# Patient Record
Sex: MALE | Race: WHITE | NOT HISPANIC OR LATINO | Employment: OTHER | URBAN - NONMETROPOLITAN AREA
[De-identification: names, ages, dates, MRNs, and addresses within clinical notes are randomized per-mention and may not be internally consistent; named-entity substitution may affect disease eponyms.]

---

## 2017-01-04 ENCOUNTER — HOSPITAL ENCOUNTER (OUTPATIENT)
Dept: GENERAL RADIOLOGY | Facility: HOSPITAL | Age: 77
Discharge: HOME | End: 2017-01-04
Attending: INTERNAL MEDICINE | Admitting: INTERNAL MEDICINE

## 2017-01-04 ENCOUNTER — TRANSCRIBE ORDERS (OUTPATIENT)
Dept: ADMINISTRATIVE | Facility: HOSPITAL | Age: 77
End: 2017-01-04

## 2017-01-04 DIAGNOSIS — C34.90 PRIMARY LUNG CANCER, UNSPECIFIED LATERALITY (HCC): Primary | ICD-10-CM

## 2017-01-04 PROCEDURE — 71020 HC CHEST PA AND LATERAL: CPT

## 2017-03-20 ENCOUNTER — HOSPITAL ENCOUNTER (OUTPATIENT)
Dept: GENERAL RADIOLOGY | Facility: HOSPITAL | Age: 77
Discharge: HOME | End: 2017-03-20
Attending: INTERNAL MEDICINE | Admitting: INTERNAL MEDICINE

## 2017-03-20 ENCOUNTER — TRANSCRIBE ORDERS (OUTPATIENT)
Dept: ADMINISTRATIVE | Facility: HOSPITAL | Age: 77
End: 2017-03-20

## 2017-03-20 DIAGNOSIS — C34.32 PRIMARY MALIGNANT NEOPLASM OF BRONCHUS OF LEFT LOWER LOBE (HCC): Primary | ICD-10-CM

## 2017-03-20 PROCEDURE — 71020 HC CHEST PA AND LATERAL: CPT

## 2017-04-27 ENCOUNTER — HOSPITAL ENCOUNTER (OUTPATIENT)
Dept: PREADMISSION TESTING | Age: 77
Discharge: HOME | End: 2017-04-27
Payer: MEDICARE

## 2017-04-27 ENCOUNTER — HOSPITAL ENCOUNTER (OUTPATIENT)
Dept: GENERAL RADIOLOGY | Age: 77
Discharge: HOME | End: 2017-04-27
Payer: MEDICARE

## 2017-04-27 VITALS — BODY MASS INDEX: 32.51 KG/M2 | WEIGHT: 240 LBS | HEIGHT: 72 IN

## 2017-04-27 LAB
ANION GAP SERPL CALCULATED.3IONS-SCNC: 16 MMOL/L (ref 7–19)
APTT: 32.3 SEC (ref 26–36.2)
BASOPHILS ABSOLUTE: 0 K/UL (ref 0–0.2)
BASOPHILS RELATIVE PERCENT: 0.4 % (ref 0–1)
BILIRUBIN URINE: NEGATIVE
BLOOD, URINE: NEGATIVE
BUN BLDV-MCNC: 9 MG/DL (ref 8–23)
CALCIUM SERPL-MCNC: 9.1 MG/DL (ref 8.8–10.2)
CHLORIDE BLD-SCNC: 97 MMOL/L (ref 98–111)
CLARITY: CLEAR
CO2: 24 MMOL/L (ref 22–29)
COLOR: YELLOW
CREAT SERPL-MCNC: 0.9 MG/DL (ref 0.5–1.2)
EOSINOPHILS ABSOLUTE: 0.1 K/UL (ref 0–0.6)
EOSINOPHILS RELATIVE PERCENT: 1.8 % (ref 0–5)
GFR NON-AFRICAN AMERICAN: >60
GLUCOSE BLD-MCNC: 80 MG/DL (ref 74–109)
GLUCOSE URINE: NEGATIVE MG/DL
HCT VFR BLD CALC: 42.2 % (ref 42–52)
HEMOGLOBIN: 14 G/DL (ref 14–18)
INR BLD: 1.1 (ref 0.88–1.18)
KETONES, URINE: NEGATIVE MG/DL
LEUKOCYTE ESTERASE, URINE: NEGATIVE
LYMPHOCYTES ABSOLUTE: 1.5 K/UL (ref 1.1–4.5)
LYMPHOCYTES RELATIVE PERCENT: 22.6 % (ref 20–40)
MCH RBC QN AUTO: 31.5 PG (ref 27–31)
MCHC RBC AUTO-ENTMCNC: 33.2 G/DL (ref 33–37)
MCV RBC AUTO: 94.8 FL (ref 80–94)
MONOCYTES ABSOLUTE: 0.9 K/UL (ref 0–0.9)
MONOCYTES RELATIVE PERCENT: 12.9 % (ref 0–10)
NEUTROPHILS ABSOLUTE: 4.2 K/UL (ref 1.5–7.5)
NEUTROPHILS RELATIVE PERCENT: 62.2 % (ref 50–65)
NITRITE, URINE: NEGATIVE
PDW BLD-RTO: 12.3 % (ref 11.5–14.5)
PH UA: 6
PLATELET # BLD: 229 K/UL (ref 130–400)
PMV BLD AUTO: 9.3 FL (ref 7.4–10.4)
POTASSIUM SERPL-SCNC: 4.1 MMOL/L (ref 3.5–5)
PROTEIN UA: NEGATIVE MG/DL
PROTHROMBIN TIME: 14.2 SEC (ref 12–14.6)
RBC # BLD: 4.45 M/UL (ref 4.7–6.1)
SODIUM BLD-SCNC: 137 MMOL/L (ref 136–145)
SPECIFIC GRAVITY UA: 1.01
UROBILINOGEN, URINE: 0.2 E.U./DL
WBC # BLD: 6.7 K/UL (ref 4.8–10.8)

## 2017-04-27 PROCEDURE — 81003 URINALYSIS AUTO W/O SCOPE: CPT

## 2017-04-27 PROCEDURE — 93005 ELECTROCARDIOGRAM TRACING: CPT

## 2017-04-27 PROCEDURE — 80048 BASIC METABOLIC PNL TOTAL CA: CPT

## 2017-04-27 PROCEDURE — 87070 CULTURE OTHR SPECIMN AEROBIC: CPT

## 2017-04-27 PROCEDURE — 85730 THROMBOPLASTIN TIME PARTIAL: CPT

## 2017-04-27 PROCEDURE — 85610 PROTHROMBIN TIME: CPT

## 2017-04-27 PROCEDURE — 85025 COMPLETE CBC W/AUTO DIFF WBC: CPT

## 2017-04-27 PROCEDURE — 71020 XR CHEST STANDARD TWO VW: CPT

## 2017-04-27 RX ORDER — ACETAMINOPHEN,DIPHENHYDRAMINE HCL 500; 25 MG/1; MG/1
1 TABLET, FILM COATED ORAL NIGHTLY PRN
COMMUNITY

## 2017-04-27 RX ORDER — AMLODIPINE BESYLATE AND BENAZEPRIL HYDROCHLORIDE 10; 20 MG/1; MG/1
1 CAPSULE ORAL DAILY
COMMUNITY

## 2017-04-27 RX ORDER — FUROSEMIDE 20 MG/1
20 TABLET ORAL DAILY
COMMUNITY

## 2017-04-27 RX ORDER — MV-MIN/FA/VIT K/LUTEIN/ZEAXANT 200MCG-5MG
2 CAPSULE ORAL DAILY
COMMUNITY

## 2017-04-27 RX ORDER — LOVASTATIN 40 MG/1
40 TABLET ORAL DAILY
COMMUNITY

## 2017-04-27 RX ORDER — ALFUZOSIN HYDROCHLORIDE 10 MG/1
10 TABLET, EXTENDED RELEASE ORAL DAILY
COMMUNITY

## 2017-04-28 LAB — MRSA CULTURE ONLY: NORMAL

## 2017-05-01 LAB
EKG P AXIS: 55 DEGREES
EKG P-R INTERVAL: 182 MS
EKG Q-T INTERVAL: 358 MS
EKG QRS DURATION: 94 MS
EKG QTC CALCULATION (BAZETT): 373 MS
EKG T AXIS: 36 DEGREES

## 2017-05-18 ENCOUNTER — HOSPITAL ENCOUNTER (INPATIENT)
Age: 77
LOS: 4 days | Discharge: HOME HEALTH | DRG: 470 | End: 2017-05-22
Attending: ORTHOPAEDIC SURGERY | Admitting: ORTHOPAEDIC SURGERY
Payer: MEDICARE

## 2017-05-18 ENCOUNTER — ANESTHESIA (OUTPATIENT)
Dept: OPERATING ROOM | Age: 77
DRG: 470 | End: 2017-05-18
Payer: MEDICARE

## 2017-05-18 ENCOUNTER — ANESTHESIA EVENT (OUTPATIENT)
Dept: OPERATING ROOM | Age: 77
DRG: 470 | End: 2017-05-18
Payer: MEDICARE

## 2017-05-18 VITALS
SYSTOLIC BLOOD PRESSURE: 120 MMHG | RESPIRATION RATE: 2 BRPM | TEMPERATURE: 97.4 F | DIASTOLIC BLOOD PRESSURE: 59 MMHG | OXYGEN SATURATION: 96 %

## 2017-05-18 DIAGNOSIS — M17.12 PRIMARY OSTEOARTHRITIS OF LEFT KNEE: Primary | ICD-10-CM

## 2017-05-18 LAB
ABO/RH: NORMAL
ANTIBODY SCREEN: NORMAL

## 2017-05-18 PROCEDURE — 6360000002 HC RX W HCPCS: Performed by: ORTHOPAEDIC SURGERY

## 2017-05-18 PROCEDURE — 6360000002 HC RX W HCPCS: Performed by: NURSE ANESTHETIST, CERTIFIED REGISTERED

## 2017-05-18 PROCEDURE — 3600000005 HC SURGERY LEVEL 5 BASE: Performed by: ORTHOPAEDIC SURGERY

## 2017-05-18 PROCEDURE — 86900 BLOOD TYPING SEROLOGIC ABO: CPT

## 2017-05-18 PROCEDURE — 2580000003 HC RX 258: Performed by: ORTHOPAEDIC SURGERY

## 2017-05-18 PROCEDURE — 2500000003 HC RX 250 WO HCPCS: Performed by: ORTHOPAEDIC SURGERY

## 2017-05-18 PROCEDURE — C1776 JOINT DEVICE (IMPLANTABLE): HCPCS | Performed by: ORTHOPAEDIC SURGERY

## 2017-05-18 PROCEDURE — 3600000015 HC SURGERY LEVEL 5 ADDTL 15MIN: Performed by: ORTHOPAEDIC SURGERY

## 2017-05-18 PROCEDURE — 3700000000 HC ANESTHESIA ATTENDED CARE: Performed by: ORTHOPAEDIC SURGERY

## 2017-05-18 PROCEDURE — 0SRC0J9 REPLACEMENT OF RIGHT KNEE JOINT WITH SYNTHETIC SUBSTITUTE, CEMENTED, OPEN APPROACH: ICD-10-PCS | Performed by: ORTHOPAEDIC SURGERY

## 2017-05-18 PROCEDURE — 2720000003 HC MISC SUTURE/STAPLES/RELOADS/ETC: Performed by: ORTHOPAEDIC SURGERY

## 2017-05-18 PROCEDURE — 86850 RBC ANTIBODY SCREEN: CPT

## 2017-05-18 PROCEDURE — 36415 COLL VENOUS BLD VENIPUNCTURE: CPT

## 2017-05-18 PROCEDURE — 1210000000 HC MED SURG R&B

## 2017-05-18 PROCEDURE — 7100000001 HC PACU RECOVERY - ADDTL 15 MIN: Performed by: ORTHOPAEDIC SURGERY

## 2017-05-18 PROCEDURE — 2720000001 HC MISC SURG SUPPLY STERILE $51-500: Performed by: ORTHOPAEDIC SURGERY

## 2017-05-18 PROCEDURE — C1713 ANCHOR/SCREW BN/BN,TIS/BN: HCPCS | Performed by: ORTHOPAEDIC SURGERY

## 2017-05-18 PROCEDURE — 6370000000 HC RX 637 (ALT 250 FOR IP): Performed by: ORTHOPAEDIC SURGERY

## 2017-05-18 PROCEDURE — G8978 MOBILITY CURRENT STATUS: HCPCS

## 2017-05-18 PROCEDURE — 7100000000 HC PACU RECOVERY - FIRST 15 MIN: Performed by: ORTHOPAEDIC SURGERY

## 2017-05-18 PROCEDURE — 3700000001 HC ADD 15 MINUTES (ANESTHESIA): Performed by: ORTHOPAEDIC SURGERY

## 2017-05-18 PROCEDURE — G8979 MOBILITY GOAL STATUS: HCPCS

## 2017-05-18 PROCEDURE — 2580000003 HC RX 258: Performed by: ANESTHESIOLOGY

## 2017-05-18 PROCEDURE — 97161 PT EVAL LOW COMPLEX 20 MIN: CPT

## 2017-05-18 PROCEDURE — 86901 BLOOD TYPING SEROLOGIC RH(D): CPT

## 2017-05-18 PROCEDURE — 2500000003 HC RX 250 WO HCPCS: Performed by: NURSE ANESTHETIST, CERTIFIED REGISTERED

## 2017-05-18 DEVICE — DISCONTINUED USE 416978 CEMENT PALACOS R SING DOSE 40GR: Type: IMPLANTABLE DEVICE | Site: KNEE | Status: FUNCTIONAL

## 2017-05-18 DEVICE — COMPONENT PAT DIA32MM THK8.5MM KNEE POLY CEM CONVENTIONAL: Type: IMPLANTABLE DEVICE | Site: PATELLA | Status: FUNCTIONAL

## 2017-05-18 DEVICE — COMPONENT FEM SZ 9 STD R KNEE CO CHROM CEM POST STBL COR: Type: IMPLANTABLE DEVICE | Site: FEMUR | Status: FUNCTIONAL

## 2017-05-18 DEVICE — PSN TIB STM 5 DEG SZ F R: Type: IMPLANTABLE DEVICE | Site: TIBIA | Status: FUNCTIONAL

## 2017-05-18 DEVICE — COMPONENT ARTC SURF PS 6-9 EF 12 MM RT TIB KNEE POLYETH: Type: IMPLANTABLE DEVICE | Site: TIBIA | Status: FUNCTIONAL

## 2017-05-18 RX ORDER — SODIUM CHLORIDE, SODIUM LACTATE, POTASSIUM CHLORIDE, CALCIUM CHLORIDE 600; 310; 30; 20 MG/100ML; MG/100ML; MG/100ML; MG/100ML
INJECTION, SOLUTION INTRAVENOUS CONTINUOUS
Status: DISCONTINUED | OUTPATIENT
Start: 2017-05-18 | End: 2017-05-18

## 2017-05-18 RX ORDER — MORPHINE SULFATE 4 MG/ML
4 INJECTION, SOLUTION INTRAMUSCULAR; INTRAVENOUS EVERY 5 MIN PRN
Status: DISCONTINUED | OUTPATIENT
Start: 2017-05-18 | End: 2017-05-18 | Stop reason: HOSPADM

## 2017-05-18 RX ORDER — OXYCODONE HYDROCHLORIDE AND ACETAMINOPHEN 5; 325 MG/1; MG/1
1 TABLET ORAL EVERY 4 HOURS PRN
Status: DISCONTINUED | OUTPATIENT
Start: 2017-05-18 | End: 2017-05-22 | Stop reason: HOSPADM

## 2017-05-18 RX ORDER — DOCUSATE SODIUM 100 MG/1
100 CAPSULE, LIQUID FILLED ORAL 2 TIMES DAILY
Status: DISCONTINUED | OUTPATIENT
Start: 2017-05-18 | End: 2017-05-22 | Stop reason: HOSPADM

## 2017-05-18 RX ORDER — LABETALOL HYDROCHLORIDE 5 MG/ML
5 INJECTION, SOLUTION INTRAVENOUS EVERY 10 MIN PRN
Status: DISCONTINUED | OUTPATIENT
Start: 2017-05-18 | End: 2017-05-18 | Stop reason: HOSPADM

## 2017-05-18 RX ORDER — LIDOCAINE HYDROCHLORIDE 10 MG/ML
1 INJECTION, SOLUTION EPIDURAL; INFILTRATION; INTRACAUDAL; PERINEURAL
Status: DISCONTINUED | OUTPATIENT
Start: 2017-05-18 | End: 2017-05-18 | Stop reason: HOSPADM

## 2017-05-18 RX ORDER — MORPHINE SULFATE 4 MG/ML
2 INJECTION, SOLUTION INTRAMUSCULAR; INTRAVENOUS EVERY 5 MIN PRN
Status: DISCONTINUED | OUTPATIENT
Start: 2017-05-18 | End: 2017-05-18 | Stop reason: HOSPADM

## 2017-05-18 RX ORDER — TRANEXAMIC ACID 650 1/1
1950 TABLET ORAL ONCE
Status: COMPLETED | OUTPATIENT
Start: 2017-05-18 | End: 2017-05-18

## 2017-05-18 RX ORDER — TAMSULOSIN HYDROCHLORIDE 0.4 MG/1
0.4 CAPSULE ORAL DAILY
Status: DISCONTINUED | OUTPATIENT
Start: 2017-05-19 | End: 2017-05-22 | Stop reason: HOSPADM

## 2017-05-18 RX ORDER — DIPHENHYDRAMINE HCL 25 MG
25 TABLET ORAL NIGHTLY PRN
Status: DISCONTINUED | OUTPATIENT
Start: 2017-05-18 | End: 2017-05-22 | Stop reason: HOSPADM

## 2017-05-18 RX ORDER — ASPIRIN 81 MG/1
81 TABLET ORAL 2 TIMES DAILY
Status: DISCONTINUED | OUTPATIENT
Start: 2017-05-18 | End: 2017-05-22 | Stop reason: HOSPADM

## 2017-05-18 RX ORDER — HYDRALAZINE HYDROCHLORIDE 20 MG/ML
5 INJECTION INTRAMUSCULAR; INTRAVENOUS EVERY 10 MIN PRN
Status: DISCONTINUED | OUTPATIENT
Start: 2017-05-18 | End: 2017-05-18 | Stop reason: HOSPADM

## 2017-05-18 RX ORDER — GLYCOPYRROLATE 0.2 MG/ML
INJECTION INTRAMUSCULAR; INTRAVENOUS PRN
Status: DISCONTINUED | OUTPATIENT
Start: 2017-05-18 | End: 2017-05-18 | Stop reason: SDUPTHER

## 2017-05-18 RX ORDER — SIMVASTATIN 5 MG
5 TABLET ORAL NIGHTLY
Status: DISCONTINUED | OUTPATIENT
Start: 2017-05-18 | End: 2017-05-22 | Stop reason: HOSPADM

## 2017-05-18 RX ORDER — FENTANYL CITRATE 50 UG/ML
50 INJECTION, SOLUTION INTRAMUSCULAR; INTRAVENOUS
Status: DISCONTINUED | OUTPATIENT
Start: 2017-05-18 | End: 2017-05-18 | Stop reason: HOSPADM

## 2017-05-18 RX ORDER — FENTANYL CITRATE 50 UG/ML
25 INJECTION, SOLUTION INTRAMUSCULAR; INTRAVENOUS
Status: DISCONTINUED | OUTPATIENT
Start: 2017-05-18 | End: 2017-05-18 | Stop reason: HOSPADM

## 2017-05-18 RX ORDER — PROMETHAZINE HYDROCHLORIDE 25 MG/ML
6.25 INJECTION, SOLUTION INTRAMUSCULAR; INTRAVENOUS EVERY 6 HOURS PRN
Status: DISCONTINUED | OUTPATIENT
Start: 2017-05-18 | End: 2017-05-22 | Stop reason: HOSPADM

## 2017-05-18 RX ORDER — MORPHINE SULFATE 4 MG/ML
2 INJECTION, SOLUTION INTRAMUSCULAR; INTRAVENOUS
Status: DISCONTINUED | OUTPATIENT
Start: 2017-05-18 | End: 2017-05-22 | Stop reason: HOSPADM

## 2017-05-18 RX ORDER — SODIUM CHLORIDE 0.9 % (FLUSH) 0.9 %
10 SYRINGE (ML) INJECTION EVERY 12 HOURS SCHEDULED
Status: DISCONTINUED | OUTPATIENT
Start: 2017-05-18 | End: 2017-05-22 | Stop reason: HOSPADM

## 2017-05-18 RX ORDER — ONDANSETRON 2 MG/ML
4 INJECTION INTRAMUSCULAR; INTRAVENOUS EVERY 6 HOURS PRN
Status: DISCONTINUED | OUTPATIENT
Start: 2017-05-18 | End: 2017-05-22 | Stop reason: HOSPADM

## 2017-05-18 RX ORDER — 0.9 % SODIUM CHLORIDE 0.9 %
500 INTRAVENOUS SOLUTION INTRAVENOUS PRN
Status: DISCONTINUED | OUTPATIENT
Start: 2017-05-18 | End: 2017-05-22 | Stop reason: HOSPADM

## 2017-05-18 RX ORDER — MV-MIN/FA/VIT K/LUTEIN/ZEAXANT 200MCG-5MG
2 CAPSULE ORAL DAILY
Status: DISCONTINUED | OUTPATIENT
Start: 2017-05-18 | End: 2017-05-18

## 2017-05-18 RX ORDER — MEPERIDINE HYDROCHLORIDE 50 MG/ML
12.5 INJECTION INTRAMUSCULAR; INTRAVENOUS; SUBCUTANEOUS EVERY 5 MIN PRN
Status: DISCONTINUED | OUTPATIENT
Start: 2017-05-18 | End: 2017-05-18 | Stop reason: HOSPADM

## 2017-05-18 RX ORDER — TRAMADOL HYDROCHLORIDE 50 MG/1
50 TABLET ORAL EVERY 6 HOURS PRN
Status: DISCONTINUED | OUTPATIENT
Start: 2017-05-18 | End: 2017-05-22 | Stop reason: HOSPADM

## 2017-05-18 RX ORDER — DIPHENHYDRAMINE HCL 25 MG
25 TABLET ORAL EVERY 6 HOURS PRN
Status: DISCONTINUED | OUTPATIENT
Start: 2017-05-18 | End: 2017-05-22 | Stop reason: HOSPADM

## 2017-05-18 RX ORDER — DIPHENHYDRAMINE HYDROCHLORIDE 50 MG/ML
12.5 INJECTION INTRAMUSCULAR; INTRAVENOUS
Status: DISCONTINUED | OUTPATIENT
Start: 2017-05-18 | End: 2017-05-18 | Stop reason: HOSPADM

## 2017-05-18 RX ORDER — FENTANYL CITRATE 50 UG/ML
INJECTION, SOLUTION INTRAMUSCULAR; INTRAVENOUS PRN
Status: DISCONTINUED | OUTPATIENT
Start: 2017-05-18 | End: 2017-05-18 | Stop reason: SDUPTHER

## 2017-05-18 RX ORDER — SODIUM CHLORIDE 9 MG/ML
INJECTION, SOLUTION INTRAVENOUS CONTINUOUS
Status: DISCONTINUED | OUTPATIENT
Start: 2017-05-18 | End: 2017-05-22 | Stop reason: HOSPADM

## 2017-05-18 RX ORDER — SODIUM CHLORIDE 0.9 % (FLUSH) 0.9 %
10 SYRINGE (ML) INJECTION EVERY 12 HOURS SCHEDULED
Status: DISCONTINUED | OUTPATIENT
Start: 2017-05-18 | End: 2017-05-18 | Stop reason: HOSPADM

## 2017-05-18 RX ORDER — SODIUM CHLORIDE 0.9 % (FLUSH) 0.9 %
10 SYRINGE (ML) INJECTION PRN
Status: DISCONTINUED | OUTPATIENT
Start: 2017-05-18 | End: 2017-05-18 | Stop reason: HOSPADM

## 2017-05-18 RX ORDER — CELECOXIB 200 MG/1
200 CAPSULE ORAL ONCE
Status: COMPLETED | OUTPATIENT
Start: 2017-05-18 | End: 2017-05-18

## 2017-05-18 RX ORDER — OXYCODONE HCL 10 MG/1
10 TABLET, FILM COATED, EXTENDED RELEASE ORAL ONCE
Status: COMPLETED | OUTPATIENT
Start: 2017-05-18 | End: 2017-05-18

## 2017-05-18 RX ORDER — SODIUM CHLORIDE 0.9 % (FLUSH) 0.9 %
10 SYRINGE (ML) INJECTION PRN
Status: DISCONTINUED | OUTPATIENT
Start: 2017-05-18 | End: 2017-05-22 | Stop reason: HOSPADM

## 2017-05-18 RX ORDER — ACETAMINOPHEN,DIPHENHYDRAMINE HCL 500; 25 MG/1; MG/1
1 TABLET, FILM COATED ORAL NIGHTLY PRN
Status: DISCONTINUED | OUTPATIENT
Start: 2017-05-18 | End: 2017-05-18

## 2017-05-18 RX ORDER — CELECOXIB 200 MG/1
200 CAPSULE ORAL DAILY
Status: DISCONTINUED | OUTPATIENT
Start: 2017-05-19 | End: 2017-05-22 | Stop reason: HOSPADM

## 2017-05-18 RX ORDER — PROPOFOL 10 MG/ML
INJECTION, EMULSION INTRAVENOUS PRN
Status: DISCONTINUED | OUTPATIENT
Start: 2017-05-18 | End: 2017-05-18 | Stop reason: SDUPTHER

## 2017-05-18 RX ORDER — ENALAPRILAT 2.5 MG/2ML
1.25 INJECTION INTRAVENOUS
Status: DISCONTINUED | OUTPATIENT
Start: 2017-05-18 | End: 2017-05-18 | Stop reason: HOSPADM

## 2017-05-18 RX ORDER — M-VIT,TX,IRON,MINS/CALC/FOLIC 27MG-0.4MG
1 TABLET ORAL DAILY
Status: DISCONTINUED | OUTPATIENT
Start: 2017-05-19 | End: 2017-05-22 | Stop reason: HOSPADM

## 2017-05-18 RX ORDER — METOCLOPRAMIDE HYDROCHLORIDE 5 MG/ML
10 INJECTION INTRAMUSCULAR; INTRAVENOUS
Status: DISCONTINUED | OUTPATIENT
Start: 2017-05-18 | End: 2017-05-18 | Stop reason: HOSPADM

## 2017-05-18 RX ORDER — ACETAMINOPHEN 500 MG
1000 TABLET ORAL ONCE
Status: COMPLETED | OUTPATIENT
Start: 2017-05-18 | End: 2017-05-18

## 2017-05-18 RX ORDER — ACETAMINOPHEN 500 MG
500 TABLET ORAL NIGHTLY PRN
Status: DISCONTINUED | OUTPATIENT
Start: 2017-05-18 | End: 2017-05-22 | Stop reason: HOSPADM

## 2017-05-18 RX ORDER — ROCURONIUM BROMIDE 10 MG/ML
INJECTION, SOLUTION INTRAVENOUS PRN
Status: DISCONTINUED | OUTPATIENT
Start: 2017-05-18 | End: 2017-05-18 | Stop reason: SDUPTHER

## 2017-05-18 RX ORDER — LIDOCAINE HYDROCHLORIDE 10 MG/ML
INJECTION, SOLUTION INFILTRATION; PERINEURAL PRN
Status: DISCONTINUED | OUTPATIENT
Start: 2017-05-18 | End: 2017-05-18 | Stop reason: SDUPTHER

## 2017-05-18 RX ORDER — OXYCODONE HYDROCHLORIDE AND ACETAMINOPHEN 5; 325 MG/1; MG/1
2 TABLET ORAL EVERY 4 HOURS PRN
Status: DISCONTINUED | OUTPATIENT
Start: 2017-05-18 | End: 2017-05-22 | Stop reason: HOSPADM

## 2017-05-18 RX ORDER — DEXAMETHASONE SODIUM PHOSPHATE 10 MG/ML
10 INJECTION INTRAMUSCULAR; INTRAVENOUS ONCE
Status: COMPLETED | OUTPATIENT
Start: 2017-05-18 | End: 2017-05-18

## 2017-05-18 RX ORDER — MIDAZOLAM HYDROCHLORIDE 1 MG/ML
2 INJECTION INTRAMUSCULAR; INTRAVENOUS
Status: DISCONTINUED | OUTPATIENT
Start: 2017-05-18 | End: 2017-05-18 | Stop reason: HOSPADM

## 2017-05-18 RX ORDER — ACETAMINOPHEN 325 MG/1
650 TABLET ORAL EVERY 4 HOURS PRN
Status: DISCONTINUED | OUTPATIENT
Start: 2017-05-18 | End: 2017-05-22 | Stop reason: HOSPADM

## 2017-05-18 RX ORDER — PROMETHAZINE HYDROCHLORIDE 25 MG/ML
6.25 INJECTION, SOLUTION INTRAMUSCULAR; INTRAVENOUS
Status: DISCONTINUED | OUTPATIENT
Start: 2017-05-18 | End: 2017-05-18 | Stop reason: HOSPADM

## 2017-05-18 RX ORDER — MORPHINE SULFATE 4 MG/ML
4 INJECTION, SOLUTION INTRAMUSCULAR; INTRAVENOUS
Status: DISCONTINUED | OUTPATIENT
Start: 2017-05-18 | End: 2017-05-22 | Stop reason: HOSPADM

## 2017-05-18 RX ADMIN — SUGAMMADEX 220 MG: 100 INJECTION, SOLUTION INTRAVENOUS at 11:21

## 2017-05-18 RX ADMIN — HYDROMORPHONE HYDROCHLORIDE 0.5 MG: 1 INJECTION, SOLUTION INTRAMUSCULAR; INTRAVENOUS; SUBCUTANEOUS at 12:34

## 2017-05-18 RX ADMIN — PROPOFOL 40 MG: 10 INJECTION, EMULSION INTRAVENOUS at 10:28

## 2017-05-18 RX ADMIN — DIPHENHYDRAMINE HCL 25 MG: 25 TABLET ORAL at 23:45

## 2017-05-18 RX ADMIN — ACETAMINOPHEN 1000 MG: 500 TABLET, FILM COATED ORAL at 09:43

## 2017-05-18 RX ADMIN — FENTANYL CITRATE 100 MCG: 50 INJECTION INTRAMUSCULAR; INTRAVENOUS at 10:03

## 2017-05-18 RX ADMIN — OXYCODONE AND ACETAMINOPHEN 2 TABLET: 5; 325 TABLET ORAL at 23:00

## 2017-05-18 RX ADMIN — CELECOXIB 200 MG: 200 CAPSULE ORAL at 09:43

## 2017-05-18 RX ADMIN — ASPIRIN 81 MG: 81 TABLET, COATED ORAL at 22:05

## 2017-05-18 RX ADMIN — DEXAMETHASONE SODIUM PHOSPHATE 10 MG: 10 INJECTION INTRAMUSCULAR; INTRAVENOUS at 10:15

## 2017-05-18 RX ADMIN — HYDROMORPHONE HYDROCHLORIDE 0.5 MG: 1 INJECTION, SOLUTION INTRAMUSCULAR; INTRAVENOUS; SUBCUTANEOUS at 12:20

## 2017-05-18 RX ADMIN — LIDOCAINE HYDROCHLORIDE 50 MG: 10 INJECTION, SOLUTION INFILTRATION; PERINEURAL at 10:05

## 2017-05-18 RX ADMIN — ONDANSETRON 4 MG: 2 INJECTION INTRAMUSCULAR; INTRAVENOUS at 18:58

## 2017-05-18 RX ADMIN — SODIUM CHLORIDE, POTASSIUM CHLORIDE, SODIUM LACTATE AND CALCIUM CHLORIDE: 600; 310; 30; 20 INJECTION, SOLUTION INTRAVENOUS at 09:43

## 2017-05-18 RX ADMIN — PHENYLEPHRINE HYDROCHLORIDE 100 MCG: 10 INJECTION INTRAVENOUS at 10:51

## 2017-05-18 RX ADMIN — SODIUM CHLORIDE, SODIUM LACTATE, POTASSIUM CHLORIDE, AND CALCIUM CHLORIDE: 600; 310; 30; 20 INJECTION, SOLUTION INTRAVENOUS at 10:58

## 2017-05-18 RX ADMIN — SIMVASTATIN 5 MG: 5 TABLET, FILM COATED ORAL at 22:04

## 2017-05-18 RX ADMIN — Medication 2 G: at 10:12

## 2017-05-18 RX ADMIN — HYDROMORPHONE HYDROCHLORIDE 0.25 MG: 1 INJECTION, SOLUTION INTRAMUSCULAR; INTRAVENOUS; SUBCUTANEOUS at 11:42

## 2017-05-18 RX ADMIN — SODIUM CHLORIDE: 9 INJECTION, SOLUTION INTRAVENOUS at 18:49

## 2017-05-18 RX ADMIN — HYDROMORPHONE HYDROCHLORIDE 0.5 MG: 1 INJECTION, SOLUTION INTRAMUSCULAR; INTRAVENOUS; SUBCUTANEOUS at 12:08

## 2017-05-18 RX ADMIN — ROCURONIUM BROMIDE 50 MG: 10 INJECTION INTRAVENOUS at 10:05

## 2017-05-18 RX ADMIN — GLYCOPYRROLATE 0.2 MG: 0.2 INJECTION, SOLUTION INTRAMUSCULAR; INTRAVENOUS at 11:40

## 2017-05-18 RX ADMIN — ASPIRIN 81 MG: 81 TABLET, COATED ORAL at 16:48

## 2017-05-18 RX ADMIN — DOCUSATE SODIUM 100 MG: 100 CAPSULE, LIQUID FILLED ORAL at 22:04

## 2017-05-18 RX ADMIN — ROCURONIUM BROMIDE 10 MG: 10 INJECTION INTRAVENOUS at 11:05

## 2017-05-18 RX ADMIN — OXYCODONE AND ACETAMINOPHEN 2 TABLET: 5; 325 TABLET ORAL at 18:58

## 2017-05-18 RX ADMIN — Medication 10 ML: at 22:05

## 2017-05-18 RX ADMIN — TRANEXAMIC ACID 1950 MG: 650 TABLET ORAL at 09:44

## 2017-05-18 RX ADMIN — DOCUSATE SODIUM 100 MG: 100 CAPSULE, LIQUID FILLED ORAL at 16:48

## 2017-05-18 RX ADMIN — PROPOFOL 140 MG: 10 INJECTION, EMULSION INTRAVENOUS at 10:05

## 2017-05-18 RX ADMIN — HYDROMORPHONE HYDROCHLORIDE 0.25 MG: 1 INJECTION, SOLUTION INTRAMUSCULAR; INTRAVENOUS; SUBCUTANEOUS at 11:23

## 2017-05-18 RX ADMIN — SODIUM CHLORIDE, SODIUM LACTATE, POTASSIUM CHLORIDE, AND CALCIUM CHLORIDE: 600; 310; 30; 20 INJECTION, SOLUTION INTRAVENOUS at 10:01

## 2017-05-18 RX ADMIN — FENTANYL CITRATE 50 MCG: 50 INJECTION INTRAMUSCULAR; INTRAVENOUS at 10:15

## 2017-05-18 RX ADMIN — OXYCODONE HYDROCHLORIDE 10 MG: 10 TABLET, FILM COATED, EXTENDED RELEASE ORAL at 09:43

## 2017-05-18 RX ADMIN — TRAMADOL HYDROCHLORIDE 50 MG: 50 TABLET, FILM COATED ORAL at 22:04

## 2017-05-18 ASSESSMENT — PAIN SCALES - GENERAL
PAINLEVEL_OUTOF10: 7
PAINLEVEL_OUTOF10: 6
PAINLEVEL_OUTOF10: 0
PAINLEVEL_OUTOF10: 2
PAINLEVEL_OUTOF10: 7
PAINLEVEL_OUTOF10: 6
PAINLEVEL_OUTOF10: 8
PAINLEVEL_OUTOF10: 4
PAINLEVEL_OUTOF10: 0
PAINLEVEL_OUTOF10: 0

## 2017-05-19 PROBLEM — I10 ESSENTIAL HYPERTENSION: Status: ACTIVE | Noted: 2017-05-19

## 2017-05-19 PROBLEM — J43.9 PULMONARY EMPHYSEMA (HCC): Status: ACTIVE | Noted: 2017-05-19

## 2017-05-19 PROBLEM — K59.01 SLOW TRANSIT CONSTIPATION: Status: ACTIVE | Noted: 2017-05-19

## 2017-05-19 PROBLEM — D50.9 IRON DEFICIENCY ANEMIA: Status: ACTIVE | Noted: 2017-05-19

## 2017-05-19 PROBLEM — R33.9 URINARY RETENTION: Status: ACTIVE | Noted: 2017-05-19

## 2017-05-19 LAB
ANION GAP SERPL CALCULATED.3IONS-SCNC: 14 MMOL/L (ref 7–19)
BUN BLDV-MCNC: 10 MG/DL (ref 8–23)
CALCIUM SERPL-MCNC: 8 MG/DL (ref 8.8–10.2)
CHLORIDE BLD-SCNC: 97 MMOL/L (ref 98–111)
CO2: 22 MMOL/L (ref 22–29)
CREAT SERPL-MCNC: 0.9 MG/DL (ref 0.5–1.2)
GFR NON-AFRICAN AMERICAN: >60
GLUCOSE BLD-MCNC: 134 MG/DL (ref 74–109)
HCT VFR BLD CALC: 31.5 % (ref 42–52)
HEMOGLOBIN: 10.4 G/DL (ref 14–18)
POTASSIUM SERPL-SCNC: 4.8 MMOL/L (ref 3.5–5)
SODIUM BLD-SCNC: 133 MMOL/L (ref 136–145)

## 2017-05-19 PROCEDURE — 94664 DEMO&/EVAL PT USE INHALER: CPT

## 2017-05-19 PROCEDURE — 85018 HEMOGLOBIN: CPT

## 2017-05-19 PROCEDURE — 97530 THERAPEUTIC ACTIVITIES: CPT

## 2017-05-19 PROCEDURE — 2580000003 HC RX 258: Performed by: ORTHOPAEDIC SURGERY

## 2017-05-19 PROCEDURE — 80048 BASIC METABOLIC PNL TOTAL CA: CPT

## 2017-05-19 PROCEDURE — 36415 COLL VENOUS BLD VENIPUNCTURE: CPT

## 2017-05-19 PROCEDURE — 6370000000 HC RX 637 (ALT 250 FOR IP): Performed by: ORTHOPAEDIC SURGERY

## 2017-05-19 PROCEDURE — 6360000002 HC RX W HCPCS: Performed by: ORTHOPAEDIC SURGERY

## 2017-05-19 PROCEDURE — 97116 GAIT TRAINING THERAPY: CPT

## 2017-05-19 PROCEDURE — 1210000000 HC MED SURG R&B

## 2017-05-19 PROCEDURE — 85014 HEMATOCRIT: CPT

## 2017-05-19 RX ADMIN — SODIUM CHLORIDE: 9 INJECTION, SOLUTION INTRAVENOUS at 05:38

## 2017-05-19 RX ADMIN — OXYCODONE HYDROCHLORIDE AND ACETAMINOPHEN 1 TABLET: 5; 325 TABLET ORAL at 15:35

## 2017-05-19 RX ADMIN — SIMVASTATIN 5 MG: 5 TABLET, FILM COATED ORAL at 21:45

## 2017-05-19 RX ADMIN — DIPHENHYDRAMINE HCL 25 MG: 25 TABLET ORAL at 21:58

## 2017-05-19 RX ADMIN — MULTIPLE VITAMINS W/ MINERALS TAB 1 TABLET: TAB at 08:58

## 2017-05-19 RX ADMIN — CELECOXIB 200 MG: 200 CAPSULE ORAL at 08:58

## 2017-05-19 RX ADMIN — ASPIRIN 81 MG: 81 TABLET, COATED ORAL at 08:58

## 2017-05-19 RX ADMIN — ASPIRIN 81 MG: 81 TABLET, COATED ORAL at 21:45

## 2017-05-19 RX ADMIN — Medication 10 ML: at 21:45

## 2017-05-19 RX ADMIN — DOCUSATE SODIUM 100 MG: 100 CAPSULE, LIQUID FILLED ORAL at 21:45

## 2017-05-19 RX ADMIN — Medication 2 G: at 03:04

## 2017-05-19 RX ADMIN — DOCUSATE SODIUM 100 MG: 100 CAPSULE, LIQUID FILLED ORAL at 08:58

## 2017-05-19 RX ADMIN — OXYCODONE AND ACETAMINOPHEN 2 TABLET: 5; 325 TABLET ORAL at 21:45

## 2017-05-19 RX ADMIN — TAMSULOSIN HYDROCHLORIDE 0.4 MG: 0.4 CAPSULE ORAL at 08:58

## 2017-05-19 RX ADMIN — TRAMADOL HYDROCHLORIDE 50 MG: 50 TABLET, FILM COATED ORAL at 08:58

## 2017-05-19 RX ADMIN — Medication 10 ML: at 08:58

## 2017-05-19 RX ADMIN — OXYCODONE AND ACETAMINOPHEN 2 TABLET: 5; 325 TABLET ORAL at 05:36

## 2017-05-19 ASSESSMENT — PAIN SCALES - GENERAL
PAINLEVEL_OUTOF10: 0
PAINLEVEL_OUTOF10: 4
PAINLEVEL_OUTOF10: 0
PAINLEVEL_OUTOF10: 7
PAINLEVEL_OUTOF10: 5
PAINLEVEL_OUTOF10: 6

## 2017-05-20 LAB
ANION GAP SERPL CALCULATED.3IONS-SCNC: 11 MMOL/L (ref 7–19)
BUN BLDV-MCNC: 12 MG/DL (ref 8–23)
CALCIUM SERPL-MCNC: 7.9 MG/DL (ref 8.8–10.2)
CHLORIDE BLD-SCNC: 95 MMOL/L (ref 98–111)
CO2: 25 MMOL/L (ref 22–29)
CREAT SERPL-MCNC: 0.9 MG/DL (ref 0.5–1.2)
GFR NON-AFRICAN AMERICAN: >60
GLUCOSE BLD-MCNC: 119 MG/DL (ref 74–109)
HCT VFR BLD CALC: 26.7 % (ref 42–52)
HEMOGLOBIN: 8.8 G/DL (ref 14–18)
IRON SATURATION: 11 % (ref 14–50)
IRON: 21 UG/DL (ref 59–158)
POTASSIUM SERPL-SCNC: 4.3 MMOL/L (ref 3.5–5)
SODIUM BLD-SCNC: 131 MMOL/L (ref 136–145)
TOTAL IRON BINDING CAPACITY: 192 UG/DL (ref 250–400)
VITAMIN B-12: 224 PG/ML (ref 211–946)

## 2017-05-20 PROCEDURE — 6370000000 HC RX 637 (ALT 250 FOR IP): Performed by: ORTHOPAEDIC SURGERY

## 2017-05-20 PROCEDURE — 83540 ASSAY OF IRON: CPT

## 2017-05-20 PROCEDURE — 80048 BASIC METABOLIC PNL TOTAL CA: CPT

## 2017-05-20 PROCEDURE — 1210000000 HC MED SURG R&B

## 2017-05-20 PROCEDURE — 97530 THERAPEUTIC ACTIVITIES: CPT

## 2017-05-20 PROCEDURE — 85018 HEMOGLOBIN: CPT

## 2017-05-20 PROCEDURE — 83550 IRON BINDING TEST: CPT

## 2017-05-20 PROCEDURE — 97116 GAIT TRAINING THERAPY: CPT

## 2017-05-20 PROCEDURE — 36415 COLL VENOUS BLD VENIPUNCTURE: CPT

## 2017-05-20 PROCEDURE — 82607 VITAMIN B-12: CPT

## 2017-05-20 PROCEDURE — 85014 HEMATOCRIT: CPT

## 2017-05-20 PROCEDURE — 2580000003 HC RX 258: Performed by: ORTHOPAEDIC SURGERY

## 2017-05-20 PROCEDURE — 2700000000 HC OXYGEN THERAPY PER DAY

## 2017-05-20 RX ADMIN — Medication 10 ML: at 08:14

## 2017-05-20 RX ADMIN — ASPIRIN 81 MG: 81 TABLET, COATED ORAL at 20:51

## 2017-05-20 RX ADMIN — DOCUSATE SODIUM 100 MG: 100 CAPSULE, LIQUID FILLED ORAL at 08:12

## 2017-05-20 RX ADMIN — MULTIPLE VITAMINS W/ MINERALS TAB 1 TABLET: TAB at 08:12

## 2017-05-20 RX ADMIN — DOCUSATE SODIUM 100 MG: 100 CAPSULE, LIQUID FILLED ORAL at 20:51

## 2017-05-20 RX ADMIN — Medication 10 ML: at 20:51

## 2017-05-20 RX ADMIN — TAMSULOSIN HYDROCHLORIDE 0.4 MG: 0.4 CAPSULE ORAL at 08:12

## 2017-05-20 RX ADMIN — CELECOXIB 200 MG: 200 CAPSULE ORAL at 08:12

## 2017-05-20 RX ADMIN — OXYCODONE AND ACETAMINOPHEN 2 TABLET: 5; 325 TABLET ORAL at 11:49

## 2017-05-20 RX ADMIN — DIPHENHYDRAMINE HCL 25 MG: 25 TABLET ORAL at 20:51

## 2017-05-20 RX ADMIN — TRAMADOL HYDROCHLORIDE 50 MG: 50 TABLET, FILM COATED ORAL at 08:12

## 2017-05-20 RX ADMIN — OXYCODONE AND ACETAMINOPHEN 2 TABLET: 5; 325 TABLET ORAL at 16:09

## 2017-05-20 RX ADMIN — OXYCODONE AND ACETAMINOPHEN 2 TABLET: 5; 325 TABLET ORAL at 04:54

## 2017-05-20 RX ADMIN — OXYCODONE AND ACETAMINOPHEN 2 TABLET: 5; 325 TABLET ORAL at 20:50

## 2017-05-20 RX ADMIN — SIMVASTATIN 5 MG: 5 TABLET, FILM COATED ORAL at 20:51

## 2017-05-20 RX ADMIN — ASPIRIN 81 MG: 81 TABLET, COATED ORAL at 08:12

## 2017-05-20 ASSESSMENT — PAIN SCALES - GENERAL
PAINLEVEL_OUTOF10: 0
PAINLEVEL_OUTOF10: 3
PAINLEVEL_OUTOF10: 0
PAINLEVEL_OUTOF10: 4
PAINLEVEL_OUTOF10: 5
PAINLEVEL_OUTOF10: 3
PAINLEVEL_OUTOF10: 10

## 2017-05-21 LAB
ANION GAP SERPL CALCULATED.3IONS-SCNC: 12 MMOL/L (ref 7–19)
BUN BLDV-MCNC: 8 MG/DL (ref 8–23)
CALCIUM SERPL-MCNC: 8.1 MG/DL (ref 8.8–10.2)
CHLORIDE BLD-SCNC: 91 MMOL/L (ref 98–111)
CO2: 26 MMOL/L (ref 22–29)
CREAT SERPL-MCNC: 0.8 MG/DL (ref 0.5–1.2)
GFR NON-AFRICAN AMERICAN: >60
GLUCOSE BLD-MCNC: 115 MG/DL (ref 74–109)
HCT VFR BLD CALC: 27.1 % (ref 42–52)
HEMOGLOBIN: 9 G/DL (ref 14–18)
POTASSIUM SERPL-SCNC: 4.5 MMOL/L (ref 3.5–5)
SODIUM BLD-SCNC: 129 MMOL/L (ref 136–145)

## 2017-05-21 PROCEDURE — 2580000003 HC RX 258: Performed by: ORTHOPAEDIC SURGERY

## 2017-05-21 PROCEDURE — 6370000000 HC RX 637 (ALT 250 FOR IP): Performed by: ORTHOPAEDIC SURGERY

## 2017-05-21 PROCEDURE — 1210000000 HC MED SURG R&B

## 2017-05-21 PROCEDURE — 85014 HEMATOCRIT: CPT

## 2017-05-21 PROCEDURE — 97530 THERAPEUTIC ACTIVITIES: CPT

## 2017-05-21 PROCEDURE — 6360000002 HC RX W HCPCS: Performed by: ORTHOPAEDIC SURGERY

## 2017-05-21 PROCEDURE — 80048 BASIC METABOLIC PNL TOTAL CA: CPT

## 2017-05-21 PROCEDURE — 85018 HEMOGLOBIN: CPT

## 2017-05-21 PROCEDURE — 36415 COLL VENOUS BLD VENIPUNCTURE: CPT

## 2017-05-21 PROCEDURE — 97116 GAIT TRAINING THERAPY: CPT

## 2017-05-21 RX ADMIN — SIMVASTATIN 5 MG: 5 TABLET, FILM COATED ORAL at 21:38

## 2017-05-21 RX ADMIN — OXYCODONE AND ACETAMINOPHEN 2 TABLET: 5; 325 TABLET ORAL at 18:44

## 2017-05-21 RX ADMIN — Medication 10 ML: at 21:39

## 2017-05-21 RX ADMIN — MULTIPLE VITAMINS W/ MINERALS TAB 1 TABLET: TAB at 09:22

## 2017-05-21 RX ADMIN — OXYCODONE AND ACETAMINOPHEN 2 TABLET: 5; 325 TABLET ORAL at 11:17

## 2017-05-21 RX ADMIN — ASPIRIN 81 MG: 81 TABLET, COATED ORAL at 09:22

## 2017-05-21 RX ADMIN — ONDANSETRON 4 MG: 2 INJECTION INTRAMUSCULAR; INTRAVENOUS at 18:44

## 2017-05-21 RX ADMIN — DIPHENHYDRAMINE HCL 25 MG: 25 TABLET ORAL at 21:42

## 2017-05-21 RX ADMIN — ASPIRIN 81 MG: 81 TABLET, COATED ORAL at 21:38

## 2017-05-21 RX ADMIN — DOCUSATE SODIUM 100 MG: 100 CAPSULE, LIQUID FILLED ORAL at 21:38

## 2017-05-21 RX ADMIN — OXYCODONE AND ACETAMINOPHEN 2 TABLET: 5; 325 TABLET ORAL at 22:54

## 2017-05-21 RX ADMIN — CELECOXIB 200 MG: 200 CAPSULE ORAL at 09:22

## 2017-05-21 RX ADMIN — DOCUSATE SODIUM 100 MG: 100 CAPSULE, LIQUID FILLED ORAL at 09:22

## 2017-05-21 RX ADMIN — OXYCODONE AND ACETAMINOPHEN 2 TABLET: 5; 325 TABLET ORAL at 02:44

## 2017-05-21 RX ADMIN — TAMSULOSIN HYDROCHLORIDE 0.4 MG: 0.4 CAPSULE ORAL at 09:22

## 2017-05-21 ASSESSMENT — PAIN SCALES - GENERAL
PAINLEVEL_OUTOF10: 0
PAINLEVEL_OUTOF10: 2
PAINLEVEL_OUTOF10: 3
PAINLEVEL_OUTOF10: 2
PAINLEVEL_OUTOF10: 4
PAINLEVEL_OUTOF10: 0
PAINLEVEL_OUTOF10: 2
PAINLEVEL_OUTOF10: 7

## 2017-05-22 VITALS
DIASTOLIC BLOOD PRESSURE: 75 MMHG | WEIGHT: 240 LBS | SYSTOLIC BLOOD PRESSURE: 140 MMHG | OXYGEN SATURATION: 92 % | BODY MASS INDEX: 32.51 KG/M2 | TEMPERATURE: 97.1 F | HEIGHT: 72 IN | RESPIRATION RATE: 16 BRPM | HEART RATE: 85 BPM

## 2017-05-22 PROCEDURE — G8988 SELF CARE GOAL STATUS: HCPCS

## 2017-05-22 PROCEDURE — 97166 OT EVAL MOD COMPLEX 45 MIN: CPT

## 2017-05-22 PROCEDURE — 6370000000 HC RX 637 (ALT 250 FOR IP): Performed by: ORTHOPAEDIC SURGERY

## 2017-05-22 PROCEDURE — G8987 SELF CARE CURRENT STATUS: HCPCS

## 2017-05-22 RX ORDER — TRAMADOL HYDROCHLORIDE 50 MG/1
50 TABLET ORAL EVERY 6 HOURS PRN
Qty: 60 TABLET | Refills: 0 | Status: SHIPPED | OUTPATIENT
Start: 2017-05-22 | End: 2017-06-01

## 2017-05-22 RX ORDER — ASPIRIN 81 MG/1
81 TABLET ORAL 2 TIMES DAILY
Qty: 60 TABLET | Refills: 0 | Status: SHIPPED | OUTPATIENT
Start: 2017-05-22

## 2017-05-22 RX ORDER — DOCUSATE SODIUM 100 MG/1
100 CAPSULE, LIQUID FILLED ORAL DAILY
Qty: 20 CAPSULE | Refills: 0 | Status: SHIPPED | OUTPATIENT
Start: 2017-05-22

## 2017-05-22 RX ORDER — OXYCODONE HYDROCHLORIDE AND ACETAMINOPHEN 5; 325 MG/1; MG/1
TABLET ORAL
Qty: 60 TABLET | Refills: 0 | Status: SHIPPED | OUTPATIENT
Start: 2017-05-22

## 2017-05-22 RX ORDER — CELECOXIB 200 MG/1
200 CAPSULE ORAL DAILY
Qty: 14 CAPSULE | Refills: 0 | Status: SHIPPED | OUTPATIENT
Start: 2017-05-22 | End: 2017-06-05

## 2017-05-22 RX ADMIN — ASPIRIN 81 MG: 81 TABLET, COATED ORAL at 09:16

## 2017-05-22 RX ADMIN — DOCUSATE SODIUM 100 MG: 100 CAPSULE, LIQUID FILLED ORAL at 09:16

## 2017-05-22 RX ADMIN — MULTIPLE VITAMINS W/ MINERALS TAB 1 TABLET: TAB at 09:16

## 2017-05-22 RX ADMIN — TRAMADOL HYDROCHLORIDE 50 MG: 50 TABLET, FILM COATED ORAL at 09:20

## 2017-05-22 RX ADMIN — TAMSULOSIN HYDROCHLORIDE 0.4 MG: 0.4 CAPSULE ORAL at 09:16

## 2017-05-22 RX ADMIN — CELECOXIB 200 MG: 200 CAPSULE ORAL at 09:16

## 2017-05-22 ASSESSMENT — PAIN DESCRIPTION - DESCRIPTORS: DESCRIPTORS: ACHING;DULL

## 2017-05-22 ASSESSMENT — PAIN DESCRIPTION - ORIENTATION: ORIENTATION: RIGHT

## 2017-05-22 ASSESSMENT — PAIN DESCRIPTION - LOCATION: LOCATION: KNEE

## 2017-05-22 ASSESSMENT — PAIN SCALES - GENERAL
PAINLEVEL_OUTOF10: 0
PAINLEVEL_OUTOF10: 2

## 2017-07-03 ENCOUNTER — HOSPITAL ENCOUNTER (OUTPATIENT)
Dept: GENERAL RADIOLOGY | Facility: HOSPITAL | Age: 77
Discharge: HOME | End: 2017-07-03
Attending: INTERNAL MEDICINE | Admitting: INTERNAL MEDICINE

## 2017-07-03 ENCOUNTER — TRANSCRIBE ORDERS (OUTPATIENT)
Dept: ADMINISTRATIVE | Facility: HOSPITAL | Age: 77
End: 2017-07-03

## 2017-07-03 DIAGNOSIS — G32.81 LUNG CANCER ASSOCIATED CEREBELLAR ATAXIA (HCC): ICD-10-CM

## 2017-07-03 DIAGNOSIS — G32.81 LUNG CANCER ASSOCIATED CEREBELLAR ATAXIA (HCC): Primary | ICD-10-CM

## 2017-07-03 DIAGNOSIS — C34.90 LUNG CANCER ASSOCIATED CEREBELLAR ATAXIA (HCC): Primary | ICD-10-CM

## 2017-07-03 DIAGNOSIS — C34.90 LUNG CANCER ASSOCIATED CEREBELLAR ATAXIA (HCC): ICD-10-CM

## 2017-07-03 PROCEDURE — 71020 HC CHEST PA AND LATERAL: CPT

## 2017-11-20 ENCOUNTER — HOSPITAL ENCOUNTER (OUTPATIENT)
Dept: GENERAL RADIOLOGY | Facility: HOSPITAL | Age: 77
Discharge: HOME | End: 2017-11-20
Attending: INTERNAL MEDICINE | Admitting: INTERNAL MEDICINE

## 2017-11-20 ENCOUNTER — TRANSCRIBE ORDERS (OUTPATIENT)
Dept: ADMINISTRATIVE | Facility: HOSPITAL | Age: 77
End: 2017-11-20

## 2017-11-20 DIAGNOSIS — C34.32 PRIMARY MALIGNANT NEOPLASM OF BRONCHUS OF LEFT LOWER LOBE (HCC): Primary | ICD-10-CM

## 2017-11-20 PROCEDURE — 71020 HC CHEST PA AND LATERAL: CPT

## 2017-11-27 ENCOUNTER — TRANSCRIBE ORDERS (OUTPATIENT)
Dept: ADMINISTRATIVE | Facility: HOSPITAL | Age: 77
End: 2017-11-27

## 2017-11-27 DIAGNOSIS — M79.89 LEG SWELLING: Primary | ICD-10-CM

## 2017-11-28 ENCOUNTER — HOSPITAL ENCOUNTER (OUTPATIENT)
Dept: ULTRASOUND IMAGING | Facility: HOSPITAL | Age: 77
Discharge: HOME | End: 2017-11-28
Attending: INTERNAL MEDICINE | Admitting: INTERNAL MEDICINE

## 2017-11-28 DIAGNOSIS — M79.89 LEG SWELLING: ICD-10-CM

## 2017-11-28 PROCEDURE — 93971 EXTREMITY STUDY: CPT

## 2017-11-28 PROCEDURE — 93971 EXTREMITY STUDY: CPT | Performed by: SURGERY

## 2018-01-02 ENCOUNTER — HOSPITAL ENCOUNTER (OUTPATIENT)
Dept: GENERAL RADIOLOGY | Facility: HOSPITAL | Age: 78
Discharge: HOME | End: 2018-01-02
Admitting: NURSE PRACTITIONER

## 2018-01-02 ENCOUNTER — TRANSCRIBE ORDERS (OUTPATIENT)
Dept: GENERAL RADIOLOGY | Facility: HOSPITAL | Age: 78
End: 2018-01-02

## 2018-01-02 DIAGNOSIS — M79.89 SWELLING OF LIMB: Primary | ICD-10-CM

## 2018-01-02 PROCEDURE — 73564 X-RAY EXAM KNEE 4 OR MORE: CPT

## 2018-01-22 ENCOUNTER — OFFICE VISIT (OUTPATIENT)
Dept: GASTROENTEROLOGY | Facility: CLINIC | Age: 78
End: 2018-01-22

## 2018-01-22 VITALS
BODY MASS INDEX: 34.81 KG/M2 | OXYGEN SATURATION: 96 % | WEIGHT: 257 LBS | SYSTOLIC BLOOD PRESSURE: 122 MMHG | DIASTOLIC BLOOD PRESSURE: 80 MMHG | HEART RATE: 104 BPM | HEIGHT: 72 IN

## 2018-01-22 DIAGNOSIS — E66.9 OBESITY, UNSPECIFIED OBESITY SEVERITY, UNSPECIFIED OBESITY TYPE: ICD-10-CM

## 2018-01-22 DIAGNOSIS — Z86.010 HX OF COLONIC POLYPS: Primary | ICD-10-CM

## 2018-01-22 PROBLEM — Z86.0100 HX OF COLONIC POLYPS: Status: ACTIVE | Noted: 2018-01-22

## 2018-01-22 PROCEDURE — S0260 H&P FOR SURGERY: HCPCS | Performed by: CLINICAL NURSE SPECIALIST

## 2018-01-22 RX ORDER — MV-MIN/FA/VIT K/LUTEIN/ZEAXANT 200MCG-5MG
CAPSULE ORAL
COMMUNITY

## 2018-01-22 RX ORDER — ALFUZOSIN HYDROCHLORIDE 10 MG/1
20 TABLET, EXTENDED RELEASE ORAL
COMMUNITY

## 2018-01-22 RX ORDER — LOVASTATIN 40 MG/1
40 TABLET ORAL
COMMUNITY

## 2018-01-22 RX ORDER — FUROSEMIDE 20 MG/1
20 TABLET ORAL
COMMUNITY
End: 2023-01-30

## 2018-01-22 RX ORDER — AMLODIPINE BESYLATE AND BENAZEPRIL HYDROCHLORIDE 10; 20 MG/1; MG/1
CAPSULE ORAL
COMMUNITY
End: 2023-01-30

## 2018-01-22 NOTE — PROGRESS NOTES
Carmine Garcia  1940 1/22/2018  Chief Complaint   Patient presents with   • Colonoscopy     Subjective   HPI  Carmine Garcia is a 77 y.o. male who presents as a referral for preventative maintenance. He has no complaints of nausea or vomiting. No change in bowels. No wt loss. No BRBPR. No melena. There is no family hx for colon cancer. No abdominal pain.  Past Medical History:   Diagnosis Date   • COPD (chronic obstructive pulmonary disease)    • Disease of thyroid gland    • Emphysema lung    • Hx of colonic polyps    • Hyperlipidemia    • Hypertension    • Lung cancer    • PUD (peptic ulcer disease)      Past Surgical History:   Procedure Laterality Date   • CATARACT EXTRACTION     • COLONOSCOPY  12/12/2014    Diverticulosis repeat exam in 3 years   • COLONOSCOPY W/ POLYPECTOMY  02/15/2010    2 Hyperplastic polyps at 25 cm and rectum repeat exam in 5 years   • LUNG CANCER SURGERY       Outpatient Prescriptions Marked as Taking for the 1/22/18 encounter (Office Visit) with GRACE Haley   Medication Sig Dispense Refill   • alfuzosin (UROXATRAL) 10 MG 24 hr tablet Take 10 mg by mouth.     • amLODIPine-benazepril (LOTREL) 10-20 MG per capsule Take  by mouth.     • furosemide (LASIX) 20 MG tablet Take 20 mg by mouth.     • lovastatin (MEVACOR) 40 MG tablet Take 40 mg by mouth.     • Multiple Vitamins-Minerals (PRESERVISION AREDS 2+MULTI VIT) capsule Take  by mouth.       Allergies   Allergen Reactions   • Penicillins      Social History     Social History   • Marital status:      Spouse name: N/A   • Number of children: N/A   • Years of education: N/A     Occupational History   • Not on file.     Social History Main Topics   • Smoking status: Former Smoker   • Smokeless tobacco: Never Used   • Alcohol use No   • Drug use: Not on file   • Sexual activity: Not on file     Other Topics Concern   • Not on file     Social History Narrative     Family History   Problem Relation Age of Onset   •  "Colon polyps Mother    • Colon cancer Neg Hx      Health Maintenance   Topic Date Due   • TDAP/TD VACCINES (1 - Tdap) 04/08/1958   • PNEUMOCOCCAL VACCINES (65+ LOW/MEDIUM RISK) (1 of 2 - PCV13) 04/08/2004   • INFLUENZA VACCINE  08/01/2017   • ZOSTER VACCINE  09/28/2017   • MEDICARE ANNUAL WELLNESS  11/29/2017   • LIPID PANEL  01/22/2018       REVIEW OF SYSTEMS  General: well appearing, no fever chills or sweats, no unexplained wt loss  HEENT: no acute visual or hearing disturbances  Cardiovascular: No chest pain or palpitations  Pulmonary: No shortness of breath, coughing, wheezing or hemoptysis  : No burning, urgency, hematuria, or dysuria  Musculoskeletal: No joint pain or stiffness  Peripheral: no edema  Skin: No lesions or rashes  Neuro: No dizziness, headaches, stroke, syncope  Endocrine: No hot or cold intolerances  Hematological: No blood dyscrasias    Objective   Vitals:    01/22/18 1336   BP: 122/80   Pulse: 104   SpO2: 96%   Weight: 117 kg (257 lb)   Height: 182.9 cm (72\")     Body mass index is 34.86 kg/(m^2).  Patient's BMI is within normal parameters. No follow-up required.      PHYSICAL EXAM  General: age appropriate well nourished well appearing, no acute distress  Head: normocephalic and atraumatic  Global assessment-supple  Neck-No JVD noted, no lymphadenopathy  Pulmonary-clear to auscultation bilaterally, normal respiratory effort  Cardiovascular-normal rate and rhythm, normal heart sounds, S1 and S2 noted  Abdomen-soft, non tender, non distended, normal bowel sounds all 4 quadrants, no hepatosplenomegaly noted  Extremities-No clubbing cyanosis or edema  Neuro-Non focal, converses appropriately, awake, alert, oriented    Assessment/Plan     Carmine was seen today for colonoscopy.    Diagnoses and all orders for this visit:    Hx of colonic polyps  Comments:  2010 with poor prep in 2014  Orders:  -     polyethylene glycol (GoLYTELY) 236 g solution; Take as directed by office instructions.  -     " Case Request; Standing  -     Implement Anesthesia Orders Day of Procedure; Standing  -     Obtain Informed Consent; Standing  -     Verify bowel prep was successful; Standing  -     Case Request    Obesity, unspecified obesity severity, unspecified obesity type      With 2 day prep discussed    COLONOSCOPY WITH ANESTHESIA (N/A)  Body mass index is 34.86 kg/(m^2).    Patient instructions on prep prior to procedure provided to the patient.    All risks, benefits, alternatives, and indications of colonoscopy procedure have been discussed with the patient. Risks to include perforation of the colon requiring possible surgery or colostomy, risk of bleeding from biopsies or removal of colon tissue, possibility of missing a colon polyp or cancer, or adverse drug reaction.  Benefits to include the diagnosis and management of disease of the colon and rectum. Alternatives to include barium enema, radiographic evaluation, lab testing or no intervention. Pt verbalizes understanding and agrees.     Essence Lou, APRN  1/22/2018  1:49 PM

## 2018-02-12 ENCOUNTER — HOSPITAL ENCOUNTER (OUTPATIENT)
Dept: GENERAL RADIOLOGY | Facility: HOSPITAL | Age: 78
Discharge: HOME | End: 2018-02-12
Attending: INTERNAL MEDICINE | Admitting: INTERNAL MEDICINE

## 2018-02-12 ENCOUNTER — TRANSCRIBE ORDERS (OUTPATIENT)
Dept: ADMINISTRATIVE | Facility: HOSPITAL | Age: 78
End: 2018-02-12

## 2018-02-12 DIAGNOSIS — C34.32 CANCER OF LOWER LOBE OF LEFT LUNG (HCC): Primary | ICD-10-CM

## 2018-02-12 PROCEDURE — 71046 X-RAY EXAM CHEST 2 VIEWS: CPT

## 2018-04-10 ENCOUNTER — ANESTHESIA (OUTPATIENT)
Dept: GASTROENTEROLOGY | Facility: HOSPITAL | Age: 78
End: 2018-04-10

## 2018-04-10 ENCOUNTER — ANESTHESIA EVENT (OUTPATIENT)
Dept: GASTROENTEROLOGY | Facility: HOSPITAL | Age: 78
End: 2018-04-10

## 2018-04-10 ENCOUNTER — HOSPITAL ENCOUNTER (OUTPATIENT)
Facility: HOSPITAL | Age: 78
Setting detail: HOSPITAL OUTPATIENT SURGERY
Discharge: HOME | End: 2018-04-10
Attending: INTERNAL MEDICINE | Admitting: INTERNAL MEDICINE

## 2018-04-10 VITALS
WEIGHT: 250 LBS | TEMPERATURE: 98 F | DIASTOLIC BLOOD PRESSURE: 70 MMHG | OXYGEN SATURATION: 96 % | HEART RATE: 85 BPM | SYSTOLIC BLOOD PRESSURE: 124 MMHG | BODY MASS INDEX: 33.86 KG/M2 | RESPIRATION RATE: 26 BRPM | HEIGHT: 72 IN

## 2018-04-10 DIAGNOSIS — Z86.010 HX OF COLONIC POLYPS: ICD-10-CM

## 2018-04-10 PROCEDURE — 25010000002 PROPOFOL 10 MG/ML EMULSION: Performed by: NURSE ANESTHETIST, CERTIFIED REGISTERED

## 2018-04-10 PROCEDURE — 88305 TISSUE EXAM BY PATHOLOGIST: CPT | Performed by: INTERNAL MEDICINE

## 2018-04-10 PROCEDURE — 45385 COLONOSCOPY W/LESION REMOVAL: CPT | Performed by: INTERNAL MEDICINE

## 2018-04-10 RX ORDER — SODIUM CHLORIDE 0.9 % (FLUSH) 0.9 %
3 SYRINGE (ML) INJECTION AS NEEDED
Status: DISCONTINUED | OUTPATIENT
Start: 2018-04-10 | End: 2018-04-10 | Stop reason: HOSPADM

## 2018-04-10 RX ORDER — PROPOFOL 10 MG/ML
VIAL (ML) INTRAVENOUS AS NEEDED
Status: DISCONTINUED | OUTPATIENT
Start: 2018-04-10 | End: 2018-04-10 | Stop reason: SURG

## 2018-04-10 RX ORDER — LIDOCAINE HYDROCHLORIDE 20 MG/ML
INJECTION, SOLUTION INFILTRATION; PERINEURAL AS NEEDED
Status: DISCONTINUED | OUTPATIENT
Start: 2018-04-10 | End: 2018-04-10 | Stop reason: SURG

## 2018-04-10 RX ORDER — SODIUM CHLORIDE 0.9 % (FLUSH) 0.9 %
1-10 SYRINGE (ML) INJECTION AS NEEDED
Status: CANCELLED | OUTPATIENT
Start: 2018-04-10

## 2018-04-10 RX ORDER — SODIUM CHLORIDE 9 MG/ML
100 INJECTION, SOLUTION INTRAVENOUS CONTINUOUS
Status: CANCELLED | OUTPATIENT
Start: 2018-04-10

## 2018-04-10 RX ORDER — SODIUM CHLORIDE 9 MG/ML
500 INJECTION, SOLUTION INTRAVENOUS CONTINUOUS PRN
Status: DISCONTINUED | OUTPATIENT
Start: 2018-04-10 | End: 2018-04-10 | Stop reason: HOSPADM

## 2018-04-10 RX ADMIN — PROPOFOL 30 MG: 10 INJECTION, EMULSION INTRAVENOUS at 09:06

## 2018-04-10 RX ADMIN — LIDOCAINE HYDROCHLORIDE 0.5 ML: 10 INJECTION, SOLUTION EPIDURAL; INFILTRATION; INTRACAUDAL; PERINEURAL at 07:46

## 2018-04-10 RX ADMIN — PROPOFOL 30 MG: 10 INJECTION, EMULSION INTRAVENOUS at 09:04

## 2018-04-10 RX ADMIN — PROPOFOL 30 MG: 10 INJECTION, EMULSION INTRAVENOUS at 09:08

## 2018-04-10 RX ADMIN — PROPOFOL 30 MG: 10 INJECTION, EMULSION INTRAVENOUS at 09:11

## 2018-04-10 RX ADMIN — PROPOFOL 30 MG: 10 INJECTION, EMULSION INTRAVENOUS at 09:12

## 2018-04-10 RX ADMIN — PROPOFOL 60 MG: 10 INJECTION, EMULSION INTRAVENOUS at 09:03

## 2018-04-10 RX ADMIN — SODIUM CHLORIDE: 9 INJECTION, SOLUTION INTRAVENOUS at 09:03

## 2018-04-10 RX ADMIN — SODIUM CHLORIDE 500 ML: 9 INJECTION, SOLUTION INTRAVENOUS at 07:47

## 2018-04-10 RX ADMIN — LIDOCAINE HYDROCHLORIDE 50 MG: 20 INJECTION, SOLUTION INFILTRATION; PERINEURAL at 09:03

## 2018-04-10 RX ADMIN — PROPOFOL 30 MG: 10 INJECTION, EMULSION INTRAVENOUS at 09:14

## 2018-04-10 NOTE — H&P
[facility]  Pre Procedure History & Physical    Chief Complaint:   History of polyps    Subjective     HPI:   Here today for colonoscopy.  History of polyps.    Past Medical History:   Past Medical History:   Diagnosis Date   • COPD (chronic obstructive pulmonary disease)    • Disease of thyroid gland    • Emphysema lung    • Hx of colonic polyps    • Hyperlipidemia    • Hypertension    • Lung cancer    • PUD (peptic ulcer disease)        Past Surgical History:  Past Surgical History:   Procedure Laterality Date   • CATARACT EXTRACTION     • COLONOSCOPY  12/12/2014    Diverticulosis repeat exam in 3 years   • COLONOSCOPY W/ POLYPECTOMY  02/15/2010    2 Hyperplastic polyps at 25 cm and rectum repeat exam in 5 years   • LUNG CANCER SURGERY     • REPLACEMENT TOTAL KNEE Right 05/2017       Family History:  Family History   Problem Relation Age of Onset   • Colon polyps Mother    • Colon cancer Neg Hx        Social History:   reports that he has quit smoking. He has never used smokeless tobacco. He reports that he does not drink alcohol.    Medications:   Prior to Admission medications    Medication Sig Start Date End Date Taking? Authorizing Provider   alfuzosin (UROXATRAL) 10 MG 24 hr tablet Take 10 mg by mouth.   Yes Historical Provider, MD   amLODIPine-benazepril (LOTREL) 10-20 MG per capsule Take  by mouth.   Yes Historical Provider, MD   furosemide (LASIX) 20 MG tablet Take 20 mg by mouth.   Yes Historical Provider, MD   lovastatin (MEVACOR) 40 MG tablet Take 40 mg by mouth.   Yes Historical Provider, MD   Multiple Vitamins-Minerals (PRESERVISION AREDS 2+MULTI VIT) capsule Take  by mouth.   Yes Historical Provider, MD   polyethylene glycol (GoLYTELY) 236 g solution Take as directed by office instructions. 1/22/18  Yes GRACE Haley       Allergies:  Penicillins    Objective     Blood pressure 140/80, pulse 95, temperature 98 °F (36.7 °C), temperature source Temporal Artery , resp.  "rate 22, height 182.9 cm (72\"), weight 113 kg (250 lb), SpO2 95 %.    Physical Exam   Constitutional: Pt is oriented to person, place, and in no distress.   HENT: Mouth/Throat: Oropharynx is clear.   Cardiovascular: Normal rate, regular rhythm.    Pulmonary/Chest: Effort normal. No respiratory distress. No  wheezes.   Abdominal: Soft. Non-distended.  Skin: Skin is warm and dry.   Psychiatric: Mood, memory, affect and judgment appear normal.     Assessment/Plan     Diagnosis:  History of polyps    Anticipated Surgical Procedure:    Proceed with colonoscopy as scheduled    The following major R/B/A were discussed with the patient, however the list is not all inclusive . Risk:  Bleeding (immediate and delayed), perforation (rupture or tear), reaction to medication, missed lesion/cancer, pain during the procedure, infection, need for surgery, need for ostomy, need for mechanical ventilation (breathing machine), death.  Benefits: removal of polyp/tissue, burn/clip/or inject to stop bleeding, removal of foreign body, dilate any stricture.  Alternatives: Xray or CT, surgery, do nothing with associated risk   The patient was given time to ask question and received explanation, and agrees to proceed as per History and Physical.   No guarantee given or expressed.    EMR Dragon/transcription disclaimer: Much of this encounter note is an electronic transcription/translation of spoken language to printed text.  The electronic translation of spoken language may permit erroneous, or at times, nonsensical words or phrases to be inadvertently transcribed.  Although I have reviewed the note for such errors, some may still exist.    Ferdinand Cross MD  9:01 AM  4/10/2018    "

## 2018-04-10 NOTE — ANESTHESIA POSTPROCEDURE EVALUATION
Patient: Carmine Garcia    Procedure Summary     Date:  04/10/18 Room / Location:  [facility] ENDOSCOPY 6 / [facility]    Anesthesia Start:  0900 Anesthesia Stop:  0917    Procedure:  COLONOSCOPY WITH ANESTHESIA (N/A ) Diagnosis:       Hx of colonic polyps      (Hx of colonic polyps [Z86.010])    Surgeon:  Ferdinand Cross MD Provider:  Rosalio Rouse CRNA    Anesthesia Type:  general ASA Status:  3          Anesthesia Type: general  Last vitals  BP   140/80 (04/10/18 0734)   Temp   98 °F (36.7 °C) (04/10/18 0734)   Pulse   95 (04/10/18 0734)   Resp   22 (04/10/18 0734)     SpO2   95 % (04/10/18 0734)     Post Anesthesia Care and Evaluation    Patient location during evaluation: PHASE II  Patient participation: complete - patient participated  Level of consciousness: awake  Pain score: 0  Pain management: adequate  Airway patency: patent  Anesthetic complications: No anesthetic complications  PONV Status: none  Cardiovascular status: acceptable  Respiratory status: acceptable  Hydration status: acceptable

## 2018-04-10 NOTE — ANESTHESIA PREPROCEDURE EVALUATION
Anesthesia Evaluation     no history of anesthetic complications:  NPO Solid Status: > 8 hours  NPO Liquid Status: > 2 hours           Airway   Mallampati: III  TM distance: >3 FB  Neck ROM: full  Dental    (+) edentulous, lower dentures and upper dentures    Pulmonary - normal exam    breath sounds clear to auscultation  (+) a smoker (quit 1985) Former, lung cancer (6.5 yrs ago, Left lower lobectomy),   (-) asthma, recent URI, sleep apnea  Cardiovascular - normal exam  Exercise tolerance: good (4-7 METS) (SOB with exertion, but denies chest pain)    Rhythm: regular  Rate: normal    (+) hypertension well controlled,   (-) pacemaker, past MI, angina, cardiac stents, CABG      Neuro/Psych  (-) seizures, TIA, CVA  GI/Hepatic/Renal/Endo    (+) obesity,     (-) GERD, liver disease, no renal disease, diabetes, hypothyroidism, hyperthyroidism    Musculoskeletal     Abdominal     Bowel sounds: normal.   Substance History      OB/GYN          Other                      Anesthesia Plan    ASA 3     general   total IV anesthesia  intravenous induction   Anesthetic plan and risks discussed with patient.

## 2018-04-11 LAB
CYTO UR: NORMAL
LAB AP CASE REPORT: NORMAL
Lab: NORMAL
PATH REPORT.FINAL DX SPEC: NORMAL
PATH REPORT.GROSS SPEC: NORMAL

## 2018-04-12 ENCOUNTER — PREP FOR SURGERY (OUTPATIENT)
Dept: GASTROENTEROLOGY | Facility: CLINIC | Age: 78
End: 2018-04-12

## 2018-04-12 DIAGNOSIS — Z86.010 HISTORY OF COLON POLYPS: Primary | ICD-10-CM

## 2018-04-13 PROBLEM — Z86.0100 HISTORY OF COLON POLYPS: Status: ACTIVE | Noted: 2018-04-13

## 2018-04-13 PROBLEM — Z86.010 HISTORY OF COLON POLYPS: Status: ACTIVE | Noted: 2018-04-13

## 2018-07-06 ENCOUNTER — HOSPITAL ENCOUNTER (OUTPATIENT)
Dept: GENERAL RADIOLOGY | Facility: HOSPITAL | Age: 78
Discharge: HOME | End: 2018-07-06
Attending: INTERNAL MEDICINE | Admitting: INTERNAL MEDICINE

## 2018-07-06 ENCOUNTER — TRANSCRIBE ORDERS (OUTPATIENT)
Dept: ADMINISTRATIVE | Facility: HOSPITAL | Age: 78
End: 2018-07-06

## 2018-07-06 DIAGNOSIS — C34.32 CANCER OF LOWER LOBE OF LEFT LUNG (HCC): Primary | ICD-10-CM

## 2018-07-06 PROCEDURE — 71046 X-RAY EXAM CHEST 2 VIEWS: CPT

## 2018-07-09 ENCOUNTER — TRANSCRIBE ORDERS (OUTPATIENT)
Dept: ADMINISTRATIVE | Facility: HOSPITAL | Age: 78
End: 2018-07-09

## 2018-07-09 DIAGNOSIS — C34.32 PRIMARY MALIGNANT NEOPLASM OF BRONCHUS OF LEFT LOWER LOBE (HCC): Primary | ICD-10-CM

## 2018-07-11 ENCOUNTER — HOSPITAL ENCOUNTER (OUTPATIENT)
Dept: ULTRASOUND IMAGING | Facility: HOSPITAL | Age: 78
Discharge: HOME | End: 2018-07-11
Attending: INTERNAL MEDICINE

## 2018-07-11 DIAGNOSIS — C34.32 PRIMARY MALIGNANT NEOPLASM OF BRONCHUS OF LEFT LOWER LOBE (HCC): ICD-10-CM

## 2018-08-02 ENCOUNTER — ANESTHESIA EVENT (OUTPATIENT)
Dept: GASTROENTEROLOGY | Facility: HOSPITAL | Age: 78
End: 2018-08-02

## 2018-08-02 ENCOUNTER — HOSPITAL ENCOUNTER (OUTPATIENT)
Facility: HOSPITAL | Age: 78
Setting detail: HOSPITAL OUTPATIENT SURGERY
Discharge: HOME | End: 2018-08-02
Attending: INTERNAL MEDICINE | Admitting: ANESTHESIOLOGY

## 2018-08-02 ENCOUNTER — ANESTHESIA (OUTPATIENT)
Dept: GASTROENTEROLOGY | Facility: HOSPITAL | Age: 78
End: 2018-08-02

## 2018-08-02 VITALS
HEART RATE: 85 BPM | OXYGEN SATURATION: 96 % | DIASTOLIC BLOOD PRESSURE: 80 MMHG | BODY MASS INDEX: 34.16 KG/M2 | TEMPERATURE: 97.5 F | RESPIRATION RATE: 20 BRPM | SYSTOLIC BLOOD PRESSURE: 121 MMHG | HEIGHT: 71 IN | WEIGHT: 244 LBS

## 2018-08-02 DIAGNOSIS — Z86.010 HISTORY OF COLON POLYPS: ICD-10-CM

## 2018-08-02 PROCEDURE — 45385 COLONOSCOPY W/LESION REMOVAL: CPT | Performed by: INTERNAL MEDICINE

## 2018-08-02 PROCEDURE — 88305 TISSUE EXAM BY PATHOLOGIST: CPT | Performed by: INTERNAL MEDICINE

## 2018-08-02 PROCEDURE — 25010000002 PROPOFOL 10 MG/ML EMULSION: Performed by: NURSE ANESTHETIST, CERTIFIED REGISTERED

## 2018-08-02 DEVICE — WORKING LENGTH 235CM, WORKING CHANNEL 2.8MM
Type: IMPLANTABLE DEVICE | Status: FUNCTIONAL
Brand: RESOLUTION 360 CLIP

## 2018-08-02 RX ORDER — SODIUM CHLORIDE 0.9 % (FLUSH) 0.9 %
3 SYRINGE (ML) INJECTION AS NEEDED
Status: DISCONTINUED | OUTPATIENT
Start: 2018-08-02 | End: 2018-08-02 | Stop reason: HOSPADM

## 2018-08-02 RX ORDER — SODIUM CHLORIDE 9 MG/ML
500 INJECTION, SOLUTION INTRAVENOUS CONTINUOUS PRN
Status: DISCONTINUED | OUTPATIENT
Start: 2018-08-02 | End: 2018-08-02 | Stop reason: HOSPADM

## 2018-08-02 RX ORDER — LIDOCAINE HYDROCHLORIDE 20 MG/ML
INJECTION, SOLUTION INFILTRATION; PERINEURAL AS NEEDED
Status: DISCONTINUED | OUTPATIENT
Start: 2018-08-02 | End: 2018-08-02 | Stop reason: SURG

## 2018-08-02 RX ORDER — SODIUM CHLORIDE 0.9 % (FLUSH) 0.9 %
1-10 SYRINGE (ML) INJECTION AS NEEDED
Status: CANCELLED | OUTPATIENT
Start: 2018-08-02

## 2018-08-02 RX ORDER — PROPOFOL 10 MG/ML
VIAL (ML) INTRAVENOUS AS NEEDED
Status: DISCONTINUED | OUTPATIENT
Start: 2018-08-02 | End: 2018-08-02 | Stop reason: SURG

## 2018-08-02 RX ORDER — SODIUM CHLORIDE 9 MG/ML
100 INJECTION, SOLUTION INTRAVENOUS CONTINUOUS
Status: CANCELLED | OUTPATIENT
Start: 2018-08-02

## 2018-08-02 RX ADMIN — SODIUM CHLORIDE 500 ML: 9 INJECTION, SOLUTION INTRAVENOUS at 08:38

## 2018-08-02 RX ADMIN — PROPOFOL 450 MG: 10 INJECTION, EMULSION INTRAVENOUS at 10:37

## 2018-08-02 RX ADMIN — LIDOCAINE HYDROCHLORIDE 0.5 ML: 10 INJECTION, SOLUTION EPIDURAL; INFILTRATION; INTRACAUDAL; PERINEURAL at 08:38

## 2018-08-02 RX ADMIN — LIDOCAINE HYDROCHLORIDE 100 MG: 20 INJECTION, SOLUTION INFILTRATION; PERINEURAL at 10:37

## 2018-08-02 NOTE — H&P
[facility]  Pre Procedure History & Physical    Chief Complaint:   History of polyps    Subjective     HPI:   Colonoscopy.  History of polyps    Past Medical History:   Past Medical History:   Diagnosis Date   • COPD (chronic obstructive pulmonary disease) (CMS/HCC)    • Disease of thyroid gland    • Emphysema lung (CMS/HCC)    • Hx of colonic polyps    • Hyperlipidemia    • Hypertension    • Lung cancer (CMS/HCC)    • PUD (peptic ulcer disease)        Past Surgical History:  Past Surgical History:   Procedure Laterality Date   • CATARACT EXTRACTION     • COLONOSCOPY  12/12/2014    Diverticulosis repeat exam in 3 years   • COLONOSCOPY N/A 4/10/2018    Procedure: COLONOSCOPY WITH ANESTHESIA;  Surgeon: Ferdinand Cross MD;  Location: [facility] ENDOSCOPY;  Service: Gastroenterology   • COLONOSCOPY W/ POLYPECTOMY  02/15/2010    2 Hyperplastic polyps at 25 cm and rectum repeat exam in 5 years   • LUNG CANCER SURGERY     • REPLACEMENT TOTAL KNEE Right 05/2017       Family History:  Family History   Problem Relation Age of Onset   • Colon polyps Mother    • Colon cancer Neg Hx        Social History:   reports that he has quit smoking. He has never used smokeless tobacco. He reports that he does not drink alcohol or use drugs.    Medications:   Prior to Admission medications    Medication Sig Start Date End Date Taking? Authorizing Provider   alfuzosin (UROXATRAL) 10 MG 24 hr tablet Take 10 mg by mouth.   Yes ProviderPerry MD   amLODIPine-benazepril (LOTREL) 10-20 MG per capsule Take  by mouth.   Yes Perry Myers MD   furosemide (LASIX) 20 MG tablet Take 20 mg by mouth.   Yes Perry Myers MD   lovastatin (MEVACOR) 40 MG tablet Take 40 mg by mouth.   Yes ProviderPerry MD   Multiple Vitamins-Minerals (PRESERVISION AREDS 2+MULTI VIT) capsule Take  by mouth.   Yes Perry Myers MD   polyethylene glycol (GoLYTELY) 236 g solution Take as directed by office instructions.  "1/22/18 8/2/18  Essence Lou APRN       Allergies:  Penicillins    Objective     Blood pressure 139/90, pulse 90, temperature 97.5 °F (36.4 °C), temperature source Temporal Artery , resp. rate 18, height 180.3 cm (71\"), weight 111 kg (244 lb), SpO2 95 %.    Physical Exam   Constitutional: Pt is oriented to person, place, and in no distress.   HENT: Mouth/Throat: Oropharynx is clear.   Cardiovascular: Normal rate, regular rhythm.    Pulmonary/Chest: Effort normal. No respiratory distress. No  wheezes.   Abdominal: Soft. Non-distended.  Skin: Skin is warm and dry.   Psychiatric: Mood, memory, affect and judgment appear normal.     Assessment/Plan     Diagnosis:  History of polyps    Anticipated Surgical Procedure:    Proceed with colonoscopy as scheduled    The following major R/B/A were discussed with the patient, however the list is not all inclusive . Risk:  Bleeding (immediate and delayed), perforation (rupture or tear), reaction to medication, missed lesion/cancer, pain during the procedure, infection, need for surgery, need for ostomy, need for mechanical ventilation (breathing machine), death.  Benefits: removal of polyp/tissue, burn/clip/or inject to stop bleeding, removal of foreign body, dilate any stricture.  Alternatives: Xray or CT, surgery, do nothing with associated risk   The patient was given time to ask question and received explanation, and agrees to proceed as per History and Physical.   No guarantee given or expressed.    EMR Dragon/transcription disclaimer: Much of this encounter note is an electronic transcription/translation of spoken language to printed text.  The electronic translation of spoken language may permit erroneous, or at times, nonsensical words or phrases to be inadvertently transcribed.  Although I have reviewed the note for such errors, some may still exist.    Ferdinand Cross MD  10:37 AM  8/2/2018    "

## 2018-08-02 NOTE — ANESTHESIA PREPROCEDURE EVALUATION
Anesthesia Evaluation     no history of anesthetic complications:  NPO Solid Status: > 8 hours  NPO Liquid Status: > 2 hours           Airway   Mallampati: II  TM distance: >3 FB  Neck ROM: full  Dental    (+) edentulous, lower dentures and upper dentures    Pulmonary - normal exam    breath sounds clear to auscultation  (+) a smoker (quit 1984) Former, lung cancer (7 yrs ago, left lower lobectomy, reports no issues since), COPD,   (-) asthma, recent URI, sleep apnea  Cardiovascular - normal exam  Exercise tolerance: good (4-7 METS)    Rhythm: regular  Rate: normal    (+) hypertension well controlled, hyperlipidemia,   (-) pacemaker, past MI, angina, cardiac stents, CABG      Neuro/Psych  (-) seizures, TIA, CVA  GI/Hepatic/Renal/Endo    (+) obesity,     (-) GERD, liver disease, no renal disease, diabetes, hypothyroidism, hyperthyroidism    Musculoskeletal     Abdominal    Substance History      OB/GYN          Other                        Anesthesia Plan    ASA 3     general   total IV anesthesia  intravenous induction   Anesthetic plan and risks discussed with patient.

## 2018-08-02 NOTE — ANESTHESIA POSTPROCEDURE EVALUATION
"Patient: Carmine Garcia    Procedure Summary     Date:  08/02/18 Room / Location:  [facility] ENDOSCOPY 2 / [facility]    Anesthesia Start:  1029 Anesthesia Stop:  1102    Procedure:  COLONOSCOPY WITH ANESTHESIA (N/A ) Diagnosis:       History of colon polyps      (History of colon polyps [Z86.010])    Surgeon:  Ferdinand Cross MD Provider:  Shade John CRNA    Anesthesia Type:  general ASA Status:  3          Anesthesia Type: general  Last vitals  BP   90/55 (08/02/18 1105)   Temp   97.5 °F (36.4 °C) (08/02/18 0814)   Pulse   86 (08/02/18 1105)   Resp   16 (08/02/18 1105)     SpO2   95 % (08/02/18 1105)     Post Anesthesia Care and Evaluation    Patient location during evaluation: PACU  Patient participation: complete - patient participated  Level of consciousness: awake and alert  Pain score: 0  Pain management: adequate  Airway patency: patent  Anesthetic complications: No anesthetic complications  PONV Status: none  Cardiovascular status: acceptable  Respiratory status: acceptable  Hydration status: acceptable    Comments: Blood pressure 90/55, pulse 86, temperature 97.5 °F (36.4 °C), temperature source Temporal Artery , resp. rate 16, height 180.3 cm (71\"), weight 111 kg (244 lb), SpO2 95 %.    Pt discharged from PACU based on chico score >8      "

## 2018-08-03 LAB
CYTO UR: NORMAL
LAB AP CASE REPORT: NORMAL
LAB AP CLINICAL INFORMATION: NORMAL
PATH REPORT.FINAL DX SPEC: NORMAL
PATH REPORT.GROSS SPEC: NORMAL

## 2018-12-21 ENCOUNTER — TRANSCRIBE ORDERS (OUTPATIENT)
Dept: ADMINISTRATIVE | Facility: HOSPITAL | Age: 78
End: 2018-12-21

## 2018-12-21 ENCOUNTER — HOSPITAL ENCOUNTER (OUTPATIENT)
Dept: GENERAL RADIOLOGY | Facility: HOSPITAL | Age: 78
Discharge: HOME | End: 2018-12-21
Attending: INTERNAL MEDICINE | Admitting: INTERNAL MEDICINE

## 2018-12-21 DIAGNOSIS — C34.32 CANCER OF LOWER LOBE OF LEFT LUNG (HCC): Primary | ICD-10-CM

## 2018-12-21 PROCEDURE — 71046 X-RAY EXAM CHEST 2 VIEWS: CPT

## 2019-03-26 ENCOUNTER — TRANSCRIBE ORDERS (OUTPATIENT)
Dept: ONCOLOGY | Facility: CLINIC | Age: 79
End: 2019-03-26

## 2019-03-26 DIAGNOSIS — E53.8 VITAMIN B12 DEFICIENCY: ICD-10-CM

## 2019-03-26 DIAGNOSIS — C34.32 PRIMARY MALIGNANT NEOPLASM OF BRONCHUS OF LEFT LOWER LOBE (HCC): Primary | ICD-10-CM

## 2019-03-26 DIAGNOSIS — D50.9 IRON DEFICIENCY ANEMIA, UNSPECIFIED IRON DEFICIENCY ANEMIA TYPE: ICD-10-CM

## 2019-08-23 ENCOUNTER — HOSPITAL ENCOUNTER (OUTPATIENT)
Dept: GENERAL RADIOLOGY | Facility: HOSPITAL | Age: 79
Discharge: HOME | End: 2019-08-23
Admitting: INTERNAL MEDICINE

## 2019-08-23 ENCOUNTER — TRANSCRIBE ORDERS (OUTPATIENT)
Dept: ADMINISTRATIVE | Facility: HOSPITAL | Age: 79
End: 2019-08-23

## 2019-08-23 ENCOUNTER — APPOINTMENT (OUTPATIENT)
Dept: LAB | Facility: HOSPITAL | Age: 79
End: 2019-08-23

## 2019-08-23 DIAGNOSIS — C34.32 MALIGNANT NEOPLASM OF LOWER LOBE OF LEFT LUNG (HCC): ICD-10-CM

## 2019-08-23 DIAGNOSIS — C34.32 MALIGNANT NEOPLASM OF LOWER LOBE OF LEFT LUNG (HCC): Primary | ICD-10-CM

## 2019-08-23 LAB
ALBUMIN SERPL-MCNC: 4.5 G/DL (ref 3.5–5)
ALBUMIN/GLOB SERPL: 1.4 G/DL (ref 1.1–2.5)
ALP SERPL-CCNC: 70 U/L (ref 24–120)
ALT SERPL W P-5'-P-CCNC: 29 U/L (ref 0–54)
ANION GAP SERPL CALCULATED.3IONS-SCNC: 10 MMOL/L (ref 4–13)
AST SERPL-CCNC: 25 U/L (ref 7–45)
BASOPHILS # BLD AUTO: 0.03 10*3/MM3 (ref 0–0.2)
BASOPHILS NFR BLD AUTO: 0.5 % (ref 0–1.5)
BILIRUB SERPL-MCNC: 0.6 MG/DL (ref 0.1–1)
BUN BLD-MCNC: 10 MG/DL (ref 5–21)
BUN/CREAT SERPL: 11.6 (ref 7–25)
CALCIUM SPEC-SCNC: 9.2 MG/DL (ref 8.4–10.4)
CEA SERPL-MCNC: 3.44 NG/ML (ref 0–5)
CHLORIDE SERPL-SCNC: 100 MMOL/L (ref 98–110)
CO2 SERPL-SCNC: 26 MMOL/L (ref 24–31)
CREAT BLD-MCNC: 0.86 MG/DL (ref 0.5–1.4)
DEPRECATED RDW RBC AUTO: 43.7 FL (ref 37–54)
EOSINOPHIL # BLD AUTO: 0.12 10*3/MM3 (ref 0–0.4)
EOSINOPHIL NFR BLD AUTO: 2.1 % (ref 0.3–6.2)
ERYTHROCYTE [DISTWIDTH] IN BLOOD BY AUTOMATED COUNT: 12.6 % (ref 12.3–15.4)
FERRITIN SERPL-MCNC: 58.1 NG/ML (ref 17.9–464)
FOLATE SERPL-MCNC: 11.6 NG/ML (ref 4.78–24.2)
GFR SERPL CREATININE-BSD FRML MDRD: 86 ML/MIN/1.73
GLOBULIN UR ELPH-MCNC: 3.2 GM/DL
GLUCOSE BLD-MCNC: 127 MG/DL (ref 70–100)
HCT VFR BLD AUTO: 42.1 % (ref 37.5–51)
HGB BLD-MCNC: 14.2 G/DL (ref 13–17.7)
IMM GRANULOCYTES # BLD AUTO: 0.02 10*3/MM3 (ref 0–0.05)
IMM GRANULOCYTES NFR BLD AUTO: 0.4 % (ref 0–0.5)
IRON 24H UR-MRATE: 161 MCG/DL (ref 42–180)
IRON SATN MFR SERPL: 53 % (ref 20–45)
LYMPHOCYTES # BLD AUTO: 1.37 10*3/MM3 (ref 0.7–3.1)
LYMPHOCYTES NFR BLD AUTO: 24.5 % (ref 19.6–45.3)
MCH RBC QN AUTO: 32.1 PG (ref 26.6–33)
MCHC RBC AUTO-ENTMCNC: 33.7 G/DL (ref 31.5–35.7)
MCV RBC AUTO: 95 FL (ref 79–97)
MONOCYTES # BLD AUTO: 0.63 10*3/MM3 (ref 0.1–0.9)
MONOCYTES NFR BLD AUTO: 11.3 % (ref 5–12)
NEUTROPHILS # BLD AUTO: 3.42 10*3/MM3 (ref 1.7–7)
NEUTROPHILS NFR BLD AUTO: 61.2 % (ref 42.7–76)
NRBC BLD AUTO-RTO: 0 /100 WBC (ref 0–0.2)
PLATELET # BLD AUTO: 244 10*3/MM3 (ref 140–450)
PMV BLD AUTO: 9.5 FL (ref 6–12)
POTASSIUM BLD-SCNC: 4.2 MMOL/L (ref 3.5–5.3)
PROT SERPL-MCNC: 7.7 G/DL (ref 6.3–8.7)
RBC # BLD AUTO: 4.43 10*6/MM3 (ref 4.14–5.8)
SODIUM BLD-SCNC: 136 MMOL/L (ref 135–145)
TIBC SERPL-MCNC: 304 MCG/DL (ref 225–420)
VIT B12 BLD-MCNC: 464 PG/ML (ref 239–931)
WBC NRBC COR # BLD: 5.59 10*3/MM3 (ref 3.4–10.8)

## 2019-08-23 PROCEDURE — 83550 IRON BINDING TEST: CPT | Performed by: INTERNAL MEDICINE

## 2019-08-23 PROCEDURE — 82728 ASSAY OF FERRITIN: CPT | Performed by: INTERNAL MEDICINE

## 2019-08-23 PROCEDURE — 36415 COLL VENOUS BLD VENIPUNCTURE: CPT | Performed by: INTERNAL MEDICINE

## 2019-08-23 PROCEDURE — 82746 ASSAY OF FOLIC ACID SERUM: CPT | Performed by: INTERNAL MEDICINE

## 2019-08-23 PROCEDURE — 82607 VITAMIN B-12: CPT | Performed by: INTERNAL MEDICINE

## 2019-08-23 PROCEDURE — 71046 X-RAY EXAM CHEST 2 VIEWS: CPT

## 2019-08-23 PROCEDURE — 80053 COMPREHEN METABOLIC PANEL: CPT | Performed by: INTERNAL MEDICINE

## 2019-08-23 PROCEDURE — 82378 CARCINOEMBRYONIC ANTIGEN: CPT | Performed by: INTERNAL MEDICINE

## 2019-08-23 PROCEDURE — 83540 ASSAY OF IRON: CPT | Performed by: INTERNAL MEDICINE

## 2019-08-23 PROCEDURE — 85025 COMPLETE CBC W/AUTO DIFF WBC: CPT | Performed by: INTERNAL MEDICINE

## 2019-08-30 ENCOUNTER — OFFICE VISIT (OUTPATIENT)
Dept: ONCOLOGY | Facility: CLINIC | Age: 79
End: 2019-08-30

## 2019-08-30 VITALS
BODY MASS INDEX: 35.48 KG/M2 | DIASTOLIC BLOOD PRESSURE: 82 MMHG | HEART RATE: 72 BPM | OXYGEN SATURATION: 96 % | TEMPERATURE: 98.1 F | HEIGHT: 71 IN | SYSTOLIC BLOOD PRESSURE: 142 MMHG | RESPIRATION RATE: 16 BRPM | WEIGHT: 253.4 LBS

## 2019-08-30 DIAGNOSIS — D50.9 IRON DEFICIENCY ANEMIA, UNSPECIFIED IRON DEFICIENCY ANEMIA TYPE: Primary | ICD-10-CM

## 2019-08-30 DIAGNOSIS — C34.32 MALIGNANT NEOPLASM OF LOWER LOBE OF LEFT LUNG (HCC): ICD-10-CM

## 2019-08-30 DIAGNOSIS — E53.8 B12 DEFICIENCY: ICD-10-CM

## 2019-08-30 DIAGNOSIS — L98.9 SKIN LESION OF BACK: ICD-10-CM

## 2019-08-30 PROCEDURE — 99214 OFFICE O/P EST MOD 30 MIN: CPT | Performed by: NURSE PRACTITIONER

## 2019-09-04 ENCOUNTER — TELEPHONE (OUTPATIENT)
Dept: ONCOLOGY | Facility: CLINIC | Age: 79
End: 2019-09-04

## 2019-09-04 NOTE — TELEPHONE ENCOUNTER
Patient called [facility] was referring him to  a dermatologist Dr. Martinez, but patient checked their website and saw they do not accept medicare. He was asked if he could be referred to somewhere that does accept it.

## 2020-04-22 ENCOUNTER — TELEPHONE (OUTPATIENT)
Dept: ONCOLOGY | Facility: CLINIC | Age: 80
End: 2020-04-22

## 2020-04-22 NOTE — TELEPHONE ENCOUNTER
DUE TO VIRUS, PT DIEGO HIS APPTS TO June.  4/24/20 LAB & 5/1/20 DR SCHREIBER.    PT ALSO HAS LOST HIS ORDER TO HAVE HIS CHEST SCAN DONE.  CAN YOU MAIL HIM ANOTHER ORDER TO:    [address] [id number]    [facility]   [phone]

## 2020-04-24 ENCOUNTER — APPOINTMENT (OUTPATIENT)
Dept: LAB | Facility: HOSPITAL | Age: 80
End: 2020-04-24

## 2020-06-19 ENCOUNTER — LAB (OUTPATIENT)
Dept: LAB | Facility: HOSPITAL | Age: 80
End: 2020-06-19

## 2020-06-19 ENCOUNTER — HOSPITAL ENCOUNTER (OUTPATIENT)
Dept: GENERAL RADIOLOGY | Facility: HOSPITAL | Age: 80
Discharge: HOME | End: 2020-06-19
Admitting: NURSE PRACTITIONER

## 2020-06-19 DIAGNOSIS — D50.9 IRON DEFICIENCY ANEMIA, UNSPECIFIED IRON DEFICIENCY ANEMIA TYPE: ICD-10-CM

## 2020-06-19 DIAGNOSIS — C34.32 MALIGNANT NEOPLASM OF LOWER LOBE OF LEFT LUNG (HCC): ICD-10-CM

## 2020-06-19 DIAGNOSIS — E53.8 B12 DEFICIENCY: ICD-10-CM

## 2020-06-19 LAB
ALBUMIN SERPL-MCNC: 4.6 G/DL (ref 3.5–5.2)
ALBUMIN/GLOB SERPL: 1.7 G/DL
ALP SERPL-CCNC: 71 U/L (ref 39–117)
ALT SERPL W P-5'-P-CCNC: 17 U/L (ref 1–41)
ANION GAP SERPL CALCULATED.3IONS-SCNC: 13 MMOL/L (ref 5–15)
AST SERPL-CCNC: 20 U/L (ref 1–40)
BASOPHILS # BLD AUTO: 0.03 10*3/MM3 (ref 0–0.2)
BASOPHILS NFR BLD AUTO: 0.5 % (ref 0–1.5)
BILIRUB SERPL-MCNC: 0.4 MG/DL (ref 0.2–1.2)
BUN BLD-MCNC: 10 MG/DL (ref 8–23)
BUN/CREAT SERPL: 11.6 (ref 7–25)
CALCIUM SPEC-SCNC: 8.8 MG/DL (ref 8.6–10.5)
CEA SERPL-MCNC: 3.6 NG/ML
CHLORIDE SERPL-SCNC: 101 MMOL/L (ref 98–107)
CO2 SERPL-SCNC: 23 MMOL/L (ref 22–29)
CREAT BLD-MCNC: 0.86 MG/DL (ref 0.76–1.27)
DEPRECATED RDW RBC AUTO: 43.8 FL (ref 37–54)
EOSINOPHIL # BLD AUTO: 0.08 10*3/MM3 (ref 0–0.4)
EOSINOPHIL NFR BLD AUTO: 1.2 % (ref 0.3–6.2)
ERYTHROCYTE [DISTWIDTH] IN BLOOD BY AUTOMATED COUNT: 12.6 % (ref 12.3–15.4)
FERRITIN SERPL-MCNC: 87.68 NG/ML (ref 30–400)
FOLATE SERPL-MCNC: 16.6 NG/ML (ref 4.78–24.2)
GFR SERPL CREATININE-BSD FRML MDRD: 85 ML/MIN/1.73
GLOBULIN UR ELPH-MCNC: 2.7 GM/DL
GLUCOSE BLD-MCNC: 88 MG/DL (ref 65–99)
HCT VFR BLD AUTO: 40.4 % (ref 37.5–51)
HGB BLD-MCNC: 13.5 G/DL (ref 13–17.7)
IMM GRANULOCYTES # BLD AUTO: 0.02 10*3/MM3 (ref 0–0.05)
IMM GRANULOCYTES NFR BLD AUTO: 0.3 % (ref 0–0.5)
IRON 24H UR-MRATE: 144 MCG/DL (ref 59–158)
IRON SATN MFR SERPL: 45 % (ref 20–50)
LYMPHOCYTES # BLD AUTO: 1.88 10*3/MM3 (ref 0.7–3.1)
LYMPHOCYTES NFR BLD AUTO: 29.3 % (ref 19.6–45.3)
MCH RBC QN AUTO: 31.5 PG (ref 26.6–33)
MCHC RBC AUTO-ENTMCNC: 33.4 G/DL (ref 31.5–35.7)
MCV RBC AUTO: 94.4 FL (ref 79–97)
MONOCYTES # BLD AUTO: 0.73 10*3/MM3 (ref 0.1–0.9)
MONOCYTES NFR BLD AUTO: 11.4 % (ref 5–12)
NEUTROPHILS # BLD AUTO: 3.67 10*3/MM3 (ref 1.7–7)
NEUTROPHILS NFR BLD AUTO: 57.3 % (ref 42.7–76)
NRBC BLD AUTO-RTO: 0 /100 WBC (ref 0–0.2)
PLATELET # BLD AUTO: 229 10*3/MM3 (ref 140–450)
PMV BLD AUTO: 9.2 FL (ref 6–12)
POTASSIUM BLD-SCNC: 4.4 MMOL/L (ref 3.5–5.2)
PROT SERPL-MCNC: 7.3 G/DL (ref 6–8.5)
RBC # BLD AUTO: 4.28 10*6/MM3 (ref 4.14–5.8)
SODIUM BLD-SCNC: 137 MMOL/L (ref 136–145)
TIBC SERPL-MCNC: 320 MCG/DL (ref 298–536)
TRANSFERRIN SERPL-MCNC: 215 MG/DL (ref 200–360)
VIT B12 BLD-MCNC: 285 PG/ML (ref 211–946)
WBC NRBC COR # BLD: 6.41 10*3/MM3 (ref 3.4–10.8)

## 2020-06-19 PROCEDURE — 80053 COMPREHEN METABOLIC PANEL: CPT

## 2020-06-19 PROCEDURE — 83540 ASSAY OF IRON: CPT

## 2020-06-19 PROCEDURE — 82728 ASSAY OF FERRITIN: CPT

## 2020-06-19 PROCEDURE — 85025 COMPLETE CBC W/AUTO DIFF WBC: CPT

## 2020-06-19 PROCEDURE — 84466 ASSAY OF TRANSFERRIN: CPT

## 2020-06-19 PROCEDURE — 82607 VITAMIN B-12: CPT

## 2020-06-19 PROCEDURE — 82746 ASSAY OF FOLIC ACID SERUM: CPT

## 2020-06-19 PROCEDURE — 36415 COLL VENOUS BLD VENIPUNCTURE: CPT

## 2020-06-19 PROCEDURE — 82378 CARCINOEMBRYONIC ANTIGEN: CPT

## 2020-06-19 PROCEDURE — 71046 X-RAY EXAM CHEST 2 VIEWS: CPT

## 2020-06-22 ENCOUNTER — TELEPHONE (OUTPATIENT)
Dept: ONCOLOGY | Facility: CLINIC | Age: 80
End: 2020-06-22

## 2020-06-22 NOTE — TELEPHONE ENCOUNTER
----- Message from GRACE Esparza sent at 6/22/2020 11:35 AM CDT -----  Please let Mr Garica know that his B12 is quite low. He can take tablets of 2000mcg po daily for 2 weeks then 1000mcg po daily or take injections of 1000mcg IM once a week for 4 then monthly. Otherwise labs are normal

## 2020-06-22 NOTE — TELEPHONE ENCOUNTER
Called patient with B12 results-[id number].  Instructed that GRACE Lisa reviewed labs and that he needs to take a B12 pill for we can set him up for injections.  Patient states that he would like to take a pill.  Instructed that he can get it over the counter, B12 1000mcg-take two tablets daily for 2 weeks then take one tablet daily after that.  Instructed to call with any problems.  Patient v/u.

## 2020-06-24 NOTE — PROGRESS NOTES
[facility]  [address]  [place] [address] [phone]    Patient Name: Carmine Garcia  Encounter Date: 06/30/2020  YOB: 1940  Patient Number: [id number]      REASON FOR VISIT: Mr. 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 He is seen 103 months since thoracoscopic surgical resection of the lung neoplasm. He is also followed for anemia of iron deficiency and B12 deficiency. It has been 31 months since last receipt of Injectafer. History is obtained from the patient who is considered to be a reliable historian.     DIAGNOSTIC ABNORMALITIES:   1. CT chest, 10/20/2011, [facility]: 1.5 cm left lower lobe nodule positioned just lateral to the hilum - granuloma versus malignancy. No pathologic lymphadenopathy.  2. PET CT scan, 10/26/2011: Increased metabolic activity in the left lower lobe. Lung nodule located centrally near the left hilum with maximum SUV of 7.4 units, suspicious for primary carcinoma. This corresponded to the lesion seen on CT scan of 10/20/2011. No abnormal mediastinal, hilar, or inguinal activity noted.  3. Diagnostic bronchoscopy, 11/14/2011: Negative for malignancy. There were no endobronchial lesions.  4. Labs, 11/29/2011: Ferritin 78.7, B12 235, folate 6.7, CEA 2.2, serum iron 16, TIBC 196 (225 to 420), iron saturation 8.2%.  5. Thyroid ultrasound, 11/30/2011. No thyroid nodule identified.  6. Labs, 12/01/2011: Serum cortisol 21.7. Urine sodium less than 5, serum osmolality 680 (601 to 850).  7. Left lower lobe lung biopsy, 11/28/2011, [facility]: Well-differentiated papillary adenocarcinoma (1.5 cm in greatest dimension).  8. Immunohistochemistry analysis, 11/30/2011: Tumor immunoreactive with TTF-1 and Napsin A while negative for thyroglobulin. Results support  primary lung adenocarcinoma.  9. Labs, 01/03/2012: GFR 69.9. Ferritin 54, iron 97, TIBC 301, iron sat 32. B12 352, folate 16.4.   10. Chest x-ray 09/09/2015 at [facility]. Stable postoperative chest x-ray.  11. Labs, 03/27/2017: Hemoglobin 13.8, YJI114.6,  (313 to 618), TSH 0.7, T4 of 6.9 (each normal),   12. Comprehensive blood report, 03/27/2017: Mild anemia with history of macrocytosis. COMPREHENSIVE DIAGNOSIS. Review of peripheral blood smear: Very mild anemia with red blood cells showing fairly normal morphology at the time of this peripheral blood specimen. Normal white blood cell and platelet counts and morphology. No abnormal populations detected on flow cytometric studies. See comment. COMPREHENSIVE COMMENT. This patient's recent peripheral blood specimen demonstrating a mild macrocytosis with an MCV of 100.6 is noted. At the time of this current peripheral blood specimen, his MCV is within normal limits at 94.3. He has a minimal anemia. White blood cell and platelet counts are both within normal limits with normal morphology. Causes of these peripheral blood findings and macrocytosis could be liver disease, thyroid disease, vitamin/nutritional deficiencies and drugs/toxins. Correlation recommended.    PREVIOUS INTERVENTIONS:   1. Left thoracoscopic core needle biopsy, followed by video-assisted thoracoscopic surgery (VATS) left lower lobectomy, and mediastinal lymph node dissection, 11/28/2011: Pathology from the left lower lung nodule lung biopsy showed no histologic evidence of malignancy. Well differentiated papillary adenocarcinoma (1.5 cm in greatest diminish. All resection margins are free of malignancy. No visceral pleural invasion identified. Three peribronchial lymph nodes negative for metastatic carcinoma. Emphysematous changes. Level 10 (1 lymph node), Level 11 (1 lymph node), Level 5 (2 lymph nodes) all negative for metastatic carcinoma. 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 Special stains are pending.  2. Venofer 200 mg IV daily, 11/30/2011 through 12/03/2011 (800 mg); then 02/17/2014 through 02/24/2014 (800 mg).  3. B12 at 1000 mcg IM beginning 11/30/2011 through 12/06/2011 (1000 mg).  4. Foltx p.o. daily beginning 02/12/2014 through 01/14/2015.  5. Injectafer 750 mg, 12/01/2017.        Problem List Items Addressed This Visit        Respiratory    Primary adenocarcinoma of lower lobe of left lung (CMS/HCC) - Primary           Primary adenocarcinoma of lower lobe of left lung (CMS/HCC)    10/20/2011 Initial Diagnosis     Primary adenocarcinoma of lower lobe of left lung (CMS/HCC)    1.CT chest, 10/20/2011, [facility]:  1.5 cm left lower lobe nodule positioned just lateral to the hilum - granuloma versus malignancy. No pathologic lymphadenopathy.  2.PET CT scan, 10/26/2011: Increased metabolic activity in the left lower lobe. Lung nodule located centrally near the left hilum with maximum SUV of 7.4 units, suspicious for primary carcinoma. This corresponded to the lesion seen on CT scan of 10/20/2011. No abnormal mediastinal, hilar, or inguinal activity noted.        11/14/2011 Biopsy     Diagnostic bronchoscopy, 11/14/2011:  Negative for malignancy. There were no endobronchial lesions.      11/28/2011 Surgery     Left lower lobe lung biopsy, 11/28/2011, [facility]: Well-differentiated papillary adenocarcinoma (1.5 cm in greatest dimension). 8.Immunohistochemistry analysis, 11/30/2011: Tumor immunoreactive with TTF-1 and Napsin A while negative for thyroglobulin. Results support primary lung adenocarcinoma    Left thoracoscopic core needle biopsy, followed by video-assisted thoracoscopic surgery (VATS) left lower lobectomy, and mediastinal lymph node dissection, 11/28/2011: Pathology from the left lower lung nodule lung biopsy showed no histologic evidence of malignancy. Well differentiated papillary adenocarcinoma (1.5 cm in greatest diminish. All resection margins  are free of malignancy. No visceral pleural invasion identified. Three peribronchial lymph nodes negative for metastatic carcinoma. Emphysematous changes. Level 10 (1 lymph node), Level 11 (1 lymph node), Level 5 (2 lymph nodes) all negative for metastatic carcinoma. 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            PAST MEDICAL HISTORY:  ALLERGIES:  Allergies   Allergen Reactions   • Penicillins Provider Review Needed     Unknown       CURRENT MEDICATIONS:  Outpatient Encounter Medications as of 6/30/2020   Medication Sig Dispense Refill   • acetaminophen (TYLENOL) 500 MG tablet Take 500 mg by mouth Every 6 (Six) Hours As Needed for Mild Pain .     • alfuzosin (UROXATRAL) 10 MG 24 hr tablet Take 10 mg by mouth.     • amLODIPine-benazepril (LOTREL) 10-20 MG per capsule Take  by mouth.     • furosemide (LASIX) 20 MG tablet Take 20 mg by mouth.     • lovastatin (MEVACOR) 40 MG tablet Take 40 mg by mouth.     • Multiple Vitamins-Minerals (PRESERVISION AREDS 2+MULTI VIT) capsule Take  by mouth.     • vitamin B-12 (CYANOCOBALAMIN) 1000 MCG tablet Take 1,000 mcg by mouth Daily.       No facility-administered encounter medications on file as of 6/30/2020.      ADULT ILLNESSES:   Lung cancer ( Stage Date: 11/28/2011, Stage IA (T1a, N0, M0)-Pathological  Date of Dx:2011 ; ICD-10:C34.32 ;Malignant neoplasm of lower lobe, left bronchus or lung )   Acute bronchitis ( ICD-10:J20.9 ;Acute bronchitis, unspecified   Cataracts ( ICD-10:H26.9 ;Unspecified cataract   Colonic polyps ( hyperplastic, 02/2010; ICD-10:K63.5 ;Polyp of colon )   Constipation ( ICD-10:K59.00 ;Constipation, unspecified   Former smoker ( ICD-10:Z87.891 ;Personal history of nicotine dependence   High carcinoembryonic antigen level ( ICD-10:R97.0 ;Elevated carcinoembryonic antigen [CEA]   Hyperlipidemia ( ICD-10:E78.5 ;Hyperlipidemia, unspecified   Hypertension ( ICD-10:I10 ;Essential (primary) hypertension   Hyperthyroidism ( ICD-10:E05.90  ;Thyrotoxicosis, unspecified without thyrotoxic crisis or storm   Iron deficiency anemia ( ICD-10:D50.9 ;Iron deficiency anemia, unspecified   Leukocytosis ( ICD-10:D72.829 ;Elevated white blood cell count, unspecified   Macrocytosis ( ICD-10:D75.89 ;Other specified diseases of blood and blood-forming organs   Malabsorption syndrome (disorder)   Peptic ulcers ( ICD-10:K27.7 ;Chronic peptic ulcer, site unspecified, without hemorrhage or perforation   Retention of urine ( postoperative following eye surgery, 10/2011; ICD-10:R33.9 ;Retention of urine, unspecified )   Strabismus (eye condition) ( ICD-10:H50.9 ;Unspecified strabismus   Vitamin B12 deficiency ( ICD-10:E53.8 ;Deficiency of other specified B group vitamins    SURGERIES:   Excision of non-melanoma skin cancer, 01/2012. Dr. Medrano   Video-assisted thoracoscopic (VATS) left lower lobectomy, 11/28/2011. Dr. Troy   Partial gastrectomy and vagotomy   Total knee replacement, right, 05/18/2017. Dr. Jarrett   Cataract surgery, 2004   Incision and drainage of abscess, left groin, (I&D) in early 01/2014. Dr. Velarde   Strabismus surgery, left eye, 2011.   Colonoscopy on 4/10/18 ([facility]). Impressions: Preparation of the colon was inadequate. One 12 mm polyp in the cecum, removed with a hot snare and removed piecemeal using a hot snare. Resected and retrieved. Diverticulosis in the sigmoid colon and in the descending colon    ADULT ILLNESSES:  Patient Active Problem List   Diagnosis Code   • Hx of colonic polyps Z86.010   • Obesity, unspecified obesity severity, unspecified obesity type E66.9   • History of colon polyps Z86.010   • Primary adenocarcinoma of lower lobe of left lung (CMS/HCC) C34.32   • Iron deficiency anemia D50.9   • B12 deficiency E53.8   • Essential hypertension I10   • Pulmonary emphysema (CMS/HCC) J43.9   • Slow transit constipation K59.01   • Urinary retention R33.9     SURGERIES:  Past Surgical History:   Procedure Laterality Date    • CATARACT EXTRACTION     • COLONOSCOPY  2014    Diverticulosis repeat exam in 3 years   • COLONOSCOPY N/A 4/10/2018    Procedure: COLONOSCOPY WITH ANESTHESIA;  Surgeon: Ferdinand Cross MD;  Location: [facility] ENDOSCOPY;  Service: Gastroenterology   • COLONOSCOPY N/A 2018    Procedure: COLONOSCOPY WITH ANESTHESIA;  Surgeon: Ferdinand Cross MD;  Location: [facility] ENDOSCOPY;  Service: Gastroenterology   • COLONOSCOPY W/ POLYPECTOMY  02/15/2010    2 Hyperplastic polyps at 25 cm and rectum repeat exam in 5 years   • LUNG CANCER SURGERY     • REPLACEMENT TOTAL KNEE Right 2017     HEALTH MAINTENANCE ITEMS:  Health Maintenance Due   Topic Date Due   • TDAP/TD VACCINES (1 - Tdap) 1950   • ZOSTER VACCINE (1 of 2) 1989   • Pneumococcal Vaccine Once at 65 Years Old  2004   • MEDICARE ANNUAL WELLNESS  2017   • LIPID PANEL  2018       <no information>  Last Completed Colonoscopy     Patient has no health maintenance due at this time          There is no immunization history on file for this patient.  Last Completed Mammogram     Patient has no health maintenance due at this time            FAMILY HISTORY:  Family History   Problem Relation Age of Onset   • Colon polyps Mother    • Stroke Mother    • Cancer Father         asbestos   • Stroke Maternal Grandmother    • No Known Problems Maternal Grandfather    • No Known Problems Paternal Grandmother    • No Known Problems Paternal Grandfather    • Colon cancer Neg Hx      SOCIAL HISTORY:  Social History     Socioeconomic History   • Marital status:      Spouse name: Not on file   • Number of children: Not on file   • Years of education: Not on file   • Highest education level: Not on file   Tobacco Use   • Smoking status: Former Smoker     Packs/day: 2.00     Years: 30.00     Pack years: 60.00     Types: Cigarettes     Last attempt to quit:      Years since quittin.5   • Smokeless tobacco: Never Used   Substance and Sexual  "Activity   • Alcohol use: No   • Drug use: No   • Sexual activity: Defer       REVIEW OF SYSTEMS:  Constitutional:   The patient's appetite is good. His energy remains chronically low to fair. Has not been as active.  He has been able to walk better since the knee surgery (right knee arthroplasty, 05/18/2017). He manages his personal ADLs including running errands and driving. His activities no longer hampered by knee pains, though says he has \"arthritis all over.\" He is still sedentary. He has no weight changes (had gained 4 pounds at his prior visit) in the interval. He lives with his daughter and her spouse. He denies fevers, chills, or drenching night sweats. His sleep habits had been appropriate prior to surgery.  Ear/Nose/Throat/Mouth:   He reports some sinus congestion and postnasal drainage but no ear pains or sore throat but has had frequent nosebleeds. No sore tongue. He has no headaches. He denies any hoarseness, change in voice quality, or hemoptysis.  Ocular:   He reports no eye pain, significant change in visual acuity, double vision, or blurry vision.  Respiratory:   He has baseline exertional dyspnea and says he is still sometimes short of breath with some activity but not worse. He denies shortness of breath at rest. He has postnasal drainage associated cough.   Cardiovascular:   He reports no exertional chest pain, chest pressure, or chest heaviness. He reports no claudication. He reports no palpitations or symptomatic orthostasis.  Gastrointestinal:   He reports no dysphagia, nausea, vomiting, postprandial abdominal pain, bloating, cramping, change in bowel habits, with no dark stools (oral iron intolerant). He reports no rectal bleeding. He reports intermittent constipation modulated by daily stool softeners (Dulcolax) without laxatives. He has no diarrhea. Repeat c-scope 08/02/2018. \"polyps.\" Repeat 3 years. \"I'm not too sure if I want to.\"  Genitourinary:   He denies prior problems with urinary " "burning, frequency, dribbling, or discoloration. He denies difficulty controlling his bladder.   Musculoskeletal:   He has chronic arthralgias but the right knee feels better since surgery. He has occasional pains of the hips and his shoulders. He denies myalgias or nighttime leg cramping.  Extremities:   He normally has no fluid retention though has lingering mild right ankle/leg swelling.   Endocrine:   He reports no problems with excess thirst, excessive urination, vasomotor instability, or unexplained fatigue.  Heme/Lymphatic:   He reports easy bruising but no unexplained bleeding, petechial rashes, or swollen glands.  Neuro:   He reports no loss of consciousness, seizures, fainting spells, or dizziness. He reports no weakness of his face, arms, or legs. He has no difficulty with speech. He has no tremors. He reports occasional left and right foot tingling.   Psych:   He seems generally satisfied with life. He denies depression. He reports no mood swings.       VITAL SIGNS: /78   Pulse 103   Temp 97.3 °F (36.3 °C)   Resp 16   Ht 180.3 cm (71\")   Wt 114 kg (250 lb 14.4 oz)   SpO2 93%   BMI 34.99 kg/m² Body surface area is 2.32 meters squared.  Pain Score    06/30/20 1504   PainSc: 0-No pain         PHYSICAL EXAMINATION:   General:   He is a pleasant, obese, and modestly-kept elderly male who appears comfortable while seated. He appears to be his stated age. His skin color is normal. He is ambulatory without a cane today. \"I have it in the car.\" ECOG PS = 2 (\"50-50\")  Head/Neck:   The patient is anicteric and atraumatic. He is wearing a surgical mask today.  The trachea is midline. The neck is supple without evidence of jugular venous distention or cervical adenopathy.  Eyes:   The pupils are equal, round, and reactive to light. The extraocular movements are full. There is no scleral jaundice or erythema.  Chest:   Breath sounds are diminished, left greater than right. There are no wheezes, rhonchi, " or rales. The right thoracotomy wounds are healed.  Cardiovascular:   The patient has a regular cardiac rate and rhythm without murmurs, rubs, or gallops. The peripheral pulses are equal and full.  Abdomen:   The belly is soft and globose. There is no rebound, guarding, organomegaly, mass-effect, or tenderness. Bowel sounds are hypoactive but present.  Extremities:   There is no evidence of cyanosis or clubbing. There is no leg edema (prior: 1+) but no calf tenderness. The right knee arthroplasty wound is healed.  Rheumatologic:   There is some evidence of osteoarthritic deformities of the hands. The gait is slow but is not cane supported.  Cutaneous:   There are no overt rashes, disseminated lesions, purpura, or petechiae.  Lymphatics:   There is no evidence of adenopathy in the cervical, supraclavicular, or axillary areas.  Neurologic:   The patient is alert, oriented, cooperative, and pleasant. He is appropriately conversant. There is overt impairment with no overt focal motor deficits.  Psych:   Mood and affect are appropriate for circumstance. Eye contact is appropriate.        LABS    Lab Results - Last 18 Months   Lab Units 06/19/20  1301 08/23/19  1044   HEMOGLOBIN g/dL 13.5 14.2   HEMATOCRIT % 40.4 42.1   MCV fL 94.4 95.0   WBC 10*3/mm3 6.41 5.59   RDW % 12.6 12.6   MPV fL 9.2 9.5   PLATELETS 10*3/mm3 229 244   IMM GRAN % % 0.3 0.4   NEUTROS ABS 10*3/mm3 3.67 3.42   LYMPHS ABS 10*3/mm3 1.88 1.37   MONOS ABS 10*3/mm3 0.73 0.63   EOS ABS 10*3/mm3 0.08 0.12   BASOS ABS 10*3/mm3 0.03 0.03   IMMATURE GRANS (ABS) 10*3/mm3 0.02 0.02   NRBC /100 WBC 0.0 0.0       Lab Results - Last 18 Months   Lab Units 06/19/20  1301 08/23/19  1044   GLUCOSE mg/dL 88 127*   SODIUM mmol/L 137 136   POTASSIUM mmol/L 4.4 4.2   CO2 mmol/L 23.0 26.0   CHLORIDE mmol/L 101 100   ANION GAP mmol/L 13.0 10.0   CREATININE mg/dL 0.86 0.86   BUN mg/dL 10 10   BUN / CREAT RATIO  11.6 11.6   CALCIUM mg/dL 8.8 9.2   EGFR IF NONAFRICN AM  mL/min/1.73 85 86   ALK PHOS U/L 71 70   TOTAL PROTEIN g/dL 7.3 7.7   ALT (SGPT) U/L 17 29   AST (SGOT) U/L 20 25   BILIRUBIN mg/dL 0.4 0.6   ALBUMIN g/dL 4.60 4.50   GLOBULIN gm/dL 2.7 3.2       Lab Results - Last 18 Months   Lab Units 06/19/20  1301 08/23/19  1044   CEA ng/mL 3.60 3.44       Lab Results - Last 18 Months   Lab Units 06/19/20  1301 08/23/19  1044   IRON mcg/dL 144 161   TIBC mcg/dL 320 304   IRON SATURATION % 45 53*   FERRITIN ng/mL 87.68 58.10   FOLATE ng/mL 16.60 11.60       PROBLEMS IDENTIFIED:   1. Well differentiated papillary adenocarcinoma:  Stage: IA (pT1a, pN0, M0).   Tumor burden: 1.5 cm left lower lung nodule.   Complications of tumor: None.   Tumor status: JAZMÍN following resection via left lower lobectomy.   Chest x-ray, 07/03/2017, [facility]. Impression: Stable appearance of the chest with postoperative changes in the left lung base and emphysematous changes.   Chest x-ray obtained 11/20/2017 at [facility] reveals no interval change when compared to chest x-ray dated 07/03/2017.   Chest x-ray on 02/12/2018 ([facility]). Impression: Hyperinflated lungs suggesting chronic obstructive pulmonary disease (COPD). Postoperative changes of the left hemithorax. No acute cardiopulmonary process identified.   Chest Xray on 07/06/2018 ([facility]). Impression: No acute cardiopulmonary disease.   Chest X-ray, 12/21/2018 at [facility]. COPD. Chronic volume loss in the lung bases with mild central vascular crowding. No acute infiltrates.   06/19/2020-chest x-ray.  Comparison: 8/23/2019-impression: Mild blunting of the left costophrenic angle which could be seen with scarring or small effusion.  2. Mildly abnormal CEA.  3.6, 06/19/2020 (prior range: 2.2 - 5.0). Continued observation warranted.   3. 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 Underwent right knee replacement, 05/18/2017.   4.  "Normocytic/macrocytic anemia with resolution of macrocytosis, recurrent iron and B12 deficiencies (anemia of chronic disease). Stable, Hgb 13.5; MCV 94.4 on 06/19/2020 (prior range: Hgb 11.4 - 14.3; 97.5 - 102.1)  a. No myelodysplastic syndrome (MDS) on comprehensive blood report, normal thyroid function tests (TFTs), normal LDH, normal B12/folate.   b. Colonoscopy on 4/10/18 ([facility]). Impressions: Preparation of the colon was inadequate. One 12 mm polyp in the cecum, removed with a hot snare and removed piecemeal using a hot snare. Resected and retrieved. Diverticulosis in the sigmoid colon and in the descending colon.   c. Repeat with 2 day prep scheduled for 08/02/2018. \"Polyps\" Repeat 3 years.  d. Colonoscopy, 08/02/2018 (above).  Fair colon preparation.  1 5 mm polyp in the ascending colon, removed with hot snare.  One 12 mm polyp in the transverse colon, removed with a hot snare.  5.  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 Negative thyroid ultrasound, 11/30/2011. Is followed by Dr. Medrano (ENT) and Dr. Roth (endocrine).   6.  Low B12 levels. Recurrent. Folbee called in, 06/19/2020.   7.  Constipation, multifactorial.  Regulated by stool softeners.  \"Occasionally.\"  8.  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   9.  Chronic intermittent irregular heart rate, Dr. Dobson following.   10. A 50 pack-year tobacco smoker with radiographic evidence of emphysema. Has not smoked since 2004.   11. Right leg edema. No longer evident.  Venous Doppler of right lower extremity on 11/28/2017 ([facility]). Impression: There is no evidence of deep venous thrombosis or superficial thrombophlebitis of the right lower extremity.    RECOMMENDATIONS:   1.  Re: Apprise of stable surveillance chest x-ray, 06/19/2020 (see above). JAZMÍN.   2.  Re: Apprise of labs from 06/19/2020 including the current heme (stable anemia with normal MCV) and the " other labs noting the mildly elevated (stable) CEA, normal CMP, repleted iron, repleted Fe sat, slightly depressed ferritin (< 100), slightly depressed B12 (285), repleted folate.   3.  Reminded of the labs from 03/27/2017 including the non-diagnostic peripheral blood comprehensive report, normal T4, normal TSH, normal LDH.   4.  Previously discussed that even without adjuvant chemotherapy 70.8% of people are alive in 5 years and none of those are alive due to therapy. Approximately 28% of those die of cancer and 1.3% die from other causes. Emphasized that very little data are available about the effects of adjuvant chemotherapy in Stage I lung cancer and most guidelines do not recommend the use of adjuvant therapy in this population and a meta-analysis even suggested that there was a trend toward a worse outcome in these patients. He preferred not to have chemo anyway.   5.  Review report of colonoscopy, 08/02/2018 (above).  Fair colon preparation.  1 5 mm polyp in the ascending colon, removed with hot snare.  One 12 mm polyp in the transverse colon, removed with a hot snare.  6.  Rx:   Folbee p.o. daily #30 x5 RF - eRx  7.  Continue currently identified medications.   8.  Continue ongoing management per primary care physician and other specialists.   9.  Return to the [place] office with pre-office chest x-ray, CEA, serum iron, Fe sat, ferritin, B12/folate, CMP, and CBC with differential in 8 months.    MEDICAL DECISION MAKING: Moderate Complexity   AMOUNT OF DATA: Moderate    I spent 31 total minutes, face-to-face, caring for Carmine today.  Greater than 50% of this time involved counseling and/or coordination of care as documented within this note regarding the patient's illness(es), pros and cons of various treatment options, instructions and/or risk reduction.    cc: DO Kieran Pennington MD Nicholas Lopez, MD

## 2020-06-30 ENCOUNTER — OFFICE VISIT (OUTPATIENT)
Dept: ONCOLOGY | Facility: CLINIC | Age: 80
End: 2020-06-30

## 2020-06-30 VITALS
DIASTOLIC BLOOD PRESSURE: 78 MMHG | OXYGEN SATURATION: 93 % | SYSTOLIC BLOOD PRESSURE: 146 MMHG | WEIGHT: 250.9 LBS | RESPIRATION RATE: 16 BRPM | BODY MASS INDEX: 35.13 KG/M2 | TEMPERATURE: 97.3 F | HEART RATE: 103 BPM | HEIGHT: 71 IN

## 2020-06-30 DIAGNOSIS — C34.32 PRIMARY ADENOCARCINOMA OF LOWER LOBE OF LEFT LUNG (HCC): Primary | ICD-10-CM

## 2020-06-30 PROBLEM — R33.9 URINARY RETENTION: Status: ACTIVE | Noted: 2017-05-19

## 2020-06-30 PROBLEM — J43.9 PULMONARY EMPHYSEMA: Status: ACTIVE | Noted: 2017-05-19

## 2020-06-30 PROBLEM — I10 ESSENTIAL HYPERTENSION: Status: ACTIVE | Noted: 2017-05-19

## 2020-06-30 PROBLEM — K59.01 SLOW TRANSIT CONSTIPATION: Status: ACTIVE | Noted: 2017-05-19

## 2020-06-30 PROCEDURE — 99214 OFFICE O/P EST MOD 30 MIN: CPT | Performed by: INTERNAL MEDICINE

## 2020-06-30 RX ORDER — LANOLIN ALCOHOL/MO/W.PET/CERES
1000 CREAM (GRAM) TOPICAL DAILY
COMMUNITY
End: 2020-06-30 | Stop reason: SDUPTHER

## 2020-06-30 RX ORDER — ACETAMINOPHEN 500 MG
500 TABLET ORAL EVERY 6 HOURS PRN
COMMUNITY
End: 2021-03-01 | Stop reason: ALTCHOICE

## 2021-02-22 ENCOUNTER — LAB (OUTPATIENT)
Dept: LAB | Facility: HOSPITAL | Age: 81
End: 2021-02-22

## 2021-02-22 ENCOUNTER — HOSPITAL ENCOUNTER (OUTPATIENT)
Dept: GENERAL RADIOLOGY | Facility: HOSPITAL | Age: 81
Discharge: HOME | End: 2021-02-22
Admitting: INTERNAL MEDICINE

## 2021-02-22 DIAGNOSIS — C34.32 PRIMARY ADENOCARCINOMA OF LOWER LOBE OF LEFT LUNG (HCC): ICD-10-CM

## 2021-02-22 LAB
ALBUMIN SERPL-MCNC: 4.1 G/DL (ref 3.5–5.2)
ALBUMIN/GLOB SERPL: 1.4 G/DL
ALP SERPL-CCNC: 100 U/L (ref 39–117)
ALT SERPL W P-5'-P-CCNC: 18 U/L (ref 1–41)
ANION GAP SERPL CALCULATED.3IONS-SCNC: 8 MMOL/L (ref 5–15)
AST SERPL-CCNC: 23 U/L (ref 1–40)
BASOPHILS # BLD AUTO: 0.03 10*3/MM3 (ref 0–0.2)
BASOPHILS NFR BLD AUTO: 0.6 % (ref 0–1.5)
BILIRUB SERPL-MCNC: 0.4 MG/DL (ref 0–1.2)
BUN SERPL-MCNC: 10 MG/DL (ref 8–23)
BUN/CREAT SERPL: 11.2 (ref 7–25)
CALCIUM SPEC-SCNC: 8.7 MG/DL (ref 8.6–10.5)
CEA SERPL-MCNC: 3.28 NG/ML
CHLORIDE SERPL-SCNC: 98 MMOL/L (ref 98–107)
CO2 SERPL-SCNC: 27 MMOL/L (ref 22–29)
CREAT SERPL-MCNC: 0.89 MG/DL (ref 0.76–1.27)
DEPRECATED RDW RBC AUTO: 42.3 FL (ref 37–54)
EOSINOPHIL # BLD AUTO: 0.06 10*3/MM3 (ref 0–0.4)
EOSINOPHIL NFR BLD AUTO: 1.1 % (ref 0.3–6.2)
ERYTHROCYTE [DISTWIDTH] IN BLOOD BY AUTOMATED COUNT: 13 % (ref 12.3–15.4)
FERRITIN SERPL-MCNC: 45.83 NG/ML (ref 30–400)
FOLATE SERPL-MCNC: >20 NG/ML (ref 4.78–24.2)
GFR SERPL CREATININE-BSD FRML MDRD: 82 ML/MIN/1.73
GLOBULIN UR ELPH-MCNC: 3 GM/DL
GLUCOSE SERPL-MCNC: 131 MG/DL (ref 65–99)
HCT VFR BLD AUTO: 38 % (ref 37.5–51)
HGB BLD-MCNC: 13.5 G/DL (ref 13–17.7)
IMM GRANULOCYTES # BLD AUTO: 0.06 10*3/MM3 (ref 0–0.05)
IMM GRANULOCYTES NFR BLD AUTO: 1.1 % (ref 0–0.5)
IRON 24H UR-MRATE: 125 MCG/DL (ref 59–158)
IRON SATN MFR SERPL: 37 % (ref 20–50)
LYMPHOCYTES # BLD AUTO: 1.41 10*3/MM3 (ref 0.7–3.1)
LYMPHOCYTES NFR BLD AUTO: 26.6 % (ref 19.6–45.3)
MCH RBC QN AUTO: 31.7 PG (ref 26.6–33)
MCHC RBC AUTO-ENTMCNC: 35.5 G/DL (ref 31.5–35.7)
MCV RBC AUTO: 89.2 FL (ref 79–97)
MONOCYTES # BLD AUTO: 0.52 10*3/MM3 (ref 0.1–0.9)
MONOCYTES NFR BLD AUTO: 9.8 % (ref 5–12)
NEUTROPHILS NFR BLD AUTO: 3.23 10*3/MM3 (ref 1.7–7)
NEUTROPHILS NFR BLD AUTO: 60.8 % (ref 42.7–76)
NRBC BLD AUTO-RTO: 0 /100 WBC (ref 0–0.2)
PLATELET # BLD AUTO: 204 10*3/MM3 (ref 140–450)
PMV BLD AUTO: 9 FL (ref 6–12)
POTASSIUM SERPL-SCNC: 4.6 MMOL/L (ref 3.5–5.2)
PROT SERPL-MCNC: 7.1 G/DL (ref 6–8.5)
RBC # BLD AUTO: 4.26 10*6/MM3 (ref 4.14–5.8)
SODIUM SERPL-SCNC: 133 MMOL/L (ref 136–145)
TIBC SERPL-MCNC: 334 MCG/DL (ref 298–536)
TRANSFERRIN SERPL-MCNC: 224 MG/DL (ref 200–360)
VIT B12 BLD-MCNC: 720 PG/ML (ref 211–946)
WBC # BLD AUTO: 5.31 10*3/MM3 (ref 3.4–10.8)

## 2021-02-22 PROCEDURE — 82607 VITAMIN B-12: CPT

## 2021-02-22 PROCEDURE — 36415 COLL VENOUS BLD VENIPUNCTURE: CPT

## 2021-02-22 PROCEDURE — 82378 CARCINOEMBRYONIC ANTIGEN: CPT

## 2021-02-22 PROCEDURE — 82746 ASSAY OF FOLIC ACID SERUM: CPT

## 2021-02-22 PROCEDURE — 82728 ASSAY OF FERRITIN: CPT

## 2021-02-22 PROCEDURE — 71046 X-RAY EXAM CHEST 2 VIEWS: CPT

## 2021-02-22 PROCEDURE — 85025 COMPLETE CBC W/AUTO DIFF WBC: CPT

## 2021-02-22 PROCEDURE — 84466 ASSAY OF TRANSFERRIN: CPT

## 2021-02-22 PROCEDURE — 80053 COMPREHEN METABOLIC PANEL: CPT

## 2021-02-22 PROCEDURE — 83540 ASSAY OF IRON: CPT

## 2021-02-22 RX ORDER — CYANOCOBALAMIN/FOLIC AC/VIT B6 1-2.5-25MG
TABLET ORAL
Qty: 30 TABLET | Refills: 0 | Status: SHIPPED | OUTPATIENT
Start: 2021-02-22 | End: 2021-03-29

## 2021-03-01 ENCOUNTER — OFFICE VISIT (OUTPATIENT)
Dept: ONCOLOGY | Facility: CLINIC | Age: 81
End: 2021-03-01

## 2021-03-01 VITALS
HEART RATE: 84 BPM | BODY MASS INDEX: 35.6 KG/M2 | DIASTOLIC BLOOD PRESSURE: 90 MMHG | SYSTOLIC BLOOD PRESSURE: 168 MMHG | HEIGHT: 71 IN | OXYGEN SATURATION: 98 % | TEMPERATURE: 97.2 F | RESPIRATION RATE: 16 BRPM | WEIGHT: 254.3 LBS

## 2021-03-01 DIAGNOSIS — C34.32 PRIMARY ADENOCARCINOMA OF LOWER LOBE OF LEFT LUNG (HCC): Primary | ICD-10-CM

## 2021-03-01 PROCEDURE — 99214 OFFICE O/P EST MOD 30 MIN: CPT | Performed by: INTERNAL MEDICINE

## 2021-03-01 RX ORDER — IBUPROFEN 400 MG/1
400 TABLET ORAL EVERY 6 HOURS PRN
COMMUNITY
End: 2022-02-01

## 2021-03-29 RX ORDER — CYANOCOBALAMIN/FOLIC AC/VIT B6 1-2.5-25MG
TABLET ORAL
Qty: 30 TABLET | Refills: 5 | Status: SHIPPED | OUTPATIENT
Start: 2021-03-29 | End: 2022-02-01

## 2021-06-07 ENCOUNTER — TRANSCRIBE ORDERS (OUTPATIENT)
Dept: ADMINISTRATIVE | Facility: HOSPITAL | Age: 81
End: 2021-06-07

## 2021-06-07 ENCOUNTER — HOSPITAL ENCOUNTER (OUTPATIENT)
Dept: ULTRASOUND IMAGING | Facility: HOSPITAL | Age: 81
Discharge: HOME | End: 2021-06-07
Admitting: INTERNAL MEDICINE

## 2021-06-07 DIAGNOSIS — R60.0 LOCALIZED EDEMA: Primary | ICD-10-CM

## 2021-06-07 PROCEDURE — 93971 EXTREMITY STUDY: CPT

## 2021-10-25 ENCOUNTER — HOSPITAL ENCOUNTER (OUTPATIENT)
Dept: GENERAL RADIOLOGY | Facility: HOSPITAL | Age: 81
Discharge: HOME | End: 2021-10-25
Admitting: INTERNAL MEDICINE

## 2021-10-25 ENCOUNTER — LAB (OUTPATIENT)
Dept: LAB | Facility: HOSPITAL | Age: 81
End: 2021-10-25

## 2021-10-25 DIAGNOSIS — C34.32 PRIMARY ADENOCARCINOMA OF LOWER LOBE OF LEFT LUNG (HCC): ICD-10-CM

## 2021-10-25 LAB
ALBUMIN SERPL-MCNC: 4.4 G/DL (ref 3.5–5.2)
ALBUMIN/GLOB SERPL: 1.8 G/DL
ALP SERPL-CCNC: 79 U/L (ref 39–117)
ALT SERPL W P-5'-P-CCNC: 16 U/L (ref 1–41)
ANION GAP SERPL CALCULATED.3IONS-SCNC: 8 MMOL/L (ref 5–15)
AST SERPL-CCNC: 20 U/L (ref 1–40)
BASOPHILS # BLD AUTO: 0.03 10*3/MM3 (ref 0–0.2)
BASOPHILS NFR BLD AUTO: 0.5 % (ref 0–1.5)
BILIRUB SERPL-MCNC: 0.3 MG/DL (ref 0–1.2)
BUN SERPL-MCNC: 11 MG/DL (ref 8–23)
BUN/CREAT SERPL: 13.1 (ref 7–25)
CALCIUM SPEC-SCNC: 9 MG/DL (ref 8.6–10.5)
CEA SERPL-MCNC: 3.4 NG/ML
CHLORIDE SERPL-SCNC: 99 MMOL/L (ref 98–107)
CO2 SERPL-SCNC: 29 MMOL/L (ref 22–29)
CREAT SERPL-MCNC: 0.84 MG/DL (ref 0.76–1.27)
DEPRECATED RDW RBC AUTO: 44.9 FL (ref 37–54)
EOSINOPHIL # BLD AUTO: 0.09 10*3/MM3 (ref 0–0.4)
EOSINOPHIL NFR BLD AUTO: 1.6 % (ref 0.3–6.2)
ERYTHROCYTE [DISTWIDTH] IN BLOOD BY AUTOMATED COUNT: 12.8 % (ref 12.3–15.4)
FERRITIN SERPL-MCNC: 17.6 NG/ML (ref 30–400)
FOLATE SERPL-MCNC: >20 NG/ML (ref 4.78–24.2)
GFR SERPL CREATININE-BSD FRML MDRD: 87 ML/MIN/1.73
GLOBULIN UR ELPH-MCNC: 2.5 GM/DL
GLUCOSE SERPL-MCNC: 85 MG/DL (ref 65–99)
HCT VFR BLD AUTO: 39.2 % (ref 37.5–51)
HGB BLD-MCNC: 12.8 G/DL (ref 13–17.7)
IMM GRANULOCYTES # BLD AUTO: 0.01 10*3/MM3 (ref 0–0.05)
IMM GRANULOCYTES NFR BLD AUTO: 0.2 % (ref 0–0.5)
IRON 24H UR-MRATE: 63 MCG/DL (ref 59–158)
IRON SATN MFR SERPL: 18 % (ref 20–50)
LYMPHOCYTES # BLD AUTO: 1.66 10*3/MM3 (ref 0.7–3.1)
LYMPHOCYTES NFR BLD AUTO: 28.7 % (ref 19.6–45.3)
MCH RBC QN AUTO: 31.1 PG (ref 26.6–33)
MCHC RBC AUTO-ENTMCNC: 32.7 G/DL (ref 31.5–35.7)
MCV RBC AUTO: 95.4 FL (ref 79–97)
MONOCYTES # BLD AUTO: 0.79 10*3/MM3 (ref 0.1–0.9)
MONOCYTES NFR BLD AUTO: 13.7 % (ref 5–12)
NEUTROPHILS NFR BLD AUTO: 3.2 10*3/MM3 (ref 1.7–7)
NEUTROPHILS NFR BLD AUTO: 55.3 % (ref 42.7–76)
NRBC BLD AUTO-RTO: 0 /100 WBC (ref 0–0.2)
PLATELET # BLD AUTO: 247 10*3/MM3 (ref 140–450)
PMV BLD AUTO: 9 FL (ref 6–12)
POTASSIUM SERPL-SCNC: 4.3 MMOL/L (ref 3.5–5.2)
PROT SERPL-MCNC: 6.9 G/DL (ref 6–8.5)
RBC # BLD AUTO: 4.11 10*6/MM3 (ref 4.14–5.8)
SODIUM SERPL-SCNC: 136 MMOL/L (ref 136–145)
TIBC SERPL-MCNC: 359 MCG/DL (ref 298–536)
TRANSFERRIN SERPL-MCNC: 241 MG/DL (ref 200–360)
VIT B12 BLD-MCNC: 1136 PG/ML (ref 211–946)
WBC # BLD AUTO: 5.78 10*3/MM3 (ref 3.4–10.8)

## 2021-10-25 PROCEDURE — 82607 VITAMIN B-12: CPT

## 2021-10-25 PROCEDURE — 82746 ASSAY OF FOLIC ACID SERUM: CPT

## 2021-10-25 PROCEDURE — 71046 X-RAY EXAM CHEST 2 VIEWS: CPT

## 2021-10-25 PROCEDURE — 80053 COMPREHEN METABOLIC PANEL: CPT

## 2021-10-25 PROCEDURE — 83540 ASSAY OF IRON: CPT

## 2021-10-25 PROCEDURE — 36415 COLL VENOUS BLD VENIPUNCTURE: CPT

## 2021-10-25 PROCEDURE — 82728 ASSAY OF FERRITIN: CPT

## 2021-10-25 PROCEDURE — 82378 CARCINOEMBRYONIC ANTIGEN: CPT

## 2021-10-25 PROCEDURE — 84466 ASSAY OF TRANSFERRIN: CPT

## 2021-10-25 PROCEDURE — 85025 COMPLETE CBC W/AUTO DIFF WBC: CPT

## 2021-10-26 ENCOUNTER — TELEPHONE (OUTPATIENT)
Dept: ONCOLOGY | Facility: CLINIC | Age: 81
End: 2021-10-26

## 2021-10-26 NOTE — TELEPHONE ENCOUNTER
Notified pt that injectafer is scheduled 11/1/21 at 11am after he sees Dr. VIVIANE Gonzalez v/u    ----- Message from Cem Arteaga MD sent at 10/25/2021  1:18 PM CDT -----  Schedule Injectafer 750 mg IV x1 at [facility]

## 2021-10-28 RX ORDER — FAMOTIDINE 10 MG/ML
20 INJECTION, SOLUTION INTRAVENOUS AS NEEDED
Status: CANCELLED | OUTPATIENT
Start: 2021-11-01

## 2021-10-28 RX ORDER — SODIUM CHLORIDE 9 MG/ML
250 INJECTION, SOLUTION INTRAVENOUS ONCE
Status: CANCELLED | OUTPATIENT
Start: 2021-11-01

## 2021-10-28 RX ORDER — DIPHENHYDRAMINE HYDROCHLORIDE 50 MG/ML
50 INJECTION INTRAMUSCULAR; INTRAVENOUS AS NEEDED
Status: CANCELLED | OUTPATIENT
Start: 2021-11-01

## 2021-11-01 ENCOUNTER — INFUSION (OUTPATIENT)
Dept: ONCOLOGY | Facility: HOSPITAL | Age: 81
End: 2021-11-01

## 2021-11-01 ENCOUNTER — OFFICE VISIT (OUTPATIENT)
Dept: ONCOLOGY | Facility: CLINIC | Age: 81
End: 2021-11-01

## 2021-11-01 VITALS
RESPIRATION RATE: 16 BRPM | DIASTOLIC BLOOD PRESSURE: 73 MMHG | TEMPERATURE: 98.1 F | WEIGHT: 249 LBS | SYSTOLIC BLOOD PRESSURE: 131 MMHG | HEART RATE: 67 BPM | BODY MASS INDEX: 33.72 KG/M2 | OXYGEN SATURATION: 96 % | HEIGHT: 72 IN

## 2021-11-01 VITALS
HEART RATE: 92 BPM | BODY MASS INDEX: 35.24 KG/M2 | RESPIRATION RATE: 16 BRPM | TEMPERATURE: 97.5 F | HEIGHT: 71 IN | OXYGEN SATURATION: 96 % | WEIGHT: 251.7 LBS | DIASTOLIC BLOOD PRESSURE: 90 MMHG | SYSTOLIC BLOOD PRESSURE: 166 MMHG

## 2021-11-01 DIAGNOSIS — D50.9 IRON DEFICIENCY ANEMIA, UNSPECIFIED IRON DEFICIENCY ANEMIA TYPE: Primary | ICD-10-CM

## 2021-11-01 DIAGNOSIS — C34.32 PRIMARY ADENOCARCINOMA OF LOWER LOBE OF LEFT LUNG (HCC): Primary | ICD-10-CM

## 2021-11-01 PROCEDURE — 25010000002 FERRIC CARBOXYMALTOSE 750 MG/15ML SOLUTION 15 ML VIAL: Performed by: INTERNAL MEDICINE

## 2021-11-01 PROCEDURE — 96365 THER/PROPH/DIAG IV INF INIT: CPT

## 2021-11-01 PROCEDURE — 99214 OFFICE O/P EST MOD 30 MIN: CPT | Performed by: INTERNAL MEDICINE

## 2021-11-01 RX ORDER — SODIUM CHLORIDE 9 MG/ML
250 INJECTION, SOLUTION INTRAVENOUS ONCE
Status: COMPLETED | OUTPATIENT
Start: 2021-11-01 | End: 2021-11-01

## 2021-11-01 RX ORDER — ACETAMINOPHEN,DIPHENHYDRAMINE HCL 500; 25 MG/1; MG/1
1 TABLET, FILM COATED ORAL NIGHTLY PRN
COMMUNITY
End: 2022-11-02 | Stop reason: ALTCHOICE

## 2021-11-01 RX ORDER — DIPHENHYDRAMINE HYDROCHLORIDE 50 MG/ML
50 INJECTION INTRAMUSCULAR; INTRAVENOUS AS NEEDED
Status: DISCONTINUED | OUTPATIENT
Start: 2021-11-01 | End: 2021-11-01 | Stop reason: HOSPADM

## 2021-11-01 RX ORDER — FAMOTIDINE 10 MG/ML
20 INJECTION, SOLUTION INTRAVENOUS AS NEEDED
Status: DISCONTINUED | OUTPATIENT
Start: 2021-11-01 | End: 2021-11-01 | Stop reason: HOSPADM

## 2021-11-01 RX ORDER — LANOLIN ALCOHOL/MO/W.PET/CERES
1000 CREAM (GRAM) TOPICAL DAILY
COMMUNITY
End: 2022-02-01

## 2021-11-01 RX ORDER — ACETAMINOPHEN 500 MG
500 TABLET ORAL EVERY 6 HOURS PRN
COMMUNITY

## 2021-11-01 RX ADMIN — FERRIC CARBOXYMALTOSE INJECTION 750 MG: 50 INJECTION, SOLUTION INTRAVENOUS at 11:39

## 2021-11-01 RX ADMIN — SODIUM CHLORIDE 250 ML: 9 INJECTION, SOLUTION INTRAVENOUS at 11:35

## 2022-02-01 ENCOUNTER — OFFICE VISIT (OUTPATIENT)
Dept: GASTROENTEROLOGY | Facility: CLINIC | Age: 82
End: 2022-02-01

## 2022-02-01 VITALS
OXYGEN SATURATION: 100 % | HEIGHT: 72 IN | HEART RATE: 90 BPM | SYSTOLIC BLOOD PRESSURE: 162 MMHG | TEMPERATURE: 96.9 F | DIASTOLIC BLOOD PRESSURE: 82 MMHG | WEIGHT: 248 LBS | BODY MASS INDEX: 33.59 KG/M2

## 2022-02-01 DIAGNOSIS — E66.9 OBESITY, UNSPECIFIED OBESITY SEVERITY, UNSPECIFIED OBESITY TYPE: ICD-10-CM

## 2022-02-01 DIAGNOSIS — I10 HTN (HYPERTENSION), BENIGN: ICD-10-CM

## 2022-02-01 DIAGNOSIS — Z86.010 HX OF ADENOMATOUS COLONIC POLYPS: Primary | ICD-10-CM

## 2022-02-01 PROCEDURE — 99214 OFFICE O/P EST MOD 30 MIN: CPT | Performed by: CLINICAL NURSE SPECIALIST

## 2022-02-01 NOTE — PROGRESS NOTES
Carmine Garcia  1940 2/1/2022  Chief Complaint   Patient presents with   • Colonoscopy     Subjective   HPI  Carmine Garcia is a 81 y.o. male who presents as a referral for preventative maintenance. He has no complaints of nausea or vomiting. No change in bowels. No wt loss. No BRBPR. No melena. There is NO family hx for colon cancer. No abdominal pain.  Past Medical History:   Diagnosis Date   • COPD (chronic obstructive pulmonary disease) (HCC)    • Disease of thyroid gland    • Emphysema lung (HCC)    • Hx of colonic polyps    • Hyperlipidemia    • Hypertension    • Lung cancer (HCC)    • PUD (peptic ulcer disease)      Past Surgical History:   Procedure Laterality Date   • CATARACT EXTRACTION     • COLONOSCOPY  12/12/2014    Diverticulosis repeat exam in 3 years   • COLONOSCOPY N/A 4/10/2018    Tubular adenoma cecum poor prep repeat in 3 months   • COLONOSCOPY N/A 8/2/2018    2 Tubular adenomas transverse and ascending colon fair prep repeat in 3 years   • COLONOSCOPY W/ POLYPECTOMY  02/15/2010    2 Hyperplastic polyps at 25 cm and rectum repeat exam in 5 years   • LUNG CANCER SURGERY     • REPLACEMENT TOTAL KNEE Right 05/2017     Outpatient Medications Marked as Taking for the 2/1/22 encounter (Office Visit) with Essence Lou APRN   Medication Sig Dispense Refill   • acetaminophen (TYLENOL) 500 MG tablet Take 500 mg by mouth Every 6 (Six) Hours As Needed for Mild Pain .     • alfuzosin (UROXATRAL) 10 MG 24 hr tablet Take 10 mg by mouth.     • amLODIPine-benazepril (LOTREL) 10-20 MG per capsule Take  by mouth.     • diphenhydrAMINE-acetaminophen (TYLENOL PM)  MG tablet per tablet Take 1 tablet by mouth At Night As Needed for Sleep.     • furosemide (LASIX) 20 MG tablet Take 20 mg by mouth.     • lovastatin (MEVACOR) 40 MG tablet Take 40 mg by mouth.     • Multiple Vitamins-Minerals (PRESERVISION AREDS 2+MULTI VIT) capsule Take  by mouth.       Allergies   Allergen Reactions   •  "Penicillins Provider Review Needed     Unknown       Social History     Socioeconomic History   • Marital status:    Tobacco Use   • Smoking status: Former Smoker     Packs/day: 2.00     Years: 30.00     Pack years: 60.00     Types: Cigarettes     Quit date:      Years since quittin.1   • Smokeless tobacco: Never Used   Substance and Sexual Activity   • Alcohol use: No   • Drug use: No   • Sexual activity: Defer     Family History   Problem Relation Age of Onset   • Colon polyps Mother    • Stroke Mother    • Cancer Father         asbestos   • Stroke Maternal Grandmother    • No Known Problems Maternal Grandfather    • No Known Problems Paternal Grandmother    • No Known Problems Paternal Grandfather    • Colon cancer Neg Hx      Health Maintenance   Topic Date Due   • Pneumococcal Vaccine 65+ (1 of 2 - PPSV23) Never done   • TDAP/TD VACCINES (1 - Tdap) Never done   • ZOSTER VACCINE (1 of 2) Never done   • ANNUAL WELLNESS VISIT  2021   • COVID-19 Vaccine (2 - Pfizer 3-dose series) 2022   • LIPID PANEL  2022   • INFLUENZA VACCINE  Completed       REVIEW OF SYSTEMS  General: well appearing, no fever chills or sweats, no unexplained wt loss  HEENT: no acute visual or hearing disturbances  Cardiovascular: No chest pain or palpitations  Pulmonary: No shortness of breath, coughing, wheezing or hemoptysis  : No burning, urgency, hematuria, or dysuria  Musculoskeletal: No joint pain or stiffness  Peripheral: no edema  Skin: No lesions or rashes  Neuro: No dizziness, headaches, stroke, syncope  Endocrine: No hot or cold intolerances  Hematological: No blood dyscrasias    Objective   Vitals:    22 1013   BP: 162/82   Pulse: 90   Temp: 96.9 °F (36.1 °C)   SpO2: 100%   Weight: 112 kg (248 lb)   Height: 182.9 cm (72.01\")     Body mass index is 33.63 kg/m².  Patient's Body mass index is 33.63 kg/m². indicating that he is obese (BMI >30). Obesity-related health conditions include the " following: hypertension. Obesity is unchanged. BMI is is above average; BMI management plan is completed. We discussed portion control and increasing exercise..      PHYSICAL EXAM  General: age appropriate well nourished well appearing, no acute distress  Head: normocephalic and atraumatic  Global assessment-supple  Neck-No JVD noted, no lymphadenopathy  Pulmonary-clear to auscultation bilaterally, normal respiratory effort  Cardiovascular-normal rate and rhythm, normal heart sounds, S1 and S2 noted  Abdomen-soft, non tender, non distended, normal bowel sounds all 4 quadrants, no hepatosplenomegaly noted  Extremities-No clubbing cyanosis or edema  Neuro-Non focal, converses appropriately, awake, alert, oriented    Assessment/Plan     Diagnoses and all orders for this visit:    1. Hx of adenomatous colonic polyps (Primary)  -     Case Request; Standing  -     Follow Anesthesia Guidelines / Standing Orders; Future  -     Obtain Informed Consent; Future  -     Case Request  -     polyethylene glycol (GoLYTELY) 236 g solution; Take as directed by office instructions.  Dispense: 4000 mL; Refill: 0    2. Obesity, unspecified obesity severity, unspecified obesity type    3. HTN (hypertension), benign  Comments:  cont BP medication the day of procedure        COLONOSCOPY WITH ANESTHESIA (N/A)  Body mass index is 33.63 kg/m².    Patient instructions on prep prior to procedure provided to the patient.    All risks, benefits, alternatives, and indications of colonoscopy procedure have been discussed with the patient. Risks to include perforation of the colon requiring possible surgery or colostomy, risk of bleeding from biopsies or removal of colon tissue, possibility of missing a colon polyp or cancer, or adverse drug reaction.  Benefits to include the diagnosis and management of disease of the colon and rectum. Alternatives to include barium enema, radiographic evaluation, lab testing or no intervention. Pt verbalizes  understanding and agrees.     Essence Lou, APRN  2022  11:00 CST      IF YOU SMOKE OR USE TOBACCO PLEASE READ THE FOLLOWIN minutes reading provided    Why is smoking bad for me?  Smoking increases the risk of heart disease, lung disease, vascular disease, stroke, and cancer.     If you smoke, STOP!    If you would like more information on quitting smoking, please visit the [facility] website: [email or web address]   This link will provide additional resources including the QUIT line and the Beat the Pack support groups.     For more information:    Quit Now [place]  -QUIT-NOW  [email or web address]

## 2022-03-24 ENCOUNTER — HOSPITAL ENCOUNTER (OUTPATIENT)
Facility: HOSPITAL | Age: 82
Setting detail: HOSPITAL OUTPATIENT SURGERY
Discharge: HOME | End: 2022-03-24
Attending: INTERNAL MEDICINE | Admitting: INTERNAL MEDICINE

## 2022-03-24 ENCOUNTER — ANESTHESIA (OUTPATIENT)
Dept: GASTROENTEROLOGY | Facility: HOSPITAL | Age: 82
End: 2022-03-24

## 2022-03-24 ENCOUNTER — ANESTHESIA EVENT (OUTPATIENT)
Dept: GASTROENTEROLOGY | Facility: HOSPITAL | Age: 82
End: 2022-03-24

## 2022-03-24 VITALS
BODY MASS INDEX: 32.23 KG/M2 | SYSTOLIC BLOOD PRESSURE: 118 MMHG | OXYGEN SATURATION: 96 % | RESPIRATION RATE: 18 BRPM | HEART RATE: 76 BPM | HEIGHT: 72 IN | TEMPERATURE: 97.6 F | DIASTOLIC BLOOD PRESSURE: 64 MMHG | WEIGHT: 238 LBS

## 2022-03-24 DIAGNOSIS — Z86.010 HX OF ADENOMATOUS COLONIC POLYPS: ICD-10-CM

## 2022-03-24 PROCEDURE — 25010000002 PROPOFOL 10 MG/ML EMULSION

## 2022-03-24 PROCEDURE — G0105 COLORECTAL SCRN; HI RISK IND: HCPCS | Performed by: INTERNAL MEDICINE

## 2022-03-24 RX ORDER — LIDOCAINE HYDROCHLORIDE 20 MG/ML
INJECTION, SOLUTION EPIDURAL; INFILTRATION; INTRACAUDAL; PERINEURAL AS NEEDED
Status: DISCONTINUED | OUTPATIENT
Start: 2022-03-24 | End: 2022-03-24 | Stop reason: SURG

## 2022-03-24 RX ORDER — SODIUM CHLORIDE 0.9 % (FLUSH) 0.9 %
10 SYRINGE (ML) INJECTION EVERY 12 HOURS SCHEDULED
Status: CANCELLED | OUTPATIENT
Start: 2022-03-24

## 2022-03-24 RX ORDER — SODIUM CHLORIDE 0.9 % (FLUSH) 0.9 %
10 SYRINGE (ML) INJECTION AS NEEDED
Status: DISCONTINUED | OUTPATIENT
Start: 2022-03-24 | End: 2022-03-24 | Stop reason: HOSPADM

## 2022-03-24 RX ORDER — SODIUM CHLORIDE 9 MG/ML
100 INJECTION, SOLUTION INTRAVENOUS CONTINUOUS
Status: CANCELLED | OUTPATIENT
Start: 2022-03-24

## 2022-03-24 RX ORDER — SODIUM CHLORIDE 0.9 % (FLUSH) 0.9 %
10 SYRINGE (ML) INJECTION AS NEEDED
Status: CANCELLED | OUTPATIENT
Start: 2022-03-24

## 2022-03-24 RX ORDER — LIDOCAINE HYDROCHLORIDE 10 MG/ML
0.5 INJECTION, SOLUTION EPIDURAL; INFILTRATION; INTRACAUDAL; PERINEURAL ONCE AS NEEDED
Status: CANCELLED | OUTPATIENT
Start: 2022-03-24

## 2022-03-24 RX ORDER — PROPOFOL 10 MG/ML
VIAL (ML) INTRAVENOUS AS NEEDED
Status: DISCONTINUED | OUTPATIENT
Start: 2022-03-24 | End: 2022-03-24 | Stop reason: SURG

## 2022-03-24 RX ORDER — SODIUM CHLORIDE 9 MG/ML
500 INJECTION, SOLUTION INTRAVENOUS CONTINUOUS PRN
Status: DISCONTINUED | OUTPATIENT
Start: 2022-03-24 | End: 2022-03-24 | Stop reason: HOSPADM

## 2022-03-24 RX ADMIN — PROPOFOL 200 MG: 10 INJECTION, EMULSION INTRAVENOUS at 10:46

## 2022-03-24 RX ADMIN — SODIUM CHLORIDE 500 ML: 9 INJECTION, SOLUTION INTRAVENOUS at 09:06

## 2022-03-24 RX ADMIN — LIDOCAINE HYDROCHLORIDE 100 MG: 20 INJECTION, SOLUTION EPIDURAL; INFILTRATION; INTRACAUDAL; PERINEURAL at 10:46

## 2022-03-24 NOTE — H&P
[facility]  Pre Procedure History & Physical    Chief Complaint:   History of polyps    Subjective     HPI:   Here for colonoscopy.  History of polyps.    Past Medical History:   Past Medical History:   Diagnosis Date   • Arthritis    • COPD (chronic obstructive pulmonary disease) (HCC)    • Disease of thyroid gland    • Emphysema lung (HCC)    • Hx of colonic polyps    • Hyperlipidemia    • Hypertension    • Lung cancer (HCC)    • PUD (peptic ulcer disease)        Past Surgical History:  Past Surgical History:   Procedure Laterality Date   • CATARACT EXTRACTION     • COLONOSCOPY  12/12/2014    Diverticulosis repeat exam in 3 years   • COLONOSCOPY N/A 4/10/2018    Tubular adenoma cecum poor prep repeat in 3 months   • COLONOSCOPY N/A 8/2/2018    2 Tubular adenomas transverse and ascending colon fair prep repeat in 3 years   • COLONOSCOPY W/ POLYPECTOMY  02/15/2010    2 Hyperplastic polyps at 25 cm and rectum repeat exam in 5 years   • LUNG CANCER SURGERY     • REPLACEMENT TOTAL KNEE Right 05/2017       Family History:  Family History   Problem Relation Age of Onset   • Colon polyps Mother    • Stroke Mother    • Cancer Father         asbestos   • Stroke Maternal Grandmother    • No Known Problems Maternal Grandfather    • No Known Problems Paternal Grandmother    • No Known Problems Paternal Grandfather    • Colon cancer Neg Hx        Social History:   reports that he quit smoking about 38 years ago. His smoking use included cigarettes. He has a 60.00 pack-year smoking history. He has never used smokeless tobacco. He reports that he does not drink alcohol and does not use drugs.    Medications:   Prior to Admission medications    Medication Sig Start Date End Date Taking? Authorizing Provider   acetaminophen (TYLENOL) 500 MG tablet Take 500 mg by mouth Every 6 (Six) Hours As Needed for Mild Pain .   Yes Provider, MD Perry   alfuzosin (UROXATRAL) 10 MG 24 hr tablet Take 10 mg by mouth.   Yes  "Perry Myers MD   amLODIPine-benazepril (LOTREL) 10-20 MG per capsule Take  by mouth.   Yes Perry Myers MD   furosemide (LASIX) 20 MG tablet Take 20 mg by mouth.   Yes Perry Myers MD   lovastatin (MEVACOR) 40 MG tablet Take 40 mg by mouth.   Yes Perry Myers MD   polyethylene glycol (GoLYTELY) 236 g solution Take as directed by office instructions. 2/1/22  Yes Essence Lou APRN   diphenhydrAMINE-acetaminophen (TYLENOL PM)  MG tablet per tablet Take 1 tablet by mouth At Night As Needed for Sleep.    Perry Myers MD   Multiple Vitamins-Minerals (PRESERVISION AREDS 2+MULTI VIT) capsule Take  by mouth.    Perry Myers MD       Allergies:  Penicillins    Objective     Blood pressure (!) 202/97, pulse 84, temperature 97.6 °F (36.4 °C), temperature source Temporal, resp. rate 18, height 182.9 cm (72\"), weight 108 kg (238 lb), SpO2 94 %.    Physical Exam   Constitutional: Pt is oriented to person, place, and in no distress.   HENT: Mouth/Throat: Oropharynx is clear.   Cardiovascular: Normal rate, regular rhythm.    Pulmonary/Chest: Effort normal. No respiratory distress. No  wheezes.   Abdominal: Soft. Non-distended.  Skin: Skin is warm and dry.   Psychiatric: Mood, memory, affect and judgment appear normal.     Assessment/Plan     Diagnosis:  History of polyps    Anticipated Surgical Procedure:    Colonoscopy as scheduled    The following major R/B/A were discussed with the patient, however the list is not all inclusive . Risk:  Bleeding (immediate and delayed), perforation (rupture or tear), reaction to medication, missed lesion/cancer, pain during the procedure, infection, need for surgery, need for ostomy, need for mechanical ventilation (breathing machine), death.  Benefits: removal of polyp/tissue, burn/clip/or inject to stop bleeding, removal of foreign body, dilate any stricture.  Alternatives: Xray or CT, surgery, do nothing with associated risk "   The patient was given time to ask question and received explanation, and agrees to proceed as per History and Physical.   No guarantee given or expressed.    EMR Dragon/transcription disclaimer: Much of this encounter note is an electronic transcription/translation of spoken language to printed text.  The electronic translation of spoken language may permit erroneous, or at times, nonsensical words or phrases to be inadvertently transcribed.  Although I have reviewed the note for such errors, some may still exist.    Ferdinand Cross MD  10:44 CDT  3/24/2022

## 2022-03-24 NOTE — ANESTHESIA PREPROCEDURE EVALUATION
Anesthesia Evaluation     Patient summary reviewed   no history of anesthetic complications:  NPO Solid Status: > 8 hours  NPO Liquid Status: > 2 hours           Airway   Mallampati: II  TM distance: >3 FB  Neck ROM: full  Dental    (+) edentulous, lower dentures and upper dentures    Pulmonary    (+) a smoker (quit 1984) Former, lung cancer (7 yrs ago, left lower lobectomy, reports no issues since), COPD,   (-) asthma, recent URI, sleep apnea  Cardiovascular   Exercise tolerance: good (4-7 METS)    (+) hypertension well controlled, hyperlipidemia,   (-) pacemaker, past MI, angina, cardiac stents, CABG      Neuro/Psych  (-) seizures, TIA, CVA  GI/Hepatic/Renal/Endo    (+) obesity,   thyroid problem   (-) GERD, liver disease, no renal disease, diabetes    Musculoskeletal     Abdominal    Substance History      OB/GYN          Other   arthritis,    history of cancer                      Anesthesia Plan    ASA 3     MAC   total IV anesthesia  intravenous induction     Anesthetic plan, all risks, benefits, and alternatives have been provided, discussed and informed consent has been obtained with: patient.

## 2022-03-24 NOTE — ANESTHESIA POSTPROCEDURE EVALUATION
"Patient: Carmine Garcia    Procedure Summary     Date: 03/24/22 Room / Location:  [facility] /  [facility]    Anesthesia Start: 1043 Anesthesia Stop: 1105    Procedure: COLONOSCOPY WITH ANESTHESIA (N/A ) Diagnosis:       Hx of adenomatous colonic polyps      (Hx of adenomatous colonic polyps [Z86.010])    Surgeons: Ferdinand Cross MD Provider: Bassam Broussard CRNA    Anesthesia Type: MAC ASA Status: 3          Anesthesia Type: MAC    Vitals  Vitals Value Taken Time   /58 03/24/22 1107   Temp     Pulse 78 03/24/22 1108   Resp     SpO2 99 % 03/24/22 1108   Vitals shown include unvalidated device data.        Post Anesthesia Care and Evaluation    Patient location during evaluation: PHASE II  Patient participation: complete - patient participated  Level of consciousness: awake  Pain score: 0  Pain management: adequate  Airway patency: patent  Anesthetic complications: No anesthetic complications  PONV Status: none  Cardiovascular status: acceptable  Respiratory status: acceptable  Hydration status: acceptable    Comments: BP (!) 202/97 (Patient Position: Sitting)   Pulse 84   Temp 97.6 °F (36.4 °C) (Temporal)   Resp 18   Ht 182.9 cm (72\")   Wt 108 kg (238 lb)   SpO2 94%   BMI 32.28 kg/m²     No anesthesia care post op    "

## 2022-04-25 ENCOUNTER — HOSPITAL ENCOUNTER (OUTPATIENT)
Dept: CT IMAGING | Facility: HOSPITAL | Age: 82
Discharge: HOME | End: 2022-04-25
Admitting: INTERNAL MEDICINE

## 2022-04-25 ENCOUNTER — LAB (OUTPATIENT)
Dept: LAB | Facility: HOSPITAL | Age: 82
End: 2022-04-25

## 2022-04-25 ENCOUNTER — TELEPHONE (OUTPATIENT)
Dept: ONCOLOGY | Facility: CLINIC | Age: 82
End: 2022-04-25

## 2022-04-25 DIAGNOSIS — C34.32 PRIMARY ADENOCARCINOMA OF LOWER LOBE OF LEFT LUNG: ICD-10-CM

## 2022-04-25 DIAGNOSIS — R91.8 LUNG NODULES: Primary | ICD-10-CM

## 2022-04-25 LAB
ALBUMIN SERPL-MCNC: 4.5 G/DL (ref 3.5–5.2)
ALBUMIN/GLOB SERPL: 1.9 G/DL
ALP SERPL-CCNC: 89 U/L (ref 39–117)
ALT SERPL W P-5'-P-CCNC: 16 U/L (ref 1–41)
ANION GAP SERPL CALCULATED.3IONS-SCNC: 9 MMOL/L (ref 5–15)
AST SERPL-CCNC: 18 U/L (ref 1–40)
BASOPHILS # BLD AUTO: 0.02 10*3/MM3 (ref 0–0.2)
BASOPHILS NFR BLD AUTO: 0.4 % (ref 0–1.5)
BILIRUB SERPL-MCNC: 0.4 MG/DL (ref 0–1.2)
BUN SERPL-MCNC: 9 MG/DL (ref 8–23)
BUN/CREAT SERPL: 11 (ref 7–25)
CALCIUM SPEC-SCNC: 9.1 MG/DL (ref 8.6–10.5)
CEA SERPL-MCNC: 3.13 NG/ML
CHLORIDE SERPL-SCNC: 99 MMOL/L (ref 98–107)
CO2 SERPL-SCNC: 27 MMOL/L (ref 22–29)
CREAT BLDA-MCNC: 0.9 MG/DL (ref 0.6–1.3)
CREAT SERPL-MCNC: 0.82 MG/DL (ref 0.76–1.27)
DEPRECATED RDW RBC AUTO: 45.1 FL (ref 37–54)
EGFRCR SERPLBLD CKD-EPI 2021: 87.2 ML/MIN/1.73
EOSINOPHIL # BLD AUTO: 0.07 10*3/MM3 (ref 0–0.4)
EOSINOPHIL NFR BLD AUTO: 1.5 % (ref 0.3–6.2)
ERYTHROCYTE [DISTWIDTH] IN BLOOD BY AUTOMATED COUNT: 12.5 % (ref 12.3–15.4)
FERRITIN SERPL-MCNC: 56.54 NG/ML (ref 30–400)
FOLATE SERPL-MCNC: 13.8 NG/ML (ref 4.78–24.2)
GLOBULIN UR ELPH-MCNC: 2.4 GM/DL
GLUCOSE SERPL-MCNC: 112 MG/DL (ref 65–99)
HCT VFR BLD AUTO: 41.3 % (ref 37.5–51)
HGB BLD-MCNC: 13.9 G/DL (ref 13–17.7)
IMM GRANULOCYTES # BLD AUTO: 0.02 10*3/MM3 (ref 0–0.05)
IMM GRANULOCYTES NFR BLD AUTO: 0.4 % (ref 0–0.5)
IRON 24H UR-MRATE: 128 MCG/DL (ref 59–158)
IRON SATN MFR SERPL: 37 % (ref 20–50)
LYMPHOCYTES # BLD AUTO: 1.32 10*3/MM3 (ref 0.7–3.1)
LYMPHOCYTES NFR BLD AUTO: 29.1 % (ref 19.6–45.3)
MCH RBC QN AUTO: 32.6 PG (ref 26.6–33)
MCHC RBC AUTO-ENTMCNC: 33.7 G/DL (ref 31.5–35.7)
MCV RBC AUTO: 96.9 FL (ref 79–97)
MONOCYTES # BLD AUTO: 0.54 10*3/MM3 (ref 0.1–0.9)
MONOCYTES NFR BLD AUTO: 11.9 % (ref 5–12)
NEUTROPHILS NFR BLD AUTO: 2.57 10*3/MM3 (ref 1.7–7)
NEUTROPHILS NFR BLD AUTO: 56.7 % (ref 42.7–76)
NRBC BLD AUTO-RTO: 0 /100 WBC (ref 0–0.2)
PLATELET # BLD AUTO: 209 10*3/MM3 (ref 140–450)
PMV BLD AUTO: 9.3 FL (ref 6–12)
POTASSIUM SERPL-SCNC: 4.2 MMOL/L (ref 3.5–5.2)
PROT SERPL-MCNC: 6.9 G/DL (ref 6–8.5)
RBC # BLD AUTO: 4.26 10*6/MM3 (ref 4.14–5.8)
SODIUM SERPL-SCNC: 135 MMOL/L (ref 136–145)
TIBC SERPL-MCNC: 349 MCG/DL (ref 298–536)
TRANSFERRIN SERPL-MCNC: 234 MG/DL (ref 200–360)
VIT B12 BLD-MCNC: 388 PG/ML (ref 211–946)
WBC NRBC COR # BLD: 4.54 10*3/MM3 (ref 3.4–10.8)

## 2022-04-25 PROCEDURE — 82728 ASSAY OF FERRITIN: CPT

## 2022-04-25 PROCEDURE — 82378 CARCINOEMBRYONIC ANTIGEN: CPT

## 2022-04-25 PROCEDURE — 84466 ASSAY OF TRANSFERRIN: CPT

## 2022-04-25 PROCEDURE — 82565 ASSAY OF CREATININE: CPT

## 2022-04-25 PROCEDURE — 83540 ASSAY OF IRON: CPT

## 2022-04-25 PROCEDURE — 36415 COLL VENOUS BLD VENIPUNCTURE: CPT

## 2022-04-25 PROCEDURE — 25010000002 IOPAMIDOL 61 % SOLUTION: Performed by: INTERNAL MEDICINE

## 2022-04-25 PROCEDURE — 80053 COMPREHEN METABOLIC PANEL: CPT

## 2022-04-25 PROCEDURE — 85025 COMPLETE CBC W/AUTO DIFF WBC: CPT

## 2022-04-25 PROCEDURE — 71260 CT THORAX DX C+: CPT

## 2022-04-25 PROCEDURE — 82607 VITAMIN B-12: CPT

## 2022-04-25 PROCEDURE — 82746 ASSAY OF FOLIC ACID SERUM: CPT

## 2022-04-25 RX ADMIN — IOPAMIDOL 100 ML: 612 INJECTION, SOLUTION INTRAVENOUS at 10:58

## 2022-04-25 NOTE — TELEPHONE ENCOUNTER
----- Message from Cem Arteaga MD sent at 4/25/2022 12:31 PM CDT -----  Refer to Dr. Teague/dr mcclellan Re: Recurrent lung nodules    Called patient with the above information. He verbalized understanding and knows he will be contacted with the appointment information.    rp

## 2022-05-02 ENCOUNTER — OFFICE VISIT (OUTPATIENT)
Dept: ONCOLOGY | Facility: CLINIC | Age: 82
End: 2022-05-02

## 2022-05-02 ENCOUNTER — TELEPHONE (OUTPATIENT)
Dept: ONCOLOGY | Facility: CLINIC | Age: 82
End: 2022-05-02

## 2022-05-02 VITALS
WEIGHT: 241.9 LBS | TEMPERATURE: 97.6 F | RESPIRATION RATE: 16 BRPM | BODY MASS INDEX: 32.76 KG/M2 | HEIGHT: 72 IN | HEART RATE: 98 BPM | DIASTOLIC BLOOD PRESSURE: 82 MMHG | OXYGEN SATURATION: 96 % | SYSTOLIC BLOOD PRESSURE: 142 MMHG

## 2022-05-02 DIAGNOSIS — C34.32 PRIMARY ADENOCARCINOMA OF LOWER LOBE OF LEFT LUNG: Primary | ICD-10-CM

## 2022-05-02 PROCEDURE — 99214 OFFICE O/P EST MOD 30 MIN: CPT | Performed by: INTERNAL MEDICINE

## 2022-05-02 NOTE — TELEPHONE ENCOUNTER
Caller: Carmine Garcia    Relationship: Self    Best call back number: [phone]      What was the call regarding: PATIENT CALLED WANTED TO KNOW WHEN  HIS CT SCAN AND XRAY WOULD BE SCHEDULED    Do you require a callback: YES

## 2022-05-17 ENCOUNTER — OFFICE VISIT (OUTPATIENT)
Dept: PULMONOLOGY | Facility: CLINIC | Age: 82
End: 2022-05-17

## 2022-05-17 VITALS
DIASTOLIC BLOOD PRESSURE: 80 MMHG | SYSTOLIC BLOOD PRESSURE: 172 MMHG | OXYGEN SATURATION: 98 % | WEIGHT: 241.3 LBS | HEART RATE: 91 BPM | RESPIRATION RATE: 16 BRPM | BODY MASS INDEX: 32.73 KG/M2

## 2022-05-17 DIAGNOSIS — Z85.118 PERSONAL HISTORY OF MALIGNANT NEOPLASM OF BRONCHUS AND LUNG: ICD-10-CM

## 2022-05-17 DIAGNOSIS — R91.8 MULTIPLE LUNG NODULES: Primary | ICD-10-CM

## 2022-05-17 DIAGNOSIS — J43.9 PULMONARY EMPHYSEMA, UNSPECIFIED EMPHYSEMA TYPE: ICD-10-CM

## 2022-05-17 PROCEDURE — 99203 OFFICE O/P NEW LOW 30 MIN: CPT | Performed by: INTERNAL MEDICINE

## 2022-05-17 NOTE — PROGRESS NOTES
Background:  Pt w lung cancer resected 2011, new nodule NISA, dilated pulm arteries, copd   Chief Complaint  lung nodules and Emphysema    Subjective    History of Present Illness       Carmine Garcia presents to [facility].  He had lung cancer resected 11 years ago.  He feels well.  He gets out of breath easily.  He coughs a lot of phlegm which he feels comes from the sinuses.  Dyspnea is alleviated by rest.  He does not want to do PFT or explore doing any inhalers right now.  Dr. Arteaga has asked me to evaluate him for newly found asymptomatic ground glass nodules in both lungs.  He has hx copd but denies significant related symptoms       has a past medical history of Arthritis, COPD (chronic obstructive pulmonary disease) (HCC), Disease of thyroid gland, Emphysema lung (HCC), colonic polyps, Hyperlipidemia, Hypertension, Lung cancer (HCC), and PUD (peptic ulcer disease).   has a past surgical history that includes Cataract extraction; Lung cancer surgery; Colonoscopy (12/12/2014); Colonoscopy w/ polypectomy (02/15/2010); Replacement total knee (Right, 05/2017); Colonoscopy (N/A, 4/10/2018); Colonoscopy (N/A, 8/2/2018); and Colonoscopy (N/A, 3/24/2022).  family history includes Cancer in his father; Colon polyps in his mother; Diabetes in his child; No Known Problems in his maternal grandfather, paternal grandfather, and paternal grandmother; Stroke in his maternal grandmother and mother.   reports that he quit smoking about 38 years ago. His smoking use included cigarettes. He has a 60.00 pack-year smoking history. He has never used smokeless tobacco. He reports that he does not drink alcohol and does not use drugs.  Allergies   Allergen Reactions   • Penicillins Unknown - High Severity     Unknown         Objective     Vital Signs:   /80   Pulse 91   Resp 16   Wt 109 kg (241 lb 4.8 oz)   SpO2 98%   BMI 32.73 kg/m²   Physical  Exam  Constitutional:       Appearance: Normal appearance. He is not ill-appearing or diaphoretic.   Eyes:      Extraocular Movements: Extraocular movements intact.   Pulmonary:      Effort: Pulmonary effort is normal. No respiratory distress.      Breath sounds: No wheezing, rhonchi or rales.   Skin:     Findings: No erythema or rash.   Neurological:      Mental Status: He is alert.        Result Review  Data Reviewed:{ Labs  Result Review  Imaging  Med Tab  Media :23}     CT Chest With Contrast Diagnostic (04/25/2022 10:56)  1. Status post resection of a left lower lobe pulmonary nodule. Moderate  changes of centrilobular emphysema are present.  2. Small groundglass nodules one within the left upper lobe and one  within the periphery of the right lower lobe warranting follow-up per  Fleischner criteria. No additional lung lesions are present.  3. No enlarged mediastinal or axillary lymphadenopathy..  4. 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 The  thoracic aorta is ectatic. There is mild enlargement of the pulmonary  arteries suggesting pulmonary arterial hypertension. Heavy coronary  calcifications are present.     My interpretation of radiograph: small ground glass nodules              Assessment and Plan {CC Problem List  Visit Diagnosis  ROS  Review (Popup)  [facility] Maintenance  Quality  BestPractice  Medications  SmartSets  SnapShot Encounters  Media :23}   Diagnoses and all orders for this visit:    1. Multiple lung nodules (Primary)  Comments:  yun and rll identified 2022  Orders:  -     CT Chest Without Contrast Diagnostic; Future    2. Pulmonary emphysema, unspecified emphysema type (HCC)  -     CT Chest Without Contrast Diagnostic; Future    3. Personal history of malignant neoplasm of bronchus and lung  -     CT Chest Without Contrast Diagnostic; Future    we discussed findings on CT and reviewed images.  Will plan to follow up.  Nodules are ground glass, too  small to assess by reasonable means other than follow up ct  Will plan 3 month follow up ct per Fleischner guidelines.  Hold off any other copd workup now per pt wish and lack of significant symptoms    Follow Up {Instructions Charge Capture  Follow-up Communications :23}   Return in about 10 weeks (around 7/26/2022).  Patient was given instructions and counseling regarding his condition or for health maintenance advice. Please see specific information pulled into the AVS if appropriate.    Electronically signed by Kieran Alexandra MD, 5/17/2022, 14:54 CDT

## 2022-06-13 ENCOUNTER — TRANSCRIBE ORDERS (OUTPATIENT)
Dept: ADMINISTRATIVE | Facility: HOSPITAL | Age: 82
End: 2022-06-13

## 2022-06-13 ENCOUNTER — HOSPITAL ENCOUNTER (OUTPATIENT)
Dept: GENERAL RADIOLOGY | Facility: HOSPITAL | Age: 82
Discharge: HOME | End: 2022-06-13
Admitting: INTERNAL MEDICINE

## 2022-06-13 DIAGNOSIS — J40 CHRONIC SINUSITIS WITH RECURRENT BRONCHITIS: ICD-10-CM

## 2022-06-13 DIAGNOSIS — J32.9 CHRONIC SINUSITIS WITH RECURRENT BRONCHITIS: ICD-10-CM

## 2022-06-13 DIAGNOSIS — J32.9 CHRONIC SINUSITIS WITH RECURRENT BRONCHITIS: Primary | ICD-10-CM

## 2022-06-13 DIAGNOSIS — J40 CHRONIC SINUSITIS WITH RECURRENT BRONCHITIS: Primary | ICD-10-CM

## 2022-06-13 PROCEDURE — 70220 X-RAY EXAM OF SINUSES: CPT

## 2022-06-23 ENCOUNTER — TRANSCRIBE ORDERS (OUTPATIENT)
Dept: ADMINISTRATIVE | Facility: HOSPITAL | Age: 82
End: 2022-06-23

## 2022-06-23 ENCOUNTER — HOSPITAL ENCOUNTER (OUTPATIENT)
Dept: ULTRASOUND IMAGING | Facility: HOSPITAL | Age: 82
Discharge: HOME | End: 2022-06-23
Admitting: NURSE PRACTITIONER

## 2022-06-23 DIAGNOSIS — R22.1 MASS OF RIGHT SIDE OF NECK: Primary | ICD-10-CM

## 2022-06-23 PROCEDURE — 76536 US EXAM OF HEAD AND NECK: CPT

## 2022-07-05 ENCOUNTER — HOSPITAL ENCOUNTER (OUTPATIENT)
Dept: CT IMAGING | Age: 82
Discharge: HOME | End: 2022-07-05
Payer: MEDICARE

## 2022-07-05 DIAGNOSIS — L98.9 LESION OF NECK: ICD-10-CM

## 2022-07-05 LAB
GFR AFRICAN AMERICAN: >60
GFR NON-AFRICAN AMERICAN: >60
PERFORMED ON: NORMAL
POC CREATININE: 1 MG/DL (ref 0.3–1.3)
POC SAMPLE TYPE: NORMAL

## 2022-07-05 PROCEDURE — 70491 CT SOFT TISSUE NECK W/DYE: CPT | Performed by: RADIOLOGY

## 2022-07-05 PROCEDURE — 82565 ASSAY OF CREATININE: CPT

## 2022-07-05 PROCEDURE — 6360000004 HC RX CONTRAST MEDICATION: Performed by: INTERNAL MEDICINE

## 2022-07-05 PROCEDURE — 70491 CT SOFT TISSUE NECK W/DYE: CPT

## 2022-07-05 RX ADMIN — IOPAMIDOL 70 ML: 755 INJECTION, SOLUTION INTRAVENOUS at 09:30

## 2022-07-19 NOTE — PROGRESS NOTES
Chief Complaint  multiple lung nodules    Subjective    History of Present Illness {CC  Problem List  Visit Diagnosis   Encounters  Notes  Medications  Labs  Result Review Imaging  Media     Carmine Garcia presents to [facility] for:    Mr. Garcia is here to review recent CT imaging for follow up and management of bilateral ground glass lung nodules. He has a history of lung cancer that was resected in . He quit smoking in . He notices occasional exertional dyspnea that is unchanged. He is not on any inhalers and does not want them at this time. No new pulmonary complaints today.        Prior to Admission medications    Medication Sig Start Date End Date Taking? Authorizing Provider   acetaminophen (TYLENOL) 500 MG tablet Take 500 mg by mouth Every 6 (Six) Hours As Needed for Mild Pain .    Perry Myers MD   alfuzosin (UROXATRAL) 10 MG 24 hr tablet Take 10 mg by mouth.    Perry Myers MD   amLODIPine-benazepril (LOTREL) 10-20 MG per capsule Take  by mouth.    Perry Myers MD   diphenhydrAMINE-acetaminophen (TYLENOL PM)  MG tablet per tablet Take 1 tablet by mouth At Night As Needed for Sleep.    Perry Myers MD   furosemide (LASIX) 20 MG tablet Take 20 mg by mouth.    Perry Myers MD   lovastatin (MEVACOR) 40 MG tablet Take 40 mg by mouth.    Perry Myers MD   Multiple Vitamins-Minerals (PRESERVISION AREDS 2+MULTI VIT) capsule Take  by mouth.    Perry Myers MD       Social History     Socioeconomic History   • Marital status:    Tobacco Use   • Smoking status: Former Smoker     Packs/day: 2.00     Years: 30.00     Pack years: 60.00     Types: Cigarettes     Quit date:      Years since quittin.5   • Smokeless tobacco: Never Used   Vaping Use   • Vaping Use: Never used   Substance and Sexual Activity   • Alcohol use: No   • Drug use: No   • Sexual activity: Defer  "      Objective   Vital Signs:   /80   Pulse 90   Ht 182.9 cm (72\")   Wt 109 kg (240 lb)   SpO2 98% Comment: RA  BMI 32.55 kg/m²     Physical Exam  Constitutional:       General: He is not in acute distress.     Interventions: Face mask in place.   HENT:      Head: Normocephalic.      Nose: Nose normal.      Mouth/Throat:      Mouth: Mucous membranes are moist.   Eyes:      General: No scleral icterus.  Cardiovascular:      Rate and Rhythm: Normal rate.   Pulmonary:      Effort: No respiratory distress.   Abdominal:      General: There is no distension.   Musculoskeletal:      Right lower leg: Edema (mild) present.      Left lower leg: Edema (mild) present.   Neurological:      Mental Status: He is alert and oriented to person, place, and time.   Psychiatric:         Mood and Affect: Mood normal.         Behavior: Behavior is cooperative.        Result Review :{ Labs  Result Review  Imaging  Med Tab  Media :          No results found for this or any previous visit.               CT Chest Without Contrast Diagnostic (07/20/2022 12:11)    My interpretation of imaging:  Stable left upper lobe gg nodule, stable right lower lobe gg nodule, emphysema, post surgical change of left lower lobectomy.           Assessment and Plan {CC Problem List  Visit Diagnosis  ROS  Review (Popup)  Health Maintenance  Quality  BestPractice  Medications  SmartSets  SnapShot Encounters  Media      Diagnoses and all orders for this visit:    1. Multiple lung nodules (Primary)  -     Cancel: CT Chest Without Contrast; Future  -     CT Chest Without Contrast; Future    2. 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 Centrilobular emphysema (HCC)        BMI is >= 30 and <35. (Class 1 Obesity). The following options were offered after discussion;: weight loss educational material (shared in after visit summary)      He is stable from a pulmonary standpoint. We reviewed CT images and compared them to " previous scan in April. Nodules have remained stable. Given his history of lung cancer we will repeat CT chest in 6 months to ensure stability. Recommend ppsv23. Follow up in 6 months, call in the interim with issues.     Stella Rain, APRN  7/27/2022  09:48 CDT    Follow Up {Instructions Charge Capture  Follow-up Communications   Return in about 6 months (around 1/27/2023) for Review CT.    Patient was given instructions and counseling regarding his condition or for health maintenance advice. Please see specific information pulled into the AVS if appropriate.

## 2022-07-20 ENCOUNTER — HOSPITAL ENCOUNTER (OUTPATIENT)
Dept: CT IMAGING | Facility: HOSPITAL | Age: 82
Discharge: HOME | End: 2022-07-20
Admitting: INTERNAL MEDICINE

## 2022-07-20 DIAGNOSIS — R91.8 MULTIPLE LUNG NODULES: ICD-10-CM

## 2022-07-20 DIAGNOSIS — J43.9 PULMONARY EMPHYSEMA, UNSPECIFIED EMPHYSEMA TYPE: ICD-10-CM

## 2022-07-20 DIAGNOSIS — Z85.118 PERSONAL HISTORY OF MALIGNANT NEOPLASM OF BRONCHUS AND LUNG: ICD-10-CM

## 2022-07-20 PROCEDURE — 71250 CT THORAX DX C-: CPT

## 2022-07-27 ENCOUNTER — OFFICE VISIT (OUTPATIENT)
Dept: PULMONOLOGY | Facility: CLINIC | Age: 82
End: 2022-07-27

## 2022-07-27 VITALS
HEIGHT: 72 IN | OXYGEN SATURATION: 98 % | BODY MASS INDEX: 32.51 KG/M2 | HEART RATE: 90 BPM | WEIGHT: 240 LBS | SYSTOLIC BLOOD PRESSURE: 126 MMHG | DIASTOLIC BLOOD PRESSURE: 80 MMHG

## 2022-07-27 DIAGNOSIS — C34.32 PRIMARY ADENOCARCINOMA OF LOWER LOBE OF LEFT LUNG: ICD-10-CM

## 2022-07-27 DIAGNOSIS — J43.2 CENTRILOBULAR EMPHYSEMA: Chronic | ICD-10-CM

## 2022-07-27 DIAGNOSIS — R91.8 MULTIPLE LUNG NODULES: Primary | Chronic | ICD-10-CM

## 2022-07-27 PROCEDURE — 99213 OFFICE O/P EST LOW 20 MIN: CPT | Performed by: NURSE PRACTITIONER

## 2022-10-26 ENCOUNTER — LAB (OUTPATIENT)
Dept: LAB | Facility: HOSPITAL | Age: 82
End: 2022-10-26

## 2022-10-26 DIAGNOSIS — C34.32 PRIMARY ADENOCARCINOMA OF LOWER LOBE OF LEFT LUNG: ICD-10-CM

## 2022-10-26 LAB
ALBUMIN SERPL-MCNC: 4.3 G/DL (ref 3.5–5.2)
ALBUMIN/GLOB SERPL: 1.4 G/DL
ALP SERPL-CCNC: 90 U/L (ref 39–117)
ALT SERPL W P-5'-P-CCNC: 12 U/L (ref 1–41)
ANION GAP SERPL CALCULATED.3IONS-SCNC: 9 MMOL/L (ref 5–15)
AST SERPL-CCNC: 16 U/L (ref 1–40)
BASOPHILS # BLD AUTO: 0.03 10*3/MM3 (ref 0–0.2)
BASOPHILS NFR BLD AUTO: 0.5 % (ref 0–1.5)
BILIRUB SERPL-MCNC: 0.5 MG/DL (ref 0–1.2)
BUN SERPL-MCNC: 11 MG/DL (ref 8–23)
BUN/CREAT SERPL: 11.5 (ref 7–25)
CALCIUM SPEC-SCNC: 9.5 MG/DL (ref 8.6–10.5)
CEA SERPL-MCNC: 3.39 NG/ML
CHLORIDE SERPL-SCNC: 96 MMOL/L (ref 98–107)
CO2 SERPL-SCNC: 28 MMOL/L (ref 22–29)
CREAT SERPL-MCNC: 0.96 MG/DL (ref 0.76–1.27)
DEPRECATED RDW RBC AUTO: 45.6 FL (ref 37–54)
EGFRCR SERPLBLD CKD-EPI 2021: 78.4 ML/MIN/1.73
EOSINOPHIL # BLD AUTO: 0.06 10*3/MM3 (ref 0–0.4)
EOSINOPHIL NFR BLD AUTO: 1 % (ref 0.3–6.2)
ERYTHROCYTE [DISTWIDTH] IN BLOOD BY AUTOMATED COUNT: 12.5 % (ref 12.3–15.4)
FERRITIN SERPL-MCNC: 71.82 NG/ML (ref 30–400)
FOLATE SERPL-MCNC: 17.5 NG/ML (ref 4.78–24.2)
GLOBULIN UR ELPH-MCNC: 3 GM/DL
GLUCOSE SERPL-MCNC: 120 MG/DL (ref 65–99)
HCT VFR BLD AUTO: 43 % (ref 37.5–51)
HGB BLD-MCNC: 14 G/DL (ref 13–17.7)
IMM GRANULOCYTES # BLD AUTO: 0.02 10*3/MM3 (ref 0–0.05)
IMM GRANULOCYTES NFR BLD AUTO: 0.3 % (ref 0–0.5)
IRON 24H UR-MRATE: 123 MCG/DL (ref 59–158)
IRON SATN MFR SERPL: 35 % (ref 20–50)
LYMPHOCYTES # BLD AUTO: 1.3 10*3/MM3 (ref 0.7–3.1)
LYMPHOCYTES NFR BLD AUTO: 21.4 % (ref 19.6–45.3)
MCH RBC QN AUTO: 32 PG (ref 26.6–33)
MCHC RBC AUTO-ENTMCNC: 32.6 G/DL (ref 31.5–35.7)
MCV RBC AUTO: 98.2 FL (ref 79–97)
MONOCYTES # BLD AUTO: 0.74 10*3/MM3 (ref 0.1–0.9)
MONOCYTES NFR BLD AUTO: 12.2 % (ref 5–12)
NEUTROPHILS NFR BLD AUTO: 3.93 10*3/MM3 (ref 1.7–7)
NEUTROPHILS NFR BLD AUTO: 64.6 % (ref 42.7–76)
NRBC BLD AUTO-RTO: 0 /100 WBC (ref 0–0.2)
PLATELET # BLD AUTO: 235 10*3/MM3 (ref 140–450)
PMV BLD AUTO: 8.8 FL (ref 6–12)
POTASSIUM SERPL-SCNC: 4 MMOL/L (ref 3.5–5.2)
PROT SERPL-MCNC: 7.3 G/DL (ref 6–8.5)
RBC # BLD AUTO: 4.38 10*6/MM3 (ref 4.14–5.8)
SODIUM SERPL-SCNC: 133 MMOL/L (ref 136–145)
TIBC SERPL-MCNC: 349 MCG/DL (ref 298–536)
TRANSFERRIN SERPL-MCNC: 234 MG/DL (ref 200–360)
VIT B12 BLD-MCNC: 290 PG/ML (ref 211–946)
WBC NRBC COR # BLD: 6.08 10*3/MM3 (ref 3.4–10.8)

## 2022-10-26 PROCEDURE — 84466 ASSAY OF TRANSFERRIN: CPT

## 2022-10-26 PROCEDURE — 36415 COLL VENOUS BLD VENIPUNCTURE: CPT

## 2022-10-26 PROCEDURE — 85025 COMPLETE CBC W/AUTO DIFF WBC: CPT

## 2022-10-26 PROCEDURE — 82728 ASSAY OF FERRITIN: CPT

## 2022-10-26 PROCEDURE — 82746 ASSAY OF FOLIC ACID SERUM: CPT

## 2022-10-26 PROCEDURE — 82378 CARCINOEMBRYONIC ANTIGEN: CPT

## 2022-10-26 PROCEDURE — 83540 ASSAY OF IRON: CPT

## 2022-10-26 PROCEDURE — 82607 VITAMIN B-12: CPT

## 2022-10-26 PROCEDURE — 80053 COMPREHEN METABOLIC PANEL: CPT

## 2022-10-27 ENCOUNTER — TELEPHONE (OUTPATIENT)
Dept: ONCOLOGY | Facility: CLINIC | Age: 82
End: 2022-10-27

## 2022-10-27 DIAGNOSIS — E53.8 LOW SERUM VITAMIN B12: Primary | ICD-10-CM

## 2022-10-27 RX ORDER — CYANOCOBALAMIN/FOLIC AC/VIT B6 1-2.5-25MG
1 TABLET ORAL DAILY
Qty: 30 TABLET | Refills: 0 | Status: SHIPPED | OUTPATIENT
Start: 2022-10-27

## 2022-10-27 NOTE — TELEPHONE ENCOUNTER
Notified pt of low B12. Folbee has been sent to [facility] pharmacy. Pt is to take once daily. Pt v/u    ----- Message from Cem Arteaga MD sent at 10/26/2022  9:19 PM CDT -----  B12 290-please call in Folbee p.o. daily dispense 30  ----- Message -----  From: Lab, Background User  Sent: 10/26/2022  11:30 AM CDT  To: Cem Arteaga MD

## 2022-11-02 ENCOUNTER — OFFICE VISIT (OUTPATIENT)
Dept: ONCOLOGY | Facility: CLINIC | Age: 82
End: 2022-11-02

## 2022-11-02 VITALS
SYSTOLIC BLOOD PRESSURE: 132 MMHG | RESPIRATION RATE: 18 BRPM | HEIGHT: 72 IN | TEMPERATURE: 97.6 F | HEART RATE: 91 BPM | DIASTOLIC BLOOD PRESSURE: 78 MMHG | WEIGHT: 230.3 LBS | BODY MASS INDEX: 31.19 KG/M2 | OXYGEN SATURATION: 97 %

## 2022-11-02 DIAGNOSIS — C34.32 PRIMARY ADENOCARCINOMA OF LOWER LOBE OF LEFT LUNG: Primary | ICD-10-CM

## 2022-11-02 PROCEDURE — 99214 OFFICE O/P EST MOD 30 MIN: CPT | Performed by: INTERNAL MEDICINE

## 2022-11-02 RX ORDER — POTASSIUM CHLORIDE 750 MG/1
TABLET, FILM COATED, EXTENDED RELEASE ORAL
COMMUNITY
Start: 2022-08-23 | End: 2023-01-30

## 2022-11-10 ENCOUNTER — APPOINTMENT (OUTPATIENT)
Dept: GENERAL RADIOLOGY | Age: 82
DRG: 645 | End: 2022-11-10
Payer: MEDICARE

## 2022-11-10 ENCOUNTER — HOSPITAL ENCOUNTER (INPATIENT)
Age: 82
LOS: 5 days | Discharge: HOME | DRG: 645 | End: 2022-11-15
Attending: EMERGENCY MEDICINE | Admitting: STUDENT IN AN ORGANIZED HEALTH CARE EDUCATION/TRAINING PROGRAM
Payer: MEDICARE

## 2022-11-10 DIAGNOSIS — R06.00 DYSPNEA AND RESPIRATORY ABNORMALITIES: ICD-10-CM

## 2022-11-10 DIAGNOSIS — F32.A DEPRESSION, UNSPECIFIED DEPRESSION TYPE: ICD-10-CM

## 2022-11-10 DIAGNOSIS — R06.89 DYSPNEA AND RESPIRATORY ABNORMALITIES: ICD-10-CM

## 2022-11-10 DIAGNOSIS — E87.1 HYPONATREMIA: Primary | ICD-10-CM

## 2022-11-10 LAB
ALBUMIN SERPL-MCNC: 4.8 G/DL (ref 3.5–5.2)
ALP BLD-CCNC: 83 U/L (ref 40–130)
ALT SERPL-CCNC: 18 U/L (ref 5–41)
ANION GAP SERPL CALCULATED.3IONS-SCNC: 9 MMOL/L (ref 7–19)
AST SERPL-CCNC: 25 U/L (ref 5–40)
BASOPHILS ABSOLUTE: 0 K/UL (ref 0–0.2)
BASOPHILS RELATIVE PERCENT: 0.3 % (ref 0–1)
BILIRUB SERPL-MCNC: 1 MG/DL (ref 0.2–1.2)
BUN BLDV-MCNC: 10 MG/DL (ref 8–23)
CALCIUM SERPL-MCNC: 9.3 MG/DL (ref 8.8–10.2)
CHLORIDE BLD-SCNC: 86 MMOL/L (ref 98–111)
CO2: 28 MMOL/L (ref 22–29)
CREAT SERPL-MCNC: 0.8 MG/DL (ref 0.5–1.2)
D DIMER: <0.27 UG/ML FEU (ref 0–0.48)
EOSINOPHILS ABSOLUTE: 0 K/UL (ref 0–0.6)
EOSINOPHILS RELATIVE PERCENT: 0.1 % (ref 0–5)
GFR SERPL CREATININE-BSD FRML MDRD: >60 ML/MIN/{1.73_M2}
GLUCOSE BLD-MCNC: 128 MG/DL (ref 74–109)
HCT VFR BLD CALC: 42.1 % (ref 42–52)
HEMOGLOBIN: 14.6 G/DL (ref 14–18)
IMMATURE GRANULOCYTES #: 0 K/UL
LYMPHOCYTES ABSOLUTE: 1.1 K/UL (ref 1.1–4.5)
LYMPHOCYTES RELATIVE PERCENT: 13.7 % (ref 20–40)
MCH RBC QN AUTO: 32.7 PG (ref 27–31)
MCHC RBC AUTO-ENTMCNC: 34.7 G/DL (ref 33–37)
MCV RBC AUTO: 94.4 FL (ref 80–94)
MONOCYTES ABSOLUTE: 0.7 K/UL (ref 0–0.9)
MONOCYTES RELATIVE PERCENT: 8.4 % (ref 0–10)
NEUTROPHILS ABSOLUTE: 6 K/UL (ref 1.5–7.5)
NEUTROPHILS RELATIVE PERCENT: 77.2 % (ref 50–65)
OSMOLALITY: 254 MOSM/KG (ref 275–300)
PDW BLD-RTO: 12.2 % (ref 11.5–14.5)
PLATELET # BLD: 253 K/UL (ref 130–400)
PMV BLD AUTO: 8.6 FL (ref 9.4–12.4)
POTASSIUM REFLEX MAGNESIUM: 4.1 MMOL/L (ref 3.5–5)
PRO-BNP: 119 PG/ML (ref 0–1800)
RBC # BLD: 4.46 M/UL (ref 4.7–6.1)
SARS-COV-2, NAAT: NOT DETECTED
SODIUM BLD-SCNC: 123 MMOL/L (ref 136–145)
TOTAL PROTEIN: 7.2 G/DL (ref 6.6–8.7)
TROPONIN: <0.01 NG/ML (ref 0–0.03)
TSH REFLEX FT4: 0.6 UIU/ML (ref 0.35–5.5)
URIC ACID, SERUM: 4.3 MG/DL (ref 3.4–7)
WBC # BLD: 7.8 K/UL (ref 4.8–10.8)

## 2022-11-10 PROCEDURE — 83930 ASSAY OF BLOOD OSMOLALITY: CPT

## 2022-11-10 PROCEDURE — 6370000000 HC RX 637 (ALT 250 FOR IP): Performed by: NURSE PRACTITIONER

## 2022-11-10 PROCEDURE — 71045 X-RAY EXAM CHEST 1 VIEW: CPT

## 2022-11-10 PROCEDURE — 84484 ASSAY OF TROPONIN QUANT: CPT

## 2022-11-10 PROCEDURE — 85025 COMPLETE CBC W/AUTO DIFF WBC: CPT

## 2022-11-10 PROCEDURE — 6370000000 HC RX 637 (ALT 250 FOR IP): Performed by: HOSPITALIST

## 2022-11-10 PROCEDURE — 2580000003 HC RX 258: Performed by: EMERGENCY MEDICINE

## 2022-11-10 PROCEDURE — 80053 COMPREHEN METABOLIC PANEL: CPT

## 2022-11-10 PROCEDURE — 6360000002 HC RX W HCPCS: Performed by: HOSPITALIST

## 2022-11-10 PROCEDURE — 83880 ASSAY OF NATRIURETIC PEPTIDE: CPT

## 2022-11-10 PROCEDURE — 71045 X-RAY EXAM CHEST 1 VIEW: CPT | Performed by: RADIOLOGY

## 2022-11-10 PROCEDURE — 99285 EMERGENCY DEPT VISIT HI MDM: CPT

## 2022-11-10 PROCEDURE — 84550 ASSAY OF BLOOD/URIC ACID: CPT

## 2022-11-10 PROCEDURE — 36415 COLL VENOUS BLD VENIPUNCTURE: CPT

## 2022-11-10 PROCEDURE — 2580000003 HC RX 258: Performed by: NURSE PRACTITIONER

## 2022-11-10 PROCEDURE — 87635 SARS-COV-2 COVID-19 AMP PRB: CPT

## 2022-11-10 PROCEDURE — 6360000002 HC RX W HCPCS: Performed by: NURSE PRACTITIONER

## 2022-11-10 PROCEDURE — 84443 ASSAY THYROID STIM HORMONE: CPT

## 2022-11-10 PROCEDURE — 1210000000 HC MED SURG R&B

## 2022-11-10 PROCEDURE — 85379 FIBRIN DEGRADATION QUANT: CPT

## 2022-11-10 RX ORDER — AMLODIPINE BESYLATE AND BENAZEPRIL HYDROCHLORIDE 10; 20 MG/1; MG/1
1 CAPSULE ORAL DAILY
Status: DISCONTINUED | OUTPATIENT
Start: 2022-11-10 | End: 2022-11-10

## 2022-11-10 RX ORDER — POLYETHYLENE GLYCOL 3350 17 G/17G
17 POWDER, FOR SOLUTION ORAL DAILY PRN
Status: DISCONTINUED | OUTPATIENT
Start: 2022-11-10 | End: 2022-11-13

## 2022-11-10 RX ORDER — SODIUM CHLORIDE 0.9 % (FLUSH) 0.9 %
5-40 SYRINGE (ML) INJECTION EVERY 12 HOURS SCHEDULED
Status: DISCONTINUED | OUTPATIENT
Start: 2022-11-10 | End: 2022-11-15 | Stop reason: HOSPADM

## 2022-11-10 RX ORDER — SODIUM CHLORIDE 9 MG/ML
INJECTION, SOLUTION INTRAVENOUS PRN
Status: DISCONTINUED | OUTPATIENT
Start: 2022-11-10 | End: 2022-11-15 | Stop reason: HOSPADM

## 2022-11-10 RX ORDER — FUROSEMIDE 20 MG/1
20 TABLET ORAL DAILY
Status: DISCONTINUED | OUTPATIENT
Start: 2022-11-10 | End: 2022-11-10

## 2022-11-10 RX ORDER — GUAIFENESIN 600 MG/1
600 TABLET, EXTENDED RELEASE ORAL 2 TIMES DAILY PRN
Status: DISCONTINUED | OUTPATIENT
Start: 2022-11-10 | End: 2022-11-11

## 2022-11-10 RX ORDER — ENOXAPARIN SODIUM 100 MG/ML
40 INJECTION SUBCUTANEOUS DAILY
Status: DISCONTINUED | OUTPATIENT
Start: 2022-11-10 | End: 2022-11-15 | Stop reason: HOSPADM

## 2022-11-10 RX ORDER — ONDANSETRON 4 MG/1
4 TABLET, ORALLY DISINTEGRATING ORAL EVERY 8 HOURS PRN
Status: DISCONTINUED | OUTPATIENT
Start: 2022-11-10 | End: 2022-11-15 | Stop reason: HOSPADM

## 2022-11-10 RX ORDER — ACETAMINOPHEN 325 MG/1
650 TABLET ORAL EVERY 6 HOURS PRN
Status: DISCONTINUED | OUTPATIENT
Start: 2022-11-10 | End: 2022-11-15 | Stop reason: HOSPADM

## 2022-11-10 RX ORDER — ASPIRIN 81 MG/1
81 TABLET ORAL 2 TIMES DAILY
Status: DISCONTINUED | OUTPATIENT
Start: 2022-11-10 | End: 2022-11-11

## 2022-11-10 RX ORDER — ACETAMINOPHEN 650 MG/1
650 SUPPOSITORY RECTAL EVERY 6 HOURS PRN
Status: DISCONTINUED | OUTPATIENT
Start: 2022-11-10 | End: 2022-11-15 | Stop reason: HOSPADM

## 2022-11-10 RX ORDER — DOCUSATE SODIUM 100 MG/1
100 CAPSULE, LIQUID FILLED ORAL DAILY
Status: DISCONTINUED | OUTPATIENT
Start: 2022-11-10 | End: 2022-11-15 | Stop reason: HOSPADM

## 2022-11-10 RX ORDER — AMLODIPINE BESYLATE 10 MG/1
10 TABLET ORAL DAILY
Status: DISCONTINUED | OUTPATIENT
Start: 2022-11-10 | End: 2022-11-15 | Stop reason: HOSPADM

## 2022-11-10 RX ORDER — SODIUM CHLORIDE 9 MG/ML
INJECTION, SOLUTION INTRAVENOUS CONTINUOUS
Status: DISCONTINUED | OUTPATIENT
Start: 2022-11-10 | End: 2022-11-10

## 2022-11-10 RX ORDER — FUROSEMIDE 10 MG/ML
40 INJECTION INTRAMUSCULAR; INTRAVENOUS ONCE
Status: COMPLETED | OUTPATIENT
Start: 2022-11-10 | End: 2022-11-10

## 2022-11-10 RX ORDER — ONDANSETRON 2 MG/ML
4 INJECTION INTRAMUSCULAR; INTRAVENOUS EVERY 6 HOURS PRN
Status: DISCONTINUED | OUTPATIENT
Start: 2022-11-10 | End: 2022-11-15 | Stop reason: HOSPADM

## 2022-11-10 RX ORDER — TAMSULOSIN HYDROCHLORIDE 0.4 MG/1
0.4 CAPSULE ORAL DAILY
Status: DISCONTINUED | OUTPATIENT
Start: 2022-11-10 | End: 2022-11-15 | Stop reason: HOSPADM

## 2022-11-10 RX ORDER — SODIUM CHLORIDE 0.9 % (FLUSH) 0.9 %
5-40 SYRINGE (ML) INJECTION PRN
Status: DISCONTINUED | OUTPATIENT
Start: 2022-11-10 | End: 2022-11-15 | Stop reason: HOSPADM

## 2022-11-10 RX ORDER — LISINOPRIL 20 MG/1
20 TABLET ORAL DAILY
Status: DISCONTINUED | OUTPATIENT
Start: 2022-11-10 | End: 2022-11-10

## 2022-11-10 RX ORDER — ATORVASTATIN CALCIUM 10 MG/1
10 TABLET, FILM COATED ORAL DAILY
Status: DISCONTINUED | OUTPATIENT
Start: 2022-11-10 | End: 2022-11-15 | Stop reason: HOSPADM

## 2022-11-10 RX ADMIN — DOCUSATE SODIUM 100 MG: 100 CAPSULE, LIQUID FILLED ORAL at 21:32

## 2022-11-10 RX ADMIN — ENOXAPARIN SODIUM 40 MG: 100 INJECTION SUBCUTANEOUS at 21:32

## 2022-11-10 RX ADMIN — AMLODIPINE BESYLATE 10 MG: 10 TABLET ORAL at 21:32

## 2022-11-10 RX ADMIN — ATORVASTATIN CALCIUM 10 MG: 10 TABLET, FILM COATED ORAL at 21:32

## 2022-11-10 RX ADMIN — GUAIFENESIN 600 MG: 600 TABLET ORAL at 23:23

## 2022-11-10 RX ADMIN — SODIUM CHLORIDE: 9 INJECTION, SOLUTION INTRAVENOUS at 21:43

## 2022-11-10 RX ADMIN — SODIUM CHLORIDE: 9 INJECTION, SOLUTION INTRAVENOUS at 18:28

## 2022-11-10 RX ADMIN — FUROSEMIDE 40 MG: 10 INJECTION, SOLUTION INTRAMUSCULAR; INTRAVENOUS at 23:23

## 2022-11-10 RX ADMIN — ASPIRIN 81 MG: 81 TABLET, COATED ORAL at 21:32

## 2022-11-10 RX ADMIN — TAMSULOSIN HYDROCHLORIDE 0.4 MG: 0.4 CAPSULE ORAL at 21:31

## 2022-11-10 RX ADMIN — LISINOPRIL 20 MG: 20 TABLET ORAL at 21:32

## 2022-11-10 RX ADMIN — SODIUM CHLORIDE, PRESERVATIVE FREE 10 ML: 5 INJECTION INTRAVENOUS at 21:43

## 2022-11-10 RX ADMIN — Medication 15 G: at 21:32

## 2022-11-10 ASSESSMENT — ENCOUNTER SYMPTOMS
NAUSEA: 0
VOICE CHANGE: 0
SINUS PRESSURE: 0
ABDOMINAL PAIN: 0
APNEA: 0
DIARRHEA: 0
FACIAL SWELLING: 0
EYE DISCHARGE: 0
CONSTIPATION: 0
CHOKING: 0
SORE THROAT: 0
BLOOD IN STOOL: 0
COUGH: 0
VOMITING: 0
SHORTNESS OF BREATH: 1

## 2022-11-10 ASSESSMENT — PAIN DESCRIPTION - DESCRIPTORS: DESCRIPTORS: PRESSURE

## 2022-11-10 ASSESSMENT — PAIN - FUNCTIONAL ASSESSMENT: PAIN_FUNCTIONAL_ASSESSMENT: 0-10

## 2022-11-10 ASSESSMENT — PAIN SCALES - GENERAL: PAINLEVEL_OUTOF10: 3

## 2022-11-10 ASSESSMENT — PAIN DESCRIPTION - LOCATION: LOCATION: CHEST

## 2022-11-11 ENCOUNTER — APPOINTMENT (OUTPATIENT)
Dept: CT IMAGING | Age: 82
DRG: 645 | End: 2022-11-11
Payer: MEDICARE

## 2022-11-11 LAB
ANION GAP SERPL CALCULATED.3IONS-SCNC: 13 MMOL/L (ref 7–19)
BILIRUBIN URINE: NEGATIVE
BLOOD, URINE: ABNORMAL
BUN BLDV-MCNC: 17 MG/DL (ref 8–23)
CALCIUM SERPL-MCNC: 8.7 MG/DL (ref 8.8–10.2)
CHLORIDE BLD-SCNC: 88 MMOL/L (ref 98–111)
CLARITY: CLEAR
CO2: 24 MMOL/L (ref 22–29)
COLOR: YELLOW
CREAT SERPL-MCNC: 0.8 MG/DL (ref 0.5–1.2)
GFR SERPL CREATININE-BSD FRML MDRD: >60 ML/MIN/{1.73_M2}
GLUCOSE BLD-MCNC: 111 MG/DL (ref 74–109)
GLUCOSE BLD-MCNC: 120 MG/DL (ref 70–99)
GLUCOSE URINE: NEGATIVE MG/DL
KETONES, URINE: 15 MG/DL
LEUKOCYTE ESTERASE, URINE: NEGATIVE
LV EF: 58 %
LVEF MODALITY: NORMAL
NITRITE, URINE: NEGATIVE
PERFORMED ON: ABNORMAL
PH UA: 6.5 (ref 5–8)
POTASSIUM REFLEX MAGNESIUM: 3.7 MMOL/L (ref 3.5–5)
PROTEIN UA: NEGATIVE MG/DL
RBC UA: ABNORMAL /HPF (ref 0–2)
SODIUM BLD-SCNC: 124 MMOL/L (ref 136–145)
SODIUM BLD-SCNC: 125 MMOL/L (ref 136–145)
SPECIFIC GRAVITY UA: 1.01 (ref 1–1.03)
UROBILINOGEN, URINE: 1 E.U./DL
WBC UA: ABNORMAL /HPF (ref 0–5)

## 2022-11-11 PROCEDURE — 2580000003 HC RX 258: Performed by: STUDENT IN AN ORGANIZED HEALTH CARE EDUCATION/TRAINING PROGRAM

## 2022-11-11 PROCEDURE — 80048 BASIC METABOLIC PNL TOTAL CA: CPT

## 2022-11-11 PROCEDURE — 6370000000 HC RX 637 (ALT 250 FOR IP): Performed by: STUDENT IN AN ORGANIZED HEALTH CARE EDUCATION/TRAINING PROGRAM

## 2022-11-11 PROCEDURE — 36415 COLL VENOUS BLD VENIPUNCTURE: CPT

## 2022-11-11 PROCEDURE — 94760 N-INVAS EAR/PLS OXIMETRY 1: CPT

## 2022-11-11 PROCEDURE — 70450 CT HEAD/BRAIN W/O DYE: CPT | Performed by: RADIOLOGY

## 2022-11-11 PROCEDURE — 70450 CT HEAD/BRAIN W/O DYE: CPT

## 2022-11-11 PROCEDURE — 6370000000 HC RX 637 (ALT 250 FOR IP): Performed by: NURSE PRACTITIONER

## 2022-11-11 PROCEDURE — C8929 TTE W OR WO FOL WCON,DOPPLER: HCPCS

## 2022-11-11 PROCEDURE — 6370000000 HC RX 637 (ALT 250 FOR IP): Performed by: HOSPITALIST

## 2022-11-11 PROCEDURE — 94640 AIRWAY INHALATION TREATMENT: CPT

## 2022-11-11 PROCEDURE — 6360000002 HC RX W HCPCS: Performed by: NURSE PRACTITIONER

## 2022-11-11 PROCEDURE — 2580000003 HC RX 258: Performed by: NURSE PRACTITIONER

## 2022-11-11 PROCEDURE — 1210000000 HC MED SURG R&B

## 2022-11-11 PROCEDURE — 81001 URINALYSIS AUTO W/SCOPE: CPT

## 2022-11-11 PROCEDURE — 6360000004 HC RX CONTRAST MEDICATION: Performed by: STUDENT IN AN ORGANIZED HEALTH CARE EDUCATION/TRAINING PROGRAM

## 2022-11-11 PROCEDURE — 82947 ASSAY GLUCOSE BLOOD QUANT: CPT

## 2022-11-11 PROCEDURE — 84295 ASSAY OF SERUM SODIUM: CPT

## 2022-11-11 RX ORDER — PROPRANOLOL HCL 60 MG
60 CAPSULE, EXTENDED RELEASE 24HR ORAL DAILY
Status: DISCONTINUED | OUTPATIENT
Start: 2022-11-11 | End: 2022-11-15 | Stop reason: HOSPADM

## 2022-11-11 RX ORDER — PROPRANOLOL HCL 60 MG
60 CAPSULE, EXTENDED RELEASE 24HR ORAL DAILY
COMMUNITY

## 2022-11-11 RX ORDER — FLUTICASONE PROPIONATE 50 MCG
2 SPRAY, SUSPENSION (ML) NASAL DAILY
Status: DISCONTINUED | OUTPATIENT
Start: 2022-11-11 | End: 2022-11-15 | Stop reason: HOSPADM

## 2022-11-11 RX ORDER — ACETAMINOPHEN/DIPHENHYDRAMINE 500MG-25MG
1 TABLET ORAL NIGHTLY PRN
Status: ON HOLD | COMMUNITY
End: 2022-11-15 | Stop reason: HOSPADM

## 2022-11-11 RX ORDER — GUAIFENESIN 600 MG/1
600 TABLET, EXTENDED RELEASE ORAL 2 TIMES DAILY
Status: DISCONTINUED | OUTPATIENT
Start: 2022-11-11 | End: 2022-11-15 | Stop reason: HOSPADM

## 2022-11-11 RX ORDER — 0.9 % SODIUM CHLORIDE 0.9 %
500 INTRAVENOUS SOLUTION INTRAVENOUS ONCE
Status: COMPLETED | OUTPATIENT
Start: 2022-11-11 | End: 2022-11-11

## 2022-11-11 RX ORDER — ESCITALOPRAM OXALATE 5 MG/1
5 TABLET ORAL DAILY
Status: ON HOLD | COMMUNITY
End: 2022-11-11

## 2022-11-11 RX ORDER — CLONAZEPAM 0.5 MG/1
0.5 TABLET ORAL NIGHTLY PRN
Status: DISCONTINUED | OUTPATIENT
Start: 2022-11-11 | End: 2022-11-11

## 2022-11-11 RX ORDER — CLONAZEPAM 0.5 MG/1
0.5 TABLET ORAL NIGHTLY
COMMUNITY
End: 2022-11-21

## 2022-11-11 RX ORDER — ACETAMINOPHEN 500 MG
500 TABLET ORAL NIGHTLY PRN
Status: DISCONTINUED | OUTPATIENT
Start: 2022-11-11 | End: 2022-11-15 | Stop reason: HOSPADM

## 2022-11-11 RX ORDER — DIPHENHYDRAMINE HCL 25 MG
25 TABLET ORAL NIGHTLY PRN
Status: DISCONTINUED | OUTPATIENT
Start: 2022-11-11 | End: 2022-11-15 | Stop reason: HOSPADM

## 2022-11-11 RX ORDER — FLUTICASONE PROPIONATE 50 MCG
2 SPRAY, SUSPENSION (ML) NASAL DAILY
COMMUNITY

## 2022-11-11 RX ORDER — IBUPROFEN 200 MG
200 TABLET ORAL EVERY 6 HOURS PRN
Status: ON HOLD | COMMUNITY
End: 2022-11-15 | Stop reason: HOSPADM

## 2022-11-11 RX ORDER — IPRATROPIUM BROMIDE AND ALBUTEROL SULFATE 2.5; .5 MG/3ML; MG/3ML
1 SOLUTION RESPIRATORY (INHALATION)
Status: DISCONTINUED | OUTPATIENT
Start: 2022-11-11 | End: 2022-11-15 | Stop reason: HOSPADM

## 2022-11-11 RX ORDER — ALBUTEROL SULFATE 90 UG/1
2 AEROSOL, METERED RESPIRATORY (INHALATION) 4 TIMES DAILY PRN
Qty: 18 G | Refills: 0 | Status: CANCELLED | OUTPATIENT
Start: 2022-11-11

## 2022-11-11 RX ORDER — ACETAMINOPHEN,DIPHENHYDRAMINE HCL 500; 25 MG/1; MG/1
1 TABLET, FILM COATED ORAL NIGHTLY PRN
Status: DISCONTINUED | OUTPATIENT
Start: 2022-11-11 | End: 2022-11-11

## 2022-11-11 RX ADMIN — IPRATROPIUM BROMIDE AND ALBUTEROL SULFATE 1 AMPULE: 2.5; .5 SOLUTION RESPIRATORY (INHALATION) at 19:37

## 2022-11-11 RX ADMIN — AMLODIPINE BESYLATE 10 MG: 10 TABLET ORAL at 10:19

## 2022-11-11 RX ADMIN — PERFLUTREN 1.5 ML: 6.52 INJECTION, SUSPENSION INTRAVENOUS at 15:25

## 2022-11-11 RX ADMIN — SODIUM CHLORIDE, PRESERVATIVE FREE 10 ML: 5 INJECTION INTRAVENOUS at 21:36

## 2022-11-11 RX ADMIN — SODIUM CHLORIDE 500 ML: 9 INJECTION, SOLUTION INTRAVENOUS at 12:04

## 2022-11-11 RX ADMIN — ASPIRIN 81 MG: 81 TABLET, COATED ORAL at 10:19

## 2022-11-11 RX ADMIN — SODIUM CHLORIDE 500 ML: 9 INJECTION, SOLUTION INTRAVENOUS at 18:30

## 2022-11-11 RX ADMIN — ENOXAPARIN SODIUM 40 MG: 100 INJECTION SUBCUTANEOUS at 10:20

## 2022-11-11 RX ADMIN — GUAIFENESIN 600 MG: 600 TABLET ORAL at 21:37

## 2022-11-11 RX ADMIN — TAMSULOSIN HYDROCHLORIDE 0.4 MG: 0.4 CAPSULE ORAL at 10:19

## 2022-11-11 RX ADMIN — Medication 15 G: at 12:04

## 2022-11-11 RX ADMIN — ATORVASTATIN CALCIUM 10 MG: 10 TABLET, FILM COATED ORAL at 10:19

## 2022-11-11 RX ADMIN — IPRATROPIUM BROMIDE AND ALBUTEROL SULFATE 1 AMPULE: 2.5; .5 SOLUTION RESPIRATORY (INHALATION) at 14:21

## 2022-11-11 RX ADMIN — GUAIFENESIN 600 MG: 600 TABLET ORAL at 10:19

## 2022-11-11 RX ADMIN — FLUTICASONE PROPIONATE 2 SPRAY: 50 SPRAY, METERED NASAL at 12:05

## 2022-11-11 RX ADMIN — SODIUM CHLORIDE, PRESERVATIVE FREE 10 ML: 5 INJECTION INTRAVENOUS at 10:20

## 2022-11-11 RX ADMIN — DOCUSATE SODIUM 100 MG: 100 CAPSULE, LIQUID FILLED ORAL at 10:19

## 2022-11-11 RX ADMIN — PROPRANOLOL HYDROCHLORIDE 60 MG: 60 CAPSULE, EXTENDED RELEASE ORAL at 12:05

## 2022-11-11 ASSESSMENT — PAIN SCALES - GENERAL: PAINLEVEL_OUTOF10: 0

## 2022-11-11 NOTE — CONSULTS
Palliative Care:    Initiated for support, goals of care and ACP. 78 y/o gentleman who presents with increased soa. Noted to have hyponatremia and treatment initiated. Met with him and his son/DIL who are present. He says he is waiting for a \"heart test\". Echo is pending. They also question about neb treatments and ask when he will receive one. Checked on above and assured that ECHO was on for today and should be soon. Also respiratory notified and acknowledged order, just have not gotten to pt yet. Communicated to family. Past Medical History:        Past Medical History:   Diagnosis Date    Arthritis     Cancer ([facility] [address]     HX LUNG CA    Emphysema/COPD ([facility] [address]     MILD/FORMER SMOKER    Hyperlipidemia     Hypertension     Macular degeneration     Palliative care patient 11/11/2022    Retention of urine        Advance Directives:    full code  Living will on file     Pain/Other Symptoms:    Pt denies any pain at this time. Activity:     as tolerated        Psychological/Spiritual:      Pt states he has good spiritual support. Plan:    Echo pending, continue medical treatments and monitor for improvement,    Patient/family discussion r/t goals:  Pt lives with his daughter and her family. He states his goal is to return home and live as independent as possible. He access to a cane and walker but does not use them at present.     Review of any needed services:  Potential for Home Health      Electronically signed by Alfonso Fabian RN on 11/11/2022 at 2:04 PM

## 2022-11-11 NOTE — ED PROVIDER NOTES
[facility]  eMERGENCY dEPARTMENT eNCOUnter      Pt Name: Vangie Gurrola  MRN: [id number]  Medardogfmagdalene 1940  Date of evaluation: 11/10/2022  Provider: Agus Lin MD    [address] [place] [address]       Chief Complaint   Patient presents with    Shortness of Breath     Pt arrived to the ed with c/o shortness of breath and feeling like \"there's something in my throat. \" Onset a couple of days. Worse today. HISTORY OF PRESENT ILLNESS   (Location/Symptom, Timing/Onset,Context/Setting, Quality, Duration, Modifying Factors, Severity)  Note limiting factors. Vangie Gurrola is a 79 y.o. male who presents to the emergency department shortness of breath     79-year-old male presents with his daughter. With complaints of dyspnea. No chest pain. No fever or chills. No change in cough or sputum production. He does complain of postnasal drip that is difficult to clear out. His daughter shared with me that he is very depressed. Saw his clinician yesterday started on Lexapro and clonazepam.  And stopped BuSpar. They considered seeing psychiatry. But there is no HI or SI. But the patient is sad because this is anniversary date Thanksgiving of the death of the loss of his wife. Plus as he ages he depends more on his daughter and he feels not as valuable member of the family. He has not been eating. He is consuming water and asked for a glass of water now. The history is provided by the patient and a relative. NursingNotes were reviewed. REVIEW OF SYSTEMS    (2-9 systems for level 4, 10 or more for level 5)     Review of Systems   Constitutional:  Negative for chills and fever. HENT:  Negative for congestion, drooling, facial swelling, nosebleeds, sinus pressure, sore throat and voice change. Eyes:  Negative for discharge. Respiratory:  Positive for shortness of breath. Negative for apnea, cough and choking. Cardiovascular:  Negative for chest pain, palpitations and leg swelling. Gastrointestinal:  Negative for abdominal pain, blood in stool, constipation, diarrhea and nausea. Genitourinary:  Negative for dysuria and enuresis. Musculoskeletal:  Negative for joint swelling. Skin:  Negative for rash and wound. Neurological:  Negative for seizures and syncope. Psychiatric/Behavioral:  Positive for dysphoric mood. Negative for behavioral problems, hallucinations and suicidal ideas. All other systems reviewed and are negative. A complete review of systems was performed and is negative except as noted above in the HPI.        PAST MEDICAL HISTORY     Past Medical History:   Diagnosis Date    Arthritis     Cancer ([facility] [address]     HX LUNG CA    Emphysema/COPD ([facility] [address]     MILD/FORMER SMOKER    Hyperlipidemia     Hypertension     Macular degeneration     Retention of urine          SURGICAL HISTORY       Past Surgical History:   Procedure Laterality Date    EYE SURGERY      MUSCLE WEAKNESS    GASTRECTOMY      PARTIAL FOR ULCER REPAIR    LUNG CANCER SURGERY Left     LLL LOBECTOMY    OTHER SURGICAL HISTORY      I & D OF GROIN INFECTION    TOTAL KNEE ARTHROPLASTY Right 5/18/2017    KNEE TOTAL ARTHROPLASTY performed by Catherine Lance MD at [address] [place] [address]       Previous Medications    ALFUZOSIN (UROXATRAL) 10 MG EXTENDED RELEASE TABLET    Take 10 mg by mouth daily Indications: BLADDER    AMLODIPINE-BENAZEPRIL (LOTREL) 10-20 MG PER CAPSULE    Take 1 capsule by mouth daily Indications: HTN    ASPIRIN EC 81 MG EC TABLET    Take 1 tablet by mouth 2 times daily    CELECOXIB (CELEBREX) 200 MG CAPSULE    Take 1 capsule by mouth daily for 14 days    DIPHENHYDRAMINE-APAP, SLEEP, (TYLENOL PM EXTRA STRENGTH)  MG TABLET    Take 1 tablet by mouth nightly as needed for Sleep    DOCUSATE SODIUM (COLACE) 100 MG CAPSULE    Take 1 capsule by mouth daily To prevent constipation    FUROSEMIDE (LASIX) 20 MG TABLET    Take 20 mg by mouth daily Indications: FLUID RETENTION LOVASTATIN (MEVACOR) 40 MG TABLET    Take 40 mg by mouth daily Indications: CHOLESTEROL    MULTIPLE VITAMINS-MINERALS (PRESERVISION AREDS 2+MULTI VIT) CAPS    Take 2 capsules by mouth daily Indications: SUPPLEMENT    OXYCODONE-ACETAMINOPHEN (PERCOCET) 5-325 MG PER TABLET    One or two every 4 to 6 hours prn. ALLERGIES     Penicillins    FAMILY HISTORY       Family History   Problem Relation Age of Onset    Diabetes Mother     Stroke Mother     Heart Attack Father     Cancer Father         ASBESTOS          SOCIAL HISTORY       Social History     Socioeconomic History    Marital status:      Spouse name: None    Number of children: None    Years of education: None    Highest education level: None   Tobacco Use    Smoking status: Former     Types: Cigarettes     Quit date: 1984     Years since quittin.5    Smokeless tobacco: Never   Vaping Use    Vaping Use: Never used   Substance and Sexual Activity    Alcohol use: No    Drug use: No       SCREENINGS    [place] Coma Scale  Eye Opening: Spontaneous  Best Verbal Response: Oriented  Best Motor Response: Obeys commands  [place] Coma Scale Score: 15        PHYSICAL EXAM    (up to 7 for level 4, 8 or more for level 5)     ED Triage Vitals [11/10/22 1540]   BP Temp Temp Source Heart Rate Resp SpO2 Height Weight   (!) 158/90 97.7 °F (36.5 °C) Temporal 69 18 96 % 6' (1.829 m) 222 lb (100.7 kg)       Physical Exam  Vitals and nursing note reviewed. Constitutional:       General: He is not in acute distress. Appearance: He is well-developed. HENT:      Head: Normocephalic and atraumatic. Right Ear: External ear normal.      Left Ear: External ear normal.      Nose: Nose normal.      Mouth/Throat:      Mouth: Mucous membranes are dry. Pharynx: Oropharynx is clear. Eyes:      General: No scleral icterus. Conjunctiva/sclera: Conjunctivae normal.      Pupils: Pupils are equal, round, and reactive to light.    Cardiovascular:      Rate and Rhythm: Normal rate and regular rhythm. Pulses: Normal pulses. Heart sounds: Normal heart sounds. No murmur heard. Pulmonary:      Effort: Pulmonary effort is normal. No respiratory distress. Breath sounds: Normal breath sounds. Abdominal:      General: Bowel sounds are normal.      Palpations: Abdomen is soft. Tenderness: There is no abdominal tenderness. Musculoskeletal:         General: Normal range of motion. Cervical back: Normal range of motion and neck supple. Right lower leg: No edema. Left lower leg: No edema. Skin:     General: Skin is warm and dry. Coloration: Skin is not jaundiced or pale. Neurological:      General: No focal deficit present. Mental Status: He is alert and oriented to person, place, and time. Psychiatric:         Mood and Affect: Mood is depressed. Affect is flat. Speech: Speech normal.         Behavior: Behavior normal.         Thought Content: Thought content normal.         Cognition and Memory: Cognition normal.       DIAGNOSTIC RESULTS     EKG: All EKG's are interpreted by the Emergency Department Physician who either signs or Co-signs this chart in the absence of a cardiologist.    Sinus rhythm rate 69. CA interval 195. QTc 433. No ST elevation by my review to indicate ischemia. I do not have an old EKG to compare. RADIOLOGY:   Non-plain film images such as CT, Ultrasound and MRI are read by the radiologist. Plainradiographic images are visualized and preliminarily interpreted by the emergency physician with the below findings:    I reviewed the images. Some increased vascularity in the right base. But no definite infiltrate. Otherwise negative.     Interpretation per the Radiologist below, if available at the time of this note:    XR CHEST PORTABLE    (Results Pending)         ED BEDSIDE ULTRASOUND:   Performed by ED Physician - none    LABS:  Labs Reviewed   CBC WITH AUTO DIFFERENTIAL - Abnormal; Notable for the following components:       Result Value    RBC 4.46 (*)     MCV 94.4 (*)     MCH 32.7 (*)     MPV 8.6 (*)     Neutrophils % 77.2 (*)     Lymphocytes % 13.7 (*)     All other components within normal limits   COMPREHENSIVE METABOLIC PANEL W/ REFLEX TO MG FOR LOW K - Abnormal; Notable for the following components:    Sodium 123 (*)     Chloride 86 (*)     Glucose 128 (*)     All other components within normal limits   COVID-19, RAPID   TROPONIN   D-DIMER, QUANTITATIVE       All other labs were within normal range or not returned as of this dictation. EMERGENCY DEPARTMENT COURSE and DIFFERENTIALDIAGNOSIS/MDM:   Vitals:    Vitals:    11/10/22 1540   BP: (!) 158/90   Pulse: 69   Resp: 18   Temp: 97.7 °F (36.5 °C)   TempSrc: Temporal   SpO2: 96%   Weight: 222 lb (100.7 kg)   Height: 6' (1.829 m)       MDM  Number of Diagnoses or Management Options  Depression, unspecified depression type  Dyspnea and respiratory abnormalities  Hyponatremia  Diagnosis management comments: So far I think the patient's respiratory or dyspnea is emotionally related. No hard findings on his work-up here. But his sodium level is low however much that may have to do with it. I think his depression and not eating and but still consuming liquids may have washed out his sodium. I will talk to the hospitalist service about admission overnight and further evaluation and correction of his hyponatremia. CONSULTS:  None    PROCEDURES:  Unless otherwise notedbelow, none     Procedures    FINAL IMPRESSION     1. Hyponatremia    2. Dyspnea and respiratory abnormalities    3.  Depression, unspecified depression type          DISPOSITION/PLAN   DISPOSITION Admitted 11/10/2022 06:25:32 PM      PATIENT REFERRED TO:  @FUP@    DISCHARGE MEDICATIONS:  New Prescriptions    No medications on file          (Please note that portions of this note were completed with a voice recognition program.  Efforts were made to edit the dictations butoccasionally words are mis-transcribed.)    Svetlana Soria MD (electronically signed)  AttendingEmergency Physician          Lucia Garza MD  11/10/22 5202

## 2022-11-11 NOTE — ACP (ADVANCE CARE PLANNING)
Advance Care Planning     Advance Care Planning Activator (Inpatient)  Conversation Note      Date of ACP Conversation: 11/11/2022     Conversation Conducted with: Pt: Cas Milner, his family present    ACP Activator: Oswaldo Somers RN    Health Care Decision Maker:     Current Designated Health Care Decision Maker:     Primary Decision Maker: Lesly Chris Child - [phone]      Care Preferences    Ventilation: \"If you were in your present state of health and suddenly became very ill and were unable to breathe on your own, what would your preference be about the use of a ventilator (breathing machine) if it were available to you? \"      Would the patient desire the use of ventilator (breathing machine)?:   YES      Resuscitation  \"CPR works best to restart the heart when there is a sudden event, like a heart attack, in someone who is otherwise healthy. Unfortunately, CPR does not typically restart the heart for people who have serious health conditions or who are very sick. \"    \"In the event your heart stopped as a result of an underlying serious health condition, would you want attempts to be made to restart your heart (answer \"yes\" for attempt to resuscitate) or would you prefer a natural death (answer \"no\" for do not attempt to resuscitate)? \"   YES        Conversation Outcomes:  [x] ACP discussion completed  [x] Existing advance directive reviewed with patient; no changes to patient's previously recorded wishes.     Living will on file

## 2022-11-11 NOTE — PROGRESS NOTES
Daily Progress Note    Date:2022  Patient: Scar Tapia  : 1940  KUS:[id number]  CODE:Full Code No additional code details  Savana Lao DO    Admit Date: 11/10/2022  5:01 PM   LOS: 1 day       Subjective:     No change in mental status. No headaches. No recent seizure-like activity. Only complaints are some ongoing dyspnea on exertion and some chest congestion, some difficulty clearing sputum. No recent fevers, chills or wheezing. Late afternoon  patient developed presyncope, likely orthostasis while in the bathroom, noted BP around 90/60. Given fluid bolus. He noted some difficulty raising his right arm above shoulder level, however no other appreciable focal deficits. CT head ordered stat. Summary: 79-year-old male with emphysema followed by pulmonology but not on any therapy, history of lung cancer, depression, hypertension, BPH presented due to ongoing dyspnea on exertion however no significant shortness of breath at rest, no wheezing. He was admitted as he was incidentally found to have serum sodium of 123. He was recently prescribed an SSRI however he has not started taking this. Over the past week he has had relatively poor p.o. intake, suspected low solute diet. Managed with fluid restriction. COPD treated with bronchodilators. Obtained echo to further evaluate his dyspnea on exertion.       Review of Systems  14 point review of systems completed and is negative except as otherwise noted      Objective:      Vital signs in last 24 hours:  Patient Vitals for the past 24 hrs:   BP Temp Temp src Pulse Resp SpO2   22 1030 -- -- -- 72 -- --   22 0722 135/78 97.9 °F (36.6 °C) Temporal 70 16 95 %   22 0507 (!) 98/57 98 °F (36.7 °C) -- 57 16 96 %   11/10/22 2004 (!) 166/95 97.3 °F (36.3 °C) -- 63 18 94 %   11/10/22 185 (!) 159/87 97.7 °F (36.5 °C) -- 60 18 93 %     Physical exam    General: No acute distress  Psych: Calm and cooperative, answers questions appropriately  HEENT: NC/AT, normal sclera  Cardiovascular: Normal rate, regular rhythm, S1-S2 present  Respiratory: Diminished breath sounds bilaterally, no wheezes rales or rhonchi  Abdomen: Nondistended, nontender  Neurologic: Alert, conversant, normal speech, nonfocal  Extremities: No clubbing, cyanosis or edema  Skin: Warm and dry, well perfused          Lab Review   Recent Results (from the past 24 hour(s))   Brain Natriuretic Peptide    Collection Time: 11/10/22  4:39 PM   Result Value Ref Range    Pro- 0 - 1,800 pg/mL   COVID-19, Rapid    Collection Time: 11/10/22  6:08 PM    Specimen: Nasopharyngeal Swab   Result Value Ref Range    SARS-CoV-2, NAAT Not Detected Not Detected   TSH with Reflex to FT4    Collection Time: 11/10/22  7:19 PM   Result Value Ref Range    TSH Reflex FT4 0.60 0.35 - 5.50 uIU/mL   OSMOLALITY, SERUM    Collection Time: 11/10/22  7:19 PM   Result Value Ref Range    Osmolality 254 (L) 275 - 300 mOsm/kg   Uric Acid    Collection Time: 11/10/22  7:19 PM   Result Value Ref Range    Uric Acid, Serum 4.3 3.4 - 7.0 mg/dL   Urinalysis with Reflex to Culture    Collection Time: 11/10/22 11:53 PM    Specimen: Urine   Result Value Ref Range    Color, UA YELLOW Straw/Yellow    Clarity, UA Clear Clear    Glucose, Ur Negative Negative mg/dL    Bilirubin Urine Negative Negative    Ketones, Urine 15 (A) Negative mg/dL    Specific Gravity, UA 1.012 1.005 - 1.030    Blood, Urine TRACE (A) Negative    pH, UA 6.5 5.0 - 8.0    Protein, UA Negative Negative mg/dL    Urobilinogen, Urine 1.0 <2.0 E.U./dL    Nitrite, Urine Negative Negative    Leukocyte Esterase, Urine Negative Negative   Microscopic Urinalysis    Collection Time: 11/10/22 11:53 PM   Result Value Ref Range    WBC, UA 0-1 0 - 5 /HPF    RBC, UA 6-10 (A) 0 - 2 /HPF   Basic Metabolic Panel w/ Reflex to MG    Collection Time: 11/11/22  4:03 AM   Result Value Ref Range    Sodium 125 (L) 136 - 145 mmol/L    Potassium reflex Magnesium 3.7 3.5 - 5.0 mmol/L    Chloride 88 (L) 98 - 111 mmol/L    CO2 24 22 - 29 mmol/L    Anion Gap 13 7 - 19 mmol/L    Glucose 111 (H) 74 - 109 mg/dL    BUN 17 8 - 23 mg/dL    Creatinine 0.8 0.5 - 1.2 mg/dL    Est, Glom Filt Rate >60 >60    Calcium 8.7 (L) 8.8 - 10.2 mg/dL       I/O last 3 completed shifts:  In: -   Out: 200 [Urine:200]  I/O this shift:  In: 1 [P.O.:1]  Out: -       Current Facility-Administered Medications:     fluticasone (FLONASE) 50 MCG/ACT nasal spray 2 spray, 2 spray, Each Nostril, Daily, Nilam Angeles MD, 2 spray at 11/11/22 1205    propranolol (INDERAL LA) extended release capsule 60 mg, 60 mg, Oral, Daily, Nilam Angeles MD, 60 mg at 11/11/22 1205    acetaminophen (TYLENOL) tablet 500 mg, 500 mg, Oral, Nightly PRN **AND** diphenhydrAMINE (BENADRYL) tablet 25 mg, 25 mg, Oral, Nightly PRN, Nilam Angeles MD    ipratropium-albuterol (DUONEB) nebulizer solution 1 ampule, 1 ampule, Inhalation, Q4H WA, Nilam Angeles MD, 1 ampule at 11/11/22 1421    guaiFENesin (MUCINEX) extended release tablet 600 mg, 600 mg, Oral, BID, Nilam Angeles MD    sodium chloride flush 0.9 % injection 5-40 mL, 5-40 mL, IntraVENous, 2 times per day, KELLIE Hunter - CNP, 10 mL at 11/11/22 1020    sodium chloride flush 0.9 % injection 5-40 mL, 5-40 mL, IntraVENous, PRN, KELLIE Hunter - CNP    0.9 % sodium chloride infusion, , IntraVENous, PRN, Rosa Crespo APRN - CNP    enoxaparin (LOVENOX) injection 40 mg, 40 mg, SubCUTAneous, Daily, KELLIE Hunter - CNP, 40 mg at 11/11/22 1020    ondansetron (ZOFRAN-ODT) disintegrating tablet 4 mg, 4 mg, Oral, Q8H PRN **OR** ondansetron (ZOFRAN) injection 4 mg, 4 mg, IntraVENous, Q6H PRN, Rosa Crespo, KELLIE - CNP    polyethylene glycol (GLYCOLAX) packet 17 g, 17 g, Oral, Daily PRN, Rosa Crespo, KELLIE - CNP    acetaminophen (TYLENOL) tablet 650 mg, 650 mg, Oral, Q6H PRN **OR** acetaminophen (TYLENOL) suppository 650 mg, 650 mg, Rectal, Q6H PRN, Rosa Crespo, KELLIE - CNP    tamsulosin (FLOMAX) capsule 0.4 mg, 0.4 mg, Oral, Daily, Marshal [place], APRN - CNP, 0.4 mg at 11/11/22 1019    docusate sodium (COLACE) capsule 100 mg, 100 mg, Oral, Daily, Marshal [place], APRN - CNP, 100 mg at 11/11/22 1019    atorvastatin (LIPITOR) tablet 10 mg, 10 mg, Oral, Daily, Marshal [place], APRN - CNP, 10 mg at 11/11/22 1019    amLODIPine (NORVASC) tablet 10 mg, 10 mg, Oral, Daily, 10 mg at 11/11/22 1019 **AND** [DISCONTINUED] lisinopril (PRINIVIL;ZESTRIL) tablet 20 mg, 20 mg, Oral, Daily, Keval [place], APRN - CNP, 20 mg at 11/10/22 2132        Assessment/plan  Principal Problem:    Hyponatremia  Resolved Problems:    * No resolved hospital problems.  *      Hyponatremia, likely related to poor intake, solute diet  - Recently prescribed Lexapro however has not started taking  --Avoid offending agents  --Follow-up serum/urine osmolality and urine sodium  - Thyroid function appropriate  - Monitor mental status  - Follow labs  - Continue fluid/water restriction  - Give 500 cc normal saline given component of hypovolemia  - Give dose of Urea  --Follow labs  - Avoid overcorrection    Uncontrolled COPD, dyspnea on exertion, not on any inhaler therapy but has been followed outpatient by pulmonology (Dr. Lane Corona)  --Nebulized bronchodilators  --Symptomatic and supportive care  -- Prescribe albuterol at discharge    Obtain echocardiogram as he has not had full work-up for progressive dyspnea on exertion      Presyncopal episode, suspect orthostasis  - Fluid bolus  - Monitor blood pressures    Follow-up CT head  Hold antihypertensives  Continue neurochecks  Neurology consult    Continue appropriate home meds for other chronic issues      Disposition: MARGRET Lopez MD 11/11/2022 4:36 PM

## 2022-11-11 NOTE — PROGRESS NOTES
4 Eyes Skin Assessment    Eunice Barron is being assessed upon: Admission    I agree that Brenton Irizarry RN, along with Kaelyn Bill LPN (either 2 RN's or 1 LPN and 1 RN) have performed a thorough Head to Toe Skin Assessment on the patient. ALL assessment sites listed below have been assessed. Areas assessed by both nurses:     [x]   Head, Face, and Ears   [x]   Shoulders, Back, and Chest  [x]   Arms, Elbows, and Hands   [x]   Coccyx, Sacrum, and Ischium  [x]   Legs, Feet, and Heels    Does the Patient have Skin Breakdown? No    Bello Prevention initiated: No  Wound Care Orders initiated: No    WOC nurse consulted for Pressure Injury (Stage 3,4, Unstageable, DTI, NWPT, and Complex wounds) and New or Established Ostomies: No        Primary Nurse eSignature:  May Buerger, RN on 11/11/2022 at 12:51 AM      Co-Signer eSignature: Electronically signed by Breanna Rose LPN on 16/10/56 at 05:53 AM RASHAD fowler

## 2022-11-11 NOTE — H&P
[facility] - History & Physical      PCP: Aleja Castro DO    Date of Admission: 11/10/2022    Date of Service: 11/10/2022    Chief Complaint:  Shortness of breath    History Of Present Illness: The patient is a 79 y.o. male who presented to [facility] ED complaining of shortness of breath. Pt has history of lung cancer followed by Dr. Mavis Rocha, depression, copd, HTN and hyperlipidemia. Pt reports problems with shortness of breath over past 2 weeks. He reports frequent throat clearing with sensation of \"something in my throat\" and post nasal drainage. He denies fevers, chest pain as well as new or worsening problems with lower extremity edema. He takes lasix at home for dependent edema. Pt has had worsening problems with depression and anxiety having started lexapro yesterday. He denies suicidal thoughts. He is here with his daughter who tells me that it is approaching the anniversary of his wife's death and that it is not uncommon for him to experience depression however it has more recently worsened. He reports weight loss of 8lbs over past couple of weeks decreased appetite for food with increased po intake of fluids. In ED, sodium 123, potassium 4.1, creatinine 0.8/bun 10, glucose 128, LFTs normal, troponin less than 0.01, wbc 8k, hgb 15, platelets 653S, ddimer negative, cxr-No focal pneumonic consolidation, no pleural effusion or elliott CHF. Pt is admitted to hospitalist service for further evaluation and treatment.      Past Medical History:        Diagnosis Date    Arthritis     Cancer ([facility] [address]     HX LUNG CA    Emphysema/COPD ([facility] [address]     MILD/FORMER SMOKER    Hyperlipidemia     Hypertension     Macular degeneration     Retention of urine        Past Surgical History:        Procedure Laterality Date    EYE SURGERY      MUSCLE WEAKNESS    GASTRECTOMY      PARTIAL FOR ULCER REPAIR    LUNG CANCER SURGERY Left     LLL LOBECTOMY    OTHER SURGICAL HISTORY      I & D OF GROIN INFECTION TOTAL KNEE ARTHROPLASTY Right 5/18/2017    KNEE TOTAL ARTHROPLASTY performed by Monae Boyd MD at [address] [place] [address] Medications:  Prior to Admission medications    Medication Sig Start Date End Date Taking? Authorizing Provider   oxyCODONE-acetaminophen (PERCOCET) 5-325 MG per tablet One or two every 4 to 6 hours prn. 5/22/17   Monae Boyd MD   aspirin EC 81 MG EC tablet Take 1 tablet by mouth 2 times daily 5/22/17   Monae Boyd MD   celecoxib (CELEBREX) 200 MG capsule Take 1 capsule by mouth daily for 14 days 5/22/17 6/5/17  Monae Boyd MD   docusate sodium (COLACE) 100 MG capsule Take 1 capsule by mouth daily To prevent constipation 5/22/17   Monae Boyd MD   furosemide (LASIX) 20 MG tablet Take 20 mg by mouth daily Indications: FLUID RETENTION    Historical Provider, MD   amLODIPine-benazepril (LOTREL) 10-20 MG per capsule Take 1 capsule by mouth daily Indications: HTN    Historical Provider, MD   lovastatin (MEVACOR) 40 MG tablet Take 40 mg by mouth daily Indications: CHOLESTEROL    Historical Provider, MD   alfuzosin (UROXATRAL) 10 MG extended release tablet Take 10 mg by mouth daily Indications: BLADDER    Historical Provider, MD   diphenhydrAMINE-APAP, sleep, (TYLENOL PM EXTRA STRENGTH)  MG tablet Take 1 tablet by mouth nightly as needed for Sleep    Historical Provider, MD   Multiple Vitamins-Minerals (PRESERVISION AREDS 2+MULTI VIT) CAPS Take 2 capsules by mouth daily Indications: SUPPLEMENT    Historical Provider, MD       Allergies:    Penicillins    Social History:    The patient currently lives at home  Tobacco:   reports that he quit smoking about 38 years ago. His smoking use included cigarettes. He has never used smokeless tobacco.  Alcohol:   reports no history of alcohol use.   Illicit Drugs: denies    Family History:      Problem Relation Age of Onset    Diabetes Mother     Stroke Mother     Heart Attack Father     Cancer Father         ASBESTOS       Review of Systems:   Review of Systems   Constitutional:  Positive for appetite change and unexpected weight change. Respiratory:  Positive for shortness of breath. Cardiovascular:  Negative for chest pain. Gastrointestinal:  Negative for abdominal pain and vomiting. Psychiatric/Behavioral:  Positive for dysphoric mood. Negative for suicidal ideas. All other systems reviewed and are negative. 14 point review of systems is negative except as specifically addressed above. Physical Examination:  BP (!) 158/90   Pulse 69   Temp 97.7 °F (36.5 °C) (Temporal)   Resp 18   Ht 6' (1.829 m)   Wt 222 lb (100.7 kg)   SpO2 96%   BMI 30.11 kg/m²   Physical Exam  Vitals reviewed. Constitutional:       General: He is not in acute distress. HENT:      Right Ear: External ear normal.      Left Ear: External ear normal.      Mouth/Throat:      Pharynx: Oropharynx is clear. Eyes:      Conjunctiva/sclera: Conjunctivae normal.   Cardiovascular:      Rate and Rhythm: Normal rate and regular rhythm. Pulmonary:      Effort: Pulmonary effort is normal.      Comments: Decreased breath sounds throughout   Abdominal:      Tenderness: There is no abdominal tenderness. Musculoskeletal:      Cervical back: Neck supple. Right lower leg: Edema present. Left lower leg: Edema present. Comments: Traced edema bilateral lower extremities   Neurological:      Mental Status: He is alert and oriented to person, place, and time.         Diagnostic Data:  CBC:  Recent Labs     11/10/22  1629   WBC 7.8   HGB 14.6   HCT 42.1        BMP:  Recent Labs     11/10/22  1629   *   K 4.1   CL 86*   CO2 28   BUN 10   CREATININE 0.8   CALCIUM 9.3     Recent Labs     11/10/22  1629   AST 25   ALT 18   BILITOT 1.0   ALKPHOS 83     Cardiac Enzymes:   Recent Labs     11/10/22  1629   TROPONINI <0.01     Assessment/Plan:  Principal Problem:    Hyponatremia  Resolved Problems:    * No resolved hospital problems. *     Principal Problem:    Hyponatremia/History of lung malignancy/COPD  -neuro checks q4hrs  -1500ml/24hrs water restriction  -hold lasix  -hold lexapro  -urea-na 15g po x1dose  -hypertonic saline if symptomatic  -serum osm/urine osm/urine sodium  -uric acid  -trial normal saline, dc if deemed to be siadh  -ns at 100cc/hr x5hrs  -pro-bnp  -tsh w/reflex FT4  -follow na closely  -avoid rapid overcorrection  -I's and O's  -daily weight  -spot check spo2 prn  Resolved Problems:    * No resolved hospital problems.  *  Signed:  KELLIE Galvez - CNP, 11/10/2022 6:26 PM

## 2022-11-12 LAB
ANION GAP SERPL CALCULATED.3IONS-SCNC: 13 MMOL/L (ref 7–19)
BACTERIA: NEGATIVE /HPF
BILIRUBIN URINE: NEGATIVE
BLOOD, URINE: ABNORMAL
BUN BLDV-MCNC: 18 MG/DL (ref 8–23)
CALCIUM SERPL-MCNC: 8.5 MG/DL (ref 8.8–10.2)
CHLORIDE BLD-SCNC: 89 MMOL/L (ref 98–111)
CLARITY: CLEAR
CO2: 23 MMOL/L (ref 22–29)
COLOR: YELLOW
CREAT SERPL-MCNC: 0.8 MG/DL (ref 0.5–1.2)
CREATININE URINE: 164.5 MG/DL (ref 4.2–622)
CRYSTALS, UA: ABNORMAL /HPF
EPITHELIAL CELLS, UA: 1 /HPF (ref 0–5)
GFR SERPL CREATININE-BSD FRML MDRD: >60 ML/MIN/{1.73_M2}
GLUCOSE BLD-MCNC: 117 MG/DL (ref 74–109)
GLUCOSE URINE: NEGATIVE MG/DL
HYALINE CASTS: 3 /HPF (ref 0–8)
KETONES, URINE: ABNORMAL MG/DL
LEUKOCYTE ESTERASE, URINE: ABNORMAL
NITRITE, URINE: NEGATIVE
OSMOLALITY URINE: 579 MOSM/KG (ref 250–1200)
OSMOLALITY: 257 MOSM/KG (ref 275–300)
PH UA: 6 (ref 5–8)
POTASSIUM REFLEX MAGNESIUM: 3.6 MMOL/L (ref 3.5–5)
PROTEIN UA: ABNORMAL MG/DL
RBC UA: 8 /HPF (ref 0–4)
SODIUM BLD-SCNC: 123 MMOL/L (ref 136–145)
SODIUM BLD-SCNC: 125 MMOL/L (ref 136–145)
SODIUM URINE: <20 MMOL/L
SPECIFIC GRAVITY UA: 1.02 (ref 1–1.03)
URIC ACID, SERUM: 5.1 MG/DL (ref 3.4–7)
UROBILINOGEN, URINE: 1 E.U./DL
WBC UA: 2 /HPF (ref 0–5)

## 2022-11-12 PROCEDURE — 82570 ASSAY OF URINE CREATININE: CPT

## 2022-11-12 PROCEDURE — 84550 ASSAY OF BLOOD/URIC ACID: CPT

## 2022-11-12 PROCEDURE — 83519 RIA NONANTIBODY: CPT

## 2022-11-12 PROCEDURE — 84300 ASSAY OF URINE SODIUM: CPT

## 2022-11-12 PROCEDURE — 94760 N-INVAS EAR/PLS OXIMETRY 1: CPT

## 2022-11-12 PROCEDURE — 99223 1ST HOSP IP/OBS HIGH 75: CPT | Performed by: PSYCHIATRY & NEUROLOGY

## 2022-11-12 PROCEDURE — 94640 AIRWAY INHALATION TREATMENT: CPT

## 2022-11-12 PROCEDURE — 6370000000 HC RX 637 (ALT 250 FOR IP): Performed by: NURSE PRACTITIONER

## 2022-11-12 PROCEDURE — 83935 ASSAY OF URINE OSMOLALITY: CPT

## 2022-11-12 PROCEDURE — 36415 COLL VENOUS BLD VENIPUNCTURE: CPT

## 2022-11-12 PROCEDURE — 6370000000 HC RX 637 (ALT 250 FOR IP): Performed by: STUDENT IN AN ORGANIZED HEALTH CARE EDUCATION/TRAINING PROGRAM

## 2022-11-12 PROCEDURE — 81001 URINALYSIS AUTO W/SCOPE: CPT

## 2022-11-12 PROCEDURE — 2580000003 HC RX 258: Performed by: NURSE PRACTITIONER

## 2022-11-12 PROCEDURE — 6370000000 HC RX 637 (ALT 250 FOR IP): Performed by: PSYCHIATRY & NEUROLOGY

## 2022-11-12 PROCEDURE — 83930 ASSAY OF BLOOD OSMOLALITY: CPT

## 2022-11-12 PROCEDURE — 6370000000 HC RX 637 (ALT 250 FOR IP): Performed by: INTERNAL MEDICINE

## 2022-11-12 PROCEDURE — 83516 IMMUNOASSAY NONANTIBODY: CPT

## 2022-11-12 PROCEDURE — 84295 ASSAY OF SERUM SODIUM: CPT

## 2022-11-12 PROCEDURE — 1210000000 HC MED SURG R&B

## 2022-11-12 PROCEDURE — 80048 BASIC METABOLIC PNL TOTAL CA: CPT

## 2022-11-12 PROCEDURE — 2580000003 HC RX 258: Performed by: INTERNAL MEDICINE

## 2022-11-12 RX ORDER — ASPIRIN 81 MG/1
81 TABLET, CHEWABLE ORAL DAILY
Status: DISCONTINUED | OUTPATIENT
Start: 2022-11-12 | End: 2022-11-15 | Stop reason: HOSPADM

## 2022-11-12 RX ORDER — SODIUM CHLORIDE 9 MG/ML
INJECTION, SOLUTION INTRAVENOUS CONTINUOUS
Status: DISCONTINUED | OUTPATIENT
Start: 2022-11-12 | End: 2022-11-14

## 2022-11-12 RX ORDER — PYRIDOSTIGMINE BROMIDE 60 MG/1
30 TABLET ORAL EVERY 8 HOURS SCHEDULED
Status: DISCONTINUED | OUTPATIENT
Start: 2022-11-12 | End: 2022-11-14

## 2022-11-12 RX ORDER — FUROSEMIDE 20 MG/1
10 TABLET ORAL DAILY
Status: DISCONTINUED | OUTPATIENT
Start: 2022-11-12 | End: 2022-11-13

## 2022-11-12 RX ADMIN — IPRATROPIUM BROMIDE AND ALBUTEROL SULFATE 1 AMPULE: 2.5; .5 SOLUTION RESPIRATORY (INHALATION) at 07:25

## 2022-11-12 RX ADMIN — IPRATROPIUM BROMIDE AND ALBUTEROL SULFATE 1 AMPULE: 2.5; .5 SOLUTION RESPIRATORY (INHALATION) at 15:15

## 2022-11-12 RX ADMIN — DOCUSATE SODIUM 100 MG: 100 CAPSULE, LIQUID FILLED ORAL at 09:12

## 2022-11-12 RX ADMIN — ASPIRIN 81 MG 81 MG: 81 TABLET ORAL at 11:14

## 2022-11-12 RX ADMIN — SODIUM CHLORIDE, PRESERVATIVE FREE 10 ML: 5 INJECTION INTRAVENOUS at 09:12

## 2022-11-12 RX ADMIN — PYRIDOSTIGMINE BROMIDE 30 MG: 60 TABLET ORAL at 20:02

## 2022-11-12 RX ADMIN — GUAIFENESIN 600 MG: 600 TABLET ORAL at 20:02

## 2022-11-12 RX ADMIN — PYRIDOSTIGMINE BROMIDE 30 MG: 60 TABLET ORAL at 11:14

## 2022-11-12 RX ADMIN — GUAIFENESIN 600 MG: 600 TABLET ORAL at 09:12

## 2022-11-12 RX ADMIN — SODIUM CHLORIDE: 9 INJECTION, SOLUTION INTRAVENOUS at 16:19

## 2022-11-12 RX ADMIN — ATORVASTATIN CALCIUM 10 MG: 10 TABLET, FILM COATED ORAL at 09:12

## 2022-11-12 RX ADMIN — FUROSEMIDE 10 MG: 20 TABLET ORAL at 12:39

## 2022-11-12 RX ADMIN — IPRATROPIUM BROMIDE AND ALBUTEROL SULFATE 1 AMPULE: 2.5; .5 SOLUTION RESPIRATORY (INHALATION) at 11:08

## 2022-11-12 RX ADMIN — FLUTICASONE PROPIONATE 2 SPRAY: 50 SPRAY, METERED NASAL at 09:12

## 2022-11-12 NOTE — PROGRESS NOTES
Daily Progress Note    Date:2022  Patient: Rose Yanez  : 1940  PHE:[id number]  CODE:Full Code No additional code details  Tanisha Jimenez DO    Admit Date: 11/10/2022  5:01 PM   LOS: 2 days       Subjective:     no change in mental status. No headaches. No recent seizure-like activity. Only complaints are some ongoing dyspnea on exertion and some chest congestion, some difficulty clearing sputum. No recent fevers, chills or wheezing. Late afternoon  patient developed presyncope, likely orthostasis while in the bathroom, noted BP around 90/60. Given fluid bolus. He noted some difficulty raising his right arm above shoulder level, however no other appreciable focal deficits. CT head ordered stat.  seen and evaluated at bedside the patient is not having lightheadedness, but still feeling weak, having ptosis in the left eye, also complaining of difficulty in swallowing and sometimes choking with eating, weakness in the right arm, discussed about possibility of myasthenia gravis for which neurologist is already working, also discussed about low sodium level, nursing staff is presently collecting the urine, discussed about following the urine studies and looking into whole picture we decide on IV fluid and further fluid restriction presently no free fluid. Nephrology has been consulted      Summary: 79-year-old male with emphysema followed by pulmonology but not on any therapy, history of lung cancer, depression, hypertension, BPH presented due to ongoing dyspnea on exertion however no significant shortness of breath at rest, no wheezing. He was admitted as he was incidentally found to have serum sodium of 123. He was recently prescribed an SSRI however he has not started taking this. Over the past week he has had relatively poor p.o. intake, suspected low solute diet. Managed with fluid restriction. COPD treated with bronchodilators.   Obtained echo to further evaluate his dyspnea on exertion.       Review of Systems  14 point review of systems completed and is negative except as otherwise noted      Objective:      Vital signs in last 24 hours:  Patient Vitals for the past 24 hrs:   BP Temp Temp src Pulse Resp SpO2   11/12/22 1525 101/61 97.4 °F (36.3 °C) Temporal 71 18 94 %   11/12/22 0728 -- -- -- -- -- 95 %   11/12/22 0722 130/79 97.7 °F (36.5 °C) Temporal 67 18 93 %   11/12/22 0522 (!) 151/70 97.5 °F (36.4 °C) -- 66 18 94 %   11/11/22 2336 -- -- -- -- 18 --   11/11/22 2236 -- -- -- 65 -- --   11/11/22 2058 (!) 94/38 97 °F (36.1 °C) -- 55 18 92 %   11/11/22 1745 (!) 90/50 -- -- 62 16 94 %       Physical exam    General: No acute distress  Psych: Calm and cooperative, answers questions appropriately  HEENT: NC/AT, normal sclera, drooping of the left eyelid  Cardiovascular: Normal rate, regular rhythm, S1-S2 present  Respiratory: Diminished breath sounds bilaterally, no wheezes rales or rhonchi  Abdomen: Nondistended, nontender  Neurologic: Alert, conversant, normal speech, nonfocal  Extremities: No clubbing, cyanosis or edema  Skin: Warm and dry, well perfused          Lab Review   Recent Results (from the past 24 hour(s))   POCT Glucose    Collection Time: 11/11/22  5:50 PM   Result Value Ref Range    POC Glucose 120 (H) 70 - 99 mg/dl    Performed on AccuChek    Sodium    Collection Time: 11/11/22  6:02 PM   Result Value Ref Range    Sodium 124 (L) 136 - 145 mmol/L   Basic Metabolic Panel w/ Reflex to MG    Collection Time: 11/12/22  1:51 AM   Result Value Ref Range    Sodium 125 (L) 136 - 145 mmol/L    Potassium reflex Magnesium 3.6 3.5 - 5.0 mmol/L    Chloride 89 (L) 98 - 111 mmol/L    CO2 23 22 - 29 mmol/L    Anion Gap 13 7 - 19 mmol/L    Glucose 117 (H) 74 - 109 mg/dL    BUN 18 8 - 23 mg/dL    Creatinine 0.8 0.5 - 1.2 mg/dL    Est, Glom Filt Rate >60 >60    Calcium 8.5 (L) 8.8 - 10.2 mg/dL   Osmolality, Serum    Collection Time: 11/12/22  1:51 AM   Result Value Ref Range Osmolality 257 (L) 275 - 300 mOsm/kg   Uric Acid    Collection Time: 11/12/22  1:51 AM   Result Value Ref Range    Uric Acid, Serum 5.1 3.4 - 7.0 mg/dL   Sodium, urine, random    Collection Time: 11/12/22 12:00 PM   Result Value Ref Range    Sodium, Ur <20.0 mmol/L   Urinalysis    Collection Time: 11/12/22 12:00 PM   Result Value Ref Range    Color, UA YELLOW Straw/Yellow    Clarity, UA Clear Clear    Glucose, Ur Negative Negative mg/dL    Bilirubin Urine Negative Negative    Ketones, Urine TRACE (A) Negative mg/dL    Specific Gravity, UA 1.019 1.005 - 1.030    Blood, Urine TRACE (A) Negative    pH, UA 6.0 5.0 - 8.0    Protein, UA TRACE (A) Negative mg/dL    Urobilinogen, Urine 1.0 <2.0 E.U./dL    Nitrite, Urine Negative Negative    Leukocyte Esterase, Urine TRACE (A) Negative   Osmolality, Urine    Collection Time: 11/12/22 12:00 PM   Result Value Ref Range    Osmolality, Ur 579 250 - 1200 mOsm/kg   Creatinine, Random Urine    Collection Time: 11/12/22 12:00 PM   Result Value Ref Range    Creatinine, Ur 164.5 4.2 - 622.0 mg/dL   Microscopic Urinalysis    Collection Time: 11/12/22 12:00 PM   Result Value Ref Range    Bacteria, UA NEGATIVE (A) None Seen /HPF    Crystals, UA NEG (A) None Seen /HPF    Hyaline Casts, UA 3 0 - 8 /HPF    WBC, UA 2 0 - 5 /HPF    RBC, UA 8 (H) 0 - 4 /HPF    Epithelial Cells, UA 1 0 - 5 /HPF       I/O last 3 completed shifts:   In: 301 [P.O.:301]  Out: 200 [Urine:200]  I/O this shift:  In: 240 [P.O.:240]  Out: -       Current Facility-Administered Medications:     pyridostigmine (MESTINON) tablet 30 mg, 30 mg, Oral, 3 times per day, Som Ledbetter MD, 30 mg at 11/12/22 1114    aspirin chewable tablet 81 mg, 81 mg, Oral, Daily, Som Ledbetter MD, 81 mg at 11/12/22 1114    furosemide (LASIX) tablet 10 mg, 10 mg, Oral, Daily, Belen Sorenson MD, 10 mg at 11/12/22 1239    0.9 % sodium chloride infusion, , IntraVENous, Continuous, Gary Carbajal MD    fluticasone (FLONASE) 50 MCG/ACT nasal spray 2 spray, 2 spray, Each Nostril, Daily, Fanny Mann MD, 2 spray at 11/12/22 0912    [Held by provider] propranolol (INDERAL LA) extended release capsule 60 mg, 60 mg, Oral, Daily, Fanny Mann MD, 60 mg at 11/11/22 1205    acetaminophen (TYLENOL) tablet 500 mg, 500 mg, Oral, Nightly PRN **AND** diphenhydrAMINE (BENADRYL) tablet 25 mg, 25 mg, Oral, Nightly PRN, Fanny Mann MD    ipratropium-albuterol (DUONEB) nebulizer solution 1 ampule, 1 ampule, Inhalation, Q4H WA, Fanny Mann MD, 1 ampule at 11/12/22 1515    guaiFENesin [facility]) extended release tablet 600 mg, 600 mg, Oral, BID, Fanny Mann MD, 600 mg at 11/12/22 0912    sodium chloride flush 0.9 % injection 5-40 mL, 5-40 mL, IntraVENous, 2 times per day, Sridhar Males, APRN - CNP, 10 mL at 11/12/22 0912    sodium chloride flush 0.9 % injection 5-40 mL, 5-40 mL, IntraVENous, PRN, Sridhar Males, APRN - CNP    0.9 % sodium chloride infusion, , IntraVENous, PRN, Sridhar Males, APRN - CNP    [Held by provider] enoxaparin (LOVENOX) injection 40 mg, 40 mg, SubCUTAneous, Daily, Sridhar Males, APRN - CNP, 40 mg at 11/11/22 1020    ondansetron (ZOFRAN-ODT) disintegrating tablet 4 mg, 4 mg, Oral, Q8H PRN **OR** ondansetron (ZOFRAN) injection 4 mg, 4 mg, IntraVENous, Q6H PRN, Sridhar Males, APRN - CNP    polyethylene glycol (GLYCOLAX) packet 17 g, 17 g, Oral, Daily PRN, Sridhar Males, APRN - CNP    acetaminophen (TYLENOL) tablet 650 mg, 650 mg, Oral, Q6H PRN **OR** acetaminophen (TYLENOL) suppository 650 mg, 650 mg, Rectal, Q6H PRN, Sridhar Males, APRN - CNP    [Held by provider] tamsulosin (FLOMAX) capsule 0.4 mg, 0.4 mg, Oral, Daily, Sridhar Males, APRN - CNP, 0.4 mg at 11/11/22 1019    docusate sodium (COLACE) capsule 100 mg, 100 mg, Oral, Daily, Sridhar Males, APRN - CNP, 100 mg at 11/12/22 0912    atorvastatin (LIPITOR) tablet 10 mg, 10 mg, Oral, Daily, Sridhar Males, APRN - CNP, 10 mg at 11/12/22 0912    amLODIPine (NORVASC) tablet 10 mg, 10 mg, Oral, Daily, 10 mg at 11/11/22 1019 **AND** [DISCONTINUED] lisinopril (PRINIVIL;ZESTRIL) tablet 20 mg, 20 mg, Oral, Daily, Reji Hilliard APRWADE - CNP, 20 mg at 11/10/22 2132        Assessment/plan  Principal Problem:    Hyponatremia  Resolved Problems:    * No resolved hospital problems.  *      Hyponatremia, likely related to poor intake, solute diet  - Recently prescribed Lexapro however has not started taking  --Avoid offending agents  --Follow-up serum/urine osmolality and urine sodium  - Thyroid function appropriate  - Monitor mental status  - Follow labs  - Continue fluid/water restriction  - Give 500 cc normal saline given component of hypovolemia  - Give dose of Urea> could not able to tolerate urea  --Follow labs> TSH 0.6 within normal limit  - Avoid overcorrection  11/12 reviewed BMP, BUN 10-18, uric acid 5.1, urinary specific gravity 1.01, serum osmolality 257, the patient is having poor oral intake, reported to be orthostatic and fluid has been given so suspected of intravascular dehydration which is supported by the fact urine sodium less than 20 and urine osmolality 597 which is appropriate SIADH secretion conserving water from the kidney, will start on normal saline 100 cc/h for 1 L only and continue monitoring sodium every 12 hourly nephrology has been consulted for further evaluation    Uncontrolled COPD, dyspnea on exertion, not on any inhaler therapy but has been followed outpatient by pulmonology (Dr. Kailyn Richardson)  --Nebulized bronchodilators  --Symptomatic and supportive care  -- Prescribe albuterol at discharge    Obtain echocardiogram as he has not had full work-up for progressive dyspnea on exertion> normal left ventricular ejection fraction with diastolic function    Hypertension   11/12 started on amlodipine 10 mg at nighttime    Presyncopal episode, suspect orthostasis  - Fluid bolus  - Monitor blood pressures    Follow-up CT head  Hold antihypertensives  Continue neurochecks  Neurology consult    Continue appropriate home meds for other chronic issues      Disposition: MARGRET Griffith MD 11/12/2022 3:37 PM

## 2022-11-12 NOTE — CONSULTS
[facility] Neurology Consult  ? ? Patient: Annie Godwin  MR#: [id number]  Account Number: [de-identified]   Room: [facility]/897-   YOB: 1940  Date of Progress Note: 11/12/2022  Time of Note 10:28 AM  Attending Physician: Mike Barrios MD  Consulting Physician: Salvador Smith M.D.  ?  ? CHIEF COMPLAINT: Right arm weakness  ? HISTORY OF PRESENT ILLNESS:   This 79 y.o. male who presents with shortness of breath. Has a history of lung cancer and COPD. Was admitted for difficulty breathing and noted some weakness in his right deltoid 11/11. He still has it. He had surgery to correct double vision about 10 years ago and since that time he has developed it again and also has a couple of months of left eyelid ptosis. Also has trouble with jaw fatigue with chewing as well as occasional trouble swallowing. He is claustrophobic and does not want to have an MRI. He had a CT of the head yesterday showing some small vessel ischemic change. I am asked to see him for right arm weakness to see if this could be a stroke. Also found to be hyponatremic with a sodium of 123 in the ED. Chest x-ray relatively unremarkable. REVIEW OF SYSTEMS:  Constitutional - No fever or chills. No diaphoresis or significant fatigue. HENT - No tinnitus or significant hearing loss. Eyes - no sudden vision change or eye pain  Respiratory - no significant shortness of breath or cough  Cardiovascular - no chest pain No palpitations or significant leg swelling  Gastrointestinal - no abdominal swelling or pain. Genitourinary - No difficulty urinating, dysuria  Musculoskeletal - no back pain or myalgia. Skin - no color change or rash  Neurologic - No seizures. No lateralizing weakness. Hematologic - no easy bruising or excessive bleeding. Psychiatric - no severe anxiety or nervousness. All other review of systems are negative. ?   Past Medical History:      Diagnosis Date    Arthritis     Cancer ([facility] [address]     HX LUNG CA    Emphysema/COPD ([facility] [address] MILD/FORMER SMOKER    Hyperlipidemia     Hypertension     Macular degeneration     Palliative care patient 11/11/2022    Retention of urine      Past Surgical History:      Procedure Laterality Date    EYE SURGERY      MUSCLE WEAKNESS    GASTRECTOMY      PARTIAL FOR ULCER REPAIR    LUNG CANCER SURGERY Left     LLL LOBECTOMY    OTHER SURGICAL HISTORY      I & D OF GROIN INFECTION    TOTAL KNEE ARTHROPLASTY Right 5/18/2017    KNEE TOTAL ARTHROPLASTY performed by Prieto Brewster MD at [address] [place] [address]     Medications in Hospital:     Current Facility-Administered Medications:     pyridostigmine (MESTINON) tablet 30 mg, 30 mg, Oral, 3 times per day, Bernardo Marlow MD    aspirin chewable tablet 81 mg, 81 mg, Oral, Daily, Bernardo Marlow MD    fluticasone (FLONASE) 50 MCG/ACT nasal spray 2 spray, 2 spray, Each Nostril, Daily, Eufemia Rodas MD, 2 spray at 11/12/22 0912    [Held by provider] propranolol (INDERAL LA) extended release capsule 60 mg, 60 mg, Oral, Daily, Eufemia Rodas MD, 60 mg at 11/11/22 1205    acetaminophen (TYLENOL) tablet 500 mg, 500 mg, Oral, Nightly PRN **AND** diphenhydrAMINE (BENADRYL) tablet 25 mg, 25 mg, Oral, Nightly PRN, Eufemia Rodas MD    ipratropium-albuterol (DUONEB) nebulizer solution 1 ampule, 1 ampule, Inhalation, Q4H Jesus Ha MD, 1 ampule at 11/12/22 0725    guaiFENesin [facility]) extended release tablet 600 mg, 600 mg, Oral, BID, Eufemia Rodas MD, 600 mg at 11/12/22 0912    sodium chloride flush 0.9 % injection 5-40 mL, 5-40 mL, IntraVENous, 2 times per day, KELLIE Bueno CNP, 10 mL at 11/12/22 0912    sodium chloride flush 0.9 % injection 5-40 mL, 5-40 mL, IntraVENous, PRN, KELLIE Bueno CNP    0.9 % sodium chloride infusion, , IntraVENous, PRN, KELLIE Bueno CNP    [Held by provider] enoxaparin (LOVENOX) injection 40 mg, 40 mg, SubCUTAneous, Daily, KELLIE Bueno CNP, 40 mg at 11/11/22 1020    ondansetron (ZOFRAN-ODT) disintegrating tablet 4 mg, 4 mg, Oral, Q8H PRN **OR** ondansetron (ZOFRAN) injection 4 mg, 4 mg, IntraVENous, Q6H PRN, Demarco Prophet, APRN - CNP    polyethylene glycol (GLYCOLAX) packet 17 g, 17 g, Oral, Daily PRN, Demarco Prophet, APRN - CNP    acetaminophen (TYLENOL) tablet 650 mg, 650 mg, Oral, Q6H PRN **OR** acetaminophen (TYLENOL) suppository 650 mg, 650 mg, Rectal, Q6H PRN, Demarco Prophet, APRN - CNP    [Held by provider] tamsulosin (FLOMAX) capsule 0.4 mg, 0.4 mg, Oral, Daily, Demarco Prophet, APRN - CNP, 0.4 mg at 11/11/22 1019    docusate sodium (COLACE) capsule 100 mg, 100 mg, Oral, Daily, Demarco Prophet, APRN - CNP, 100 mg at 11/12/22 0912    atorvastatin (LIPITOR) tablet 10 mg, 10 mg, Oral, Daily, Demarco Prophet, APRN - CNP, 10 mg at 11/12/22 0912    amLODIPine (NORVASC) tablet 10 mg, 10 mg, Oral, Daily, 10 mg at 11/11/22 1019 **AND** [DISCONTINUED] lisinopril (PRINIVIL;ZESTRIL) tablet 20 mg, 20 mg, Oral, Daily, Demarco Prophet, APRN - CNP, 20 mg at 11/10/22 2132  Allergies: Penicillins  Social History:   TOBACCO:  reports that he quit smoking about 38 years ago. His smoking use included cigarettes. He has never used smokeless tobacco.  ETOH:  reports no history of alcohol use. Family History:       Problem Relation Age of Onset    Diabetes Mother     Stroke Mother     Heart Attack Father     Cancer Father         ASBESTOS     ? ? Physical Exam:    Vitals: /79   Pulse 67   Temp 97.7 °F (36.5 °C) (Temporal)   Resp 18   Ht 6' (1.829 m)   Wt 222 lb (100.7 kg)   SpO2 95%   BMI 30.11 kg/m²   Constitutional - well developed, well nourished.    Eyes - conjunctiva normal. Pupils react to light  Ear, nose, throat -hearing intact to finger rub No scars, masses, or lesions over external nose or ears, no atrophy of tongue  Neck-symmetric, no masses noted, no jugular vein distension  Respiration- chest wall appears symmetric, good expansion,   normal effort without use of accessory muscles  Musculoskeletal - no significant wasting of muscles noted, no bony deformities  Extremities-no clubbing, cyanosis or edema  Skin - warm, dry, and intact. No rash, erythema, or pallor. Psychiatric - mood, affect, and behavior appear normal.   Neurological exam  Awake, alert, fluent oriented x 3 appropriate affect  Attention and concentration appear appropriate  Recent and remote memory appears unremarkable  Speech normal without dysarthria  No clear issues with language of fund of knowledge  Cranial Nerve Exam   CN II- Visual fields grossly unremarkable  CN III, IV,VI-EOMI, No nystagmus, conjugate eye movements, left-sided ptosis  CN V-sensation intact to LT over face  CN VII-no facial assymetry. Unable to bury his eyelashes  CN VIII-Hearing intact to voice  CN IX and X- Palate elevates in midline  CN XI-good shoulder shrug  CN XII-Tongue midline with no fasciculations or fibrillations  Neck flexion and extension appear normal  Motor Exam 3/5 weakness right deltoid and mild weakness of bilateral hip flexion otherwise normal  Sensory Exam decreased vibration and pinprick to the shins  Reflexes absent throughout  Tremors-postural tremor noted  Gait  Able to walk independently  Coordination  Finger to nose-unremarkable  ? ?  CBC:   Recent Labs     11/10/22  1629   WBC 7.8   HGB 14.6        BMP:   Recent Labs     11/10/22  1629 11/11/22  0403 11/11/22  1802 11/12/22  0151   * 125* 124* 125*   K 4.1 3.7  --  3.6   CL 86* 88*  --  89*   CO2 28 24  --  23   BUN 10 17  --  18   CREATININE 0.8 0.8  --  0.8   GLUCOSE 128* 111*  --  117*     Hepatic:   Recent Labs     11/10/22  1629   AST 25   ALT 18   BILITOT 1.0   ALKPHOS 83     Lipids: No results for input(s): CHOL, HDL in the last 72 hours. Invalid input(s): LDLCALCU  INR: No results for input(s): INR in the last 72 hours. ?  ?  Assessment and Plan   1. Patient with right arm weakness. Unclear if it was acute in onset or not.   Patient declines MRI which would be the test of choice to rule out a new stroke. We will check carotid Dopplers and go ahead and place him on a baby aspirin. Additionally, given his double vision, ptosis, jaw fatigue and intermittent dysphagia he may have myasthenia gravis as a source for many of his problems including some component of his shortness of air. He will be placed on Mestinon empirically and we will check acetylcholine receptor antibodies. ?  ?       Ruel Anderson MD  Board Certified in Neurology

## 2022-11-12 NOTE — PLAN OF CARE
Problem: Pain  Goal: Verbalizes/displays adequate comfort level or baseline comfort level  Outcome: Progressing  Flowsheets (Taken 11/11/2022 2345)  Verbalizes/displays adequate comfort level or baseline comfort level:   Encourage patient to monitor pain and request assistance   Assess pain using appropriate pain scale   Administer analgesics based on type and severity of pain and evaluate response   Implement non-pharmacological measures as appropriate and evaluate response   Consider cultural and social influences on pain and pain management   Notify Licensed Independent Practitioner if interventions unsuccessful or patient reports new pain     Problem: Safety - Adult  Goal: Free from fall injury  Outcome: Progressing  Flowsheets  Taken 11/11/2022 2357 by Krishna Stewart LPN  Free From Fall Injury: Instruct family/caregiver on patient safety  Taken 11/11/2022 1029 by Saroj Ratliff LPN  Free From Fall Injury: Instruct family/caregiver on patient safety

## 2022-11-12 NOTE — CONSULTS
Nephrology ([facility]) Consult Note      Patient:  Coy Waggoner  YOB: 1940  Date of Service: 11/12/2022  MRN: [id number]   Acct: [de-identified]   Primary Care Physician: Eugenie Andrade DO  Advance Directive: Full Code  Admit Date: 11/10/2022       Hospital Day: 2  Referring Provider: Юлия Ojeda MD    Patient independently seen and examined, Chart, Consults, Notes, Operative notes, Labs, Cardiology, and Radiology studies reviewed as available. Chief complaint: Abnormal labs. Subjective:  Coy Waggoner is a 79 y.o. male for whom we were consulted for evaluation and treatment of HYPONATREMIA. Patient denies any history of hyponatremia. However he has history of lung cancer, tobacco, chronic obstructive pulmonary disease and benign essential hypertension. Patient presented with increasing shortness of breath, weakness of his right upper arm. Incidental finding in the lab consistent with serum sodium of 125 mmol. The scan of the head consistent with small ischemic changes otherwise unremarkable. He is also being evaluated by neurology. Patient is currently on a water restriction and loop diuretics with no significant improvement of serum sodium level. He denies any nausea vomiting or diarrhea. He has been drinking flavored water to keep himself hydrated.     Allergies:  Penicillins    Medicines:  Current Facility-Administered Medications   Medication Dose Route Frequency Provider Last Rate Last Admin    pyridostigmine (MESTINON) tablet 30 mg  30 mg Oral 3 times per day Saba Granger MD        aspirin chewable tablet 81 mg  81 mg Oral Daily Saba Granger MD        fluticasone [facility]) 50 MCG/ACT nasal spray 2 spray  2 spray Each Nostril Daily Fadumo Gerber MD   2 spray at 11/12/22 0912    [Held by provider] propranolol (INDERAL LA) extended release capsule 60 mg  60 mg Oral Daily Fadumo Gerber MD   60 mg at 11/11/22 1205    acetaminophen (TYLENOL) tablet 500 mg 500 mg Oral Nightly PRN Spring Lorenzo MD        And    diphenhydrAMINE (BENADRYL) tablet 25 mg  25 mg Oral Nightly PRN Spring Lorenzo MD        ipratropium-albuterol (DUONEB) nebulizer solution 1 ampule  1 ampule Inhalation Q4H WA Spring Lorenzo MD   1 ampule at 11/12/22 1108    guaiFENesin ([address]) extended release tablet 600 mg  600 mg Oral BID Spring Lorenzo MD   600 mg at 11/12/22 0912    sodium chloride flush 0.9 % injection 5-40 mL  5-40 mL IntraVENous 2 times per day KELLIE Elliott - CNP   10 mL at 11/12/22 0912    sodium chloride flush 0.9 % injection 5-40 mL  5-40 mL IntraVENous PRN Lyubov Hall APRN - CNP        0.9 % sodium chloride infusion   IntraVENous PRN Lyubov Hall APRN - CNP        [Held by provider] enoxaparin (LOVENOX) injection 40 mg  40 mg SubCUTAneous Daily Lyubov Hall APRN - CNP   40 mg at 11/11/22 1020    ondansetron (ZOFRAN-ODT) disintegrating tablet 4 mg  4 mg Oral Q8H PRN Lyubov Hall APRN - RAJIV        Or    ondansetron (ZOFRAN) injection 4 mg  4 mg IntraVENous Q6H PRN Lyubov Hall APRN - CNP        polyethylene glycol (GLYCOLAX) packet 17 g  17 g Oral Daily PRN Lyubov Hall APRN - CNP        acetaminophen (TYLENOL) tablet 650 mg  650 mg Oral Q6H PRN Lyubov Hall APRN - CNP        Or    acetaminophen (TYLENOL) suppository 650 mg  650 mg Rectal Q6H PRN Lyubov Hall APRN - CNP        [Held by provider] tamsulosin (FLOMAX) capsule 0.4 mg  0.4 mg Oral Daily Lyubov Hall APRN - CNP   0.4 mg at 11/11/22 1019    docusate sodium (COLACE) capsule 100 mg  100 mg Oral Daily Lyubov Hall APRN - CNP   100 mg at 11/12/22 0912    atorvastatin (LIPITOR) tablet 10 mg  10 mg Oral Daily Lyubov Hlal APRN - CNP   10 mg at 11/12/22 0912    amLODIPine (NORVASC) tablet 10 mg  10 mg Oral Daily KELLIE Elliott CNP   10 mg at 11/11/22 1019       Past Medical History:  Past Medical History:   Diagnosis Date    Arthritis     [facility]     HX LUNG CA    Emphysema/COPD ([facility] MILD/FORMER SMOKER    Hyperlipidemia     Hypertension     Macular degeneration     Palliative care patient 2022    Retention of urine        Past Surgical History:  Past Surgical History:   Procedure Laterality Date    EYE SURGERY      MUSCLE WEAKNESS    GASTRECTOMY      PARTIAL FOR ULCER REPAIR    LUNG CANCER SURGERY Left     LLL LOBECTOMY    OTHER SURGICAL HISTORY      I & D OF GROIN INFECTION    TOTAL KNEE ARTHROPLASTY Right 2017    KNEE TOTAL ARTHROPLASTY performed by Danelle Horne MD at [facility] OR       Family History  Family History   Problem Relation Age of Onset    Diabetes Mother     Stroke Mother     Heart Attack Father     Cancer Father         ASBESTOS       Social History  Social History     Socioeconomic History    Marital status:       Spouse name: Not on file    Number of children: Not on file    Years of education: Not on file    Highest education level: Not on file   Occupational History    Not on file   Tobacco Use    Smoking status: Former     Types: Cigarettes     Quit date: 1984     Years since quittin.5    Smokeless tobacco: Never   Vaping Use    Vaping Use: Never used   Substance and Sexual Activity    Alcohol use: No    Drug use: No    Sexual activity: Not on file   Other Topics Concern    Not on file   Social History Narrative    Not on file     Social Determinants of Health     Financial Resource Strain: Not on file   Food Insecurity: Not on file   Transportation Needs: Not on file   Physical Activity: Not on file   Stress: Not on file   Social Connections: Not on file   Intimate Partner Violence: Not on file   Housing Stability: Not on file         Review of Systems:  History obtained from chart review and the patient  General ROS: No fever or chills  Respiratory ROS: Increasing shortness of breath  Cardiovascular ROS: No chest pain or palpitations  Gastrointestinal ROS: No abdominal pain or melena  Genito-Urinary ROS: No dysuria or hematuria  Musculoskeletal ROS: No joint pain or swelling   14 point ROS reviewed with the patient and negative except as noted above and in the HPI unless unable to obtain. Objective:  Patient Vitals for the past 24 hrs:   BP Temp Temp src Pulse Resp SpO2   11/12/22 0728 -- -- -- -- -- 95 %   11/12/22 0722 130/79 97.7 °F (36.5 °C) Temporal 67 18 93 %   11/12/22 0522 (!) 151/70 97.5 °F (36.4 °C) -- 66 18 94 %   11/11/22 2336 -- -- -- -- 18 --   11/11/22 2236 -- -- -- 65 -- --   11/11/22 2058 (!) 94/38 97 °F (36.1 °C) -- 55 18 92 %   11/11/22 1745 (!) 90/50 -- -- 62 16 94 %       Intake/Output Summary (Last 24 hours) at 11/12/2022 1109  Last data filed at 11/12/2022 0532  Gross per 24 hour   Intake 300 ml   Output --   Net 300 ml     General: awake/alert   HEENT: Normocephalic atraumatic head  Chest:  clear to auscultation bilaterally  CVS: regular rate and rhythm  Abdominal: soft, nontender, normal bowel sounds obesity  Extremities: no cyanosis or edema  Skin: warm and dry without rash      Labs:  BMP:   Recent Labs     11/10/22  1629 11/11/22  0403 11/11/22  1802 11/12/22  0151   * 125* 124* 125*   K 4.1 3.7  --  3.6   CL 86* 88*  --  89*   CO2 28 24  --  23   BUN 10 17  --  18   CREATININE 0.8 0.8  --  0.8   CALCIUM 9.3 8.7*  --  8.5*     CBC:   Recent Labs     11/10/22  1629   WBC 7.8   HGB 14.6   HCT 42.1   MCV 94.4*        LIVER PROFILE:   Recent Labs     11/10/22  1629   AST 25   ALT 18   BILITOT 1.0   ALKPHOS 83     PT/INR: No results for input(s): PROTIME, INR in the last 72 hours. APTT: No results for input(s): APTT in the last 72 hours. BNP:  No results for input(s): BNP in the last 72 hours. Ionized Calcium:No results for input(s): IONCA in the last 72 hours. Magnesium:No results for input(s): MG in the last 72 hours. Phosphorus:No results for input(s): PHOS in the last 72 hours. HgbA1C: No results for input(s): LABA1C in the last 72 hours.   Hepatic:   Recent Labs     11/10/22  5889 ALKPHOS 83   ALT 18   AST 25   PROT 7.2   BILITOT 1.0   LABALBU 4.8     Lactic Acid: No results for input(s): LACTA in the last 72 hours. Troponin: No results for input(s): CKTOTAL, CKMB, TROPONINT in the last 72 hours. ABGs: No results for input(s): PH, PCO2, PO2, HCO3, O2SAT in the last 72 hours. CRP:  No results for input(s): CRP in the last 72 hours. Sed Rate:  No results for input(s): SEDRATE in the last 72 hours. Cultures:   No results for input(s): CULTURE in the last 72 hours. No results for input(s): BC, Barnetta Lamont in the last 72 hours. No results for input(s): CXSURG in the last 72 hours. Radiology reports as per the Radiologist  Radiology: CT HEAD WO CONTRAST    Result Date: 11/11/2022  NO PRIOR REPORT AVAILABLE Exam: CT OF THE BRAIN WITHOUT CONTRAST Clinical data: Right arm weakness. Technique: Contiguous axial images are obtained from the skull base to vertex without intravenous contrast.  Reformatted/MPR images were performed. Radiation dose: CTDIvol = 34.75 mGy, DLP = 713 mGy x cm. Prior studies: No prior studies submitted. Findings:  Mild age-appropriate cerebral volume loss. Patchy hypodensities in the periventricular regions most prominent in the left frontal zone with a few left anterior basal ganglia lacunar infarcts. No evidence of acute cortical infarction, hemorrhage, mass or mass effect. No hydrocephalus or abnormal extra-axial fluid collections are present. The posterior fossa is unremarkable. The skull base and calvarium are intact. The included portions of the paranasal sinuses and mastoid air cells are clear. 1.  No acute intracranial bleed, no mass, no hypodense lobar infarct. 2. Patchy hypodensities in the periventricular regions most prominent in the left frontal zone. 3.  A few left anterior basal ganglia lacunar infarcts of indeterminate age. Recommendation: Follow up as clinically indicated.   All CT scans at this facility utilize dose modulation, iterative reconstruction, and/or weight based dosing when appropriate to reduce radiation dose to as low as reasonably achievable. Electronically Signed by Justine Chowdary MD at 11-Nov-2022 07:35:30 PM EST             XR CHEST PORTABLE    Result Date: 11/10/2022  NO PRIOR REPORT AVAILABLE Exam: X-RAY OF [facility] Clinical data:Shortness of breath. Technique:Single view of the chest. Prior studies: No prior studies submitted. Findings:Bilateral lung hyperinflation with basilar atelectasis. No focal pneumonic consolidation or pleural effusion. Cardiac silhouette is within normal limits. No acute osseous abnormality is detected. No focal pneumonic consolidation, no pleural effusion or elliott CHF. Recommendation: Follow up as clinically indicated. Electronically Signed by Justine Chowdary MD at [id number] 37:17:43 PM EST                Assessment   1. HYPONATREMIA/new onset. 2.  Possible mixed picture  3. History of chronic obstructive pulm disease. 4.  History of lung cancer. 5.  Benign essential hypertension. Plan:  1.  P.o. water restriction. 2.  Continue urea powder  3. Urinary electrolyte. 4.  Urine osmolality. 5.  Serum uric acid level      Thank you for the consult, we appreciate the opportunity to provide care to your patients. Feel free to contact me if I can be of any further assistance.       Jose Singer MD  11/12/22  11:09 AM

## 2022-11-12 NOTE — PLAN OF CARE
Problem: Pain  Goal: Verbalizes/displays adequate comfort level or baseline comfort level  11/12/2022 0413 by Shana Green LPN  Outcome: Progressing  Flowsheets (Taken 11/11/2022 2345)  Verbalizes/displays adequate comfort level or baseline comfort level:   Encourage patient to monitor pain and request assistance   Assess pain using appropriate pain scale   Administer analgesics based on type and severity of pain and evaluate response   Implement non-pharmacological measures as appropriate and evaluate response   Consider cultural and social influences on pain and pain management   Notify Licensed Independent Practitioner if interventions unsuccessful or patient reports new pain  11/11/2022 2358 by Shana Green LPN  Outcome: Progressing  Flowsheets (Taken 11/11/2022 2345)  Verbalizes/displays adequate comfort level or baseline comfort level:   Encourage patient to monitor pain and request assistance   Assess pain using appropriate pain scale   Administer analgesics based on type and severity of pain and evaluate response   Implement non-pharmacological measures as appropriate and evaluate response   Consider cultural and social influences on pain and pain management   Notify Licensed Independent Practitioner if interventions unsuccessful or patient reports new pain     Problem: Safety - Adult  Goal: Free from fall injury  11/12/2022 0413 by Shana Green LPN  Outcome: Clara Adry (Taken 11/11/2022 2357)  Free From Fall Injury: Instruct family/caregiver on patient safety  11/11/2022 2358 by Shana Green LPN  Outcome: Progressing  Flowsheets  Taken 11/11/2022 2357 by Shana Green LPN  Free From Fall Injury: Instruct family/caregiver on patient safety  Taken 11/11/2022 1029 by Gerry Leo LPN  Free From Fall Injury: Instruct family/caregiver on patient safety

## 2022-11-13 LAB
ALBUMIN SERPL-MCNC: 4.4 G/DL (ref 3.5–5.2)
ALP BLD-CCNC: 84 U/L (ref 40–130)
ALT SERPL-CCNC: 19 U/L (ref 5–41)
ANION GAP SERPL CALCULATED.3IONS-SCNC: 6 MMOL/L (ref 7–19)
AST SERPL-CCNC: 26 U/L (ref 5–40)
BILIRUB SERPL-MCNC: 0.7 MG/DL (ref 0.2–1.2)
BUN BLDV-MCNC: 14 MG/DL (ref 8–23)
CALCIUM SERPL-MCNC: 8.7 MG/DL (ref 8.8–10.2)
CHLORIDE BLD-SCNC: 90 MMOL/L (ref 98–111)
CO2: 29 MMOL/L (ref 22–29)
CREAT SERPL-MCNC: 0.8 MG/DL (ref 0.5–1.2)
GFR SERPL CREATININE-BSD FRML MDRD: >60 ML/MIN/{1.73_M2}
GLUCOSE BLD-MCNC: 111 MG/DL (ref 74–109)
HCT VFR BLD CALC: 38.5 % (ref 42–52)
HEMOGLOBIN: 13.2 G/DL (ref 14–18)
MCH RBC QN AUTO: 32.4 PG (ref 27–31)
MCHC RBC AUTO-ENTMCNC: 34.3 G/DL (ref 33–37)
MCV RBC AUTO: 94.6 FL (ref 80–94)
PDW BLD-RTO: 12.4 % (ref 11.5–14.5)
PLATELET # BLD: 250 K/UL (ref 130–400)
PMV BLD AUTO: 8.8 FL (ref 9.4–12.4)
POTASSIUM REFLEX MAGNESIUM: 3.6 MMOL/L (ref 3.5–5)
POTASSIUM SERPL-SCNC: 3.6 MMOL/L (ref 3.5–5)
RBC # BLD: 4.07 M/UL (ref 4.7–6.1)
SODIUM BLD-SCNC: 125 MMOL/L (ref 136–145)
TOTAL PROTEIN: 6.1 G/DL (ref 6.6–8.7)
WBC # BLD: 10.1 K/UL (ref 4.8–10.8)

## 2022-11-13 PROCEDURE — 6370000000 HC RX 637 (ALT 250 FOR IP): Performed by: STUDENT IN AN ORGANIZED HEALTH CARE EDUCATION/TRAINING PROGRAM

## 2022-11-13 PROCEDURE — 6370000000 HC RX 637 (ALT 250 FOR IP): Performed by: INTERNAL MEDICINE

## 2022-11-13 PROCEDURE — 6370000000 HC RX 637 (ALT 250 FOR IP): Performed by: NURSE PRACTITIONER

## 2022-11-13 PROCEDURE — 2700000000 HC OXYGEN THERAPY PER DAY

## 2022-11-13 PROCEDURE — 99232 SBSQ HOSP IP/OBS MODERATE 35: CPT | Performed by: PSYCHIATRY & NEUROLOGY

## 2022-11-13 PROCEDURE — 94760 N-INVAS EAR/PLS OXIMETRY 1: CPT

## 2022-11-13 PROCEDURE — 1210000000 HC MED SURG R&B

## 2022-11-13 PROCEDURE — 94640 AIRWAY INHALATION TREATMENT: CPT

## 2022-11-13 PROCEDURE — 93880 EXTRACRANIAL BILAT STUDY: CPT

## 2022-11-13 PROCEDURE — 85027 COMPLETE CBC AUTOMATED: CPT

## 2022-11-13 PROCEDURE — 80053 COMPREHEN METABOLIC PANEL: CPT

## 2022-11-13 PROCEDURE — 2580000003 HC RX 258: Performed by: NURSE PRACTITIONER

## 2022-11-13 PROCEDURE — 6370000000 HC RX 637 (ALT 250 FOR IP): Performed by: PSYCHIATRY & NEUROLOGY

## 2022-11-13 PROCEDURE — 2580000003 HC RX 258: Performed by: INTERNAL MEDICINE

## 2022-11-13 PROCEDURE — 36415 COLL VENOUS BLD VENIPUNCTURE: CPT

## 2022-11-13 RX ORDER — ESCITALOPRAM OXALATE 5 MG/1
5 TABLET ORAL DAILY
Status: DISCONTINUED | OUTPATIENT
Start: 2022-11-13 | End: 2022-11-14

## 2022-11-13 RX ADMIN — GUAIFENESIN 600 MG: 600 TABLET ORAL at 07:49

## 2022-11-13 RX ADMIN — AMLODIPINE BESYLATE 10 MG: 10 TABLET ORAL at 07:49

## 2022-11-13 RX ADMIN — FUROSEMIDE 10 MG: 20 TABLET ORAL at 07:49

## 2022-11-13 RX ADMIN — IPRATROPIUM BROMIDE AND ALBUTEROL SULFATE 1 AMPULE: 2.5; .5 SOLUTION RESPIRATORY (INHALATION) at 10:46

## 2022-11-13 RX ADMIN — ATORVASTATIN CALCIUM 10 MG: 10 TABLET, FILM COATED ORAL at 07:49

## 2022-11-13 RX ADMIN — PYRIDOSTIGMINE BROMIDE 30 MG: 60 TABLET ORAL at 20:10

## 2022-11-13 RX ADMIN — DOCUSATE SODIUM 100 MG: 100 CAPSULE, LIQUID FILLED ORAL at 07:49

## 2022-11-13 RX ADMIN — ESCITALOPRAM 5 MG: 5 TABLET, FILM COATED ORAL at 18:01

## 2022-11-13 RX ADMIN — SODIUM CHLORIDE, PRESERVATIVE FREE 10 ML: 5 INJECTION INTRAVENOUS at 07:50

## 2022-11-13 RX ADMIN — GUAIFENESIN 600 MG: 600 TABLET ORAL at 19:39

## 2022-11-13 RX ADMIN — IPRATROPIUM BROMIDE AND ALBUTEROL SULFATE 1 AMPULE: 2.5; .5 SOLUTION RESPIRATORY (INHALATION) at 06:22

## 2022-11-13 RX ADMIN — SODIUM CHLORIDE: 9 INJECTION, SOLUTION INTRAVENOUS at 03:23

## 2022-11-13 RX ADMIN — PYRIDOSTIGMINE BROMIDE 30 MG: 60 TABLET ORAL at 05:11

## 2022-11-13 RX ADMIN — IPRATROPIUM BROMIDE AND ALBUTEROL SULFATE 1 AMPULE: 2.5; .5 SOLUTION RESPIRATORY (INHALATION) at 19:49

## 2022-11-13 RX ADMIN — SODIUM CHLORIDE, PRESERVATIVE FREE 10 ML: 5 INJECTION INTRAVENOUS at 19:41

## 2022-11-13 RX ADMIN — ASPIRIN 81 MG 81 MG: 81 TABLET ORAL at 07:49

## 2022-11-13 RX ADMIN — PYRIDOSTIGMINE BROMIDE 30 MG: 60 TABLET ORAL at 15:49

## 2022-11-13 RX ADMIN — FLUTICASONE PROPIONATE 2 SPRAY: 50 SPRAY, METERED NASAL at 07:49

## 2022-11-13 ASSESSMENT — PAIN SCALES - GENERAL: PAINLEVEL_OUTOF10: 0

## 2022-11-13 NOTE — PROGRESS NOTES
Patient:   Brennno Villalobos  MR#:    [id number]   Room:    [facility]   YOB: 1940  Date of Progress Note: 11/13/2022  Time of Note                           10:21 AM  Consulting Physician:   Toney Lomeli M.D. Attending Physician:  Erica Griffith MD     CHIEF COMPLAINT: Right arm weakness  Subjective   This 79 y.o. male who presents with shortness of breath. Has a history of lung cancer and COPD. Was admitted for difficulty breathing and noted some weakness in his right deltoid 11/11. He still has it. He had surgery to correct double vision about 10 years ago and since that time he has developed it again and also has a couple of months of left eyelid ptosis. Also has trouble with jaw fatigue with chewing as well as occasional trouble swallowing. He is claustrophobic and does not want to have an MRI. He had a CT of the head 11/11 showing some small vessel ischemic change. I am asked to see him for right arm weakness to see if this could be a stroke. Also found to be hyponatremic with a sodium of 123 in the ED. Chest x-ray relatively unremarkable. No improvement in any of his symptoms with Mestinon. No other complaints today. Looks stable. REVIEW OF SYSTEMS:  Constitutional: No fevers No chills  Neck:No stiffness  Respiratory: No shortness of breath  Cardiovascular: No chest pain No palpitations  Gastrointestinal: No abdominal pain    Genitourinary: No Dysuria  Neurological: No headache, no confusion      PHYSICAL EXAM:  BP (!) 155/79   Pulse 73   Temp 98.3 °F (36.8 °C) (Temporal)   Resp 18   Ht 6' (1.829 m)   Wt 210 lb 5 oz (95.4 kg)   SpO2 96%   BMI 28.52 kg/m²     Constitutional: he appears well-developed and well-nourished. Eyes - conjunctiva normal.  Pupils react to light  Ear, nose, throat -hearing intact to voice.  No scars, masses, or lesions over external nose or ears, no atrophy of tongue  Neck-symmetric, no masses noted, no jugular vein distension  Respiration- chest wall appears symmetric, good expansion,   normal effort without use of accessory muscles  Cardiovascular- RRR  Musculoskeletal - no significant wasting of muscles noted, no bony deformities, gait no gross ataxia  Extremities-no clubbing, cyanosis or edema  Skin - warm, dry, and intact. No rash, erythema, or pallor. Psychiatric - mood, affect, and behavior appear normal.      Neurology  NEUROLOGICAL EXAM:      Mental status   Awake, alert, fluent oriented x 3 appropriate affect  Attention and concentration appear appropriate  Recent and remote memory appears unremarkable  Speech normal without dysarthria  No clear issues with language       Cranial Nerves   CN II- Visual fields grossly unremarkable  CN III, IV,VI-EOMI, No nystagmus, conjugate eye movements, left-sided ptosis  CN VII-no facial asymmetry  CN VIII-Hearing intact   CN IX and X- Palate elevates in midline  CN XI-good shoulder shrug  CN XII-Tongue midline with no fasciculations or fibrillations          Motor function  Antigravity x 4 although can barely lift the right arm against gravity                           Nursing/pcp notes, imaging,labs and vitals reviewed.          Lab Results   Component Value Date    WBC 10.1 11/13/2022    HGB 13.2 (L) 11/13/2022    HCT 38.5 (L) 11/13/2022    MCV 94.6 (H) 11/13/2022     11/13/2022     Lab Results   Component Value Date     (L) 11/13/2022    K 3.6 11/13/2022    K 3.6 11/13/2022    CL 90 (L) 11/13/2022    CO2 29 11/13/2022    BUN 14 11/13/2022    CREATININE 0.8 11/13/2022    GLUCOSE 111 (H) 11/13/2022    CALCIUM 8.7 (L) 11/13/2022    PROT 6.1 (L) 11/13/2022    LABALBU 4.4 11/13/2022    BILITOT 0.7 11/13/2022    ALKPHOS 84 11/13/2022    AST 26 11/13/2022    ALT 19 11/13/2022    LABGLOM >60 11/13/2022    GFRAA >60 07/05/2022     Lab Results   Component Value Date    INR 1.10 04/27/2017   No results found for: PHENYTOIN, ESR, CRP    PT,OT and/or speech notes reviewed:      RECORD REVIEW: Previous medical records, medications were reviewed at today's visit    IMPRESSION:   Patient with right arm weakness. Unclear if it was acute in onset or not although the patient believes it is. .  Patient declines MRI which would be the test of choice to rule out a new stroke. We will check carotid Dopplers and go ahead and place him on a baby aspirin. Additionally, given his double vision, ptosis, jaw fatigue and intermittent dysphagia he may have myasthenia gravis as a source for many of his problems including some component of his shortness of air. He will be placed on Mestinon empirically and we will check acetylcholine receptor antibodies. Will increase Mestinon tomorrow if not improved. May need Ortho consult regarding the right shoulder.   ?  ?

## 2022-11-13 NOTE — PROGRESS NOTES
PRELIMINARY REPORT    RCCA= 96/16      LCCA= 106/14  BALWINDER= 17/77        LICA= 53/12  RECA= 277/61    LECA= 121/14  DANGELO ANTEGR[employer] VERTEBRAL ARTERIES    PENDING FINAL REPORT

## 2022-11-13 NOTE — PROGRESS NOTES
Nephrology ([facility]) Progress Note      Patient:  Po Wesley  YOB: 1940  Date of Service: 11/13/2022  MRN: [id number]   Acct: [de-identified]   Primary Care Physician: Jaqueline Mckeon DO  Advance Directive: Full Code  Admit Date: 11/10/2022       Hospital Day: 3  Referring Provider: Anival Chau MD    Patient independently seen and examined, Chart, Consults, Notes, Operative notes, Labs, Cardiology, and Radiology studies reviewed as available. Chief complaint: Abnormal labs. Subjective:  Po Wesley is a 79 y.o. male for whom we were consulted for evaluation and treatment of HYPONATREMIA. Patient denies any history of hyponatremia. However he has history of lung cancer, tobacco, chronic obstructive pulmonary disease and benign essential hypertension. Patient presented with increasing shortness of breath, weakness of his right upper arm. Incidental finding in the lab consistent with serum sodium of 125 mmol. The scan of the head consistent with small ischemic changes otherwise unremarkable. He is also being evaluated by neurology. Patient is currently on a water restriction and loop diuretics with no significant improvement of serum sodium level. He denies any nausea vomiting or diarrhea. He has been drinking flavored water to keep himself hydrated. This afternoon he feels well.   He denies any nausea or vomiting his serum sodium remained stable    Allergies:  Penicillins    Medicines:  Current Facility-Administered Medications   Medication Dose Route Frequency Provider Last Rate Last Admin    bisacodyl (DULCOLAX) EC tablet 10 mg  10 mg Oral Daily PRN Darryle Ammon, MD        pyridostigmine (MESTINON) tablet 30 mg  30 mg Oral 3 times per day Michael Buckley MD   30 mg at 11/13/22 0511    aspirin chewable tablet 81 mg  81 mg Oral Daily Michael Buckley MD   81 mg at 11/13/22 0749    furosemide (LASIX) tablet 10 mg  10 mg Oral Daily Bakari Cook MD   10 mg at 11/13/22 0749    0.9 % sodium chloride infusion   IntraVENous Continuous Mickie Olvera  mL/hr at 11/13/22 0323 New Bag at 11/13/22 0323    fluticasone (FLONASE) 50 MCG/ACT nasal spray 2 spray  2 spray Each Nostril Daily Dominga Murillo MD   2 spray at 11/13/22 0749    [Held by provider] propranolol (INDERAL LA) extended release capsule 60 mg  60 mg Oral Daily Dominga Murillo MD   60 mg at 11/11/22 1205    acetaminophen (TYLENOL) tablet 500 mg  500 mg Oral Nightly PRN Dominga Murillo MD        And    diphenhydrAMINE (BENADRYL) tablet 25 mg  25 mg Oral Nightly PRN Dominga Murillo MD        ipratropium-albuterol (DUONEB) nebulizer solution 1 ampule  1 ampule Inhalation Q4H WA Dominga Murillo MD   1 ampule at 11/13/22 1046    guaiFENesin (MUCINEX) extended release tablet 600 mg  600 mg Oral BID Dominga Murillo MD   600 mg at 11/13/22 0749    sodium chloride flush 0.9 % injection 5-40 mL  5-40 mL IntraVENous 2 times per day Demarco Prophet, APRN - CNP   10 mL at 11/13/22 0750    sodium chloride flush 0.9 % injection 5-40 mL  5-40 mL IntraVENous PRN Demarco Prophet, APRN - CNP        0.9 % sodium chloride infusion   IntraVENous PRN Demarco Prophet, APRN - CNP        [Held by provider] enoxaparin (LOVENOX) injection 40 mg  40 mg SubCUTAneous Daily Demarco Prophet, APRN - CNP   40 mg at 11/11/22 1020    ondansetron (ZOFRAN-ODT) disintegrating tablet 4 mg  4 mg Oral Q8H PRN Demarco Prophet, APRN - CNP        Or    ondansetron (ZOFRAN) injection 4 mg  4 mg IntraVENous Q6H PRN Edmarco Prophet, APRN - CNP        acetaminophen (TYLENOL) tablet 650 mg  650 mg Oral Q6H PRN Demarco Prophet, APRN - CNP        Or    acetaminophen (TYLENOL) suppository 650 mg  650 mg Rectal Q6H PRN Demarco Prophet, APRN - CNP        [Held by provider] tamsulosin (FLOMAX) capsule 0.4 mg  0.4 mg Oral Daily Nilam Prophet, APRN - CNP   0.4 mg at 11/11/22 1019    docusate sodium (COLACE) capsule 100 mg  100 mg Oral Daily Nilam Prophet, APRN - CNP   100 mg at 11/13/22 6682 atorvastatin (LIPITOR) tablet 10 mg  10 mg Oral Daily [place], APRN - CNP   10 mg at 22 0749    amLODIPine (NORVASC) tablet 10 mg  10 mg Oral Daily [place], APRN - CNP   10 mg at 22 0749       Past Medical History:  Past Medical History:   Diagnosis Date    Arthritis     Cancer ([facility] [address]     HX LUNG CA    Emphysema/COPD ([facility] [address]     MILD/FORMER SMOKER    Hyperlipidemia     Hypertension     Macular degeneration     Palliative care patient 2022    Retention of urine        Past Surgical History:  Past Surgical History:   Procedure Laterality Date    EYE SURGERY      MUSCLE WEAKNESS    GASTRECTOMY      PARTIAL FOR ULCER REPAIR    LUNG CANCER SURGERY Left     LLL LOBECTOMY    OTHER SURGICAL HISTORY      I & D OF GROIN INFECTION    TOTAL KNEE ARTHROPLASTY Right 2017    KNEE TOTAL ARTHROPLASTY performed by Marcia Araiza MD at [facility] OR       Family History  Family History   Problem Relation Age of Onset    Diabetes Mother     Stroke Mother     Heart Attack Father     Cancer Father         ASBESTOS       Social History  Social History     Socioeconomic History    Marital status:       Spouse name: Not on file    Number of children: Not on file    Years of education: Not on file    Highest education level: Not on file   Occupational History    Not on file   Tobacco Use    Smoking status: Former     Types: Cigarettes     Quit date: 1984     Years since quittin.5    Smokeless tobacco: Never   Vaping Use    Vaping Use: Never used   Substance and Sexual Activity    Alcohol use: No    Drug use: No    Sexual activity: Not on file   Other Topics Concern    Not on file   Social History Narrative    Not on file     Social Determinants of Health     Financial Resource Strain: Not on file   Food Insecurity: Not on file   Transportation Needs: Not on file   Physical Activity: Not on file   Stress: Not on file   Social Connections: Not on file   Intimate Partner Violence: Not on file Housing Stability: Not on file         Review of Systems:  History obtained from chart review and the patient  General ROS: No fever or chills  Respiratory ROS: Increasing shortness of breath  Cardiovascular ROS: No chest pain or palpitations  Gastrointestinal ROS: No abdominal pain or melena  Genito-Urinary ROS: No dysuria or hematuria  Musculoskeletal ROS: No joint pain or swelling   14 point ROS reviewed with the patient and negative except as noted above and in the HPI unless unable to obtain. Objective:  Patient Vitals for the past 24 hrs:   BP Temp Temp src Pulse Resp SpO2 Weight   11/13/22 0750 (!) 155/79 98.3 °F (36.8 °C) Temporal 73 18 96 % --   11/13/22 0624 -- -- -- -- -- 95 % --   11/13/22 0542 -- -- -- -- -- -- 210 lb 5 oz (95.4 kg)   11/13/22 0330 -- -- -- -- -- 97 % --   11/12/22 2013 136/76 98.1 °F (36.7 °C) -- 67 18 95 % --   11/12/22 1632 133/72 97.1 °F (36.2 °C) Temporal 64 18 94 % --   11/12/22 1525 101/61 97.4 °F (36.3 °C) Temporal 71 18 94 % --       Intake/Output Summary (Last 24 hours) at 11/13/2022 1314  Last data filed at 11/13/2022 1146  Gross per 24 hour   Intake 240 ml   Output --   Net 240 ml     General: awake/alert   HEENT: Normocephalic atraumatic head  Chest:  clear to auscultation bilaterally  CVS: regular rate and rhythm  Abdominal: soft, nontender, normal bowel sounds obesity  Extremities: no cyanosis or edema  Skin: warm and dry without rash      Labs:  BMP:   Recent Labs     11/11/22  0403 11/11/22  1802 11/12/22  0151 11/12/22  1639 11/13/22  0415   *   < > 125* 123* 125*   K 3.7  --  3.6  --  3.6  3.6   CL 88*  --  89*  --  90*   CO2 24  --  23  --  29   BUN 17  --  18  --  14   CREATININE 0.8  --  0.8  --  0.8   CALCIUM 8.7*  --  8.5*  --  8.7*    < > = values in this interval not displayed.      CBC:   Recent Labs     11/10/22  1629 11/13/22  0415   WBC 7.8 10.1   HGB 14.6 13.2*   HCT 42.1 38.5*   MCV 94.4* 94.6*    250     LIVER PROFILE:   Recent Labs 11/10/22  1629 11/13/22  0415   AST 25 26   ALT 18 19   BILITOT 1.0 0.7   ALKPHOS 83 84     PT/INR: No results for input(s): PROTIME, INR in the last 72 hours. APTT: No results for input(s): APTT in the last 72 hours. BNP:  No results for input(s): BNP in the last 72 hours. Ionized Calcium:No results for input(s): IONCA in the last 72 hours. Magnesium:No results for input(s): MG in the last 72 hours. Phosphorus:No results for input(s): PHOS in the last 72 hours. HgbA1C: No results for input(s): LABA1C in the last 72 hours. Hepatic:   Recent Labs     11/10/22  1629 11/13/22  0415   ALKPHOS 83 84   ALT 18 19   AST 25 26   PROT 7.2 6.1*   BILITOT 1.0 0.7   LABALBU 4.8 4.4     Lactic Acid: No results for input(s): LACTA in the last 72 hours. Troponin: No results for input(s): CKTOTAL, CKMB, TROPONINT in the last 72 hours. ABGs: No results for input(s): PH, PCO2, PO2, HCO3, O2SAT in the last 72 hours. CRP:  No results for input(s): CRP in the last 72 hours. Sed Rate:  No results for input(s): SEDRATE in the last 72 hours. Cultures:   No results for input(s): CULTURE in the last 72 hours. No results for input(s): BC, Omaira Breech in the last 72 hours. No results for input(s): CXSURG in the last 72 hours. Radiology reports as per the Radiologist  Radiology: CT HEAD WO CONTRAST    Result Date: 11/11/2022  NO PRIOR REPORT AVAILABLE Exam: CT OF THE BRAIN WITHOUT CONTRAST Clinical data: Right arm weakness. Technique: Contiguous axial images are obtained from the skull base to vertex without intravenous contrast.  Reformatted/MPR images were performed. Radiation dose: CTDIvol = 34.75 mGy, DLP = 713 mGy x cm. Prior studies: No prior studies submitted. Findings:  Mild age-appropriate cerebral volume loss. Patchy hypodensities in the periventricular regions most prominent in the left frontal zone with a few left anterior basal ganglia lacunar infarcts.   No evidence of acute cortical infarction, hemorrhage, mass or mass effect. No hydrocephalus or abnormal extra-axial fluid collections are present. The posterior fossa is unremarkable. The skull base and calvarium are intact. The included portions of the paranasal sinuses and mastoid air cells are clear. 1.  No acute intracranial bleed, no mass, no hypodense lobar infarct. 2. Patchy hypodensities in the periventricular regions most prominent in the left frontal zone. 3.  A few left anterior basal ganglia lacunar infarcts of indeterminate age. Recommendation: Follow up as clinically indicated. All CT scans at this facility utilize dose modulation, iterative reconstruction, and/or weight based dosing when appropriate to reduce radiation dose to as low as reasonably achievable. Electronically Signed by Valerie Maya MD at 11-Nov-2022 07:35:30 PM EST             XR CHEST PORTABLE    Result Date: 11/10/2022  NO PRIOR REPORT AVAILABLE [facility] data:Shortness of breath. Technique:Single view of the chest. Prior studies: No prior studies submitted. Findings:Bilateral lung hyperinflation with basilar atelectasis. No focal pneumonic consolidation or pleural effusion. Cardiac silhouette is within normal limits. No acute osseous abnormality is detected. No focal pneumonic consolidation, no pleural effusion or elliott CHF. Recommendation: Follow up as clinically indicated. Electronically Signed by Valerie Maya MD at [id number] 98:08:13 PM EST                Assessment   1. HYPONATREMIA/new onset. 2.  Mixed picture/combination of SIADH+ volume depletion  3. History of chronic obstructive pulm disease. 4.  History of lung cancer. 5.  Benign essential hypertension. Plan:  1. Agree with IV fluid. 2.  Discontinue urea powder  3. Monitor serum sodium level    .       Stephanie Mcgrath MD  11/13/22  1:14 PM

## 2022-11-13 NOTE — PROGRESS NOTES
Pt had C/O Shortness of breath due to laying down. Pt was on room air and O2 sat was 95%. Pt was put on 2L NC, O2 sat went up to 98%. Will continue to monitor pt.

## 2022-11-14 LAB
ALBUMIN SERPL-MCNC: 4 G/DL (ref 3.5–5.2)
ALP BLD-CCNC: 83 U/L (ref 40–130)
ALT SERPL-CCNC: 20 U/L (ref 5–41)
ANION GAP SERPL CALCULATED.3IONS-SCNC: 11 MMOL/L (ref 7–19)
ANION GAP SERPL CALCULATED.3IONS-SCNC: 9 MMOL/L (ref 7–19)
AST SERPL-CCNC: 24 U/L (ref 5–40)
BILIRUB SERPL-MCNC: 0.6 MG/DL (ref 0.2–1.2)
BUN BLDV-MCNC: 11 MG/DL (ref 8–23)
BUN BLDV-MCNC: 13 MG/DL (ref 8–23)
CALCIUM SERPL-MCNC: 8.4 MG/DL (ref 8.8–10.2)
CALCIUM SERPL-MCNC: 8.7 MG/DL (ref 8.8–10.2)
CHLORIDE BLD-SCNC: 92 MMOL/L (ref 98–111)
CHLORIDE BLD-SCNC: 93 MMOL/L (ref 98–111)
CO2: 25 MMOL/L (ref 22–29)
CO2: 26 MMOL/L (ref 22–29)
CREAT SERPL-MCNC: 0.8 MG/DL (ref 0.5–1.2)
CREAT SERPL-MCNC: 0.9 MG/DL (ref 0.5–1.2)
GFR SERPL CREATININE-BSD FRML MDRD: >60 ML/MIN/{1.73_M2}
GFR SERPL CREATININE-BSD FRML MDRD: >60 ML/MIN/{1.73_M2}
GLUCOSE BLD-MCNC: 112 MG/DL (ref 74–109)
GLUCOSE BLD-MCNC: 131 MG/DL (ref 74–109)
HCT VFR BLD CALC: 39.6 % (ref 42–52)
HEMOGLOBIN: 13.2 G/DL (ref 14–18)
MCH RBC QN AUTO: 32.1 PG (ref 27–31)
MCHC RBC AUTO-ENTMCNC: 33.3 G/DL (ref 33–37)
MCV RBC AUTO: 96.4 FL (ref 80–94)
PDW BLD-RTO: 12.4 % (ref 11.5–14.5)
PLATELET # BLD: 212 K/UL (ref 130–400)
PMV BLD AUTO: 8.5 FL (ref 9.4–12.4)
POTASSIUM SERPL-SCNC: 3.5 MMOL/L (ref 3.5–5)
POTASSIUM SERPL-SCNC: 3.8 MMOL/L (ref 3.5–5)
RBC # BLD: 4.11 M/UL (ref 4.7–6.1)
SODIUM BLD-SCNC: 127 MMOL/L (ref 136–145)
SODIUM BLD-SCNC: 129 MMOL/L (ref 136–145)
TOTAL PROTEIN: 6 G/DL (ref 6.6–8.7)
WBC # BLD: 8.8 K/UL (ref 4.8–10.8)

## 2022-11-14 PROCEDURE — 94640 AIRWAY INHALATION TREATMENT: CPT

## 2022-11-14 PROCEDURE — 94760 N-INVAS EAR/PLS OXIMETRY 1: CPT

## 2022-11-14 PROCEDURE — 1210000000 HC MED SURG R&B

## 2022-11-14 PROCEDURE — 6370000000 HC RX 637 (ALT 250 FOR IP): Performed by: NURSE PRACTITIONER

## 2022-11-14 PROCEDURE — 2580000003 HC RX 258: Performed by: NURSE PRACTITIONER

## 2022-11-14 PROCEDURE — 6370000000 HC RX 637 (ALT 250 FOR IP): Performed by: PSYCHIATRY & NEUROLOGY

## 2022-11-14 PROCEDURE — 6370000000 HC RX 637 (ALT 250 FOR IP): Performed by: STUDENT IN AN ORGANIZED HEALTH CARE EDUCATION/TRAINING PROGRAM

## 2022-11-14 PROCEDURE — 85027 COMPLETE CBC AUTOMATED: CPT

## 2022-11-14 PROCEDURE — 99232 SBSQ HOSP IP/OBS MODERATE 35: CPT | Performed by: PSYCHIATRY & NEUROLOGY

## 2022-11-14 PROCEDURE — 80053 COMPREHEN METABOLIC PANEL: CPT

## 2022-11-14 PROCEDURE — 36415 COLL VENOUS BLD VENIPUNCTURE: CPT

## 2022-11-14 PROCEDURE — 6370000000 HC RX 637 (ALT 250 FOR IP): Performed by: HOSPITALIST

## 2022-11-14 PROCEDURE — 6370000000 HC RX 637 (ALT 250 FOR IP): Performed by: INTERNAL MEDICINE

## 2022-11-14 RX ORDER — PYRIDOSTIGMINE BROMIDE 60 MG/1
60 TABLET ORAL EVERY 8 HOURS SCHEDULED
Status: DISCONTINUED | OUTPATIENT
Start: 2022-11-14 | End: 2022-11-15 | Stop reason: HOSPADM

## 2022-11-14 RX ORDER — BUPROPION HYDROCHLORIDE 150 MG/1
150 TABLET ORAL DAILY
Status: DISCONTINUED | OUTPATIENT
Start: 2022-11-15 | End: 2022-11-15 | Stop reason: HOSPADM

## 2022-11-14 RX ADMIN — SODIUM CHLORIDE, PRESERVATIVE FREE 10 ML: 5 INJECTION INTRAVENOUS at 09:25

## 2022-11-14 RX ADMIN — GUAIFENESIN 600 MG: 600 TABLET ORAL at 09:25

## 2022-11-14 RX ADMIN — PYRIDOSTIGMINE BROMIDE 60 MG: 60 TABLET ORAL at 21:44

## 2022-11-14 RX ADMIN — ASPIRIN 81 MG 81 MG: 81 TABLET ORAL at 09:25

## 2022-11-14 RX ADMIN — IPRATROPIUM BROMIDE AND ALBUTEROL SULFATE 1 AMPULE: 2.5; .5 SOLUTION RESPIRATORY (INHALATION) at 19:43

## 2022-11-14 RX ADMIN — GUAIFENESIN 600 MG: 600 TABLET ORAL at 21:44

## 2022-11-14 RX ADMIN — IPRATROPIUM BROMIDE AND ALBUTEROL SULFATE 1 AMPULE: 2.5; .5 SOLUTION RESPIRATORY (INHALATION) at 14:10

## 2022-11-14 RX ADMIN — SODIUM CHLORIDE, PRESERVATIVE FREE 10 ML: 5 INJECTION INTRAVENOUS at 21:44

## 2022-11-14 RX ADMIN — DOCUSATE SODIUM 100 MG: 100 CAPSULE, LIQUID FILLED ORAL at 09:25

## 2022-11-14 RX ADMIN — PYRIDOSTIGMINE BROMIDE 30 MG: 60 TABLET ORAL at 05:05

## 2022-11-14 RX ADMIN — PYRIDOSTIGMINE BROMIDE 60 MG: 60 TABLET ORAL at 16:21

## 2022-11-14 RX ADMIN — AMLODIPINE BESYLATE 10 MG: 10 TABLET ORAL at 09:25

## 2022-11-14 RX ADMIN — ATORVASTATIN CALCIUM 10 MG: 10 TABLET, FILM COATED ORAL at 09:25

## 2022-11-14 RX ADMIN — IPRATROPIUM BROMIDE AND ALBUTEROL SULFATE 1 AMPULE: 2.5; .5 SOLUTION RESPIRATORY (INHALATION) at 10:36

## 2022-11-14 RX ADMIN — IPRATROPIUM BROMIDE AND ALBUTEROL SULFATE 1 AMPULE: 2.5; .5 SOLUTION RESPIRATORY (INHALATION) at 06:21

## 2022-11-14 RX ADMIN — ESCITALOPRAM 5 MG: 5 TABLET, FILM COATED ORAL at 09:25

## 2022-11-14 RX ADMIN — FLUTICASONE PROPIONATE 2 SPRAY: 50 SPRAY, METERED NASAL at 09:25

## 2022-11-14 RX ADMIN — BISACODYL 10 MG: 5 TABLET, COATED ORAL at 09:34

## 2022-11-14 ASSESSMENT — ENCOUNTER SYMPTOMS
ABDOMINAL PAIN: 0
SHORTNESS OF BREATH: 0
WHEEZING: 0
NAUSEA: 0

## 2022-11-14 ASSESSMENT — PAIN DESCRIPTION - LOCATION: LOCATION: BACK

## 2022-11-14 ASSESSMENT — PAIN SCALES - GENERAL: PAINLEVEL_OUTOF10: 3

## 2022-11-14 ASSESSMENT — PAIN DESCRIPTION - DESCRIPTORS: DESCRIPTORS: ACHING

## 2022-11-14 ASSESSMENT — PAIN DESCRIPTION - PAIN TYPE: TYPE: CHRONIC PAIN

## 2022-11-14 NOTE — PLAN OF CARE
Problem: Pain  Goal: Verbalizes/displays adequate comfort level or baseline comfort level  Outcome: Progressing     Problem: Safety - Adult  Goal: Free from fall injury  Outcome: Progressing  Flowsheets (Taken 11/13/2022 2010)  Free From Fall Injury: Instruct family/caregiver on patient safety

## 2022-11-14 NOTE — PROGRESS NOTES
Nephrology ([facility]) Progress Note    Patient:  Vini Price  YOB: 1940  Date of Service: 11/14/2022  MRN: [id number]   Acct: [de-identified]   Primary Care Physician: Rodrigo Lange DO  Advance Directive: Full Code  Admit Date: 11/10/2022       Hospital Day: 4  Referring Provider: Berna Zaragoza MD    Patient independently seen and examined, Chart, Consults, Notes, Operative notes, Labs, Cardiology, and Radiology studies reviewed as available. Subjective:  Vini Price is a 79 y.o. male for whom we were consulted for evaluation and treatment of hyponatremia. Patient denies any history of hyponatremia. However, he has a history of lung cancer, tobacco, chronic obstructive pulmonary disease and benign essential hypertension. Patient presented with increasing shortness of breath and weakness of his right upper arm. Incidental finding in the lab consistent with serum sodium of 125. The scan of the head consistent with small ischemic changes otherwise unremarkable. He was also being evaluated by neurology. Patient was on a water restriction and loop diuretics with no significant improvement of serum sodium level. He denied any nausea vomiting or diarrhea. He has been drinking flavored water to keep himself hydrated. Today, no overnight events. Patient denied chest pain, shortness of air at rest, nausea or vomiting. He did note trouble with his peripheral IV overnight and ultimately it was removed. Nursing present at the bedside as well.     Allergies:  Penicillins    Medicines:  Current Facility-Administered Medications   Medication Dose Route Frequency Provider Last Rate Last Admin    pyridostigmine (MESTINON) tablet 60 mg  60 mg Oral 3 times per day Claritza Carter MD        bisacodyl (DULCOLAX) EC tablet 10 mg  10 mg Oral Daily PRN Pratik Yoo MD   10 mg at 11/14/22 0934    escitalopram (LEXAPRO) tablet 5 mg  5 mg Oral Daily Akash Coffman MD   5 mg at 11/14/22 0925    aspirin chewable tablet 81 mg  81 mg Oral Daily Claritza Carter MD   81 mg at 11/14/22 0925    0.9 % sodium chloride infusion   IntraVENous Continuous Akash Coffman  mL/hr at 11/13/22 0323 New Bag at 11/13/22 0323    fluticasone (FLONASE) 50 MCG/ACT nasal spray 2 spray  2 spray Each Nostril Daily Fang Granado MD   2 spray at 11/14/22 4175    [Held by provider] propranolol (INDERAL LA) extended release capsule 60 mg  60 mg Oral Daily Fang Granado MD   60 mg at 11/11/22 1205    acetaminophen (TYLENOL) tablet 500 mg  500 mg Oral Nightly PRN Fang Granado MD        And    diphenhydrAMINE (BENADRYL) tablet 25 mg  25 mg Oral Nightly PRN Fang Granado MD        ipratropium-albuterol (DUONEB) nebulizer solution 1 ampule  1 ampule Inhalation Q4H WA Fang Granado MD   1 ampule at 11/14/22 1036    guaiFENesin (MUCINEX) extended release tablet 600 mg  600 mg Oral BID Fang Granado MD   600 mg at 11/14/22 7467    sodium chloride flush 0.9 % injection 5-40 mL  5-40 mL IntraVENous 2 times per day Mac Blunt APRN - CNP   10 mL at 11/14/22 0925    sodium chloride flush 0.9 % injection 5-40 mL  5-40 mL IntraVENous PRN Mac Blunt, APRN - CNP        0.9 % sodium chloride infusion   IntraVENous PRN Mac Blunt APRN - CNP        [Held by provider] enoxaparin (LOVENOX) injection 40 mg  40 mg SubCUTAneous Daily Mac Blunt APRN - CNP   40 mg at 11/11/22 1020    ondansetron (ZOFRAN-ODT) disintegrating tablet 4 mg  4 mg Oral Q8H PRN Mac Blunt, APRN - CNP        Or    ondansetron (ZOFRAN) injection 4 mg  4 mg IntraVENous Q6H PRN Mac Blunt, APRN - CNP        acetaminophen (TYLENOL) tablet 650 mg  650 mg Oral Q6H PRN Mac Blunt APRN - CNP        Or    acetaminophen (TYLENOL) suppository 650 mg  650 mg Rectal Q6H PRN KELLIE Muniz CNP        [Held by provider] tamsulosin (FLOMAX) capsule 0.4 mg  0.4 mg Oral Daily KELLIE Muniz CNP   0.4 mg at 11/11/22 1019    docusate sodium (COLACE) capsule 100 mg  100 mg Oral Daily Leonor Gutter, APRN - CNP   100 mg at 22 0925    atorvastatin (LIPITOR) tablet 10 mg  10 mg Oral Daily Leonor Gutter, APRN - CNP   10 mg at 22 0925    amLODIPine (NORVASC) tablet 10 mg  10 mg Oral Daily Leonor Gutter, APRN - CNP   10 mg at 22 0673       Past Medical History:  Past Medical History:   Diagnosis Date    Arthritis     Cancer ([facility] [address]     HX LUNG CA    [facility]     MILD/FORMER SMOKER    Hyperlipidemia     Hypertension     Macular degeneration     Palliative care patient 2022    Retention of urine        Past Surgical History:  Past Surgical History:   Procedure Laterality Date    EYE SURGERY      MUSCLE WEAKNESS    GASTRECTOMY      PARTIAL FOR ULCER REPAIR    LUNG CANCER SURGERY Left     LLL LOBECTOMY    OTHER SURGICAL HISTORY      I & D OF GROIN INFECTION    TOTAL KNEE ARTHROPLASTY Right 2017    KNEE TOTAL ARTHROPLASTY performed by Lara Morgan MD at [facility] OR       Family History  Family History   Problem Relation Age of Onset    Diabetes Mother     Stroke Mother     Heart Attack Father     Cancer Father         ASBESTOS       Social History  Social History     Socioeconomic History    Marital status:       Spouse name: Not on file    Number of children: Not on file    Years of education: Not on file    Highest education level: Not on file   Occupational History    Not on file   Tobacco Use    Smoking status: Former     Types: Cigarettes     Quit date: 1984     Years since quittin.5    Smokeless tobacco: Never   Vaping Use    Vaping Use: Never used   Substance and Sexual Activity    Alcohol use: No    Drug use: No    Sexual activity: Not on file   Other Topics Concern    Not on file   Social History Narrative    Not on file     Social Determinants of Health     Financial Resource Strain: Not on file   Food Insecurity: Not on file   Transportation Needs: Not on file   Physical Activity: Not on file Stress: Not on file   Social Connections: Not on file   Intimate Partner Violence: Not on file   Housing Stability: Not on file         Review of Systems:  History obtained from chart review and the patient  General ROS: No fever or chills  Respiratory ROS: No cough, shortness of breath, wheezing  Cardiovascular ROS: No chest pain or palpitations  Gastrointestinal ROS: No abdominal pain or melena  Genito-Urinary ROS: No dysuria or hematuria  Musculoskeletal ROS: No joint pain or swelling         Objective:  Patient Vitals for the past 24 hrs:   BP Temp Temp src Pulse Resp SpO2   11/14/22 0836 (!) 167/91 97.4 °F (36.3 °C) -- 85 20 95 %   11/14/22 0622 -- -- -- -- -- 95 %   11/14/22 0440 (!) 161/77 98.4 °F (36.9 °C) Temporal 78 18 94 %   11/13/22 2017 135/69 97.2 °F (36.2 °C) Temporal 68 18 92 %   11/13/22 1949 -- -- -- -- -- 96 %   11/13/22 1645 133/76 98.6 °F (37 °C) Temporal 77 18 95 %       Intake/Output Summary (Last 24 hours) at 11/14/2022 1059  Last data filed at 11/13/2022 1146  Gross per 24 hour   Intake 120 ml   Output --   Net 120 ml     General: awake/alert   Chest:  clear to auscultation bilaterally  CVS: regular rate and rhythm  Abdominal: soft, nontender, normal bowel sounds  Extremities: no cyanosis or edema  Skin: warm and dry without rash    Labs:  BMP:   Recent Labs     11/12/22  0151 11/12/22  1639 11/13/22  0415 11/14/22  0300   * 123* 125* 129*   K 3.6  --  3.6  3.6 3.8   CL 89*  --  90* 93*   CO2 23  --  29 25   BUN 18  --  14 11   CREATININE 0.8  --  0.8 0.8   CALCIUM 8.5*  --  8.7* 8.4*     CBC:   Recent Labs     11/13/22  0415 11/14/22  0300   WBC 10.1 8.8   HGB 13.2* 13.2*   HCT 38.5* 39.6*   MCV 94.6* 96.4*    212     LIVER PROFILE:   Recent Labs     11/13/22  0415 11/14/22  0300   AST 26 24   ALT 19 20   BILITOT 0.7 0.6   ALKPHOS 84 83     PT/INR: No results for input(s): PROTIME, INR in the last 72 hours. APTT: No results for input(s): APTT in the last 72 hours.   BNP:  No results for input(s): BNP in the last 72 hours. Ionized Calcium:No results for input(s): IONCA in the last 72 hours. Magnesium:No results for input(s): MG in the last 72 hours. Phosphorus:No results for input(s): PHOS in the last 72 hours. HgbA1C: No results for input(s): LABA1C in the last 72 hours. Hepatic:   Recent Labs     11/13/22  0415 11/14/22  0300   ALKPHOS 84 83   ALT 19 20   AST 26 24   PROT 6.1* 6.0*   BILITOT 0.7 0.6   LABALBU 4.4 4.0     Lactic Acid: No results for input(s): LACTA in the last 72 hours. Troponin: No results for input(s): CKTOTAL, CKMB, TROPONINT in the last 72 hours. ABGs: No results found for: PHART, PO2ART, [id number]  CRP:  No results for input(s): CRP in the last 72 hours. Sed Rate:  No results for input(s): SEDRATE in the last 72 hours. Culture Results:   Blood Culture Recent: No results for input(s): BC in the last 720 hours. Urine Culture Recent : No results for input(s): LABURIN in the last 720 hours. Radiology reports as per the Radiologist  Radiology: CT HEAD WO CONTRAST    Result Date: 11/11/2022  NO PRIOR REPORT AVAILABLE Exam: CT OF THE BRAIN WITHOUT CONTRAST Clinical data: Right arm weakness. Technique: Contiguous axial images are obtained from the skull base to vertex without intravenous contrast.  Reformatted/MPR images were performed. Radiation dose: CTDIvol = 34.75 mGy, DLP = 713 mGy x cm. Prior studies: No prior studies submitted. Findings:  Mild age-appropriate cerebral volume loss. Patchy hypodensities in the periventricular regions most prominent in the left frontal zone with a few left anterior basal ganglia lacunar infarcts. No evidence of acute cortical infarction, hemorrhage, mass or mass effect. No hydrocephalus or abnormal extra-axial fluid collections are present. The posterior fossa is unremarkable. The skull base and calvarium are intact. The included portions of the paranasal sinuses and mastoid air cells are clear.       1.  No acute intracranial bleed, no mass, no hypodense lobar infarct. 2. Patchy hypodensities in the periventricular regions most prominent in the left frontal zone. 3.  A few left anterior basal ganglia lacunar infarcts of indeterminate age. Recommendation: Follow up as clinically indicated. All CT scans at this facility utilize dose modulation, iterative reconstruction, and/or weight based dosing when appropriate to reduce radiation dose to as low as reasonably achievable. Electronically Signed by Faheem Alonso MD at 11-Nov-2022 07:35:30 PM EST             XR CHEST PORTABLE    Result Date: 11/10/2022  NO PRIOR REPORT AVAILABLE Exam: X-RAY OF [facility] Clinical data:Shortness of breath. Technique:Single view of the chest. Prior studies: No prior studies submitted. Findings:Bilateral lung hyperinflation with basilar atelectasis. No focal pneumonic consolidation or pleural effusion. Cardiac silhouette is within normal limits. No acute osseous abnormality is detected. No focal pneumonic consolidation, no pleural effusion or elliott CHF. Recommendation: Follow up as clinically indicated. Electronically Signed by Faheem Alonso MD at [id number] 09:62:83 PM EST                Assessment     Hyponatremia  SIADH  COPD  Lung cancer  Hypertension      Plan:  Discussed with patient, nursing   Work-up reviewed to date  Monitor labs  Sodium levels improved with limits administration of IV fluids due to constant IV issues.   We will discontinue these as we currently do not have IV access and continue to monitor serum sodium level  Home medication usage included NSAIDs and SSRIs which could contribute to ongoing hyponatremic issues  Blood pressure trends have continued to rise from admission minimal hypotension up to systolics of 752D this morning, consider addition of hydralazine for short acting blood pressure management until more stable    Sunny Gaming MD  11/14/22  10:59 AM

## 2022-11-14 NOTE — PROGRESS NOTES
Patient:   Chet Crawford  MR#:    [id number]   Room:    [facility]   YOB: 1940  Date of Progress Note: 11/14/2022  Time of Note                           9:37 AM  Consulting Physician:   Austin Levin M.D. Attending Physician:  Pedro Luis Pollard MD     CHIEF COMPLAINT: Right arm weakness  Subjective   This 79 y.o. male who presents with shortness of breath. Has a history of lung cancer and COPD. Was admitted for difficulty breathing and noted some weakness in his right deltoid 11/11. He still has it. He had surgery to correct double vision about 10 years ago and since that time he has developed it again and also has a couple of months of left eyelid ptosis. Also has trouble with jaw fatigue with chewing as well as occasional trouble swallowing. He is claustrophobic and does not want to have an MRI. He had a CT of the head 11/11 showing some small vessel ischemic change. I am asked to see him for right arm weakness to see if this could be a stroke. Also found to be hyponatremic with a sodium of 123 in the ED. Chest x-ray relatively unremarkable. No improvement in any of his symptoms with Mestinon. Now saying that his right arm symptoms are associated with pain  REVIEW OF SYSTEMS:  Constitutional: No fevers No chills  Neck:No stiffness  Respiratory: No shortness of breath  Cardiovascular: No chest pain No palpitations  Gastrointestinal: No abdominal pain    Genitourinary: No Dysuria  Neurological: No headache, no confusion      PHYSICAL EXAM:  BP (!) 167/91   Pulse 85   Temp 97.4 °F (36.3 °C)   Resp 20   Ht 6' (1.829 m)   Wt 210 lb 5 oz (95.4 kg)   SpO2 95%   BMI 28.52 kg/m²     Constitutional: he appears well-developed and well-nourished. Eyes - conjunctiva normal.  Pupils react to light  Ear, nose, throat -hearing intact to voice.  No scars, masses, or lesions over external nose or ears, no atrophy of tongue  Neck-symmetric, no masses noted, no jugular vein distension  Respiration- chest wall appears symmetric, good expansion,   normal effort without use of accessory muscles  Cardiovascular- RRR  Musculoskeletal - no significant wasting of muscles noted, no bony deformities, gait no gross ataxia  Extremities-no clubbing, cyanosis or edema  Skin - warm, dry, and intact. No rash, erythema, or pallor. Psychiatric - mood, affect, and behavior appear normal.      Neurology  NEUROLOGICAL EXAM:      Mental status   Awake, alert, fluent oriented x 3 appropriate affect  Attention and concentration appear appropriate  Recent and remote memory appears unremarkable  Speech normal without dysarthria  No clear issues with language       Cranial Nerves   CN II- Visual fields grossly unremarkable  CN III, IV,VI-EOMI, No nystagmus, conjugate eye movements, left-sided ptosis  CN VII-no facial asymmetry  CN VIII-Hearing intact   CN IX and X- Palate elevates in midline  CN XI-good shoulder shrug  CN XII-Tongue midline with no fasciculations or fibrillations          Motor function  Antigravity x 4 although can barely lift the right arm against gravity                           Nursing/pcp notes, imaging,labs and vitals reviewed.          Lab Results   Component Value Date    WBC 8.8 11/14/2022    HGB 13.2 (L) 11/14/2022    HCT 39.6 (L) 11/14/2022    MCV 96.4 (H) 11/14/2022     11/14/2022     Lab Results   Component Value Date     (L) 11/14/2022    K 3.8 11/14/2022    CL 93 (L) 11/14/2022    CO2 25 11/14/2022    BUN 11 11/14/2022    CREATININE 0.8 11/14/2022    GLUCOSE 112 (H) 11/14/2022    CALCIUM 8.4 (L) 11/14/2022    PROT 6.0 (L) 11/14/2022    LABALBU 4.0 11/14/2022    BILITOT 0.6 11/14/2022    ALKPHOS 83 11/14/2022    AST 24 11/14/2022    ALT 20 11/14/2022    LABGLOM >60 11/14/2022    GFRAA >60 07/05/2022     Lab Results   Component Value Date    INR 1.10 04/27/2017   No results found for: PHENYTOIN, ESR, CRP    PT,OT and/or speech notes reviewed:      RECORD REVIEW: Previous medical records, medications were reviewed at today's visit    IMPRESSION:   Patient with right arm weakness. Unclear if it was acute in onset or not although the patient believes it is. .  Patient declines MRI which would be the test of choice to rule out a new stroke. Carotid Dopplers done but results are pending. Now a baby aspirin was added. Additionally, given his double vision, ptosis, jaw fatigue and intermittent dysphagia he may have myasthenia gravis as a source for many of his problems including some component of his shortness of air. He will be placed on Mestinon empirically and we will check acetylcholine receptor antibodies. Increase Mestinon today. This may help him move his bowels which has been problematic. Ask for orthopedic consult to rule out rotator cuff injury, etc.  ?

## 2022-11-14 NOTE — PROGRESS NOTES
Daily Progress Note    Date:2022  Patient: Nathalia Spain  : 1940  TLK:[id number]  CODE:Full Code No additional code details  Sofi Iniguez DO    Admit Date: 11/10/2022  5:01 PM   LOS: 3 days       Subjective:    seen and evaluated at bedside the patient is getting IV fluid according to nephrology he remarked that he is feeling better and discussed about the present management of myasthenia gravis and dehydration with IV fluid resuscitation and likely take 3 to 4 days for better sodium level    Summary: 79-year-old male with emphysema followed by pulmonology but not on any therapy, history of lung cancer, depression, hypertension, BPH presented due to ongoing dyspnea on exertion however no significant shortness of breath at rest, no wheezing. He was admitted as he was incidentally found to have serum sodium of 123. He was recently prescribed an SSRI which is started last week. He thinks because of poor vision he got depressed as he is devoid of watching TV. Over the past week he has had relatively poor p.o. intake, suspected low solute diet. Managed with fluid restriction. COPD treated with bronchodilators. Obtained echo to further evaluate his dyspnea on exertion.     For orthostatic vital he has got fluid resuscitation however initially the patient was suspected SIADH and placed on fluid restriction and started on urea tablet, later on after thorough evaluation of labs, serum and urine sample, he was found to be internally dehydrated, started on IV fluid infusion with sodium follow-up nephrology on board    feeling weak, having ptosis in the left eye, also complaining of difficulty in swallowing and sometimes choking with eating, weakness in the right arm, discussed about possibility of myasthenia gravis for which neurologist is already working      Review of Systems  14 point review of systems completed and is negative except as otherwise noted      Objective:      Vital signs in last 24 hours:  Patient Vitals for the past 24 hrs:   BP Temp Temp src Pulse Resp SpO2 Weight   11/13/22 1645 133/76 98.6 °F (37 °C) Temporal 77 18 95 % --   11/13/22 0750 (!) 155/79 98.3 °F (36.8 °C) Temporal 73 18 96 % --   11/13/22 0624 -- -- -- -- -- 95 % --   11/13/22 0542 -- -- -- -- -- -- 210 lb 5 oz (95.4 kg)   11/13/22 0330 -- -- -- -- -- 97 % --   11/12/22 2013 136/76 98.1 °F (36.7 °C) -- 67 18 95 % --       Physical exam    General: No acute distress  Psych: Calm and cooperative, answers questions appropriately  HEENT: NC/AT, normal sclera, drooping of the left eyelid  Cardiovascular: Normal rate, regular rhythm, S1-S2 present  Respiratory: Diminished breath sounds bilaterally, no wheezes rales or rhonchi  Abdomen: Nondistended, nontender  Neurologic: Alert, conversant, normal speech, nonfocal  Extremities: No clubbing, cyanosis or edema  Skin: Warm and dry, well perfused          Lab Review   Recent Results (from the past 24 hour(s))   Basic Metabolic Panel w/ Reflex to MG    Collection Time: 11/13/22  4:15 AM   Result Value Ref Range    Potassium reflex Magnesium 3.6 3.5 - 5.0 mmol/L   CBC    Collection Time: 11/13/22  4:15 AM   Result Value Ref Range    WBC 10.1 4.8 - 10.8 K/uL    RBC 4.07 (L) 4.70 - 6.10 M/uL    Hemoglobin 13.2 (L) 14.0 - 18.0 g/dL    Hematocrit 38.5 (L) 42.0 - 52.0 %    MCV 94.6 (H) 80.0 - 94.0 fL    MCH 32.4 (H) 27.0 - 31.0 pg    MCHC 34.3 33.0 - 37.0 g/dL    RDW 12.4 11.5 - 14.5 %    Platelets 730 905 - 543 K/uL    MPV 8.8 (L) 9.4 - 12.4 fL   Comprehensive Metabolic Panel    Collection Time: 11/13/22  4:15 AM   Result Value Ref Range    Sodium 125 (L) 136 - 145 mmol/L    Potassium 3.6 3.5 - 5.0 mmol/L    Chloride 90 (L) 98 - 111 mmol/L    CO2 29 22 - 29 mmol/L    Anion Gap 6 (L) 7 - 19 mmol/L    Glucose 111 (H) 74 - 109 mg/dL    BUN 14 8 - 23 mg/dL    Creatinine 0.8 0.5 - 1.2 mg/dL    Est, Glom Filt Rate >60 >60    Calcium 8.7 (L) 8.8 - 10.2 mg/dL    Total Protein 6.1 (L) 6.6 - 8.7 g/dL Albumin 4.4 3.5 - 5.2 g/dL    Total Bilirubin 0.7 0.2 - 1.2 mg/dL    Alkaline Phosphatase 84 40 - 130 U/L    ALT 19 5 - 41 U/L    AST 26 5 - 40 U/L       I/O last 3 completed shifts:   In: 540 [P.O.:540]  Out: -   I/O this shift:  In: 120 [P.O.:120]  Out: -       Current Facility-Administered Medications:     bisacodyl (DULCOLAX) EC tablet 10 mg, 10 mg, Oral, Daily PRN, Michelle Madison MD    escitalopram (LEXAPRO) tablet 5 mg, 5 mg, Oral, Daily, Laci Siu MD    pyridostigmine (MESTINON) tablet 30 mg, 30 mg, Oral, 3 times per day, Mary Guerra MD, 30 mg at 11/13/22 1549    aspirin chewable tablet 81 mg, 81 mg, Oral, Daily, Mary Guerra MD, 81 mg at 11/13/22 0749    0.9 % sodium chloride infusion, , IntraVENous, Continuous, Laci Siu MD, Last Rate: 100 mL/hr at 11/13/22 0323, New Bag at 11/13/22 0323    fluticasone (FLONASE) 50 MCG/ACT nasal spray 2 spray, 2 spray, Each Nostril, Daily, Margarito Mcmanus MD, 2 spray at 11/13/22 0749    [Held by provider] propranolol (INDERAL LA) extended release capsule 60 mg, 60 mg, Oral, Daily, Margarito Mcmanus MD, 60 mg at 11/11/22 1205    acetaminophen (TYLENOL) tablet 500 mg, 500 mg, Oral, Nightly PRN **AND** diphenhydrAMINE (BENADRYL) tablet 25 mg, 25 mg, Oral, Nightly PRN, Margarito Mcmanus MD    ipratropium-albuterol (DUONEB) nebulizer solution 1 ampule, 1 ampule, Inhalation, Q4H WA, Margarito Mcmanus MD, 1 ampule at 11/13/22 1046    guaiFENesin (MUCINEX) extended release tablet 600 mg, 600 mg, Oral, BID, Margarito Mcmanus MD, 600 mg at 11/13/22 0749    sodium chloride flush 0.9 % injection 5-40 mL, 5-40 mL, IntraVENous, 2 times per day, KELLIE Buck - CNP, 10 mL at 11/13/22 0750    sodium chloride flush 0.9 % injection 5-40 mL, 5-40 mL, IntraVENous, PRN, Easton Geronimo, KELLIE - CNP    0.9 % sodium chloride infusion, , IntraVENous, PRN, Easton Geronimo, APRN - CNP    [Held by provider] enoxaparin (LOVENOX) injection 40 mg, 40 mg, SubCUTAneous, Daily, KELLIE Buck - CNP, 40 mg at 11/11/22 1020    ondansetron (ZOFRAN-ODT) disintegrating tablet 4 mg, 4 mg, Oral, Q8H PRN **OR** ondansetron (ZOFRAN) injection 4 mg, 4 mg, IntraVENous, Q6H PRN, Marolyn Jozefzier, APRN - CNP    acetaminophen (TYLENOL) tablet 650 mg, 650 mg, Oral, Q6H PRN **OR** acetaminophen (TYLENOL) suppository 650 mg, 650 mg, Rectal, Q6H PRN, Marolyn Jozefzier, APRN - CNP    [Held by provider] tamsulosin (FLOMAX) capsule 0.4 mg, 0.4 mg, Oral, Daily, Marolyn Crazier, APRN - CNP, 0.4 mg at 11/11/22 1019    docusate sodium (COLACE) capsule 100 mg, 100 mg, Oral, Daily, Marolyn Crazier, APRN - CNP, 100 mg at 11/13/22 0749    atorvastatin (LIPITOR) tablet 10 mg, 10 mg, Oral, Daily, Marolyn Crazier, APRN - CNP, 10 mg at 11/13/22 0749    amLODIPine (NORVASC) tablet 10 mg, 10 mg, Oral, Daily, 10 mg at 11/13/22 0749 **AND** [DISCONTINUED] lisinopril (PRINIVIL;ZESTRIL) tablet 20 mg, 20 mg, Oral, Daily, Marolyn Jozefzier, APRN - CNP, 20 mg at 11/10/22 2542        Assessment/plan  Principal Problem:    Hyponatremia  Resolved Problems:    * No resolved hospital problems.  *      Hyponatremia, likely related to poor intake, solute diet  --Avoid offending agents  --Follow-up serum/urine osmolality and urine sodium  - Thyroid function appropriate  - Monitor mental status  - Follow labs  - Continue fluid/water restriction  - Give 500 cc normal saline given component of hypovolemia  - Give dose of Urea> could not able to tolerate urea  --Follow labs> TSH 0.6 within normal limit  - Avoid overcorrection  11/12 reviewed BMP, BUN 10-18, uric acid 5.1, urinary specific gravity 1.01, serum osmolality 257, the patient is having poor oral intake, reported to be orthostatic and fluid has been given so suspected of intravascular dehydration which is supported by the fact urine sodium less than 20 and urine osmolality 597 which is appropriate SIADH secretion conserving water from the kidney, will start on normal saline 100 cc/h for 1 L only and continue monitoring

## 2022-11-15 VITALS
RESPIRATION RATE: 18 BRPM | DIASTOLIC BLOOD PRESSURE: 88 MMHG | BODY MASS INDEX: 28.48 KG/M2 | WEIGHT: 210.31 LBS | HEIGHT: 72 IN | OXYGEN SATURATION: 93 % | TEMPERATURE: 97.5 F | HEART RATE: 78 BPM | SYSTOLIC BLOOD PRESSURE: 141 MMHG

## 2022-11-15 LAB
ANION GAP SERPL CALCULATED.3IONS-SCNC: 13 MMOL/L (ref 7–19)
BUN BLDV-MCNC: 8 MG/DL (ref 8–23)
CALCIUM SERPL-MCNC: 8.6 MG/DL (ref 8.8–10.2)
CHLORIDE BLD-SCNC: 93 MMOL/L (ref 98–111)
CO2: 25 MMOL/L (ref 22–29)
CREAT SERPL-MCNC: 0.7 MG/DL (ref 0.5–1.2)
GFR SERPL CREATININE-BSD FRML MDRD: >60 ML/MIN/{1.73_M2}
GLUCOSE BLD-MCNC: 115 MG/DL (ref 74–109)
MAGNESIUM: 1.8 MG/DL (ref 1.6–2.4)
POTASSIUM REFLEX MAGNESIUM: 3.5 MMOL/L (ref 3.5–5)
SODIUM BLD-SCNC: 131 MMOL/L (ref 136–145)

## 2022-11-15 PROCEDURE — 94760 N-INVAS EAR/PLS OXIMETRY 1: CPT

## 2022-11-15 PROCEDURE — 80048 BASIC METABOLIC PNL TOTAL CA: CPT

## 2022-11-15 PROCEDURE — 6370000000 HC RX 637 (ALT 250 FOR IP): Performed by: PSYCHIATRY & NEUROLOGY

## 2022-11-15 PROCEDURE — 83735 ASSAY OF MAGNESIUM: CPT

## 2022-11-15 PROCEDURE — 94640 AIRWAY INHALATION TREATMENT: CPT

## 2022-11-15 PROCEDURE — 97165 OT EVAL LOW COMPLEX 30 MIN: CPT

## 2022-11-15 PROCEDURE — 6370000000 HC RX 637 (ALT 250 FOR IP): Performed by: STUDENT IN AN ORGANIZED HEALTH CARE EDUCATION/TRAINING PROGRAM

## 2022-11-15 PROCEDURE — 97530 THERAPEUTIC ACTIVITIES: CPT

## 2022-11-15 PROCEDURE — 6360000002 HC RX W HCPCS: Performed by: NURSE PRACTITIONER

## 2022-11-15 PROCEDURE — 2580000003 HC RX 258: Performed by: NURSE PRACTITIONER

## 2022-11-15 PROCEDURE — 6370000000 HC RX 637 (ALT 250 FOR IP): Performed by: NURSE PRACTITIONER

## 2022-11-15 PROCEDURE — 36415 COLL VENOUS BLD VENIPUNCTURE: CPT

## 2022-11-15 PROCEDURE — 99232 SBSQ HOSP IP/OBS MODERATE 35: CPT | Performed by: PSYCHIATRY & NEUROLOGY

## 2022-11-15 RX ORDER — BUPROPION HYDROCHLORIDE 150 MG/1
150 TABLET ORAL DAILY
Qty: 30 TABLET | Refills: 0 | Status: SHIPPED | OUTPATIENT
Start: 2022-11-16

## 2022-11-15 RX ORDER — PYRIDOSTIGMINE BROMIDE 60 MG/1
60 TABLET ORAL EVERY 8 HOURS SCHEDULED
Qty: 90 TABLET | Refills: 0 | Status: SHIPPED | OUTPATIENT
Start: 2022-11-15

## 2022-11-15 RX ADMIN — DOCUSATE SODIUM 100 MG: 100 CAPSULE, LIQUID FILLED ORAL at 07:44

## 2022-11-15 RX ADMIN — BUPROPION HYDROCHLORIDE 150 MG: 150 TABLET, EXTENDED RELEASE ORAL at 07:44

## 2022-11-15 RX ADMIN — IPRATROPIUM BROMIDE AND ALBUTEROL SULFATE 1 AMPULE: 2.5; .5 SOLUTION RESPIRATORY (INHALATION) at 07:53

## 2022-11-15 RX ADMIN — ATORVASTATIN CALCIUM 10 MG: 10 TABLET, FILM COATED ORAL at 07:44

## 2022-11-15 RX ADMIN — ENOXAPARIN SODIUM 40 MG: 100 INJECTION SUBCUTANEOUS at 07:43

## 2022-11-15 RX ADMIN — PYRIDOSTIGMINE BROMIDE 60 MG: 60 TABLET ORAL at 05:36

## 2022-11-15 RX ADMIN — ASPIRIN 81 MG 81 MG: 81 TABLET ORAL at 07:44

## 2022-11-15 RX ADMIN — PYRIDOSTIGMINE BROMIDE 60 MG: 60 TABLET ORAL at 14:26

## 2022-11-15 RX ADMIN — AMLODIPINE BESYLATE 10 MG: 10 TABLET ORAL at 07:44

## 2022-11-15 RX ADMIN — GUAIFENESIN 600 MG: 600 TABLET ORAL at 07:44

## 2022-11-15 RX ADMIN — FLUTICASONE PROPIONATE 2 SPRAY: 50 SPRAY, METERED NASAL at 07:45

## 2022-11-15 RX ADMIN — IPRATROPIUM BROMIDE AND ALBUTEROL SULFATE 1 AMPULE: 2.5; .5 SOLUTION RESPIRATORY (INHALATION) at 11:27

## 2022-11-15 RX ADMIN — SODIUM CHLORIDE, PRESERVATIVE FREE 10 ML: 5 INJECTION INTRAVENOUS at 07:45

## 2022-11-15 NOTE — DISCHARGE SUMMARY
[place], [place], [address]    DEPARTMENT OF HOSPITALIST MEDICINE      DISCHARGE SUMMARY:      PATIENT NAME:  Jacob Hartman  :  1940  MRN:  [id number]    Admission Date:   11/10/2022  5:01 PM Attending: Guillaume Cohen MD   Discharge Date:   11/15/2022              PCP: Colette Wesley DO  Length of Stay: 5 days     Chief Complaint on Admission:   Chief Complaint   Patient presents with    Shortness of Breath     Pt arrived to the ed with c/o shortness of breath and feeling like \"there's something in my throat. \" Onset a couple of days. Worse today. Consultants:     PALLIATIVE CARE EVAL  IP CONSULT TO NEUROLOGY  IP CONSULT TO NEPHROLOGY  IP CONSULT TO NEPHROLOGY  IP CONSULT TO [id number] [address]  COURSE  AND  TREATMENT:  21-year-old male with emphysema followed by pulmonology but not on any therapy, history of lung cancer, depression, hypertension, BPH presented due to ongoing dyspnea on exertion however no significant shortness of breath at rest, no wheezing. He was admitted as he was incidentally found to have serum sodium of 123. He was recently prescribed an SSRI which is started last week. He thinks because of poor vision he got depressed as he is devoid of watching TV. Over the past week he has had relatively poor p.o. intake, suspected low solute diet. Managed with fluid restriction. COPD treated with bronchodilators. Obtained echo to further evaluate his dyspnea on exertion. For orthostatic vital he has got fluid resuscitation however initially the patient was suspected SIADH and placed on fluid restriction and started on urea tablet, later on after thorough evaluation of labs, serum and urine sample, he was found to be internally dehydrated, started on IV fluid infusion with nephrology on board. Sodium improved to 131 prior to discharge.      feeling weak, having ptosis in the left eye, also complaining of difficulty in swallowing and sometimes choking with eating, weakness in the right arm. Neurology was consulted. Patient deferred MRI of brain. They are empirically treating myasthenia with Mestinon and increase dose as needed, acetylcholinesterase antibody panel sent. Discharge Problem List:   Principal Problem:    Hyponatremia  Resolved Problems:    * No resolved hospital problems. *         Last dated Assessment and Plan . .. 11/14/22        OBJECTIVE:  BP (!) 141/88   Pulse 78   Temp 97.5 °F (36.4 °C)   Resp 18   Ht 6' (1.829 m)   Wt 210 lb 5 oz (95.4 kg)   SpO2 93%   BMI 28.52 kg/m²       Heart: RRR   Lungs: Bilateral fair air entry   Abdomen: Soft, non-tender   Extremities: No edema   Neurologic: Alert and oriented   Skin: Warm and dry          Laboratory Data:  Recent Labs     11/13/22 0415 11/14/22  0300   WBC 10.1 8.8   HGB 13.2* 13.2*    212     Recent Labs     11/14/22  0300 11/14/22  1338 11/15/22  0351   * 127* 131*   K 3.8 3.5 3.5   CL 93* 92* 93*   CO2 25 26 25   BUN 11 13 8   CREATININE 0.8 0.9 0.7   GLUCOSE 112* 131* 115*     Recent Labs     11/13/22 0415 11/14/22  0300   AST 26 24   ALT 19 20   BILITOT 0.7 0.6   ALKPHOS 84 83     Troponin T: No results for input(s): TROPONINI in the last 72 hours. Pro-BNP: No results for input(s): BNP in the last 72 hours. INR: No results for input(s): INR in the last 72 hours. UA:No results for input(s): NITRITE, COLORU, PHUR, LABCAST, WBCUA, RBCUA, MUCUS, TRICHOMONAS, YEAST, BACTERIA, CLARITYU, SPECGRAV, LEUKOCYTESUR, UROBILINOGEN, BILIRUBINUR, BLOODU, GLUCOSEU, AMORPHOUS in the last 72 hours. Invalid input(s): Teddy Maldonadotead  A1C: No results for input(s): LABA1C in the last 72 hours. ABG:No results for input(s): PHART, CYS2CCF, PO2ART, IHZ1WQV, BEART, HGBAE, P3UZCKOM, CARBOXHGBART in the last 72 hours.          Impressions of imaging performed in 48 hours before discharge:    CT HEAD WO CONTRAST    Result Date: 11/11/2022  NO PRIOR REPORT AVAILABLE Exam: CT OF THE BRAIN Obi Dumas MD at 10-Nov-2022 72:23:71 PM EST             VL DUP CAROTID BILATERAL    Result Date: 11/14/2022  Vascular Carotid Procedure  Demographics   Patient Name    Tim Abreu Age                  79   Patient Number  [id number]          Gender               Male   Visit Number    [id number]       Interpreting         Adalid Painting                                  Physician   Date of Birth   1940      Referring Physician  Tato Marker   Accession       [id number]      [address] RT, RVT  Number  Procedure Type of Study:   Cerebral:Carotid, VL CAROTID BILATERAL. Indications for Study:CVA and Diplopia. Risk Factors   - The patient's risk factor(s) include: dyslipidemia and arterial     hypertension.   - The patient has a former tobacco history. Impression   There is mixed plaque visualized in the bilateral internal carotid  artery(ies). There is less than 50% stenosis in the right internal carotid artery. There is less than 50% stenosis of the left internal carotid artery. There is normal antegrade flow in the bilateral vertebral artery(ies). Signature   ----------------------------------------------------------------  Electronically signed by Darrell Uriarte(Interpreting  physician) on 11/14/2022 02:01 PM  ----------------------------------------------------------------  Blood Pressure:Left arm 176/76 mmHg. Velocities are measured in cm/s ; Diameters are measured in mm Carotid Right Measurements +------------+-------+-------+--------+-------+------------+---------------+ ! Location    ! PSV    ! EDV    ! Angle   ! RI     !%Stenosis   ! Tortuosity     ! +------------+-------+-------+--------+-------+------------+---------------+ ! Prox CCA    !96.3   !16.2   ! 60      !0.83   !            !               ! +------------+-------+-------+--------+-------+------------+---------------+ ! Mid CCA     !88.8   !16.2   ! 60      !0.82   !            !               ! +------------+-------+-------+--------+-------+------------+---------------+ ! Prox ICA    !68     !13.7   !60      !0.8    ! !               ! +------------+-------+-------+--------+-------+------------+---------------+ ! Mid ICA     !64.8   !17     !60      !0.74   !            !               ! +------------+-------+-------+--------+-------+------------+---------------+ ! Dist ICA    !110    !27.3   ! 60      !0.75   !            !               ! +------------+-------+-------+--------+-------+------------+---------------+ ! Prox ECA    !104    !12.4   !60      !0.88   !            !               ! +------------+-------+-------+--------+-------+------------+---------------+ ! Vertebral   !51.6   !12.4   !60      !0.76   !            !               ! +------------+-------+-------+--------+-------+------------+---------------+   - There is antegrade vertebral flow noted on the right side. - Additional Measurements:ICAPSV/CCAPSV 1.14. ICAEDV/CCAEDV 1.69. Carotid Left Measurements +------------+-------+-------+--------+-------+------------+---------------+ ! Location    ! PSV    ! EDV    ! Angle   ! RI     !%Stenosis   ! Tortuosity     ! +------------+-------+-------+--------+-------+------------+---------------+ ! Prox CCA    !106    !14.3   !60      !0.87   !            !               ! +------------+-------+-------+--------+-------+------------+---------------+ ! Mid CCA     !92.6   !15.5   !60      !0.83   !            !               ! +------------+-------+-------+--------+-------+------------+---------------+ ! Prox ICA    ! 84.5   !14.9   !60      !0.82   !            !               ! +------------+-------+-------+--------+-------+------------+---------------+ ! Mid ICA     !70.8   !19.3   !60      !0.73   !            !               ! +------------+-------+-------+--------+-------+------------+---------------+ ! Dist ICA    !69     !18     !60      !0.74   !            !               ! +------------+-------+-------+--------+-------+------------+---------------+ ! Prox ECA    !121    !13.7   !60      !0.89   !            !               ! +------------+-------+-------+--------+-------+------------+---------------+ ! Vertebral   !82.6   !20.5   !60      !0.75   !            !               ! +------------+-------+-------+--------+-------+------------+---------------+   - There is antegrade vertebral flow noted on the left side. - Additional Measurements:ICAPSV/CCAPSV 0.8. ICAEDV/CCAEDV 1.35. Medication List        START taking these medications      buPROPion 150 MG extended release tablet  Commonly known as: WELLBUTRIN XL  Take 1 tablet by mouth daily  Start taking on: November 16, 2022     pyridostigmine 60 MG tablet  Commonly known as: MESTINON  Take 1 tablet by mouth every 8 hours            CHANGE how you take these medications      diphenhydrAMINE-APAP (sleep)  MG tablet  Commonly known as: TYLENOL PM EXTRA STRENGTH  What changed: Another medication with the same name was removed. Continue taking this medication, and follow the directions you see here.             CONTINUE taking these medications      alfuzosin 10 MG extended release tablet  Commonly known as: UROXATRAL     amLODIPine-benazepril 2.5-10 MG per capsule  Commonly known as: LOTREL     clonazePAM 0.5 MG tablet  Commonly known as: KLONOPIN     fluticasone 50 MCG/ACT nasal spray  Commonly known as: FLONASE     furosemide 20 MG tablet  Commonly known as: LASIX     KLOR-CON 10 PO     lovastatin 40 MG tablet  Commonly known as: MEVACOR     PreserVision AREDS 2+Multi Vit Caps     propranolol 60 MG extended release capsule  Commonly known as: INDERAL LA            STOP taking these medications      celecoxib 200 MG capsule  Commonly known as: CeleBREX     escitalopram 5 MG tablet  Commonly known as: LEXAPRO     ibuprofen 200 MG tablet  Commonly known as: ADVIL;MOTRIN            ASK your doctor about these medications aspirin EC 81 MG EC tablet  Take 1 tablet by mouth 2 times daily     docusate sodium 100 MG capsule  Commonly known as: Colace  Take 1 capsule by mouth daily To prevent constipation     oxyCODONE-acetaminophen 5-325 MG per tablet  Commonly known as: Percocet  One or two every 4 to 6 hours prn. Where to Get Your Medications        These medications were sent to [address], [address] [place] [address], [address] [place] [address] [place] [address]      Phone: [phone]   buPROPion 150 MG extended release tablet  pyridostigmine 60 MG tablet           ISSUES TO BE ADDRESSED AT HOSPITAL FOLLOW-UP VISIT:    Follow-up with nephrology for hyponatremia scheduled. Follow-up with neurology for possible myasthenia gravis. Patient discharged on Mestinon 60 mg every 8 hours. Advised patient to follow-up closely with PCP upon discharge for management of chronic medical issues    Condition on Discharge: stable  Discharge Disposition: Home    Recommended Follow Up:  Kiran Yeboah MD  [address]  [phone]    Go on 11/15/2022  @ [phone] forPulmonology follow up for COPD. Please arrive 15 minutes prior to your appointment with your ID, insurance cards,  medication list, and any documents needing filled at that time    Lyudmila Lin DO  [address]  [id number]  [phone]    Schedule an appointment as soon as possible for a visit on 11/21/2022  AT  11:15      follow up on hyponatremia, low sodium    Followup Appointments Scheduled at Time of Discharge:  No future appointments. Discharge Instructions:   Please see the discharge paperwork. Patient was seen at bedside today, and the examination shows improvement since yesterday. Detailed discharge directions delivered to the patient by myself and our nursing staff, who verbalizes understanding and is satisfied with the plan.  Patient has been advised to continue all medications as prescribed and advised, and f/u with PCP within 1 week. Patient is stable from medical standpoint to be discharged. Total time spent during patient evaluation and assessment, discussion with the nurse/family, addressing discharge medications/scripts and coordination of care for safe discharge was in excess of 33 minutes.       Signed Electronically:    Robert Rojas MD  12:47 PM 11/15/2022

## 2022-11-15 NOTE — CARE COORDINATION
CM met with patient at the bedside. Pt reports, he is doing better. He had become depressed about how weak he was prior to admission but better now. Pt lives w/dtr and her family. Pt independent with ADL's/IADL's and had gotten depressed about losing his independence. Pt has cane/walker. Pt still driving. Pt considering of [facility] [employer] [facility].   11/15/22 9325   Service Assessment   Patient Orientation Alert and Oriented   Cognition Alert   History Provided By Patient   Primary [address]   PCP Verified by CM Yes   Prior Functional Level Independent in ADLs/IADLs   Current Functional Level Independent in ADLs/IADLs   Can patient return to prior living arrangement Yes   Ability to make needs known: Good   Family able to assist with home care needs: Yes   Would you like for me to discuss the discharge plan with any other family members/significant others, and if so, who? No   Financial Resources [employer]   (none at this time)   Social/Functional History   Lives With Daughter   Type of [address] Shower/Tub Tub/Shower unit   Gabbie Cane;Walker, rolling  (Does not use either in the house)   ADL Assistance Independent   Ambulation Assistance Independent   Transfer Assistance Independent   Active  Yes   Occupation Retired   Discharge Planning   Type of [address]  (\"father in law\" apartment)   Potential Assistance Needed N/A   DME Ordered? No   Type of Home Care Services None   Patient expects to be discharged to: House   Condition of Participation: Discharge Planning   Freedom of Choice list was provided with basic dialogue that supports the patient's individualized plan of care/goals, treatment preferences, and shares the quality data associated with the providers?   Yes   Royce Horner  Patient Contact Information:    [address]  [facility]  [phone] (home)   Telephone Information:   Mobile [phone]     Above information verified? [x]   Yes  []   No      Emergency Contacts:    Extended Emergency Contact Information  Primary Emergency Contact: Carie Parker  Address: [address] [place], [place] [address] [address] Phone: [phone]  Relation: Child      Have you been vaccinated for COVID-19 (SARS-CoV-2)? [x]   Yes  []   No                   If so, when?  2 v/1 booster, but unsure of   Which :         []   Pfizer-BioNTech  []   Walliqra Gibson  []   Justino Balls  []   Other:         Pharmacy:    [address]  [address] [facility]  Phone: [phone] Fax: [phone]          Patient Deficits:    []   Yes   [x]   No    If yes:    []   Confusion/Memory  []   Visual  []   Motor/Sensory         []   Right arm         []   Right leg         []   Left arm         []   Left leg  []   Language/Speech         []   Aphasia         []   Dysarthria         []   Swallow    NIH Stroke Scale  Level of Consciousness (1a): Alert  LOC Questions (1b): Answers both correctly  LOC Commands (1c): Performs both tasks correctly  Best Gaze (2): Normal  Visual (3): No visual loss  Facial Palsy (4): Normal symmetrical movement  Motor Arm, Left (5a): No drift  Motor Arm, Right (5b): No drift  Motor Leg, Left (6a): No drift  Motor Leg, Right (6b):  No drift  Limb Ataxia (7): Absent  Sensory (8): Normal  Best Language (9): No aphasia  Dysarthria (10): Normal  Extinction and Inattention (11): No abnormality  Total: 0    [place] Coma Scale  Eye Opening: Spontaneous  Best Verbal Response: Oriented  Best Motor Response: Obeys commands  Monica Coma Scale Score: 15    Electronically signed by Denia Robert RN on 11/15/2022 at 5:15 PM

## 2022-11-15 NOTE — PROGRESS NOTES
Daily Progress Note    Date:2022  Patient: Zena Cisneros  : 1940  FDV:[id number]  CODE:Full Code No additional code details  PCP:MIYA Santos DO    Admit Date: 11/10/2022  5:01 PM   LOS: 4 days     Chief Complaint   Patient presents with    Shortness of Breath     Pt arrived to the ed with c/o shortness of breath and feeling like \"there's something in my throat. \" Onset a couple of days. Worse today. Subjective: No acute events overnight. Patient reports improvement in dyspnea since admission. He feels well overall and wants to go home. Had a large bowel movement this morning. Hospital Summary:79year-old male with emphysema followed by pulmonology but not on any therapy, history of lung cancer, depression, hypertension, BPH presented due to ongoing dyspnea on exertion however no significant shortness of breath at rest, no wheezing. He was admitted as he was incidentally found to have serum sodium of 123. He was recently prescribed an SSRI which is started last week. He thinks because of poor vision he got depressed as he is devoid of watching TV. Over the past week he has had relatively poor p.o. intake, suspected low solute diet. Managed with fluid restriction. COPD treated with bronchodilators. Obtained echo to further evaluate his dyspnea on exertion. For orthostatic vital he has got fluid resuscitation however initially the patient was suspected SIADH and placed on fluid restriction and started on urea tablet, later on after thorough evaluation of labs, serum and urine sample, he was found to be internally dehydrated, started on IV fluid infusion with sodium follow-up nephrology on board     feeling weak, having ptosis in the left eye, also complaining of difficulty in swallowing and sometimes choking with eating, weakness in the right arm. Neurology was consulted. Patient refusing MRI of brain.   They are empirically treating myasthenia with Mestinon and increase dose as needed, anticholinergic antibody sent. Review of Systems   Constitutional:  Negative for chills and fever. Respiratory:  Negative for shortness of breath and wheezing. Cardiovascular:  Negative for chest pain and palpitations. Gastrointestinal:  Negative for abdominal pain and nausea. Objective:      Vital signs in last 24 hours:  Patient Vitals for the past 24 hrs:   BP Temp Temp src Pulse Resp SpO2   11/14/22 1757 (!) 144/91 97.8 °F (36.6 °C) -- 83 20 94 %   11/14/22 0836 (!) 167/91 97.4 °F (36.3 °C) -- 85 20 95 %   11/14/22 0622 -- -- -- -- -- 95 %   11/14/22 0440 (!) 161/77 98.4 °F (36.9 °C) Temporal 78 18 94 %   11/13/22 2017 135/69 97.2 °F (36.2 °C) Temporal 68 18 92 %   11/13/22 1949 -- -- -- -- -- 96 %       I/O last 3 completed shifts: In: 120 [P.O.:120]  Out: -   No intake/output data recorded. Physical Exam  Constitutional:       General: He is not in acute distress. Appearance: Normal appearance. Cardiovascular:      Rate and Rhythm: Normal rate and regular rhythm. Pulses: Normal pulses. Heart sounds: Normal heart sounds. Pulmonary:      Effort: Pulmonary effort is normal. No respiratory distress. Breath sounds: Normal breath sounds. Abdominal:      General: Abdomen is flat. There is no distension. Palpations: Abdomen is soft. Tenderness: There is no abdominal tenderness.            Lab Review   Recent Results (from the past 24 hour(s))   CBC    Collection Time: 11/14/22  3:00 AM   Result Value Ref Range    WBC 8.8 4.8 - 10.8 K/uL    RBC 4.11 (L) 4.70 - 6.10 M/uL    Hemoglobin 13.2 (L) 14.0 - 18.0 g/dL    Hematocrit 39.6 (L) 42.0 - 52.0 %    MCV 96.4 (H) 80.0 - 94.0 fL    MCH 32.1 (H) 27.0 - 31.0 pg    MCHC 33.3 33.0 - 37.0 g/dL    RDW 12.4 11.5 - 14.5 %    Platelets 798 221 - 773 K/uL    MPV 8.5 (L) 9.4 - 12.4 fL   Comprehensive Metabolic Panel    Collection Time: 11/14/22  3:00 AM   Result Value Ref Range    Sodium 129 (L) 136 - 145 mmol/L Potassium 3.8 3.5 - 5.0 mmol/L    Chloride 93 (L) 98 - 111 mmol/L    CO2 25 22 - 29 mmol/L    Anion Gap 11 7 - 19 mmol/L    Glucose 112 (H) 74 - 109 mg/dL    BUN 11 8 - 23 mg/dL    Creatinine 0.8 0.5 - 1.2 mg/dL    Est, Glom Filt Rate >60 >60    Calcium 8.4 (L) 8.8 - 10.2 mg/dL    Total Protein 6.0 (L) 6.6 - 8.7 g/dL    Albumin 4.0 3.5 - 5.2 g/dL    Total Bilirubin 0.6 0.2 - 1.2 mg/dL    Alkaline Phosphatase 83 40 - 130 U/L    ALT 20 5 - 41 U/L    AST 24 5 - 40 U/L   Basic Metabolic Panel    Collection Time: 11/14/22  1:38 PM   Result Value Ref Range    Sodium 127 (L) 136 - 145 mmol/L    Potassium 3.5 3.5 - 5.0 mmol/L    Chloride 92 (L) 98 - 111 mmol/L    CO2 26 22 - 29 mmol/L    Anion Gap 9 7 - 19 mmol/L    Glucose 131 (H) 74 - 109 mg/dL    BUN 13 8 - 23 mg/dL    Creatinine 0.9 0.5 - 1.2 mg/dL    Est, Glom Filt Rate >60 >60    Calcium 8.7 (L) 8.8 - 10.2 mg/dL             Current Facility-Administered Medications:     pyridostigmine (MESTINON) tablet 60 mg, 60 mg, Oral, 3 times per day, Gabby Wolf MD, 60 mg at 11/14/22 1621    [START ON 11/15/2022] buPROPion (WELLBUTRIN XL) extended release tablet 150 mg, 150 mg, Oral, Daily, Romaine Goode MD    bisacodyl (DULCOLAX) EC tablet 10 mg, 10 mg, Oral, Daily PRN, Violetta Doshi MD, 10 mg at 11/14/22 5326    aspirin chewable tablet 81 mg, 81 mg, Oral, Daily, Gabby Wolf MD, 81 mg at 11/14/22 0941    fluticasone (FLONASE) 50 MCG/ACT nasal spray 2 spray, 2 spray, Each Nostril, Daily, Emil Vázquez MD, 2 spray at 11/14/22 0516    [Held by provider] propranolol (INDERAL LA) extended release capsule 60 mg, 60 mg, Oral, Daily, Emil Vázquez MD, 60 mg at 11/11/22 1205    acetaminophen (TYLENOL) tablet 500 mg, 500 mg, Oral, Nightly PRN **AND** diphenhydrAMINE (BENADRYL) tablet 25 mg, 25 mg, Oral, Nightly PRN, Emil Vázquez MD    ipratropium-albuterol (DUONEB) nebulizer solution 1 ampule, 1 ampule, Inhalation, Q4H WA, Emil Vázquez MD, 1 ampule at 11/14/22 1410    guaiFENesin (MUCINEX) extended release tablet 600 mg, 600 mg, Oral, BID, Pérez Rodrigez MD, 600 mg at 11/14/22 2946    sodium chloride flush 0.9 % injection 5-40 mL, 5-40 mL, IntraVENous, 2 times per day, Miguel Both, APRN - CNP, 10 mL at 11/14/22 0925    sodium chloride flush 0.9 % injection 5-40 mL, 5-40 mL, IntraVENous, PRN, Miguel Both, APRN - CNP    0.9 % sodium chloride infusion, , IntraVENous, PRN, Kodak Both, APRN - CNP    [Held by provider] enoxaparin (LOVENOX) injection 40 mg, 40 mg, SubCUTAneous, Daily, Kodak Both, APRN - CNP, 40 mg at 11/11/22 1020    ondansetron (ZOFRAN-ODT) disintegrating tablet 4 mg, 4 mg, Oral, Q8H PRN **OR** ondansetron (ZOFRAN) injection 4 mg, 4 mg, IntraVENous, Q6H PRN, Miguel Both, APRN - CNP    acetaminophen (TYLENOL) tablet 650 mg, 650 mg, Oral, Q6H PRN **OR** acetaminophen (TYLENOL) suppository 650 mg, 650 mg, Rectal, Q6H PRN, Kodak Both, APRN - CNP    [Held by provider] tamsulosin (FLOMAX) capsule 0.4 mg, 0.4 mg, Oral, Daily, Kodak Both, APRN - CNP, 0.4 mg at 11/11/22 1019    docusate sodium (COLACE) capsule 100 mg, 100 mg, Oral, Daily, Kodak Both, APRN - CNP, 100 mg at 11/14/22 0925    atorvastatin (LIPITOR) tablet 10 mg, 10 mg, Oral, Daily, Miguel Both, APRN - CNP, 10 mg at 11/14/22 0925    amLODIPine (NORVASC) tablet 10 mg, 10 mg, Oral, Daily, 10 mg at 11/14/22 0925 **AND** [DISCONTINUED] lisinopril (PRINIVIL;ZESTRIL) tablet 20 mg, 20 mg, Oral, Daily, Miguel Both, APRN - CNP, 20 mg at 11/10/22 7942      Imaging:  CT HEAD WO CONTRAST    Result Date: 11/11/2022   1. No acute intracranial bleed, no mass, no hypodense lobar infarct. 2. Patchy hypodensities in the periventricular regions most prominent in the left frontal zone. 3.  A few left anterior basal ganglia lacunar infarcts of indeterminate age. Recommendation: Follow up as clinically indicated.   All CT scans at this facility utilize dose modulation, iterative reconstruction, and/or weight based dosing when appropriate to reduce radiation dose to as low as reasonably achievable. Electronically Signed by Lyn Kelley MD at 11-Nov-2022 07:35:30 PM EST             XR CHEST PORTABLE    Result Date: 11/10/2022  No focal pneumonic consolidation, no pleural effusion or elliott CHF. Recommendation: Follow up as clinically indicated. Electronically Signed by Lyn Kelley MD at [id number] 21:63:53 PM EST                  Assessment/Plan  Principal Problem:    Hyponatremia  Resolved Problems:    * No resolved hospital problems. *     Hyponatremia  -- Hypovolemic vs SIADH  -- Given IV normal saline but lost IV access  -- Na 124 -> 125 -> 123 -> . .. 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

## 2022-11-15 NOTE — PROGRESS NOTES
Occupational Therapy  Facility/Department: [address] Initial Assessment    Name: Felicia Ortiz  : 1940  MRN: [id number]  Date of Service: 11/15/2022    Discharge Recommendations:             Patient Diagnosis(es): The primary encounter diagnosis was Hyponatremia. Diagnoses of Dyspnea and respiratory abnormalities and Depression, unspecified depression type were also pertinent to this visit. Past Medical History:  has a past medical history of Arthritis, Cancer ([address] Emphysema/COPD ([facility] [address] Hyperlipidemia, Hypertension, Macular degeneration, Palliative care patient, and Retention of urine. Past Surgical History:  has a past surgical history that includes Lung cancer surgery (Left); gastrectomy; eye surgery; other surgical history; and Total knee arthroplasty (Right, 2017). Treatment Diagnosis: Hyponatremia, R shld weakness      Assessment   Performance deficits / Impairments: Decreased ROM; Decreased strength  Assessment: Pt. shown AROM for R shld flex and AB and ER to neutral. Encouraged to keep Shld elevation below 90 deg  Treatment Diagnosis: Hyponatremia, R shld weakness  Prognosis: Good  Decision Making: Low Complexity  REQUIRES OT FOLLOW-UP: No  Activity Tolerance  Activity Tolerance: Patient Tolerated treatment well        Plan   Occupational Therapy Plan  Times Per Week: One visit and d/c OT  Times Per Day:  Once a day     Restrictions       Subjective   General  Chart Reviewed: Yes  Patient assessed for rehabilitation services?: Yes  Family / Caregiver Present: No  Diagnosis: R shld weakness, hyponatremia  General Comment  Comments: Pt. states he can only lift his arm to certain height for last 2 days     Social/Functional History  Social/Functional History  Lives With: Daughter  Type of Home: House  Home Equipment: Jeramie Shank, rolling (Does not use either in the house)  ADL Assistance: Independent  Ambulation Assistance: Independent  Transfer Assistance: Independent       Objective   Heart Rate: 78  BP: (!) 141/88  MAP (Calculated): 106  Resp: 18  SpO2: 95 %                      AROM: Generally decreased, functional (R shld flex and AB to 60 deg then AAROM to 105.)  Strength: Generally decreased, functional (ER is 3+/5)  ADL  Feeding: Independent  Grooming: Independent  UE Bathing: Supervision  LE Bathing: Supervision  UE Dressing: Supervision  LE Dressing: Supervision  Toileting: Supervision           Transfers  Stand Step Transfers: Supervision  Sit to stand: Supervision  Vision  Vision: Within Functional Limits  Hearing  Hearing: Within functional limits  Cognition  Overall Cognitive Status: WNL  Orientation  Overall Orientation Status: Within Normal Limits                                           G-Code     OutComes Score                                                  AM-PAC Score             Tinneti Score       Goals  Short Term Goals  Time Frame for Short Term Goals: 1 week  Short Term Goal 1: Pt. perform A/AAROM R shld exercises without pain-goal met  Long Term Goals  Long Term Goal 1: Return to PLOF       Therapy Time   Individual Concurrent Group Co-treatment   Time In           Time Out           Minutes                   Everett Weaver OT

## 2022-11-15 NOTE — PROGRESS NOTES
Patient:   Moises aBins  MR#:    [id number]   Room:    [facility]/657-   YOB: 1940  Date of Progress Note: 11/15/2022  Time of Note                           10:33 AM  Consulting Physician:   Sandi Thayer M.D. Attending Physician:  Jenae Kramer MD     CHIEF COMPLAINT: Right arm weakness  Subjective   This 79 y.o. male who presents with shortness of breath. Has a history of lung cancer and COPD. Was admitted for difficulty breathing and noted some weakness in his right deltoid 11/11. He still has it. He had surgery to correct double vision about 10 years ago and since that time he has developed it again and also has a couple of months of left eyelid ptosis. Also has trouble with jaw fatigue with chewing as well as occasional trouble swallowing. He is claustrophobic and does not want to have an MRI. He had a CT of the head 11/11 showing some small vessel ischemic change. I am asked to see him for right arm weakness to see if this could be a stroke. Also found to be hyponatremic with a sodium of 123 in the ED. Chest x-ray relatively unremarkable. Bowels have been moving. He is able to lift his arm on his own today. He says it is intermittent. REVIEW OF SYSTEMS:  Constitutional: No fevers No chills  Neck:No stiffness  Respiratory: No shortness of breath  Cardiovascular: No chest pain No palpitations  Gastrointestinal: No abdominal pain    Genitourinary: No Dysuria  Neurological: No headache, no confusion      PHYSICAL EXAM:  BP (!) 141/88   Pulse 78   Temp 97.5 °F (36.4 °C)   Resp 18   Ht 6' (1.829 m)   Wt 210 lb 5 oz (95.4 kg)   SpO2 95%   BMI 28.52 kg/m²     Constitutional: he appears well-developed and well-nourished. Eyes - conjunctiva normal.  Pupils react to light  Ear, nose, throat -hearing intact to voice.  No scars, masses, or lesions over external nose or ears, no atrophy of tongue  Neck-symmetric, no masses noted, no jugular vein distension  Respiration- chest wall appears symmetric, good expansion,   normal effort without use of accessory muscles  Cardiovascular- RRR  Musculoskeletal - no significant wasting of muscles noted, no bony deformities, gait no gross ataxia  Extremities-no clubbing, cyanosis or edema  Skin - warm, dry, and intact. No rash, erythema, or pallor. Psychiatric - mood, affect, and behavior appear normal.      Neurology  NEUROLOGICAL EXAM:      Mental status   Awake, alert, fluent oriented x 3 appropriate affect  Attention and concentration appear appropriate  Recent and remote memory appears unremarkable  Speech normal without dysarthria  No clear issues with language       Cranial Nerves   CN II- Visual fields grossly unremarkable  CN III, IV,VI-EOMI, No nystagmus, conjugate eye movements, left-sided ptosis  CN VII-no facial asymmetry  CN VIII-Hearing intact   CN IX and X- Palate elevates in midline  CN XI-good shoulder shrug  CN XII-Tongue midline with no fasciculations or fibrillations          Motor function  Fairly normal today                           Nursing/pcp notes, imaging,labs and vitals reviewed.          Lab Results   Component Value Date    WBC 8.8 11/14/2022    HGB 13.2 (L) 11/14/2022    HCT 39.6 (L) 11/14/2022    MCV 96.4 (H) 11/14/2022     11/14/2022     Lab Results   Component Value Date     (L) 11/15/2022    K 3.5 11/15/2022    CL 93 (L) 11/15/2022    CO2 25 11/15/2022    BUN 8 11/15/2022    CREATININE 0.7 11/15/2022    GLUCOSE 115 (H) 11/15/2022    CALCIUM 8.6 (L) 11/15/2022    PROT 6.0 (L) 11/14/2022    LABALBU 4.0 11/14/2022    BILITOT 0.6 11/14/2022    ALKPHOS 83 11/14/2022    AST 24 11/14/2022    ALT 20 11/14/2022    LABGLOM >60 11/15/2022    GFRAA >60 07/05/2022     Lab Results   Component Value Date    INR 1.10 04/27/2017   No results found for: PHENYTOIN, ESR, CRP    PT,OT and/or speech notes reviewed:      RECORD REVIEW: Previous medical records, medications were reviewed at today's visit    IMPRESSION:   Patient with right arm weakness. Unclear if it was acute in onset or not although the patient believes it is. .  Patient declines MRI which would be the test of choice to rule out a new stroke. Carotid Dopplers negative. Now a baby aspirin was added. Additionally, given his double vision, ptosis, jaw fatigue and intermittent dysphagia he may have myasthenia gravis as a source for many of his problems including some component of his shortness of air. Currently on Mestinon and tolerating it. Unclear how helpful. Would continue on that for now. Acetylcholine receptor antibodies are pending. He has been seen by orthopedics.   Paola for neurology to go home today and follow-up with me in the office in 2 weeks

## 2022-11-15 NOTE — CONSULTS
Orthopaedic Inpatient Consultation    NAME:  Shahab Friend   : 1940  MRN: [id number]    11/10/2022  5:01 PM        CHIEF COMPLAINT:  right shoulder weakness x2 days      HISTORY OF PRESENT ILLNESS:   The patient is a 79 y.o. male right-hand-dominant who presents with the above complaint after admission a few days ago for shortness of breath and not feeling well. He was found to have hyponatremia and to be dehydrated. He is feeling better he noticed a few days ago that his right shoulder was weak for overhead activity. Did not injure the shoulder. He remembers 40 years ago having problems with this shoulder and having such injections. He has some lateral arm and shoulder pain when he reaches overhead. Past Medical History:        Diagnosis Date    Arthritis     Cancer ([facility] [address]     HX LUNG CA    Emphysema/COPD ([facility] [address]     MILD/FORMER SMOKER    Hyperlipidemia     Hypertension     Macular degeneration     Palliative care patient 2022    Retention of urine        Past Surgical History:        Procedure Laterality Date    EYE SURGERY      MUSCLE WEAKNESS    GASTRECTOMY      PARTIAL FOR ULCER REPAIR    LUNG CANCER SURGERY Left     LLL LOBECTOMY    OTHER SURGICAL HISTORY      I & D OF GROIN INFECTION    TOTAL KNEE ARTHROPLASTY Right 2017    KNEE TOTAL ARTHROPLASTY performed by Monae Boyd MD at [facility] OR       Current Medications:   Prior to Admission medications    Medication Sig Start Date End Date Taking? Authorizing Provider   propranolol (INDERAL LA) 60 MG extended release capsule Take 60 mg by mouth daily   Yes Historical Provider, MD   clonazePAM (KLONOPIN) 0.5 MG tablet Take 0.5 mg by mouth nightly.    Yes Historical Provider, MD   Potassium Chloride (KLOR-CON 10 PO) Take 10 mEq by mouth three times a week Monday, Wednesday, Friday   Yes Historical Provider, MD   ibuprofen (ADVIL;MOTRIN) 200 MG tablet Take 200 mg by mouth every 6 hours as needed for Pain   Yes Historical Provider, MD diphenhydrAMINE-APAP, sleep, (TYLENOL PM EXTRA STRENGTH)  MG tablet Take 1 tablet by mouth nightly as needed for Sleep   Yes Historical Provider, MD   fluticasone (FLONASE) 50 MCG/ACT nasal spray 2 sprays by Each Nostril route daily   Yes Historical Provider, MD   oxyCODONE-acetaminophen (PERCOCET) 5-325 MG per tablet One or two every 4 to 6 hours prn. Patient not taking: Reported on 11/11/2022 5/22/17   Prieto Brewster MD   aspirin EC 81 MG EC tablet Take 1 tablet by mouth 2 times daily  Patient not taking: Reported on 11/11/2022 5/22/17   Prieto Brewster MD   celecoxib (CELEBREX) 200 MG capsule Take 1 capsule by mouth daily for 14 days 5/22/17 6/5/17  Prieto Brewster MD   docusate sodium (COLACE) 100 MG capsule Take 1 capsule by mouth daily To prevent constipation  Patient not taking: Reported on 11/11/2022 5/22/17   Prieto Brewster MD   furosemide (LASIX) 20 MG tablet Take 40 mg by mouth See Admin Instructions Indications: FLUID RETENTION Monday, Wednesday, Friday    Historical Provider, MD   amLODIPine-benazepril (LOTREL) 2.5-10 MG per capsule Take 1 capsule by mouth daily Indications: HTN    Historical Provider, MD   lovastatin (MEVACOR) 40 MG tablet Take 40 mg by mouth daily Indications: CHOLESTEROL    Historical Provider, MD   alfuzosin (UROXATRAL) 10 MG extended release tablet Take 10 mg by mouth daily Indications: BLADDER    Historical Provider, MD   diphenhydrAMINE-APAP, sleep, (TYLENOL PM EXTRA STRENGTH)  MG tablet Take 1 tablet by mouth nightly as needed for Sleep    Historical Provider, MD   Multiple Vitamins-Minerals (PRESERVISION AREDS 2+MULTI VIT) CAPS Take 2 capsules by mouth daily Indications: SUPPLEMENT    Historical Provider, MD       Allergies:  Penicillins    Social History:   Social History     Socioeconomic History    Marital status:       Spouse name: Not on file    Number of children: Not on file    Years of education: Not on file Highest education level: Not on file   Occupational History    Not on file   Tobacco Use    Smoking status: Former     Types: Cigarettes     Quit date: 1984     Years since quittin.5    Smokeless tobacco: Never   Vaping Use    Vaping Use: Never used   Substance and Sexual Activity    Alcohol use: No    Drug use: No    Sexual activity: Not on file   Other Topics Concern    Not on file   Social History Narrative    Not on file     Social Determinants of Health     Financial Resource Strain: Not on file   Food Insecurity: Not on file   Transportation Needs: Not on file   Physical Activity: Not on file   Stress: Not on file   Social Connections: Not on file   Intimate Partner Violence: Not on file   Housing Stability: Not on file       Family History:   Family History   Problem Relation Age of Onset    Diabetes Mother     Stroke Mother     Heart Attack Father     Cancer Father         ASBESTOS       REVIEW OF SYSTEMS:  14 point review of systems has been reviewed from the patient's emergency room visit, reviewed with the patient on today's date with no new changes. PHYSICAL EXAM:      Physical Examination:  Vitals:   Vitals:    22 1757 22 1943 22 2046 11/15/22 0539   BP: (!) 144/91  121/79 (!) 166/82   Pulse: 83  84 83   Resp: 20  18 18   Temp: 97.8 °F (36.6 °C)  97.8 °F (36.6 °C) 98.3 °F (36.8 °C)   TempSrc:       SpO2: 94% 98% 98% 96%   Weight:       Height:         General:  Appears stated age, no distress. Orientation:  Alert and oriented to time, place, and person. Mood and Affect:  Cooperative and pleasant. Gait:  Resting comfortably in bed. Cardiovascular:  Symmetric 1-2 plus pulses in upper and lower extremities. Lymph:  No cervical or inguinal lymphadenopathy noted. Sensation:  Grossly intact to light touch. DTR:  Normal, no pathologic reflexes.   Coordination/balance:  Normal    Musculoskeletal:  Right upper extremity exam:  There is no tenderness to palpation about the shoulder, elbow, wrist or hand. Range of motion normal  .  5/5 strength, normal sensation, good radial pulse and skin is normal.      Left upper extremity exam:  There is no tenderness to palpation about the shoulder, elbow, wrist or hand. Active range of motion normal . normal sensation, good radial pulse and skin is normal.  Shoulder with 3/5 abduction strength. 3/5 external rotation strength. 5/5 internal rotation strength. Normal motor distal to the shoulder. Positive impingement.     DATA:    CBC with Differential:    Lab Results   Component Value Date/Time    WBC 8.8 11/14/2022 03:00 AM    RBC 4.11 11/14/2022 03:00 AM    HGB 13.2 11/14/2022 03:00 AM    HCT 39.6 11/14/2022 03:00 AM     11/14/2022 03:00 AM    MCV 96.4 11/14/2022 03:00 AM    MCH 32.1 11/14/2022 03:00 AM    MCHC 33.3 11/14/2022 03:00 AM    RDW 12.4 11/14/2022 03:00 AM    LYMPHOPCT 13.7 11/10/2022 04:29 PM    MONOPCT 8.4 11/10/2022 04:29 PM    BASOPCT 0.3 11/10/2022 04:29 PM    MONOSABS 0.70 11/10/2022 04:29 PM    LYMPHSABS 1.1 11/10/2022 04:29 PM    EOSABS 0.00 11/10/2022 04:29 PM    BASOSABS 0.00 11/10/2022 04:29 PM     CMP:    Lab Results   Component Value Date/Time     11/15/2022 03:51 AM    K 3.5 11/15/2022 03:51 AM    CL 93 11/15/2022 03:51 AM    CO2 25 11/15/2022 03:51 AM    BUN 8 11/15/2022 03:51 AM    CREATININE 0.7 11/15/2022 03:51 AM    GFRAA >60 07/05/2022 09:16 AM    LABGLOM >60 11/15/2022 03:51 AM    GLUCOSE 115 11/15/2022 03:51 AM    PROT 6.0 11/14/2022 03:00 AM    CALCIUM 8.6 11/15/2022 03:51 AM    BILITOT 0.6 11/14/2022 03:00 AM    ALKPHOS 83 11/14/2022 03:00 AM    AST 24 11/14/2022 03:00 AM    ALT 20 11/14/2022 03:00 AM     BMP:    Lab Results   Component Value Date/Time     11/15/2022 03:51 AM    K 3.5 11/15/2022 03:51 AM    CL 93 11/15/2022 03:51 AM    CO2 25 11/15/2022 03:51 AM    BUN 8 11/15/2022 03:51 AM    CREATININE 0.7 11/15/2022 03:51 AM    CALCIUM 8.6 11/15/2022 03:51 AM    GFRAA >60 07/05/2022 09:16 AM    LABGLOM >60 11/15/2022 03:51 AM    GLUCOSE 115 11/15/2022 03:51 AM     No x-rays of the right shoulder. Assessment:   Probable right rotator cuff tear arthropathy. No x-rays available. And thinks he may be discharged today. I recommend he follow-up in the office in the next few weeks when he is feeling better and we can get some good x-rays possibly do a cortisone injection. Plan: Follow-up in the office with x-rays in a few weeks. Provider:  Amy Castañeda MD  Date: 11/15/2022

## 2022-11-15 NOTE — PROGRESS NOTES
Nephrology ([facility]) Progress Note    Patient:  Cb Garcia  YOB: 1940  Date of Service: 11/15/2022  MRN: [id number]   Acct: [de-identified]   Primary Care Physician: Sue Gracia DO  Advance Directive: Full Code  Admit Date: 11/10/2022       Hospital Day: 5  Referring Provider: Shorty Boswell MD    Patient independently seen and examined, Chart, Consults, Notes, Operative notes, Labs, Cardiology, and Radiology studies reviewed as available. Subjective:  Cb Garcia is a 79 y.o. male for whom we were consulted for evaluation and treatment of hyponatremia. Patient denies any history of hyponatremia. However, he has a history of lung cancer, tobacco, chronic obstructive pulmonary disease and benign essential hypertension. Patient presented with increasing shortness of breath and weakness of his right upper arm. Incidental finding in the lab consistent with serum sodium of 125. The scan of the head consistent with small ischemic changes otherwise unremarkable. He was also being evaluated by neurology. Patient was on a water restriction and loop diuretics with no significant improvement of serum sodium level. He denied any nausea vomiting or diarrhea. He has been drinking flavored water to keep himself hydrated. Today, no overnight events. Patient denied chest pain, shortness of air at rest, nausea or vomiting. Encouraged by improvement in sodium level.     Allergies:  Penicillins    Medicines:  Current Facility-Administered Medications   Medication Dose Route Frequency Provider Last Rate Last Admin    pyridostigmine (MESTINON) tablet 60 mg  60 mg Oral 3 times per day Katrina Sol MD   60 mg at 11/15/22 0536    buPROPion (WELLBUTRIN XL) extended release tablet 150 mg  150 mg Oral Daily Shorty Boswell MD   150 mg at 11/15/22 0744    bisacodyl (DULCOLAX) EC tablet 10 mg  10 mg Oral Daily PRN Jadyn Alford MD   10 mg at 11/14/22 0934    aspirin chewable tablet 81 mg  81 mg Oral Daily Bernardo Marlow MD   81 mg at 11/15/22 0744    fluticasone (FLONASE) 50 MCG/ACT nasal spray 2 spray  2 spray Each Nostril Daily Eufemia Rodas MD   2 spray at 11/15/22 0745    [Held by provider] propranolol (INDERAL LA) extended release capsule 60 mg  60 mg Oral Daily Eufemia Rodas MD   60 mg at 11/11/22 1205    acetaminophen (TYLENOL) tablet 500 mg  500 mg Oral Nightly PRN Eufemia Rodas MD        And    diphenhydrAMINE (BENADRYL) tablet 25 mg  25 mg Oral Nightly PRN Eufemia Rodas MD        ipratropium-albuterol (DUONEB) nebulizer solution 1 ampule  1 ampule Inhalation Q4H WA Eufemia Rodas MD   1 ampule at 11/15/22 0753    guaiFENesin [facility]) extended release tablet 600 mg  600 mg Oral BID Eufemia Rodas MD   600 mg at 11/15/22 0744    sodium chloride flush 0.9 % injection 5-40 mL  5-40 mL IntraVENous 2 times per day KELLIE Bueno CNP   10 mL at 11/15/22 0745    sodium chloride flush 0.9 % injection 5-40 mL  5-40 mL IntraVENous PRN KELLIE Bueno CNP        0.9 % sodium chloride infusion   IntraVENous PRN KELLIE Bueno CNP        enoxaparin (LOVENOX) injection 40 mg  40 mg SubCUTAneous Daily KELLIE Bueno CNP   40 mg at 11/15/22 0743    ondansetron (ZOFRAN-ODT) disintegrating tablet 4 mg  4 mg Oral Q8H PRN KELLIE Bueno CNP        Or    ondansetron (ZOFRAN) injection 4 mg  4 mg IntraVENous Q6H PRN KELLIE Bueno CNP        acetaminophen (TYLENOL) tablet 650 mg  650 mg Oral Q6H PRN KELLIE Bueno CNP        Or    acetaminophen (TYLENOL) suppository 650 mg  650 mg Rectal Q6H PRN KELLIE Bueno CNP        [Held by provider] tamsulosin (FLOMAX) capsule 0.4 mg  0.4 mg Oral Daily KELLIE Bueno CNP   0.4 mg at 11/11/22 1019    docusate sodium (COLACE) capsule 100 mg  100 mg Oral Daily KELLIE Bueno CNP   100 mg at 11/15/22 0744    atorvastatin (LIPITOR) tablet 10 mg  10 mg Oral Daily KELLIE Bueno CNP 10 mg at 11/15/22 0744    amLODIPine (NORVASC) tablet 10 mg  10 mg Oral Daily Miguel Both, APRN - CNP   10 mg at 11/15/22 0744       Past Medical History:  Past Medical History:   Diagnosis Date    Arthritis     Cancer ([facility] [address]     HX LUNG CA    Emphysema/COPD ([facility] [address]     MILD/FORMER SMOKER    Hyperlipidemia     Hypertension     Macular degeneration     Palliative care patient 2022    Retention of urine        Past Surgical History:  Past Surgical History:   Procedure Laterality Date    EYE SURGERY      MUSCLE WEAKNESS    GASTRECTOMY      PARTIAL FOR ULCER REPAIR    LUNG CANCER SURGERY Left     LLL LOBECTOMY    OTHER SURGICAL HISTORY      I & D OF GROIN INFECTION    TOTAL KNEE ARTHROPLASTY Right 2017    KNEE TOTAL ARTHROPLASTY performed by Berna Figueroa MD at [facility] OR       Family History  Family History   Problem Relation Age of Onset    Diabetes Mother     Stroke Mother     Heart Attack Father     Cancer Father         ASBESTOS       Social History  Social History     Socioeconomic History    Marital status:       Spouse name: Not on file    Number of children: Not on file    Years of education: Not on file    Highest education level: Not on file   Occupational History    Not on file   Tobacco Use    Smoking status: Former     Types: Cigarettes     Quit date: 1984     Years since quittin.5    Smokeless tobacco: Never   Vaping Use    Vaping Use: Never used   Substance and Sexual Activity    Alcohol use: No    Drug use: No    Sexual activity: Not on file   Other Topics Concern    Not on file   Social History Narrative    Not on file     Social Determinants of Health     Financial Resource Strain: Not on file   Food Insecurity: Not on file   Transportation Needs: Not on file   Physical Activity: Not on file   Stress: Not on file   Social Connections: Not on file   Intimate Partner Violence: Not on file   Housing Stability: Not on file         Review of Systems:  History obtained from chart review and the patient  General ROS: No fever or chills  Respiratory ROS: No cough, shortness of breath, wheezing  Cardiovascular ROS: No chest pain or palpitations  Gastrointestinal ROS: No abdominal pain or melena  Genito-Urinary ROS: No dysuria or hematuria  Musculoskeletal ROS: No joint pain or swelling         Objective:  Patient Vitals for the past 24 hrs:   BP Temp Pulse Resp SpO2   11/15/22 0711 (!) 141/88 97.5 °F (36.4 °C) 78 18 95 %   11/15/22 0539 (!) 166/82 98.3 °F (36.8 °C) 83 18 96 %   11/14/22 2046 121/79 97.8 °F (36.6 °C) 84 18 98 %   11/14/22 1943 -- -- -- -- 98 %   11/14/22 1757 (!) 144/91 97.8 °F (36.6 °C) 83 20 94 %     No intake or output data in the 24 hours ending 11/15/22 1053    General: awake/alert   Chest:  clear to auscultation bilaterally  CVS: regular rate and rhythm  Abdominal: soft, nontender, normal bowel sounds  Extremities: no cyanosis or edema  Skin: warm and dry without rash    Labs:  BMP:   Recent Labs     11/14/22  0300 11/14/22  1338 11/15/22  0351   * 127* 131*   K 3.8 3.5 3.5   CL 93* 92* 93*   CO2 25 26 25   BUN 11 13 8   CREATININE 0.8 0.9 0.7   CALCIUM 8.4* 8.7* 8.6*     CBC:   Recent Labs     11/13/22 0415 11/14/22  0300   WBC 10.1 8.8   HGB 13.2* 13.2*   HCT 38.5* 39.6*   MCV 94.6* 96.4*    212     LIVER PROFILE:   Recent Labs     11/13/22 0415 11/14/22  0300   AST 26 24   ALT 19 20   BILITOT 0.7 0.6   ALKPHOS 84 83     PT/INR: No results for input(s): PROTIME, INR in the last 72 hours. APTT: No results for input(s): APTT in the last 72 hours. BNP:  No results for input(s): BNP in the last 72 hours. Ionized Calcium:No results for input(s): IONCA in the last 72 hours. Magnesium:  Recent Labs     11/15/22  0351   MG 1.8     Phosphorus:No results for input(s): PHOS in the last 72 hours. HgbA1C: No results for input(s): LABA1C in the last 72 hours.   Hepatic:   Recent Labs     11/13/22  0415 11/14/22  0300   ALKPHOS 84 83   ALT 19 20   AST 26 24 PROT 6.1* 6.0*   BILITOT 0.7 0.6   LABALBU 4.4 4.0     Lactic Acid: No results for input(s): LACTA in the last 72 hours. Troponin: No results for input(s): CKTOTAL, CKMB, TROPONINT in the last 72 hours. ABGs: No results found for: PHART, PO2ART, AWV0FLU  CRP:  No results for input(s): CRP in the last 72 hours. Sed Rate:  No results for input(s): SEDRATE in the last 72 hours. Culture Results:   Blood Culture Recent: No results for input(s): BC in the last 720 hours. Urine Culture Recent : No results for input(s): LABURIN in the last 720 hours. Radiology reports as per the Radiologist  Radiology: CT HEAD WO CONTRAST    Result Date: 11/11/2022  NO PRIOR REPORT AVAILABLE Exam: CT OF THE BRAIN WITHOUT CONTRAST Clinical data: Right arm weakness. Technique: Contiguous axial images are obtained from the skull base to vertex without intravenous contrast.  Reformatted/MPR images were performed. Radiation dose: CTDIvol = 34.75 mGy, DLP = 713 mGy x cm. Prior studies: No prior studies submitted. Findings:  Mild age-appropriate cerebral volume loss. Patchy hypodensities in the periventricular regions most prominent in the left frontal zone with a few left anterior basal ganglia lacunar infarcts. No evidence of acute cortical infarction, hemorrhage, mass or mass effect. No hydrocephalus or abnormal extra-axial fluid collections are present. The posterior fossa is unremarkable. The skull base and calvarium are intact. The included portions of the paranasal sinuses and mastoid air cells are clear. 1.  No acute intracranial bleed, no mass, no hypodense lobar infarct. 2. Patchy hypodensities in the periventricular regions most prominent in the left frontal zone. 3.  A few left anterior basal ganglia lacunar infarcts of indeterminate age. Recommendation: Follow up as clinically indicated.   All CT scans at this facility utilize dose modulation, iterative reconstruction, and/or weight based dosing when appropriate to reduce radiation dose to as low as reasonably achievable. Electronically Signed by Andreas Strickland MD at 11-Nov-2022 07:35:30 PM EST             XR CHEST PORTABLE    Result Date: 11/10/2022  NO PRIOR REPORT AVAILABLE Exam: X-RAY OF [facility] Clinical data:Shortness of breath. Technique:Single view of the chest. Prior studies: No prior studies submitted. Findings:Bilateral lung hyperinflation with basilar atelectasis. No focal pneumonic consolidation or pleural effusion. Cardiac silhouette is within normal limits. No acute osseous abnormality is detected. No focal pneumonic consolidation, no pleural effusion or elliott CHF. Recommendation: Follow up as clinically indicated.  Electronically Signed by Andreas Strickland MD at [id number] 22:81:67 PM EST                Assessment     Hyponatremia  SIADH  COPD  Lung cancer  Hypertension      Plan:  Discussed with patient, nursing   Work-up reviewed to date  Monitor labs  Sodium levels improved with current management  Home medication usage included NSAIDs and SSRIs which could contribute to ongoing hyponatremic issues  Blood pressure trends have continued to rise from admission minimal hypotension up to systolics of 227N this morning, consider hydralazine as needed for short acting blood pressure management until more stable  Follow-up in the office 1 week at discharge with KELLIE Jalloh MD  11/15/22  10:53 AM

## 2022-11-15 NOTE — PLAN OF CARE
Problem: Pain  Goal: Verbalizes/displays adequate comfort level or baseline comfort level  Outcome: Completed     Problem: Safety - Adult  Goal: Free from fall injury  Outcome: Completed     Problem: ABCDS Injury Assessment  Goal: Absence of physical injury  Outcome: Completed

## 2022-11-17 ENCOUNTER — TELEPHONE (OUTPATIENT)
Dept: NEUROLOGY | Age: 82
End: 2022-11-17

## 2022-11-17 LAB
ACETYLCHOL MODUL AB: 87 %
ACETYLCHOLINE BINDING ANTIBODY: 21.2 NMOL/L (ref 0–0.4)
ACETYLCHOLINE BLOCKING AB: 71 % (ref 0–26)

## 2022-11-17 NOTE — TELEPHONE ENCOUNTER
MD Jamin Belcher MA  Please make sure that he has a f/u visit with me 1-2 weeks. thanks               I looked over your schedule for the next few weeks and you are completely booked. Patient is scheduled for 12/8.  Is there anything I should tell this patient or check on before he comes in?

## 2022-11-18 NOTE — TELEPHONE ENCOUNTER
Bernardo Marlow MD  You Yesterday (12:53 PM)     VW  Have him come in this coming Monday at 930 please. Thank you        Patient called and scheduled for 11/21 at 9:30 per Dr. Stephany Piaz request. Pt given directions to office. Pt voiced understanding to all.

## 2022-11-21 ENCOUNTER — OFFICE VISIT (OUTPATIENT)
Dept: NEUROLOGY | Age: 82
End: 2022-11-21
Payer: MEDICARE

## 2022-11-21 VITALS
WEIGHT: 210 LBS | SYSTOLIC BLOOD PRESSURE: 106 MMHG | HEART RATE: 51 BPM | DIASTOLIC BLOOD PRESSURE: 67 MMHG | BODY MASS INDEX: 28.44 KG/M2 | HEIGHT: 72 IN | OXYGEN SATURATION: 95 %

## 2022-11-21 DIAGNOSIS — G70.00 MYASTHENIA GRAVIS (HCC): ICD-10-CM

## 2022-11-21 DIAGNOSIS — Z86.69 HISTORY OF DOUBLE VISION: ICD-10-CM

## 2022-11-21 DIAGNOSIS — Z09 HOSPITAL DISCHARGE FOLLOW-UP: Primary | ICD-10-CM

## 2022-11-21 DIAGNOSIS — Z87.09 HISTORY OF COPD: ICD-10-CM

## 2022-11-21 PROCEDURE — 3078F DIAST BP <80 MM HG: CPT | Performed by: PSYCHIATRY & NEUROLOGY

## 2022-11-21 PROCEDURE — G8417 CALC BMI ABV UP PARAM F/U: HCPCS | Performed by: PSYCHIATRY & NEUROLOGY

## 2022-11-21 PROCEDURE — 99214 OFFICE O/P EST MOD 30 MIN: CPT | Performed by: PSYCHIATRY & NEUROLOGY

## 2022-11-21 PROCEDURE — 3074F SYST BP LT 130 MM HG: CPT | Performed by: PSYCHIATRY & NEUROLOGY

## 2022-11-21 PROCEDURE — G8427 DOCREV CUR MEDS BY ELIG CLIN: HCPCS | Performed by: PSYCHIATRY & NEUROLOGY

## 2022-11-21 PROCEDURE — 1036F TOBACCO NON-USER: CPT | Performed by: PSYCHIATRY & NEUROLOGY

## 2022-11-21 PROCEDURE — G8484 FLU IMMUNIZE NO ADMIN: HCPCS | Performed by: PSYCHIATRY & NEUROLOGY

## 2022-11-21 PROCEDURE — 1111F DSCHRG MED/CURRENT MED MERGE: CPT | Performed by: PSYCHIATRY & NEUROLOGY

## 2022-11-21 PROCEDURE — 1123F ACP DISCUSS/DSCN MKR DOCD: CPT | Performed by: PSYCHIATRY & NEUROLOGY

## 2022-11-21 RX ORDER — ACETAMINOPHEN 500 MG
TABLET ORAL EVERY 6 HOURS PRN
COMMUNITY

## 2022-11-21 RX ORDER — CLONAZEPAM 0.5 MG/1
TABLET, ORALLY DISINTEGRATING ORAL
COMMUNITY
Start: 2022-11-10

## 2022-11-21 RX ORDER — PREDNISONE 10 MG/1
TABLET ORAL
Qty: 90 TABLET | Refills: 5 | Status: SHIPPED | OUTPATIENT
Start: 2022-11-21

## 2022-11-21 RX ORDER — CYANOCOBALAMIN/FOLIC AC/VIT B6 1-2.5-25MG
TABLET ORAL
COMMUNITY
Start: 2022-10-27

## 2022-11-21 RX ORDER — PANTOPRAZOLE SODIUM 40 MG/1
40 TABLET, DELAYED RELEASE ORAL
Qty: 30 TABLET | Refills: 0 | Status: SHIPPED | OUTPATIENT
Start: 2022-11-21

## 2022-11-21 RX ORDER — CYANOCOBALAMIN/FOLIC AC/VIT B6 1-2.5-25MG
1 TABLET ORAL DAILY
COMMUNITY
Start: 2022-10-27

## 2022-11-21 NOTE — PROGRESS NOTES
Chief Complaint   Patient presents with    Follow-Up from Hospital     1 week        Abril Rasheed is a 79y.o. year old male who is seen for evaluation of myasthenia gravis. I saw him in the hospital earlier this month for right arm weakness. He admitted to prior eye surgery to correct double vision as well as jaw fatigue and intermittent dysphagia. Had some noted left eye ptosis. Acetylcholine receptor antibodies were markedly positive. He also has a history of COPD and breathing difficulties. He was placed on Mestinon 60 mg twice a day and I felt that it was beneficial..  Also had hyponatremia but had just been recently prescribed an SSRI. Has a history of lung cancer, depression, hypertension and BPH. Since getting on Mestinon his arm strength has improved and he no longer has double vision.   Active Ambulatory Problems     Diagnosis Date Noted    Essential hypertension 05/19/2017    Iron deficiency anemia 05/19/2017    Pulmonary emphysema (HCC) 05/19/2017    Slow transit constipation 05/19/2017    Urinary retention 05/19/2017    Hyponatremia 11/10/2022    Palliative care patient 11/22/2022     Resolved Ambulatory Problems     Diagnosis Date Noted    No Resolved Ambulatory Problems     Past Medical History:   Diagnosis Date    Arthritis     Cancer ([address]     Emphysema/COPD ([address]     Hyperlipidemia     Hypertension     Macular degeneration     Retention of urine        Past Surgical History:   Procedure Laterality Date    EYE SURGERY      MUSCLE WEAKNESS    GASTRECTOMY      PARTIAL FOR ULCER REPAIR    LUNG CANCER SURGERY Left     LLL LOBECTOMY    OTHER SURGICAL HISTORY      I & D OF GROIN INFECTION    TOTAL KNEE ARTHROPLASTY Right 5/18/2017    KNEE TOTAL ARTHROPLASTY performed by Cecelia Booth MD at [facility] OR       Family History   Problem Relation Age of Onset    Diabetes Mother     Stroke Mother     Heart Attack Father     Cancer Father         ASBESTOS       Allergies   Allergen Reactions Penicillins        Social History     Socioeconomic History    Marital status:       Spouse name: Not on file    Number of children: Not on file    Years of education: Not on file    Highest education level: Not on file   Occupational History    Not on file   Tobacco Use    Smoking status: Former     Types: Cigarettes     Quit date: 1984     Years since quittin.5    Smokeless tobacco: Never   Vaping Use    Vaping Use: Never used   Substance and Sexual Activity    Alcohol use: No    Drug use: No    Sexual activity: Not on file   Other Topics Concern    Not on file   Social History Narrative    Not on file     Social Determinants of Health     Financial Resource Strain: Not on file   Food Insecurity: Not on file   Transportation Needs: Not on file   Physical Activity: Not on file   Stress: Not on file   Social Connections: Not on file   Intimate Partner Violence: Not on file   Housing Stability: Not on file       Review of Systems  Constitutional: []Fever []Sweat []Chills [] Recent Injury [x] Denies all unless marked  HEENT:[]Headache  [] Head Injury/Hearing Loss  [] Sore Throat  [] Ear Ache/Dizziness  [x] Denies all unless marked  Spine:  [] Neck pain  [] Back pain  [] Sciaticia  [x] Denies all unless marked  Cardiovascular:[]Heart Disease []Chest Pain [] Palpitations  [x] Denies all unless marked  Pulmonary: [x]Shortness of Breath [x]Cough   [x] Denies all unless marke  Gastrointestinal: []Nausea  []Vomiting  []Abdominal Pain  []Constipation  []Diarrhea  []Dark Bloody Stools  [x] Denies all unless marked  Psychiatric/Behavioral:[x] Depression [x] Anxiety [x] Denies all unless marked  Genitourinary:   [] Frequency  [] Urgency  [] Incontinence [] Pain with Urination  [x] Denies all unless marked  Extremities: []Pain  []Swelling  [x] Denies all unless marked  Musculoskeletal: [] Muscle Pain  [] Joint Pain  [] Arthritis [] Muscle Cramps [] Muscle Twitches  [x] Denies all unless marked  Sleep: [] Insomnia [] Snoring [] Restless Legs [] Sleep Apnea  [] Daytime Sleepiness  [x] Denies all unless marked  Skin:[] Rash [] Skin Discoloration [x] Denies all unless marked   Neurological: []Visual Disturbance/Memory Loss [x] Loss of Balance [x] Slurred Speech/Weakness [] Seizures  [] Vertigo/Dizziness [x] Denies all unless marked            Current Outpatient Medications   Medication Sig Dispense Refill    acetaminophen (TYLENOL) 500 MG tablet Take by mouth every 6 hours as needed      clonazePAM (KLONOPIN) 0.5 MG disintegrating tablet DISSOLVE 1 TABLET IN MOUTH ONCE DAILY FOR 30 DAYS      folic acid-pyridoxine-cyanocobalamine (FOLBEE) 2.5-25-1 MG TABS tablet Take 1 tablet by mouth daily      FOLBEE 2.5-25-1 MG TABS tablet TAKE 1 TABLET BY MOUTH ONCE DAILY      pantoprazole (PROTONIX) 40 MG tablet Take 1 tablet by mouth every morning (before breakfast) 30 tablet 0    predniSONE (DELTASONE) 10 MG tablet Take 1 each am for a week, then 2 each am for a week,then 3 each am 90 tablet 5    buPROPion (WELLBUTRIN XL) 150 MG extended release tablet Take 1 tablet by mouth daily 30 tablet 0    pyridostigmine (MESTINON) 60 MG tablet Take 1 tablet by mouth every 8 hours 90 tablet 0    propranolol (INDERAL LA) 60 MG extended release capsule Take 60 mg by mouth daily      Potassium Chloride (KLOR-CON 10 PO) Take 10 mEq by mouth three times a week Monday, Wednesday, Friday      fluticasone (FLONASE) 50 MCG/ACT nasal spray 2 sprays by Each Nostril route daily      furosemide (LASIX) 20 MG tablet Take 40 mg by mouth See Admin Instructions Indications: FLUID RETENTION Monday, Wednesday, Friday      amLODIPine-benazepril (LOTREL) 2.5-10 MG per capsule Take 1 capsule by mouth daily Indications: HTN      lovastatin (MEVACOR) 40 MG tablet Take 40 mg by mouth daily Indications: CHOLESTEROL      alfuzosin (UROXATRAL) 10 MG extended release tablet Take 10 mg by mouth daily Indications: BLADDER      Multiple Vitamins-Minerals (PRESERVISION AREDS 2+MULTI VIT) CAPS Take 2 capsules by mouth daily Indications: SUPPLEMENT      oxyCODONE-acetaminophen (PERCOCET) 5-325 MG per tablet One or two every 4 to 6 hours prn. (Patient not taking: No sig reported) 60 tablet 0    aspirin EC 81 MG EC tablet Take 1 tablet by mouth 2 times daily (Patient not taking: No sig reported) 60 tablet 0    docusate sodium (COLACE) 100 MG capsule Take 1 capsule by mouth daily To prevent constipation (Patient not taking: No sig reported) 20 capsule 0    diphenhydrAMINE-APAP, sleep, (TYLENOL PM EXTRA STRENGTH)  MG tablet Take 1 tablet by mouth nightly as needed for Sleep (Patient not taking: Reported on 11/21/2022)       No current facility-administered medications for this visit.        Outpatient Medications Marked as Taking for the 11/21/22 encounter (Office Visit) with Cash Delgadillo MD   Medication Sig Dispense Refill    acetaminophen (TYLENOL) 500 MG tablet Take by mouth every 6 hours as needed      clonazePAM (KLONOPIN) 0.5 MG disintegrating tablet DISSOLVE 1 TABLET IN MOUTH ONCE DAILY FOR 30 DAYS      folic acid-pyridoxine-cyanocobalamine (FOLBEE) 2.5-25-1 MG TABS tablet Take 1 tablet by mouth daily      FOLBEE 2.5-25-1 MG TABS tablet TAKE 1 TABLET BY MOUTH ONCE DAILY      pantoprazole (PROTONIX) 40 MG tablet Take 1 tablet by mouth every morning (before breakfast) 30 tablet 0    predniSONE (DELTASONE) 10 MG tablet Take 1 each am for a week, then 2 each am for a week,then 3 each am 90 tablet 5    buPROPion (WELLBUTRIN XL) 150 MG extended release tablet Take 1 tablet by mouth daily 30 tablet 0    pyridostigmine (MESTINON) 60 MG tablet Take 1 tablet by mouth every 8 hours 90 tablet 0    propranolol (INDERAL LA) 60 MG extended release capsule Take 60 mg by mouth daily      Potassium Chloride (KLOR-CON 10 PO) Take 10 mEq by mouth three times a week Monday, Wednesday, Friday      fluticasone (FLONASE) 50 MCG/ACT nasal spray 2 sprays by Each Nostril route daily      furosemide (LASIX) 20 MG tablet Take 40 mg by mouth See Admin Instructions Indications: FLUID RETENTION Monday, Wednesday, Friday      amLODIPine-benazepril (LOTREL) 2.5-10 MG per capsule Take 1 capsule by mouth daily Indications: HTN      lovastatin (MEVACOR) 40 MG tablet Take 40 mg by mouth daily Indications: CHOLESTEROL      alfuzosin (UROXATRAL) 10 MG extended release tablet Take 10 mg by mouth daily Indications: BLADDER      Multiple Vitamins-Minerals (PRESERVISION AREDS 2+MULTI VIT) CAPS Take 2 capsules by mouth daily Indications: SUPPLEMENT         /67   Pulse 51   Ht 6' (1.829 m)   Wt 210 lb (95.3 kg)   SpO2 95%   BMI 28.48 kg/m²       Constitutional - well developed, well nourished. Eyes - conjunctiva normal.  Pupils react to light  Ear, nose, throat -hearing intact to finger rub No scars, masses, or lesions over external nose or ears, no atrophy of tongue  Neck-symmetric, no masses noted, no jugular vein distension. No bruits noted. Respiration- chest wall appears symmetric, good expansion,   normal effort without use of accessory muscles  Cardiovascular- RRR  Musculoskeletal - no significant wasting of muscles noted, no bony deformities, gait no gross ataxia  Extremities-no clubbing, cyanosis or edema  Skin - warm, dry, and intact. No rash, erythema, or pallor. Psychiatric - mood, affect, and behavior appear normal.      Neurological exam  Awake, alert, fluent oriented x 3 appropriate affect  Attention and concentration appear appropriate  Recent and remote memory appears unremarkable  Speech normal without dysarthria  No clear issues with language of fund of knowledge    Cranial Nerve Exam   CN II- Visual fields grossly unremarkable. VA adequate.    CN III, IV,VI- PERRLA, EOMI, No nystagmus, conjugate eye movements, no ptosis  CN VII-no facial asymmetry  CN VIII-Hearing intact   CN IX and X- Palate elevates in midline  CN XI-good shoulder shrug  CN XII-Tongue midline with no fasciculations or fibrillations  Able to bury his lashes. Normal neck flexion  Motor Exam relatively normal throughout although he is unable to stand without using his arms. Reflexes trace throughout    Tremors- no tremors in hands or head noted    Gait  Normal base and speed  No ataxia. No Romberg sign    Coordination  Finger to nose and MIKE-unremarkable    Lab Results   Component Value Date    [id number] 224 05/20/2017     Lab Results   Component Value Date    WBC 8.8 11/14/2022    HGB 13.2 (L) 11/14/2022    HCT 39.6 (L) 11/14/2022    MCV 96.4 (H) 11/14/2022     11/14/2022     Lab Results   Component Value Date     (L) 11/15/2022    K 3.5 11/15/2022    CL 93 (L) 11/15/2022    CO2 25 11/15/2022    BUN 8 11/15/2022    CREATININE 0.7 11/15/2022    GLUCOSE 115 (H) 11/15/2022    CALCIUM 8.6 (L) 11/15/2022    PROT 6.0 (L) 11/14/2022    LABALBU 4.0 11/14/2022    BILITOT 0.6 11/14/2022    ALKPHOS 83 11/14/2022    AST 24 11/14/2022    ALT 20 11/14/2022    LABGLOM >60 11/15/2022    GFRAA >60 07/05/2022           Assessment    ICD-10-CM    1. Hospital discharge follow-up  Z09 [place] DISCHARGE MEDS RECONCILED W/ CURRENT OUTPATIENT MED LIST      2. Myasthenia gravis ([facility] [address]  G70.00       3. History of double vision  Z86.69       4. History of COPD  Z87.09           Patient with myasthenia presumably for numerous years. Showing some response to Mestinon although in general his fatigable weakness and breathing have all slowly worsened over the past few months. After a long discussion including the risks, benefits and alternatives he has agreed to a trial of prednisone. Protonix will be added for gastric protection. May need protection for osteoporosis. Defer to PCP. Would also consider reducing and/or discontinuing propanolol if okay with PCP as this may be worsening his weakness. Unclear how much of his breathing difficulties and fatigue are due to age, COPD and other morbidities versus myasthenia gravis.     Plan  Orders Placed This Encounter Procedures    MD DISCHARGE MEDS RECONCILED W/ CURRENT OUTPATIENT MED LIST         Orders Placed This Encounter   Medications    pantoprazole (PROTONIX) 40 MG tablet     Sig: Take 1 tablet by mouth every morning (before breakfast)     Dispense:  30 tablet     Refill:  0    predniSONE (DELTASONE) 10 MG tablet     Sig: Take 1 each am for a week, then 2 each am for a week,then 3 each am     Dispense:  90 tablet     Refill:  5       Pt warned of potential side effects of medication changes/new medicines. They are to call for new or ongoing difficulties. Return in about 4 weeks (around 12/19/2022).     (Please note that portions of this note were completed with a voice recognition program. Efforts were made to edit the dictations but occasionally words are mis-transcribed.)

## 2022-11-21 NOTE — PROGRESS NOTES
REVIEW OF SYSTEMS    Constitutional: []Fever []Sweat []Chills [] Recent Injury [x] Denies all unless marked  HEENT:[]Headache  [] Head Injury/Hearing Loss  [] Sore Throat  [] Ear Ache/Dizziness  [x] Denies all unless marked  Spine:  [] Neck pain  [] Back pain  [] Sciaticia  [x] Denies all unless marked  Cardiovascular:[]Heart Disease []Chest Pain [] Palpitations  [x] Denies all unless marked  Pulmonary: [x]Shortness of Breath [x]Cough   [x] Denies all unless marke  Gastrointestinal: []Nausea  []Vomiting  []Abdominal Pain  []Constipation  []Diarrhea  []Dark Bloody Stools  [x] Denies all unless marked  Psychiatric/Behavioral:[x] Depression [x] Anxiety [x] Denies all unless marked  Genitourinary:   [] Frequency  [] Urgency  [] Incontinence [] Pain with Urination  [x] Denies all unless marked  Extremities: []Pain  []Swelling  [x] Denies all unless marked  Musculoskeletal: [] Muscle Pain  [] Joint Pain  [] Arthritis [] Muscle Cramps [] Muscle Twitches  [x] Denies all unless marked  Sleep: [] Insomnia [] Snoring [] Restless Legs [] Sleep Apnea  [] Daytime Sleepiness  [x] Denies all unless marked  Skin:[] Rash [] Skin Discoloration [x] Denies all unless marked   Neurological: []Visual Disturbance/Memory Loss [x] Loss of Balance [x] Slurred Speech/Weakness [] Seizures  [] Vertigo/Dizziness [x] Denies all unless marked

## 2022-11-22 PROBLEM — Z51.5 PALLIATIVE CARE PATIENT: Status: ACTIVE | Noted: 2022-11-22

## 2022-12-16 ENCOUNTER — TELEPHONE (OUTPATIENT)
Dept: NEUROLOGY | Age: 82
End: 2022-12-16

## 2022-12-16 RX ORDER — PANTOPRAZOLE SODIUM 40 MG/1
TABLET, DELAYED RELEASE ORAL
Qty: 30 TABLET | Refills: 0 | OUTPATIENT
Start: 2022-12-16

## 2022-12-16 NOTE — TELEPHONE ENCOUNTER
Called and spoke with patient to let him know that his appointment with Dr. Dory Jones had to be rescheduled due to the provider is out. Patient is aware of when I have his appointment rescheduled too.

## 2022-12-19 RX ORDER — PYRIDOSTIGMINE BROMIDE 60 MG/1
60 TABLET ORAL EVERY 8 HOURS SCHEDULED
Qty: 90 TABLET | Refills: 5 | Status: ON HOLD | OUTPATIENT
Start: 2022-12-19 | End: 2023-01-01 | Stop reason: HOSPADM

## 2022-12-19 NOTE — TELEPHONE ENCOUNTER
Requested Prescriptions     Pending Prescriptions Disp Refills    pyridostigmine (MESTINON) 60 MG tablet 90 tablet 0     Sig: Take 1 tablet by mouth every 8 hours       Last Office Visit: 11/21/2022  Next Office Visit: 1/12/2023  Last Medication Refill:11/15/2022 with 0 [facility] patient taking for myasthenia gravis

## 2022-12-20 RX ORDER — PANTOPRAZOLE SODIUM 40 MG/1
TABLET, DELAYED RELEASE ORAL
Qty: 30 TABLET | Refills: 0 | OUTPATIENT
Start: 2022-12-20

## 2022-12-21 ENCOUNTER — TRANSCRIBE ORDERS (OUTPATIENT)
Dept: ADMINISTRATIVE | Facility: HOSPITAL | Age: 82
End: 2022-12-21

## 2022-12-21 ENCOUNTER — HOSPITAL ENCOUNTER (OUTPATIENT)
Dept: GENERAL RADIOLOGY | Facility: HOSPITAL | Age: 82
Discharge: HOME | End: 2022-12-21
Admitting: NURSE PRACTITIONER

## 2022-12-21 DIAGNOSIS — R05.9 COUGH, UNSPECIFIED TYPE: Primary | ICD-10-CM

## 2022-12-21 PROCEDURE — 71046 X-RAY EXAM CHEST 2 VIEWS: CPT

## 2022-12-22 ENCOUNTER — TRANSCRIBE ORDERS (OUTPATIENT)
Dept: ADMINISTRATIVE | Facility: HOSPITAL | Age: 82
End: 2022-12-22

## 2022-12-22 DIAGNOSIS — S09.90XA HEAD INJURY, CLOSED, INITIAL ENCOUNTER: Primary | ICD-10-CM

## 2022-12-24 ENCOUNTER — HOSPITAL ENCOUNTER (INPATIENT)
Age: 82
LOS: 8 days | Discharge: NURSING, REHAB OR LONG-TERM CARE | DRG: 177 | End: 2023-01-01
Attending: EMERGENCY MEDICINE | Admitting: INTERNAL MEDICINE
Payer: MEDICARE

## 2022-12-24 ENCOUNTER — APPOINTMENT (OUTPATIENT)
Dept: GENERAL RADIOLOGY | Age: 82
DRG: 177 | End: 2022-12-24
Payer: MEDICARE

## 2022-12-24 DIAGNOSIS — E87.1 HYPONATREMIA: ICD-10-CM

## 2022-12-24 DIAGNOSIS — R09.02 HYPOXIA: ICD-10-CM

## 2022-12-24 DIAGNOSIS — U07.1 COVID-19: Primary | ICD-10-CM

## 2022-12-24 PROBLEM — J12.82 PNEUMONIA DUE TO COVID-19 VIRUS: Status: ACTIVE | Noted: 2022-12-24

## 2022-12-24 LAB
ALBUMIN SERPL-MCNC: 3.8 G/DL (ref 3.5–5.2)
ALP BLD-CCNC: 74 U/L (ref 40–130)
ALT SERPL-CCNC: 21 U/L (ref 5–41)
ANION GAP SERPL CALCULATED.3IONS-SCNC: 10 MMOL/L (ref 7–19)
APTT: 33.9 SEC (ref 26–36.2)
AST SERPL-CCNC: 20 U/L (ref 5–40)
BACTERIA: NEGATIVE /HPF
BASE EXCESS ARTERIAL: 5.2 MMOL/L (ref -2–2)
BASOPHILS ABSOLUTE: 0 K/UL (ref 0–0.2)
BASOPHILS RELATIVE PERCENT: 0.1 % (ref 0–1)
BILIRUB SERPL-MCNC: 0.7 MG/DL (ref 0.2–1.2)
BILIRUBIN URINE: NEGATIVE
BLOOD, URINE: ABNORMAL
BUN BLDV-MCNC: 9 MG/DL (ref 8–23)
C-REACTIVE PROTEIN: 2.33 MG/DL (ref 0–0.5)
CALCIUM SERPL-MCNC: 8.6 MG/DL (ref 8.8–10.2)
CARBOXYHEMOGLOBIN ARTERIAL: 1.6 % (ref 0–5)
CHLORIDE BLD-SCNC: 80 MMOL/L (ref 98–111)
CLARITY: CLEAR
CO2: 29 MMOL/L (ref 22–29)
COLOR: YELLOW
CREAT SERPL-MCNC: 0.7 MG/DL (ref 0.5–1.2)
CRYSTALS, UA: ABNORMAL /HPF
D DIMER: 0.28 UG/ML FEU (ref 0–0.48)
EOSINOPHILS ABSOLUTE: 0 K/UL (ref 0–0.6)
EOSINOPHILS RELATIVE PERCENT: 0 % (ref 0–5)
EPITHELIAL CELLS, UA: 1 /HPF (ref 0–5)
FERRITIN: 270.9 NG/ML (ref 30–400)
FIBRINOGEN: 491 MG/DL (ref 238–505)
GFR SERPL CREATININE-BSD FRML MDRD: >60 ML/MIN/{1.73_M2}
GLUCOSE BLD-MCNC: 128 MG/DL (ref 74–109)
GLUCOSE URINE: NEGATIVE MG/DL
HCO3 ARTERIAL: 30.5 MMOL/L (ref 22–26)
HCT VFR BLD CALC: 39.8 % (ref 42–52)
HEMOGLOBIN, ART, EXTENDED: 14.1 G/DL (ref 14–18)
HEMOGLOBIN: 13.8 G/DL (ref 14–18)
HYALINE CASTS: 4 /HPF (ref 0–8)
IMMATURE GRANULOCYTES #: 0.1 K/UL
KETONES, URINE: 15 MG/DL
L. PNEUMOPHILA SEROGP 1 UR AG: NORMAL
LACTATE DEHYDROGENASE: 182 U/L (ref 91–215)
LACTIC ACID: 1 MMOL/L (ref 0.5–1.9)
LEUKOCYTE ESTERASE, URINE: NEGATIVE
LYMPHOCYTES ABSOLUTE: 0.7 K/UL (ref 1.1–4.5)
LYMPHOCYTES RELATIVE PERCENT: 5.8 % (ref 20–40)
MCH RBC QN AUTO: 32.5 PG (ref 27–31)
MCHC RBC AUTO-ENTMCNC: 34.7 G/DL (ref 33–37)
MCV RBC AUTO: 93.9 FL (ref 80–94)
METHEMOGLOBIN ARTERIAL: 0.8 %
MONOCYTES ABSOLUTE: 0.9 K/UL (ref 0–0.9)
MONOCYTES RELATIVE PERCENT: 7.9 % (ref 0–10)
NEUTROPHILS ABSOLUTE: 9.7 K/UL (ref 1.5–7.5)
NEUTROPHILS RELATIVE PERCENT: 85.8 % (ref 50–65)
NITRITE, URINE: NEGATIVE
O2 CONTENT ARTERIAL: 18 ML/DL
O2 SAT, ARTERIAL: 91 %
O2 THERAPY: ABNORMAL
OSMOLALITY URINE: 358 MOSM/KG (ref 250–1200)
OSMOLALITY: 251 MOSM/KG (ref 275–300)
OSMOLALITY: 251 MOSM/KG (ref 275–300)
PCO2 ARTERIAL: 46 MMHG (ref 35–45)
PDW BLD-RTO: 12.5 % (ref 11.5–14.5)
PH ARTERIAL: 7.43 (ref 7.35–7.45)
PH UA: 7 (ref 5–8)
PLATELET # BLD: 196 K/UL (ref 130–400)
PMV BLD AUTO: 8.5 FL (ref 9.4–12.4)
PO2 ARTERIAL: 60 MMHG (ref 80–100)
POTASSIUM SERPL-SCNC: 3.7 MMOL/L (ref 3.5–5)
POTASSIUM, WHOLE BLOOD: 3.5
PROCALCITONIN: 0.09 NG/ML (ref 0–0.09)
PROTEIN UA: 30 MG/DL
RBC # BLD: 4.24 M/UL (ref 4.7–6.1)
RBC UA: 10 /HPF (ref 0–4)
SARS-COV-2, NAAT: DETECTED
SEDIMENTATION RATE, ERYTHROCYTE: 19 MM/HR (ref 0–15)
SODIUM BLD-SCNC: 119 MMOL/L (ref 136–145)
SODIUM BLD-SCNC: 119 MMOL/L (ref 136–145)
SODIUM BLD-SCNC: 121 MMOL/L (ref 136–145)
SODIUM URINE: 56 MMOL/L
SPECIFIC GRAVITY UA: 1.01 (ref 1–1.03)
TOTAL PROTEIN: 6.6 G/DL (ref 6.6–8.7)
TROPONIN: <0.01 NG/ML (ref 0–0.03)
UROBILINOGEN, URINE: 1 E.U./DL
VITAMIN D 25-HYDROXY: <5 NG/ML
WBC # BLD: 11.3 K/UL (ref 4.8–10.8)
WBC UA: 1 /HPF (ref 0–5)

## 2022-12-24 PROCEDURE — 83930 ASSAY OF BLOOD OSMOLALITY: CPT

## 2022-12-24 PROCEDURE — XW033H5 INTRODUCTION OF TOCILIZUMAB INTO PERIPHERAL VEIN, PERCUTANEOUS APPROACH, NEW TECHNOLOGY GROUP 5: ICD-10-PCS | Performed by: INTERNAL MEDICINE

## 2022-12-24 PROCEDURE — 96374 THER/PROPH/DIAG INJ IV PUSH: CPT

## 2022-12-24 PROCEDURE — 94760 N-INVAS EAR/PLS OXIMETRY 1: CPT

## 2022-12-24 PROCEDURE — 80053 COMPREHEN METABOLIC PANEL: CPT

## 2022-12-24 PROCEDURE — 84300 ASSAY OF URINE SODIUM: CPT

## 2022-12-24 PROCEDURE — 85025 COMPLETE CBC W/AUTO DIFF WBC: CPT

## 2022-12-24 PROCEDURE — 85652 RBC SED RATE AUTOMATED: CPT

## 2022-12-24 PROCEDURE — 85730 THROMBOPLASTIN TIME PARTIAL: CPT

## 2022-12-24 PROCEDURE — 99285 EMERGENCY DEPT VISIT HI MDM: CPT

## 2022-12-24 PROCEDURE — 3E0333Z INTRODUCTION OF ANTI-INFLAMMATORY INTO PERIPHERAL VEIN, PERCUTANEOUS APPROACH: ICD-10-PCS | Performed by: INTERNAL MEDICINE

## 2022-12-24 PROCEDURE — 2700000000 HC OXYGEN THERAPY PER DAY

## 2022-12-24 PROCEDURE — 85379 FIBRIN DEGRADATION QUANT: CPT

## 2022-12-24 PROCEDURE — 87635 SARS-COV-2 COVID-19 AMP PRB: CPT

## 2022-12-24 PROCEDURE — 83605 ASSAY OF LACTIC ACID: CPT

## 2022-12-24 PROCEDURE — 6370000000 HC RX 637 (ALT 250 FOR IP): Performed by: INTERNAL MEDICINE

## 2022-12-24 PROCEDURE — 6360000002 HC RX W HCPCS: Performed by: INTERNAL MEDICINE

## 2022-12-24 PROCEDURE — 2000000000 HC ICU R&B

## 2022-12-24 PROCEDURE — 82306 VITAMIN D 25 HYDROXY: CPT

## 2022-12-24 PROCEDURE — 82803 BLOOD GASES ANY COMBINATION: CPT

## 2022-12-24 PROCEDURE — 36415 COLL VENOUS BLD VENIPUNCTURE: CPT

## 2022-12-24 PROCEDURE — 83615 LACTATE (LD) (LDH) ENZYME: CPT

## 2022-12-24 PROCEDURE — 36600 WITHDRAWAL OF ARTERIAL BLOOD: CPT

## 2022-12-24 PROCEDURE — 86140 C-REACTIVE PROTEIN: CPT

## 2022-12-24 PROCEDURE — 2580000003 HC RX 258: Performed by: INTERNAL MEDICINE

## 2022-12-24 PROCEDURE — 84295 ASSAY OF SERUM SODIUM: CPT

## 2022-12-24 PROCEDURE — 85384 FIBRINOGEN ACTIVITY: CPT

## 2022-12-24 PROCEDURE — 93005 ELECTROCARDIOGRAM TRACING: CPT | Performed by: EMERGENCY MEDICINE

## 2022-12-24 PROCEDURE — 71045 X-RAY EXAM CHEST 1 VIEW: CPT

## 2022-12-24 PROCEDURE — 82728 ASSAY OF FERRITIN: CPT

## 2022-12-24 PROCEDURE — 84145 PROCALCITONIN (PCT): CPT

## 2022-12-24 PROCEDURE — 83935 ASSAY OF URINE OSMOLALITY: CPT

## 2022-12-24 PROCEDURE — 84484 ASSAY OF TROPONIN QUANT: CPT

## 2022-12-24 PROCEDURE — 6360000002 HC RX W HCPCS: Performed by: EMERGENCY MEDICINE

## 2022-12-24 PROCEDURE — 87449 NOS EACH ORGANISM AG IA: CPT

## 2022-12-24 PROCEDURE — 81001 URINALYSIS AUTO W/SCOPE: CPT

## 2022-12-24 RX ORDER — SODIUM CHLORIDE 9 MG/ML
INJECTION, SOLUTION INTRAVENOUS CONTINUOUS
Status: DISCONTINUED | OUTPATIENT
Start: 2022-12-24 | End: 2022-12-24

## 2022-12-24 RX ORDER — ACETAMINOPHEN 325 MG/1
650 TABLET ORAL EVERY 6 HOURS PRN
Status: DISCONTINUED | OUTPATIENT
Start: 2022-12-24 | End: 2023-01-01 | Stop reason: HOSPADM

## 2022-12-24 RX ORDER — ERGOCALCIFEROL 1.25 MG/1
50000 CAPSULE ORAL WEEKLY
Status: DISCONTINUED | OUTPATIENT
Start: 2022-12-24 | End: 2023-01-01 | Stop reason: HOSPADM

## 2022-12-24 RX ORDER — PYRIDOSTIGMINE BROMIDE 60 MG/1
60 TABLET ORAL EVERY 8 HOURS SCHEDULED
Status: DISCONTINUED | OUTPATIENT
Start: 2022-12-24 | End: 2022-12-29

## 2022-12-24 RX ORDER — GUAIFENESIN 200 MG/1
400 TABLET ORAL EVERY 8 HOURS
Status: DISCONTINUED | OUTPATIENT
Start: 2022-12-24 | End: 2023-01-01 | Stop reason: HOSPADM

## 2022-12-24 RX ORDER — ASPIRIN 81 MG/1
81 TABLET ORAL 2 TIMES DAILY
Status: DISCONTINUED | OUTPATIENT
Start: 2022-12-24 | End: 2023-01-01 | Stop reason: HOSPADM

## 2022-12-24 RX ORDER — ALBUTEROL SULFATE 90 UG/1
2 AEROSOL, METERED RESPIRATORY (INHALATION) EVERY 4 HOURS
Status: DISCONTINUED | OUTPATIENT
Start: 2022-12-24 | End: 2023-01-01 | Stop reason: HOSPADM

## 2022-12-24 RX ORDER — PANTOPRAZOLE SODIUM 40 MG/1
40 TABLET, DELAYED RELEASE ORAL
Status: DISCONTINUED | OUTPATIENT
Start: 2022-12-24 | End: 2023-01-01 | Stop reason: HOSPADM

## 2022-12-24 RX ORDER — VITAMIN B COMPLEX
2000 TABLET ORAL DAILY
Status: DISCONTINUED | OUTPATIENT
Start: 2022-12-24 | End: 2023-01-01 | Stop reason: HOSPADM

## 2022-12-24 RX ORDER — HYDRALAZINE HYDROCHLORIDE 20 MG/ML
10 INJECTION INTRAMUSCULAR; INTRAVENOUS EVERY 6 HOURS PRN
Status: DISCONTINUED | OUTPATIENT
Start: 2022-12-24 | End: 2023-01-01 | Stop reason: HOSPADM

## 2022-12-24 RX ORDER — AMLODIPINE BESYLATE 2.5 MG/1
2.5 TABLET ORAL DAILY
Status: DISCONTINUED | OUTPATIENT
Start: 2022-12-24 | End: 2023-01-01 | Stop reason: HOSPADM

## 2022-12-24 RX ORDER — LISINOPRIL 10 MG/1
10 TABLET ORAL DAILY
Status: DISCONTINUED | OUTPATIENT
Start: 2022-12-24 | End: 2023-01-01 | Stop reason: HOSPADM

## 2022-12-24 RX ORDER — VITAMIN B COMPLEX
2000 TABLET ORAL DAILY
Status: DISCONTINUED | OUTPATIENT
Start: 2022-12-24 | End: 2022-12-24 | Stop reason: SDUPTHER

## 2022-12-24 RX ORDER — ACETAMINOPHEN 500 MG
500 TABLET ORAL NIGHTLY PRN
Status: DISCONTINUED | OUTPATIENT
Start: 2022-12-24 | End: 2023-01-01 | Stop reason: HOSPADM

## 2022-12-24 RX ORDER — BUPROPION HYDROCHLORIDE 150 MG/1
150 TABLET ORAL DAILY
Status: DISCONTINUED | OUTPATIENT
Start: 2022-12-24 | End: 2023-01-01 | Stop reason: HOSPADM

## 2022-12-24 RX ORDER — POTASSIUM CHLORIDE 750 MG/1
10 TABLET, EXTENDED RELEASE ORAL
Status: DISCONTINUED | OUTPATIENT
Start: 2022-12-26 | End: 2023-01-01 | Stop reason: HOSPADM

## 2022-12-24 RX ORDER — LABETALOL HYDROCHLORIDE 5 MG/ML
20 INJECTION, SOLUTION INTRAVENOUS EVERY 4 HOURS PRN
Status: DISCONTINUED | OUTPATIENT
Start: 2022-12-24 | End: 2023-01-01 | Stop reason: HOSPADM

## 2022-12-24 RX ORDER — ACETAMINOPHEN 325 MG/1
650 TABLET ORAL EVERY 6 HOURS PRN
Status: DISCONTINUED | OUTPATIENT
Start: 2022-12-24 | End: 2022-12-24 | Stop reason: SDUPTHER

## 2022-12-24 RX ORDER — ACETAMINOPHEN,DIPHENHYDRAMINE HCL 500; 25 MG/1; MG/1
1 TABLET, FILM COATED ORAL NIGHTLY PRN
Status: DISCONTINUED | OUTPATIENT
Start: 2022-12-24 | End: 2022-12-24 | Stop reason: CLARIF

## 2022-12-24 RX ORDER — ASCORBIC ACID 500 MG
500 TABLET ORAL 2 TIMES DAILY
Status: DISCONTINUED | OUTPATIENT
Start: 2022-12-24 | End: 2023-01-01 | Stop reason: HOSPADM

## 2022-12-24 RX ORDER — ALFUZOSIN HYDROCHLORIDE 10 MG/1
10 TABLET, EXTENDED RELEASE ORAL DAILY
Status: DISCONTINUED | OUTPATIENT
Start: 2022-12-24 | End: 2023-01-01 | Stop reason: HOSPADM

## 2022-12-24 RX ORDER — ATORVASTATIN CALCIUM 10 MG/1
10 TABLET, FILM COATED ORAL NIGHTLY
Status: DISCONTINUED | OUTPATIENT
Start: 2022-12-24 | End: 2023-01-01 | Stop reason: HOSPADM

## 2022-12-24 RX ORDER — DEXAMETHASONE SODIUM PHOSPHATE 10 MG/ML
10 INJECTION, SOLUTION INTRAMUSCULAR; INTRAVENOUS ONCE
Status: COMPLETED | OUTPATIENT
Start: 2022-12-24 | End: 2022-12-24

## 2022-12-24 RX ORDER — OXYCODONE HYDROCHLORIDE AND ACETAMINOPHEN 5; 325 MG/1; MG/1
1 TABLET ORAL EVERY 4 HOURS PRN
Status: DISCONTINUED | OUTPATIENT
Start: 2022-12-24 | End: 2022-12-25

## 2022-12-24 RX ORDER — AMLODIPINE BESYLATE AND BENAZEPRIL HYDROCHLORIDE 2.5; 1 MG/1; MG/1
1 CAPSULE ORAL DAILY
Status: DISCONTINUED | OUTPATIENT
Start: 2022-12-24 | End: 2022-12-24 | Stop reason: CLARIF

## 2022-12-24 RX ORDER — MV-MIN/FA/VIT K/LUTEIN/ZEAXANT 200MCG-5MG
2 CAPSULE ORAL DAILY
Status: DISCONTINUED | OUTPATIENT
Start: 2022-12-24 | End: 2023-01-01 | Stop reason: HOSPADM

## 2022-12-24 RX ORDER — FLUTICASONE PROPIONATE 50 MCG
2 SPRAY, SUSPENSION (ML) NASAL DAILY
Status: DISCONTINUED | OUTPATIENT
Start: 2022-12-24 | End: 2023-01-01 | Stop reason: HOSPADM

## 2022-12-24 RX ORDER — LOVASTATIN 20 MG/1
40 TABLET ORAL DAILY
Status: DISCONTINUED | OUTPATIENT
Start: 2022-12-24 | End: 2022-12-24 | Stop reason: CLARIF

## 2022-12-24 RX ORDER — PROPRANOLOL HCL 60 MG
60 CAPSULE, EXTENDED RELEASE 24HR ORAL DAILY
Status: DISCONTINUED | OUTPATIENT
Start: 2022-12-24 | End: 2022-12-29

## 2022-12-24 RX ORDER — DOCUSATE SODIUM 100 MG/1
100 CAPSULE, LIQUID FILLED ORAL DAILY
Status: DISCONTINUED | OUTPATIENT
Start: 2022-12-24 | End: 2023-01-01 | Stop reason: HOSPADM

## 2022-12-24 RX ORDER — CLONAZEPAM 0.25 MG/1
0.25 TABLET, ORALLY DISINTEGRATING ORAL DAILY
Status: DISCONTINUED | OUTPATIENT
Start: 2022-12-24 | End: 2023-01-01 | Stop reason: HOSPADM

## 2022-12-24 RX ORDER — DEXAMETHASONE SODIUM PHOSPHATE 10 MG/ML
6 INJECTION, SOLUTION INTRAMUSCULAR; INTRAVENOUS DAILY
Status: DISCONTINUED | OUTPATIENT
Start: 2022-12-25 | End: 2022-12-24

## 2022-12-24 RX ORDER — ZINC SULFATE 50(220)MG
50 CAPSULE ORAL DAILY
Status: DISCONTINUED | OUTPATIENT
Start: 2022-12-24 | End: 2023-01-01 | Stop reason: HOSPADM

## 2022-12-24 RX ORDER — DIPHENHYDRAMINE HCL 25 MG
25 TABLET ORAL NIGHTLY PRN
Status: DISCONTINUED | OUTPATIENT
Start: 2022-12-24 | End: 2023-01-01 | Stop reason: HOSPADM

## 2022-12-24 RX ORDER — ENOXAPARIN SODIUM 100 MG/ML
30 INJECTION SUBCUTANEOUS 2 TIMES DAILY
Status: DISCONTINUED | OUTPATIENT
Start: 2022-12-24 | End: 2023-01-01 | Stop reason: HOSPADM

## 2022-12-24 RX ORDER — DEXAMETHASONE SODIUM PHOSPHATE 10 MG/ML
6 INJECTION, SOLUTION INTRAMUSCULAR; INTRAVENOUS EVERY 12 HOURS
Status: DISCONTINUED | OUTPATIENT
Start: 2022-12-24 | End: 2022-12-27

## 2022-12-24 RX ADMIN — IPRATROPIUM BROMIDE 2 PUFF: 17 AEROSOL, METERED RESPIRATORY (INHALATION) at 12:31

## 2022-12-24 RX ADMIN — DEXAMETHASONE SODIUM PHOSPHATE 10 MG: 10 INJECTION INTRAMUSCULAR; INTRAVENOUS at 05:17

## 2022-12-24 RX ADMIN — ALFUZOSIN HYDROCHLORIDE 10 MG: 10 TABLET, FILM COATED, EXTENDED RELEASE ORAL at 09:08

## 2022-12-24 RX ADMIN — GUAIFENESIN 400 MG: 200 TABLET ORAL at 23:45

## 2022-12-24 RX ADMIN — ALBUTEROL SULFATE 2 PUFF: 90 AEROSOL, METERED RESPIRATORY (INHALATION) at 17:00

## 2022-12-24 RX ADMIN — ASPIRIN 81 MG: 81 TABLET, COATED ORAL at 09:09

## 2022-12-24 RX ADMIN — DEXAMETHASONE SODIUM PHOSPHATE 6 MG: 10 INJECTION INTRAMUSCULAR; INTRAVENOUS at 16:59

## 2022-12-24 RX ADMIN — Medication 2000 UNITS: at 09:10

## 2022-12-24 RX ADMIN — ERGOCALCIFEROL 50000 UNITS: 1.25 CAPSULE ORAL at 09:10

## 2022-12-24 RX ADMIN — BUPROPION HYDROCHLORIDE 150 MG: 150 TABLET, EXTENDED RELEASE ORAL at 09:09

## 2022-12-24 RX ADMIN — OXYCODONE HYDROCHLORIDE AND ACETAMINOPHEN 500 MG: 500 TABLET ORAL at 20:57

## 2022-12-24 RX ADMIN — ATORVASTATIN CALCIUM 10 MG: 10 TABLET, FILM COATED ORAL at 20:57

## 2022-12-24 RX ADMIN — ALBUTEROL SULFATE 2 PUFF: 90 AEROSOL, METERED RESPIRATORY (INHALATION) at 12:31

## 2022-12-24 RX ADMIN — DIPHENHYDRAMINE HYDROCHLORIDE 25 MG: 25 TABLET ORAL at 23:45

## 2022-12-24 RX ADMIN — IPRATROPIUM BROMIDE 2 PUFF: 17 AEROSOL, METERED RESPIRATORY (INHALATION) at 20:30

## 2022-12-24 RX ADMIN — LISINOPRIL 10 MG: 10 TABLET ORAL at 09:07

## 2022-12-24 RX ADMIN — OXYCODONE HYDROCHLORIDE AND ACETAMINOPHEN 500 MG: 500 TABLET ORAL at 09:10

## 2022-12-24 RX ADMIN — FLUTICASONE PROPIONATE 2 SPRAY: 50 SPRAY, METERED NASAL at 09:24

## 2022-12-24 RX ADMIN — PYRIDOSTIGMINE BROMIDE 60 MG: 60 TABLET ORAL at 12:30

## 2022-12-24 RX ADMIN — AZITHROMYCIN DIHYDRATE 500 MG: 500 INJECTION, POWDER, LYOPHILIZED, FOR SOLUTION INTRAVENOUS at 09:53

## 2022-12-24 RX ADMIN — GUAIFENESIN 400 MG: 200 TABLET ORAL at 16:59

## 2022-12-24 RX ADMIN — TOCILIZUMAB 800 MG: 20 INJECTION, SOLUTION, CONCENTRATE INTRAVENOUS at 14:19

## 2022-12-24 RX ADMIN — SODIUM CHLORIDE, PRESERVATIVE FREE 1000 MG: 5 INJECTION INTRAVENOUS at 09:19

## 2022-12-24 RX ADMIN — DOCUSATE SODIUM 100 MG: 100 CAPSULE, LIQUID FILLED ORAL at 09:09

## 2022-12-24 RX ADMIN — ASPIRIN 81 MG: 81 TABLET, COATED ORAL at 20:57

## 2022-12-24 RX ADMIN — Medication 1 TABLET: at 09:09

## 2022-12-24 RX ADMIN — CLONAZEPAM 0.25 MG: 0.25 TABLET, ORALLY DISINTEGRATING ORAL at 09:07

## 2022-12-24 RX ADMIN — PANTOPRAZOLE SODIUM 40 MG: 40 TABLET, DELAYED RELEASE ORAL at 09:37

## 2022-12-24 RX ADMIN — GUAIFENESIN 400 MG: 200 TABLET ORAL at 09:09

## 2022-12-24 RX ADMIN — ENOXAPARIN SODIUM 30 MG: 100 INJECTION SUBCUTANEOUS at 20:57

## 2022-12-24 RX ADMIN — AMLODIPINE BESYLATE 2.5 MG: 2.5 TABLET ORAL at 09:08

## 2022-12-24 RX ADMIN — ALBUTEROL SULFATE 2 PUFF: 90 AEROSOL, METERED RESPIRATORY (INHALATION) at 09:26

## 2022-12-24 RX ADMIN — IPRATROPIUM BROMIDE 2 PUFF: 17 AEROSOL, METERED RESPIRATORY (INHALATION) at 09:26

## 2022-12-24 RX ADMIN — ENOXAPARIN SODIUM 30 MG: 100 INJECTION SUBCUTANEOUS at 11:15

## 2022-12-24 RX ADMIN — PROPRANOLOL HYDROCHLORIDE 60 MG: 60 CAPSULE, EXTENDED RELEASE ORAL at 09:10

## 2022-12-24 RX ADMIN — IPRATROPIUM BROMIDE 2 PUFF: 17 AEROSOL, METERED RESPIRATORY (INHALATION) at 17:00

## 2022-12-24 RX ADMIN — ACETAMINOPHEN 500 MG: 500 TABLET ORAL at 23:45

## 2022-12-24 RX ADMIN — PYRIDOSTIGMINE BROMIDE 60 MG: 60 TABLET ORAL at 09:37

## 2022-12-24 RX ADMIN — ALBUTEROL SULFATE 2 PUFF: 90 AEROSOL, METERED RESPIRATORY (INHALATION) at 20:30

## 2022-12-24 RX ADMIN — ZINC SULFATE 220 MG (50 MG) CAPSULE 50 MG: CAPSULE at 12:30

## 2022-12-24 RX ADMIN — PYRIDOSTIGMINE BROMIDE 60 MG: 60 TABLET ORAL at 20:57

## 2022-12-24 RX ADMIN — IPRATROPIUM BROMIDE 2 PUFF: 17 AEROSOL, METERED RESPIRATORY (INHALATION) at 23:51

## 2022-12-24 RX ADMIN — ALBUTEROL SULFATE 2 PUFF: 90 AEROSOL, METERED RESPIRATORY (INHALATION) at 23:51

## 2022-12-24 ASSESSMENT — ENCOUNTER SYMPTOMS
SHORTNESS OF BREATH: 1
RHINORRHEA: 0
SORE THROAT: 0
NAUSEA: 0
COUGH: 1
DIARRHEA: 0
BACK PAIN: 0
VOMITING: 0
ABDOMINAL PAIN: 0

## 2022-12-24 ASSESSMENT — PAIN SCALES - GENERAL: PAINLEVEL_OUTOF10: 0

## 2022-12-24 NOTE — ED PROVIDER NOTES
[facility] EMERGENCY DEPT  eMERGENCY dEPARTMENT eNCOUnter      Pt Name: Octaviano Arriaga  MRN: [id number]  Medardogfmagdalene 1940  Date of evaluation: 12/24/2022  Provider: Carlos Templeton MD    [address] [place] [address]       Chief Complaint   Patient presents with    Positive For Covid-19     Started paxlovid on 12/21. Cough, congestion, SOA and increasing weakness         HISTORY OF PRESENT ILLNESS   (Location/Symptom, Timing/Onset,Context/Setting, Quality, Duration, Modifying Factors, Severity)  Note limiting factors. Octaviano Arriaga is a 79 y.o. male who presents to the emergency department for worsening cough sob and fatigue. Patient dx with covid on 12/21 and started on paxlovid. Patient very fatigued having difficult time ambulating. 02 sat of 80 with EMS with they arrived doesn't wear home 02. Still was hypoxic on n/c until they gave him a neb in route. Pt sat of 99 on 3L currently. No chest pain. HPI    NursingNotes were reviewed. REVIEW OF SYSTEMS    (2-9 systems for level 4, 10 or more for level 5)     Review of Systems   Constitutional:  Positive for activity change and fatigue. Negative for chills and fever. HENT:  Negative for rhinorrhea and sore throat. Respiratory:  Positive for cough and shortness of breath. Cardiovascular:  Negative for chest pain and leg swelling. Gastrointestinal:  Negative for abdominal pain, diarrhea, nausea and vomiting. Genitourinary:  Negative for dysuria and frequency. Musculoskeletal:  Negative for back pain and neck pain. Neurological:  Negative for dizziness and headaches. All other systems reviewed and are negative.          PAST MEDICALHISTORY     Past Medical History:   Diagnosis Date    Arthritis     Cancer ([facility]     HX LUNG CA    Emphysema/COPD ([facility]     MILD/FORMER SMOKER    Hyperlipidemia     Hypertension     Macular degeneration     Palliative care patient 11/11/2022    Retention of urine          SURGICAL HISTORY       Past Surgical History:   Procedure Laterality Date    EYE SURGERY      MUSCLE WEAKNESS    GASTRECTOMY      PARTIAL FOR ULCER REPAIR    LUNG CANCER SURGERY Left     LLL LOBECTOMY    OTHER SURGICAL HISTORY      I & D OF GROIN INFECTION    TOTAL KNEE ARTHROPLASTY Right 5/18/2017    KNEE TOTAL ARTHROPLASTY performed by Alden Abdul MD at [address] [facility]     Previous Medications    ACETAMINOPHEN (TYLENOL) 500 MG TABLET    Take by mouth every 6 hours as needed    ALFUZOSIN (UROXATRAL) 10 MG EXTENDED RELEASE TABLET    Take 10 mg by mouth daily Indications: BLADDER    AMLODIPINE-BENAZEPRIL (LOTREL) 2.5-10 MG PER CAPSULE    Take 1 capsule by mouth daily Indications: HTN    ASPIRIN EC 81 MG EC TABLET    Take 1 tablet by mouth 2 times daily    BUPROPION (WELLBUTRIN XL) 150 MG EXTENDED RELEASE TABLET    Take 1 tablet by mouth daily    CLONAZEPAM (KLONOPIN) 0.5 MG DISINTEGRATING TABLET    DISSOLVE 1 TABLET IN MOUTH ONCE DAILY FOR 30 DAYS    DIPHENHYDRAMINE-APAP, SLEEP, (TYLENOL PM EXTRA STRENGTH)  MG TABLET    Take 1 tablet by mouth nightly as needed for Sleep    DOCUSATE SODIUM (COLACE) 100 MG CAPSULE    Take 1 capsule by mouth daily To prevent constipation    FLUTICASONE (FLONASE) 50 MCG/ACT NASAL SPRAY    2 sprays by Each Nostril route daily    FOLBEE 2.5-25-1 MG TABS TABLET    TAKE 1 TABLET BY MOUTH ONCE DAILY    FOLIC ACID-PYRIDOXINE-CYANOCOBALAMINE (FOLBEE) 2.5-25-1 MG TABS TABLET    Take 1 tablet by mouth daily    FUROSEMIDE (LASIX) 20 MG TABLET    Take 40 mg by mouth See Admin Instructions Indications: FLUID RETENTION Monday, Wednesday, Friday    LOVASTATIN (MEVACOR) 40 MG TABLET    Take 40 mg by mouth daily Indications: CHOLESTEROL    MULTIPLE VITAMINS-MINERALS (PRESERVISION AREDS 2+MULTI VIT) CAPS    Take 2 capsules by mouth daily Indications: SUPPLEMENT    OXYCODONE-ACETAMINOPHEN (PERCOCET) 5-325 MG PER TABLET    One or two every 4 to 6 hours prn.     PANTOPRAZOLE (PROTONIX) 40 MG TABLET    Take 1 tablet by mouth every morning (before breakfast)    POTASSIUM CHLORIDE (KLOR-CON 10 PO)    Take 10 mEq by mouth three times a week Monday, Wednesday, Friday    PREDNISONE (DELTASONE) 10 MG TABLET    Take 1 each am for a week, then 2 each am for a week,then 3 each am    PROPRANOLOL (INDERAL LA) 60 MG EXTENDED RELEASE CAPSULE    Take 60 mg by mouth daily    PYRIDOSTIGMINE (MESTINON) 60 MG TABLET    Take 1 tablet by mouth every 8 hours       ALLERGIES     Penicillins    FAMILY HISTORY       Family History   Problem Relation Age of Onset    Diabetes Mother     Stroke Mother     Heart Attack Father     Cancer Father         ASBESTOS          SOCIAL HISTORY       Social History     Socioeconomic History    Marital status:      Spouse name: None    Number of children: None    Years of education: None    Highest education level: None   Tobacco Use    Smoking status: Former     Types: Cigarettes     Quit date: 1984     Years since quittin.6    Smokeless tobacco: Never   Vaping Use    Vaping Use: Never used   Substance and Sexual Activity    Alcohol use: No    Drug use: No       SCREENINGS    Monica Coma Scale  Eye Opening: Spontaneous  Best Verbal Response: Oriented  Best Motor Response: Obeys commands  Monica Coma Scale Score: 15        PHYSICAL EXAM    (up to 7 for level 4, 8 or more for level 5)     ED Triage Vitals [22 0451]   BP Temp Temp Source Heart Rate Resp SpO2 Height Weight   (!) 155/80 97.8 °F (36.6 °C) Axillary 74 24 (!) 87 % 6' (1.829 m) 205 lb (93 kg)       Physical Exam  Vitals and nursing note reviewed. Constitutional:       General: He is not in acute distress. Appearance: He is well-developed. He is ill-appearing. He is not diaphoretic. HENT:      Head: Normocephalic and atraumatic. Nose: Nose normal.      Mouth/Throat:      Mouth: Mucous membranes are moist.   Eyes:      Conjunctiva/sclera: Conjunctivae normal.   Neck:      Trachea: No tracheal deviation.    Cardiovascular:      Rate and Rhythm: Normal rate and regular rhythm. Heart sounds: Normal heart sounds. No murmur heard. Pulmonary:      Effort: No respiratory distress. Breath sounds: Examination of the right-middle field reveals rhonchi. Examination of the left-middle field reveals rhonchi. Examination of the right-lower field reveals rhonchi. Examination of the left-lower field reveals rhonchi. Rhonchi present. No wheezing or rales. Abdominal:      Palpations: Abdomen is soft. Tenderness: There is no abdominal tenderness. Musculoskeletal:         General: Normal range of motion. Cervical back: Normal range of motion and neck supple. Right lower leg: No edema. Left lower leg: No edema. Skin:     General: Skin is warm and dry. Neurological:      Mental Status: He is alert and oriented to person, place, and time.        DIAGNOSTIC RESULTS     EKG: All EKG's areinterpreted by the Emergency Department Physician who either signs or Co-signs this chart in the absence of a cardiologist.    74 NSR no obvious st changes non diagnostic ekg    RADIOLOGY:  Non-plain film images such as CT, Ultrasound and MRI are read by the radiologist. Plain radiographic images are visualized and preliminarily interpreted bythe emergency physician with the below findings:        XR CHEST PORTABLE    (Results Pending)           LABS:  Labs Reviewed   COVID-19, RAPID - Abnormal; Notable for the following components:       Result Value    SARS-CoV-2, NAAT DETECTED (*)     All other components within normal limits    Narrative:     Lizy Found tel. ,  Microbiology results called to and read back by Romie Escalante, [address] [place] [address] in ED,  12/24/2022 05:24, by MARKUS   BLOOD GAS, ARTERIAL - Abnormal; Notable for the following components:    pCO2, Arterial 46.0 (*)     pO2, Arterial 60.0 (*)     HCO3, Arterial 30.5 (*)     Base Excess, Arterial 5.2 (*)     All other components within normal limits   CBC WITH AUTO DIFFERENTIAL - Abnormal; Notable for the following components:    WBC 11.3 (*)     RBC 4.24 (*)     Hemoglobin 13.8 (*)     Hematocrit 39.8 (*)     MCH 32.5 (*)     MPV 8.5 (*)     Neutrophils % 85.8 (*)     Lymphocytes % 5.8 (*)     Neutrophils Absolute 9.7 (*)     Lymphocytes Absolute 0.7 (*)     All other components within normal limits   COMPREHENSIVE METABOLIC PANEL - Abnormal; Notable for the following components:    Sodium 119 (*)     Chloride 80 (*)     Glucose 128 (*)     Calcium 8.6 (*)     All other components within normal limits    Narrative:     CALL  Roberts  KLED tel. ,  Chemistry results called to and read back by [address] [place] [address] ED,  12/24/2022 05:47, by [facility]   LEGIONELLA ANTIGEN, URINE   TROPONIN   LACTIC ACID   PROCALCITONIN   URINALYSIS WITH REFLEX TO CULTURE   SODIUM, URINE, RANDOM   OSMOLALITY, URINE   OSMOLALITY, SERUM   D-DIMER, QUANTITATIVE   LACTATE DEHYDROGENASE   FIBRINOGEN   VITAMIN D 25 HYDROXY       All other labs were within normal range or not returned as of this dictation. EMERGENCY DEPARTMENT COURSE and DIFFERENTIAL DIAGNOSIS/MDM:   Vitals:    Vitals:    12/24/22 0451 12/24/22 0454 12/24/22 0552   BP: (!) 155/80  (!) 173/86   Pulse: 74  76   Resp: 24     Temp: 97.8 °F (36.6 °C)     TempSrc: Axillary     SpO2: (!) 87% 97% 97%   Weight: 205 lb (93 kg)     Height: 6' (1.829 m)         MDM     Amount and/or Complexity of Data Reviewed  Clinical lab tests: ordered and reviewed  Tests in the radiology section of CPT®: ordered and reviewed  Discuss the patient with other providers: yes  Independent visualization of images, tracings, or specimens: yes      RA sat of 80 with EMS. Abg done on RA here but was on 02 just prior to this from EMS and currently requiring 3L 02 to maintain sat. Pt with hypoxia and worsening fatigue and ability to function at home due to worsening covid symptoms. Lab notable for Na of 119. Have d/w Dr. Dashawn Santos for admission.     CONSULTS:  None    PROCEDURES:  Unless otherwise noted below, none     Procedures    FINAL IMPRESSION      1. COVID-19    2. Hypoxia    3. Hyponatremia          DISPOSITION/PLAN   DISPOSITION Decision To Admit 12/24/2022 05:30:58 AM      PATIENT REFERRED TO:  No follow-up provider specified.     DISCHARGE MEDICATIONS:  New Prescriptions    No medications on file          (Please note that portions of this note were completed with a voice recognition program.  Efforts were made to edit thedictations but occasionally words are mis-transcribed.)    Aarti Machado MD (electronically signed)  Attending Emergency Physician          Erik Culver MD  12/24/22 2233       Erik Culver MD  12/24/22 9016

## 2022-12-24 NOTE — H&P
Chris, [facility]    DEPARTMENT OF HOSPITALIST MEDICINE      HISTORY & PHYSICAL:          REASON FOR ADMISSION:  Chief Complaint   Patient presents with    Positive For Covid-19     Started paxlovid on 12/21. Cough, congestion, SOA and increasing weakness        HISTORY OF PRESENT ILLNESS:  Radha Galan is an 79 y.o. male medical history significant for arthritis, remote lung cancer COPD hypertension hyperlipidemia had just been recently diagnosed with COVID just 3 days ago on 12/21/2022 and into the emergency room today because of shortness of breath of a new onset. The patient had never been on oxygen when the EMS presented in the home saturation was 80 on room air patient is now in the high 90s at 97% on just 3 L of oxygen. Chest x-ray was radiographic finding lateral pneumonia  COVID-19 positive test (U07.1, COVID-19) with Acute Pneumonia (J12.89, Other viral pneumonia)  (If respiratory failure or sepsis present, add as separate assessment)    . At this time patient met criteria for having some dexamethasone on oxygen inflammatory markers comes up -the inflammatory markers such as D-dimer ferritin C-reactive protein ESR, if elevated then patient can be placed on baricitinib pharmacy to dose. Follow-up on the inflammatory markers twice patient is hemodynamically stable really concerns and I will at the bedside. Has normal renal function and does have sodium of 119 patient is on Lasix at home at 40 mg once daily patient Lasix have been held and I am avoiding starting patient on IV fluid as well. .  Initiated oral azithromycin    PAST MEDICAL HISTORY:  Past Medical History:   Diagnosis Date    Arthritis     Cancer ([address]     HX LUNG CA    Emphysema/COPD ([facility] [address]     MILD/FORMER SMOKER    Hyperlipidemia     Hypertension     Macular degeneration     Palliative care patient 11/11/2022    Retention of urine          PAST SURGICAL HISTORY:  Past Surgical History:   Procedure Laterality Date EYE SURGERY      MUSCLE WEAKNESS    GASTRECTOMY      PARTIAL FOR ULCER REPAIR    LUNG CANCER SURGERY Left     LLL LOBECTOMY    OTHER SURGICAL HISTORY      I & D OF GROIN INFECTION    TOTAL KNEE ARTHROPLASTY Right 2017    KNEE TOTAL ARTHROPLASTY performed by Lukas Sanchez MD at [address] [place] [address] HISTORY:  Social History     Socioeconomic History    Marital status:      Spouse name: None    Number of children: None    Years of education: None    Highest education level: None   Tobacco Use    Smoking status: Former     Types: Cigarettes     Quit date: 1984     Years since quittin.6    Smokeless tobacco: Never   Vaping Use    Vaping Use: Never used   Substance and Sexual Activity    Alcohol use: No    Drug use: No        FAMILY HISTORY:  Family History   Problem Relation Age of Onset    Diabetes Mother     Stroke Mother     Heart Attack Father     Cancer Father         ASBESTOS         ALLERGIES:  Allergies   Allergen Reactions    Penicillins         PRIOR TO ADMISSION MEDS:  Not in a hospital admission.      REVIEW OF SYSTEMS:  Constitutional:  No fevers, chills, nausea, vomiting, + tiredness & fatigue   Head:  No head injury, facial trauma   Eyes:  No acute visual changes, exudate, trauma   Ears:  No acute hearing loss, earaches   Nose: No nasal discharge, epistaxis   Neck: No new hoarseness, voice change, or new masses   Lungs:   No hemoptysis, pleurisy   Heart:  No chest pressure with exertion, palpitations,    Abdomen:   No new masses, no bright red blood per rectum   Extremities: No acute pain while ambulating, no new lesions   Skin: No new changes in skin color, no rashes or lesions   Neurologic: No new motor or sensory changes     14 point review of systems addressed with patient which is essentially negative except as specifically addressed above:    PHYSICAL EXAM:  BP (!) 157/80   Pulse 72   Temp 97.8 °F (36.6 °C) (Axillary)   Resp 22   Ht 6' (1.829 m)   Wt 205 lb (93 kg)   SpO2 96%   BMI 27.80 kg/m²   No intake/output data recorded. PHYSICAL EXAMINATION:    Vital Signs: Please see the chart   ARABELLA:  Awake, alert, oriented x 3, patient appears tired and fatigued   Head/Eyes:  Normocephalic, atraumatic, EOMI and PERRLA bilaterally   ENT: Moist mucous membranes, nasal passages clear   Neck: Supple, full range of motion, no carotid bruit, trachea midline   Respiratory:   Bilateral fair air entry in both lung fields, mild B/L crackles, symmetric expansion of chest   Cardiovascular:  Regular rate and rhythm, S1+S2+0, no murmurs/rubs   Urology: No bilateral CVA tenderness, no suprapubic tenderness   Abdomen:   Soft, non-tender, bowel sounds +ve, no organomegaly   Muscle/Joints: Moves all, full range of motion, no muscle spasms   Extremities: No clubbing, no cyanosis, no calf tenderness, no edema   Pulses: 2+ bilaterally, symmetrical   Skin: Warm, dry, no pallor/cyanosis/jaundice, no rashes/lesions   Neurologic: Awake, alert, oriented x 3, cranial nerves II-XII intact, no focal neurological deficits, sensory system intact   Psychiatric: Normal mood, non-suicidal         LABORATORY DATA:    CBC:  Recent Labs     12/24/22 0450   WBC 11.3*   HGB 13.8*   HCT 39.8*        BMP:  Recent Labs     12/24/22 0450 12/24/22 0456   *  --    K 3.7 3.5   CL 80*  --    CO2 29  --    BUN 9  --    CREATININE 0.7  --    CALCIUM 8.6*  --      Recent Labs     12/24/22 0450   AST 20   ALT 21   BILITOT 0.7   ALKPHOS 74     Coag Panel: No results for input(s): INR, PROTIME, APTT in the last 72 hours.   Cardiac Enzymes:   Recent Labs     12/24/22 0450   TROPONINI <0.01     ABGs:  Lab Results   Component Value Date/Time    PHART 7.430 12/24/2022 04:56 AM    PO2ART 60.0 12/24/2022 04:56 AM    REO9VJL 46.0 12/24/2022 04:56 AM     Urinalysis:  Lab Results   Component Value Date/Time    NITRU Negative 11/12/2022 12:00 PM    WBCUA 2 11/12/2022 12:00 PM    BACTERIA NEGATIVE 11/12/2022 12:00 PM RBCUA 8 11/12/2022 12:00 PM    BLOODU TRACE 11/12/2022 12:00 PM    SPECGRAV 1.019 11/12/2022 12:00 PM    GLUCOSEU Negative 11/12/2022 12:00 PM     A1C: No results for input(s): LABA1C in the last 72 hours. ABG:  Recent Labs     12/24/22  0456   PHART 7.430   YII5VCF 46.0*   PO2ART 60.0*   IWZ0SCB 30.5*   BEART 5.2*   HGBAE 14.1   L2GPKBSU 91.0   CARBOXHGBART 1.6       EKG:   Please see chart      IMAGING:  No results found. Assessment     Principal Problem:    Pneumonia due to COVID-19 virus  Active Problems:    Hyponatremia    Essential hypertension    Pulmonary emphysema ([facility] [address] [address]      Plan:  COVID-19 pneumonia  -Continue with dexamethasone 6 mg daily x10 days  -Patient is hypoxemic on room air at 80 and did well with 3 L of oxygen nasal cannula pulse ox in the high 90s  -We will continue oxygen support  -Inflammatory markers ordered if these are normal nothing else to be added for this patient  -Inflammatory markers elevated then baricitinib added with pharmacy dosing      COPD exacerbation  -General supportive care  -Inhaler  -Already on steroid at home 10 mg of prednisone x1 week and then increase to 20 mg x 1 week and then 30 mg times another week per the primary care doctor patient patient prescription filled out for 90 days this had been held why patient is getting at this time dexamethasone for COVID      Hyponatremia  -Again could be due to COVID also could be due to diuretics therapy  -And is dehydrated chloride at 80  -We will allow patient oral fluid would not want to give IV fluid at this time. Goal sodium level. GI DVT prophylaxis in place          Attestation:  Inpatient status is used for patients with an expected LOS extending past two midnights due to medical therapy and/or critical care needs  . .. all other patients are placed under OBServation status.       Ross Gonzalez DO  6:44 AM 12/24/2022      DISCLAIMER: This note was created with electronic voice recognition which does have occasional errors. If you have any questions regarding the content within the note please do not hesitate to contact me. .. Thanks.

## 2022-12-24 NOTE — PROGRESS NOTES
0 Result Notes  Component Ref Range & Units 12/24/22 0456    pH, Arterial 7.350 - 7.450 7.430    pCO2, Arterial 35.0 - 45.0 mmHg 46.0 High     pO2, Arterial 80.0 - 100.0 mmHg 60.0 Low     HCO3, Arterial 22.0 - 26.0 mmol/L 30.5 High     Base Excess, Arterial -2.0 - 2.0 mmol/L 5.2 High     Hemoglobin, Art, Extended 14.0 - 18.0 g/dL 14.1    O2 Sat, Arterial >92 % 91.0    Carboxyhgb, Arterial 0.0 - 5.0 % 1.6    Comment:      0.0-1.5   (Smokers 1.5-5.0)    Methemoglobin, Arterial <1.5 % 0.8    O2 Content, Arterial Not Established mL/dL 18.0    O2 Therapy  Unknown    Potassium, Whole Blood  3.5    Resulting Agency  [facility]              Specimen Collected: 12/24/22 04:56 CST Last Resulted: 12/24/22 04:57 CST        Lab Flowsheet     Order Details     View Encounter     Lab and Collection Details     Routing     Result History     View Encounter Conversation           Result Care Coordination      Patient Communication     Released  Not seen Back to Top           Testing Performed By:    [address] [place] [address] LAB   [address] [address]   Tel: [phone]   : Genesis Faith M.D.                     Result Information    Flag: Abnormal Abnormal   Status: Final result (Collected: 12/24/2022 04:56) Provider Status: Ordered             BLOOD GAS, ARTERIAL: Patient Communication     Released  Not seen           Click to Print Result      View SmartLink Info    BLOOD GAS, ARTERIAL (Order #[id number]) on 12/24/22       Order Report     Order Details    Room air

## 2022-12-24 NOTE — PROGRESS NOTES
Hospitalist Progress Note  [facility]     Patient: Shannon Abbott  : 1940  MRN: [id number]  Code Status: Prior    Hospital Day: 0   Date of Service: 2022    Subjective:   Patient seen in [place] [address] critical care unit. No current complaints. All questions sought and answered. Past Medical History:   Diagnosis Date    Arthritis     Cancer ([facility] [address]     HX LUNG CA    Emphysema/COPD ([facility] [address]     MILD/FORMER SMOKER    Hyperlipidemia     Hypertension     Macular degeneration     Palliative care patient 2022    Retention of urine        Past Surgical History:   Procedure Laterality Date    EYE SURGERY      MUSCLE WEAKNESS    GASTRECTOMY      PARTIAL FOR ULCER REPAIR    LUNG CANCER SURGERY Left     LLL LOBECTOMY    OTHER SURGICAL HISTORY      I & D OF GROIN INFECTION    TOTAL KNEE ARTHROPLASTY Right 2017    KNEE TOTAL ARTHROPLASTY performed by Dar Otoole MD at [facility] OR       Family History   Problem Relation Age of Onset    Diabetes Mother     Stroke Mother     Heart Attack Father     Cancer Father         ASBESTOS       Social History     Socioeconomic History    Marital status:       Spouse name: Not on file    Number of children: Not on file    Years of education: Not on file    Highest education level: Not on file   Occupational History    Not on file   Tobacco Use    Smoking status: Former     Types: Cigarettes     Quit date: 1984     Years since quittin.6    Smokeless tobacco: Never   Vaping Use    Vaping Use: Never used   Substance and Sexual Activity    Alcohol use: No    Drug use: No    Sexual activity: Not on file   Other Topics Concern    Not on file   Social History Narrative    Not on file     Social Determinants of Health     Financial Resource Strain: Not on file   Food Insecurity: Not on file   Transportation Needs: Not on file   Physical Activity: Not on file   Stress: Not on file   Social Connections: Not on file   Intimate Partner Violence: Not on file   Housing Stability: Not on file       Current Facility-Administered Medications   Medication Dose Route Frequency Provider Last Rate Last Admin    alfuzosin (UROXATRAL) extended release tablet 10 mg  10 mg Oral Daily Patience Dennis, DO   10 mg at 12/24/22 3470    aspirin EC tablet 81 mg  81 mg Oral BID Patience Dennis, DO   81 mg at 12/24/22 0909    buPROPion (WELLBUTRIN XL) extended release tablet 150 mg  150 mg Oral Daily Patience Dennis, DO   150 mg at 12/24/22 4955    clonazePAM (KLONOPIN) disintegrating tablet 0.25 mg  0.25 mg Oral Daily Patience Dennis, DO   0.25 mg at 12/24/22 3958    docusate sodium (COLACE) capsule 100 mg  100 mg Oral Daily Patience Dennis, DO   100 mg at 12/24/22 0909    fluticasone (FLONASE) 50 MCG/ACT nasal spray 2 spray  2 spray Each Nostril Daily Patience Dennis, DO   2 spray at 91/61/45 4172    folic acid-pyridoxine-cyancobalamin (FOLTX) 2.5-25-2 MG TABS 1 tablet  1 tablet Oral Daily Patience Dennis, DO   1 tablet at 12/24/22 0909    PreserVision AREDS 2+Multi Vit CAPS 2 capsule (Patient Supplied)  2 capsule Oral Daily Patience Dennis, DO        oxyCODONE-acetaminophen (PERCOCET) 5-325 MG per tablet 1 tablet  1 tablet Oral Q4H PRN Patience Dennis, DO        pantoprazole (PROTONIX) tablet 40 mg  40 mg Oral QAM AC Patience Dennis, DO   40 mg at 12/24/22 0937    [START ON 12/26/2022] potassium chloride (KLOR-CON M) extended release tablet 10 mEq  10 mEq Oral Once per day on Mon Wed Fri Patience Dennis, DO        propranolol (INDERAL LA) extended release capsule 60 mg  60 mg Oral Daily Patience Dennis, DO   60 mg at 12/24/22 0910    pyridostigmine (MESTINON) tablet 60 mg  60 mg Oral 3 times per day Patience Dennis, DO   60 mg at 12/24/22 0937    amLODIPine (NORVASC) tablet 2.5 mg  2.5 mg Oral Daily Patience Dennis, DO   2.5 mg at 12/24/22 0908    And    lisinopril (PRINIVIL;ZESTRIL) tablet 10 mg  10 mg Oral Daily Patience Dennis, DO   10 mg at 12/24/22 0907    atorvastatin (LIPITOR) tablet 10 mg  10 mg Oral Nightly Patience Dennis, DO        diphenhydrAMINE (BENADRYL) tablet 25 mg  25 mg Oral Nightly PRN Patience Dennis, DO        And    acetaminophen (TYLENOL) tablet 500 mg  500 mg Oral Nightly PRN Patience Dennis, DO        Vitamin D (CHOLECALCIFEROL) tablet 2,000 Units  2,000 Units Oral Daily Devaughn Anders MD   2,000 Units at 12/24/22 0910    vitamin D (ERGOCALCIFEROL) capsule 50,000 Units  50,000 Units Oral Weekly Devaughn Anders MD   50,000 Units at 12/24/22 0910    acetaminophen (TYLENOL) tablet 650 mg  650 mg Oral Q6H PRN Devaughn Anders MD        dexamethasone (PF) (DECADRON) injection 6 mg  6 mg IntraVENous Q12H Devaughn Anders MD        guaiFENesin tablet 400 mg  400 mg Oral Q8H Devaughn Anders MD   400 mg at 12/24/22 9748    ascorbic acid (VITAMIN C) tablet 500 mg  500 mg Oral BID Devaughn Anedrs MD   500 mg at 12/24/22 0910    zinc sulfate (ZINCATE) capsule 50 mg  50 mg Oral Daily Devaughn Anders MD        albuterol sulfate HFA (PROVENTIL;VENTOLIN;PROAIR) 108 (90 Base) MCG/ACT inhaler 2 puff  2 puff Inhalation Q4H Devaughn Anders MD   2 puff at 12/24/22 0926    ipratropium (ATROVENT HFA) 17 MCG/ACT inhaler 2 puff  2 puff Inhalation Q4H Devaughn Anders MD   2 puff at 12/24/22 0926    cefTRIAXone (ROCEPHIN) 1,000 mg in sodium chloride (PF) 0.9 % 10 mL IV syringe  1,000 mg IntraVENous Q24H Devaughn Anders MD   1,000 mg at 12/24/22 0919    azithromycin (ZITHROMAX) 500 mg in dextrose 5 % 250 mL IVPB ([id number])  500 mg IntraVENous Q24H Devaughn Anders  mL/hr at 12/24/22 0953 500 mg at 12/24/22 0953    0.9 % sodium chloride infusion   IntraVENous Continuous Devaughn Anders MD        hydrALAZINE (APRESOLINE) injection 10 mg  10 mg IntraVENous Q6H PRN Devaughn Anders MD        labetalol (NORMODYNE;TRANDATE) injection 20 mg  20 mg IntraVENous Q4H PRN Devaughn Anders MD             sodium chloride          Objective:   BP (!) 188/100   Pulse 82   Temp 97.4 °F (36.3 °C) (Temporal)   Resp 19   Ht 6' (1.829 m)   Wt 205 lb (93 kg)   SpO2 96%   BMI 27.80 kg/m²     In an effort to reduce COVID-19 exposure, patient seen from doorway and case discussed in detail with unit staff    Recent Labs     12/24/22 0450   WBC 11.3*   RBC 4.24*   HGB 13.8*   HCT 39.8*   MCV 93.9   MCH 32.5*   MCHC 34.7        Recent Labs     12/24/22 0450 12/24/22 0456   *  --    K 3.7 3.5   ANIONGAP 10  --    CL 80*  --    CO2 29  --    BUN 9  --    CREATININE 0.7  --    GLUCOSE 128*  --    CALCIUM 8.6*  --      No results for input(s): MG, PHOS in the last 72 hours. Recent Labs     12/24/22 0450   AST 20   ALT 21   BILITOT 0.7   ALKPHOS 74     No results for input(s): PH, PO2, PCO2, HCO3, BE, O2SAT in the last 72 hours. Recent Labs     12/24/22 0450   TROPONINI <0.01     No results for input(s): INR in the last 72 hours. Recent Labs     12/24/22 0450   LACTA 1.0       No intake or output data in the 24 hours ending 12/24/22 1015    XR CHEST PORTABLE    Result Date: 12/24/2022  Patient: Ata Ingram  Time Out: 06:31Exam(s): FILM CXR 1 VIEW EXAM:  XR Chest, 1 ViewCLINICAL HISTORY:   Reason for exam: shortness of breath. TECHNIQUE:  Frontal view of the chest.COMPARISON:Comparison is made to prior chest x-ray from November 10, 2022. FINDINGS:  Lungs: Mild to moderate peribronchial thickening of the central and lower lobe bronchi with increased interstitial opacities at the right cardiophrenic angle and patchy opacity at the left lung base. Pleural space:  Unremarkable. No pneumothorax. Heart:  Unremarkable. No cardiomegaly. Mediastinum:  Unremarkable. Bones/joints:  Unremarkable. Findings concerning for bronchitis with pneumonitis at the right lung base and infiltrate at the left lung base. Electronically signed by Armando Centeno MD on 12-24-22 at [address] [place] [address] and Plan:   COVID-19 pneumonia  COPD exacerbation  Acute hypoxemic respiratory failure  Hyponatremia    Dexamethasone  Tocilizumab  Adjuvant therapy  Empiric broad-spectrum antibiotics to cover potential secondary bacterial infection  Encouraged self proning  Steroids/nebs/antibiotics for COPD exacerbation  Currently requiring 3 L NC  Asymptomatic hyponatremia  Serum/urine studies  Follow sodium  Lovenox for DVT prophylaxis  Discussed treatment plan with patient/RN    Total critical care time: 45 minutes    Jr Walsh MD   12/24/2022 10:15 AM

## 2022-12-25 LAB
ANION GAP SERPL CALCULATED.3IONS-SCNC: 13 MMOL/L (ref 7–19)
BASOPHILS ABSOLUTE: 0 K/UL (ref 0–0.2)
BASOPHILS RELATIVE PERCENT: 0.1 % (ref 0–1)
BUN BLDV-MCNC: 13 MG/DL (ref 8–23)
CALCIUM SERPL-MCNC: 8.3 MG/DL (ref 8.8–10.2)
CHLORIDE BLD-SCNC: 82 MMOL/L (ref 98–111)
CO2: 25 MMOL/L (ref 22–29)
CREAT SERPL-MCNC: 0.7 MG/DL (ref 0.5–1.2)
EOSINOPHILS ABSOLUTE: 0 K/UL (ref 0–0.6)
EOSINOPHILS RELATIVE PERCENT: 0 % (ref 0–5)
GFR SERPL CREATININE-BSD FRML MDRD: >60 ML/MIN/{1.73_M2}
GLUCOSE BLD-MCNC: 123 MG/DL (ref 74–109)
HCT VFR BLD CALC: 36.1 % (ref 42–52)
HEMOGLOBIN: 12.8 G/DL (ref 14–18)
IMMATURE GRANULOCYTES #: 0.1 K/UL
LYMPHOCYTES ABSOLUTE: 0.4 K/UL (ref 1.1–4.5)
LYMPHOCYTES RELATIVE PERCENT: 4.6 % (ref 20–40)
MAGNESIUM: 1.9 MG/DL (ref 1.6–2.4)
MCH RBC QN AUTO: 33.2 PG (ref 27–31)
MCHC RBC AUTO-ENTMCNC: 35.5 G/DL (ref 33–37)
MCV RBC AUTO: 93.5 FL (ref 80–94)
MONOCYTES ABSOLUTE: 0.2 K/UL (ref 0–0.9)
MONOCYTES RELATIVE PERCENT: 2.3 % (ref 0–10)
NEUTROPHILS ABSOLUTE: 8.3 K/UL (ref 1.5–7.5)
NEUTROPHILS RELATIVE PERCENT: 92.2 % (ref 50–65)
PDW BLD-RTO: 12.4 % (ref 11.5–14.5)
PLATELET # BLD: 193 K/UL (ref 130–400)
PMV BLD AUTO: 9 FL (ref 9.4–12.4)
POTASSIUM SERPL-SCNC: 3.9 MMOL/L (ref 3.5–5)
RBC # BLD: 3.86 M/UL (ref 4.7–6.1)
SODIUM BLD-SCNC: 120 MMOL/L (ref 136–145)
SODIUM BLD-SCNC: 120 MMOL/L (ref 136–145)
SODIUM BLD-SCNC: 121 MMOL/L (ref 136–145)
WBC # BLD: 9 K/UL (ref 4.8–10.8)

## 2022-12-25 PROCEDURE — 2580000003 HC RX 258: Performed by: INTERNAL MEDICINE

## 2022-12-25 PROCEDURE — 84295 ASSAY OF SERUM SODIUM: CPT

## 2022-12-25 PROCEDURE — 6370000000 HC RX 637 (ALT 250 FOR IP): Performed by: INTERNAL MEDICINE

## 2022-12-25 PROCEDURE — 1210000000 HC MED SURG R&B

## 2022-12-25 PROCEDURE — 36415 COLL VENOUS BLD VENIPUNCTURE: CPT

## 2022-12-25 PROCEDURE — 2700000000 HC OXYGEN THERAPY PER DAY

## 2022-12-25 PROCEDURE — 85025 COMPLETE CBC W/AUTO DIFF WBC: CPT

## 2022-12-25 PROCEDURE — 6360000002 HC RX W HCPCS: Performed by: INTERNAL MEDICINE

## 2022-12-25 PROCEDURE — 83735 ASSAY OF MAGNESIUM: CPT

## 2022-12-25 PROCEDURE — 80048 BASIC METABOLIC PNL TOTAL CA: CPT

## 2022-12-25 PROCEDURE — 94760 N-INVAS EAR/PLS OXIMETRY 1: CPT

## 2022-12-25 RX ORDER — SODIUM CHLORIDE 1000 MG
1 TABLET, SOLUBLE MISCELLANEOUS
Status: DISCONTINUED | OUTPATIENT
Start: 2022-12-25 | End: 2023-01-01 | Stop reason: HOSPADM

## 2022-12-25 RX ORDER — OXYCODONE HYDROCHLORIDE AND ACETAMINOPHEN 5; 325 MG/1; MG/1
1 TABLET ORAL EVERY 4 HOURS PRN
Status: DISCONTINUED | OUTPATIENT
Start: 2022-12-25 | End: 2023-01-01 | Stop reason: HOSPADM

## 2022-12-25 RX ADMIN — PYRIDOSTIGMINE BROMIDE 60 MG: 60 TABLET ORAL at 17:47

## 2022-12-25 RX ADMIN — PROPRANOLOL HYDROCHLORIDE 60 MG: 60 CAPSULE, EXTENDED RELEASE ORAL at 11:16

## 2022-12-25 RX ADMIN — IPRATROPIUM BROMIDE 2 PUFF: 17 AEROSOL, METERED RESPIRATORY (INHALATION) at 22:31

## 2022-12-25 RX ADMIN — OXYCODONE HYDROCHLORIDE AND ACETAMINOPHEN 500 MG: 500 TABLET ORAL at 22:29

## 2022-12-25 RX ADMIN — SODIUM CHLORIDE 1 G: 1 TABLET ORAL at 17:47

## 2022-12-25 RX ADMIN — Medication 1 TABLET: at 08:52

## 2022-12-25 RX ADMIN — IPRATROPIUM BROMIDE 2 PUFF: 17 AEROSOL, METERED RESPIRATORY (INHALATION) at 04:45

## 2022-12-25 RX ADMIN — ALFUZOSIN HYDROCHLORIDE 10 MG: 10 TABLET, FILM COATED, EXTENDED RELEASE ORAL at 08:52

## 2022-12-25 RX ADMIN — ENOXAPARIN SODIUM 30 MG: 100 INJECTION SUBCUTANEOUS at 08:51

## 2022-12-25 RX ADMIN — GUAIFENESIN 400 MG: 200 TABLET ORAL at 08:52

## 2022-12-25 RX ADMIN — ZINC SULFATE 220 MG (50 MG) CAPSULE 50 MG: CAPSULE at 08:52

## 2022-12-25 RX ADMIN — ALBUTEROL SULFATE 2 PUFF: 90 AEROSOL, METERED RESPIRATORY (INHALATION) at 22:31

## 2022-12-25 RX ADMIN — IPRATROPIUM BROMIDE 2 PUFF: 17 AEROSOL, METERED RESPIRATORY (INHALATION) at 08:52

## 2022-12-25 RX ADMIN — DEXAMETHASONE SODIUM PHOSPHATE 6 MG: 10 INJECTION INTRAMUSCULAR; INTRAVENOUS at 17:46

## 2022-12-25 RX ADMIN — PANTOPRAZOLE SODIUM 40 MG: 40 TABLET, DELAYED RELEASE ORAL at 08:52

## 2022-12-25 RX ADMIN — ALBUTEROL SULFATE 2 PUFF: 90 AEROSOL, METERED RESPIRATORY (INHALATION) at 17:47

## 2022-12-25 RX ADMIN — GUAIFENESIN 400 MG: 200 TABLET ORAL at 15:15

## 2022-12-25 RX ADMIN — DEXAMETHASONE SODIUM PHOSPHATE 6 MG: 10 INJECTION INTRAMUSCULAR; INTRAVENOUS at 04:45

## 2022-12-25 RX ADMIN — IPRATROPIUM BROMIDE 2 PUFF: 17 AEROSOL, METERED RESPIRATORY (INHALATION) at 11:53

## 2022-12-25 RX ADMIN — AMLODIPINE BESYLATE 2.5 MG: 2.5 TABLET ORAL at 09:13

## 2022-12-25 RX ADMIN — ENOXAPARIN SODIUM 30 MG: 100 INJECTION SUBCUTANEOUS at 22:30

## 2022-12-25 RX ADMIN — CLONAZEPAM 0.25 MG: 0.25 TABLET, ORALLY DISINTEGRATING ORAL at 11:17

## 2022-12-25 RX ADMIN — IPRATROPIUM BROMIDE 2 PUFF: 17 AEROSOL, METERED RESPIRATORY (INHALATION) at 17:46

## 2022-12-25 RX ADMIN — LISINOPRIL 10 MG: 10 TABLET ORAL at 09:13

## 2022-12-25 RX ADMIN — OXYCODONE HYDROCHLORIDE AND ACETAMINOPHEN 500 MG: 500 TABLET ORAL at 08:52

## 2022-12-25 RX ADMIN — ALBUTEROL SULFATE 2 PUFF: 90 AEROSOL, METERED RESPIRATORY (INHALATION) at 04:45

## 2022-12-25 RX ADMIN — ATORVASTATIN CALCIUM 10 MG: 10 TABLET, FILM COATED ORAL at 22:29

## 2022-12-25 RX ADMIN — Medication: at 15:15

## 2022-12-25 RX ADMIN — PYRIDOSTIGMINE BROMIDE 60 MG: 60 TABLET ORAL at 22:29

## 2022-12-25 RX ADMIN — ALBUTEROL SULFATE 2 PUFF: 90 AEROSOL, METERED RESPIRATORY (INHALATION) at 11:53

## 2022-12-25 RX ADMIN — ASPIRIN 81 MG: 81 TABLET, COATED ORAL at 22:29

## 2022-12-25 RX ADMIN — Medication 2000 UNITS: at 09:13

## 2022-12-25 RX ADMIN — SODIUM CHLORIDE 1 G: 1 TABLET ORAL at 11:17

## 2022-12-25 RX ADMIN — ALBUTEROL SULFATE 2 PUFF: 90 AEROSOL, METERED RESPIRATORY (INHALATION) at 08:52

## 2022-12-25 RX ADMIN — PYRIDOSTIGMINE BROMIDE 60 MG: 60 TABLET ORAL at 04:44

## 2022-12-25 RX ADMIN — SODIUM CHLORIDE, PRESERVATIVE FREE 1000 MG: 5 INJECTION INTRAVENOUS at 08:54

## 2022-12-25 RX ADMIN — AZITHROMYCIN DIHYDRATE 500 MG: 500 INJECTION, POWDER, LYOPHILIZED, FOR SOLUTION INTRAVENOUS at 09:15

## 2022-12-25 RX ADMIN — BUPROPION HYDROCHLORIDE 150 MG: 150 TABLET, EXTENDED RELEASE ORAL at 08:52

## 2022-12-25 RX ADMIN — FLUTICASONE PROPIONATE 2 SPRAY: 50 SPRAY, METERED NASAL at 08:52

## 2022-12-25 RX ADMIN — ASPIRIN 81 MG: 81 TABLET, COATED ORAL at 08:52

## 2022-12-25 RX ADMIN — DOCUSATE SODIUM 100 MG: 100 CAPSULE, LIQUID FILLED ORAL at 08:52

## 2022-12-25 ASSESSMENT — PAIN SCALES - GENERAL: PAINLEVEL_OUTOF10: 0

## 2022-12-25 NOTE — PROGRESS NOTES
Hospitalist Progress Note  [facility]     Patient: Wang Check  : 1940  MRN: [id number]  Code Status: Prior    Hospital Day: 1   Date of Service: 2022    Subjective:   Patient seen in [place] [address] critical care unit. No current complaints. Past Medical History:   Diagnosis Date    Arthritis     Cancer ([facility] [address]     HX LUNG CA    Emphysema/COPD ([facility] [address]     MILD/FORMER SMOKER    Hyperlipidemia     Hypertension     Macular degeneration     Palliative care patient 2022    Retention of urine        Past Surgical History:   Procedure Laterality Date    EYE SURGERY      MUSCLE WEAKNESS    GASTRECTOMY      PARTIAL FOR ULCER REPAIR    LUNG CANCER SURGERY Left     LLL LOBECTOMY    OTHER SURGICAL HISTORY      I & D OF GROIN INFECTION    TOTAL KNEE ARTHROPLASTY Right 2017    KNEE TOTAL ARTHROPLASTY performed by Adelia Siemens, MD at [facility] OR       Family History   Problem Relation Age of Onset    Diabetes Mother     Stroke Mother     Heart Attack Father     Cancer Father         ASBESTOS       Social History     Socioeconomic History    Marital status:       Spouse name: Not on file    Number of children: Not on file    Years of education: Not on file    Highest education level: Not on file   Occupational History    Not on file   Tobacco Use    Smoking status: Former     Types: Cigarettes     Quit date: 1984     Years since quittin.6    Smokeless tobacco: Never   Vaping Use    Vaping Use: Never used   Substance and Sexual Activity    Alcohol use: No    Drug use: No    Sexual activity: Not on file   Other Topics Concern    Not on file   Social History Narrative    Not on file     Social Determinants of Health     Financial Resource Strain: Not on file   Food Insecurity: Not on file   Transportation Needs: Not on file   Physical Activity: Not on file   Stress: Not on file   Social Connections: Not on file   Intimate Partner Violence: Not on file   Housing Stability: Not on file       Current Facility-Administered Medications   Medication Dose Route Frequency Provider Last Rate Last Admin    magic butt cream   Topical Q4H PRN Patience Dennis, DO        oxyCODONE-acetaminophen (PERCOCET) 5-325 MG per tablet 1 tablet  1 tablet Oral Q4H PRN Patience Dennis, DO        alfuzosin (UROXATRAL) extended release tablet 10 mg  10 mg Oral Daily Patience Dennis, DO   10 mg at 12/25/22 6142    aspirin EC tablet 81 mg  81 mg Oral BID Patience Dennis, DO   81 mg at 12/25/22 0852    buPROPion (WELLBUTRIN XL) extended release tablet 150 mg  150 mg Oral Daily Patience Dennis, DO   150 mg at 12/25/22 0550    clonazePAM (KLONOPIN) disintegrating tablet 0.25 mg  0.25 mg Oral Daily Patience Dennis, DO   0.25 mg at 12/24/22 0011    docusate sodium (COLACE) capsule 100 mg  100 mg Oral Daily Patience Dennis, DO   100 mg at 12/25/22 0852    fluticasone (FLONASE) 50 MCG/ACT nasal spray 2 spray  2 spray Each Nostril Daily Patience Dennis, DO   2 spray at 67/85/53 3537    folic acid-pyridoxine-cyancobalamin (FOLTX) 2.5-25-2 MG TABS 1 tablet  1 tablet Oral Daily Patience Dennis, DO   1 tablet at 12/25/22 6157    PreserVision AREDS 2+Multi Vit CAPS 2 capsule (Patient Supplied)  2 capsule Oral Daily Patience Dennis, DO        pantoprazole (PROTONIX) tablet 40 mg  40 mg Oral QAM AC Patience Dennis, DO   40 mg at 12/25/22 0852    [START ON 12/26/2022] potassium chloride (KLOR-CON M) extended release tablet 10 mEq  10 mEq Oral Once per day on Mon Wed Fri Patience Dennis, DO        propranolol (INDERAL LA) extended release capsule 60 mg  60 mg Oral Daily Patience Dennis, DO   60 mg at 12/24/22 0910    pyridostigmine (MESTINON) tablet 60 mg  60 mg Oral 3 times per day Patience Dennis, DO   60 mg at 12/25/22 0444    amLODIPine (NORVASC) tablet 2.5 mg  2.5 mg Oral Daily Patience Dennis, DO   2.5 mg at 12/25/22 0913    And    lisinopril (PRINIVIL;ZESTRIL) tablet 10 mg  10 mg Oral Daily Patience Dennis, DO   10 mg at 12/25/22 0913    atorvastatin (LIPITOR) tablet 10 mg  10 mg Oral Nightly Patience Dennis, DO   10 mg at 12/24/22 2057    diphenhydrAMINE (BENADRYL) tablet 25 mg  25 mg Oral Nightly PRN Patience Dennis, DO   25 mg at 12/24/22 2345    And    acetaminophen (TYLENOL) tablet 500 mg  500 mg Oral Nightly PRN Patience Dennis, DO   500 mg at 12/24/22 2345    Vitamin D (CHOLECALCIFEROL) tablet 2,000 Units  2,000 Units Oral Daily Dee East MD   2,000 Units at 12/25/22 0913    vitamin D (ERGOCALCIFEROL) capsule 50,000 Units  50,000 Units Oral Weekly Dee East MD   50,000 Units at 12/24/22 0910    acetaminophen (TYLENOL) tablet 650 mg  650 mg Oral Q6H PRN Dee East MD        dexamethasone (PF) (DECADRON) injection 6 mg  6 mg IntraVENous Q12H Dee East MD   6 mg at 12/25/22 0445    guaiFENesin tablet 400 mg  400 mg Oral Q8H Dee East MD   400 mg at 12/25/22 1585    ascorbic acid (VITAMIN C) tablet 500 mg  500 mg Oral BID Dee East MD   500 mg at 12/25/22 0852    albuterol sulfate HFA (PROVENTIL;VENTOLIN;PROAIR) 108 (90 Base) MCG/ACT inhaler 2 puff  2 puff Inhalation Q4H Dee East MD   2 puff at 12/25/22 0852    ipratropium (ATROVENT HFA) 17 MCG/ACT inhaler 2 puff  2 puff Inhalation Q4H Dee East MD   2 puff at 12/25/22 0852    cefTRIAXone (ROCEPHIN) 1,000 mg in sodium chloride (PF) 0.9 % 10 mL IV syringe  1,000 mg IntraVENous Q24H Dee East MD   1,000 mg at 12/25/22 0854    azithromycin (ZITHROMAX) 500 mg in dextrose 5 % 250 mL IVPB ([id number])  500 mg IntraVENous Q24H Dee East  mL/hr at 12/25/22 0915 500 mg at 12/25/22 0915    hydrALAZINE (APRESOLINE) injection 10 mg  10 mg IntraVENous Q6H PRN Dee East MD        labetalol (NORMODYNE;TRANDATE) injection 20 mg  20 mg IntraVENous Q4H PRN Dee East MD        enoxaparin Sodium (LOVENOX) injection 30 mg  30 mg SubCUTAneous BID Dee East MD   30 mg at 12/25/22 0851    zinc sulfate (ZINCATE) capsule 50 mg  50 mg Oral Daily Guillaume Mir MD   50 mg at 12/25/22 9705             Objective:   BP (!) 148/75   Pulse 56   Temp 97.5 °F (36.4 °C) (Axillary)   Resp 13   Ht 6' (1.829 m)   Wt 205 lb (93 kg)   SpO2 97%   BMI 27.80 kg/m²     In an effort to reduce COVID-19 exposure, patient seen from doorway and case discussed in detail with unit staff     Recent Labs     12/24/22 0450 12/25/22  0125   WBC 11.3* 9.0   RBC 4.24* 3.86*   HGB 13.8* 12.8*   HCT 39.8* 36.1*   MCV 93.9 93.5   MCH 32.5* 33.2*   MCHC 34.7 35.5    193     Recent Labs     12/24/22  0450 12/24/22  0456 12/24/22  0939 12/24/22  1734 12/25/22  0125   *  --  121* 119* 120*   K 3.7 3.5  --   --  3.9   ANIONGAP 10  --   --   --  13   CL 80*  --   --   --  82*   CO2 29  --   --   --  25   BUN 9  --   --   --  13   CREATININE 0.7  --   --   --  0.7   GLUCOSE 128*  --   --   --  123*   CALCIUM 8.6*  --   --   --  8.3*     Recent Labs     12/25/22  0125   MG 1.9     Recent Labs     12/24/22 0450   AST 20   ALT 21   BILITOT 0.7   ALKPHOS 74     No results for input(s): PH, PO2, PCO2, HCO3, BE, O2SAT in the last 72 hours. Recent Labs     12/24/22 0450   TROPONINI <0.01     No results for input(s): INR in the last 72 hours. Recent Labs     12/24/22 0450   LACTA 1.0         Intake/Output Summary (Last 24 hours) at 12/25/2022 1005  Last data filed at 12/25/2022 0600  Gross per 24 hour   Intake 986.27 ml   Output 1800 ml   Net -813.73 ml       XR CHEST PORTABLE    Result Date: 12/24/2022  Patient: [facility]  Time Out: 06:31Exam(s): FILM CXR 1 VIEW EXAM:  XR Chest, 1 ViewCLINICAL HISTORY:   Reason for exam: shortness of breath. TECHNIQUE:  Frontal view of the chest.COMPARISON:Comparison is made to prior chest x-ray from November 10, 2022. FINDINGS:  Lungs: Mild to moderate peribronchial thickening of the central and lower lobe bronchi with increased interstitial opacities at the right cardiophrenic angle and patchy opacity at the left lung base. Pleural space:  Unremarkable. No pneumothorax. Heart:  Unremarkable. No cardiomegaly. Mediastinum:  Unremarkable. Bones/joints:  Unremarkable. Findings concerning for bronchitis with pneumonitis at the right lung base and infiltrate at the left lung base. Electronically signed by Lesly Wise MD on 12-24-22 at [address] [place] [address] and Plan:   COVID-19 pneumonia  COPD exacerbation  Acute hypoxemic respiratory failure  Hyponatremia     Dexamethasone  Tocilizumab  Adjuvant therapy  Empiric broad-spectrum antibiotics to cover potential secondary bacterial infection  Encouraged self proning  Steroids/nebs/antibiotics for COPD exacerbation  Currently requiring 4 L NC  Asymptomatic hyponatremia  Serum/urine studies reviewed  Follow sodium  Lovenox for DVT prophylaxis  Discussed treatment plan with patient/RN    Janny Marcial MD   12/25/2022 10:05 AM

## 2022-12-25 NOTE — PLAN OF CARE
Problem: Discharge Planning  Goal: Discharge to home or other facility with appropriate resources  12/24/2022 1835 by Teo Gore RN  Outcome: Progressing  12/24/2022 1834 by Teo Gore RN  Outcome: Progressing     Problem: Safety - Adult  Goal: Free from fall injury  12/24/2022 1835 by Teo Gore RN  Outcome: Progressing  12/24/2022 1834 by Teo Gore RN  Outcome: Progressing     Problem: ABCDS Injury Assessment  Goal: Absence of physical injury  12/24/2022 1835 by Teo Gore RN  Outcome: Progressing  12/24/2022 1834 by Teo Gore RN  Outcome: Progressing     Problem: Pain  Goal: Verbalizes/displays adequate comfort level or baseline comfort level  12/24/2022 1835 by Teo Gore RN  Outcome: Progressing  12/24/2022 1834 by Teo Gore RN  Outcome: Progressing

## 2022-12-25 NOTE — PROGRESS NOTES
4 Eyes Skin Assessment    Linh Anton is being assessed upon: Transfer to New Unit    I agree that Rhoda Escobedo, RN, along with Jez Beckett RN (either 2 RN's or 1 LPN and 1 RN) have performed a thorough Head to Toe Skin Assessment on the patient. ALL assessment sites listed below have been assessed. Areas assessed by both nurses:     [x]   Head, Face, and Ears   [x]   Shoulders, Back, and Chest  [x]   Arms, Elbows, and Hands   [x]   Coccyx, Sacrum, and Ischium  [x]   Legs, Feet, and Heels    Does the Patient have Skin Breakdown?  No    Bello Prevention initiated: Yes  Wound Care Orders initiated: No    WOC nurse consulted for Pressure Injury (Stage 3,4, Unstageable, DTI, NWPT, and Complex wounds) and New or Established Ostomies: No        Primary Nurse eSignature: Jazmine De Oliveira RN on 12/25/2022 at 1:36 PM      Co-Signer eSignature: {Esignature:[id number]}

## 2022-12-26 PROBLEM — G70.00 MYASTHENIA GRAVIS (HCC): Status: ACTIVE | Noted: 2022-12-26

## 2022-12-26 PROBLEM — C34.32 PRIMARY ADENOCARCINOMA OF LOWER LOBE OF LEFT LUNG (HCC): Status: ACTIVE | Noted: 2019-08-30

## 2022-12-26 LAB
ANION GAP SERPL CALCULATED.3IONS-SCNC: 12 MMOL/L (ref 7–19)
BASOPHILS ABSOLUTE: 0 K/UL (ref 0–0.2)
BASOPHILS RELATIVE PERCENT: 0.1 % (ref 0–1)
BUN BLDV-MCNC: 16 MG/DL (ref 8–23)
CALCIUM SERPL-MCNC: 8.5 MG/DL (ref 8.8–10.2)
CHLORIDE BLD-SCNC: 87 MMOL/L (ref 98–111)
CO2: 27 MMOL/L (ref 22–29)
CREAT SERPL-MCNC: 0.7 MG/DL (ref 0.5–1.2)
EOSINOPHILS ABSOLUTE: 0 K/UL (ref 0–0.6)
EOSINOPHILS RELATIVE PERCENT: 0 % (ref 0–5)
GFR SERPL CREATININE-BSD FRML MDRD: >60 ML/MIN/{1.73_M2}
GLUCOSE BLD-MCNC: 146 MG/DL (ref 74–109)
HCT VFR BLD CALC: 39.6 % (ref 42–52)
HEMOGLOBIN: 13.6 G/DL (ref 14–18)
IMMATURE GRANULOCYTES #: 0.1 K/UL
LYMPHOCYTES ABSOLUTE: 0.5 K/UL (ref 1.1–4.5)
LYMPHOCYTES RELATIVE PERCENT: 3.5 % (ref 20–40)
MAGNESIUM: 2 MG/DL (ref 1.6–2.4)
MCH RBC QN AUTO: 32.5 PG (ref 27–31)
MCHC RBC AUTO-ENTMCNC: 34.3 G/DL (ref 33–37)
MCV RBC AUTO: 94.7 FL (ref 80–94)
MONOCYTES ABSOLUTE: 0.4 K/UL (ref 0–0.9)
MONOCYTES RELATIVE PERCENT: 2.3 % (ref 0–10)
NEUTROPHILS ABSOLUTE: 14.5 K/UL (ref 1.5–7.5)
NEUTROPHILS RELATIVE PERCENT: 93.3 % (ref 50–65)
PDW BLD-RTO: 12.6 % (ref 11.5–14.5)
PLATELET # BLD: 258 K/UL (ref 130–400)
PMV BLD AUTO: 9 FL (ref 9.4–12.4)
POTASSIUM SERPL-SCNC: 4 MMOL/L (ref 3.5–5)
RBC # BLD: 4.18 M/UL (ref 4.7–6.1)
SODIUM BLD-SCNC: 121 MMOL/L (ref 136–145)
SODIUM BLD-SCNC: 126 MMOL/L (ref 136–145)
WBC # BLD: 15.5 K/UL (ref 4.8–10.8)

## 2022-12-26 PROCEDURE — 6370000000 HC RX 637 (ALT 250 FOR IP): Performed by: INTERNAL MEDICINE

## 2022-12-26 PROCEDURE — 6360000002 HC RX W HCPCS: Performed by: INTERNAL MEDICINE

## 2022-12-26 PROCEDURE — 84295 ASSAY OF SERUM SODIUM: CPT

## 2022-12-26 PROCEDURE — 99222 1ST HOSP IP/OBS MODERATE 55: CPT | Performed by: PHYSICIAN ASSISTANT

## 2022-12-26 PROCEDURE — 2580000003 HC RX 258: Performed by: INTERNAL MEDICINE

## 2022-12-26 PROCEDURE — 36415 COLL VENOUS BLD VENIPUNCTURE: CPT

## 2022-12-26 PROCEDURE — 83735 ASSAY OF MAGNESIUM: CPT

## 2022-12-26 PROCEDURE — 2700000000 HC OXYGEN THERAPY PER DAY

## 2022-12-26 PROCEDURE — 80048 BASIC METABOLIC PNL TOTAL CA: CPT

## 2022-12-26 PROCEDURE — 85025 COMPLETE CBC W/AUTO DIFF WBC: CPT

## 2022-12-26 PROCEDURE — 1210000000 HC MED SURG R&B

## 2022-12-26 RX ADMIN — PROPRANOLOL HYDROCHLORIDE 60 MG: 60 CAPSULE, EXTENDED RELEASE ORAL at 09:36

## 2022-12-26 RX ADMIN — ALBUTEROL SULFATE 2 PUFF: 90 AEROSOL, METERED RESPIRATORY (INHALATION) at 06:07

## 2022-12-26 RX ADMIN — DEXAMETHASONE SODIUM PHOSPHATE 6 MG: 10 INJECTION INTRAMUSCULAR; INTRAVENOUS at 17:11

## 2022-12-26 RX ADMIN — IPRATROPIUM BROMIDE 2 PUFF: 17 AEROSOL, METERED RESPIRATORY (INHALATION) at 09:35

## 2022-12-26 RX ADMIN — GUAIFENESIN 400 MG: 200 TABLET ORAL at 01:29

## 2022-12-26 RX ADMIN — IPRATROPIUM BROMIDE 2 PUFF: 17 AEROSOL, METERED RESPIRATORY (INHALATION) at 06:08

## 2022-12-26 RX ADMIN — POTASSIUM CHLORIDE 10 MEQ: 750 TABLET, EXTENDED RELEASE ORAL at 09:36

## 2022-12-26 RX ADMIN — ASPIRIN 81 MG: 81 TABLET, COATED ORAL at 09:35

## 2022-12-26 RX ADMIN — DIPHENHYDRAMINE HYDROCHLORIDE 25 MG: 25 TABLET ORAL at 22:04

## 2022-12-26 RX ADMIN — IPRATROPIUM BROMIDE 2 PUFF: 17 AEROSOL, METERED RESPIRATORY (INHALATION) at 17:10

## 2022-12-26 RX ADMIN — PYRIDOSTIGMINE BROMIDE 60 MG: 60 TABLET ORAL at 22:06

## 2022-12-26 RX ADMIN — PYRIDOSTIGMINE BROMIDE 60 MG: 60 TABLET ORAL at 14:35

## 2022-12-26 RX ADMIN — PANTOPRAZOLE SODIUM 40 MG: 40 TABLET, DELAYED RELEASE ORAL at 06:07

## 2022-12-26 RX ADMIN — ALFUZOSIN HYDROCHLORIDE 10 MG: 10 TABLET, FILM COATED, EXTENDED RELEASE ORAL at 09:36

## 2022-12-26 RX ADMIN — DIPHENHYDRAMINE HYDROCHLORIDE 25 MG: 25 TABLET ORAL at 22:06

## 2022-12-26 RX ADMIN — Medication 1 TABLET: at 09:36

## 2022-12-26 RX ADMIN — Medication 2000 UNITS: at 09:36

## 2022-12-26 RX ADMIN — GUAIFENESIN 400 MG: 200 TABLET ORAL at 09:36

## 2022-12-26 RX ADMIN — AZITHROMYCIN DIHYDRATE 500 MG: 500 INJECTION, POWDER, LYOPHILIZED, FOR SOLUTION INTRAVENOUS at 12:12

## 2022-12-26 RX ADMIN — ALBUTEROL SULFATE 2 PUFF: 90 AEROSOL, METERED RESPIRATORY (INHALATION) at 09:35

## 2022-12-26 RX ADMIN — ALBUTEROL SULFATE 2 PUFF: 90 AEROSOL, METERED RESPIRATORY (INHALATION) at 14:36

## 2022-12-26 RX ADMIN — GUAIFENESIN 400 MG: 200 TABLET ORAL at 17:11

## 2022-12-26 RX ADMIN — ALBUTEROL SULFATE 2 PUFF: 90 AEROSOL, METERED RESPIRATORY (INHALATION) at 01:30

## 2022-12-26 RX ADMIN — ACETAMINOPHEN 500 MG: 500 TABLET ORAL at 22:06

## 2022-12-26 RX ADMIN — LISINOPRIL 10 MG: 10 TABLET ORAL at 09:36

## 2022-12-26 RX ADMIN — IPRATROPIUM BROMIDE 2 PUFF: 17 AEROSOL, METERED RESPIRATORY (INHALATION) at 14:36

## 2022-12-26 RX ADMIN — CLONAZEPAM 0.25 MG: 0.25 TABLET, ORALLY DISINTEGRATING ORAL at 09:36

## 2022-12-26 RX ADMIN — PYRIDOSTIGMINE BROMIDE 60 MG: 60 TABLET ORAL at 06:07

## 2022-12-26 RX ADMIN — SODIUM CHLORIDE 1 G: 1 TABLET ORAL at 09:36

## 2022-12-26 RX ADMIN — ENOXAPARIN SODIUM 30 MG: 100 INJECTION SUBCUTANEOUS at 22:07

## 2022-12-26 RX ADMIN — ZINC SULFATE 220 MG (50 MG) CAPSULE 50 MG: CAPSULE at 09:36

## 2022-12-26 RX ADMIN — OXYCODONE HYDROCHLORIDE AND ACETAMINOPHEN 500 MG: 500 TABLET ORAL at 09:36

## 2022-12-26 RX ADMIN — AMLODIPINE BESYLATE 2.5 MG: 2.5 TABLET ORAL at 09:36

## 2022-12-26 RX ADMIN — ALBUTEROL SULFATE 2 PUFF: 90 AEROSOL, METERED RESPIRATORY (INHALATION) at 17:10

## 2022-12-26 RX ADMIN — DEXAMETHASONE SODIUM PHOSPHATE 6 MG: 10 INJECTION INTRAMUSCULAR; INTRAVENOUS at 06:08

## 2022-12-26 RX ADMIN — IPRATROPIUM BROMIDE 2 PUFF: 17 AEROSOL, METERED RESPIRATORY (INHALATION) at 01:30

## 2022-12-26 RX ADMIN — FLUTICASONE PROPIONATE 2 SPRAY: 50 SPRAY, METERED NASAL at 09:39

## 2022-12-26 RX ADMIN — ATORVASTATIN CALCIUM 10 MG: 10 TABLET, FILM COATED ORAL at 22:07

## 2022-12-26 RX ADMIN — DOCUSATE SODIUM 100 MG: 100 CAPSULE, LIQUID FILLED ORAL at 09:35

## 2022-12-26 RX ADMIN — SODIUM CHLORIDE 1 G: 1 TABLET ORAL at 14:35

## 2022-12-26 RX ADMIN — SODIUM CHLORIDE 1 G: 1 TABLET ORAL at 17:11

## 2022-12-26 RX ADMIN — OXYCODONE HYDROCHLORIDE AND ACETAMINOPHEN 500 MG: 500 TABLET ORAL at 22:07

## 2022-12-26 RX ADMIN — SODIUM CHLORIDE, PRESERVATIVE FREE 1000 MG: 5 INJECTION INTRAVENOUS at 11:30

## 2022-12-26 RX ADMIN — ASPIRIN 81 MG: 81 TABLET, COATED ORAL at 23:06

## 2022-12-26 RX ADMIN — BUPROPION HYDROCHLORIDE 150 MG: 150 TABLET, EXTENDED RELEASE ORAL at 09:36

## 2022-12-26 RX ADMIN — ENOXAPARIN SODIUM 30 MG: 100 INJECTION SUBCUTANEOUS at 09:39

## 2022-12-26 RX ADMIN — ALBUTEROL SULFATE 2 PUFF: 90 AEROSOL, METERED RESPIRATORY (INHALATION) at 22:07

## 2022-12-26 RX ADMIN — IPRATROPIUM BROMIDE 2 PUFF: 17 AEROSOL, METERED RESPIRATORY (INHALATION) at 22:07

## 2022-12-26 NOTE — CONSULTS
Palliative Care Consult Note    12/26/2022 1:14 PM  Subjective:  Admit Date: 12/24/2022  PCP: Satya Norman DO    Date of Service: 12/26/2022    Reason for Consultation:  Goals of Care, Code Status, Family Support     History Obtained From: EMR/Patient and their Family    History Of Present Illness: The patient is a 79 y.o. 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 EMS was summoned to his home and noted O2 sat in the 80s. He was placed on supplemental oxygen, dexamethasone and admitted to Hospitalist service. Tocilizumab was given as well as empiric antibiotics. Palliative care was consulted for goals of care and code status discussion. Past Medical History:        Diagnosis Date    Arthritis     Cancer ([facility] [place] [address]     HX LUNG CA    Emphysema/COPD ([facility] [place] [address]     MILD/FORMER SMOKER    Hyperlipidemia     Hypertension     Macular degeneration     Myasthenia gravis ([facility] [place] [address] 12/26/2022    Palliative care patient 11/11/2022    Retention of urine        Past Surgical History:        Procedure Laterality Date    EYE SURGERY      MUSCLE WEAKNESS    GASTRECTOMY      PARTIAL FOR ULCER REPAIR    LUNG CANCER SURGERY Left     LLL LOBECTOMY    OTHER SURGICAL HISTORY      I & D OF GROIN INFECTION    TOTAL KNEE ARTHROPLASTY Right 5/18/2017    KNEE TOTAL ARTHROPLASTY performed by Duane Sin, MD at [address] [place] [address] Medications:  Prior to Admission medications    Medication Sig Start Date End Date Taking?  Authorizing Provider   pyridostigmine (MESTINON) 60 MG tablet Take 1 tablet by mouth every 8 hours 12/19/22   Cliff Krishna MD   acetaminophen (TYLENOL) 500 MG tablet Take by mouth every 6 hours as needed    Historical Provider, MD   clonazePAM (KLONOPIN) 0.5 MG disintegrating tablet DISSOLVE 1 TABLET IN MOUTH ONCE DAILY FOR 30 DAYS 11/10/22   Historical Provider, MD   folic acid-pyridoxine-cyanocobalamine (FOLBEE) 2.5-25-1 MG TABS tablet Take 1 tablet by mouth daily 10/27/22   Historical Provider, MD   FOLBEE 2.5-25-1 MG TABS tablet TAKE 1 TABLET BY MOUTH ONCE DAILY 10/27/22   Historical Provider, MD   pantoprazole (PROTONIX) 40 MG tablet Take 1 tablet by mouth every morning (before breakfast) 11/21/22   Mariano Gleason MD   predniSONE (DELTASONE) 10 MG tablet Take 1 each am for a week, then 2 each am for a week,then 3 each am 11/21/22   Mariano Gleason MD   buPROPion (WELLBUTRIN XL) 150 MG extended release tablet Take 1 tablet by mouth daily 11/16/22   Dickson Campos MD   propranolol (INDERAL LA) 60 MG extended release capsule Take 60 mg by mouth daily    Historical Provider, MD   Potassium Chloride (KLOR-CON 10 PO) Take 10 mEq by mouth three times a week Monday, Wednesday, Friday    Historical Provider, MD   fluticasone (FLONASE) 50 MCG/ACT nasal spray 2 sprays by Each Nostril route daily    Historical Provider, MD   oxyCODONE-acetaminophen (PERCOCET) 5-325 MG per tablet One or two every 4 to 6 hours prn.   Patient not taking: No sig reported 5/22/17   Jas Russell MD   aspirin EC 81 MG EC tablet Take 1 tablet by mouth 2 times daily  Patient not taking: No sig reported 5/22/17   Jas Russell MD   docusate sodium (COLACE) 100 MG capsule Take 1 capsule by mouth daily To prevent constipation  Patient not taking: No sig reported 5/22/17   Jas Russell MD   furosemide (LASIX) 20 MG tablet Take 40 mg by mouth See Admin Instructions Indications: FLUID RETENTION Monday, Wednesday, Friday    Historical Provider, MD   amLODIPine-benazepril (LOTREL) 2.5-10 MG per capsule Take 1 capsule by mouth daily Indications: HTN    Historical Provider, MD   lovastatin (MEVACOR) 40 MG tablet Take 40 mg by mouth daily Indications: CHOLESTEROL    Historical Provider, MD   alfuzosin (UROXATRAL) 10 MG extended release tablet Take 10 mg by mouth daily Indications: BLADDER    Historical Provider, MD   diphenhydrAMINE-APAP, sleep, (TYLENOL PM EXTRA STRENGTH)  MG tablet Take 1 tablet by mouth nightly as needed for Sleep  Patient not taking: Reported on 11/21/2022    Historical Provider, MD   Multiple Vitamins-Minerals (PRESERVISION AREDS 2+MULTI VIT) CAPS Take 2 capsules by mouth daily Indications: SUPPLEMENT    Historical Provider, MD       Allergies:    Penicillins    Social History:    The patient currently lives at home. Tobacco:   reports that he quit smoking about 38 years ago. His smoking use included cigarettes. He has never used smokeless tobacco.  Alcohol:   reports no history of alcohol use.   Illicit Drugs: none    Family History:      Problem Relation Age of Onset    Diabetes Mother     Stroke Mother     Heart Attack Father     Cancer Father         ASBESTOS       Review of Systems:   Constitutional / general:  Denies fever / chills / sweats +fatigue  Head:  Denies headache / neck stiffness / trauma / visual change  Eyes:  Denies blurry vision / acute visual change or loss / itching / redness  ENT: Denies sore throat / hoarseness / nasal drainage / ear pain  CV:  Denies chest pain / palpitations/ orthopnea   Pulmonary:  Denies cough /+ shortness of breath / sputum / hemoptysis  GI: Denies nausea / vomiting / abdominal pain / diarrhea / constipation  :  Denies dysuria / hesitancy / urgency / hematuria   Neuro: Denies paralysis / syncope / seizure / dysphagia / headache / paresthesias  Musculoskeletal:  +muscle weakness /joint stiffness / pain  Vascular: Denies edema / claudication / varicosities  Heme / endocrine: Denies easy bruising / bleeding / excessive sweating / heat or cold intolerance  Psychiatric:  Denies depression / anxiety / insomnia / mood changes  Skin:  Denies new rashes / lesions / skin hair or nail changes    14 point review of systems is negative except as specifically addressed above. Physical Examination:  /78   Pulse 78   Temp 97.7 °F (36.5 °C) (Temporal)   Resp 20   Ht 6' (1.829 m)   Wt 205 lb (93 kg)   SpO2 94%   BMI 27.80 kg/m²   General appearance: alert, appears stated age, cooperative and no distress  Head: Normocephalic, without obvious abnormality, atraumatic  Eyes: conjunctivae/corneas clear. PERRL, EOM's intact. Ears/Nose: normal external ears and nose  Neck/Throat: supple, symmetrical, trachea midline, throat without exudate    Pulmonary: diminished throughout otherwise clear to auscultation bilaterally,no rales or wheezes   Cardiovascular: regular rate and rhythm, S1, S2 normal, no murmur  Gastrointestinal:soft, non-tender; non-distended, bowel sounds present  Musculoskeletal:No lower extremity edema,  No erythema, no tenderness to palpation  Skin: Warm, dry  Neurologic: Alert and oriented X 3, generalized weakness, normal tone, speech fluent, no focal deficits appreciated   Psychiatric: Appropriate mood/affect     Diagnostic Data:  CBC:  Recent Labs     12/24/22  0450 12/25/22  0125 12/26/22  0202   WBC 11.3* 9.0 15.5*   HGB 13.8* 12.8* 13.6*   HCT 39.8* 36.1* 39.6*    193 258     BMP:  Recent Labs     12/24/22  0450 12/24/22  0456 12/24/22  0939 12/25/22  0125 12/25/22  1051 12/25/22  1814 12/26/22  0202 12/26/22  1008   *  --    < > 120*   < > 120* 126* 121*   K 3.7 3.5  --  3.9  --   --  4.0  --    CL 80*  --   --  82*  --   --  87*  --    CO2 29  --   --  25  --   --  27  --    BUN 9  --   --  13  --   --  16  --    CREATININE 0.7  --   --  0.7  --   --  0.7  --    CALCIUM 8.6*  --   --  8.3*  --   --  8.5*  --     < > = values in this interval not displayed.      Recent Labs     12/24/22  0450   AST 20   ALT 21   BILITOT 0.7   ALKPHOS 74       XR CHEST PORTABLE    Result Date: 12/24/2022  Patient: Lucas Cedillo  Time Out: 06:31Exam(s): FILM CXR 1 VIEW EXAM:  XR Chest, 1 ViewCLINICAL HISTORY:   Reason for exam: shortness of breath. TECHNIQUE:  Frontal view of the chest.COMPARISON:Comparison is made to prior chest x-ray from November 10, 2022. FINDINGS:  Lungs: Mild to moderate peribronchial thickening of the central and lower lobe bronchi with increased interstitial opacities at the right cardiophrenic angle and patchy opacity at the left lung base. Pleural space:  Unremarkable. No pneumothorax. Heart:  Unremarkable. No cardiomegaly. Mediastinum:  Unremarkable. Bones/joints:  Unremarkable. Findings concerning for bronchitis with pneumonitis at the right lung base and infiltrate at the left lung base. Electronically signed by Kevin Stafford MD on 12-24-22 at [address] [place] [address] of Advance Directives:     Surrogate Decision Maker:Carie Ramírez Power of :No    Advanced Directives/Living [place]: Yes-on file (Patient states he also has one listing his daughter, Judith Santamaria, as HCS.  Copy requested     Out of hospital medical orders in place to reflect resuscitation status (MOLST/POLST): No    Information Sharing:  Patient's awareness of illness:  [] Terminal [] Life-Threatening [x] Serious [] Non life-threatening [] Not serious   [] Not discussed    Family awareness of illness:   [] Terminal [] Life-Threatening [] Serious [] Nonlife-threatening [] Not serious   [x] Not discussed    Palliative Performance Scale:  [] 80% Full ambulation  Some disease: Normal activity w/ some effort  Full self-care  Normal/reduced intake  Full LOC  [x] 70% Reduced ambulation  Some disease: Can't do normal job or work  Full self-care  Normal/reduced intake  Full LOC  [] 60% Reduced ambulation  Significant disease: Can't do hobbies/housework  Occasional assistance  Normal/reduced intake  LOC full/confusion  [] 50% Mainly sit/lie  Extensive disease: Can't do any work  Considerable assistance  Normal/reduced intake  LOC full/confusion  [] 40% Mainly in bed  Extensive disease  Mainly assistance  Normal/reduced intake  LOC full/confusion  [] 30% Bed Bound  Extensive disease  Total care  Reduced Intake  LOC full/confusion  [] 20% Bed Bound  Extensive disease  Total care  Minimal intake  Drowsy/coma  [] 10% Bed Bound  Extensive disease  Total care  Mouth care only  Drowsy/coma  [] 0% Death    ECOG:(2) Ambulatory and capable of self care, unable to carry out work activity, up and about > 50% or waking hours    Assessment/Plan:  Principal Problem:    Pneumonia due to COVID-19 virus  Active Problems:    Hyponatremia    Palliative care patient    Myasthenia gravis [facility])    Essential hypertension    Pulmonary emphysema ([facility] [address]  Resolved Problems:    * No resolved hospital problems. *     Visit Summary:  Chart reviewed, patient discussed with consulting service and nursing staff. Reviewed health issues, work up and treatment plan as well as factors that lead to hospitalization. I saw Mr. Arnold Mix at his bedside. He is awake and interactive and wearing [place] O2. He has history of stage I adenocarcinoma of the lung as well as Myasthenia gravis. He is followed outpatient by Oncology and Neurology and has recently been placed on Mestinon for which he has shown improvement in right arm weakness and Diplopia. He lives with his daughter, Jovanni Doty, who he names as his healthcare surrogate and tells me he recently completed new paperwork that lists her as primary decision maker. Copy requested. He feels he is overall improved since admission. Has less dyspnea. Remains weak and would like to work with PT/OT. He has requested a walker and a bedside commode. Tells me he plans to return home with his daughter when medically stable. We reviewed his current living will on file. We also discussed the meaning of \"full code\" and \"do not resuscitate\". He tells me he would want an attempt at resuscitation and short term ventilator management if required but he would not want to be kept long term on a ventilator.  He tells me his daughter Jovanni Doty is well aware of his wishes. His current goals are to return home with her when medically stable.     Candidate for SCOP:Out of service area     Recommendations:     Palliative Care-GOC discharge home w/ his daughter when medically stable may need HH Code status: Full code ACP completed   Acute respiratory failure w/ hypoxia in setting of COVID-19/known COPD-received Tocilizumab, Decadron, empiric antibiotics, inhalers   Hyponatremia-chronic, asymptomatic, mgmt per Hospitalist   Stage I adenocarcinoma of the lung-surgically treated, no evidence of metastasis followed by Oncology at [facility]  Myasthenia gravis-Mestinon continued, Neurology sees as OP  Generalized weakness-PT/OT consult, nursing communication for walker and bedside commode     Thank you for consulting palliative care and allowing us to participate in the care of the patient.      CounselingTopics: Goals of care, Code Status, Disease process education, pt/family support    Time Spent Counseling/Coordination of care > 50%:  YES                                   Total Time Spent with patient/family counseling, workup/treatment review, discussion with medical team and placement of orders/preparation of this note:56 minutes    Electronically signed by Flower Kohli PA-C on 12/26/2022 at 1:14 PM    (Please note that portions of this note were completed with a voice recognition program.  Efforts were made to edit the dictations but occasionally words are mis-transcribed.)

## 2022-12-26 NOTE — PROGRESS NOTES
Hospitalist Progress Note  [facility]     Patient: Zena Landaverde  : 1940  MRN: [id number]  Code Status: Full Code    Hospital Day: 2   Date of Service: 2022    Subjective:   Patient seen in COVID-19 unit. No current complaints. Past Medical History:   Diagnosis Date    Arthritis     Cancer ([facility] [address]     HX LUNG CA    Emphysema/COPD ([facility] [address]     MILD/FORMER SMOKER    Hyperlipidemia     Hypertension     Macular degeneration     Myasthenia gravis ([facility] [address] 2022    Palliative care patient 2022    Retention of urine        Past Surgical History:   Procedure Laterality Date    EYE SURGERY      MUSCLE WEAKNESS    GASTRECTOMY      PARTIAL FOR ULCER REPAIR    LUNG CANCER SURGERY Left     LLL LOBECTOMY    OTHER SURGICAL HISTORY      I & D OF GROIN INFECTION    TOTAL KNEE ARTHROPLASTY Right 2017    KNEE TOTAL ARTHROPLASTY performed by Buckley Brunner, MD at [facility] OR       Family History   Problem Relation Age of Onset    Diabetes Mother     Stroke Mother     Heart Attack Father     Cancer Father         ASBESTOS       Social History     Socioeconomic History    Marital status:       Spouse name: Not on file    Number of children: Not on file    Years of education: Not on file    Highest education level: Not on file   Occupational History    Not on file   Tobacco Use    Smoking status: Former     Types: Cigarettes     Quit date: 1984     Years since quittin.6    Smokeless tobacco: Never   Vaping Use    Vaping Use: Never used   Substance and Sexual Activity    Alcohol use: No    Drug use: No    Sexual activity: Not on file   Other Topics Concern    Not on file   Social History Narrative    Not on file     Social Determinants of Health     Financial Resource Strain: Not on file   Food Insecurity: Not on file   Transportation Needs: Not on file   Physical Activity: Not on file   Stress: Not on file   Social Connections: Not on file   Intimate Partner Violence: Not on file   Housing Stability: Not on file       Current Facility-Administered Medications   Medication Dose Route Frequency Provider Last Rate Last Admin    magic butt cream   Topical Q4H PRN Patience Dennis, DO   Given at 12/25/22 1515    oxyCODONE-acetaminophen (PERCOCET) 5-325 MG per tablet 1 tablet  1 tablet Oral Q4H PRN Patience Dennis, DO        sodium chloride tablet 1 g  1 g Oral TID  Vicki Buck MD   1 g at 12/26/22 0936    alfuzosin (UROXATRAL) extended release tablet 10 mg  10 mg Oral Daily Patience Dennis, DO   10 mg at 12/26/22 8051    aspirin EC tablet 81 mg  81 mg Oral BID Patience Dennis, DO   81 mg at 12/26/22 0935    buPROPion (WELLBUTRIN XL) extended release tablet 150 mg  150 mg Oral Daily Patience Dennis, DO   150 mg at 12/26/22 0936    clonazePAM (KLONOPIN) disintegrating tablet 0.25 mg  0.25 mg Oral Daily Patience Dennis, DO   0.25 mg at 12/26/22 0936    docusate sodium (COLACE) capsule 100 mg  100 mg Oral Daily Patience Dennis, DO   100 mg at 12/26/22 0935    fluticasone (FLONASE) 50 MCG/ACT nasal spray 2 spray  2 spray Each Nostril Daily Patience Dennis, DO   2 spray at 62/02/74 3610    folic acid-pyridoxine-cyancobalamin (FOLTX) 2.5-25-2 MG TABS 1 tablet  1 tablet Oral Daily Patience Dennis, DO   1 tablet at 12/26/22 9258    PreserVision AREDS 2+Multi Vit CAPS 2 capsule (Patient Supplied)  2 capsule Oral Daily Patience Dennis, DO        pantoprazole (PROTONIX) tablet 40 mg  40 mg Oral QAM AC Patience Dennis, DO   40 mg at 12/26/22 0607    potassium chloride (KLOR-CON M) extended release tablet 10 mEq  10 mEq Oral Once per day on Mon Wed Fri Patience Dennis, DO   10 mEq at 12/26/22 0936    propranolol (INDERAL LA) extended release capsule 60 mg  60 mg Oral Daily Patience Dennis, DO   60 mg at 12/26/22 0936    pyridostigmine (MESTINON) tablet 60 mg  60 mg Oral 3 times per day Patience Dennis, DO   60 mg at 12/26/22 0607    amLODIPine (NORVASC) tablet 2.5 mg  2.5 mg Oral Daily Patience Dennis, DO   2.5 mg at 12/26/22 0936    And    lisinopril (PRINIVIL;ZESTRIL) tablet 10 mg  10 mg Oral Daily Patience Dennis, DO   10 mg at 12/26/22 0936    atorvastatin (LIPITOR) tablet 10 mg  10 mg Oral Nightly Patience Dennis, DO   10 mg at 12/25/22 2229    diphenhydrAMINE (BENADRYL) tablet 25 mg  25 mg Oral Nightly PRN Patience Dennis, DO   25 mg at 12/24/22 2345    And    acetaminophen (TYLENOL) tablet 500 mg  500 mg Oral Nightly PRN Patience Dennis, DO   500 mg at 12/24/22 2345    Vitamin D (CHOLECALCIFEROL) tablet 2,000 Units  2,000 Units Oral Daily Corina Goodrich MD   2,000 Units at 12/26/22 5864    vitamin D (ERGOCALCIFEROL) capsule 50,000 Units  50,000 Units Oral Weekly Corina Goodrich MD   50,000 Units at 12/24/22 0910    acetaminophen (TYLENOL) tablet 650 mg  650 mg Oral Q6H PRN Corina Goodrich MD        dexamethasone (PF) (DECADRON) injection 6 mg  6 mg IntraVENous Q12H Corina Goodrich MD   6 mg at 12/26/22 1901    guaiFENesin tablet 400 mg  400 mg Oral Q8H Corina Goodrich MD   400 mg at 12/26/22 9975    ascorbic acid (VITAMIN C) tablet 500 mg  500 mg Oral BID Corina Goodrich MD   500 mg at 12/26/22 0936    albuterol sulfate HFA (PROVENTIL;VENTOLIN;PROAIR) 108 (90 Base) MCG/ACT inhaler 2 puff  2 puff Inhalation Q4H Corina Goodrich MD   2 puff at 12/26/22 0935    ipratropium (ATROVENT HFA) 17 MCG/ACT inhaler 2 puff  2 puff Inhalation Q4H Corina Goodrich MD   2 puff at 12/26/22 0935    cefTRIAXone (ROCEPHIN) 1,000 mg in sodium chloride (PF) 0.9 % 10 mL IV syringe  1,000 mg IntraVENous Q24H Corina Goodrich MD   1,000 mg at 12/26/22 1130    azithromycin (ZITHROMAX) 500 mg in dextrose 5 % 250 mL IVPB ([id number])  500 mg IntraVENous Q24H Corina Goodrich  mL/hr at 12/26/22 1212 500 mg at 12/26/22 1212    hydrALAZINE (APRESOLINE) injection 10 mg  10 mg IntraVENous Q6H PRN Corina Goodrich MD        labetalol (NORMODYNE;TRANDATE) injection 20 mg  20 mg IntraVENous Q4H PRN Corina Goodrich MD        enoxaparin Sodium (LOVENOX) injection 30 mg  30 mg SubCUTAneous BID Cony Fish MD   30 mg at 12/26/22 7414    zinc sulfate (ZINCATE) capsule 50 mg  50 mg Oral Daily Cony Fish MD   50 mg at 12/26/22 4765             Objective:   /78   Pulse 78   Temp 97.7 °F (36.5 °C) (Temporal)   Resp 20   Ht 6' (1.829 m)   Wt 205 lb (93 kg)   SpO2 94%   BMI 27.80 kg/m²     In an effort to reduce COVID-19 exposure[facility] and case discussed in detail with unit staff    Recent Labs     12/24/22 0450 12/25/22 0125 12/26/22  0202   WBC 11.3* 9.0 15.5*   RBC 4.24* 3.86* 4.18*   HGB 13.8* 12.8* 13.6*   HCT 39.8* 36.1* 39.6*   MCV 93.9 93.5 94.7*   MCH 32.5* 33.2* 32.5*   MCHC 34.7 35.5 34.3    193 258     Recent Labs     12/24/22  0450 12/24/22  0456 12/24/22  0939 12/25/22  0125 12/25/22  1051 12/25/22  1814 12/26/22  0202 12/26/22  1008   *  --    < > 120*   < > 120* 126* 121*   K 3.7 3.5  --  3.9  --   --  4.0  --    ANIONGAP 10  --   --  13  --   --  12  --    CL 80*  --   --  82*  --   --  87*  --    CO2 29  --   --  25  --   --  27  --    BUN 9  --   --  13  --   --  16  --    CREATININE 0.7  --   --  0.7  --   --  0.7  --    GLUCOSE 128*  --   --  123*  --   --  146*  --    CALCIUM 8.6*  --   --  8.3*  --   --  8.5*  --     < > = values in this interval not displayed. Recent Labs     12/25/22  0125 12/26/22  0202   MG 1.9 2.0     Recent Labs     12/24/22  0450   AST 20   ALT 21   BILITOT 0.7   ALKPHOS 74     No results for input(s): PH, PO2, PCO2, HCO3, BE, O2SAT in the last 72 hours. Recent Labs     12/24/22  0450   TROPONINI <0.01     No results for input(s): INR in the last 72 hours. Recent Labs     12/24/22 0450   LACTA 1.0         Intake/Output Summary (Last 24 hours) at 12/26/2022 1421  Last data filed at 12/26/2022 0938  Gross per 24 hour   Intake 240 ml   Output 1250 ml   Net -1010 ml       No results found.      Assessment and Plan:   COVID-19 pneumonia  COPD exacerbation  Acute hypoxemic respiratory failure  Hyponatremia     Dexamethasone  S/p Tocilizumab  Adjuvant therapy  Empiric broad-spectrum antibiotics to cover potential secondary bacterial infection  Encouraged self proning  Steroids/nebs/antibiotics for COPD exacerbation  4 L NC  Asymptomatic hyponatremia  Serum/urine studies reviewed  Follow sodium  Lovenox for DVT prophylaxis  Discussed treatment plan with patient/RN    Indra Kumar MD   12/26/2022 2:21 PM

## 2022-12-26 NOTE — ACP (ADVANCE CARE PLANNING)
Advance Care Planning     Advance Care Planning (ACP) Physician/NP/PA (Provider) Janet Madrigal      Date of ACP Conversation: 12/26/22    Conversation Conducted with:   Patient with Decision Making [address]  [facility] Decision Maker:    Current Designated Health Care Decision Maker:    Primary Decision MakeDawna Goldman - Karolyn - [phone]    Care Preferences:    Ventilation: \"If you were in your present state of health and suddenly became very ill and were unable to breathe on your own, what would your preference be about the use of a ventilator (breathing machine) if it was available to you? \"    Yes but would not want to remain ventilator dependent for long term     Resuscitation:  \"CPR works best to restart the heart when there is a sudden event, like a heart attack, in someone who is otherwise healthy. Unfortunately, CPR does not typically restart the heart for people who have serious health conditions or who are very sick. \"    \"In the event your heart stopped as a result of an underlying serious health condition, would you want attempts to be made to restart your heart (answer \"yes\" for attempt to resuscitate) or would you prefer a natural death (answer \"no\" for do not attempt to resuscitate)? \"   Yes    Length of Voluntary ACP Conversation in minutes:  15 minutes included w/ consult time    Darnell Hopper PA-C

## 2022-12-27 LAB
ANION GAP SERPL CALCULATED.3IONS-SCNC: 8 MMOL/L (ref 7–19)
BASOPHILS ABSOLUTE: 0 K/UL (ref 0–0.2)
BASOPHILS RELATIVE PERCENT: 0.1 % (ref 0–1)
BUN BLDV-MCNC: 20 MG/DL (ref 8–23)
CALCIUM SERPL-MCNC: 8.2 MG/DL (ref 8.8–10.2)
CHLORIDE BLD-SCNC: 87 MMOL/L (ref 98–111)
CO2: 31 MMOL/L (ref 22–29)
CREAT SERPL-MCNC: 0.9 MG/DL (ref 0.5–1.2)
EKG P AXIS: 61 DEGREES
EKG P-R INTERVAL: 178 MS
EKG Q-T INTERVAL: 418 MS
EKG QRS DURATION: 102 MS
EKG QTC CALCULATION (BAZETT): 442 MS
EKG T AXIS: 47 DEGREES
EOSINOPHILS ABSOLUTE: 0 K/UL (ref 0–0.6)
EOSINOPHILS RELATIVE PERCENT: 0 % (ref 0–5)
GFR SERPL CREATININE-BSD FRML MDRD: >60 ML/MIN/{1.73_M2}
GLUCOSE BLD-MCNC: 135 MG/DL (ref 74–109)
HCT VFR BLD CALC: 38.7 % (ref 42–52)
HEMOGLOBIN: 13.4 G/DL (ref 14–18)
IMMATURE GRANULOCYTES #: 0.1 K/UL
LYMPHOCYTES ABSOLUTE: 0.5 K/UL (ref 1.1–4.5)
LYMPHOCYTES RELATIVE PERCENT: 3.1 % (ref 20–40)
MAGNESIUM: 2 MG/DL (ref 1.6–2.4)
MCH RBC QN AUTO: 33 PG (ref 27–31)
MCHC RBC AUTO-ENTMCNC: 34.6 G/DL (ref 33–37)
MCV RBC AUTO: 95.3 FL (ref 80–94)
MONOCYTES ABSOLUTE: 0.3 K/UL (ref 0–0.9)
MONOCYTES RELATIVE PERCENT: 2.2 % (ref 0–10)
NEUTROPHILS ABSOLUTE: 14.3 K/UL (ref 1.5–7.5)
NEUTROPHILS RELATIVE PERCENT: 93.7 % (ref 50–65)
PDW BLD-RTO: 12.6 % (ref 11.5–14.5)
PLATELET # BLD: 242 K/UL (ref 130–400)
PMV BLD AUTO: 8.7 FL (ref 9.4–12.4)
POTASSIUM SERPL-SCNC: 3.9 MMOL/L (ref 3.5–5)
RBC # BLD: 4.06 M/UL (ref 4.7–6.1)
SODIUM BLD-SCNC: 126 MMOL/L (ref 136–145)
WBC # BLD: 15.3 K/UL (ref 4.8–10.8)

## 2022-12-27 PROCEDURE — 97162 PT EVAL MOD COMPLEX 30 MIN: CPT

## 2022-12-27 PROCEDURE — 1210000000 HC MED SURG R&B

## 2022-12-27 PROCEDURE — 93010 ELECTROCARDIOGRAM REPORT: CPT | Performed by: INTERNAL MEDICINE

## 2022-12-27 PROCEDURE — 6370000000 HC RX 637 (ALT 250 FOR IP): Performed by: INTERNAL MEDICINE

## 2022-12-27 PROCEDURE — 97116 GAIT TRAINING THERAPY: CPT

## 2022-12-27 PROCEDURE — 97165 OT EVAL LOW COMPLEX 30 MIN: CPT

## 2022-12-27 PROCEDURE — 99232 SBSQ HOSP IP/OBS MODERATE 35: CPT | Performed by: PHYSICIAN ASSISTANT

## 2022-12-27 PROCEDURE — 85025 COMPLETE CBC W/AUTO DIFF WBC: CPT

## 2022-12-27 PROCEDURE — 97530 THERAPEUTIC ACTIVITIES: CPT

## 2022-12-27 PROCEDURE — 2700000000 HC OXYGEN THERAPY PER DAY

## 2022-12-27 PROCEDURE — 83735 ASSAY OF MAGNESIUM: CPT

## 2022-12-27 PROCEDURE — 36415 COLL VENOUS BLD VENIPUNCTURE: CPT

## 2022-12-27 PROCEDURE — 94761 N-INVAS EAR/PLS OXIMETRY MLT: CPT

## 2022-12-27 PROCEDURE — 94760 N-INVAS EAR/PLS OXIMETRY 1: CPT

## 2022-12-27 PROCEDURE — 6360000002 HC RX W HCPCS: Performed by: INTERNAL MEDICINE

## 2022-12-27 PROCEDURE — 97535 SELF CARE MNGMENT TRAINING: CPT

## 2022-12-27 PROCEDURE — 80048 BASIC METABOLIC PNL TOTAL CA: CPT

## 2022-12-27 PROCEDURE — 2580000003 HC RX 258: Performed by: INTERNAL MEDICINE

## 2022-12-27 RX ORDER — DOXYCYCLINE HYCLATE 100 MG/1
100 CAPSULE ORAL EVERY 12 HOURS SCHEDULED
Status: DISCONTINUED | OUTPATIENT
Start: 2022-12-27 | End: 2022-12-29

## 2022-12-27 RX ORDER — DEXAMETHASONE 4 MG/1
4 TABLET ORAL EVERY 12 HOURS SCHEDULED
Status: DISCONTINUED | OUTPATIENT
Start: 2022-12-27 | End: 2022-12-29

## 2022-12-27 RX ADMIN — DOXYCYCLINE HYCLATE 100 MG: 100 CAPSULE ORAL at 21:17

## 2022-12-27 RX ADMIN — Medication 2000 UNITS: at 09:56

## 2022-12-27 RX ADMIN — BUPROPION HYDROCHLORIDE 150 MG: 150 TABLET, EXTENDED RELEASE ORAL at 09:56

## 2022-12-27 RX ADMIN — ACETAMINOPHEN 500 MG: 500 TABLET ORAL at 23:01

## 2022-12-27 RX ADMIN — DEXAMETHASONE 4 MG: 4 TABLET ORAL at 15:34

## 2022-12-27 RX ADMIN — ALFUZOSIN HYDROCHLORIDE 10 MG: 10 TABLET, FILM COATED, EXTENDED RELEASE ORAL at 09:57

## 2022-12-27 RX ADMIN — IPRATROPIUM BROMIDE 2 PUFF: 17 AEROSOL, METERED RESPIRATORY (INHALATION) at 09:55

## 2022-12-27 RX ADMIN — ASPIRIN 81 MG: 81 TABLET, COATED ORAL at 21:16

## 2022-12-27 RX ADMIN — SODIUM CHLORIDE 1 G: 1 TABLET ORAL at 09:56

## 2022-12-27 RX ADMIN — IPRATROPIUM BROMIDE 2 PUFF: 17 AEROSOL, METERED RESPIRATORY (INHALATION) at 12:30

## 2022-12-27 RX ADMIN — OXYCODONE HYDROCHLORIDE AND ACETAMINOPHEN 500 MG: 500 TABLET ORAL at 21:17

## 2022-12-27 RX ADMIN — ALBUTEROL SULFATE 2 PUFF: 90 AEROSOL, METERED RESPIRATORY (INHALATION) at 15:35

## 2022-12-27 RX ADMIN — ALBUTEROL SULFATE 2 PUFF: 90 AEROSOL, METERED RESPIRATORY (INHALATION) at 09:55

## 2022-12-27 RX ADMIN — ALBUTEROL SULFATE 2 PUFF: 90 AEROSOL, METERED RESPIRATORY (INHALATION) at 23:02

## 2022-12-27 RX ADMIN — IPRATROPIUM BROMIDE 2 PUFF: 17 AEROSOL, METERED RESPIRATORY (INHALATION) at 01:42

## 2022-12-27 RX ADMIN — ALBUTEROL SULFATE 2 PUFF: 90 AEROSOL, METERED RESPIRATORY (INHALATION) at 04:53

## 2022-12-27 RX ADMIN — LISINOPRIL 10 MG: 10 TABLET ORAL at 09:56

## 2022-12-27 RX ADMIN — PYRIDOSTIGMINE BROMIDE 60 MG: 60 TABLET ORAL at 21:16

## 2022-12-27 RX ADMIN — IPRATROPIUM BROMIDE 2 PUFF: 17 AEROSOL, METERED RESPIRATORY (INHALATION) at 15:34

## 2022-12-27 RX ADMIN — ALBUTEROL SULFATE 2 PUFF: 90 AEROSOL, METERED RESPIRATORY (INHALATION) at 12:30

## 2022-12-27 RX ADMIN — DOCUSATE SODIUM 100 MG: 100 CAPSULE, LIQUID FILLED ORAL at 09:56

## 2022-12-27 RX ADMIN — CLONAZEPAM 0.25 MG: 0.25 TABLET, ORALLY DISINTEGRATING ORAL at 09:57

## 2022-12-27 RX ADMIN — DEXAMETHASONE SODIUM PHOSPHATE 6 MG: 10 INJECTION INTRAMUSCULAR; INTRAVENOUS at 04:53

## 2022-12-27 RX ADMIN — GUAIFENESIN 400 MG: 200 TABLET ORAL at 15:34

## 2022-12-27 RX ADMIN — PYRIDOSTIGMINE BROMIDE 60 MG: 60 TABLET ORAL at 04:52

## 2022-12-27 RX ADMIN — IPRATROPIUM BROMIDE 2 PUFF: 17 AEROSOL, METERED RESPIRATORY (INHALATION) at 04:53

## 2022-12-27 RX ADMIN — GUAIFENESIN 400 MG: 200 TABLET ORAL at 09:56

## 2022-12-27 RX ADMIN — PANTOPRAZOLE SODIUM 40 MG: 40 TABLET, DELAYED RELEASE ORAL at 04:52

## 2022-12-27 RX ADMIN — AMLODIPINE BESYLATE 2.5 MG: 2.5 TABLET ORAL at 09:56

## 2022-12-27 RX ADMIN — DOXYCYCLINE HYCLATE 100 MG: 100 CAPSULE ORAL at 15:34

## 2022-12-27 RX ADMIN — GUAIFENESIN 400 MG: 200 TABLET ORAL at 01:41

## 2022-12-27 RX ADMIN — ENOXAPARIN SODIUM 30 MG: 100 INJECTION SUBCUTANEOUS at 21:17

## 2022-12-27 RX ADMIN — ALBUTEROL SULFATE 2 PUFF: 90 AEROSOL, METERED RESPIRATORY (INHALATION) at 01:42

## 2022-12-27 RX ADMIN — ZINC SULFATE 220 MG (50 MG) CAPSULE 50 MG: CAPSULE at 09:56

## 2022-12-27 RX ADMIN — SODIUM CHLORIDE, PRESERVATIVE FREE 1000 MG: 5 INJECTION INTRAVENOUS at 09:55

## 2022-12-27 RX ADMIN — OXYCODONE HYDROCHLORIDE AND ACETAMINOPHEN 500 MG: 500 TABLET ORAL at 09:57

## 2022-12-27 RX ADMIN — SODIUM CHLORIDE 1 G: 1 TABLET ORAL at 15:34

## 2022-12-27 RX ADMIN — ASPIRIN 81 MG: 81 TABLET, COATED ORAL at 09:56

## 2022-12-27 RX ADMIN — ATORVASTATIN CALCIUM 10 MG: 10 TABLET, FILM COATED ORAL at 21:17

## 2022-12-27 RX ADMIN — PROPRANOLOL HYDROCHLORIDE 60 MG: 60 CAPSULE, EXTENDED RELEASE ORAL at 09:56

## 2022-12-27 RX ADMIN — AZITHROMYCIN DIHYDRATE 500 MG: 500 INJECTION, POWDER, LYOPHILIZED, FOR SOLUTION INTRAVENOUS at 10:10

## 2022-12-27 RX ADMIN — ENOXAPARIN SODIUM 30 MG: 100 INJECTION SUBCUTANEOUS at 09:57

## 2022-12-27 RX ADMIN — Medication 1 TABLET: at 09:56

## 2022-12-27 RX ADMIN — IPRATROPIUM BROMIDE 2 PUFF: 17 AEROSOL, METERED RESPIRATORY (INHALATION) at 21:17

## 2022-12-27 RX ADMIN — ACETAMINOPHEN 650 MG: 325 TABLET ORAL at 18:45

## 2022-12-27 RX ADMIN — DIPHENHYDRAMINE HYDROCHLORIDE 25 MG: 25 TABLET ORAL at 21:17

## 2022-12-27 RX ADMIN — FLUTICASONE PROPIONATE 2 SPRAY: 50 SPRAY, METERED NASAL at 09:58

## 2022-12-27 RX ADMIN — PYRIDOSTIGMINE BROMIDE 60 MG: 60 TABLET ORAL at 15:35

## 2022-12-27 RX ADMIN — GUAIFENESIN 400 MG: 200 TABLET ORAL at 23:02

## 2022-12-27 ASSESSMENT — PAIN DESCRIPTION - ORIENTATION: ORIENTATION: POSTERIOR

## 2022-12-27 ASSESSMENT — PAIN DESCRIPTION - LOCATION: LOCATION: NECK;HEAD

## 2022-12-27 ASSESSMENT — PAIN SCALES - GENERAL: PAINLEVEL_OUTOF10: 3

## 2022-12-27 ASSESSMENT — PAIN DESCRIPTION - DESCRIPTORS: DESCRIPTORS: ACHING

## 2022-12-27 ASSESSMENT — PAIN - FUNCTIONAL ASSESSMENT: PAIN_FUNCTIONAL_ASSESSMENT: PREVENTS OR INTERFERES SOME ACTIVE ACTIVITIES AND ADLS

## 2022-12-27 NOTE — CARE COORDINATION
Patient Contact Information:  [address] [address] [place]  [facility] [address]  [phone] (home)   Above information verified? [x]   Yes  []   No    Had HOME OXYGEN prior to admission:    Nitin Iyer 262:  If home oxygen is needed, patient does not have a preferred supplier     Has a pulse oximetry unit at home:   []   Yes  []   No    Have you been vaccinated for COVID-19 (SARS-CoV-2)? [x]   Yes  []   No                   If so, when? Which :  []   Pfizer-ULURUNTSiriona  [x]   Moderna  []   Marzella Arabia  []   Other:       Pharmacy:    [facility] [address] KRISTAL SWARTZ [address] [facility] [address], [place] [address] [address] [facility]  [address] [address]  Phone: [phone] Fax: [phone]      Active with HD/PD prior to admission:           []   Yes  [x]   No  HD Center:       Financial:  Payor: Anastacio Brady / Plan: MEDICARE PART A AND B / Product Type: *No Product type* /     Pre-Cert required for SNF:   []   Yes  [x]   No    Patient Deficits:  []   Yes   [x]   No    If yes:  []   Confusion/Memory  []   Visual  []   Motor/Sensory         []   Right arm         []   Right leg         []   Left arm         []   Left leg  []   Language/Speech         []   Aphasia         []   Dysarthria         []   Swallow         Wesco Coma Scale  Eye Opening: Spontaneous  Best Verbal Response: Oriented  Best Motor Response: Obeys commands  Monica Coma Scale Score: 15    Patient Deficit Notes: Additional CM/SW Notes:   Patient lives at home with his daughter and her spouse and 3 children and plans to return home at discharge. Patient adamantly declines discharging to a SNF for short-term Rehab. He is agreeable to Home Health. He uses a cane and walker and continues to drive. If home oxygen is needed, he does not have a preferred supplier. CM will continue to follow. Explained [employer] Worker program.  Provided literature.   Patient declined to participate in [facility] program.      Jessica Morgan RN  [facility]  Care Management     12/27/22 5815   Service Assessment   Patient Orientation Alert and Oriented;Person;Place;Situation   Cognition Alert   History Provided By Patient   Primary Jose Daniel 8   PCP Verified by CM Yes   Prior Functional Level Independent in ADLs/IADLs   Current Functional Level Independent in ADLs/IADLs   Can patient return to prior living arrangement Yes   Ability to make needs known: Good   Family able to assist with home care needs: Yes   Would you like for me to discuss the discharge plan with any other family members/significant others, and if so, who?   (Carie)   Financial Resources   (declines financial needs)   Social/Functional History   Lives With Daughter   Type of [address] One level   Home Access Stairs to enter with rails   Entrance Stairs - Number of Steps 3   Bathroom Shower/Tub Tub/Shower unit   Bathroom Equipment Shower chair;Grab bars in [facility] Cane;Walker, [address] Help From Family   ADL Assistance Independent   Ambulation Assistance Independent   Transfer Assistance Independent   Active  Yes   Occupation Retired   Discharge Planning   Type of [address] Prior To Admission [address] [email or web address] [place]   Current DME Prior to [address]   DME Ordered? No   Potential Assistance Purchasing Medications No   Type of Home Care Services OT;PT;Nursing Services   Patient expects to be discharged to: [address] Discharge   Services At/After Discharge Home Health   Mode of Transport at Discharge Other (see comment)  (daughter, Carie)   Condition of Participation: Discharge Planning   The Patient and/or Patient Representative was provided with a Choice of Provider?  Patient   The Patient and/Or Patient Representative agree with the Discharge Plan? Yes

## 2022-12-27 NOTE — PROGRESS NOTES
Hospitalist Progress Note  [facility]     Patient: Kenji Adams  : 1940  MRN: [id number]  Code Status: Full Code    Hospital Day: 3   Date of Service: 2022    Subjective:   Patient seen in COVID-19 unit. No current complaints. Past Medical History:   Diagnosis Date    Arthritis     Cancer ([address]     HX LUNG CA    Emphysema/COPD ([address]     MILD/FORMER SMOKER    Hyperlipidemia     Hypertension     Macular degeneration     Myasthenia gravis ([address] 2022    Palliative care patient 2022    Retention of urine        Past Surgical History:   Procedure Laterality Date    EYE SURGERY      MUSCLE WEAKNESS    GASTRECTOMY      PARTIAL FOR ULCER REPAIR    LUNG CANCER SURGERY Left     LLL LOBECTOMY    OTHER SURGICAL HISTORY      I & D OF GROIN INFECTION    TOTAL KNEE ARTHROPLASTY Right 2017    KNEE TOTAL ARTHROPLASTY performed by Alfonso Baeza MD at [facility] OR       Family History   Problem Relation Age of Onset    Diabetes Mother     Stroke Mother     Heart Attack Father     Cancer Father         ASBESTOS       Social History     Socioeconomic History    Marital status:       Spouse name: Not on file    Number of children: Not on file    Years of education: Not on file    Highest education level: Not on file   Occupational History    Not on file   Tobacco Use    Smoking status: Former     Types: Cigarettes     Quit date: 1984     Years since quittin.6    Smokeless tobacco: Never   Vaping Use    Vaping Use: Never used   Substance and Sexual Activity    Alcohol use: No    Drug use: No    Sexual activity: Not on file   Other Topics Concern    Not on file   Social History Narrative    Not on file     Social Determinants of Health     Financial Resource Strain: Not on file   Food Insecurity: Not on file   Transportation Needs: Not on file   Physical Activity: Not on file   Stress: Not on file   Social Connections: Not on file   Intimate Partner Violence: Not on file   Housing Stability: Not on file       Current Facility-Administered Medications   Medication Dose Route Frequency Provider Last Rate Last Admin    dexamethasone (DECADRON) tablet 4 mg  4 mg Oral 2 times per day Cony Fish MD        magic butt cream   Topical Q4H PRN Murl Draper, DO   Given at 12/25/22 1515    oxyCODONE-acetaminophen (PERCOCET) 5-325 MG per tablet 1 tablet  1 tablet Oral Q4H PRN Patience Dennis, DO        sodium chloride tablet 1 g  1 g Oral TID  Cony Fish MD   1 g at 12/27/22 0956    alfuzosin (UROXATRAL) extended release tablet 10 mg  10 mg Oral Daily Patience Dennis, DO   10 mg at 12/27/22 0957    aspirin EC tablet 81 mg  81 mg Oral BID Patience Dennis, DO   81 mg at 12/27/22 0956    buPROPion (WELLBUTRIN XL) extended release tablet 150 mg  150 mg Oral Daily Patience Dennis, DO   150 mg at 12/27/22 0956    clonazePAM (KLONOPIN) disintegrating tablet 0.25 mg  0.25 mg Oral Daily Patience Dennis, DO   0.25 mg at 12/27/22 0957    docusate sodium (COLACE) capsule 100 mg  100 mg Oral Daily Patience Dennis, DO   100 mg at 12/27/22 0956    fluticasone (FLONASE) 50 MCG/ACT nasal spray 2 spray  2 spray Each Nostril Daily Patience Dennis, DO   2 spray at 97/80/43 7183    folic acid-pyridoxine-cyancobalamin (FOLTX) 2.5-25-2 MG TABS 1 tablet  1 tablet Oral Daily Patience Dennis, DO   1 tablet at 12/27/22 3969    PreserVision AREDS 2+Multi Vit CAPS 2 capsule (Patient Supplied)  2 capsule Oral Daily Patience Dennis, DO        pantoprazole (PROTONIX) tablet 40 mg  40 mg Oral QAM AC Patience Dennis, DO   40 mg at 12/27/22 0452    potassium chloride (KLOR-CON M) extended release tablet 10 mEq  10 mEq Oral Once per day on Mon Wed Fri Patience Dennis, DO   10 mEq at 12/26/22 0936    propranolol (INDERAL LA) extended release capsule 60 mg  60 mg Oral Daily Patience Dennis, DO   60 mg at 12/27/22 0956    pyridostigmine (MESTINON) tablet 60 mg  60 mg Oral 3 times per day Patience Dennis, DO   60 mg at 12/27/22 0452    amLODIPine (NORVASC) tablet 2.5 mg  2.5 mg Oral Daily Patience Dennis, DO   2.5 mg at 12/27/22 4614    And    lisinopril (PRINIVIL;ZESTRIL) tablet 10 mg  10 mg Oral Daily Patience Dennis, DO   10 mg at 12/27/22 0956    atorvastatin (LIPITOR) tablet 10 mg  10 mg Oral Nightly Patience Dennis, DO   10 mg at 12/26/22 2207    diphenhydrAMINE (BENADRYL) tablet 25 mg  25 mg Oral Nightly PRN Patience Dennis, DO   25 mg at 12/26/22 2206    And    acetaminophen (TYLENOL) tablet 500 mg  500 mg Oral Nightly PRN Patience Dennis, DO   500 mg at 12/26/22 2206    Vitamin D (CHOLECALCIFEROL) tablet 2,000 Units  2,000 Units Oral Daily Raul Chowdary MD   2,000 Units at 12/27/22 4961    vitamin D (ERGOCALCIFEROL) capsule 50,000 Units  50,000 Units Oral Weekly Raul Chowdary MD   50,000 Units at 12/24/22 0910    acetaminophen (TYLENOL) tablet 650 mg  650 mg Oral Q6H PRN Raul Chowdary MD        guaiFENesin tablet 400 mg  400 mg Oral Q8H Raul Chowdary MD   400 mg at 12/27/22 5739    ascorbic acid (VITAMIN C) tablet 500 mg  500 mg Oral BID Raul Chowdary MD   500 mg at 12/27/22 0957    albuterol sulfate HFA (PROVENTIL;VENTOLIN;PROAIR) 108 (90 Base) MCG/ACT inhaler 2 puff  2 puff Inhalation Q4H Raul Chowdary MD   2 puff at 12/27/22 0955    ipratropium (ATROVENT HFA) 17 MCG/ACT inhaler 2 puff  2 puff Inhalation Q4H Raul Chowdary MD   2 puff at 12/27/22 0955    cefTRIAXone (ROCEPHIN) 1,000 mg in sodium chloride (PF) 0.9 % 10 mL IV syringe  1,000 mg IntraVENous Q24H Raul Chowdary MD   1,000 mg at 12/27/22 0955    azithromycin (ZITHROMAX) 500 mg in dextrose 5 % 250 mL IVPB ([id number])  500 mg IntraVENous Q24H Raul Chowdary  mL/hr at 12/27/22 1010 500 mg at 12/27/22 1010    hydrALAZINE (APRESOLINE) injection 10 mg  10 mg IntraVENous Q6H PRN Raul Chowdary MD        labetalol (NORMODYNE;TRANDATE) injection 20 mg  20 mg IntraVENous Q4H PRN Raul Chowdary MD        enoxaparin Sodium (LOVENOX) injection 30 mg  30 mg SubCUTAneous BID Lary Moreno MD   30 mg at 12/27/22 0957    zinc sulfate (ZINCATE) capsule 50 mg  50 mg Oral Daily Lary Moreno MD   50 mg at 12/27/22 3936             Objective:   BP 86/62   Pulse 82   Temp 97.5 °F (36.4 °C) (Tympanic)   Resp 18   Ht 6' (1.829 m)   Wt 205 lb (93 kg)   SpO2 98%   BMI 27.80 kg/m²     In an effort to reduce COVID-19 exposure, patient seen from doorway and case discussed in detail with unit staff    Recent Labs     12/25/22  0125 12/26/22  0202 12/27/22  0117   WBC 9.0 15.5* 15.3*   RBC 3.86* 4.18* 4.06*   HGB 12.8* 13.6* 13.4*   HCT 36.1* 39.6* 38.7*   MCV 93.5 94.7* 95.3*   MCH 33.2* 32.5* 33.0*   MCHC 35.5 34.3 34.6    258 242     Recent Labs     12/25/22  0125 12/25/22  1051 12/26/22  0202 12/26/22  1008 12/27/22  0117   *   < > 126* 121* 126*   K 3.9  --  4.0  --  3.9   ANIONGAP 13  --  12  --  8   CL 82*  --  87*  --  87*   CO2 25  --  27  --  31*   BUN 13  --  16  --  20   CREATININE 0.7  --  0.7  --  0.9   GLUCOSE 123*  --  146*  --  135*   CALCIUM 8.3*  --  8.5*  --  8.2*    < > = values in this interval not displayed. Recent Labs     12/25/22  0125 12/26/22  0202 12/27/22  0117   MG 1.9 2.0 2.0     No results for input(s): AST, ALT, ALB, BILITOT, ALKPHOS, ALB in the last 72 hours. No results for input(s): PH, PO2, PCO2, HCO3, BE, O2SAT in the last 72 hours. No results for input(s): TROPONINI in the last 72 hours. No results for input(s): INR in the last 72 hours. No results for input(s): LACTA in the last 72 hours. Intake/Output Summary (Last 24 hours) at 12/27/2022 1112  Last data filed at 12/26/2022 1544  Gross per 24 hour   Intake 732.72 ml   Output 550 ml   Net 182.72 ml       No results found.      Assessment and Plan:   COVID-19 pneumonia  COPD exacerbation  Acute hypoxemic respiratory failure  Hyponatremia     Tapering dexamethasone  S/p Tocilizumab  Adjuvant therapy  Empiric broad-spectrum antibiotics to cover potential secondary bacterial infection  Tapering antibiotics  Encouraged self proning  Steroids/nebs/antibiotics for COPD exacerbation  2 L NC, decreasing requirement  Asymptomatic hyponatremia  Serum/urine studies reviewed  Follow sodium  Lovenox for DVT prophylaxis  Discussed treatment plan with patient/RN    Sánchez Baeza MD   12/27/2022 11:12 AM

## 2022-12-27 NOTE — PROGRESS NOTES
Palliative Care Progress Note  12/27/2022 11:15 AM    Patient:  Juhi Linda  YOB: 1940  Primary Care Physician: Milad Jj DO  Advance Directive: Full Code  Admit Date: 12/24/2022       Hospital Day: 3  Portions of this note have been copied forward, however, changed to reflect the most current clinical status of this patient. CHIEF COMPLAINT/REASON FOR CONSULTATION Goals of care, family support, Code status, symptom management     SUBJECTIVE:  Mr. Nay Muse complains of pain from sitting in the bed. Denies new or worsening dyspnea. Denies N/V, diarrhea or constipation. HPI:  The patient is a 79 y.o. 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 EMS was summoned to his home and noted O2 sat in the 80s. He was placed on supplemental oxygen, dexamethasone and admitted to Hospitalist service. Tocilizumab was given as well as empiric antibiotics. Palliative care was consulted for goals of care and code status discussion. Review of Systems:   14 point review of systems is negative except as specifically addressed above. Objective:   VITALS:  BP 86/62   Pulse 82   Temp 97.5 °F (36.4 °C) (Tympanic)   Resp 18   Ht 6' (1.829 m)   Wt 205 lb (93 kg)   SpO2 98%   BMI 27.80 kg/m²   24HR INTAKE/OUTPUT:    Intake/Output Summary (Last 24 hours) at 12/27/2022 1115  Last data filed at 12/26/2022 1544  Gross per 24 hour   Intake 732.72 ml   Output 550 ml   Net 182.72 ml       General appearance: 78 yo male, no acute distress, sitting on side of bed   Head: Normocephalic, without obvious abnormality, atraumatic  Eyes: conjunctivae/corneas clear. PERRL, EOM's intact.    Ears/Nose: normal external ears and nose  Neck/Throat: throat without exudate, supple, symmetrical, trachea midline   Pulmonary: clear to auscultation bilaterally,no rales or wheezes   Cardiovascular: regular rate and rhythm, S1, S2 normal, no murmur  Gastrointestinal:soft, non-tender; non-distended, bowel sounds present    Musculoskeletal:No lower extremity edema,  No erythema, no tenderness to palpation  Skin: warm, dry  Neurologic: Alert and oriented X 3, generalized weakness, normal tone. No focal deficits  Psychiatric: appropriate mood/affect    Medications:      dexamethasone  4 mg Oral 2 times per day    sodium chloride  1 g Oral TID WC    alfuzosin  10 mg Oral Daily    aspirin EC  81 mg Oral BID    buPROPion  150 mg Oral Daily    clonazePAM  0.25 mg Oral Daily    docusate sodium  100 mg Oral Daily    fluticasone  2 spray Each Nostril Daily    folic acid-pyridoxine-cyancobalamin  1 tablet Oral Daily    PreserVision AREDS 2+Multi Vit  2 capsule Oral Daily    pantoprazole  40 mg Oral QAM AC    potassium chloride  10 mEq Oral Once per day on Mon Wed Fri    propranolol  60 mg Oral Daily    pyridostigmine  60 mg Oral 3 times per day    amLODIPine  2.5 mg Oral Daily    And    lisinopril  10 mg Oral Daily    atorvastatin  10 mg Oral Nightly    Vitamin D  2,000 Units Oral Daily    vitamin D  50,000 Units Oral Weekly    guaiFENesin  400 mg Oral Q8H    vitamin C  500 mg Oral BID    albuterol sulfate HFA  2 puff Inhalation Q4H    ipratropium  2 puff Inhalation Q4H    cefTRIAXone (ROCEPHIN) IV  1,000 mg IntraVENous Q24H    azithromycin  500 mg IntraVENous Q24H    enoxaparin  30 mg SubCUTAneous BID    zinc sulfate  50 mg Oral Daily     magic butt cream, oxyCODONE-acetaminophen, diphenhydrAMINE **AND** acetaminophen, acetaminophen, hydrALAZINE, labetalol  ADULT DIET; Regular; Low Fat/Low Chol/High Fiber/ALEX; 60 to 80 gm; Safety Tray; Safety Tray (Disposables); Does not like fish, chili, turkey, or chicken.  Does l.ronda pork and beef     Lab and other Data:     Recent Labs     12/25/22  0125 12/26/22  0202 12/27/22  0117 WBC 9.0 15.5* 15.3*   HGB 12.8* 13.6* 13.4*    258 242     Recent Labs     12/25/22  0125 12/25/22  1051 12/26/22  0202 12/26/22  1008 12/27/22  0117   *   < > 126* 121* 126*   K 3.9  --  4.0  --  3.9   CL 82*  --  87*  --  87*   CO2 25  --  27  --  31*   BUN 13  --  16  --  20   CREATININE 0.7  --  0.7  --  0.9   GLUCOSE 123*  --  146*  --  135*    < > = values in this interval not displayed. Palliative Performance Scale:  [] 80% Full ambulation  Some disease: Normal activity w/ some effort  Full self-care  Normal/reduced intake  Full LOC  [] 70% Reduced ambulation  Some disease: Can't do normal job or work  Full self-care  Normal/reduced intake  Full LOC  [x] 60% Reduced ambulation  Significant disease: Can't do hobbies/housework  Occasional assistance  Normal/reduced intake  LOC full/confusion  [] 50% Mainly sit/lie  Extensive disease: Can't do any work  Considerable assistance  Normal/reduced intake  LOC full/confusion  [] 40% Mainly in bed  Extensive disease  Mainly assistance  Normal/reduced intake  LOC full/confusion  [] 30% Bed Bound  Extensive disease  Total care  Reduced Intake  LOC full/confusion  [] 20% Bed Bound  Extensive disease  Total care  Minimal intake  Drowsy/coma  [] 10% Bed Bound  Extensive disease  Total care  Mouth care only  Drowsy/coma  [] 0% Death    ECOG:(2) Ambulatory and capable of self care, unable to carry out work activity, up and about > 50% or waking hours      Assessment/Plan   Principal Problem:    Pneumonia due to COVID-19 virus  Active Problems:    Hyponatremia    Palliative care patient    Myasthenia gravis ([facility]    Essential hypertension    Pulmonary emphysema ([facility]  Resolved Problems:    * No resolved hospital problems. *    Visit Summary:  Chart reviewed, patient discussed with consulting service and nursing staff. Reviewed health issues, work up and treatment plan as well as factors that lead to hospitalization.  I saw  Dorina Victoria at his bedside. He is awake and interactive and is sitting on the side of the bed. PCA reports he's had some hypotension this AM. BP improved when taken manually. Patient is asymptomatic from this. He's been seen by case management and declines need for SNF but would be willing for home health when he's medically stable for discharge. Goals remain unchanged at this time. Candidate for SCOP: Out of service area     Recommendations:   Palliative Care-GOC unchanged- discharge home w/ his daughter when medically stable agreeable to Home health if needed Code status: Full code ACP completed   Acute respiratory failure w/ hypoxia in setting of COVID-19/known COPD-received Tocilizumab, Decadron, empiric antibiotics, inhalers, improving  Hyponatremia-chronic, asymptomatic, mgmt per Hospitalist , improving   Stage I adenocarcinoma of the lung-surgically treated, no evidence of metastasis followed by Oncology at [facility]  Myasthenia gravis-Mestinon continued, Neurology sees as OP  Generalized weakness-PT/OT consult, nursing communication for walker and bedside commode   Hypotension-D/W RN/PCA to notify Hospitalist if worsens though it is asymptomatic and appears improved when checked manually     Thank you for consulting Palliative Care and allowing us to participate in the care of this patient.    Time Spent Counseling/Coordination of care > 50%:  YES                                   Total Time Spent with patient/family counseling, workup/treatment review, discussion with medical team and placement of orders/preparation of this note: 27 minutes    Electronically signed by Josey Madrigal PA-C on 12/27/2022 at 11:15 AM    (Please note that portions of this note were completed with a voice recognition program.  Bernardo Lopez made to edit the dictations but occasionally words are mis-transcribed.)

## 2022-12-27 NOTE — PROGRESS NOTES
Occupational Therapy  Facility/Department: [facility] 4 ONCOLOGY UNIT  Occupational Therapy Initial Assessment    Name: Loraine Bermudez  : 1940  MRN: [id number]  Date of Service: 2022    Discharge Recommendations:  Patient would benefit from continued therapy after discharge, Continue to assess pending progress  OT Equipment Recommendations  Equipment Needed: Yes  Other: BSC if d/c home       Patient Diagnosis(es): The primary encounter diagnosis was COVID-19. Diagnoses of Hypoxia and Hyponatremia were also pertinent to this visit. Past Medical History:  has a past medical history of Arthritis, Cancer ([facility] [address] Emphysema/COPD ([facility] [address] Hyperlipidemia, Hypertension, Macular degeneration, Myasthenia gravis ([facility] [address] Palliative care patient, and Retention of urine. Past Surgical History:  has a past surgical history that includes Lung cancer surgery (Left); gastrectomy; eye surgery; other surgical history; and Total knee arthroplasty (Right, 2017). Treatment Diagnosis: decreased ADL, decrease functional transfers      Assessment   Performance deficits / Impairments: Decreased functional mobility ; Decreased balance;Decreased ADL status; Decreased vision/visual deficit; Decreased ROM; Decreased endurance;Decreased high-level IADLs;Decreased strength  Assessment: Pt demos decreased ADL and UE function, decreased LB reach, decreased functional standing tolerance, overall decreased endurance and strength. Anticipate pt requiring 24 hr assist at d/c. Pt would benefit from OT for AE and strategies to improve ADL performance and safety, fall prevention, increase functional mobility and transfers.   Treatment Diagnosis: decreased ADL, decrease functional transfers  REQUIRES OT FOLLOW-UP: Yes  Activity Tolerance  Activity Tolerance: Patient Tolerated treatment well        Plan   Occupational Therapy Plan  Times Per Week: 3-5  Current Treatment Recommendations: Strengthening, ROM, Balance training, Functional mobility training, Endurance training, Safety education & training, Patient/Caregiver education & training, Equipment evaluation, education, & procurement, Self-Care / ADL     Restrictions  Restrictions/Precautions  Restrictions/Precautions: Isolation, Fall Risk (COVID)    Subjective   General  Additional Pertinent Hx: Myasthenia gravis, R rotator cuff/weakness  Diagnosis: pna, COVID     Social/Functional History  Social/Functional History  Lives With: Daughter  Type of Home: House  Home Layout: One level  Home Access: Stairs to enter with rails  Entrance Stairs - Number of Steps: 3  Bathroom Shower/Tub: Tub/Shower unit  Bathroom Equipment: Shower chair, Grab bars in [address] [place]: Meliton Pereyra, Marsha Fabian, [address] Help From: Family  ADL Assistance: Independent  Ambulation Assistance: Independent  Transfer Assistance: Independent  Active : Yes  Occupation: Retired       Objective   Heart Rate: 82  [address]: Monitor  BP: 86/62  BP Location: Left upper arm  BP Method: Manual  MAP (Calculated): 70  Resp: 18  SpO2: 96 %  O2 Device: Nasal cannula          Observation/Palpation  Observation: ON 02 WITH CONTINUOUS PULSE OX. SP02 97% ON 02 DURING ACTIVITY. Safety Devices  Type of Devices: Gait belt;Patient at risk for falls;Call light within reach; Left in chair     Toilet Transfers  Toilet - Technique: Stand step  Equipment Used: Standard bedside commode  Toilet Transfer: Minimal assistance     ADL  Feeding: Minimal assistance  Grooming: Minimal assistance; Moderate assistance  UE Bathing: Minimal assistance (per clinical observation of prerequisite skills)  LE Bathing: Moderate assistance (per clinical observation of prerequisite skills)  UE Dressing: Moderate assistance  LE Dressing: Maximum assistance (without AE)  Toileting: Minimal assistance     Activity Tolerance  Activity Tolerance: Patient tolerated treatment well     Transfers  Sit to stand: Minimal assistance; Moderate assistance  Stand to sit: Minimal assistance  Vision  Vision: Impaired  Vision Exceptions: Wears glasses for reading  Diplopia Assessment: Other (comment) (Pt reports he was on medication for double vision and has run out; diplopia is starting to return)  Hearing  Hearing: Within functional limits  Cognition  Overall Cognitive Status: WFL  Orientation  Overall Orientation Status: Within Functional Limits                     LUE AROM (degrees)  LUE AROM : Exceptions  LUE General AROM: distal wfl  L Shoulder Flexion 0-180: 0-90  RUE PROM (degrees)  RUE PROM: Exceptions  R Shoulder Flex  0-180: 0-90  RUE AROM (degrees)  RUE AROM : Exceptions  RUE General AROM: distally wfl  R Shoulder Flexion 0-180: 0-20 due to rotator cuff deterioration                       Goals  Short Term Goals  Time Frame for Short Term Goals: 2 weeks  Short Term Goal 1: Pt will eat with setup/AE prn  Short Term Goal 2: Pt will groom with setup, AE prn  Short Term Goal 3: Pt will bathe with SBA, AE prn  Short Term Goal 4: Pt will dress UB with setup  Short Term Goal 5: Pt will dress LB with SBA, AE  Short Term Goal 6: Pt will toilet with SBA  Short Term Goal 7: Pt will perform toilet txf with SBA  Long Term Goals  Long Term Goal 1: upgrade as tolerated         Josie Li OT  Electronically signed by Josie Li OT on 12/27/2022 at 5:12 PM

## 2022-12-27 NOTE — PROGRESS NOTES
Physical Therapy  Facility/Department: [facility] ONCOLOGY UNIT  Physical Therapy Initial Assessment    Name: Sita Willett  : 1940  MRN: [id number]  Date of Service: 2022    Discharge Recommendations:  Continue to assess pending progress, Patient would benefit from continued therapy after discharge (IF Pt WERE TO DC HOME AT HIS CURRENT LEVEL OF FUNCTION, HE WOULD REQUIRE 24/7 ASSIST WITH ALL MOBILITY AND ADL'S. IS ALSO A CANDIDATE FOR SHORT TERM REHAB)          Patient Diagnosis(es): The primary encounter diagnosis was COVID-19. Diagnoses of Hypoxia and Hyponatremia were also pertinent to this visit. Past Medical History:  has a past medical history of Arthritis, Cancer ([facility] [address] Emphysema/COPD ([facility] [address] Hyperlipidemia, Hypertension, Macular degeneration, Myasthenia gravis ([facility] [address] Palliative care patient, and Retention of urine. Past Surgical History:  has a past surgical history that includes Lung cancer surgery (Left); gastrectomy; eye surgery; other surgical history; and Total knee arthroplasty (Right, 2017). Assessment   Body Structures, Functions, Activity Limitations Requiring Skilled Therapeutic Intervention: Decreased functional mobility ; Decreased strength;Decreased endurance;Decreased posture  Assessment: pt WOULD BENEFIT FROM SKILLED PT IN THIS SETTING TO ADDRESS HIS ENDURANCE/MOBILITY DEFICITS  Therapy Prognosis: Good  Decision Making: Low Complexity  Requires PT Follow-Up: Yes  Activity Tolerance  Activity Tolerance: Patient tolerated treatment well     Plan   Physcial Therapy Plan  General Plan: 5-7 times per week  Therapy Duration: 2 Weeks  Current Treatment Recommendations: Strengthening, Functional mobility training, Balance training, Transfer training, Gait training, Safety education & training, Therapeutic activities  Safety Devices  Type of Devices: Bed alarm in place, Gait belt, Left in chair, Call light within reach     Restrictions        Subjective   General  Diagnosis: PNA, COVID, RECENT FALLS  Follows Commands: Within Functional Limits  Subjective  Subjective: pt STATES HE IS REALLY WEAK AND HAS FALLEN RECENTLY AT HOME. Social/Functional History  Social/Functional History  Lives With: Daughter  Type of Home: House  Home Layout: One level  Home Access: Stairs to enter with rails  Entrance Stairs - Number of Steps: 3  Bathroom Shower/Tub: Tub/Shower unit  Bathroom Equipment: Shower chair, Grab bars in shower  Home Equipment: [employer], [employer] Help From: Family  ADL Assistance: Independent  Ambulation Assistance: Independent  Transfer Assistance: Independent  Active : Yes  Occupation: Retired  Vision/Hearing       Cognition         Objective   Heart Rate: 82  [address]: Monitor  BP: 86/62  BP Location: Left upper arm  BP Method: Manual  MAP (Calculated): 70  Resp: 18  SpO2: 96 %  O2 Device: Nasal cannula     Observation/Palpation  Observation: ON 02 WITH CONTINUOUS PULSE OX. SP02 97% ON 02 DURING ACTIVITY. Strength RLE  Strength RLE: WFL  Strength LLE  Strength LLE: WFL  Strength Other  Other: STATES HE HAS \"BAD SHOULDERS\" DUE TO RC INJURY           Bed mobility  Bed Mobility Comments: Pt WAS UP IN THE RECLINER. Transfers  Sit to Stand: Contact guard assistance  Stand to Sit: Contact guard assistance  Comment: REQUIRED VC'S FOR PROPER TECHNIQUE WITH SIT TO STAND. pt REPORTS SIT TO STAND CAN BE DIFFICULT AT TIMES FOR HIM AT HOME  Ambulation  Surface: Level tile  Device: Rolling Walker  Other Apparatus: O2  Assistance: Contact guard assistance  Quality of Gait: FLEXED POSTURE. WEAK AND FRAIL APPEARING OVERALL  Gait Deviations: Slow Staci;Decreased step length;Decreased step height;Shuffles  Distance: 15 FT, REST, 15 FT  Comments: pt REPORTS FATIGUE AFTER AMB BUT NO DISTRESS.                       Goals  Short Term Goals  Time Frame for Short Term Goals: 2 WKS  Short Term Goal 1: AMB 75 FT WITH RW, CGA       Education  Patient Education  Education Given To: Patient  Education Provided: Role of Therapy;Plan of Care  Education Method: Verbal  Barriers to Learning: None  Education Outcome: Verbalized understanding      Therapy Time   Individual Concurrent Group Co-treatment   Time In           Time Out           Minutes                   Dany Trinidad PT   Electronically signed by Dany Trinidad PT on 12/27/2022 at 3:21 PM

## 2022-12-28 LAB
ANION GAP SERPL CALCULATED.3IONS-SCNC: 11 MMOL/L (ref 7–19)
BASOPHILS ABSOLUTE: 0 K/UL (ref 0–0.2)
BASOPHILS RELATIVE PERCENT: 0.1 % (ref 0–1)
BUN BLDV-MCNC: 31 MG/DL (ref 8–23)
CALCIUM SERPL-MCNC: 8.6 MG/DL (ref 8.8–10.2)
CHLORIDE BLD-SCNC: 87 MMOL/L (ref 98–111)
CO2: 29 MMOL/L (ref 22–29)
CREAT SERPL-MCNC: 1.2 MG/DL (ref 0.5–1.2)
EOSINOPHILS ABSOLUTE: 0 K/UL (ref 0–0.6)
EOSINOPHILS RELATIVE PERCENT: 0 % (ref 0–5)
GFR SERPL CREATININE-BSD FRML MDRD: 60 ML/MIN/{1.73_M2}
GLUCOSE BLD-MCNC: 133 MG/DL (ref 74–109)
HCT VFR BLD CALC: 37.9 % (ref 42–52)
HEMOGLOBIN: 13 G/DL (ref 14–18)
IMMATURE GRANULOCYTES #: 0.2 K/UL
LYMPHOCYTES ABSOLUTE: 0.6 K/UL (ref 1.1–4.5)
LYMPHOCYTES RELATIVE PERCENT: 4.2 % (ref 20–40)
MAGNESIUM: 2 MG/DL (ref 1.6–2.4)
MCH RBC QN AUTO: 32.7 PG (ref 27–31)
MCHC RBC AUTO-ENTMCNC: 34.3 G/DL (ref 33–37)
MCV RBC AUTO: 95.2 FL (ref 80–94)
MONOCYTES ABSOLUTE: 0.6 K/UL (ref 0–0.9)
MONOCYTES RELATIVE PERCENT: 3.9 % (ref 0–10)
NEUTROPHILS ABSOLUTE: 13.3 K/UL (ref 1.5–7.5)
NEUTROPHILS RELATIVE PERCENT: 90.4 % (ref 50–65)
PDW BLD-RTO: 12.7 % (ref 11.5–14.5)
PLATELET # BLD: 268 K/UL (ref 130–400)
PMV BLD AUTO: 9.1 FL (ref 9.4–12.4)
POTASSIUM SERPL-SCNC: 4.3 MMOL/L (ref 3.5–5)
RBC # BLD: 3.98 M/UL (ref 4.7–6.1)
SODIUM BLD-SCNC: 127 MMOL/L (ref 136–145)
WBC # BLD: 14.8 K/UL (ref 4.8–10.8)

## 2022-12-28 PROCEDURE — 1210000000 HC MED SURG R&B

## 2022-12-28 PROCEDURE — 6370000000 HC RX 637 (ALT 250 FOR IP): Performed by: INTERNAL MEDICINE

## 2022-12-28 PROCEDURE — 83735 ASSAY OF MAGNESIUM: CPT

## 2022-12-28 PROCEDURE — 94761 N-INVAS EAR/PLS OXIMETRY MLT: CPT

## 2022-12-28 PROCEDURE — 94760 N-INVAS EAR/PLS OXIMETRY 1: CPT

## 2022-12-28 PROCEDURE — 2700000000 HC OXYGEN THERAPY PER DAY

## 2022-12-28 PROCEDURE — 80048 BASIC METABOLIC PNL TOTAL CA: CPT

## 2022-12-28 PROCEDURE — 6360000002 HC RX W HCPCS: Performed by: INTERNAL MEDICINE

## 2022-12-28 PROCEDURE — 36415 COLL VENOUS BLD VENIPUNCTURE: CPT

## 2022-12-28 PROCEDURE — 85025 COMPLETE CBC W/AUTO DIFF WBC: CPT

## 2022-12-28 RX ADMIN — IPRATROPIUM BROMIDE 2 PUFF: 17 AEROSOL, METERED RESPIRATORY (INHALATION) at 16:45

## 2022-12-28 RX ADMIN — BUPROPION HYDROCHLORIDE 150 MG: 150 TABLET, EXTENDED RELEASE ORAL at 08:03

## 2022-12-28 RX ADMIN — ASPIRIN 81 MG: 81 TABLET, COATED ORAL at 08:03

## 2022-12-28 RX ADMIN — IPRATROPIUM BROMIDE 2 PUFF: 17 AEROSOL, METERED RESPIRATORY (INHALATION) at 20:12

## 2022-12-28 RX ADMIN — FLUTICASONE PROPIONATE 2 SPRAY: 50 SPRAY, METERED NASAL at 08:00

## 2022-12-28 RX ADMIN — IPRATROPIUM BROMIDE 2 PUFF: 17 AEROSOL, METERED RESPIRATORY (INHALATION) at 08:01

## 2022-12-28 RX ADMIN — SODIUM CHLORIDE 1 G: 1 TABLET ORAL at 12:59

## 2022-12-28 RX ADMIN — ASPIRIN 81 MG: 81 TABLET, COATED ORAL at 20:32

## 2022-12-28 RX ADMIN — AMLODIPINE BESYLATE 2.5 MG: 2.5 TABLET ORAL at 08:04

## 2022-12-28 RX ADMIN — SODIUM CHLORIDE 1 G: 1 TABLET ORAL at 08:04

## 2022-12-28 RX ADMIN — ENOXAPARIN SODIUM 30 MG: 100 INJECTION SUBCUTANEOUS at 20:38

## 2022-12-28 RX ADMIN — DOXYCYCLINE HYCLATE 100 MG: 100 CAPSULE ORAL at 09:10

## 2022-12-28 RX ADMIN — DEXAMETHASONE 4 MG: 4 TABLET ORAL at 20:32

## 2022-12-28 RX ADMIN — POTASSIUM CHLORIDE 10 MEQ: 750 TABLET, EXTENDED RELEASE ORAL at 08:04

## 2022-12-28 RX ADMIN — ENOXAPARIN SODIUM 30 MG: 100 INJECTION SUBCUTANEOUS at 08:08

## 2022-12-28 RX ADMIN — PYRIDOSTIGMINE BROMIDE 60 MG: 60 TABLET ORAL at 12:59

## 2022-12-28 RX ADMIN — DOXYCYCLINE HYCLATE 100 MG: 100 CAPSULE ORAL at 20:32

## 2022-12-28 RX ADMIN — DEXAMETHASONE 4 MG: 4 TABLET ORAL at 08:04

## 2022-12-28 RX ADMIN — ALBUTEROL SULFATE 2 PUFF: 90 AEROSOL, METERED RESPIRATORY (INHALATION) at 16:44

## 2022-12-28 RX ADMIN — CLONAZEPAM 0.25 MG: 0.25 TABLET, ORALLY DISINTEGRATING ORAL at 08:04

## 2022-12-28 RX ADMIN — IPRATROPIUM BROMIDE 2 PUFF: 17 AEROSOL, METERED RESPIRATORY (INHALATION) at 13:02

## 2022-12-28 RX ADMIN — PYRIDOSTIGMINE BROMIDE 60 MG: 60 TABLET ORAL at 22:12

## 2022-12-28 RX ADMIN — Medication 1 TABLET: at 08:04

## 2022-12-28 RX ADMIN — OXYCODONE HYDROCHLORIDE AND ACETAMINOPHEN 500 MG: 500 TABLET ORAL at 20:32

## 2022-12-28 RX ADMIN — IPRATROPIUM BROMIDE 2 PUFF: 17 AEROSOL, METERED RESPIRATORY (INHALATION) at 04:40

## 2022-12-28 RX ADMIN — LISINOPRIL 10 MG: 10 TABLET ORAL at 08:04

## 2022-12-28 RX ADMIN — ALBUTEROL SULFATE 2 PUFF: 90 AEROSOL, METERED RESPIRATORY (INHALATION) at 08:00

## 2022-12-28 RX ADMIN — PROPRANOLOL HYDROCHLORIDE 60 MG: 60 CAPSULE, EXTENDED RELEASE ORAL at 08:03

## 2022-12-28 RX ADMIN — ALBUTEROL SULFATE 2 PUFF: 90 AEROSOL, METERED RESPIRATORY (INHALATION) at 20:11

## 2022-12-28 RX ADMIN — ALFUZOSIN HYDROCHLORIDE 10 MG: 10 TABLET, FILM COATED, EXTENDED RELEASE ORAL at 08:04

## 2022-12-28 RX ADMIN — ALBUTEROL SULFATE 2 PUFF: 90 AEROSOL, METERED RESPIRATORY (INHALATION) at 13:00

## 2022-12-28 RX ADMIN — PANTOPRAZOLE SODIUM 40 MG: 40 TABLET, DELAYED RELEASE ORAL at 06:05

## 2022-12-28 RX ADMIN — DIPHENHYDRAMINE HYDROCHLORIDE 25 MG: 25 TABLET ORAL at 20:32

## 2022-12-28 RX ADMIN — ALBUTEROL SULFATE 2 PUFF: 90 AEROSOL, METERED RESPIRATORY (INHALATION) at 04:39

## 2022-12-28 RX ADMIN — GUAIFENESIN 400 MG: 200 TABLET ORAL at 16:44

## 2022-12-28 RX ADMIN — ZINC SULFATE 220 MG (50 MG) CAPSULE 50 MG: CAPSULE at 09:10

## 2022-12-28 RX ADMIN — OXYCODONE HYDROCHLORIDE AND ACETAMINOPHEN 500 MG: 500 TABLET ORAL at 08:03

## 2022-12-28 RX ADMIN — ATORVASTATIN CALCIUM 10 MG: 10 TABLET, FILM COATED ORAL at 20:32

## 2022-12-28 RX ADMIN — DOCUSATE SODIUM 100 MG: 100 CAPSULE, LIQUID FILLED ORAL at 08:04

## 2022-12-28 RX ADMIN — Medication 2000 UNITS: at 08:04

## 2022-12-28 RX ADMIN — PYRIDOSTIGMINE BROMIDE 60 MG: 60 TABLET ORAL at 06:05

## 2022-12-28 RX ADMIN — GUAIFENESIN 400 MG: 200 TABLET ORAL at 08:04

## 2022-12-28 RX ADMIN — SODIUM CHLORIDE 1 G: 1 TABLET ORAL at 16:44

## 2022-12-28 NOTE — PROGRESS NOTES
Occupational Therapy  Pt denies therapy this pm, states \"I just went to the bathroom and could barely make it back. Lets hold off until tomorrow\". Will continue to follow.  Electronically signed by LOGAN Jimenez on 12/28/2022 at 4:39 PM

## 2022-12-28 NOTE — CARE COORDINATION
Re-visited with patient to discuss discharge plan. Re-explained PT recommendations that if he discharges home he will need 24 hour assist.  Patient states he will not have 24 hour assist at home. He is now agreeable to send a referral to a SNF for short-term Rehab. Provided SNF Choice List.  Patient requested to send a referral to [facility]. Referral sent to [facility] 12/28/2022. Awaiting response.   [address] [place] [address] [phone]  F [phone]  Electronically signed by Jessica Morgan RN on 12/28/2022 at 8:59 AM

## 2022-12-28 NOTE — PROGRESS NOTES
Hospitalist Progress Note  [facility]     Patient: Octaviano Arriaga  : 1940  MRN: [id number]  Code Status: Full Code    Hospital Day: 4   Date of Service: 2022    Subjective:   Patient seen in COVID-19 unit. No current complaints. Past Medical History:   Diagnosis Date    Arthritis     Cancer ([address]     HX LUNG CA    Emphysema/COPD ([address]     MILD/FORMER SMOKER    Hyperlipidemia     Hypertension     Macular degeneration     Myasthenia gravis ([facility] [address] 2022    Palliative care patient 2022    Retention of urine        Past Surgical History:   Procedure Laterality Date    EYE SURGERY      MUSCLE WEAKNESS    GASTRECTOMY      PARTIAL FOR ULCER REPAIR    LUNG CANCER SURGERY Left     LLL LOBECTOMY    OTHER SURGICAL HISTORY      I & D OF GROIN INFECTION    TOTAL KNEE ARTHROPLASTY Right 2017    KNEE TOTAL ARTHROPLASTY performed by Vivian Logan MD at [facility] OR       Family History   Problem Relation Age of Onset    Diabetes Mother     Stroke Mother     Heart Attack Father     Cancer Father         ASBESTOS       Social History     Socioeconomic History    Marital status:       Spouse name: Not on file    Number of children: Not on file    Years of education: Not on file    Highest education level: Not on file   Occupational History    Not on file   Tobacco Use    Smoking status: Former     Types: Cigarettes     Quit date: 1984     Years since quittin.6    Smokeless tobacco: Never   Vaping Use    Vaping Use: Never used   Substance and Sexual Activity    Alcohol use: No    Drug use: No    Sexual activity: Not on file   Other Topics Concern    Not on file   Social History Narrative    Not on file     Social Determinants of Health     Financial Resource Strain: Not on file   Food Insecurity: Not on file   Transportation Needs: Not on file   Physical Activity: Not on file   Stress: Not on file   Social Connections: Not on file   Intimate Partner Violence: Not on file   Housing Stability: Not on file       Current Facility-Administered Medications   Medication Dose Route Frequency Provider Last Rate Last Admin    dexamethasone (DECADRON) tablet 4 mg  4 mg Oral 2 times per day Laura Berman MD   4 mg at 12/28/22 2787    doxycycline hyclate (VIBRAMYCIN) capsule 100 mg  100 mg Oral 2 times per day Laura Berman MD   100 mg at 12/28/22 3293    magic butt cream   Topical Q4H PRN Phill Keenan, DO   Given at 12/25/22 1515    oxyCODONE-acetaminophen (PERCOCET) 5-325 MG per tablet 1 tablet  1 tablet Oral Q4H PRN Patience Dennis, DO        sodium chloride tablet 1 g  1 g Oral TID WC Laura Berman MD   1 g at 12/28/22 0804    alfuzosin (UROXATRAL) extended release tablet 10 mg  10 mg Oral Daily Patience Dennis, DO   10 mg at 12/28/22 0589    aspirin EC tablet 81 mg  81 mg Oral BID Patience Dennis, DO   81 mg at 12/28/22 0803    buPROPion (WELLBUTRIN XL) extended release tablet 150 mg  150 mg Oral Daily Patience Dennis, DO   150 mg at 12/28/22 0803    clonazePAM (KLONOPIN) disintegrating tablet 0.25 mg  0.25 mg Oral Daily Patience Dennis, DO   0.25 mg at 12/28/22 0804    docusate sodium (COLACE) capsule 100 mg  100 mg Oral Daily Patience Dennis, DO   100 mg at 12/28/22 0804    fluticasone (FLONASE) 50 MCG/ACT nasal spray 2 spray  2 spray Each Nostril Daily Patience Dennis, DO   2 spray at 44/06/03 8801    folic acid-pyridoxine-cyancobalamin (FOLTX) 2.5-25-2 MG TABS 1 tablet  1 tablet Oral Daily Patience Dennis, DO   1 tablet at 12/28/22 0267    PreserVision AREDS 2+Multi Vit CAPS 2 capsule (Patient Supplied)  2 capsule Oral Daily Patience Dennis, DO        pantoprazole (PROTONIX) tablet 40 mg  40 mg Oral QAM AC Patience Dennis, DO   40 mg at 12/28/22 5247    potassium chloride (KLOR-CON M) extended release tablet 10 mEq  10 mEq Oral Once per day on Mon Wed Fri Patience Dennis, DO   10 mEq at 12/28/22 0804    propranolol (INDERAL LA) extended release capsule 60 mg  60 mg Oral Daily Patience Dennis, DO   60 mg at 12/28/22 0803    pyridostigmine (MESTINON) tablet 60 mg  60 mg Oral 3 times per day Patience Dennis, DO   60 mg at 12/28/22 0605    amLODIPine (NORVASC) tablet 2.5 mg  2.5 mg Oral Daily Patience Dennis, DO   2.5 mg at 12/28/22 4928    And    lisinopril (PRINIVIL;ZESTRIL) tablet 10 mg  10 mg Oral Daily Patience Dennis, DO   10 mg at 12/28/22 0804    atorvastatin (LIPITOR) tablet 10 mg  10 mg Oral Nightly Patience Dennis, DO   10 mg at 12/27/22 2117    diphenhydrAMINE (BENADRYL) tablet 25 mg  25 mg Oral Nightly PRN Patience Dennis, DO   25 mg at 12/27/22 2117    And    acetaminophen (TYLENOL) tablet 500 mg  500 mg Oral Nightly PRN Patience Dennis, DO   500 mg at 12/27/22 2301    Vitamin D (CHOLECALCIFEROL) tablet 2,000 Units  2,000 Units Oral Daily Raul Chowdary MD   2,000 Units at 12/28/22 0804    vitamin D (ERGOCALCIFEROL) capsule 50,000 Units  50,000 Units Oral Weekly Raul Chowdary MD   50,000 Units at 12/24/22 0910    acetaminophen (TYLENOL) tablet 650 mg  650 mg Oral Q6H PRN Raul Chowdary MD   650 mg at 12/27/22 1845    guaiFENesin tablet 400 mg  400 mg Oral Q8H Raul Chowdary MD   400 mg at 12/28/22 6295    ascorbic acid (VITAMIN C) tablet 500 mg  500 mg Oral BID Raul Chowdary MD   500 mg at 12/28/22 0803    albuterol sulfate HFA (PROVENTIL;VENTOLIN;PROAIR) 108 (90 Base) MCG/ACT inhaler 2 puff  2 puff Inhalation Q4H Raul Chowdary MD   2 puff at 12/28/22 0800    ipratropium (ATROVENT HFA) 17 MCG/ACT inhaler 2 puff  2 puff Inhalation Q4H Raul Chowdary MD   2 puff at 12/28/22 0801    hydrALAZINE (APRESOLINE) injection 10 mg  10 mg IntraVENous Q6H PRN Raul Chowdary MD        labetalol (NORMODYNE;TRANDATE) injection 20 mg  20 mg IntraVENous Q4H PRN Raul Chowdary MD        enoxaparin Sodium (LOVENOX) injection 30 mg  30 mg SubCUTAneous BID Raul Chowdary MD   30 mg at 12/28/22 0382    zinc sulfate (ZINCATE) capsule 50 mg  50 mg Oral Daily Raul Chowdary MD   50 mg at 12/28/22 7899             Objective:   /65   Pulse 73   Temp 97.6 °F (36.4 °C)   Resp 17   Ht 6' (1.829 m)   Wt 201 lb (91.2 kg)   SpO2 96%   BMI 27.26 kg/m²     In an effort to reduce COVID-19 exposure, patient seen from doorway and case discussed in detail with unit staff    Recent Labs     12/26/22  0202 12/27/22  0117 12/28/22  0135   WBC 15.5* 15.3* 14.8*   RBC 4.18* 4.06* 3.98*   HGB 13.6* 13.4* 13.0*   HCT 39.6* 38.7* 37.9*   MCV 94.7* 95.3* 95.2*   MCH 32.5* 33.0* 32.7*   MCHC 34.3 34.6 34.3    242 268     Recent Labs     12/26/22  0202 12/26/22  1008 12/27/22  0117 12/28/22  0135   * 121* 126* 127*   K 4.0  --  3.9 4.3   ANIONGAP 12  --  8 11   CL 87*  --  87* 87*   CO2 27  --  31* 29   BUN 16  --  20 31*   CREATININE 0.7  --  0.9 1.2   GLUCOSE 146*  --  135* 133*   CALCIUM 8.5*  --  8.2* 8.6*     Recent Labs     12/26/22  0202 12/27/22  0117 12/28/22  0135   MG 2.0 2.0 2.0     No results for input(s): AST, ALT, ALB, BILITOT, ALKPHOS, ALB in the last 72 hours. No results for input(s): PH, PO2, PCO2, HCO3, BE, O2SAT in the last 72 hours. No results for input(s): TROPONINI in the last 72 hours. No results for input(s): INR in the last 72 hours. No results for input(s): LACTA in the last 72 hours. No intake or output data in the 24 hours ending 12/28/22 1206    No results found.      Assessment and Plan:   COVID-19 pneumonia  COPD exacerbation  Acute hypoxemic respiratory failure  Hyponatremia     Tapering dexamethasone  S/p Tocilizumab  Adjuvant therapy  Empiric broad-spectrum antibiotics to cover potential secondary bacterial infection  Tapering antibiotics  Encouraged self proning  Steroids/nebs/antibiotics for COPD exacerbation  Currently on 2 L NC, decreased requirement overall  Asymptomatic hyponatremia  Serum/urine studies reviewed  Follow sodium improving  Social work following for discharge planning  Lovenox for DVT prophylaxis  Discussed treatment plan with patient/RN/social work    Angélica López MD   12/28/2022 12:06 PM

## 2022-12-28 NOTE — CARE COORDINATION
Patient's nurse, Modesto Koo RN, stated that patient's daughter, Joya Tovar, requested to speak with Care Management regarding discharge plan. Called Carie. Answered all questions appropriately. [facility]ie is agreeable to referral to [facility].   Electronically signed by Red Lanza RN on 12/28/2022 at 12:13 PM

## 2022-12-29 PROBLEM — J96.01 ACUTE RESPIRATORY FAILURE WITH HYPOXIA (HCC): Status: ACTIVE | Noted: 2022-12-29

## 2022-12-29 PROBLEM — R53.1 GENERALIZED WEAKNESS: Status: ACTIVE | Noted: 2022-12-29

## 2022-12-29 LAB
ANION GAP SERPL CALCULATED.3IONS-SCNC: 6 MMOL/L (ref 7–19)
BASOPHILS ABSOLUTE: 0 K/UL (ref 0–0.2)
BASOPHILS RELATIVE PERCENT: 0.2 % (ref 0–1)
BUN BLDV-MCNC: 26 MG/DL (ref 8–23)
CALCIUM SERPL-MCNC: 8.3 MG/DL (ref 8.8–10.2)
CHLORIDE BLD-SCNC: 92 MMOL/L (ref 98–111)
CO2: 32 MMOL/L (ref 22–29)
CREAT SERPL-MCNC: 1 MG/DL (ref 0.5–1.2)
EOSINOPHILS ABSOLUTE: 0 K/UL (ref 0–0.6)
EOSINOPHILS RELATIVE PERCENT: 0.1 % (ref 0–5)
GFR SERPL CREATININE-BSD FRML MDRD: >60 ML/MIN/{1.73_M2}
GLUCOSE BLD-MCNC: 120 MG/DL (ref 74–109)
GLUCOSE BLD-MCNC: 154 MG/DL (ref 70–99)
HCT VFR BLD CALC: 34.5 % (ref 42–52)
HEMOGLOBIN: 12.1 G/DL (ref 14–18)
IMMATURE GRANULOCYTES #: 0.2 K/UL
LYMPHOCYTES ABSOLUTE: 0.8 K/UL (ref 1.1–4.5)
LYMPHOCYTES RELATIVE PERCENT: 6.3 % (ref 20–40)
MAGNESIUM: 1.9 MG/DL (ref 1.6–2.4)
MCH RBC QN AUTO: 33.6 PG (ref 27–31)
MCHC RBC AUTO-ENTMCNC: 35.1 G/DL (ref 33–37)
MCV RBC AUTO: 95.8 FL (ref 80–94)
MONOCYTES ABSOLUTE: 0.6 K/UL (ref 0–0.9)
MONOCYTES RELATIVE PERCENT: 5 % (ref 0–10)
NEUTROPHILS ABSOLUTE: 11.2 K/UL (ref 1.5–7.5)
NEUTROPHILS RELATIVE PERCENT: 87.2 % (ref 50–65)
PDW BLD-RTO: 12.7 % (ref 11.5–14.5)
PERFORMED ON: ABNORMAL
PLATELET # BLD: 250 K/UL (ref 130–400)
PMV BLD AUTO: 8.9 FL (ref 9.4–12.4)
POTASSIUM SERPL-SCNC: 4.4 MMOL/L (ref 3.5–5)
RBC # BLD: 3.6 M/UL (ref 4.7–6.1)
SODIUM BLD-SCNC: 130 MMOL/L (ref 136–145)
WBC # BLD: 12.8 K/UL (ref 4.8–10.8)

## 2022-12-29 PROCEDURE — 6360000002 HC RX W HCPCS: Performed by: PSYCHIATRY & NEUROLOGY

## 2022-12-29 PROCEDURE — 94760 N-INVAS EAR/PLS OXIMETRY 1: CPT

## 2022-12-29 PROCEDURE — 85025 COMPLETE CBC W/AUTO DIFF WBC: CPT

## 2022-12-29 PROCEDURE — 2700000000 HC OXYGEN THERAPY PER DAY

## 2022-12-29 PROCEDURE — 82947 ASSAY GLUCOSE BLOOD QUANT: CPT

## 2022-12-29 PROCEDURE — 6370000000 HC RX 637 (ALT 250 FOR IP): Performed by: INTERNAL MEDICINE

## 2022-12-29 PROCEDURE — 1210000000 HC MED SURG R&B

## 2022-12-29 PROCEDURE — 83735 ASSAY OF MAGNESIUM: CPT

## 2022-12-29 PROCEDURE — 99232 SBSQ HOSP IP/OBS MODERATE 35: CPT | Performed by: PHYSICIAN ASSISTANT

## 2022-12-29 PROCEDURE — 2580000003 HC RX 258: Performed by: INTERNAL MEDICINE

## 2022-12-29 PROCEDURE — 80048 BASIC METABOLIC PNL TOTAL CA: CPT

## 2022-12-29 PROCEDURE — 6360000002 HC RX W HCPCS: Performed by: INTERNAL MEDICINE

## 2022-12-29 PROCEDURE — 99223 1ST HOSP IP/OBS HIGH 75: CPT | Performed by: PSYCHIATRY & NEUROLOGY

## 2022-12-29 PROCEDURE — 6370000000 HC RX 637 (ALT 250 FOR IP): Performed by: PSYCHIATRY & NEUROLOGY

## 2022-12-29 PROCEDURE — 05HY33Z INSERTION OF INFUSION DEVICE INTO UPPER VEIN, PERCUTANEOUS APPROACH: ICD-10-PCS | Performed by: INTERNAL MEDICINE

## 2022-12-29 PROCEDURE — 36415 COLL VENOUS BLD VENIPUNCTURE: CPT

## 2022-12-29 RX ORDER — SODIUM CHLORIDE 9 MG/ML
INJECTION, SOLUTION INTRAVENOUS ONCE
Status: COMPLETED | OUTPATIENT
Start: 2022-12-29 | End: 2022-12-29

## 2022-12-29 RX ORDER — SODIUM CHLORIDE, SODIUM LACTATE, POTASSIUM CHLORIDE, CALCIUM CHLORIDE 600; 310; 30; 20 MG/100ML; MG/100ML; MG/100ML; MG/100ML
INJECTION, SOLUTION INTRAVENOUS CONTINUOUS
Status: DISCONTINUED | OUTPATIENT
Start: 2022-12-29 | End: 2023-01-01 | Stop reason: HOSPADM

## 2022-12-29 RX ORDER — PROPRANOLOL HYDROCHLORIDE 10 MG/1
10 TABLET ORAL 3 TIMES DAILY
Status: DISCONTINUED | OUTPATIENT
Start: 2022-12-29 | End: 2022-12-31

## 2022-12-29 RX ORDER — METHYLPREDNISOLONE SODIUM SUCCINATE 125 MG/2ML
250 INJECTION, POWDER, LYOPHILIZED, FOR SOLUTION INTRAMUSCULAR; INTRAVENOUS EVERY 12 HOURS
Status: DISCONTINUED | OUTPATIENT
Start: 2022-12-29 | End: 2022-12-31

## 2022-12-29 RX ORDER — SODIUM CHLORIDE 9 MG/ML
INJECTION, SOLUTION INTRAVENOUS ONCE
Status: DISCONTINUED | OUTPATIENT
Start: 2022-12-29 | End: 2022-12-30

## 2022-12-29 RX ORDER — PYRIDOSTIGMINE BROMIDE 60 MG/1
60 TABLET ORAL EVERY 6 HOURS SCHEDULED
Status: DISCONTINUED | OUTPATIENT
Start: 2022-12-29 | End: 2023-01-01 | Stop reason: HOSPADM

## 2022-12-29 RX ADMIN — METHYLPREDNISOLONE SODIUM SUCCINATE 250 MG: 125 INJECTION, POWDER, FOR SOLUTION INTRAMUSCULAR; INTRAVENOUS at 16:47

## 2022-12-29 RX ADMIN — DOXYCYCLINE HYCLATE 100 MG: 100 CAPSULE ORAL at 08:57

## 2022-12-29 RX ADMIN — IPRATROPIUM BROMIDE 2 PUFF: 17 AEROSOL, METERED RESPIRATORY (INHALATION) at 21:58

## 2022-12-29 RX ADMIN — PROPRANOLOL HYDROCHLORIDE 60 MG: 60 CAPSULE, EXTENDED RELEASE ORAL at 08:57

## 2022-12-29 RX ADMIN — SODIUM CHLORIDE 1 G: 1 TABLET ORAL at 12:44

## 2022-12-29 RX ADMIN — LISINOPRIL 10 MG: 10 TABLET ORAL at 08:57

## 2022-12-29 RX ADMIN — ENOXAPARIN SODIUM 30 MG: 100 INJECTION SUBCUTANEOUS at 08:57

## 2022-12-29 RX ADMIN — GUAIFENESIN 400 MG: 200 TABLET ORAL at 00:39

## 2022-12-29 RX ADMIN — ASPIRIN 81 MG: 81 TABLET, COATED ORAL at 08:56

## 2022-12-29 RX ADMIN — ASPIRIN 81 MG: 81 TABLET, COATED ORAL at 21:57

## 2022-12-29 RX ADMIN — ALBUTEROL SULFATE 2 PUFF: 90 AEROSOL, METERED RESPIRATORY (INHALATION) at 16:36

## 2022-12-29 RX ADMIN — SODIUM CHLORIDE: 9 INJECTION, SOLUTION INTRAVENOUS at 11:00

## 2022-12-29 RX ADMIN — SODIUM CHLORIDE 1 G: 1 TABLET ORAL at 08:57

## 2022-12-29 RX ADMIN — DEXAMETHASONE 4 MG: 4 TABLET ORAL at 08:57

## 2022-12-29 RX ADMIN — IPRATROPIUM BROMIDE 2 PUFF: 17 AEROSOL, METERED RESPIRATORY (INHALATION) at 09:03

## 2022-12-29 RX ADMIN — ENOXAPARIN SODIUM 30 MG: 100 INJECTION SUBCUTANEOUS at 21:58

## 2022-12-29 RX ADMIN — GUAIFENESIN 400 MG: 200 TABLET ORAL at 08:56

## 2022-12-29 RX ADMIN — OXYCODONE HYDROCHLORIDE AND ACETAMINOPHEN 500 MG: 500 TABLET ORAL at 08:57

## 2022-12-29 RX ADMIN — ALBUTEROL SULFATE 2 PUFF: 90 AEROSOL, METERED RESPIRATORY (INHALATION) at 09:03

## 2022-12-29 RX ADMIN — ALBUTEROL SULFATE 2 PUFF: 90 AEROSOL, METERED RESPIRATORY (INHALATION) at 00:39

## 2022-12-29 RX ADMIN — ALBUTEROL SULFATE 2 PUFF: 90 AEROSOL, METERED RESPIRATORY (INHALATION) at 05:47

## 2022-12-29 RX ADMIN — ALBUTEROL SULFATE 2 PUFF: 90 AEROSOL, METERED RESPIRATORY (INHALATION) at 12:23

## 2022-12-29 RX ADMIN — OXYCODONE HYDROCHLORIDE AND ACETAMINOPHEN 500 MG: 500 TABLET ORAL at 21:57

## 2022-12-29 RX ADMIN — ZINC SULFATE 220 MG (50 MG) CAPSULE 50 MG: CAPSULE at 08:56

## 2022-12-29 RX ADMIN — SODIUM CHLORIDE 1 G: 1 TABLET ORAL at 16:33

## 2022-12-29 RX ADMIN — SODIUM CHLORIDE: 9 INJECTION, SOLUTION INTRAVENOUS at 12:08

## 2022-12-29 RX ADMIN — ATORVASTATIN CALCIUM 10 MG: 10 TABLET, FILM COATED ORAL at 21:57

## 2022-12-29 RX ADMIN — DIPHENHYDRAMINE HYDROCHLORIDE 25 MG: 25 TABLET ORAL at 21:57

## 2022-12-29 RX ADMIN — Medication 2000 UNITS: at 08:57

## 2022-12-29 RX ADMIN — IPRATROPIUM BROMIDE 2 PUFF: 17 AEROSOL, METERED RESPIRATORY (INHALATION) at 12:23

## 2022-12-29 RX ADMIN — FLUTICASONE PROPIONATE 2 SPRAY: 50 SPRAY, METERED NASAL at 09:04

## 2022-12-29 RX ADMIN — IPRATROPIUM BROMIDE 2 PUFF: 17 AEROSOL, METERED RESPIRATORY (INHALATION) at 00:39

## 2022-12-29 RX ADMIN — PYRIDOSTIGMINE BROMIDE 60 MG: 60 TABLET ORAL at 05:49

## 2022-12-29 RX ADMIN — GUAIFENESIN 400 MG: 200 TABLET ORAL at 16:21

## 2022-12-29 RX ADMIN — ALFUZOSIN HYDROCHLORIDE 10 MG: 10 TABLET, FILM COATED, EXTENDED RELEASE ORAL at 08:57

## 2022-12-29 RX ADMIN — AMLODIPINE BESYLATE 2.5 MG: 2.5 TABLET ORAL at 08:57

## 2022-12-29 RX ADMIN — IPRATROPIUM BROMIDE 2 PUFF: 17 AEROSOL, METERED RESPIRATORY (INHALATION) at 05:48

## 2022-12-29 RX ADMIN — BUPROPION HYDROCHLORIDE 150 MG: 150 TABLET, EXTENDED RELEASE ORAL at 08:57

## 2022-12-29 RX ADMIN — ALBUTEROL SULFATE 2 PUFF: 90 AEROSOL, METERED RESPIRATORY (INHALATION) at 21:58

## 2022-12-29 RX ADMIN — PANTOPRAZOLE SODIUM 40 MG: 40 TABLET, DELAYED RELEASE ORAL at 05:49

## 2022-12-29 RX ADMIN — CLONAZEPAM 0.25 MG: 0.25 TABLET, ORALLY DISINTEGRATING ORAL at 08:57

## 2022-12-29 RX ADMIN — DOCUSATE SODIUM 100 MG: 100 CAPSULE, LIQUID FILLED ORAL at 08:56

## 2022-12-29 RX ADMIN — IPRATROPIUM BROMIDE 2 PUFF: 17 AEROSOL, METERED RESPIRATORY (INHALATION) at 16:36

## 2022-12-29 RX ADMIN — OXYCODONE HYDROCHLORIDE AND ACETAMINOPHEN 1 TABLET: 5; 325 TABLET ORAL at 21:57

## 2022-12-29 RX ADMIN — Medication 1 TABLET: at 08:57

## 2022-12-29 RX ADMIN — PROPRANOLOL HYDROCHLORIDE 10 MG: 10 TABLET ORAL at 21:57

## 2022-12-29 RX ADMIN — SODIUM CHLORIDE, POTASSIUM CHLORIDE, SODIUM LACTATE AND CALCIUM CHLORIDE: 600; 310; 30; 20 INJECTION, SOLUTION INTRAVENOUS at 12:16

## 2022-12-29 ASSESSMENT — PAIN SCALES - GENERAL
PAINLEVEL_OUTOF10: 0
PAINLEVEL_OUTOF10: 0

## 2022-12-29 NOTE — CARE COORDINATION
Met with patient to inform him that [facility] does not have any male beds at this time. He requested to send a referral to [facility]. Called patient's daughter, Gavi Julian, to inform her of referral.  She states their second choice is [facility] if [address] [place] [address] is unable to accept patient. Referral sent to [facility]. Awaiting response. Patient will be out of quarantine 12/31/2022. [facility] [phone]  F [phone]  Electronically signed by Yuli Mills RN on 12/29/2022 at 12:50 PM    Patient accepted at [facility]. He can admit to their facility 1/1/2023.   [facility] [phone]  F [phone]  Electronically signed by Yuli Mills RN on 12/29/2022 at 3:11 PM

## 2022-12-29 NOTE — PROGRESS NOTES
Hospitalist Progress Note  [facility]     Patient: Rosi Hussein  : 1940  MRN: [id number]  Code Status: Full Code    Hospital Day: 5   Date of Service: 2022    Subjective:   Patient seen in COVID-19 unit. No current complaints. Past Medical History:   Diagnosis Date    Arthritis     Cancer ([address]     HX LUNG CA    Emphysema/COPD ([address]     MILD/FORMER SMOKER    Hyperlipidemia     Hypertension     Macular degeneration     Myasthenia gravis ([facility] [address] 2022    Palliative care patient 2022    Retention of urine        Past Surgical History:   Procedure Laterality Date    EYE SURGERY      MUSCLE WEAKNESS    GASTRECTOMY      PARTIAL FOR ULCER REPAIR    LUNG CANCER SURGERY Left     LLL LOBECTOMY    OTHER SURGICAL HISTORY      I & D OF GROIN INFECTION    TOTAL KNEE ARTHROPLASTY Right 2017    KNEE TOTAL ARTHROPLASTY performed by Duane Sin, MD at [facility] OR       Family History   Problem Relation Age of Onset    Diabetes Mother     Stroke Mother     Heart Attack Father     Cancer Father         ASBESTOS       Social History     Socioeconomic History    Marital status:       Spouse name: Not on file    Number of children: Not on file    Years of education: Not on file    Highest education level: Not on file   Occupational History    Not on file   Tobacco Use    Smoking status: Former     Types: Cigarettes     Quit date: 1984     Years since quittin.6    Smokeless tobacco: Never   Vaping Use    Vaping Use: Never used   Substance and Sexual Activity    Alcohol use: No    Drug use: No    Sexual activity: Not on file   Other Topics Concern    Not on file   Social History Narrative    Not on file     Social Determinants of Health     Financial Resource Strain: Not on file   Food Insecurity: Not on file   Transportation Needs: Not on file   Physical Activity: Not on file   Stress: Not on file   Social Connections: Not on file   Intimate Partner Violence: Not on file   Housing Stability: Not on file       Current Facility-Administered Medications   Medication Dose Route Frequency Provider Last Rate Last Admin    lactated ringers infusion   IntraVENous Continuous Corina Goodrich  mL/hr at 12/29/22 1216 New Bag at 12/29/22 1216    0.9 % sodium chloride infusion   IntraVENous Once Corina Goodrich MD        propranolol (INDERAL) tablet 10 mg  10 mg Oral TID Jaclyn Sheth MD        pyridostigmine (MESTINON) tablet 60 mg  60 mg Oral 4 times per day Jaclyn Sheth MD        methylPREDNISolone sodium (SOLU-MEDROL) injection 250 mg  250 mg IntraVENous Q12H Jaclyn Sheth MD   250 mg at 12/29/22 1647    [Held by provider] doxycycline hyclate (VIBRAMYCIN) capsule 100 mg  100 mg Oral 2 times per day Corina Goodrich MD   100 mg at 12/29/22 0857    magic butt cream   Topical Q4H PRN Patience Dennis, DO   Given at 12/25/22 1515    oxyCODONE-acetaminophen (PERCOCET) 5-325 MG per tablet 1 tablet  1 tablet Oral Q4H PRN Patience Dennis, DO        sodium chloride tablet 1 g  1 g Oral TID WC Corina Goodrich MD   1 g at 12/29/22 1633    alfuzosin (UROXATRAL) extended release tablet 10 mg  10 mg Oral Daily Patience Dennis, DO   10 mg at 12/29/22 0857    aspirin EC tablet 81 mg  81 mg Oral BID Patience Dennis, DO   81 mg at 12/29/22 0856    buPROPion (WELLBUTRIN XL) extended release tablet 150 mg  150 mg Oral Daily Patience Dennis, DO   150 mg at 12/29/22 0857    clonazePAM (KLONOPIN) disintegrating tablet 0.25 mg  0.25 mg Oral Daily Patience Dennis, DO   0.25 mg at 12/29/22 0857    docusate sodium (COLACE) capsule 100 mg  100 mg Oral Daily Patience Dennis, DO   100 mg at 12/29/22 0856    fluticasone (FLONASE) 50 MCG/ACT nasal spray 2 spray  2 spray Each Nostril Daily Patience Dennis, DO   2 spray at 80/33/14 8367    folic acid-pyridoxine-cyancobalamin (FOLTX) 2.5-25-2 MG TABS 1 tablet  1 tablet Oral Daily Patience DO Dennis   1 tablet at 12/29/22 0857    PreserVision AREDS 2+Multi Vit CAPS 2 capsule (Patient Supplied)  2 capsule Oral Daily Patience Dennis, DO        pantoprazole (PROTONIX) tablet 40 mg  40 mg Oral QAM AC Patience Dennis, DO   40 mg at 12/29/22 0549    potassium chloride (KLOR-CON M) extended release tablet 10 mEq  10 mEq Oral Once per day on Mon Wed Fri Patience Dennis, DO   10 mEq at 12/28/22 0804    amLODIPine (NORVASC) tablet 2.5 mg  2.5 mg Oral Daily Patience Dennis, DO   2.5 mg at 12/29/22 0857    And    lisinopril (PRINIVIL;ZESTRIL) tablet 10 mg  10 mg Oral Daily Patience Dennis, DO   10 mg at 12/29/22 0857    atorvastatin (LIPITOR) tablet 10 mg  10 mg Oral Nightly Patience Dennis, DO   10 mg at 12/28/22 2032    diphenhydrAMINE (BENADRYL) tablet 25 mg  25 mg Oral Nightly PRN Patience Dennis, DO   25 mg at 12/28/22 2032    And    acetaminophen (TYLENOL) tablet 500 mg  500 mg Oral Nightly PRN Patience Dennis, DO   500 mg at 12/27/22 2301    Vitamin D (CHOLECALCIFEROL) tablet 2,000 Units  2,000 Units Oral Daily Jian Sanchez MD   2,000 Units at 12/29/22 0857    vitamin D (ERGOCALCIFEROL) capsule 50,000 Units  50,000 Units Oral Weekly Jian Sanchez MD   50,000 Units at 12/24/22 0910    acetaminophen (TYLENOL) tablet 650 mg  650 mg Oral Q6H PRN Jian Sanchez MD   650 mg at 12/27/22 1845    guaiFENesin tablet 400 mg  400 mg Oral Q8H Jina Sanchez MD   400 mg at 12/29/22 1621    ascorbic acid (VITAMIN C) tablet 500 mg  500 mg Oral BID Jian Sanchez MD   500 mg at 12/29/22 0857    albuterol sulfate HFA (PROVENTIL;VENTOLIN;PROAIR) 108 (90 Base) MCG/ACT inhaler 2 puff  2 puff Inhalation Q4H Jian Sanchez MD   2 puff at 12/29/22 1636    ipratropium (ATROVENT HFA) 17 MCG/ACT inhaler 2 puff  2 puff Inhalation Q4H Jian Sanchez MD   2 puff at 12/29/22 1636    hydrALAZINE (APRESOLINE) injection 10 mg  10 mg IntraVENous Q6H PRN Jian Sanchez MD        labetalol (NORMODYNE;TRANDATE) injection 20 mg  20 mg IntraVENous Q4H PRN Jian Sanchez MD        enoxaparin Sodium (LOVENOX) injection 30 mg  30 mg SubCUTAneous BID Judith Cantu MD   30 mg at 12/29/22 0857    zinc sulfate (ZINCATE) capsule 50 mg  50 mg Oral Daily Judith Cantu MD   50 mg at 12/29/22 0856         lactated ringers 100 mL/hr at 12/29/22 1216    sodium chloride          Objective:   BP (!) 113/59   Pulse 63   Temp 98.6 °F (37 °C) (Temporal)   Resp 18   Ht 6' (1.829 m)   Wt 201 lb (91.2 kg)   SpO2 94%   BMI 27.26 kg/m²     In an effort to reduce COVID-19 exposure, patient seen from doorway and case discussed in detail with unit staff    Recent Labs     12/27/22 0117 12/28/22 0135 12/29/22  0736   WBC 15.3* 14.8* 12.8*   RBC 4.06* 3.98* 3.60*   HGB 13.4* 13.0* 12.1*   HCT 38.7* 37.9* 34.5*   MCV 95.3* 95.2* 95.8*   MCH 33.0* 32.7* 33.6*   MCHC 34.6 34.3 35.1    268 250     Recent Labs     12/27/22 0117 12/28/22  0135 12/29/22  0736   * 127* 130*   K 3.9 4.3 4.4   ANIONGAP 8 11 6*   CL 87* 87* 92*   CO2 31* 29 32*   BUN 20 31* 26*   CREATININE 0.9 1.2 1.0   GLUCOSE 135* 133* 120*   CALCIUM 8.2* 8.6* 8.3*     Recent Labs     12/27/22  0117 12/28/22  0135 12/29/22  0736   MG 2.0 2.0 1.9     No results for input(s): AST, ALT, ALB, BILITOT, ALKPHOS, ALB in the last 72 hours. No results for input(s): PH, PO2, PCO2, HCO3, BE, O2SAT in the last 72 hours. No results for input(s): TROPONINI in the last 72 hours. No results for input(s): INR in the last 72 hours. No results for input(s): LACTA in the last 72 hours. Intake/Output Summary (Last 24 hours) at 12/29/2022 2023  Last data filed at 12/29/2022 0747  Gross per 24 hour   Intake 240 ml   Output --   Net 240 ml       No results found.      Assessment and Plan:   COVID-19 pneumonia  COPD exacerbation  Acute hypoxemic respiratory failure  Hyponatremia  History of myasthenia gravis     Steroids  S/p Tocilizumab  Adjuvant therapy  Completed course of empiric antibiotics  Encouraged self proning  Nebs  Currently on 2 L NC, decreased requirement overall  Asymptomatic hyponatremia  Serum/urine studies reviewed  Follow sodium improving  Neurology following myasthenia gravis  Social work following for discharge planning  Lovenox for DVT prophylaxis  Discussed treatment plan with patient/RN/social work    Cony Fish MD   12/29/2022 8:23 PM

## 2022-12-29 NOTE — PROGRESS NOTES
12/29/22 1227   Encounter Summary   Encounter Overview/Reason  Spiritual/Emotional Needs   Service Provided For: Patient and family together   Referral/Consult From: Other (comment)  (Rapid Response)   Support System Family members   Complexity of Encounter High   Begin Time 1050   End Time  1120   Total Time Calculated 30 min   Crisis   Type Rapid Response   Spiritual/Emotional needs   Type Emotional Distress  (visit with grandson of patient)   Assessment/Intervention/Outcome   Assessment Anxious; Complicated grieving;Sad;Tearful   Intervention Active listening;Sustaining Presence/Ministry of presence   Outcome Expressed feelings, needs, and concerns; Less anxious, Less agitated;Receptive

## 2022-12-29 NOTE — PROGRESS NOTES
Palliative Care Progress Note  12/29/2022 4:20 PM    Patient:  Wang Christianson  YOB: 1940  Primary Care Physician: Isaias Martinez DO  Advance Directive: Full Code  Admit Date: 12/24/2022       Hospital Day: 5  Portions of this note have been copied forward, however, changed to reflect the most current clinical status of this patient. CHIEF COMPLAINT/REASON FOR CONSULTATION Goals of care, family support, Code status, symptom management     SUBJECTIVE:  Mr. Yogesh Clemnes complains of worsening weakness and hypotension. A rapid response was called earlier for this and he has responded well to IVF's    HPI:  The patient is a 79 y.o. 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 EMS was summoned to his home and noted O2 sat in the 80s. He was placed on supplemental oxygen, dexamethasone and admitted to Hospitalist service. Tocilizumab was given as well as empiric antibiotics. Palliative care was consulted for goals of care and code status discussion. Review of Systems:   14 point review of systems is negative except as specifically addressed above. Objective:   VITALS:  BP (!) 100/56   Pulse 65   Temp 97.3 °F (36.3 °C) (Temporal)   Resp 18   Ht 6' (1.829 m)   Wt 201 lb (91.2 kg)   SpO2 94%   BMI 27.26 kg/m²   24HR INTAKE/OUTPUT:    Intake/Output Summary (Last 24 hours) at 12/29/2022 1620  Last data filed at 12/29/2022 0747  Gross per 24 hour   Intake 240 ml   Output --   Net 240 ml       General appearance: 80 yo male, no acute distress, laying in supine position w/ head of bed elevated, lethargic   Head: Normocephalic, without obvious abnormality, atraumatic  Eyes: conjunctivae/corneas clear. PERRL, EOM's intact.    Ears/Nose: normal external ears and nose  Neck/Throat: throat without exudate, supple, symmetrical, trachea midline   Pulmonary: clear throughout no rhonchi wheezing or rales  Cardiovascular: RRR, S1, S2 normal, no murmur  Gastrointestinal:soft, non-tender; non-distended, bowel sounds present    Musculoskeletal:No lower extremity edema,  No erythema, no tenderness to palpation  Skin: warm, dry  Neurologic: Alert and oriented X 3, generalized weakness, normal tone. No focal deficits  Psychiatric: appropriate mood/affect    Medications:      lactated ringers 100 mL/hr at 12/29/22 1216    sodium chloride        propranolol  10 mg Oral TID    pyridostigmine  60 mg Oral 4 times per day    methylPREDNISolone  250 mg IntraVENous Q12H    [Held by provider] doxycycline hyclate  100 mg Oral 2 times per day    sodium chloride  1 g Oral TID WC    alfuzosin  10 mg Oral Daily    aspirin EC  81 mg Oral BID    buPROPion  150 mg Oral Daily    clonazePAM  0.25 mg Oral Daily    docusate sodium  100 mg Oral Daily    fluticasone  2 spray Each Nostril Daily    folic acid-pyridoxine-cyancobalamin  1 tablet Oral Daily    PreserVision AREDS 2+Multi Vit  2 capsule Oral Daily    pantoprazole  40 mg Oral QAM AC    potassium chloride  10 mEq Oral Once per day on Mon Wed Fri    amLODIPine  2.5 mg Oral Daily    And    lisinopril  10 mg Oral Daily    atorvastatin  10 mg Oral Nightly    Vitamin D  2,000 Units Oral Daily    vitamin D  50,000 Units Oral Weekly    guaiFENesin  400 mg Oral Q8H    vitamin C  500 mg Oral BID    albuterol sulfate HFA  2 puff Inhalation Q4H    ipratropium  2 puff Inhalation Q4H    enoxaparin  30 mg SubCUTAneous BID    zinc sulfate  50 mg Oral Daily     magic butt cream, oxyCODONE-acetaminophen, diphenhydrAMINE **AND** acetaminophen, acetaminophen, hydrALAZINE, labetalol  ADULT DIET; Regular; Low Fat/Low Chol/High Fiber/ALEX; 60 to 80 gm; Safety Tray; Safety Tray (Disposables); Does not like fish, chili, turkey, or chicken.  Does l.ronda pork and beef     Lab and other Data:     Recent Labs     12/27/22  0117 12/28/22  0135 12/29/22  0736   WBC 15.3* 14.8* 12.8*   HGB 13.4* 13.0* 12.1*    268 250       Recent Labs     12/27/22  0117 12/28/22  0135 12/29/22  0736   * 127* 130*   K 3.9 4.3 4.4   CL 87* 87* 92*   CO2 31* 29 32*   BUN 20 31* 26*   CREATININE 0.9 1.2 1.0   GLUCOSE 135* 133* 120*       Palliative Performance Scale:  [] 80% Full ambulation  Some disease: Normal activity w/ some effort  Full self-care  Normal/reduced intake  Full LOC  [] 70% Reduced ambulation  Some disease: Can't do normal job or work  Full self-care  Normal/reduced intake  Full LOC  [] 60% Reduced ambulation  Significant disease: Can't do hobbies/housework  Occasional assistance  Normal/reduced intake  LOC full/confusion  [] 50% Mainly sit/lie  Extensive disease: Can't do any work  Considerable assistance  Normal/reduced intake  LOC full/confusion  [x] 40% Mainly in bed  Extensive disease  Mainly assistance  Normal/reduced intake  LOC full/confusion  [] 30% Bed Bound  Extensive disease  Total care  Reduced Intake  LOC full/confusion  [] 20% Bed Bound  Extensive disease  Total care  Minimal intake  Drowsy/coma  [] 10% Bed Bound  Extensive disease  Total care  Mouth care only  Drowsy/coma  [] 0% Death    ECOG:(3)      Assessment/Plan   Principal Problem:    Pneumonia due to COVID-19 virus  Active Problems:    Hyponatremia    Palliative care patient    Myasthenia gravis ([facility] [address]    Essential hypertension    Pulmonary emphysema (HCC)  Resolved Problems:    * No resolved hospital problems. *    Visit Summary:  Chart reviewed. Rapid response called today w/ concern for weakness and hypotension. IVF's ordered and patient is responding appropriate to those. He does have known history of Myasthenia and Neurology has been consulted as well. Mestinon increased, Inderal adjusted, Solumedrol added.  Mr. Monique Marcial tells me he has decided to pursue placement at this time due to worsening weakness. We did readdress his code status and he confirms his desire to remain full code but would not want to be kept on a ventilator for long term. Opportunity for questions and emotional support provided. Will follow. Candidate for SCOP: Out of service area     Recommendations:   Palliative Care-GOC DC to SNF when medically stable Code status: Full code ACP completed   Acute respiratory failure w/ hypoxia in setting of COVID-19/known COPD-received Tocilizumab, Decadron, empiric antibiotics, inhalers, stable  Hyponatremia-chronic, asymptomatic, mgmt per Hospitalist , improving  Stage I adenocarcinoma of the lung-surgically treated, no evidence of metastasis followed by Oncology at [facility]  Myasthenia gravis-Mestinon increased, Neurology following, Solumedrol added, Inderal decreased   Generalized weakness-PT/OT consult, nursing communication for walker and bedside commode   Hypotension-symptomatic today, monitor with adjustment in Inderal, considering holding Norvasc as well will defer to Hospitalist     Thank you for consulting Palliative Care and allowing us to participate in the care of this patient.    Time Spent Counseling/Coordination of care > 50%:  YES                                   Total Time Spent with patient/family counseling, workup/treatment review, discussion with medical team and placement of orders/preparation of this note: 35 minutes    Electronically signed by Jeremy Casarez PA-C on 12/29/2022 at 4:20 PM    (Please note that portions of this note were completed with a voice recognition program.  Gerry Crowe made to edit the dictations but occasionally words are mis-transcribed.)

## 2022-12-29 NOTE — CONSULTS
[facility] Vascular Access Team:  Consult Note    Patient: Javi Matos  YOB: 1940   MRN: [id number]  Room: [address] [place] [address]     Attending Physician: Indra Kumar MD  Ordering Physician: Indra Kumar MD    Diagnosis:   Hyponatremia [E87.1]  Hypoxia [R09.02]  Pneumonia due to COVID-19 virus [U07.1, J12.82]  COVID-19 [U07.1]    Active LDAs:  Peripheral IV 12/29/22 Left; Anterior Forearm (Active)   Number of days: 0       Reason for Consult:  [facility] vascular access team consulted for placement of Ultrasound Guided Peripheral IV. Indication(s):  Javi Matos is a 79 y.o. male admitted to [address] [place] [address]- for Pneumonia due to COVID-19 virus. Javi Matos lost IV access. Per primary RN, patient acutely hypotensive and needs immediate IV fluids. Nursing unable to gain access. Findings:  A 20G Ultrasound Guide Peripheral IV was placed in patient's left forearm with one attempt and no complications. Patient tolerated well. Patient had sluggish blood return, but verified patency with saline. Past Medical History:   Diagnosis Date    Arthritis     Cancer ([facility] [address]     HX LUNG CA    Emphysema/COPD ([facility] [address]     MILD/FORMER SMOKER    Hyperlipidemia     Hypertension     Macular degeneration     Myasthenia gravis ([facility] [address] 12/26/2022    Palliative care patient 11/11/2022    Retention of urine        Recent Labs     Units 12/29/22  0736   WBC K/uL 12.8*   PLT K/uL 250   CREATININE mg/dL 1.0       Impression/Plan:  1. Ultrasound-Guided Peripheral IV is ready to be used    Thank you for your time and consult.      Electronically Signed By: Mikaela Eller RN on 12/29/2022 at 12:58 PM

## 2022-12-29 NOTE — PROGRESS NOTES
Grandson called out that patient was ready to lay back in bed from the chair. 2 PCAs responded to move the patient. Upon entering the patients room, noticed patient was not as alert and oriented as he had previously been throughout the morning. PCAs called the nurses station for a nurse to evaluate the patient. Vitals were obtained. Upon entering the room, vitals were complete and reading BP 68/47, O2 98%, pulse 67. Patient continued to to become less alert, required max assist to get back into bed. Pupils were pinpoint and non-reactive. Pt was still able to answer questions, but had minimal voice. Rapid response was called at 10:55, patient was placed in trendelenburg position. PRN IV fluids started. Repeat BP was 93/51. Blood glucose was 154.     4th floor staff, Respiratory, case management, clinical house, lab, and physician arrived to room. Dr De La Rosa Nicely ordered rainbow labs, and 1 L bolus of NS. Lost IV access, Margarito Snowden showed up to place a new IV to continue bolus fluids. Patient continued to have low BP after 500mL of fluid bolus. Dr. De La Rosa Nicely notified via secure message. New orders to continue bolus and give 250 more bolus, and start continuous LR at 100. BP up to 92/52. Will continue to monitor.      Electronically signed by Carly Chopra RN on 12/29/2022 at 12:53 PM

## 2022-12-29 NOTE — CONSULTS
[facility] Neurology Consult  ? ? Patient: Lucien Reed  MR#: [id number]  Account Number: [de-identified]   Room: 6289/061-73   YOB: 1940  Date of Progress Note: 12/29/2022  Time of Note 3:01 PM  Attending Physician: Viktoriya Caba MD  Consulting Physician: Zayra Nuñez M.D.  ?  ? CHIEF COMPLAINT: gen weakness   ? HISTORY OF PRESENT ILLNESS:   This 79 y.o. male who presents with covid pna and copd exac. Recently dx with MG. Says he ran out of meds a few weeks ago. Supposedly started prednisone as opt. Now on  doxycycline, etc.   Weaker the past few days. REVIEW OF SYSTEMS:  Constitutional - No fever or chills. No diaphoresis or significant fatigue. HENT - No tinnitus or significant hearing loss. Eyes - no sudden vision change or eye pain  Respiratory - no significant shortness of breath or cough  Cardiovascular - no chest pain No palpitations or significant leg swelling  Gastrointestinal - no abdominal swelling or pain. Genitourinary - No difficulty urinating, dysuria  Musculoskeletal - no back pain or myalgia. Skin - no color change or rash  Neurologic - No seizures. No lateralizing weakness. Hematologic - no easy bruising or excessive bleeding. Psychiatric - no severe anxiety or nervousness. All other review of systems are negative. ?   Past Medical History:      Diagnosis Date    Arthritis     Cancer ([address]     HX LUNG CA    Emphysema/COPD ([address]     MILD/FORMER SMOKER    Hyperlipidemia     Hypertension     Macular degeneration     Myasthenia gravis [address] [address] 12/26/2022    Palliative care patient 11/11/2022    Retention of urine      Past Surgical History:      Procedure Laterality Date    EYE SURGERY      MUSCLE WEAKNESS    GASTRECTOMY      PARTIAL FOR ULCER REPAIR    LUNG CANCER SURGERY Left     LLL LOBECTOMY    OTHER SURGICAL HISTORY      I & D OF GROIN INFECTION    TOTAL KNEE ARTHROPLASTY Right 5/18/2017    KNEE TOTAL ARTHROPLASTY performed by Shefali Loo MD at [facility] [employer] [facility] Medications in Hospital:     Current Facility-Administered Medications:     lactated ringers infusion, , IntraVENous, Continuous, Nory Ruggiero MD, Last Rate: 100 mL/hr at 12/29/22 1216, New Bag at 12/29/22 1216    0.9 % sodium chloride infusion, , IntraVENous, Once, Nory Ruggiero MD    propranolol (INDERAL) tablet 10 mg, 10 mg, Oral, TID, Veronica Ramirez MD    pyridostigmine (MESTINON) tablet 60 mg, 60 mg, Oral, 4 times per day, Veronica Ramirez MD    dexamethasone (DECADRON) tablet 4 mg, 4 mg, Oral, 2 times per day, Nory Ruggiero MD, 4 mg at 12/29/22 0857    [Held by provider] doxycycline hyclate (VIBRAMYCIN) capsule 100 mg, 100 mg, Oral, 2 times per day, Nory Ruggiero MD, 100 mg at 12/29/22 0857    magic butt cream, , Topical, Q4H PRN, Grayce Lava, DO, Given at 12/25/22 1515    oxyCODONE-acetaminophen (PERCOCET) 5-325 MG per tablet 1 tablet, 1 tablet, Oral, Q4H PRN, Patience Dennis, DO    sodium chloride tablet 1 g, 1 g, Oral, TID WC, Nory Ruggiero MD, 1 g at 12/29/22 1244    alfuzosin (UROXATRAL) extended release tablet 10 mg, 10 mg, Oral, Daily, Patience Dennis, DO, 10 mg at 12/29/22 0857    aspirin EC tablet 81 mg, 81 mg, Oral, BID, Patience Dennis, DO, 81 mg at 12/29/22 0856    buPROPion (WELLBUTRIN XL) extended release tablet 150 mg, 150 mg, Oral, Daily, Patience Dennis, DO, 150 mg at 12/29/22 0857    clonazePAM (KLONOPIN) disintegrating tablet 0.25 mg, 0.25 mg, Oral, Daily, Patience Dennis, DO, 0.25 mg at 12/29/22 0857    docusate sodium (COLACE) capsule 100 mg, 100 mg, Oral, Daily, Patience Dennis, DO, 100 mg at 12/29/22 0856    fluticasone (FLONASE) 50 MCG/ACT nasal spray 2 spray, 2 spray, Each Nostril, Daily, Patience DennisDO cheikh, 2 spray at 01/50/39 5924    folic acid-pyridoxine-cyancobalamin (FOLTX) 2.5-25-2 MG TABS 1 tablet, 1 tablet, Oral, Daily, Patience DennisDO cheikh, 1 tablet at 12/29/22 0857    PreserVision AREDS 2+Multi Vit CAPS 2 capsule (Patient Supplied), 2 capsule, Oral, Daily, Patience Dennis, DO    pantoprazole (PROTONIX) tablet 40 mg, 40 mg, Oral, QAM AC, Patience Dennis, DO, 40 mg at 12/29/22 0549    potassium chloride (KLOR-CON M) extended release tablet 10 mEq, 10 mEq, Oral, Once per day on Mon Wed Fri, Patience Dennis, DO, 10 mEq at 12/28/22 0804    amLODIPine (NORVASC) tablet 2.5 mg, 2.5 mg, Oral, Daily, 2.5 mg at 12/29/22 0857 **AND** lisinopril (PRINIVIL;ZESTRIL) tablet 10 mg, 10 mg, Oral, Daily, Patience Dennis, DO, 10 mg at 12/29/22 0857    atorvastatin (LIPITOR) tablet 10 mg, 10 mg, Oral, Nightly, Patience Dennis, DO, 10 mg at 12/28/22 2032    diphenhydrAMINE (BENADRYL) tablet 25 mg, 25 mg, Oral, Nightly PRN, 25 mg at 12/28/22 2032 **AND** acetaminophen (TYLENOL) tablet 500 mg, 500 mg, Oral, Nightly PRN, Patience Dennis, DO, 500 mg at 12/27/22 2301    Vitamin D (CHOLECALCIFEROL) tablet 2,000 Units, 2,000 Units, Oral, Daily, Nory Ruggiero MD, 2,000 Units at 12/29/22 0857    vitamin D (ERGOCALCIFEROL) capsule 50,000 Units, 50,000 Units, Oral, Weekly, Nory Ruggiero MD, 50,000 Units at 12/24/22 0910    acetaminophen (TYLENOL) tablet 650 mg, 650 mg, Oral, Q6H PRN, Nory Ruggiero MD, 650 mg at 12/27/22 1845    guaiFENesin tablet 400 mg, 400 mg, Oral, Q8H, Nory Ruggiero MD, 400 mg at 12/29/22 9149    ascorbic acid (VITAMIN C) tablet 500 mg, 500 mg, Oral, BID, Nory Ruggiero MD, 500 mg at 12/29/22 0857    albuterol sulfate HFA (PROVENTIL;VENTOLIN;PROAIR) 108 (90 Base) MCG/ACT inhaler 2 puff, 2 puff, Inhalation, Q4H, Nory Ruggiero MD, 2 puff at 12/29/22 1223    ipratropium (ATROVENT HFA) 17 MCG/ACT inhaler 2 puff, 2 puff, Inhalation, Q4H, Nory Ruggiero MD, 2 puff at 12/29/22 1223    hydrALAZINE (APRESOLINE) injection 10 mg, 10 mg, IntraVENous, Q6H PRN, Nory Ruggiero MD    labetalol (NORMODYNE;TRANDATE) injection 20 mg, 20 mg, IntraVENous, Q4H PRN, Nory Ruggiero MD    enoxaparin Sodium (LOVENOX) injection 30 mg, 30 mg, SubCUTAneous, BID, Nory Rgugiero MD, 30 mg at 12/29/22 0857    zinc sulfate (ZINCATE) capsule 50 mg, 50 mg, Oral, Daily, Raul Chowdary MD, 50 mg at 12/29/22 7656  Allergies: Penicillins  Social History:   TOBACCO:  reports that he quit smoking about 38 years ago. His smoking use included cigarettes. He has never used smokeless tobacco.  ETOH:  reports no history of alcohol use. Family History:       Problem Relation Age of Onset    Diabetes Mother     Stroke Mother     Heart Attack Father     Cancer Father         ASBESTOS     ? ? Physical Exam:    Vitals: BP (!) 100/56   Pulse 65   Temp 97.3 °F (36.3 °C) (Temporal)   Resp 18   Ht 6' (1.829 m)   Wt 201 lb (91.2 kg)   SpO2 94%   BMI 27.26 kg/m²   Constitutional - well developed, well nourished. Eyes - conjunctiva normal. Pupils react to light  Ear, nose, throat -hearing intact to finger rub No scars, masses, or lesions over external nose or ears, no atrophy of tongue  Neck-symmetric, no masses noted, no jugular vein distension  Respiration- chest wall appears symmetric, good expansion,   normal effort without use of accessory muscles  Musculoskeletal - no significant wasting of muscles noted, no bony deformities  Extremities-no clubbing, cyanosis or edema  Skin - warm, dry, and intact. No rash, erythema, or pallor.   Psychiatric - mood, affect, and behavior appear normal.   Neurological exam  Awake, alert, fluent oriented x 3 appropriate affect  Attention and concentration appear appropriate  Recent and remote memory appears unremarkable  Speech normal without dysarthria  No clear issues with language of fund of knowledge  Cranial Nerve Exam   CN II- Visual fields grossly unremarkable  CN III, IV,VI-EOMI, No nystagmus, conjugate eye movements, no ptosis  CN V-sensation intact to LT over face  CN VII-no facial assymetry  CN VIII-Hearing intact to voice  CN IX and X- Palate elevates in midline  CN XI-good shoulder shrug  CN XII-Tongue midline with no fasciculations or fibrillations  Motor Exam  Mild weakkness prox ue  Sensory Exam  Sensation intact to light touch and temperature upper and lower extremities bilaterally  Reflexes absent  Tremors- no tremors in hands or head noted  Gait  Not tested  Coordination  Finger to nose-unremarkable  ? ?  CBC:   Recent Labs     12/27/22 0117 12/28/22  0135 12/29/22  0736   WBC 15.3* 14.8* 12.8*   HGB 13.4* 13.0* 12.1*    268 250     BMP:   Recent Labs     12/27/22 0117 12/28/22  0135 12/29/22  0736   * 127* 130*   K 3.9 4.3 4.4   CL 87* 87* 92*   CO2 31* 29 32*   BUN 20 31* 26*   CREATININE 0.9 1.2 1.0   GLUCOSE 135* 133* 120*     Hepatic: No results for input(s): AST, ALT, ALB, BILITOT, ALKPHOS in the last 72 hours. Lipids: No results for input(s): CHOL, HDL in the last 72 hours. Invalid input(s): LDLCALCU  INR: No results for input(s): INR in the last 72 hours. ?  ?  Assessment and Plan   1. MG with probable exacerbation, on top of covid pna. Need to avoid tetracycline and beta blockers. Will wean off of inderal.  Increase mestinon and steroids. ?  ?       Pasha Noel MD  Board Certified in Neurology

## 2022-12-29 NOTE — PLAN OF CARE
Problem: Discharge Planning  Goal: Discharge to home or other facility with appropriate resources  Outcome: Progressing     Problem: Safety - Adult  Goal: Free from fall injury  Outcome: Progressing  Flowsheets (Taken 12/28/2022 2343 by Joi Kumar LPN)  Free From Fall Injury: Instruct family/caregiver on patient safety     Problem: ABCDS Injury Assessment  Goal: Absence of physical injury  Outcome: Progressing     Problem: Pain  Goal: Verbalizes/displays adequate comfort level or baseline comfort level  Outcome: Progressing

## 2022-12-30 LAB
ANION GAP SERPL CALCULATED.3IONS-SCNC: 9 MMOL/L (ref 7–19)
BASOPHILS ABSOLUTE: 0 K/UL (ref 0–0.2)
BASOPHILS RELATIVE PERCENT: 0.2 % (ref 0–1)
BUN BLDV-MCNC: 23 MG/DL (ref 8–23)
CALCIUM SERPL-MCNC: 8.3 MG/DL (ref 8.8–10.2)
CHLORIDE BLD-SCNC: 96 MMOL/L (ref 98–111)
CO2: 27 MMOL/L (ref 22–29)
CREAT SERPL-MCNC: 0.9 MG/DL (ref 0.5–1.2)
EOSINOPHILS ABSOLUTE: 0 K/UL (ref 0–0.6)
EOSINOPHILS RELATIVE PERCENT: 0 % (ref 0–5)
GFR SERPL CREATININE-BSD FRML MDRD: >60 ML/MIN/{1.73_M2}
GLUCOSE BLD-MCNC: 149 MG/DL (ref 74–109)
HCT VFR BLD CALC: 32.4 % (ref 42–52)
HEMOGLOBIN: 11.1 G/DL (ref 14–18)
IMMATURE GRANULOCYTES #: 0.2 K/UL
LYMPHOCYTES ABSOLUTE: 0.6 K/UL (ref 1.1–4.5)
LYMPHOCYTES RELATIVE PERCENT: 4.6 % (ref 20–40)
MAGNESIUM: 1.8 MG/DL (ref 1.6–2.4)
MCH RBC QN AUTO: 32.9 PG (ref 27–31)
MCHC RBC AUTO-ENTMCNC: 34.3 G/DL (ref 33–37)
MCV RBC AUTO: 96.1 FL (ref 80–94)
MONOCYTES ABSOLUTE: 0.1 K/UL (ref 0–0.9)
MONOCYTES RELATIVE PERCENT: 1.1 % (ref 0–10)
NEUTROPHILS ABSOLUTE: 12.2 K/UL (ref 1.5–7.5)
NEUTROPHILS RELATIVE PERCENT: 92.4 % (ref 50–65)
PDW BLD-RTO: 12.8 % (ref 11.5–14.5)
PLATELET # BLD: 233 K/UL (ref 130–400)
PMV BLD AUTO: 8.6 FL (ref 9.4–12.4)
POTASSIUM SERPL-SCNC: 4.6 MMOL/L (ref 3.5–5)
RBC # BLD: 3.37 M/UL (ref 4.7–6.1)
SODIUM BLD-SCNC: 132 MMOL/L (ref 136–145)
WBC # BLD: 13.2 K/UL (ref 4.8–10.8)

## 2022-12-30 PROCEDURE — 6370000000 HC RX 637 (ALT 250 FOR IP): Performed by: INTERNAL MEDICINE

## 2022-12-30 PROCEDURE — 97530 THERAPEUTIC ACTIVITIES: CPT

## 2022-12-30 PROCEDURE — 6360000002 HC RX W HCPCS: Performed by: INTERNAL MEDICINE

## 2022-12-30 PROCEDURE — 94761 N-INVAS EAR/PLS OXIMETRY MLT: CPT

## 2022-12-30 PROCEDURE — 2700000000 HC OXYGEN THERAPY PER DAY

## 2022-12-30 PROCEDURE — 6360000002 HC RX W HCPCS: Performed by: PSYCHIATRY & NEUROLOGY

## 2022-12-30 PROCEDURE — 6370000000 HC RX 637 (ALT 250 FOR IP): Performed by: PSYCHIATRY & NEUROLOGY

## 2022-12-30 PROCEDURE — 97110 THERAPEUTIC EXERCISES: CPT

## 2022-12-30 PROCEDURE — 85025 COMPLETE CBC W/AUTO DIFF WBC: CPT

## 2022-12-30 PROCEDURE — 2580000003 HC RX 258: Performed by: INTERNAL MEDICINE

## 2022-12-30 PROCEDURE — 99232 SBSQ HOSP IP/OBS MODERATE 35: CPT | Performed by: PSYCHIATRY & NEUROLOGY

## 2022-12-30 PROCEDURE — 36415 COLL VENOUS BLD VENIPUNCTURE: CPT

## 2022-12-30 PROCEDURE — 83735 ASSAY OF MAGNESIUM: CPT

## 2022-12-30 PROCEDURE — 1210000000 HC MED SURG R&B

## 2022-12-30 PROCEDURE — 80048 BASIC METABOLIC PNL TOTAL CA: CPT

## 2022-12-30 RX ADMIN — PYRIDOSTIGMINE BROMIDE 60 MG: 60 TABLET ORAL at 18:26

## 2022-12-30 RX ADMIN — SODIUM CHLORIDE 1 G: 1 TABLET ORAL at 18:26

## 2022-12-30 RX ADMIN — GUAIFENESIN 400 MG: 200 TABLET ORAL at 12:44

## 2022-12-30 RX ADMIN — ALBUTEROL SULFATE 2 PUFF: 90 AEROSOL, METERED RESPIRATORY (INHALATION) at 12:43

## 2022-12-30 RX ADMIN — PROPRANOLOL HYDROCHLORIDE 10 MG: 10 TABLET ORAL at 21:10

## 2022-12-30 RX ADMIN — PANTOPRAZOLE SODIUM 40 MG: 40 TABLET, DELAYED RELEASE ORAL at 06:10

## 2022-12-30 RX ADMIN — ASPIRIN 81 MG: 81 TABLET, COATED ORAL at 12:44

## 2022-12-30 RX ADMIN — METHYLPREDNISOLONE SODIUM SUCCINATE 250 MG: 125 INJECTION, POWDER, FOR SOLUTION INTRAMUSCULAR; INTRAVENOUS at 18:26

## 2022-12-30 RX ADMIN — IPRATROPIUM BROMIDE 2 PUFF: 17 AEROSOL, METERED RESPIRATORY (INHALATION) at 18:30

## 2022-12-30 RX ADMIN — PYRIDOSTIGMINE BROMIDE 60 MG: 60 TABLET ORAL at 06:10

## 2022-12-30 RX ADMIN — ENOXAPARIN SODIUM 30 MG: 100 INJECTION SUBCUTANEOUS at 12:44

## 2022-12-30 RX ADMIN — ATORVASTATIN CALCIUM 10 MG: 10 TABLET, FILM COATED ORAL at 21:10

## 2022-12-30 RX ADMIN — FLUTICASONE PROPIONATE 2 SPRAY: 50 SPRAY, METERED NASAL at 12:43

## 2022-12-30 RX ADMIN — ALBUTEROL SULFATE 2 PUFF: 90 AEROSOL, METERED RESPIRATORY (INHALATION) at 18:30

## 2022-12-30 RX ADMIN — BUPROPION HYDROCHLORIDE 150 MG: 150 TABLET, EXTENDED RELEASE ORAL at 12:45

## 2022-12-30 RX ADMIN — IPRATROPIUM BROMIDE 2 PUFF: 17 AEROSOL, METERED RESPIRATORY (INHALATION) at 00:30

## 2022-12-30 RX ADMIN — ENOXAPARIN SODIUM 30 MG: 100 INJECTION SUBCUTANEOUS at 21:10

## 2022-12-30 RX ADMIN — ZINC SULFATE 220 MG (50 MG) CAPSULE 50 MG: CAPSULE at 12:44

## 2022-12-30 RX ADMIN — IPRATROPIUM BROMIDE 2 PUFF: 17 AEROSOL, METERED RESPIRATORY (INHALATION) at 04:23

## 2022-12-30 RX ADMIN — IPRATROPIUM BROMIDE 2 PUFF: 17 AEROSOL, METERED RESPIRATORY (INHALATION) at 12:42

## 2022-12-30 RX ADMIN — GUAIFENESIN 400 MG: 200 TABLET ORAL at 00:30

## 2022-12-30 RX ADMIN — ALBUTEROL SULFATE 2 PUFF: 90 AEROSOL, METERED RESPIRATORY (INHALATION) at 04:22

## 2022-12-30 RX ADMIN — SODIUM CHLORIDE, POTASSIUM CHLORIDE, SODIUM LACTATE AND CALCIUM CHLORIDE: 600; 310; 30; 20 INJECTION, SOLUTION INTRAVENOUS at 11:28

## 2022-12-30 RX ADMIN — Medication 2000 UNITS: at 12:44

## 2022-12-30 RX ADMIN — GUAIFENESIN 400 MG: 200 TABLET ORAL at 18:26

## 2022-12-30 RX ADMIN — OXYCODONE HYDROCHLORIDE AND ACETAMINOPHEN 500 MG: 500 TABLET ORAL at 12:44

## 2022-12-30 RX ADMIN — ALBUTEROL SULFATE 2 PUFF: 90 AEROSOL, METERED RESPIRATORY (INHALATION) at 21:11

## 2022-12-30 RX ADMIN — ALFUZOSIN HYDROCHLORIDE 10 MG: 10 TABLET, FILM COATED, EXTENDED RELEASE ORAL at 12:44

## 2022-12-30 RX ADMIN — PYRIDOSTIGMINE BROMIDE 60 MG: 60 TABLET ORAL at 00:30

## 2022-12-30 RX ADMIN — PROPRANOLOL HYDROCHLORIDE 10 MG: 10 TABLET ORAL at 12:45

## 2022-12-30 RX ADMIN — METHYLPREDNISOLONE SODIUM SUCCINATE 250 MG: 125 INJECTION, POWDER, FOR SOLUTION INTRAMUSCULAR; INTRAVENOUS at 04:22

## 2022-12-30 RX ADMIN — PYRIDOSTIGMINE BROMIDE 60 MG: 60 TABLET ORAL at 12:45

## 2022-12-30 RX ADMIN — POTASSIUM CHLORIDE 10 MEQ: 750 TABLET, EXTENDED RELEASE ORAL at 12:44

## 2022-12-30 RX ADMIN — SODIUM CHLORIDE, POTASSIUM CHLORIDE, SODIUM LACTATE AND CALCIUM CHLORIDE: 600; 310; 30; 20 INJECTION, SOLUTION INTRAVENOUS at 00:32

## 2022-12-30 RX ADMIN — OXYCODONE HYDROCHLORIDE AND ACETAMINOPHEN 500 MG: 500 TABLET ORAL at 21:10

## 2022-12-30 RX ADMIN — ALBUTEROL SULFATE 2 PUFF: 90 AEROSOL, METERED RESPIRATORY (INHALATION) at 00:30

## 2022-12-30 RX ADMIN — Medication 1 TABLET: at 12:45

## 2022-12-30 RX ADMIN — ACETAMINOPHEN 650 MG: 325 TABLET ORAL at 21:17

## 2022-12-30 RX ADMIN — IPRATROPIUM BROMIDE 2 PUFF: 17 AEROSOL, METERED RESPIRATORY (INHALATION) at 21:11

## 2022-12-30 RX ADMIN — ASPIRIN 81 MG: 81 TABLET, COATED ORAL at 21:10

## 2022-12-30 RX ADMIN — DOCUSATE SODIUM 100 MG: 100 CAPSULE, LIQUID FILLED ORAL at 12:44

## 2022-12-30 RX ADMIN — SODIUM CHLORIDE 1 G: 1 TABLET ORAL at 14:27

## 2022-12-30 RX ADMIN — DIPHENHYDRAMINE HYDROCHLORIDE 25 MG: 25 TABLET ORAL at 21:17

## 2022-12-30 RX ADMIN — SODIUM CHLORIDE, POTASSIUM CHLORIDE, SODIUM LACTATE AND CALCIUM CHLORIDE: 600; 310; 30; 20 INJECTION, SOLUTION INTRAVENOUS at 21:17

## 2022-12-30 NOTE — CARE COORDINATION
12/30/22 1444   IMM Letter   IMM Letter given to Patient/Family/Significant other/Guardian/POA/by: Given to and explained to patient by Red Lanza RN CM. Patient verbalized understanding. IMM not signed due to Covid isolation. IMM Letter date given: 12/30/22   IMM Letter time given: 56     Patient's daughter, Joya Tovar, visiting patient. Informed patient and Carie that patient has been accepted at [facility] and can be admitted to their facility 1/1/2023.   Electronically signed by Red Lanza RN on 12/30/2022 at 2:49 PM

## 2022-12-30 NOTE — PROGRESS NOTES
Patient:   Holland Richards  MR#:    [id number]   Room:    9661/541-59   YOB: 1940  Date of Progress Note: 12/30/2022  Time of Note                           7:25 AM  Consulting Physician:   Maria L Quiñonez M.D. Attending Physician:  Sushma Reyes MD     Chief complaint: Worsening generalized weakness and double vision    S:This 79 y.o. male who was admitted with a COPD exacerbation and COVID-pneumonia. He has recently been diagnosed with myasthenia gravis. He ran out of his medications a few weeks prior to admission. No double vision noted this morning. No changes overnight. Had been on doxycycline for a few days. REVIEW OF SYSTEMS:  Constitutional: No fevers No chills  Neck:No stiffness  Respiratory: No shortness of breath  Cardiovascular: No chest pain No palpitations  Gastrointestinal: No abdominal pain    Genitourinary: No Dysuria  Neurological: No headache, no confusion      PHYSICAL EXAM:  BP (!) 141/76   Pulse 70   Temp 96.8 °F (36 °C) (Temporal)   Resp 18   Ht 6' (1.829 m)   Wt 201 lb (91.2 kg)   SpO2 94%   BMI 27.26 kg/m²     Constitutional: he appears well-developed and well-nourished. Eyes - conjunctiva normal.  Pupils react to light  Ear, nose, throat -hearing intact to voice. No scars, masses, or lesions over external nose or ears, no atrophy of tongue  Neck-symmetric, no masses noted, no jugular vein distension  Respiration- chest wall appears symmetric, good expansion,   normal effort without use of accessory muscles  Cardiovascular- RRR  Musculoskeletal - no significant wasting of muscles noted, no bony deformities, gait no gross ataxia  Extremities-no clubbing, cyanosis or edema  Skin - warm, dry, and intact. No rash, erythema, or pallor.   Psychiatric - mood, affect, and behavior appear normal.      Neurology  NEUROLOGICAL EXAM:      Mental status   Awake, alert, fluent oriented x 3 appropriate affect  Attention and concentration appear appropriate  Recent and remote memory appears unremarkable  Speech normal without dysarthria  No clear issues with language       Cranial Nerves   CN II- Visual fields grossly unremarkable  CN III, IV,VI-EOMI, No nystagmus, conjugate eye movements, no ptosis  CN VII-no facial asymmetry  CN VIII-Hearing intact   CN IX and X- Palate elevates in midline  CN XI-good shoulder shrug  CN XII-Tongue midline with no fasciculations or fibrillations          Motor function  Antigravity x 4. Mild weakness proximal upper extremities       Nursing/pcp notes, imaging,labs and vitals reviewed. Lab Results   Component Value Date    WBC 13.2 (H) 12/30/2022    HGB 11.1 (L) 12/30/2022    HCT 32.4 (L) 12/30/2022    MCV 96.1 (H) 12/30/2022     12/30/2022     Lab Results   Component Value Date     (L) 12/30/2022    K 4.6 12/30/2022    CL 96 (L) 12/30/2022    CO2 27 12/30/2022    BUN 23 12/30/2022    CREATININE 0.9 12/30/2022    GLUCOSE 149 (H) 12/30/2022    CALCIUM 8.3 (L) 12/30/2022    PROT 6.6 12/24/2022    LABALBU 3.8 12/24/2022    BILITOT 0.7 12/24/2022    ALKPHOS 74 12/24/2022    AST 20 12/24/2022    ALT 21 12/24/2022    LABGLOM >60 12/30/2022    GFRAA >60 07/05/2022     Lab Results   Component Value Date    INR 1.10 04/27/2017     Lab Results   Component Value Date    CRP 2.33 (H) 12/24/2022       PT,OT and/or speech notes reviewed:      RECORD REVIEW: Previous medical records, medications were reviewed at today's visit    IMPRESSION:   Myasthenia gravis with exacerbation related to COVID-pneumonia and possibly due to doxycycline/continual use of beta-blocker. Mestinon and steroids increased. Patient a little better today. Continue to wean off of propanolol. Avoid tetracycline and fluoroquinolones.   PT/OT

## 2022-12-30 NOTE — PROGRESS NOTES
Hospitalist Progress Note  [facility]     Patient: Octaviano Arriaga  : 1940  MRN: [id number]  Code Status: Full Code    Hospital Day: 6   Date of Service: 2022    Subjective:   Patient seen in COVID-19 unit. No current complaints. Past Medical History:   Diagnosis Date    Arthritis     Cancer ([address]     HX LUNG CA    Emphysema/COPD ([address]     MILD/FORMER SMOKER    Hyperlipidemia     Hypertension     Macular degeneration     Myasthenia gravis ([facility] [address] 2022    Palliative care patient 2022    Retention of urine        Past Surgical History:   Procedure Laterality Date    EYE SURGERY      MUSCLE WEAKNESS    GASTRECTOMY      PARTIAL FOR ULCER REPAIR    LUNG CANCER SURGERY Left     LLL LOBECTOMY    OTHER SURGICAL HISTORY      I & D OF GROIN INFECTION    TOTAL KNEE ARTHROPLASTY Right 2017    KNEE TOTAL ARTHROPLASTY performed by Vivian Logan MD at [facility] OR       Family History   Problem Relation Age of Onset    Diabetes Mother     Stroke Mother     Heart Attack Father     Cancer Father         ASBESTOS       Social History     Socioeconomic History    Marital status:       Spouse name: Not on file    Number of children: Not on file    Years of education: Not on file    Highest education level: Not on file   Occupational History    Not on file   Tobacco Use    Smoking status: Former     Types: Cigarettes     Quit date: 1984     Years since quittin.7    Smokeless tobacco: Never   Vaping Use    Vaping Use: Never used   Substance and Sexual Activity    Alcohol use: No    Drug use: No    Sexual activity: Not on file   Other Topics Concern    Not on file   Social History Narrative    Not on file     Social Determinants of Health     Financial Resource Strain: Not on file   Food Insecurity: Not on file   Transportation Needs: Not on file   Physical Activity: Not on file   Stress: Not on file   Social Connections: Not on file   Intimate Partner Violence: Not on file   Housing Stability: Not on file       Current Facility-Administered Medications   Medication Dose Route Frequency Provider Last Rate Last Admin    lactated ringers infusion   IntraVENous Continuous Stefania Garcia  mL/hr at 12/30/22 1128 New Bag at 12/30/22 1128    0.9 % sodium chloride infusion   IntraVENous Once Stefania Garcia MD        propranolol (INDERAL) tablet 10 mg  10 mg Oral TID Afshan Mai MD   10 mg at 12/30/22 1245    pyridostigmine (MESTINON) tablet 60 mg  60 mg Oral 4 times per day Afshan Mai MD   60 mg at 12/30/22 1245    methylPREDNISolone sodium (SOLU-MEDROL) injection 250 mg  250 mg IntraVENous Q12H Afshan Mai MD   250 mg at 12/30/22 0422    magic butt cream   Topical Q4H PRN Lara Grosser, DO   Given at 12/25/22 1515    oxyCODONE-acetaminophen (PERCOCET) 5-325 MG per tablet 1 tablet  1 tablet Oral Q4H PRN Patience Dennis, DO   1 tablet at 12/29/22 2157    sodium chloride tablet 1 g  1 g Oral TID WC Stefania Garcia MD   1 g at 12/29/22 1633    alfuzosin (UROXATRAL) extended release tablet 10 mg  10 mg Oral Daily Patience Dennis, DO   10 mg at 12/30/22 1244    aspirin EC tablet 81 mg  81 mg Oral BID Patience Dennis, DO   81 mg at 12/30/22 1244    buPROPion (WELLBUTRIN XL) extended release tablet 150 mg  150 mg Oral Daily Patience Dennis, DO   150 mg at 12/30/22 1245    [Held by provider] clonazePAM (KLONOPIN) disintegrating tablet 0.25 mg  0.25 mg Oral Daily Patience Dennis, DO   0.25 mg at 12/29/22 0857    docusate sodium (COLACE) capsule 100 mg  100 mg Oral Daily Patience Dennis, DO   100 mg at 12/30/22 1244    fluticasone (FLONASE) 50 MCG/ACT nasal spray 2 spray  2 spray Each Nostril Daily Patience Dennis, DO   2 spray at 36/33/44 0271    folic acid-pyridoxine-cyancobalamin (FOLTX) 2.5-25-2 MG TABS 1 tablet  1 tablet Oral Daily Patience DennisDO cheikh   1 tablet at 12/30/22 1245    PreserVision AREDS 2+Multi Vit CAPS 2 capsule (Patient Supplied)  2 capsule Oral Daily Patience Dennis, DO        pantoprazole (PROTONIX) tablet 40 mg  40 mg Oral QAM AC Patience Dennis, DO   40 mg at 12/30/22 0610    potassium chloride (KLOR-CON M) extended release tablet 10 mEq  10 mEq Oral Once per day on Mon Wed Fri Patience lAden Dietz, DO   10 mEq at 12/30/22 1244    [Held by provider] amLODIPine (NORVASC) tablet 2.5 mg  2.5 mg Oral Daily Patience Dennis, DO   2.5 mg at 12/29/22 0857    And    [Held by provider] lisinopril (PRINIVIL;ZESTRIL) tablet 10 mg  10 mg Oral Daily Patience Dennis, DO   10 mg at 12/29/22 0857    atorvastatin (LIPITOR) tablet 10 mg  10 mg Oral Nightly Patience Dennis, DO   10 mg at 12/29/22 2157    diphenhydrAMINE (BENADRYL) tablet 25 mg  25 mg Oral Nightly PRN Patience Dennis, DO   25 mg at 12/29/22 2157    And    acetaminophen (TYLENOL) tablet 500 mg  500 mg Oral Nightly PRN Patience Dennis, DO   500 mg at 12/27/22 2301    Vitamin D (CHOLECALCIFEROL) tablet 2,000 Units  2,000 Units Oral Daily Anna Marie Smith MD   2,000 Units at 12/30/22 1244    vitamin D (ERGOCALCIFEROL) capsule 50,000 Units  50,000 Units Oral Weekly Anna Marie Smith MD   50,000 Units at 12/24/22 0910    acetaminophen (TYLENOL) tablet 650 mg  650 mg Oral Q6H PRN Anna Marie Smith MD   650 mg at 12/27/22 1845    guaiFENesin tablet 400 mg  400 mg Oral Q8H Anna Marie Smith MD   400 mg at 12/30/22 1244    ascorbic acid (VITAMIN C) tablet 500 mg  500 mg Oral BID Anna Marie Smith MD   500 mg at 12/30/22 1244    albuterol sulfate HFA (PROVENTIL;VENTOLIN;PROAIR) 108 (90 Base) MCG/ACT inhaler 2 puff  2 puff Inhalation Q4H Anna Marie Smith MD   2 puff at 12/30/22 1243    ipratropium (ATROVENT HFA) 17 MCG/ACT inhaler 2 puff  2 puff Inhalation Q4H Anna Marie Smith MD   2 puff at 12/30/22 1242    hydrALAZINE (APRESOLINE) injection 10 mg  10 mg IntraVENous Q6H PRN Anna Marie Smith MD        labetalol (NORMODYNE;TRANDATE) injection 20 mg  20 mg IntraVENous Q4H PRN Anna Marie Smith MD        enoxaparin Sodium (LOVENOX) injection 30 mg  30 mg SubCUTAneous BID Yanely Donis MD   30 mg at 12/30/22 1244    zinc sulfate (ZINCATE) capsule 50 mg  50 mg Oral Daily Yanely Donis MD   50 mg at 12/30/22 1244         lactated ringers 100 mL/hr at 12/30/22 1128    sodium chloride          Objective:   BP (!) 144/80   Pulse 77   Temp 96.9 °F (36.1 °C) (Temporal)   Resp 16   Ht 6' (1.829 m)   Wt 201 lb (91.2 kg)   SpO2 94%   BMI 27.26 kg/m²     In an effort to reduce COVID-19 exposure, patient seen from doorway and case discussed in detail with unit staff    Recent Labs     12/28/22 0135 12/29/22  0736 12/30/22  0324   WBC 14.8* 12.8* 13.2*   RBC 3.98* 3.60* 3.37*   HGB 13.0* 12.1* 11.1*   HCT 37.9* 34.5* 32.4*   MCV 95.2* 95.8* 96.1*   MCH 32.7* 33.6* 32.9*   MCHC 34.3 35.1 34.3    250 233     Recent Labs     12/28/22 0135 12/29/22  0736 12/30/22  0324   * 130* 132*   K 4.3 4.4 4.6   ANIONGAP 11 6* 9   CL 87* 92* 96*   CO2 29 32* 27   BUN 31* 26* 23   CREATININE 1.2 1.0 0.9   GLUCOSE 133* 120* 149*   CALCIUM 8.6* 8.3* 8.3*     Recent Labs     12/28/22 0135 12/29/22  0736 12/30/22  0324   MG 2.0 1.9 1.8     No results for input(s): AST, ALT, ALB, BILITOT, ALKPHOS, ALB in the last 72 hours. No results for input(s): PH, PO2, PCO2, HCO3, BE, O2SAT in the last 72 hours. No results for input(s): TROPONINI in the last 72 hours. No results for input(s): INR in the last 72 hours. No results for input(s): LACTA in the last 72 hours. Intake/Output Summary (Last 24 hours) at 12/30/2022 1427  Last data filed at 12/30/2022 0619  Gross per 24 hour   Intake 994.02 ml   Output 400 ml   Net 594.02 ml       No results found.      Assessment and Plan:   COVID-19 pneumonia  COPD exacerbation  Acute hypoxemic respiratory failure  Hyponatremia  History of myasthenia gravis     Steroids  S/p Tocilizumab  Adjuvant therapy  Completed course of empiric antibiotics  Encouraged self proning  Nebs  Currently on 2 L NC, decreased requirement overall  Asymptomatic hyponatremia  Serum/urine studies reviewed  Follow sodium continues to improve  Neurology following myasthenia gravis  Social work following for discharge planning  Lovenox for DVT prophylaxis  Discussed treatment plan with patient/RN/social work     Ely Quintana MD   12/30/2022 2:27 PM

## 2022-12-30 NOTE — PROGRESS NOTES
Comprehensive Nutrition Assessment    Type and Reason for Visit:  Initial, RD Nutrition Re-Screen/LOS    Nutrition Recommendations/Plan:   Continue current POC     Malnutrition Assessment:  Malnutrition Status:  No malnutrition (12/30/22 1412)    Context:  Acute Illness     Findings of the 6 clinical characteristics of malnutrition:  Energy Intake:  Mild decrease in energy intake (Comment)  Weight Loss:  No significant weight loss     Body Fat Loss:  No significant body fat loss     Muscle Mass Loss:  No significant muscle mass loss    Fluid Accumulation:  No significant fluid accumulation Extremities   Strength:  Not Performed    Nutrition Assessment:    Following patint for LOS x 6 days. PO intake is slowly improving. Intake is now ranging %. Glucose 120-149, but is receiving Solumedrol    Nutrition Related Findings:    adequately nourished Wound Type: None       Current Nutrition Intake & Therapies:    Average Meal Intake: 51-75%, %  Average Supplements Intake: None Ordered  ADULT DIET; Regular; Low Fat/Low Chol/High Fiber/ALEX; 60 to 80 gm; Safety Tray; Safety Tray (Disposables); DOES NOT LIKE CHICKEN, TURKEY, FIST, OR CHILI. (Does like pork and beef)    Anthropometric Measures:  Height: 6' (182.9 cm)  Ideal Body Weight (IBW): 178 lbs (81 kg)    Admission Body Weight: 205 lb (93 kg)  Current Body Weight: 201 lb (91.2 kg), 112.9 % IBW. Current BMI (kg/m2): 27.3  Usual Body Weight: 210 lb 5 oz (95.4 kg) (11/2022)  % Weight Change (Calculated): -4.4  Weight Adjustment For: No Adjustment  BMI Categories: Overweight (BMI 25.0-29. 9)    Nutrition Diagnosis:   Altered nutrition-related lab values related to endocrine dysfuntion (medication) as evidenced by lab values    Nutrition Interventions:   Food and/or Nutrient Delivery: Continue Current Diet  Nutrition Education/Counseling: No recommendation at this time  Coordination of Nutrition Care: Continue to monitor while inpatient       Goals: Goals:  Meet at least 75% of estimated needs, PO intake 50% or greater       Nutrition Monitoring and Evaluation:   Behavioral-Environmental Outcomes: None Identified  Food/Nutrient Intake Outcomes: Food and Nutrient Intake  Physical Signs/Symptoms Outcomes: Biochemical Data, Fluid Status or Edema, Weight, Skin    Discharge Planning:    Continue current diet     Monika Barnes MS, RD, LD  Contact: [phone]

## 2022-12-30 NOTE — PROGRESS NOTES
Physical Therapy  Name: Maria Ines Patel  MRN:  [id number]  Date of service:  12/30/2022 12/30/22 1518   Restrictions/Precautions   Restrictions/Precautions Isolation; Fall Risk   Subjective   Subjective I would like to get up again. Oxygen Therapy   O2 Device Nasal cannula   O2 Flow Rate (L/min) 1 L/min   Bed Mobility   Supine to Sit Contact guard assistance   Transfers   Sit to Stand Contact guard assistance   Stand to Sit Contact guard assistance   Comment REQUIRED VC'S FOR PROPER TECHNIQUE WITH SIT TO STAND. pt REPORTS SIT TO STAND CAN BE DIFFICULT AT TIMES FOR HIM AT HOME   Ambulation   Surface Level tile   Device Rolling Walker   Other Apparatus O2   Assistance Contact guard assistance   Distance 5 feet   Exercises   Hip Flexion standing marching  2 sets of 10 reps   Short Term Goals   Time Frame for Short Term Goals 2 WKS   Short Term Goal 1 AMB 75 FT WITH RW, CGA   Conditions Requiring Skilled Therapeutic Intervention   Body Structures, Functions, Activity Limitations Requiring Skilled Therapeutic Intervention Decreased functional mobility ; Decreased strength;Decreased endurance;Decreased posture   Assessment After standing marching patient transfered to chair. He stated he couldn't catch his breath. O2 stats were in the low 90's and never went any lower. Nursing arrived to give inhaler and pateint began to feel some better. Verbal cues through to slow breathing. He reports a fear of suffacation from a previous incident. Discharge Recommendations Continue to assess pending progress   Activity Tolerance   Activity Tolerance Patient limited by endurance; Patient limited by fatigue;Treatment limited secondary to agitation;Treatment limited secondary to medical complications   Physcial Therapy Plan   General Plan 5-7 times per week   Therapy Duration 2 Weeks   PT Plan of Care   Friday X   Safety Devices   Type of Devices Call light within reach; Left in chair;Nurse notified       Electronically signed by Brenton Bartholomew, PTA on 12/30/2022 at 4:35 PM

## 2022-12-31 LAB
ANION GAP SERPL CALCULATED.3IONS-SCNC: 8 MMOL/L (ref 7–19)
BASOPHILS ABSOLUTE: 0 K/UL (ref 0–0.2)
BASOPHILS RELATIVE PERCENT: 0.1 % (ref 0–1)
BUN BLDV-MCNC: 16 MG/DL (ref 8–23)
CALCIUM SERPL-MCNC: 8 MG/DL (ref 8.8–10.2)
CHLORIDE BLD-SCNC: 96 MMOL/L (ref 98–111)
CO2: 27 MMOL/L (ref 22–29)
CREAT SERPL-MCNC: 0.7 MG/DL (ref 0.5–1.2)
EOSINOPHILS ABSOLUTE: 0 K/UL (ref 0–0.6)
EOSINOPHILS RELATIVE PERCENT: 0 % (ref 0–5)
GFR SERPL CREATININE-BSD FRML MDRD: >60 ML/MIN/{1.73_M2}
GLUCOSE BLD-MCNC: 147 MG/DL (ref 74–109)
HCT VFR BLD CALC: 30.5 % (ref 42–52)
HEMOGLOBIN: 9.7 G/DL (ref 14–18)
IMMATURE GRANULOCYTES #: 0.2 K/UL
LYMPHOCYTES ABSOLUTE: 0.6 K/UL (ref 1.1–4.5)
LYMPHOCYTES RELATIVE PERCENT: 3.6 % (ref 20–40)
MAGNESIUM: 1.9 MG/DL (ref 1.6–2.4)
MCH RBC QN AUTO: 32.3 PG (ref 27–31)
MCHC RBC AUTO-ENTMCNC: 31.8 G/DL (ref 33–37)
MCV RBC AUTO: 101.7 FL (ref 80–94)
MONOCYTES ABSOLUTE: 0.3 K/UL (ref 0–0.9)
MONOCYTES RELATIVE PERCENT: 1.7 % (ref 0–10)
NEUTROPHILS ABSOLUTE: 14.3 K/UL (ref 1.5–7.5)
NEUTROPHILS RELATIVE PERCENT: 93.6 % (ref 50–65)
PDW BLD-RTO: 12.9 % (ref 11.5–14.5)
PLATELET # BLD: 216 K/UL (ref 130–400)
PMV BLD AUTO: 9.2 FL (ref 9.4–12.4)
POTASSIUM SERPL-SCNC: 4.3 MMOL/L (ref 3.5–5)
RBC # BLD: 3 M/UL (ref 4.7–6.1)
SODIUM BLD-SCNC: 131 MMOL/L (ref 136–145)
WBC # BLD: 15.3 K/UL (ref 4.8–10.8)

## 2022-12-31 PROCEDURE — 6370000000 HC RX 637 (ALT 250 FOR IP): Performed by: INTERNAL MEDICINE

## 2022-12-31 PROCEDURE — 80048 BASIC METABOLIC PNL TOTAL CA: CPT

## 2022-12-31 PROCEDURE — 1210000000 HC MED SURG R&B

## 2022-12-31 PROCEDURE — 83735 ASSAY OF MAGNESIUM: CPT

## 2022-12-31 PROCEDURE — 36415 COLL VENOUS BLD VENIPUNCTURE: CPT

## 2022-12-31 PROCEDURE — 94760 N-INVAS EAR/PLS OXIMETRY 1: CPT

## 2022-12-31 PROCEDURE — 85025 COMPLETE CBC W/AUTO DIFF WBC: CPT

## 2022-12-31 PROCEDURE — 6370000000 HC RX 637 (ALT 250 FOR IP): Performed by: PSYCHIATRY & NEUROLOGY

## 2022-12-31 PROCEDURE — 2700000000 HC OXYGEN THERAPY PER DAY

## 2022-12-31 PROCEDURE — 94761 N-INVAS EAR/PLS OXIMETRY MLT: CPT

## 2022-12-31 PROCEDURE — 6360000002 HC RX W HCPCS: Performed by: INTERNAL MEDICINE

## 2022-12-31 PROCEDURE — 6360000002 HC RX W HCPCS: Performed by: PSYCHIATRY & NEUROLOGY

## 2022-12-31 RX ORDER — PREDNISONE 20 MG/1
40 TABLET ORAL DAILY
Status: DISCONTINUED | OUTPATIENT
Start: 2022-12-31 | End: 2023-01-01 | Stop reason: HOSPADM

## 2022-12-31 RX ORDER — SODIUM PHOSPHATE, DIBASIC AND SODIUM PHOSPHATE, MONOBASIC 7; 19 G/133ML; G/133ML
1 ENEMA RECTAL
Status: ACTIVE | OUTPATIENT
Start: 2022-12-31 | End: 2023-01-01

## 2022-12-31 RX ORDER — SENNA PLUS 8.6 MG/1
1 TABLET ORAL NIGHTLY
Status: DISCONTINUED | OUTPATIENT
Start: 2022-12-31 | End: 2023-01-01 | Stop reason: HOSPADM

## 2022-12-31 RX ADMIN — OXYCODONE HYDROCHLORIDE AND ACETAMINOPHEN 500 MG: 500 TABLET ORAL at 21:42

## 2022-12-31 RX ADMIN — DOCUSATE SODIUM 100 MG: 100 CAPSULE, LIQUID FILLED ORAL at 08:32

## 2022-12-31 RX ADMIN — OXYCODONE HYDROCHLORIDE AND ACETAMINOPHEN 500 MG: 500 TABLET ORAL at 08:33

## 2022-12-31 RX ADMIN — ALBUTEROL SULFATE 2 PUFF: 90 AEROSOL, METERED RESPIRATORY (INHALATION) at 00:55

## 2022-12-31 RX ADMIN — SENNOSIDES 8.6 MG: 8.6 TABLET, FILM COATED ORAL at 09:27

## 2022-12-31 RX ADMIN — IPRATROPIUM BROMIDE 2 PUFF: 17 AEROSOL, METERED RESPIRATORY (INHALATION) at 05:04

## 2022-12-31 RX ADMIN — GUAIFENESIN 400 MG: 200 TABLET ORAL at 00:55

## 2022-12-31 RX ADMIN — FLUTICASONE PROPIONATE 2 SPRAY: 50 SPRAY, METERED NASAL at 08:31

## 2022-12-31 RX ADMIN — DIPHENHYDRAMINE HYDROCHLORIDE 25 MG: 25 TABLET ORAL at 21:55

## 2022-12-31 RX ADMIN — PANTOPRAZOLE SODIUM 40 MG: 40 TABLET, DELAYED RELEASE ORAL at 06:20

## 2022-12-31 RX ADMIN — METHYLPREDNISOLONE SODIUM SUCCINATE 250 MG: 125 INJECTION, POWDER, FOR SOLUTION INTRAMUSCULAR; INTRAVENOUS at 05:03

## 2022-12-31 RX ADMIN — SODIUM CHLORIDE 1 G: 1 TABLET ORAL at 08:32

## 2022-12-31 RX ADMIN — BUPROPION HYDROCHLORIDE 150 MG: 150 TABLET, EXTENDED RELEASE ORAL at 08:33

## 2022-12-31 RX ADMIN — ALFUZOSIN HYDROCHLORIDE 10 MG: 10 TABLET, FILM COATED, EXTENDED RELEASE ORAL at 08:33

## 2022-12-31 RX ADMIN — PYRIDOSTIGMINE BROMIDE 60 MG: 60 TABLET ORAL at 06:19

## 2022-12-31 RX ADMIN — GUAIFENESIN 400 MG: 200 TABLET ORAL at 17:23

## 2022-12-31 RX ADMIN — SODIUM CHLORIDE 1 G: 1 TABLET ORAL at 14:09

## 2022-12-31 RX ADMIN — IPRATROPIUM BROMIDE 2 PUFF: 17 AEROSOL, METERED RESPIRATORY (INHALATION) at 08:31

## 2022-12-31 RX ADMIN — ALBUTEROL SULFATE 2 PUFF: 90 AEROSOL, METERED RESPIRATORY (INHALATION) at 08:31

## 2022-12-31 RX ADMIN — IPRATROPIUM BROMIDE 2 PUFF: 17 AEROSOL, METERED RESPIRATORY (INHALATION) at 00:55

## 2022-12-31 RX ADMIN — PREDNISONE 40 MG: 20 TABLET ORAL at 08:33

## 2022-12-31 RX ADMIN — PYRIDOSTIGMINE BROMIDE 60 MG: 60 TABLET ORAL at 14:09

## 2022-12-31 RX ADMIN — ALBUTEROL SULFATE 2 PUFF: 90 AEROSOL, METERED RESPIRATORY (INHALATION) at 14:10

## 2022-12-31 RX ADMIN — ATORVASTATIN CALCIUM 10 MG: 10 TABLET, FILM COATED ORAL at 21:41

## 2022-12-31 RX ADMIN — Medication 2000 UNITS: at 08:33

## 2022-12-31 RX ADMIN — SODIUM CHLORIDE 1 G: 1 TABLET ORAL at 17:23

## 2022-12-31 RX ADMIN — ACETAMINOPHEN 500 MG: 500 TABLET ORAL at 21:55

## 2022-12-31 RX ADMIN — PYRIDOSTIGMINE BROMIDE 60 MG: 60 TABLET ORAL at 17:23

## 2022-12-31 RX ADMIN — GUAIFENESIN 400 MG: 200 TABLET ORAL at 08:32

## 2022-12-31 RX ADMIN — ENOXAPARIN SODIUM 30 MG: 100 INJECTION SUBCUTANEOUS at 21:48

## 2022-12-31 RX ADMIN — PYRIDOSTIGMINE BROMIDE 60 MG: 60 TABLET ORAL at 00:55

## 2022-12-31 RX ADMIN — ALBUTEROL SULFATE 2 PUFF: 90 AEROSOL, METERED RESPIRATORY (INHALATION) at 21:49

## 2022-12-31 RX ADMIN — IPRATROPIUM BROMIDE 2 PUFF: 17 AEROSOL, METERED RESPIRATORY (INHALATION) at 14:10

## 2022-12-31 RX ADMIN — ENOXAPARIN SODIUM 30 MG: 100 INJECTION SUBCUTANEOUS at 08:32

## 2022-12-31 RX ADMIN — ALBUTEROL SULFATE 2 PUFF: 90 AEROSOL, METERED RESPIRATORY (INHALATION) at 05:04

## 2022-12-31 RX ADMIN — Medication 1 TABLET: at 08:33

## 2022-12-31 RX ADMIN — ASPIRIN 81 MG: 81 TABLET, COATED ORAL at 21:41

## 2022-12-31 RX ADMIN — ASPIRIN 81 MG: 81 TABLET, COATED ORAL at 08:32

## 2022-12-31 RX ADMIN — ZINC SULFATE 220 MG (50 MG) CAPSULE 50 MG: CAPSULE at 08:33

## 2022-12-31 RX ADMIN — ALBUTEROL SULFATE 2 PUFF: 90 AEROSOL, METERED RESPIRATORY (INHALATION) at 17:23

## 2022-12-31 RX ADMIN — IPRATROPIUM BROMIDE 2 PUFF: 17 AEROSOL, METERED RESPIRATORY (INHALATION) at 17:23

## 2022-12-31 RX ADMIN — IPRATROPIUM BROMIDE 2 PUFF: 17 AEROSOL, METERED RESPIRATORY (INHALATION) at 21:49

## 2022-12-31 RX ADMIN — ERGOCALCIFEROL 50000 UNITS: 1.25 CAPSULE ORAL at 09:27

## 2022-12-31 NOTE — PROGRESS NOTES
Hospitalist Progress Note  [facility]     Patient: Clive Alexander  : 1940  MRN: [id number]  Code Status: Full Code    Hospital Day: 7   Date of Service: 2022    Subjective:   Patient seen in COVID-19 unit. No current complaints. Past Medical History:   Diagnosis Date    Arthritis     Cancer ([address]     HX LUNG CA    Emphysema/COPD ([address]     MILD/FORMER SMOKER    Hyperlipidemia     Hypertension     Macular degeneration     Myasthenia gravis ([facility] [address] 2022    Palliative care patient 2022    Retention of urine        Past Surgical History:   Procedure Laterality Date    EYE SURGERY      MUSCLE WEAKNESS    GASTRECTOMY      PARTIAL FOR ULCER REPAIR    LUNG CANCER SURGERY Left     LLL LOBECTOMY    OTHER SURGICAL HISTORY      I & D OF GROIN INFECTION    TOTAL KNEE ARTHROPLASTY Right 2017    KNEE TOTAL ARTHROPLASTY performed by Harvinder Boland MD at [facility] OR       Family History   Problem Relation Age of Onset    Diabetes Mother     Stroke Mother     Heart Attack Father     Cancer Father         ASBESTOS       Social History     Socioeconomic History    Marital status:       Spouse name: Not on file    Number of children: Not on file    Years of education: Not on file    Highest education level: Not on file   Occupational History    Not on file   Tobacco Use    Smoking status: Former     Types: Cigarettes     Quit date: 1984     Years since quittin.7    Smokeless tobacco: Never   Vaping Use    Vaping Use: Never used   Substance and Sexual Activity    Alcohol use: No    Drug use: No    Sexual activity: Not on file   Other Topics Concern    Not on file   Social History Narrative    Not on file     Social Determinants of Health     Financial Resource Strain: Not on file   Food Insecurity: Not on file   Transportation Needs: Not on file   Physical Activity: Not on file   Stress: Not on file   Social Connections: Not on file   Intimate Partner Violence: Not on file   Housing Stability: Not on file       Current Facility-Administered Medications   Medication Dose Route Frequency Provider Last Rate Last Admin    predniSONE (DELTASONE) tablet 40 mg  40 mg Oral Daily Merissa Kruse MD   40 mg at 12/31/22 8777    senna (SENOKOT) tablet 8.6 mg  1 tablet Oral Nightly Jero Hall MD   8.6 mg at 12/31/22 5206    sodium phosphate (FLEET) rectal enema 1 enema  1 enema Rectal Once PRN Jero Hall MD        lactated ringers infusion   IntraVENous Continuous Jero Hall MD 50 mL/hr at 12/30/22 2117 New Bag at 12/30/22 2117    pyridostigmine (MESTINON) tablet 60 mg  60 mg Oral 4 times per day Merissa Kruse MD   60 mg at 12/31/22 9420    magic butt cream   Topical Q4H PRN Tatiannalottie Mckeoner, DO   Given at 12/25/22 1515    oxyCODONE-acetaminophen (PERCOCET) 5-325 MG per tablet 1 tablet  1 tablet Oral Q4H PRN Patience Dennis, DO   1 tablet at 12/29/22 2157    sodium chloride tablet 1 g  1 g Oral TID WC Jero Hall MD   1 g at 12/31/22 9644    alfuzosin (UROXATRAL) extended release tablet 10 mg  10 mg Oral Daily Patience Dennis, DO   10 mg at 12/31/22 6070    aspirin EC tablet 81 mg  81 mg Oral BID Patience Dennis, DO   81 mg at 12/31/22 0425    buPROPion (WELLBUTRIN XL) extended release tablet 150 mg  150 mg Oral Daily Patience Dennis, DO   150 mg at 12/31/22 9242    [Held by provider] clonazePAM (KLONOPIN) disintegrating tablet 0.25 mg  0.25 mg Oral Daily Patience Dennis, DO   0.25 mg at 12/29/22 0857    docusate sodium (COLACE) capsule 100 mg  100 mg Oral Daily Patience Dennis, DO   100 mg at 12/31/22 0832    fluticasone (FLONASE) 50 MCG/ACT nasal spray 2 spray  2 spray Each Nostril Daily Patience Dennis, DO   2 spray at 86/36/75 3578    folic acid-pyridoxine-cyancobalamin (FOLTX) 2.5-25-2 MG TABS 1 tablet  1 tablet Oral Daily Patience DO Dennis   1 tablet at 12/31/22 9056    PreserVision AREDS 2+Multi Vit CAPS 2 capsule (Patient Supplied)  2 capsule Oral Daily Patience Topher Lazo, DO        pantoprazole (PROTONIX) tablet 40 mg  40 mg Oral QAM AC Patience Dennis, DO   40 mg at 12/31/22 1696    potassium chloride (KLOR-CON M) extended release tablet 10 mEq  10 mEq Oral Once per day on Mon Wed Fri Patience Rebecca Patel, DO   10 mEq at 12/30/22 1244    [Held by provider] amLODIPine (NORVASC) tablet 2.5 mg  2.5 mg Oral Daily Patience Dennis, DO   2.5 mg at 12/29/22 0857    And    [Held by provider] lisinopril (PRINIVIL;ZESTRIL) tablet 10 mg  10 mg Oral Daily Patience Dennis, DO   10 mg at 12/29/22 0857    atorvastatin (LIPITOR) tablet 10 mg  10 mg Oral Nightly Patience Dennis, DO   10 mg at 12/30/22 2110    diphenhydrAMINE (BENADRYL) tablet 25 mg  25 mg Oral Nightly PRN Patience Dennis, DO   25 mg at 12/30/22 2117    And    acetaminophen (TYLENOL) tablet 500 mg  500 mg Oral Nightly PRN Patience Dennis, DO   500 mg at 12/27/22 2301    Vitamin D (CHOLECALCIFEROL) tablet 2,000 Units  2,000 Units Oral Daily Ely Quintana MD   2,000 Units at 12/31/22 8729    vitamin D (ERGOCALCIFEROL) capsule 50,000 Units  50,000 Units Oral Weekly Ely Quintana MD   50,000 Units at 12/31/22 6964    acetaminophen (TYLENOL) tablet 650 mg  650 mg Oral Q6H PRN Ely Quintana MD   650 mg at 12/30/22 2117    guaiFENesin tablet 400 mg  400 mg Oral Q8H Ely Quintana MD   400 mg at 12/31/22 6830    ascorbic acid (VITAMIN C) tablet 500 mg  500 mg Oral BID Ely Quintana MD   500 mg at 12/31/22 0833    albuterol sulfate HFA (PROVENTIL;VENTOLIN;PROAIR) 108 (90 Base) MCG/ACT inhaler 2 puff  2 puff Inhalation Q4H Ely Quintana MD   2 puff at 12/31/22 0831    ipratropium (ATROVENT HFA) 17 MCG/ACT inhaler 2 puff  2 puff Inhalation Q4H Ely Quintana MD   2 puff at 12/31/22 0831    hydrALAZINE (APRESOLINE) injection 10 mg  10 mg IntraVENous Q6H PRN Ely Quintana MD        labetalol (NORMODYNE;TRANDATE) injection 20 mg  20 mg IntraVENous Q4H PRN Ely Quintana MD        enoxaparin Sodium (LOVENOX) injection 30 mg  30 mg SubCUTAneous BID Angélica López MD   30 mg at 12/31/22 7528    zinc sulfate (ZINCATE) capsule 50 mg  50 mg Oral Daily Angélica López MD   50 mg at 12/31/22 0282         lactated ringers 50 mL/hr at 12/30/22 2117        Objective:   BP (!) 118/59   Pulse 82   Temp 97.1 °F (36.2 °C) (Temporal)   Resp 18   Ht 6' (1.829 m)   Wt 201 lb (91.2 kg)   SpO2 95%   BMI 27.26 kg/m²     In an effort to reduce COVID-19 exposure, patient seen from doorway and case discussed in detail with unit staff    Recent Labs     12/29/22  0736 12/30/22  0324 12/31/22  0302   WBC 12.8* 13.2* 15.3*   RBC 3.60* 3.37* 3.00*   HGB 12.1* 11.1* 9.7*   HCT 34.5* 32.4* 30.5*   MCV 95.8* 96.1* 101.7*   MCH 33.6* 32.9* 32.3*   MCHC 35.1 34.3 31.8*    233 216     Recent Labs     12/29/22  0736 12/30/22  0324 12/31/22  0302   * 132* 131*   K 4.4 4.6 4.3   ANIONGAP 6* 9 8   CL 92* 96* 96*   CO2 32* 27 27   BUN 26* 23 16   CREATININE 1.0 0.9 0.7   GLUCOSE 120* 149* 147*   CALCIUM 8.3* 8.3* 8.0*     Recent Labs     12/29/22  0736 12/30/22  0324 12/31/22  0302   MG 1.9 1.8 1.9     No results for input(s): AST, ALT, ALB, BILITOT, ALKPHOS, ALB in the last 72 hours. No results for input(s): PH, PO2, PCO2, HCO3, BE, O2SAT in the last 72 hours. No results for input(s): TROPONINI in the last 72 hours. No results for input(s): INR in the last 72 hours. No results for input(s): LACTA in the last 72 hours. Intake/Output Summary (Last 24 hours) at 12/31/2022 1324  Last data filed at 12/31/2022 0930  Gross per 24 hour   Intake 2425 ml   Output 201 ml   Net 2224 ml       No results found.      Assessment and Plan:   COVID-19 pneumonia  COPD exacerbation  Acute hypoxemic respiratory failure  Hyponatremia  History of myasthenia gravis     Steroids  S/p Tocilizumab  Adjuvant therapy  Completed course of empiric antibiotics  Encouraged self proning  Nebs  Currently on 1 L NC, decreased requirement overall  Asymptomatic hyponatremia improved  Serum/urine studies reviewed  Neurology following myasthenia gravis  Social work following for discharge planning  Lovenox for DVT prophylaxis  Discussed treatment plan with patient/RN/social work    Suellen Qureshi MD   12/31/2022 1:24 PM

## 2022-12-31 NOTE — PROGRESS NOTES
Patient:   Katina Espinoza  MR#:    [id number]   Room:    [facility]-   YOB: 1940  Date of Progress Note: 12/31/2022  Time of Note                           7:56 AM  Consulting Physician:   Ritu Littlejohn M.D. Attending Physician:  Akash Alvarado MD     Chief complaint: Worsening generalized weakness and double vision    S:This 79 y.o. male who was admitted with a COPD exacerbation and COVID-pneumonia. He has recently been diagnosed with myasthenia gravis. He ran out of his medications a few weeks prior to admission. No double vision noted this morning. No changes overnight. Had been on doxycycline for a few days. no c/o today    REVIEW OF SYSTEMS:  Constitutional: No fevers No chills  Neck:No stiffness  Respiratory: No shortness of breath  Cardiovascular: No chest pain No palpitations  Gastrointestinal: No abdominal pain    Genitourinary: No Dysuria  Neurological: No headache, no confusion      PHYSICAL EXAM:  BP (!) 150/75   Pulse 61   Temp 98.3 °F (36.8 °C)   Resp 18   Ht 6' (1.829 m)   Wt 201 lb (91.2 kg)   SpO2 93%   BMI 27.26 kg/m²     Constitutional: he appears well-developed and well-nourished. Eyes - conjunctiva normal.  Pupils react to light  Ear, nose, throat -hearing intact to voice. No scars, masses, or lesions over external nose or ears, no atrophy of tongue  Neck-symmetric, no masses noted, no jugular vein distension  Respiration- chest wall appears symmetric, good expansion,   normal effort without use of accessory muscles  Cardiovascular- RRR  Musculoskeletal - no significant wasting of muscles noted, no bony deformities, gait no gross ataxia  Extremities-no clubbing, cyanosis or edema  Skin - warm, dry, and intact. No rash, erythema, or pallor.   Psychiatric - mood, affect, and behavior appear normal.      Neurology  NEUROLOGICAL EXAM:      Mental status   Awake, alert, fluent oriented x 3 appropriate affect  Attention and concentration appear appropriate  Recent and remote memory appears unremarkable  Speech normal without dysarthria  No clear issues with language       Cranial Nerves   CN II- Visual fields grossly unremarkable  CN III, IV,VI-EOMI, No nystagmus, conjugate eye movements, no ptosis  CN VII-no facial asymmetry  CN VIII-Hearing intact   CN IX and X- Palate elevates in midline  CN XI-good shoulder shrug  CN XII-Tongue midline with no fasciculations or fibrillations          Motor function  Antigravity x 4. Mild weakness proximal upper extremities       Nursing/pcp notes, imaging,labs and vitals reviewed. Lab Results   Component Value Date    WBC 15.3 (H) 12/31/2022    HGB 9.7 (L) 12/31/2022    HCT 30.5 (L) 12/31/2022    .7 (H) 12/31/2022     12/31/2022     Lab Results   Component Value Date     (L) 12/31/2022    K 4.3 12/31/2022    CL 96 (L) 12/31/2022    CO2 27 12/31/2022    BUN 16 12/31/2022    CREATININE 0.7 12/31/2022    GLUCOSE 147 (H) 12/31/2022    CALCIUM 8.0 (L) 12/31/2022    PROT 6.6 12/24/2022    LABALBU 3.8 12/24/2022    BILITOT 0.7 12/24/2022    ALKPHOS 74 12/24/2022    AST 20 12/24/2022    ALT 21 12/24/2022    LABGLOM >60 12/31/2022    GFRAA >60 07/05/2022     Lab Results   Component Value Date    INR 1.10 04/27/2017     Lab Results   Component Value Date    CRP 2.33 (H) 12/24/2022       PT,OT and/or speech notes reviewed:      RECORD REVIEW: Previous medical records, medications were reviewed at today's visit    IMPRESSION:   Myasthenia gravis with exacerbation related to COVID-pneumonia and possibly due to doxycycline/continual use of beta-blocker. Mestinon and steroids to continue. Changed solumedrol to prednisone 40mg qam.  Discontinued propanolol. Avoid tetracycline and fluoroquinolones. PT/OT  Generalized weakness likely r/t age and covid. Diplopia resolved. Ok to go to [facility]. F/u with me 3 weeks.

## 2023-01-01 VITALS
SYSTOLIC BLOOD PRESSURE: 132 MMHG | HEIGHT: 72 IN | RESPIRATION RATE: 16 BRPM | OXYGEN SATURATION: 93 % | HEART RATE: 87 BPM | WEIGHT: 201 LBS | DIASTOLIC BLOOD PRESSURE: 72 MMHG | BODY MASS INDEX: 27.22 KG/M2 | TEMPERATURE: 97.3 F

## 2023-01-01 PROCEDURE — 6370000000 HC RX 637 (ALT 250 FOR IP): Performed by: INTERNAL MEDICINE

## 2023-01-01 PROCEDURE — 94761 N-INVAS EAR/PLS OXIMETRY MLT: CPT

## 2023-01-01 PROCEDURE — 2700000000 HC OXYGEN THERAPY PER DAY

## 2023-01-01 PROCEDURE — 6370000000 HC RX 637 (ALT 250 FOR IP): Performed by: PSYCHIATRY & NEUROLOGY

## 2023-01-01 PROCEDURE — 6360000002 HC RX W HCPCS: Performed by: INTERNAL MEDICINE

## 2023-01-01 RX ORDER — SODIUM CHLORIDE 1000 MG
1 TABLET, SOLUBLE MISCELLANEOUS
Qty: 90 TABLET | Refills: 0 | DISCHARGE
Start: 2023-01-01

## 2023-01-01 RX ORDER — SENNA PLUS 8.6 MG/1
1 TABLET ORAL NIGHTLY
Qty: 30 TABLET | Refills: 0 | DISCHARGE
Start: 2023-01-01 | End: 2023-01-31

## 2023-01-01 RX ORDER — PYRIDOSTIGMINE BROMIDE 60 MG/1
60 TABLET ORAL EVERY 6 HOURS
Qty: 120 TABLET | Refills: 0 | DISCHARGE
Start: 2023-01-01

## 2023-01-01 RX ORDER — ERGOCALCIFEROL 1.25 MG/1
50000 CAPSULE ORAL WEEKLY
Qty: 5 CAPSULE | DISCHARGE
Start: 2023-01-07

## 2023-01-01 RX ORDER — PREDNISONE 20 MG/1
40 TABLET ORAL DAILY
Qty: 60 TABLET | Refills: 0 | DISCHARGE
Start: 2023-01-02 | End: 2023-02-01

## 2023-01-01 RX ORDER — CHOLECALCIFEROL (VITAMIN D3) 50 MCG
2000 TABLET ORAL DAILY
Qty: 30 TABLET | Refills: 3 | DISCHARGE
Start: 2023-01-02

## 2023-01-01 RX ADMIN — SODIUM CHLORIDE 1 G: 1 TABLET ORAL at 12:00

## 2023-01-01 RX ADMIN — GUAIFENESIN 400 MG: 200 TABLET ORAL at 16:35

## 2023-01-01 RX ADMIN — IPRATROPIUM BROMIDE 2 PUFF: 17 AEROSOL, METERED RESPIRATORY (INHALATION) at 01:11

## 2023-01-01 RX ADMIN — SODIUM CHLORIDE 1 G: 1 TABLET ORAL at 16:35

## 2023-01-01 RX ADMIN — Medication 2000 UNITS: at 08:21

## 2023-01-01 RX ADMIN — FLUTICASONE PROPIONATE 2 SPRAY: 50 SPRAY, METERED NASAL at 08:22

## 2023-01-01 RX ADMIN — IPRATROPIUM BROMIDE 2 PUFF: 17 AEROSOL, METERED RESPIRATORY (INHALATION) at 15:24

## 2023-01-01 RX ADMIN — IPRATROPIUM BROMIDE 2 PUFF: 17 AEROSOL, METERED RESPIRATORY (INHALATION) at 08:20

## 2023-01-01 RX ADMIN — OXYCODONE HYDROCHLORIDE AND ACETAMINOPHEN 1 TABLET: 5; 325 TABLET ORAL at 08:20

## 2023-01-01 RX ADMIN — ALBUTEROL SULFATE 2 PUFF: 90 AEROSOL, METERED RESPIRATORY (INHALATION) at 15:24

## 2023-01-01 RX ADMIN — ALBUTEROL SULFATE 2 PUFF: 90 AEROSOL, METERED RESPIRATORY (INHALATION) at 12:01

## 2023-01-01 RX ADMIN — ALFUZOSIN HYDROCHLORIDE 10 MG: 10 TABLET, FILM COATED, EXTENDED RELEASE ORAL at 08:21

## 2023-01-01 RX ADMIN — PANTOPRAZOLE SODIUM 40 MG: 40 TABLET, DELAYED RELEASE ORAL at 06:24

## 2023-01-01 RX ADMIN — ZINC SULFATE 220 MG (50 MG) CAPSULE 50 MG: CAPSULE at 08:20

## 2023-01-01 RX ADMIN — ASPIRIN 81 MG: 81 TABLET, COATED ORAL at 08:21

## 2023-01-01 RX ADMIN — ALBUTEROL SULFATE 2 PUFF: 90 AEROSOL, METERED RESPIRATORY (INHALATION) at 01:10

## 2023-01-01 RX ADMIN — PYRIDOSTIGMINE BROMIDE 60 MG: 60 TABLET ORAL at 11:59

## 2023-01-01 RX ADMIN — GUAIFENESIN 400 MG: 200 TABLET ORAL at 01:09

## 2023-01-01 RX ADMIN — SODIUM CHLORIDE 1 G: 1 TABLET ORAL at 08:21

## 2023-01-01 RX ADMIN — PYRIDOSTIGMINE BROMIDE 60 MG: 60 TABLET ORAL at 16:35

## 2023-01-01 RX ADMIN — BUPROPION HYDROCHLORIDE 150 MG: 150 TABLET, EXTENDED RELEASE ORAL at 08:20

## 2023-01-01 RX ADMIN — GUAIFENESIN 400 MG: 200 TABLET ORAL at 08:20

## 2023-01-01 RX ADMIN — PYRIDOSTIGMINE BROMIDE 60 MG: 60 TABLET ORAL at 01:09

## 2023-01-01 RX ADMIN — ALBUTEROL SULFATE 2 PUFF: 90 AEROSOL, METERED RESPIRATORY (INHALATION) at 08:19

## 2023-01-01 RX ADMIN — PREDNISONE 40 MG: 20 TABLET ORAL at 08:21

## 2023-01-01 RX ADMIN — Medication 1 TABLET: at 08:21

## 2023-01-01 RX ADMIN — ENOXAPARIN SODIUM 30 MG: 100 INJECTION SUBCUTANEOUS at 08:21

## 2023-01-01 RX ADMIN — PYRIDOSTIGMINE BROMIDE 60 MG: 60 TABLET ORAL at 06:24

## 2023-01-01 RX ADMIN — IPRATROPIUM BROMIDE 2 PUFF: 17 AEROSOL, METERED RESPIRATORY (INHALATION) at 12:02

## 2023-01-01 RX ADMIN — DOCUSATE SODIUM 100 MG: 100 CAPSULE, LIQUID FILLED ORAL at 08:21

## 2023-01-01 RX ADMIN — OXYCODONE HYDROCHLORIDE AND ACETAMINOPHEN 500 MG: 500 TABLET ORAL at 08:20

## 2023-01-01 ASSESSMENT — PAIN DESCRIPTION - DESCRIPTORS: DESCRIPTORS: ACHING

## 2023-01-01 ASSESSMENT — PAIN DESCRIPTION - LOCATION: LOCATION: LEG

## 2023-01-01 ASSESSMENT — PAIN SCALES - GENERAL: PAINLEVEL_OUTOF10: 5

## 2023-01-01 NOTE — PLAN OF CARE
Problem: Discharge Planning  Goal: Discharge to home or other facility with appropriate resources  1/1/2023 1519 by Josue Botello RN  Outcome: Completed  1/1/2023 0242 by Soto Pal RN  Outcome: Progressing     Problem: Safety - Adult  Goal: Free from fall injury  1/1/2023 1519 by Josue Botello RN  Outcome: Completed  1/1/2023 0242 by Soto Pal RN  Outcome: Progressing     Problem: ABCDS Injury Assessment  Goal: Absence of physical injury  1/1/2023 1519 by Josue Botello RN  Outcome: Completed  1/1/2023 0242 by Soto Pal RN  Outcome: Progressing     Problem: Pain  Goal: Verbalizes/displays adequate comfort level or baseline comfort level  1/1/2023 1519 by Josue Botello RN  Outcome: Completed  1/1/2023 0242 by Soto Pal RN  Outcome: Progressing     Problem: Skin/Tissue Integrity  Goal: Absence of new skin breakdown  Description: 1. Monitor for areas of redness and/or skin breakdown  2. Assess vascular access sites hourly  3. Every 4-6 hours minimum:  Change oxygen saturation probe site  4. Every 4-6 hours:  If on nasal continuous positive airway pressure, respiratory therapy assess nares and determine need for appliance change or resting period.   1/1/2023 1519 by Josue Botello RN  Outcome: Completed  1/1/2023 0242 by Soto Pal RN  Outcome: Progressing

## 2023-01-01 NOTE — DISCHARGE SUMMARY
Hospitalist Discharge Summary  [facility]    Patient: Prieto Lea  : 1940  MRN: [id number]  Code Status: Full Code  PCP: Johnny Velasquez DO  Attending: Jr Walsh MD  Admission Date: 2022  Discharge Date: 2023    Discharge Medications:     Current Discharge Medication List             Details   senna (SENOKOT) 8.6 MG tablet Take 1 tablet by mouth nightly  Qty: 30 tablet, Refills: 0      sodium chloride 1 g tablet Take 1 tablet by mouth 3 times daily (with meals)  Qty: 90 tablet, Refills: 0      Ergocalciferol (VITAMIN D) 39072 units CAPS Take 50,000 Units by mouth once a week  Qty: 5 capsule      Vitamin D (CHOLECALCIFEROL) 50 MCG (2000 UT) TABS tablet Take 1 tablet by mouth daily  Qty: 30 tablet, Refills: 3    Comments: Labeling may look different. 25 mcg=1000 Units. Please double check dosages. Details   pyridostigmine (MESTINON) 60 MG tablet Take 1 tablet by mouth in the morning and 1 tablet at noon and 1 tablet in the evening and 1 tablet before bedtime.   Qty: 120 tablet, Refills: 0      predniSONE (DELTASONE) 20 MG tablet Take 2 tablets by mouth daily  Qty: 60 tablet, Refills: 0                Details   acetaminophen (TYLENOL) 500 MG tablet Take by mouth every 6 hours as needed      FOLBEE 2.5-25-1 MG TABS tablet TAKE 1 TABLET BY MOUTH ONCE DAILY      pantoprazole (PROTONIX) 40 MG tablet Take 1 tablet by mouth every morning (before breakfast)  Qty: 30 tablet, Refills: 0      buPROPion (WELLBUTRIN XL) 150 MG extended release tablet Take 1 tablet by mouth daily  Qty: 30 tablet, Refills: 0      Potassium Chloride (KLOR-CON 10 PO) Take 10 mEq by mouth three times a week Monday, Wednesday, Friday      fluticasone (FLONASE) 50 MCG/ACT nasal spray 2 sprays by Each Nostril route daily      aspirin EC 81 MG EC tablet Take 1 tablet by mouth 2 times daily  Qty: 60 tablet, Refills: 0      docusate sodium (COLACE) 100 MG capsule Take 1 capsule by mouth daily To prevent constipation  Qty: 20 capsule, Refills: 0      furosemide (LASIX) 20 MG tablet Take 40 mg by mouth See Admin Instructions Indications: FLUID RETENTION Monday, Wednesday, Friday      amLODIPine-benazepril (LOTREL) 2.5-10 MG per capsule Take 1 capsule by mouth daily Indications: HTN      lovastatin (MEVACOR) 40 MG tablet Take 40 mg by mouth daily Indications: CHOLESTEROL      alfuzosin (UROXATRAL) 10 MG extended release tablet Take 10 mg by mouth daily Indications: BLADDER      Multiple Vitamins-Minerals (PRESERVISION AREDS 2+MULTI VIT) CAPS Take 2 capsules by mouth daily Indications: SUPPLEMENT            Discharge Instructions:   Recommended Follow Up:  Constance Pedersen MD  [address] [facility] [phone]    Schedule an appointment as soon as possible for a visit      Serafin Ohara DO  [address] [place]  [id number] [phone]    Schedule an appointment as soon as possible for a visit    Future Appointments Scheduled at Time of Discharge:  Future Appointments   Date Time Provider Kerri Payton   1/12/2023 11:45 AM Constance Pedersen MD [address] [facility] Neurology -      Hospital Course:   COVID-19 pneumonia resolved  COPD exacerbation resolved  Acute hypoxemic respiratory failure resolved  Hyponatremia resolved  History of myasthenia gravis stable     Steroids  S/p Tocilizumab  Quarantine ended 12/31/2022  Completed course of empiric antibiotics  Encouraged self proning  [facility]  Neurology recs on board for myasthenia gravis    Patient clinically back to baseline  Patient denies any complaints  Patient/family agreeable for discharge  Neurology states patient stable for discharge  Patient medically stable for discharge  Patient/family aware to follow-up with his outpatient providers  Patient discharged to SNF on 1/1/2023 in stable condition    Discharge Exam:   In an effort to reduce COVID-19 exposure, patient seen from doorway and case discussed in detail with unit staff    Recent Labs 12/30/22  0324 12/31/22  0302   WBC 13.2* 15.3*   HGB 11.1* 9.7*    216     Recent Labs     12/30/22  0324 12/31/22  0302   * 131*   K 4.6 4.3   CL 96* 96*   CO2 27 27   BUN 23 16   CREATININE 0.9 0.7   GLUCOSE 149* 147*     No results for input(s): AST, ALT, ALB, BILITOT, ALKPHOS in the last 72 hours. Troponin T: No results for input(s): TROPONINI in the last 72 hours. Pro-BNP: No results for input(s): BNP in the last 72 hours. INR: No results for input(s): INR in the last 72 hours. UA:No results for input(s): NITRITE, COLORU, PHUR, LABCAST, WBCUA, RBCUA, MUCUS, TRICHOMONAS, YEAST, BACTERIA, CLARITYU, SPECGRAV, LEUKOCYTESUR, UROBILINOGEN, BILIRUBINUR, BLOODU, GLUCOSEU, AMORPHOUS in the last 72 hours. A1C: No results for input(s): LABA1C in the last 72 hours. ABG:No results for input(s): PHART, INW9OLY, PO2ART, WHA2HTA, BEART, HGBAE, B0TIZLKA, CARBOXHGBART in the last 72 hours.      Discharge Disposition: [facility], [facility] Ronda Bobo MD  1/1/2023 2:52 PM

## 2023-01-20 ENCOUNTER — OFFICE VISIT (OUTPATIENT)
Dept: NEUROLOGY | Age: 83
End: 2023-01-20
Payer: MEDICARE

## 2023-01-20 VITALS
OXYGEN SATURATION: 94 % | DIASTOLIC BLOOD PRESSURE: 68 MMHG | WEIGHT: 201 LBS | BODY MASS INDEX: 27.22 KG/M2 | SYSTOLIC BLOOD PRESSURE: 116 MMHG | HEIGHT: 72 IN | HEART RATE: 87 BPM

## 2023-01-20 DIAGNOSIS — Z86.69 HISTORY OF DOUBLE VISION: ICD-10-CM

## 2023-01-20 DIAGNOSIS — Z87.09 HISTORY OF COPD: ICD-10-CM

## 2023-01-20 DIAGNOSIS — G70.00 MYASTHENIA GRAVIS (HCC): Primary | ICD-10-CM

## 2023-01-20 PROCEDURE — 99214 OFFICE O/P EST MOD 30 MIN: CPT | Performed by: PSYCHIATRY & NEUROLOGY

## 2023-01-20 PROCEDURE — 1111F DSCHRG MED/CURRENT MED MERGE: CPT | Performed by: PSYCHIATRY & NEUROLOGY

## 2023-01-20 PROCEDURE — G8482 FLU IMMUNIZE ORDER/ADMIN: HCPCS | Performed by: PSYCHIATRY & NEUROLOGY

## 2023-01-20 PROCEDURE — G8427 DOCREV CUR MEDS BY ELIG CLIN: HCPCS | Performed by: PSYCHIATRY & NEUROLOGY

## 2023-01-20 PROCEDURE — G8417 CALC BMI ABV UP PARAM F/U: HCPCS | Performed by: PSYCHIATRY & NEUROLOGY

## 2023-01-20 PROCEDURE — 1123F ACP DISCUSS/DSCN MKR DOCD: CPT | Performed by: PSYCHIATRY & NEUROLOGY

## 2023-01-20 PROCEDURE — 1036F TOBACCO NON-USER: CPT | Performed by: PSYCHIATRY & NEUROLOGY

## 2023-01-20 PROCEDURE — 3078F DIAST BP <80 MM HG: CPT | Performed by: PSYCHIATRY & NEUROLOGY

## 2023-01-20 PROCEDURE — 3074F SYST BP LT 130 MM HG: CPT | Performed by: PSYCHIATRY & NEUROLOGY

## 2023-01-20 RX ORDER — ATENOLOL 25 MG/1
1 TABLET ORAL DAILY
COMMUNITY
Start: 2022-11-21

## 2023-01-20 RX ORDER — LOSARTAN POTASSIUM 25 MG/1
0.5 TABLET ORAL DAILY
COMMUNITY
Start: 2022-11-21

## 2023-01-20 RX ORDER — BUSPIRONE HYDROCHLORIDE 5 MG/1
5 TABLET ORAL 3 TIMES DAILY
COMMUNITY

## 2023-01-20 RX ORDER — PYRIDOSTIGMINE BROMIDE 60 MG/1
60 TABLET ORAL EVERY 6 HOURS
Qty: 120 TABLET | Refills: 5 | Status: SHIPPED | OUTPATIENT
Start: 2023-01-20

## 2023-01-20 RX ORDER — ACETAMINOPHEN/DIPHENHYDRAMINE 500MG-25MG
1 TABLET ORAL NIGHTLY
COMMUNITY

## 2023-01-20 NOTE — PROGRESS NOTES
Chief Complaint   Patient presents with    Follow-up     4 week follow up from hospital d/c       Ramonavalentina Fletcher is a 79y.o. year old male who is seen for evaluation of myasthenia gravis. I saw him in the hospital last month for right arm weakness. He admitted to prior eye surgery to correct double vision as well as jaw fatigue and intermittent dysphagia. Had some noted left eye ptosis. Acetylcholine receptor antibodies were markedly positive. He also has a history of COPD and breathing difficulties. He was placed on Mestinon 60 mg twice a day and I felt that it was beneficial..  Also had hyponatremia but had just been recently prescribed an SSRI. Has a history of lung cancer, depression, hypertension and BPH. Since getting on Mestinon his arm strength  improved and he no longer has double vision. He was readmitted to the hospital 12/22 with COVID-pneumonia and COPD exacerbation. He had been on doxycycline for a few days. I saw him in consultation and discontinue that as well as his beta-blocker. Currently on prednisone 30 mg each morning and Mestinon dose is 60 mg 4 times a day. He is scheduled to get out of the nursing home sometime soon. No longer having double vision. Has some intermittent swallowing difficulties.   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 ([address] 08/30/2019    Acute respiratory failure with hypoxia ([address] 12/29/2022    Generalized weakness 12/29/2022     Resolved Ambulatory Problems     Diagnosis Date Noted    No Resolved Ambulatory Problems     Past Medical History:   Diagnosis Date Arthritis     Cancer ([facility]     Emphysema/COPD ([facility]     Hyperlipidemia     Hypertension     Macular degeneration     Retention of urine        Past Surgical History:   Procedure Laterality Date    EYE SURGERY      MUSCLE WEAKNESS    GASTRECTOMY      PARTIAL FOR ULCER REPAIR    LUNG CANCER SURGERY Left     LLL LOBECTOMY    OTHER SURGICAL HISTORY      I & D OF GROIN INFECTION    TOTAL KNEE ARTHROPLASTY Right 2017    KNEE TOTAL ARTHROPLASTY performed by Qasim Perez MD at [facility] OR       Family History   Problem Relation Age of Onset    Diabetes Mother     Stroke Mother     Heart Attack Father     Cancer Father         ASBESTOS       Allergies   Allergen Reactions    Penicillins        Social History     Socioeconomic History    Marital status:       Spouse name: Not on file    Number of children: Not on file    Years of education: Not on file    Highest education level: Not on file   Occupational History    Not on file   Tobacco Use    Smoking status: Former     Types: Cigarettes     Quit date: 1984     Years since quittin.7    Smokeless tobacco: Never   Vaping Use    Vaping Use: Never used   Substance and Sexual Activity    Alcohol use: No    Drug use: No    Sexual activity: Not Currently   Other Topics Concern    Not on file   Social History Narrative    Not on file     Social Determinants of Health     Financial Resource Strain: Not on file   Food Insecurity: Not on file   Transportation Needs: Not on file   Physical Activity: Not on file   Stress: Not on file   Social Connections: Not on file   Intimate Partner Violence: Not on file   Housing Stability: Not on file       Review of Systems     Constitutional: []Fever []Sweats []Chills [] Recent Injury   [x] Denies all unless marked  HENT:[]Headache  [] Head Injury  [] Sore Throat  [] Ear Pain  [] Dizziness [] Hearing Loss   [x] Denies all unless marked  Spine:  [] Neck pain  [] Back pain  [] Sciatica  [x] Denies all unless marked  Cardiovascular:[]Chest Pain []Palpitations [] Heart Disease  [x] Denies all unless marked  Pulmonary: []Shortness of Breath []Cough   [x] Denies all unless marked  Gastrointestinal:  []Abdominal Pain  []Blood in Stool  []Diarrhea []Constipation []Nausea  []Vomiting  [x] Denies all unless marked  Genitourinary:  [] Dysuria [] Frequency  [] Incontinence [] Urgency   [x] Denies all unless marked  Musculoskeletal: [] Arthralgia  [] Myalgias [] Muscle cramps  [] Muscle twitches   [x] Denies all unless marked   Extremities:   [] Pain   [] Swelling   [x] Denies all unless marked  Skin:[] Rash  [] Color Change  [x] Denies all unless marked  Neurological:[] Visual Disturbance [] Double Vision [] Slurred Speech [] Trouble swallowing  [] Vertigo [] Tingling [] Numbness [] Weakness [] Loss of Balance   [] Loss of Consciousness [] Memory Loss [] Seizures  [x] Denies all unless marked  Psychiatric/Behavioral:[] Depression [] Anxiety  [x] Denies all unless marked  Sleep: []  Insomnia [] Sleep Disturbance [] Snoring [] Restless Legs [] Daytime Sleepiness [] Sleep Apnea  [x] Denies all unless marked            Current Outpatient Medications   Medication Sig Dispense Refill    losartan (COZAAR) 25 MG tablet Take 0.5 tablets by mouth daily      atenolol (TENORMIN) 25 MG tablet Take 1 tablet by mouth daily      diphenhydrAMINE-APAP, sleep, (TYLENOL PM EXTRA STRENGTH)  MG tablet Take 1 tablet by mouth at bedtime      busPIRone (BUSPAR) 5 MG tablet Take 5 mg by mouth 3 times daily      pyridostigmine (MESTINON) 60 MG tablet Take 1 tablet by mouth in the morning and 1 tablet at noon and 1 tablet in the evening and 1 tablet before bedtime.  120 tablet 5    predniSONE (DELTASONE) 20 MG tablet Take 2 tablets by mouth daily 60 tablet 0    senna (SENOKOT) 8.6 MG tablet Take 1 tablet by mouth nightly 30 tablet 0    sodium chloride 1 g tablet Take 1 tablet by mouth 3 times daily (with meals) 90 tablet 0    Ergocalciferol (VITAMIN D) 63863 units CAPS Take 50,000 Units by mouth once a week 5 capsule     Vitamin D (CHOLECALCIFEROL) 50 MCG (2000 UT) TABS tablet Take 1 tablet by mouth daily 30 tablet 3    acetaminophen (TYLENOL) 500 MG tablet Take by mouth every 6 hours as needed      pantoprazole (PROTONIX) 40 MG tablet Take 1 tablet by mouth every morning (before breakfast) 30 tablet 0    Potassium Chloride (KLOR-CON 10 PO) Take 10 mEq by mouth three times a week Monday, Wednesday, Friday      fluticasone (FLONASE) 50 MCG/ACT nasal spray 2 sprays by Each Nostril route daily      docusate sodium (COLACE) 100 MG capsule Take 1 capsule by mouth daily To prevent constipation 20 capsule 0    furosemide (LASIX) 20 MG tablet Take 40 mg by mouth See Admin Instructions Indications: FLUID RETENTION Monday, Wednesday, Friday      lovastatin (MEVACOR) 40 MG tablet Take 40 mg by mouth daily Indications: CHOLESTEROL      alfuzosin (UROXATRAL) 10 MG extended release tablet Take 10 mg by mouth daily Indications: BLADDER      Multiple Vitamins-Minerals (PRESERVISION AREDS 2+MULTI VIT) CAPS Take 2 capsules by mouth daily Indications: SUPPLEMENT       No current facility-administered medications for this visit. Outpatient Medications Marked as Taking for the 1/20/23 encounter (Office Visit) with Etienne Nails MD   Medication Sig Dispense Refill    losartan (COZAAR) 25 MG tablet Take 0.5 tablets by mouth daily      atenolol (TENORMIN) 25 MG tablet Take 1 tablet by mouth daily      diphenhydrAMINE-APAP, sleep, (TYLENOL PM EXTRA STRENGTH)  MG tablet Take 1 tablet by mouth at bedtime      busPIRone (BUSPAR) 5 MG tablet Take 5 mg by mouth 3 times daily      pyridostigmine (MESTINON) 60 MG tablet Take 1 tablet by mouth in the morning and 1 tablet at noon and 1 tablet in the evening and 1 tablet before bedtime.  120 tablet 5    predniSONE (DELTASONE) 20 MG tablet Take 2 tablets by mouth daily 60 tablet 0    senna (SENOKOT) 8.6 MG tablet Take 1 tablet by mouth nightly 30 tablet 0    sodium chloride 1 g tablet Take 1 tablet by mouth 3 times daily (with meals) 90 tablet 0    Ergocalciferol (VITAMIN D) 12208 units CAPS Take 50,000 Units by mouth once a week 5 capsule     Vitamin D (CHOLECALCIFEROL) 50 MCG (2000 UT) TABS tablet Take 1 tablet by mouth daily 30 tablet 3    acetaminophen (TYLENOL) 500 MG tablet Take by mouth every 6 hours as needed      pantoprazole (PROTONIX) 40 MG tablet Take 1 tablet by mouth every morning (before breakfast) 30 tablet 0    Potassium Chloride (KLOR-CON 10 PO) Take 10 mEq by mouth three times a week Monday, Wednesday, Friday      fluticasone (FLONASE) 50 MCG/ACT nasal spray 2 sprays by Each Nostril route daily      docusate sodium (COLACE) 100 MG capsule Take 1 capsule by mouth daily To prevent constipation 20 capsule 0    furosemide (LASIX) 20 MG tablet Take 40 mg by mouth See Admin Instructions Indications: FLUID RETENTION Monday, Wednesday, Friday      lovastatin (MEVACOR) 40 MG tablet Take 40 mg by mouth daily Indications: CHOLESTEROL      alfuzosin (UROXATRAL) 10 MG extended release tablet Take 10 mg by mouth daily Indications: BLADDER      Multiple Vitamins-Minerals (PRESERVISION AREDS 2+MULTI VIT) CAPS Take 2 capsules by mouth daily Indications: SUPPLEMENT         /68   Pulse 87   Ht 6' (1.829 m)   Wt 201 lb (91.2 kg)   SpO2 94%   BMI 27.26 kg/m²       Constitutional - well developed, well nourished. Eyes - conjunctiva normal.  Pupils react to light  Ear, nose, throat -hearing intact to finger rub No scars, masses, or lesions over external nose or ears, no atrophy of tongue  Neck-symmetric, no masses noted, no jugular vein distension. No bruits noted.   Respiration- chest wall appears symmetric, good expansion,   normal effort without use of accessory muscles  Cardiovascular- RRR  Musculoskeletal - no significant wasting of muscles noted, no bony deformities, gait no gross ataxia  Extremities-no clubbing, cyanosis or edema  Skin - warm, dry, and intact. No rash, erythema, or pallor. Psychiatric - mood, affect, and behavior appear normal.      Neurological exam  Awake, alert, fluent oriented x 3 appropriate affect  Attention and concentration appear appropriate  Recent and remote memory appears unremarkable  Speech normal without dysarthria  No clear issues with language of fund of knowledge    Cranial Nerve Exam   CN II- Visual fields grossly unremarkable. VA adequate. CN III, IV,VI- PERRLA, EOMI, No nystagmus, conjugate eye movements, left ptosis  CN VII-no facial asymmetry  CN VIII-Hearing intact   CN IX and X- Palate elevates in midline  CN XI-good shoulder shrug  CN XII-Tongue midline with no fasciculations or fibrillations  Able to bury his lashes. Normal neck flexion  Motor Exam relatively normal throughout although he is unable to stand without using his arms. Reflexes trace throughout    Tremors- no tremors in hands or head noted    Gait  Normal base and speed  No ataxia. No Romberg sign    Coordination  Finger to nose and MIKE-unremarkable    Lab Results   Component Value Date    [id number] 224 05/20/2017     Lab Results   Component Value Date    WBC 15.3 (H) 12/31/2022    HGB 9.7 (L) 12/31/2022    HCT 30.5 (L) 12/31/2022    .7 (H) 12/31/2022     12/31/2022     Lab Results   Component Value Date     (L) 12/31/2022    K 4.3 12/31/2022    CL 96 (L) 12/31/2022    CO2 27 12/31/2022    BUN 16 12/31/2022    CREATININE 0.7 12/31/2022    GLUCOSE 147 (H) 12/31/2022    CALCIUM 8.0 (L) 12/31/2022    PROT 6.6 12/24/2022    LABALBU 3.8 12/24/2022    BILITOT 0.7 12/24/2022    ALKPHOS 74 12/24/2022    AST 20 12/24/2022    ALT 21 12/24/2022    LABGLOM >60 12/31/2022    GFRAA >60 07/05/2022           Assessment    ICD-10-CM    1. Myasthenia gravis ([facility]  G70.00       2. History of double vision  Z86.69       3. History of COPD  Z87.09             Patient with myasthenia presumably for numerous years.   Doing better with Mestinon and prednisone. They were again warned of the potential adverse side effects for chronic prednisone usage. Continue current medications as is and follow-up again in 1 month. Calcium and vitamin D supplementation was advised. COPD stable. Double vision improved. Plan    Return in about 4 weeks (around 2/17/2023).     (Please note that portions of this note were completed with a voice recognition program. Efforts were made to edit the dictations but occasionally words are mis-transcribed.)

## 2023-01-23 NOTE — PROGRESS NOTES
Chief Complaint  Multiple lung nodules    Subjective    History of Present Illness {  Problem List  Visit Diagnosis   Encounters  Notes  Medications  Labs  Result Review Imaging  Media     Carmine Garcia presents to [facility] for:    History of Present Illness  Mr. Garcia is here for follow up and management of left upper lobe nodule.  He tells me he was hospitalized twice over the last few months.  He was hospitalized in November with complaints of double vision and facial weakness.  During that hospitalization he was diagnosed with myasthenia gravis and is following with neurology at [facility].  He had COVID-pneumonia in December and was hospitalized for a week at [facility].  He was discharged to [place] rehab and has been home approximately a week.  He tells me his strength has significantly improved.  He was on oxygen for a week after discharge from the hospital but has been off for approximately 2 weeks now.  He tells me his oxygen saturations have been 96 to 99% on room air.  He denies cough.  He gets mildly dyspneic with heavy exertion but tells me his breathing has greatly improved.       Prior to Admission medications    Medication Sig Start Date End Date Taking? Authorizing Provider   acetaminophen (TYLENOL) 500 MG tablet Take 500 mg by mouth Every 6 (Six) Hours As Needed for Mild Pain .    ProviderPerry MD   alfuzosin (UROXATRAL) 10 MG 24 hr tablet Take 20 mg by mouth.    ProviderPerry MD   amLODIPine-benazepril (LOTREL) 10-20 MG per capsule Take  by mouth.    ProviderPerry MD   folic acid-vit B6-vit B12 (Folbee) 2.5-25-1 MG tablet tablet Take 1 tablet by mouth Daily. 10/27/22   Cem Arteaga MD   furosemide (LASIX) 20 MG tablet Take 20 mg by mouth.    ProviderPerry MD   lovastatin (MEVACOR) 40 MG tablet Take 40 mg by mouth.    Perry Myers MD   Multiple Vitamins-Minerals  "(PRESERVISION AREDS 2+MULTI VIT) capsule Take  by mouth.    Provider, MD Perry   potassium chloride 10 MEQ CR tablet  22   Provider, Perry, MD       Social History     Socioeconomic History   • Marital status:    Tobacco Use   • Smoking status: Former     Packs/day: 2.00     Years: 30.00     Pack years: 60.00     Types: Cigarettes     Start date:      Quit date: 1984     Years since quittin.1   • Smokeless tobacco: Never   Vaping Use   • Vaping Use: Never used   Substance and Sexual Activity   • Alcohol use: No   • Drug use: No   • Sexual activity: Defer       Objective   Vital Signs:   /70   Pulse 70   Ht 182.9 cm (72\")   Wt 93.4 kg (206 lb)   SpO2 96%   BMI 27.94 kg/m²     Physical Exam  Constitutional:       General: He is not in acute distress.     Interventions: Face mask in place.   HENT:      Head: Normocephalic.      Nose: Nose normal.      Mouth/Throat:      Mouth: Mucous membranes are moist.   Eyes:      General: No scleral icterus.  Cardiovascular:      Rate and Rhythm: Normal rate.   Pulmonary:      Effort: No respiratory distress.   Abdominal:      General: There is no distension.   Musculoskeletal:      Right lower leg: Edema present.      Left lower leg: Edema present.      Comments: walker   Neurological:      Mental Status: He is alert and oriented to person, place, and time.   Psychiatric:         Mood and Affect: Mood normal.         Behavior: Behavior is cooperative.        Result Review :{ Labs  Result Review  Imaging  Med Tab  Media :          No results found for this or any previous visit.               CT Chest With Contrast Diagnostic (2023 13:46)    My interpretation of imaging: Left lower lobe surgical changes, left upper lobe groundglass nodule stable, right lower lobe nodule stable        Assessment and Plan {CC Problem List  Visit Diagnosis  ROS  Review (Popup)  Health Maintenance  Quality  BestPractice  Medications  " [facility]  SnapShot Encounters  Media      Diagnoses and all orders for this visit:    1. Multiple lung nodules (Primary)    2. Panlobular emphysema (HCC)    3. 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 (Class 1 Obesity). The following options were offered after discussion;: weight loss educational material (shared in after visit summary)      He is overall stable from a pulmonary standpoint.  Continue rehabilitation efforts with home therapies.  Continue monitoring oxygen saturation.  No need for oxygen at this time.  Offered PFT testing and he wants to forego this at this time.  Oncology is ordering surveillance scans which have been stable.  Follow-up in 6 months, call in the interim with issues.    Stella Rain, APRN  1/30/2023  10:03 CST    Follow Up {Instructions Charge Capture  Follow-up Communications   Return in about 6 months (around 7/30/2023).    Patient was given instructions and counseling regarding his condition or for health maintenance advice. Please see specific information pulled into the AVS if appropriate.

## 2023-01-27 ENCOUNTER — HOSPITAL ENCOUNTER (OUTPATIENT)
Dept: CT IMAGING | Facility: HOSPITAL | Age: 83
Discharge: HOME | End: 2023-01-27
Admitting: INTERNAL MEDICINE
Payer: MEDICARE

## 2023-01-27 DIAGNOSIS — C34.32 PRIMARY ADENOCARCINOMA OF LOWER LOBE OF LEFT LUNG: ICD-10-CM

## 2023-01-27 LAB — CREAT BLDA-MCNC: 1 MG/DL (ref 0.6–1.3)

## 2023-01-27 PROCEDURE — 25010000002 IOPAMIDOL 61 % SOLUTION: Performed by: INTERNAL MEDICINE

## 2023-01-27 PROCEDURE — 82565 ASSAY OF CREATININE: CPT

## 2023-01-27 PROCEDURE — 71260 CT THORAX DX C+: CPT

## 2023-01-27 RX ADMIN — IOPAMIDOL 100 ML: 612 INJECTION, SOLUTION INTRAVENOUS at 13:48

## 2023-01-30 ENCOUNTER — OFFICE VISIT (OUTPATIENT)
Dept: PULMONOLOGY | Facility: CLINIC | Age: 83
End: 2023-01-30
Payer: MEDICARE

## 2023-01-30 VITALS
HEIGHT: 72 IN | DIASTOLIC BLOOD PRESSURE: 70 MMHG | WEIGHT: 206 LBS | HEART RATE: 70 BPM | OXYGEN SATURATION: 96 % | BODY MASS INDEX: 27.9 KG/M2 | SYSTOLIC BLOOD PRESSURE: 132 MMHG

## 2023-01-30 DIAGNOSIS — J43.1 PANLOBULAR EMPHYSEMA: Chronic | ICD-10-CM

## 2023-01-30 DIAGNOSIS — C34.32 PRIMARY ADENOCARCINOMA OF LOWER LOBE OF LEFT LUNG: ICD-10-CM

## 2023-01-30 DIAGNOSIS — R91.8 MULTIPLE LUNG NODULES: Primary | Chronic | ICD-10-CM

## 2023-01-30 PROCEDURE — 99214 OFFICE O/P EST MOD 30 MIN: CPT | Performed by: NURSE PRACTITIONER

## 2023-01-30 RX ORDER — ACETAMINOPHEN/DIPHENHYDRAMINE 500MG-25MG
1 TABLET ORAL
COMMUNITY

## 2023-01-30 RX ORDER — ERGOCALCIFEROL 1.25 MG/1
1 CAPSULE ORAL WEEKLY
COMMUNITY
Start: 2023-01-26

## 2023-01-30 RX ORDER — PYRIDOSTIGMINE BROMIDE 60 MG/1
1 TABLET ORAL 4 TIMES DAILY
COMMUNITY
Start: 2023-01-20

## 2023-01-30 RX ORDER — PANTOPRAZOLE SODIUM 40 MG/1
1 TABLET, DELAYED RELEASE ORAL EVERY 24 HOURS
COMMUNITY
Start: 2022-11-21

## 2023-01-30 RX ORDER — SENNA PLUS 8.6 MG/1
1 TABLET ORAL NIGHTLY
COMMUNITY
Start: 2023-01-01 | End: 2023-02-01

## 2023-01-30 RX ORDER — PREDNISONE 10 MG/1
1 TABLET ORAL DAILY
COMMUNITY
Start: 2023-01-21

## 2023-01-30 RX ORDER — FLUTICASONE PROPIONATE 50 MCG
1 SPRAY, SUSPENSION (ML) NASAL 2 TIMES DAILY
COMMUNITY
Start: 2023-01-21

## 2023-01-30 RX ORDER — FUROSEMIDE 20 MG/1
20 TABLET ORAL AS NEEDED
COMMUNITY

## 2023-01-30 RX ORDER — ATENOLOL 25 MG/1
1 TABLET ORAL EVERY 24 HOURS
COMMUNITY
Start: 2022-11-21

## 2023-01-30 RX ORDER — SODIUM CHLORIDE 1000 MG
1 TABLET, SOLUBLE MISCELLANEOUS 2 TIMES DAILY
COMMUNITY
Start: 2023-01-01

## 2023-01-30 RX ORDER — PREDNISONE 20 MG/1
1 TABLET ORAL DAILY
COMMUNITY
Start: 2023-01-21

## 2023-01-30 RX ORDER — LOSARTAN POTASSIUM 25 MG/1
1 TABLET ORAL 2 TIMES DAILY
COMMUNITY
Start: 2022-11-21

## 2023-01-30 RX ORDER — CALCIUM CARBONATE 200(500)MG
TABLET,CHEWABLE ORAL EVERY 12 HOURS SCHEDULED
COMMUNITY
Start: 2023-01-26 | End: 2023-05-26

## 2023-01-30 RX ORDER — BUSPIRONE HYDROCHLORIDE 5 MG/1
5 TABLET ORAL 2 TIMES DAILY
COMMUNITY
Start: 2023-01-21

## 2023-02-08 LAB
ANION GAP SERPL CALCULATED.3IONS-SCNC: 8 MMOL/L (ref 7–19)
BUN BLDV-MCNC: 8 MG/DL (ref 8–23)
CALCIUM SERPL-MCNC: 8.3 MG/DL (ref 8.8–10.2)
CHLORIDE BLD-SCNC: 104 MMOL/L (ref 98–111)
CO2: 28 MMOL/L (ref 22–29)
CREAT SERPL-MCNC: 0.8 MG/DL (ref 0.5–1.2)
GFR SERPL CREATININE-BSD FRML MDRD: >60 ML/MIN/{1.73_M2}
GLUCOSE BLD-MCNC: 86 MG/DL (ref 74–109)
POTASSIUM SERPL-SCNC: 4 MMOL/L (ref 3.5–5)
SODIUM BLD-SCNC: 140 MMOL/L (ref 136–145)

## 2023-02-20 RX ORDER — PANTOPRAZOLE SODIUM 40 MG/1
40 TABLET, DELAYED RELEASE ORAL
Qty: 30 TABLET | Refills: 5 | Status: SHIPPED | OUTPATIENT
Start: 2023-02-20

## 2023-02-27 ENCOUNTER — OFFICE VISIT (OUTPATIENT)
Dept: NEUROLOGY | Age: 83
End: 2023-02-27
Payer: MEDICARE

## 2023-02-27 VITALS
HEIGHT: 72 IN | SYSTOLIC BLOOD PRESSURE: 122 MMHG | WEIGHT: 201 LBS | BODY MASS INDEX: 27.22 KG/M2 | DIASTOLIC BLOOD PRESSURE: 72 MMHG | HEART RATE: 77 BPM

## 2023-02-27 DIAGNOSIS — Z87.09 HISTORY OF COPD: ICD-10-CM

## 2023-02-27 DIAGNOSIS — Z86.69 HISTORY OF DOUBLE VISION: ICD-10-CM

## 2023-02-27 DIAGNOSIS — G70.00 MYASTHENIA GRAVIS (HCC): Primary | ICD-10-CM

## 2023-02-27 PROCEDURE — 3074F SYST BP LT 130 MM HG: CPT | Performed by: PSYCHIATRY & NEUROLOGY

## 2023-02-27 PROCEDURE — 99214 OFFICE O/P EST MOD 30 MIN: CPT | Performed by: PSYCHIATRY & NEUROLOGY

## 2023-02-27 PROCEDURE — 3078F DIAST BP <80 MM HG: CPT | Performed by: PSYCHIATRY & NEUROLOGY

## 2023-02-27 PROCEDURE — 1036F TOBACCO NON-USER: CPT | Performed by: PSYCHIATRY & NEUROLOGY

## 2023-02-27 PROCEDURE — G8417 CALC BMI ABV UP PARAM F/U: HCPCS | Performed by: PSYCHIATRY & NEUROLOGY

## 2023-02-27 PROCEDURE — 1123F ACP DISCUSS/DSCN MKR DOCD: CPT | Performed by: PSYCHIATRY & NEUROLOGY

## 2023-02-27 PROCEDURE — G8427 DOCREV CUR MEDS BY ELIG CLIN: HCPCS | Performed by: PSYCHIATRY & NEUROLOGY

## 2023-02-27 PROCEDURE — G8482 FLU IMMUNIZE ORDER/ADMIN: HCPCS | Performed by: PSYCHIATRY & NEUROLOGY

## 2023-02-27 RX ORDER — PREDNISONE 10 MG/1
15 TABLET ORAL DAILY
Qty: 60 TABLET | Refills: 2 | Status: SHIPPED | OUTPATIENT
Start: 2023-02-27

## 2023-02-27 RX ORDER — PREDNISONE 20 MG/1
20 TABLET ORAL DAILY
COMMUNITY
End: 2023-02-27 | Stop reason: SDUPTHER

## 2023-02-27 NOTE — PROGRESS NOTES
Chief Complaint   Patient presents with    Follow-up       Alfonso Cope is a 79y.o. year old male who is seen for evaluation of myasthenia gravis. I saw him in the hospital last year for right arm weakness. He admitted to prior eye surgery to correct double vision as well as jaw fatigue and intermittent dysphagia. Had some noted left eye ptosis. Acetylcholine receptor antibodies were markedly positive. He also has a history of COPD and breathing difficulties. He was placed on Mestinon 60 mg twice a day and  felt that it was beneficial..  Also had hyponatremia but had just been recently prescribed an SSRI. Has a history of lung cancer, depression, hypertension and BPH. Since getting on Mestinon his arm strength  improved and he no longer has double vision. He was readmitted to the hospital 12/22 with COVID-pneumonia and COPD exacerbation. He had been on doxycycline for a few days. I saw him in consultation and discontinued that as well as his beta-blocker. Currently on prednisone 20 mg each morning and Mestinon dose is 60 mg 4 times a day. Has been on the prednisone at this dose for the past 2 to 3 weeks. He is no longer having double vision or swallowing difficulties. He has been out of the nursing home for about a month.   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 ([address] 08/30/2019    Acute respiratory failure with hypoxia ([address] 12/29/2022    Generalized weakness 12/29/2022     Resolved Ambulatory Problems     Diagnosis Date Noted    No Resolved Ambulatory Problems     Past Medical History:   Diagnosis Date Arthritis     Cancer ([facility]     [facility]COPD [facility]     Hyperlipidemia     Hypertension     Macular degeneration     Retention of urine        Past Surgical History:   Procedure Laterality Date    EYE SURGERY      MUSCLE WEAKNESS    GASTRECTOMY      PARTIAL FOR ULCER REPAIR    LUNG CANCER SURGERY Left     LLL LOBECTOMY    OTHER SURGICAL HISTORY      I & D OF GROIN INFECTION    TOTAL KNEE ARTHROPLASTY Right 2017    KNEE TOTAL ARTHROPLASTY performed by Jeana Jalloh MD at [facility] OR       Family History   Problem Relation Age of Onset    Diabetes Mother     Stroke Mother     Heart Attack Father     Cancer Father         ASBESTOS       Allergies   Allergen Reactions    Penicillins        Social History     Socioeconomic History    Marital status:      Spouse name: Not on file    Number of children: Not on file    Years of education: Not on file    Highest education level: Not on file   Occupational History    Not on file   Tobacco Use    Smoking status: Former     Types: Cigarettes     Quit date: 1984     Years since quittin.8    Smokeless tobacco: Never   Vaping Use    Vaping Use: Never used   Substance and Sexual Activity    Alcohol use: No    Drug use: No    Sexual activity: Not Currently   Other Topics Concern    Not on file   Social History Narrative    Not on file     Social Determinants of Health     Financial Resource Strain: Not on file   Food Insecurity: Not on file   Transportation Needs: Not on file   Physical Activity: Not on file   Stress: Not on file   Social Connections: Not on file   Intimate Partner Violence: Not on file   Housing Stability: Not on file       Review of Systems   Constitutional - No fever or chills. No diaphoresis or significant fatigue. HENT -  No tinnitus or significant hearing loss.   Eyes - no sudden vision change or eye pain  Respiratory - no significant shortness of breath or cough  Cardiovascular - no chest pain No palpitations or significant leg swelling  Gastrointestinal - no abdominal swelling or pain. Genitourinary - No difficulty urinating, dysuria  Musculoskeletal - no back pain or myalgia. Skin - no color change or rash  Neurologic - No seizures. No lateralizing weakness. Hematologic - no easy bruising or excessive bleeding. Psychiatric - no severe anxiety or nervousness. All other review of systems are negative. Current Outpatient Medications   Medication Sig Dispense Refill    Ergocalciferol (VITAMIN D2 PO) Take by mouth      predniSONE (DELTASONE) 10 MG tablet Take 1.5 tablets by mouth daily 60 tablet 2    pantoprazole (PROTONIX) 40 MG tablet Take 1 tablet by mouth every morning (before breakfast) 30 tablet 5    losartan (COZAAR) 25 MG tablet Take 0.5 tablets by mouth daily      atenolol (TENORMIN) 25 MG tablet Take 1 tablet by mouth daily      diphenhydrAMINE-APAP, sleep, (TYLENOL PM EXTRA STRENGTH)  MG tablet Take 1 tablet by mouth at bedtime      busPIRone (BUSPAR) 5 MG tablet Take 5 mg by mouth 3 times daily      pyridostigmine (MESTINON) 60 MG tablet Take 1 tablet by mouth in the morning and 1 tablet at noon and 1 tablet in the evening and 1 tablet before bedtime.  120 tablet 5    acetaminophen (TYLENOL) 500 MG tablet Take by mouth every 6 hours as needed      Potassium Chloride (KLOR-CON 10 PO) Take 10 mEq by mouth three times a week Monday, Wednesday, Friday      fluticasone (FLONASE) 50 MCG/ACT nasal spray 2 sprays by Each Nostril route daily      docusate sodium (COLACE) 100 MG capsule Take 1 capsule by mouth daily To prevent constipation 20 capsule 0    furosemide (LASIX) 20 MG tablet Take 40 mg by mouth See Admin Instructions Indications: FLUID RETENTION Monday, Wednesday, Friday      lovastatin (MEVACOR) 40 MG tablet Take 40 mg by mouth daily Indications: CHOLESTEROL      alfuzosin (UROXATRAL) 10 MG extended release tablet Take 10 mg by mouth daily Indications: BLADDER      Multiple Vitamins-Minerals (PRESERVISION AREDS 2+MULTI VIT) CAPS Take 2 capsules by mouth daily Indications: SUPPLEMENT       No current facility-administered medications for this visit. Outpatient Medications Marked as Taking for the 2/27/23 encounter (Office Visit) with Xenia Bundy MD   Medication Sig Dispense Refill    Ergocalciferol (VITAMIN D2 PO) Take by mouth      predniSONE (DELTASONE) 10 MG tablet Take 1.5 tablets by mouth daily 60 tablet 2    pantoprazole (PROTONIX) 40 MG tablet Take 1 tablet by mouth every morning (before breakfast) 30 tablet 5    losartan (COZAAR) 25 MG tablet Take 0.5 tablets by mouth daily      atenolol (TENORMIN) 25 MG tablet Take 1 tablet by mouth daily      diphenhydrAMINE-APAP, sleep, (TYLENOL PM EXTRA STRENGTH)  MG tablet Take 1 tablet by mouth at bedtime      busPIRone (BUSPAR) 5 MG tablet Take 5 mg by mouth 3 times daily      pyridostigmine (MESTINON) 60 MG tablet Take 1 tablet by mouth in the morning and 1 tablet at noon and 1 tablet in the evening and 1 tablet before bedtime.  120 tablet 5    acetaminophen (TYLENOL) 500 MG tablet Take by mouth every 6 hours as needed      Potassium Chloride (KLOR-CON 10 PO) Take 10 mEq by mouth three times a week Monday, Wednesday, Friday      fluticasone (FLONASE) 50 MCG/ACT nasal spray 2 sprays by Each Nostril route daily      docusate sodium (COLACE) 100 MG capsule Take 1 capsule by mouth daily To prevent constipation 20 capsule 0    furosemide (LASIX) 20 MG tablet Take 40 mg by mouth See Admin Instructions Indications: FLUID RETENTION Monday, Wednesday, Friday      lovastatin (MEVACOR) 40 MG tablet Take 40 mg by mouth daily Indications: CHOLESTEROL      alfuzosin (UROXATRAL) 10 MG extended release tablet Take 10 mg by mouth daily Indications: BLADDER      Multiple Vitamins-Minerals (PRESERVISION AREDS 2+MULTI VIT) CAPS Take 2 capsules by mouth daily Indications: SUPPLEMENT         /72   Pulse 77   Ht 6' (1.829 m)   Wt 201 lb (91.2 kg)   BMI 27.26 kg/m² Constitutional - well developed, well nourished. Eyes - conjunctiva normal.  Pupils react to light  Ear, nose, throat -hearing intact to finger rub No scars, masses, or lesions over external nose or ears, no atrophy of tongue  Neck-symmetric, no masses noted, no jugular vein distension. No bruits noted. Respiration- chest wall appears symmetric, good expansion,   normal effort without use of accessory muscles  Cardiovascular- RRR  Musculoskeletal - no significant wasting of muscles noted, no bony deformities, gait no gross ataxia  Extremities-no clubbing, cyanosis or edema  Skin - warm, dry, and intact. No rash, erythema, or pallor. Psychiatric - mood, affect, and behavior appear normal.      Neurological exam  Awake, alert, fluent oriented x 3 appropriate affect  Attention and concentration appear appropriate  Recent and remote memory appears unremarkable  Speech normal without dysarthria  No clear issues with language of fund of knowledge    Cranial Nerve Exam   CN II- Visual fields grossly unremarkable. VA adequate. CN III, IV,VI- PERRLA, EOMI, No nystagmus, conjugate eye movements, left ptosis  CN VII-no facial asymmetry  CN VIII-Hearing intact   CN IX and X- Palate elevates in midline  CN XI-good shoulder shrug  CN XII-Tongue midline with no fasciculations or fibrillations  Able to bury his lashes. Normal neck flexion  Motor Exam relatively normal throughout he is able to stand without using his arms. Reflexes trace throughout    Tremors- no tremors in hands or head noted    Gait  Normal base and speed  No ataxia.  No Romberg sign    Coordination  Finger to nose and MIKE-unremarkable    Lab Results   Component Value Date    [id number] 224 05/20/2017     Lab Results   Component Value Date    WBC 15.3 (H) 12/31/2022    HGB 9.7 (L) 12/31/2022    HCT 30.5 (L) 12/31/2022    .7 (H) 12/31/2022     12/31/2022     Lab Results   Component Value Date     02/08/2023    K 4.0 02/08/2023  02/08/2023    CO2 28 02/08/2023    BUN 8 02/08/2023    CREATININE 0.8 02/08/2023    GLUCOSE 86 02/08/2023    CALCIUM 8.3 (L) 02/08/2023    PROT 6.6 12/24/2022    LABALBU 3.8 12/24/2022    BILITOT 0.7 12/24/2022    ALKPHOS 74 12/24/2022    AST 20 12/24/2022    ALT 21 12/24/2022    LABGLOM >60 02/08/2023    GFRAA >60 07/05/2022           Assessment    ICD-10-CM    1. Myasthenia gravis ([facility]  G70.00       2. History of double vision  Z86.69       3. History of COPD  Z87.09               Patient with myasthenia presumably for numerous years. Doing better with Mestinon and prednisone. He will go ahead and reduce his prednisone to 15 mg a day and follow-up in 1 month for reassessment. He is back on a beta-blocker and I would prefer him not to be on 1 if at all possible. I will defer that to his PCP. Double vision resolved. COPD stable  Plan    Return in about 1 month (around 3/27/2023).     (Please note that portions of this note were completed with a voice recognition program. Efforts were made to edit the dictations but occasionally words are mis-transcribed.)

## 2023-04-26 ENCOUNTER — LAB (OUTPATIENT)
Dept: LAB | Facility: HOSPITAL | Age: 83
End: 2023-04-26
Payer: MEDICARE

## 2023-04-26 DIAGNOSIS — C34.32 PRIMARY ADENOCARCINOMA OF LOWER LOBE OF LEFT LUNG: ICD-10-CM

## 2023-04-26 LAB
ALBUMIN SERPL-MCNC: 4.3 G/DL (ref 3.5–5.2)
ALBUMIN/GLOB SERPL: 2.3 G/DL
ALP SERPL-CCNC: 73 U/L (ref 39–117)
ALT SERPL W P-5'-P-CCNC: 23 U/L (ref 1–41)
ANION GAP SERPL CALCULATED.3IONS-SCNC: 8 MMOL/L (ref 5–15)
AST SERPL-CCNC: 18 U/L (ref 1–40)
BASOPHILS # BLD AUTO: 0.02 10*3/MM3 (ref 0–0.2)
BASOPHILS NFR BLD AUTO: 0.3 % (ref 0–1.5)
BILIRUB SERPL-MCNC: 0.4 MG/DL (ref 0–1.2)
BUN SERPL-MCNC: 10 MG/DL (ref 8–23)
BUN/CREAT SERPL: 11.2 (ref 7–25)
CALCIUM SPEC-SCNC: 8.8 MG/DL (ref 8.6–10.5)
CEA SERPL-MCNC: 3.79 NG/ML
CHLORIDE SERPL-SCNC: 103 MMOL/L (ref 98–107)
CO2 SERPL-SCNC: 27 MMOL/L (ref 22–29)
CREAT SERPL-MCNC: 0.89 MG/DL (ref 0.76–1.27)
DEPRECATED RDW RBC AUTO: 48.8 FL (ref 37–54)
EGFRCR SERPLBLD CKD-EPI 2021: 84.5 ML/MIN/1.73
EOSINOPHIL # BLD AUTO: 0.02 10*3/MM3 (ref 0–0.4)
EOSINOPHIL NFR BLD AUTO: 0.3 % (ref 0.3–6.2)
ERYTHROCYTE [DISTWIDTH] IN BLOOD BY AUTOMATED COUNT: 13 % (ref 12.3–15.4)
FERRITIN SERPL-MCNC: 68.42 NG/ML (ref 30–400)
FOLATE SERPL-MCNC: 18.8 NG/ML (ref 4.78–24.2)
GLOBULIN UR ELPH-MCNC: 1.9 GM/DL
GLUCOSE SERPL-MCNC: 105 MG/DL (ref 65–99)
HCT VFR BLD AUTO: 39.9 % (ref 37.5–51)
HGB BLD-MCNC: 12.6 G/DL (ref 13–17.7)
IMM GRANULOCYTES # BLD AUTO: 0.03 10*3/MM3 (ref 0–0.05)
IMM GRANULOCYTES NFR BLD AUTO: 0.4 % (ref 0–0.5)
IRON 24H UR-MRATE: 95 MCG/DL (ref 59–158)
IRON SATN MFR SERPL: 31 % (ref 20–50)
LYMPHOCYTES # BLD AUTO: 1.04 10*3/MM3 (ref 0.7–3.1)
LYMPHOCYTES NFR BLD AUTO: 14.3 % (ref 19.6–45.3)
MCH RBC QN AUTO: 32.1 PG (ref 26.6–33)
MCHC RBC AUTO-ENTMCNC: 31.6 G/DL (ref 31.5–35.7)
MCV RBC AUTO: 101.8 FL (ref 79–97)
MONOCYTES # BLD AUTO: 0.48 10*3/MM3 (ref 0.1–0.9)
MONOCYTES NFR BLD AUTO: 6.6 % (ref 5–12)
NEUTROPHILS NFR BLD AUTO: 5.66 10*3/MM3 (ref 1.7–7)
NEUTROPHILS NFR BLD AUTO: 78.1 % (ref 42.7–76)
NRBC BLD AUTO-RTO: 0 /100 WBC (ref 0–0.2)
PLATELET # BLD AUTO: 247 10*3/MM3 (ref 140–450)
PMV BLD AUTO: 9.3 FL (ref 6–12)
POTASSIUM SERPL-SCNC: 4.3 MMOL/L (ref 3.5–5.2)
PROT SERPL-MCNC: 6.2 G/DL (ref 6–8.5)
RBC # BLD AUTO: 3.92 10*6/MM3 (ref 4.14–5.8)
SODIUM SERPL-SCNC: 138 MMOL/L (ref 136–145)
TIBC SERPL-MCNC: 305 MCG/DL (ref 298–536)
TRANSFERRIN SERPL-MCNC: 205 MG/DL (ref 200–360)
VIT B12 BLD-MCNC: 366 PG/ML (ref 211–946)
WBC NRBC COR # BLD: 7.25 10*3/MM3 (ref 3.4–10.8)

## 2023-04-26 PROCEDURE — 85025 COMPLETE CBC W/AUTO DIFF WBC: CPT

## 2023-04-26 PROCEDURE — 82746 ASSAY OF FOLIC ACID SERUM: CPT

## 2023-04-26 PROCEDURE — 82378 CARCINOEMBRYONIC ANTIGEN: CPT

## 2023-04-26 PROCEDURE — 84466 ASSAY OF TRANSFERRIN: CPT

## 2023-04-26 PROCEDURE — 83540 ASSAY OF IRON: CPT

## 2023-04-26 PROCEDURE — 82728 ASSAY OF FERRITIN: CPT

## 2023-04-26 PROCEDURE — 82607 VITAMIN B-12: CPT

## 2023-04-26 PROCEDURE — 80053 COMPREHEN METABOLIC PANEL: CPT

## 2023-04-26 PROCEDURE — 36415 COLL VENOUS BLD VENIPUNCTURE: CPT

## 2023-05-08 ENCOUNTER — OFFICE VISIT (OUTPATIENT)
Dept: NEUROLOGY | Age: 83
End: 2023-05-08
Payer: MEDICARE

## 2023-05-08 VITALS
BODY MASS INDEX: 28.44 KG/M2 | OXYGEN SATURATION: 95 % | DIASTOLIC BLOOD PRESSURE: 98 MMHG | SYSTOLIC BLOOD PRESSURE: 153 MMHG | WEIGHT: 210 LBS | HEART RATE: 90 BPM | HEIGHT: 72 IN

## 2023-05-08 DIAGNOSIS — G70.00 MYASTHENIA GRAVIS (HCC): Primary | ICD-10-CM

## 2023-05-08 DIAGNOSIS — Z86.69 HISTORY OF DOUBLE VISION: ICD-10-CM

## 2023-05-08 DIAGNOSIS — Z87.09 HISTORY OF COPD: ICD-10-CM

## 2023-05-08 PROCEDURE — G8428 CUR MEDS NOT DOCUMENT: HCPCS | Performed by: PSYCHIATRY & NEUROLOGY

## 2023-05-08 PROCEDURE — 3074F SYST BP LT 130 MM HG: CPT | Performed by: PSYCHIATRY & NEUROLOGY

## 2023-05-08 PROCEDURE — 3078F DIAST BP <80 MM HG: CPT | Performed by: PSYCHIATRY & NEUROLOGY

## 2023-05-08 PROCEDURE — 99214 OFFICE O/P EST MOD 30 MIN: CPT | Performed by: PSYCHIATRY & NEUROLOGY

## 2023-05-08 PROCEDURE — 1036F TOBACCO NON-USER: CPT | Performed by: PSYCHIATRY & NEUROLOGY

## 2023-05-08 PROCEDURE — 1123F ACP DISCUSS/DSCN MKR DOCD: CPT | Performed by: PSYCHIATRY & NEUROLOGY

## 2023-05-08 PROCEDURE — G8417 CALC BMI ABV UP PARAM F/U: HCPCS | Performed by: PSYCHIATRY & NEUROLOGY

## 2023-05-08 NOTE — PROGRESS NOTES
REVIEW OF SYSTEMS    Constitutional: []Fever []Sweat []Chills [] Recent Injury [x] Denies all unless marked  HEENT:[]Headache  [] Head Injury/Hearing Loss  [] Sore Throat  [] Ear Ache/Dizziness  [x] Denies all unless marked  Spine:  [] Neck pain  [] Back pain  [] Sciaticia  [x] Denies all unless marked  Cardiovascular:[]Heart Disease []Chest Pain [] Palpitations  [x] Denies all unless marked  Pulmonary: []Shortness of Breath []Cough   [x] Denies all unless marke  Gastrointestinal: []Nausea  []Vomiting  []Abdominal Pain  []Constipation  []Diarrhea  []Dark Bloody Stools  [x] Denies all unless marked  Psychiatric/Behavioral:[] Depression [] Anxiety [x] Denies all unless marked  Genitourinary:   [] Frequency  [] Urgency  [] Incontinence [] Pain with Urination  [x] Denies all unless marked  Extremities: []Pain  []Swelling  [x] Denies all unless marked  Musculoskeletal: [] Muscle Pain  [] Joint Pain  [] Arthritis [] Muscle Cramps [] Muscle Twitches  [x] Denies all unless marked  Sleep: [] Insomnia [] Snoring [] Restless Legs [] Sleep Apnea  [] Daytime Sleepiness  [x] Denies all unless marked  Skin:[] Rash [] Skin Discoloration [x] Denies all unless marked   Neurological: []Visual Disturbance/Memory Loss [x] Loss of Balance [] Slurred Speech/Weakness [] Seizures  [] Vertigo/Dizziness [x] Denies all unless marked
Potassium Chloride (KLOR-CON 10 PO) Take 10 mEq by mouth three times a week Monday, Wednesday, Friday      fluticasone (FLONASE) 50 MCG/ACT nasal spray 2 sprays by Each Nostril route daily      docusate sodium (COLACE) 100 MG capsule Take 1 capsule by mouth daily To prevent constipation 20 capsule 0    furosemide (LASIX) 20 MG tablet Take 2 tablets by mouth See Admin Instructions Indications: FLUID RETENTION Monday, Wednesday, Friday      lovastatin (MEVACOR) 40 MG tablet Take 1 tablet by mouth daily Indications: CHOLESTEROL      alfuzosin (UROXATRAL) 10 MG extended release tablet Take 1 tablet by mouth daily Indications: BLADDER      Multiple Vitamins-Minerals (PRESERVISION AREDS 2+MULTI VIT) CAPS Take 2 capsules by mouth daily Indications: SUPPLEMENT       No current facility-administered medications for this visit. Outpatient Medications Marked as Taking for the 5/8/23 encounter (Office Visit) with Margarito Mills MD   Medication Sig Dispense Refill    Ergocalciferol (VITAMIN D2 PO) Take by mouth      predniSONE (DELTASONE) 10 MG tablet Take 1.5 tablets by mouth daily 60 tablet 2    pantoprazole (PROTONIX) 40 MG tablet Take 1 tablet by mouth every morning (before breakfast) 30 tablet 5    losartan (COZAAR) 25 MG tablet Take 0.5 tablets by mouth daily      atenolol (TENORMIN) 25 MG tablet Take 1 tablet by mouth daily      diphenhydrAMINE-APAP, sleep, (TYLENOL PM EXTRA STRENGTH)  MG tablet Take 1 tablet by mouth at bedtime      busPIRone (BUSPAR) 5 MG tablet Take 1 tablet by mouth 3 times daily      pyridostigmine (MESTINON) 60 MG tablet Take 1 tablet by mouth in the morning and 1 tablet at noon and 1 tablet in the evening and 1 tablet before bedtime.  120 tablet 5    acetaminophen (TYLENOL) 500 MG tablet Take by mouth every 6 hours as needed      Potassium Chloride (KLOR-CON 10 PO) Take 10 mEq by mouth three times a week Monday, Wednesday, Friday      fluticasone (FLONASE) 50 MCG/ACT nasal spray 2 sprays

## 2023-06-07 DIAGNOSIS — C34.32 PRIMARY ADENOCARCINOMA OF LOWER LOBE OF LEFT LUNG: Primary | ICD-10-CM

## 2023-06-14 ENCOUNTER — HOSPITAL ENCOUNTER (OUTPATIENT)
Dept: CT IMAGING | Facility: HOSPITAL | Age: 83
Discharge: HOME | End: 2023-06-14
Admitting: INTERNAL MEDICINE
Payer: MEDICARE

## 2023-06-14 DIAGNOSIS — C34.32 PRIMARY ADENOCARCINOMA OF LOWER LOBE OF LEFT LUNG: ICD-10-CM

## 2023-06-14 PROCEDURE — 82565 ASSAY OF CREATININE: CPT

## 2023-06-14 PROCEDURE — 71260 CT THORAX DX C+: CPT

## 2023-06-14 PROCEDURE — 25510000001 IOPAMIDOL 61 % SOLUTION: Performed by: INTERNAL MEDICINE

## 2023-06-14 RX ADMIN — IOPAMIDOL 78 ML: 612 INJECTION, SOLUTION INTRAVENOUS at 14:18

## 2023-06-15 ENCOUNTER — TELEPHONE (OUTPATIENT)
Dept: ONCOLOGY | Facility: CLINIC | Age: 83
End: 2023-06-15
Payer: MEDICARE

## 2023-06-15 DIAGNOSIS — R91.1 LUNG NODULE, SOLITARY: ICD-10-CM

## 2023-06-15 DIAGNOSIS — C34.32 PRIMARY ADENOCARCINOMA OF LOWER LOBE OF LEFT LUNG: Primary | ICD-10-CM

## 2023-06-15 RX ORDER — LORAZEPAM 1 MG/1
TABLET ORAL
Qty: 1 TABLET | Refills: 0 | Status: SHIPPED | OUTPATIENT
Start: 2023-06-15

## 2023-06-15 NOTE — TELEPHONE ENCOUNTER
Discussed with patient and he is agreeable to PET scan but he reports he is extremely claustrophobic. He is requesting something to settle his nerves for during the procedure.

## 2023-06-15 NOTE — TELEPHONE ENCOUNTER
----- Message from Cem Arteaga MD sent at 6/14/2023  5:12 PM CDT -----  Schedule PET scan neck to thighs asap  ----- Message -----  From: Interface, Rad Results [place] In  Sent: 6/14/2023   3:17 PM CDT  To: Cem Arteaga MD

## 2023-06-16 LAB — CREAT BLDA-MCNC: 0.9 MG/DL (ref 0.6–1.3)

## 2023-06-22 RX ORDER — PYRIDOSTIGMINE BROMIDE 60 MG/1
60 TABLET ORAL EVERY 6 HOURS
Qty: 120 TABLET | Refills: 5 | Status: SHIPPED | OUTPATIENT
Start: 2023-06-22

## 2023-06-22 NOTE — TELEPHONE ENCOUNTER
Requested Prescriptions     Pending Prescriptions Disp Refills    pyridostigmine (MESTINON) 60 MG tablet 120 tablet 5     Sig: Take 1 tablet by mouth in the morning and 1 tablet at noon and 1 tablet in the evening and 1 tablet before bedtime.        Last Office Visit: 5/8/2023  Next Office Visit: 7/10/2023  Last Medication Refill: 1/20/23 with 5 rf

## 2023-06-26 PROBLEM — Z87.891 FORMER SMOKER: Status: ACTIVE | Noted: 2023-06-26

## 2023-07-03 ENCOUNTER — HOSPITAL ENCOUNTER (OUTPATIENT)
Dept: RADIATION ONCOLOGY | Facility: HOSPITAL | Age: 83
Setting detail: RADIATION/ONCOLOGY SERIES
End: 2023-07-03
Payer: MEDICARE

## 2023-07-10 ENCOUNTER — OFFICE VISIT (OUTPATIENT)
Dept: NEUROLOGY | Age: 83
End: 2023-07-10
Payer: MEDICARE

## 2023-07-10 VITALS
HEART RATE: 92 BPM | WEIGHT: 202 LBS | BODY MASS INDEX: 27.36 KG/M2 | DIASTOLIC BLOOD PRESSURE: 85 MMHG | SYSTOLIC BLOOD PRESSURE: 144 MMHG | HEIGHT: 72 IN

## 2023-07-10 DIAGNOSIS — Z86.69 HISTORY OF DOUBLE VISION: ICD-10-CM

## 2023-07-10 DIAGNOSIS — Z87.09 HISTORY OF COPD: ICD-10-CM

## 2023-07-10 DIAGNOSIS — G70.00 MYASTHENIA GRAVIS (HCC): Primary | ICD-10-CM

## 2023-07-10 PROCEDURE — G8427 DOCREV CUR MEDS BY ELIG CLIN: HCPCS | Performed by: PSYCHIATRY & NEUROLOGY

## 2023-07-10 PROCEDURE — 1123F ACP DISCUSS/DSCN MKR DOCD: CPT | Performed by: PSYCHIATRY & NEUROLOGY

## 2023-07-10 PROCEDURE — 1036F TOBACCO NON-USER: CPT | Performed by: PSYCHIATRY & NEUROLOGY

## 2023-07-10 PROCEDURE — G8417 CALC BMI ABV UP PARAM F/U: HCPCS | Performed by: PSYCHIATRY & NEUROLOGY

## 2023-07-10 PROCEDURE — 99214 OFFICE O/P EST MOD 30 MIN: CPT | Performed by: PSYCHIATRY & NEUROLOGY

## 2023-07-10 PROCEDURE — 3074F SYST BP LT 130 MM HG: CPT | Performed by: PSYCHIATRY & NEUROLOGY

## 2023-07-10 PROCEDURE — 3078F DIAST BP <80 MM HG: CPT | Performed by: PSYCHIATRY & NEUROLOGY

## 2023-07-10 RX ORDER — PREDNISONE 10 MG/1
15 TABLET ORAL DAILY
Qty: 60 TABLET | Refills: 2 | Status: SHIPPED | OUTPATIENT
Start: 2023-07-10

## 2023-07-13 PROCEDURE — 77301 RADIOTHERAPY DOSE PLAN IMRT: CPT | Performed by: RADIOLOGY

## 2023-07-13 PROCEDURE — 77338 DESIGN MLC DEVICE FOR IMRT: CPT | Performed by: RADIOLOGY

## 2023-07-13 PROCEDURE — 77293 RESPIRATOR MOTION MGMT SIMUL: CPT | Performed by: RADIOLOGY

## 2023-07-13 PROCEDURE — 77300 RADIATION THERAPY DOSE PLAN: CPT | Performed by: RADIOLOGY

## 2023-08-01 ENCOUNTER — HOSPITAL ENCOUNTER (OUTPATIENT)
Dept: RADIATION ONCOLOGY | Facility: HOSPITAL | Age: 83
Setting detail: RADIATION/ONCOLOGY SERIES
End: 2023-08-01
Payer: MEDICARE

## 2023-08-01 ENCOUNTER — HOSPITAL ENCOUNTER (OUTPATIENT)
Dept: RADIATION ONCOLOGY | Facility: HOSPITAL | Age: 83
Discharge: HOME | End: 2023-08-01
Payer: MEDICARE

## 2023-08-01 LAB
RAD ONC ARIA COURSE ID: NORMAL
RAD ONC ARIA COURSE LAST TREATMENT DATE: NORMAL
RAD ONC ARIA COURSE START DATE: NORMAL
RAD ONC ARIA COURSE TREATMENT ELAPSED DAYS: 0
RAD ONC ARIA FIRST TREATMENT DATE: NORMAL
RAD ONC ARIA PLAN FRACTIONS TREATED TO DATE: 1
RAD ONC ARIA PLAN ID: NORMAL
RAD ONC ARIA PLAN PRESCRIBED DOSE PER FRACTION: 10 GY
RAD ONC ARIA PLAN PRIMARY REFERENCE POINT: NORMAL
RAD ONC ARIA PLAN TOTAL FRACTIONS PRESCRIBED: 5
RAD ONC ARIA PLAN TOTAL PRESCRIBED DOSE: 5000 CGY
RAD ONC ARIA REFERENCE POINT DOSAGE GIVEN TO DATE: 10 GY
RAD ONC ARIA REFERENCE POINT ID: NORMAL
RAD ONC ARIA REFERENCE POINT SESSION DOSAGE GIVEN: 10 GY

## 2023-08-01 PROCEDURE — 77373 STRTCTC BDY RAD THER TX DLVR: CPT | Performed by: RADIOLOGY

## 2023-08-04 ENCOUNTER — HOSPITAL ENCOUNTER (OUTPATIENT)
Dept: RADIATION ONCOLOGY | Facility: HOSPITAL | Age: 83
Discharge: HOME | End: 2023-08-04
Payer: MEDICARE

## 2023-08-04 LAB
RAD ONC ARIA COURSE ID: NORMAL
RAD ONC ARIA COURSE LAST TREATMENT DATE: NORMAL
RAD ONC ARIA COURSE START DATE: NORMAL
RAD ONC ARIA COURSE TREATMENT ELAPSED DAYS: 3
RAD ONC ARIA FIRST TREATMENT DATE: NORMAL
RAD ONC ARIA PLAN FRACTIONS TREATED TO DATE: 2
RAD ONC ARIA PLAN ID: NORMAL
RAD ONC ARIA PLAN PRESCRIBED DOSE PER FRACTION: 10 GY
RAD ONC ARIA PLAN PRIMARY REFERENCE POINT: NORMAL
RAD ONC ARIA PLAN TOTAL FRACTIONS PRESCRIBED: 5
RAD ONC ARIA PLAN TOTAL PRESCRIBED DOSE: 5000 CGY
RAD ONC ARIA REFERENCE POINT DOSAGE GIVEN TO DATE: 20 GY
RAD ONC ARIA REFERENCE POINT ID: NORMAL
RAD ONC ARIA REFERENCE POINT SESSION DOSAGE GIVEN: 10 GY

## 2023-08-04 PROCEDURE — 77373 STRTCTC BDY RAD THER TX DLVR: CPT | Performed by: RADIOLOGY

## 2023-08-09 ENCOUNTER — HOSPITAL ENCOUNTER (OUTPATIENT)
Dept: RADIATION ONCOLOGY | Facility: HOSPITAL | Age: 83
Setting detail: RADIATION/ONCOLOGY SERIES
Discharge: HOME | End: 2023-08-09
Payer: MEDICARE

## 2023-08-09 LAB
RAD ONC ARIA COURSE ID: NORMAL
RAD ONC ARIA COURSE LAST TREATMENT DATE: NORMAL
RAD ONC ARIA COURSE START DATE: NORMAL
RAD ONC ARIA COURSE TREATMENT ELAPSED DAYS: 8
RAD ONC ARIA FIRST TREATMENT DATE: NORMAL
RAD ONC ARIA PLAN FRACTIONS TREATED TO DATE: 3
RAD ONC ARIA PLAN ID: NORMAL
RAD ONC ARIA PLAN PRESCRIBED DOSE PER FRACTION: 10 GY
RAD ONC ARIA PLAN PRIMARY REFERENCE POINT: NORMAL
RAD ONC ARIA PLAN TOTAL FRACTIONS PRESCRIBED: 5
RAD ONC ARIA PLAN TOTAL PRESCRIBED DOSE: 5000 CGY
RAD ONC ARIA REFERENCE POINT DOSAGE GIVEN TO DATE: 30 GY
RAD ONC ARIA REFERENCE POINT ID: NORMAL
RAD ONC ARIA REFERENCE POINT SESSION DOSAGE GIVEN: 10 GY

## 2023-08-09 PROCEDURE — 77336 RADIATION PHYSICS CONSULT: CPT | Performed by: RADIOLOGY

## 2023-08-09 PROCEDURE — 77373 STRTCTC BDY RAD THER TX DLVR: CPT | Performed by: RADIOLOGY

## 2023-08-10 ENCOUNTER — HOSPITAL ENCOUNTER (OUTPATIENT)
Dept: RADIATION ONCOLOGY | Facility: HOSPITAL | Age: 83
Setting detail: RADIATION/ONCOLOGY SERIES
Discharge: HOME | End: 2023-08-10
Payer: MEDICARE

## 2023-08-10 ENCOUNTER — OFFICE VISIT (OUTPATIENT)
Dept: NEUROLOGY | Age: 83
End: 2023-08-10
Payer: MEDICARE

## 2023-08-10 VITALS
WEIGHT: 202 LBS | HEIGHT: 72 IN | HEART RATE: 81 BPM | BODY MASS INDEX: 27.36 KG/M2 | SYSTOLIC BLOOD PRESSURE: 109 MMHG | DIASTOLIC BLOOD PRESSURE: 74 MMHG

## 2023-08-10 DIAGNOSIS — Z87.09 HISTORY OF COPD: ICD-10-CM

## 2023-08-10 DIAGNOSIS — Z86.69 HISTORY OF DOUBLE VISION: ICD-10-CM

## 2023-08-10 DIAGNOSIS — G70.00 MYASTHENIA GRAVIS (HCC): Primary | ICD-10-CM

## 2023-08-10 LAB

## 2023-08-10 PROCEDURE — 99214 OFFICE O/P EST MOD 30 MIN: CPT | Performed by: PSYCHIATRY & NEUROLOGY

## 2023-08-10 PROCEDURE — G8427 DOCREV CUR MEDS BY ELIG CLIN: HCPCS | Performed by: PSYCHIATRY & NEUROLOGY

## 2023-08-10 PROCEDURE — 3074F SYST BP LT 130 MM HG: CPT | Performed by: PSYCHIATRY & NEUROLOGY

## 2023-08-10 PROCEDURE — 1123F ACP DISCUSS/DSCN MKR DOCD: CPT | Performed by: PSYCHIATRY & NEUROLOGY

## 2023-08-10 PROCEDURE — 3078F DIAST BP <80 MM HG: CPT | Performed by: PSYCHIATRY & NEUROLOGY

## 2023-08-10 PROCEDURE — 1036F TOBACCO NON-USER: CPT | Performed by: PSYCHIATRY & NEUROLOGY

## 2023-08-10 PROCEDURE — 77373 STRTCTC BDY RAD THER TX DLVR: CPT | Performed by: RADIOLOGY

## 2023-08-10 PROCEDURE — G8417 CALC BMI ABV UP PARAM F/U: HCPCS | Performed by: PSYCHIATRY & NEUROLOGY

## 2023-08-10 RX ORDER — ACETAMINOPHEN 325 MG/1
650 TABLET ORAL ONCE
OUTPATIENT
Start: 2023-08-10 | End: 2023-08-10

## 2023-08-10 RX ORDER — DIPHENHYDRAMINE HCL 25 MG
25 TABLET ORAL ONCE
OUTPATIENT
Start: 2023-08-10 | End: 2023-08-10

## 2023-08-10 RX ORDER — FAMOTIDINE 10 MG/ML
20 INJECTION, SOLUTION INTRAVENOUS
OUTPATIENT
Start: 2023-08-10

## 2023-08-10 RX ORDER — EPINEPHRINE 1 MG/ML
0.3 INJECTION, SOLUTION, CONCENTRATE INTRAVENOUS PRN
OUTPATIENT
Start: 2023-08-10

## 2023-08-10 RX ORDER — MEPERIDINE HYDROCHLORIDE 50 MG/ML
12.5 INJECTION INTRAMUSCULAR; INTRAVENOUS; SUBCUTANEOUS PRN
OUTPATIENT
Start: 2023-08-10

## 2023-08-10 RX ORDER — ACETAMINOPHEN 325 MG/1
650 TABLET ORAL
OUTPATIENT
Start: 2023-08-10

## 2023-08-10 RX ORDER — HEPARIN SODIUM (PORCINE) LOCK FLUSH IV SOLN 100 UNIT/ML 100 UNIT/ML
500 SOLUTION INTRAVENOUS PRN
OUTPATIENT
Start: 2023-08-10

## 2023-08-10 RX ORDER — BUPROPION HYDROCHLORIDE 150 MG/1
150 TABLET, EXTENDED RELEASE ORAL 2 TIMES DAILY
COMMUNITY

## 2023-08-10 RX ORDER — SODIUM CHLORIDE 9 MG/ML
5-250 INJECTION, SOLUTION INTRAVENOUS PRN
OUTPATIENT
Start: 2023-08-10

## 2023-08-10 RX ORDER — SODIUM CHLORIDE 9 MG/ML
INJECTION, SOLUTION INTRAVENOUS CONTINUOUS
OUTPATIENT
Start: 2023-08-10

## 2023-08-10 RX ORDER — SODIUM CHLORIDE 0.9 % (FLUSH) 0.9 %
5-40 SYRINGE (ML) INJECTION PRN
OUTPATIENT
Start: 2023-08-10

## 2023-08-10 RX ORDER — PHENOL 1.4 %
1 AEROSOL, SPRAY (ML) MUCOUS MEMBRANE DAILY
COMMUNITY

## 2023-08-10 RX ORDER — ALBUTEROL SULFATE 90 UG/1
4 AEROSOL, METERED RESPIRATORY (INHALATION) PRN
OUTPATIENT
Start: 2023-08-10

## 2023-08-10 RX ORDER — ONDANSETRON 2 MG/ML
8 INJECTION INTRAMUSCULAR; INTRAVENOUS
OUTPATIENT
Start: 2023-08-10

## 2023-08-10 RX ORDER — DIPHENHYDRAMINE HYDROCHLORIDE 50 MG/ML
50 INJECTION INTRAMUSCULAR; INTRAVENOUS
OUTPATIENT
Start: 2023-08-10

## 2023-08-10 NOTE — PROGRESS NOTES
Chief Complaint   Patient presents with    Follow-up     Pt states he's currently in treatment for lung cancer  Myasthenia gravis- pt complains of vision worsening       Janelle Nazario is a 79y.o. year old male who is seen for evaluation of myasthenia gravis. I saw him in the hospital last year for right arm weakness. He admitted to prior eye surgery to correct double vision as well as jaw fatigue and intermittent dysphagia. Had some noted left eye ptosis. Acetylcholine receptor antibodies were markedly positive. He also has a history of COPD and breathing difficulties. He was placed on Mestinon and currently takes 60 mg 4 times a day . Also had hyponatremia but had just been recently prescribed an SSRI. Has a history of lung cancer, depression, hypertension and BPH. Since getting on Mestinon his arm strength  improved and he no longer has double vision. He was readmitted to the hospital 12/22 with COVID-pneumonia and COPD exacerbation. He had been on doxycycline for a few days. I saw him in consultation and discontinued that as well as his beta-blocker. \  When last seen his prednisone was increased from 10 to 15 mg every morning. He has had to go up to 20 mg for the past week or 2. Having worsening left sided ptosis and diplopia. Symptoms come and go and are worse with fatigue. Complains of that his arms feel weak he has ptosis on the left and occasional double vision but his breathing is okay. Currently getting radiation therapy for recurrent lung cancer.   Active Ambulatory Problems     Diagnosis Date Noted    Essential hypertension 05/19/2017    Iron deficiency anemia 05/19/2017    Pulmonary emphysema ([address]) 05/19/2017    Slow transit constipation 05/19/2017    Urinary retention 05/19/2017    Hyponatremia 11/10/2022    Palliative care patient 11/22/2022    Pneumonia due to COVID-19 virus 12/24/2022    Myasthenia gravis ([address]) 12/26/2022    Primary adenocarcinoma of lower lobe of left lung ([address])

## 2023-08-14 ENCOUNTER — HOSPITAL ENCOUNTER (OUTPATIENT)
Dept: RADIATION ONCOLOGY | Facility: HOSPITAL | Age: 83
Setting detail: RADIATION/ONCOLOGY SERIES
Discharge: HOME | End: 2023-08-14
Payer: MEDICARE

## 2023-08-14 LAB
RAD ONC ARIA COURSE ID: NORMAL
RAD ONC ARIA COURSE LAST TREATMENT DATE: NORMAL
RAD ONC ARIA COURSE START DATE: NORMAL
RAD ONC ARIA COURSE TREATMENT ELAPSED DAYS: 13
RAD ONC ARIA FIRST TREATMENT DATE: NORMAL
RAD ONC ARIA PLAN FRACTIONS TREATED TO DATE: 5
RAD ONC ARIA PLAN ID: NORMAL
RAD ONC ARIA PLAN PRESCRIBED DOSE PER FRACTION: 10 GY
RAD ONC ARIA PLAN PRIMARY REFERENCE POINT: NORMAL
RAD ONC ARIA PLAN TOTAL FRACTIONS PRESCRIBED: 5
RAD ONC ARIA PLAN TOTAL PRESCRIBED DOSE: 5000 CGY
RAD ONC ARIA REFERENCE POINT DOSAGE GIVEN TO DATE: 50 GY
RAD ONC ARIA REFERENCE POINT ID: NORMAL
RAD ONC ARIA REFERENCE POINT SESSION DOSAGE GIVEN: 10 GY

## 2023-08-14 PROCEDURE — 77373 STRTCTC BDY RAD THER TX DLVR: CPT | Performed by: RADIOLOGY

## 2023-08-15 ENCOUNTER — HOSPITAL ENCOUNTER (OUTPATIENT)
Dept: INFUSION THERAPY | Age: 83
Setting detail: INFUSION SERIES
Discharge: HOME | End: 2023-08-15
Payer: MEDICARE

## 2023-08-15 VITALS
RESPIRATION RATE: 18 BRPM | TEMPERATURE: 97.4 F | SYSTOLIC BLOOD PRESSURE: 128 MMHG | HEART RATE: 66 BPM | OXYGEN SATURATION: 96 % | DIASTOLIC BLOOD PRESSURE: 76 MMHG

## 2023-08-15 DIAGNOSIS — G70.00 MYASTHENIA GRAVIS (HCC): Primary | ICD-10-CM

## 2023-08-15 LAB
ALBUMIN SERPL-MCNC: 3.5 G/DL (ref 3.5–5.2)
ALP SERPL-CCNC: 63 U/L (ref 40–130)
ALT SERPL-CCNC: 21 U/L (ref 5–41)
ANION GAP SERPL CALCULATED.3IONS-SCNC: 7 MMOL/L (ref 7–19)
AST SERPL-CCNC: 17 U/L (ref 5–40)
BASOPHILS # BLD: 0 K/UL (ref 0–0.2)
BASOPHILS NFR BLD: 0.3 % (ref 0–1)
BILIRUB SERPL-MCNC: 0.5 MG/DL (ref 0.2–1.2)
BUN SERPL-MCNC: 11 MG/DL (ref 8–23)
CALCIUM SERPL-MCNC: 8.2 MG/DL (ref 8.8–10.2)
CHLORIDE SERPL-SCNC: 97 MMOL/L (ref 98–111)
CO2 SERPL-SCNC: 25 MMOL/L (ref 22–29)
CREAT SERPL-MCNC: 0.7 MG/DL (ref 0.5–1.2)
EOSINOPHIL # BLD: 0 K/UL (ref 0–0.6)
EOSINOPHIL NFR BLD: 0.5 % (ref 0–5)
ERYTHROCYTE [DISTWIDTH] IN BLOOD BY AUTOMATED COUNT: 13.7 % (ref 11.5–14.5)
GLUCOSE SERPL-MCNC: 118 MG/DL (ref 74–109)
HCT VFR BLD AUTO: 41.1 % (ref 42–52)
HGB BLD-MCNC: 13.3 G/DL (ref 14–18)
IMM GRANULOCYTES # BLD: 0 K/UL
LYMPHOCYTES # BLD: 0.7 K/UL (ref 1.1–4.5)
LYMPHOCYTES NFR BLD: 10.2 % (ref 20–40)
MCH RBC QN AUTO: 31.9 PG (ref 27–31)
MCHC RBC AUTO-ENTMCNC: 32.4 G/DL (ref 33–37)
MCV RBC AUTO: 98.6 FL (ref 80–94)
MONOCYTES # BLD: 0.4 K/UL (ref 0–0.9)
MONOCYTES NFR BLD: 6.8 % (ref 0–10)
NEUTROPHILS # BLD: 5.2 K/UL (ref 1.5–7.5)
NEUTS SEG NFR BLD: 81.9 % (ref 50–65)
PLATELET # BLD AUTO: 213 K/UL (ref 130–400)
PMV BLD AUTO: 10.1 FL (ref 9.4–12.4)
POTASSIUM SERPL-SCNC: 4.4 MMOL/L (ref 3.5–5)
PROT SERPL-MCNC: 7.9 G/DL (ref 6.6–8.7)
RBC # BLD AUTO: 4.17 M/UL (ref 4.7–6.1)
REASON FOR REJECTION: NORMAL
REJECTED TEST: NORMAL
SODIUM SERPL-SCNC: 129 MMOL/L (ref 136–145)
WBC # BLD AUTO: 6.4 K/UL (ref 4.8–10.8)

## 2023-08-15 PROCEDURE — 85025 COMPLETE CBC W/AUTO DIFF WBC: CPT

## 2023-08-15 PROCEDURE — 36415 COLL VENOUS BLD VENIPUNCTURE: CPT

## 2023-08-15 PROCEDURE — 80053 COMPREHEN METABOLIC PANEL: CPT

## 2023-08-15 PROCEDURE — 6370000000 HC RX 637 (ALT 250 FOR IP): Performed by: PSYCHIATRY & NEUROLOGY

## 2023-08-15 PROCEDURE — 96366 THER/PROPH/DIAG IV INF ADDON: CPT

## 2023-08-15 PROCEDURE — 6360000002 HC RX W HCPCS: Performed by: PSYCHIATRY & NEUROLOGY

## 2023-08-15 PROCEDURE — 96365 THER/PROPH/DIAG IV INF INIT: CPT

## 2023-08-15 RX ORDER — ACETAMINOPHEN 325 MG/1
650 TABLET ORAL ONCE
Status: COMPLETED | OUTPATIENT
Start: 2023-08-15 | End: 2023-08-15

## 2023-08-15 RX ORDER — ACETAMINOPHEN 325 MG/1
650 TABLET ORAL
OUTPATIENT
Start: 2023-09-10

## 2023-08-15 RX ORDER — SODIUM CHLORIDE 0.9 % (FLUSH) 0.9 %
5-40 SYRINGE (ML) INJECTION PRN
Status: DISCONTINUED | OUTPATIENT
Start: 2023-08-15 | End: 2023-08-16 | Stop reason: HOSPADM

## 2023-08-15 RX ORDER — HEPARIN 100 UNIT/ML
500 SYRINGE INTRAVENOUS PRN
OUTPATIENT
Start: 2023-09-10

## 2023-08-15 RX ORDER — DIPHENHYDRAMINE HYDROCHLORIDE 50 MG/ML
50 INJECTION INTRAMUSCULAR; INTRAVENOUS
OUTPATIENT
Start: 2023-09-10

## 2023-08-15 RX ORDER — SODIUM CHLORIDE 0.9 % (FLUSH) 0.9 %
5-40 SYRINGE (ML) INJECTION PRN
OUTPATIENT
Start: 2023-09-10

## 2023-08-15 RX ORDER — SODIUM CHLORIDE 9 MG/ML
5-250 INJECTION, SOLUTION INTRAVENOUS PRN
Status: DISCONTINUED | OUTPATIENT
Start: 2023-08-15 | End: 2023-08-16 | Stop reason: HOSPADM

## 2023-08-15 RX ORDER — SODIUM CHLORIDE 9 MG/ML
INJECTION, SOLUTION INTRAVENOUS CONTINUOUS
OUTPATIENT
Start: 2023-09-10

## 2023-08-15 RX ORDER — ACETAMINOPHEN 325 MG/1
650 TABLET ORAL ONCE
OUTPATIENT
Start: 2023-09-10 | End: 2023-09-10

## 2023-08-15 RX ORDER — DIPHENHYDRAMINE HCL 25 MG
25 TABLET ORAL ONCE
Status: COMPLETED | OUTPATIENT
Start: 2023-08-15 | End: 2023-08-15

## 2023-08-15 RX ORDER — MEPERIDINE HYDROCHLORIDE 25 MG/ML
12.5 INJECTION INTRAMUSCULAR; INTRAVENOUS; SUBCUTANEOUS PRN
OUTPATIENT
Start: 2023-09-10

## 2023-08-15 RX ORDER — DIPHENHYDRAMINE HCL 25 MG
25 TABLET ORAL ONCE
OUTPATIENT
Start: 2023-09-10 | End: 2023-09-10

## 2023-08-15 RX ORDER — ALBUTEROL SULFATE 90 UG/1
4 AEROSOL, METERED RESPIRATORY (INHALATION) PRN
OUTPATIENT
Start: 2023-09-10

## 2023-08-15 RX ORDER — EPINEPHRINE 1 MG/ML
0.3 INJECTION, SOLUTION, CONCENTRATE INTRAVENOUS PRN
OUTPATIENT
Start: 2023-09-10

## 2023-08-15 RX ORDER — SODIUM CHLORIDE 9 MG/ML
5-250 INJECTION, SOLUTION INTRAVENOUS PRN
OUTPATIENT
Start: 2023-09-10

## 2023-08-15 RX ORDER — ONDANSETRON 2 MG/ML
8 INJECTION INTRAMUSCULAR; INTRAVENOUS
OUTPATIENT
Start: 2023-09-10

## 2023-08-15 RX ADMIN — IMMUNE GLOBULIN (HUMAN) 90 G: 10 INJECTION INTRAVENOUS; SUBCUTANEOUS at 09:56

## 2023-08-15 RX ADMIN — ACETAMINOPHEN 650 MG: 325 TABLET ORAL at 09:30

## 2023-08-15 RX ADMIN — DIPHENHYDRAMINE HYDROCHLORIDE 25 MG: 25 TABLET ORAL at 09:30

## 2023-08-15 NOTE — PROGRESS NOTES
Pt arrived to [facility] and PIV inserted. Pt premedicated as ordered. GAMUNEX-C 90g administered. Pt monitored for 30 minutes after. Pt tolerated well.      Electronically signed by Zaynab Velasco RN on 8/15/2023 at 1:17 PM

## 2023-08-15 NOTE — DISCHARGE INSTRUCTIONS
immune globulin (intravenous and subcutaneous)  Pronunciation:  jakob OREN murcia  Brand:  Gammagard Liquid, Gammaked, Gamunex-C  What is the most important information I should know about immune globulin? This medicine can cause blood clots. The risk is highest in older adults or in people who have had blood clots, heart problems, or blood circulation problems. Blood clots are also more likely during long-term bedrest, while using birth control pills or hormone replacement therapy, or while having a central intravenous (IV) catheter in place. Call your doctor at once if you have chest pain, trouble breathing, fast heartbeats, numbness or weakness, or swelling and warmth or discoloration in an arm or leg. This medicine can also harm your kidneys, especially if you have kidney disease or if you also use certain medicines. Tell your doctor right away if you have signs of kidney problems, such as swelling, rapid weight gain, and little or no urination. What is immune globulin? Immune globulin intravenous and subcutaneous (for injection into a vein or under the skin) is used to treat primary immunodeficiency. Immune globulin is also used to increase platelets (blood clotting cells) in people with idiopathic thrombocytopenic purpura. Immune globulin is also used to treat certain debilitating nerve disorders that cause muscle weakness and can affect daily activities. Immune globulin may also be used for purposes not listed in this medication guide. What should I discuss with my healthcare provider before using immune globulin? You should not use this medicine if:  you have had an allergic reaction to an immune globulin or blood product; or  you have immune globulin A (IgA) deficiency with antibody to IgA. Immune globulin can cause blood clots or kidney problems, especially in older adults or in people with certain conditions.  Tell your doctor if you have ever had:  heart problems, blood circulation

## 2023-08-21 ENCOUNTER — CLINICAL DOCUMENTATION (OUTPATIENT)
Facility: HOSPITAL | Age: 83
End: 2023-08-21

## 2023-08-21 ENCOUNTER — APPOINTMENT (OUTPATIENT)
Dept: CT IMAGING | Facility: HOSPITAL | Age: 83
End: 2023-08-21
Payer: MEDICARE

## 2023-08-21 ENCOUNTER — LAB (OUTPATIENT)
Dept: LAB | Facility: HOSPITAL | Age: 83
End: 2023-08-21
Payer: MEDICARE

## 2023-08-21 DIAGNOSIS — C34.32 PRIMARY ADENOCARCINOMA OF LOWER LOBE OF LEFT LUNG: ICD-10-CM

## 2023-08-21 LAB
ALBUMIN SERPL-MCNC: 4 G/DL (ref 3.5–5.2)
ALBUMIN/GLOB SERPL: 1.3 G/DL
ALP SERPL-CCNC: 72 U/L (ref 39–117)
ALT SERPL W P-5'-P-CCNC: 26 U/L (ref 1–41)
ANION GAP SERPL CALCULATED.3IONS-SCNC: 8 MMOL/L (ref 5–15)
AST SERPL-CCNC: 24 U/L (ref 1–40)
BASOPHILS # BLD AUTO: 0.02 10*3/MM3 (ref 0–0.2)
BASOPHILS NFR BLD AUTO: 0.3 % (ref 0–1.5)
BILIRUB SERPL-MCNC: 0.3 MG/DL (ref 0–1.2)
BUN SERPL-MCNC: 11 MG/DL (ref 8–23)
BUN/CREAT SERPL: 13.6 (ref 7–25)
CALCIUM SPEC-SCNC: 9.1 MG/DL (ref 8.6–10.5)
CEA SERPL-MCNC: 4.15 NG/ML
CHLORIDE SERPL-SCNC: 101 MMOL/L (ref 98–107)
CO2 SERPL-SCNC: 29 MMOL/L (ref 22–29)
CREAT SERPL-MCNC: 0.81 MG/DL (ref 0.76–1.27)
DEPRECATED RDW RBC AUTO: 51.3 FL (ref 37–54)
EGFRCR SERPLBLD CKD-EPI 2021: 86.9 ML/MIN/1.73
EOSINOPHIL # BLD AUTO: 0.01 10*3/MM3 (ref 0–0.4)
EOSINOPHIL NFR BLD AUTO: 0.2 % (ref 0.3–6.2)
ERYTHROCYTE [DISTWIDTH] IN BLOOD BY AUTOMATED COUNT: 14 % (ref 12.3–15.4)
FERRITIN SERPL-MCNC: 47.78 NG/ML (ref 30–400)
FOLATE SERPL-MCNC: 13.1 NG/ML (ref 4.78–24.2)
GLOBULIN UR ELPH-MCNC: 3 GM/DL
GLUCOSE SERPL-MCNC: 130 MG/DL (ref 65–99)
HCT VFR BLD AUTO: 42.3 % (ref 37.5–51)
HGB BLD-MCNC: 12.9 G/DL (ref 13–17.7)
IMM GRANULOCYTES # BLD AUTO: 0.04 10*3/MM3 (ref 0–0.05)
IMM GRANULOCYTES NFR BLD AUTO: 0.6 % (ref 0–0.5)
IRON 24H UR-MRATE: 100 MCG/DL (ref 59–158)
IRON SATN MFR SERPL: 30 % (ref 20–50)
LYMPHOCYTES # BLD AUTO: 0.69 10*3/MM3 (ref 0.7–3.1)
LYMPHOCYTES NFR BLD AUTO: 11 % (ref 19.6–45.3)
MCH RBC QN AUTO: 30.4 PG (ref 26.6–33)
MCHC RBC AUTO-ENTMCNC: 30.5 G/DL (ref 31.5–35.7)
MCV RBC AUTO: 99.8 FL (ref 79–97)
MONOCYTES # BLD AUTO: 0.34 10*3/MM3 (ref 0.1–0.9)
MONOCYTES NFR BLD AUTO: 5.4 % (ref 5–12)
NEUTROPHILS NFR BLD AUTO: 5.17 10*3/MM3 (ref 1.7–7)
NEUTROPHILS NFR BLD AUTO: 82.5 % (ref 42.7–76)
NRBC BLD AUTO-RTO: 0 /100 WBC (ref 0–0.2)
PLATELET # BLD AUTO: 174 10*3/MM3 (ref 140–450)
PMV BLD AUTO: 9 FL (ref 6–12)
POTASSIUM SERPL-SCNC: 4.1 MMOL/L (ref 3.5–5.2)
PROT SERPL-MCNC: 7 G/DL (ref 6–8.5)
RBC # BLD AUTO: 4.24 10*6/MM3 (ref 4.14–5.8)
SODIUM SERPL-SCNC: 138 MMOL/L (ref 136–145)
TIBC SERPL-MCNC: 335 MCG/DL (ref 298–536)
TRANSFERRIN SERPL-MCNC: 225 MG/DL (ref 200–360)
VIT B12 BLD-MCNC: 279 PG/ML (ref 211–946)
WBC NRBC COR # BLD: 6.27 10*3/MM3 (ref 3.4–10.8)

## 2023-08-21 PROCEDURE — 84466 ASSAY OF TRANSFERRIN: CPT

## 2023-08-21 PROCEDURE — 82746 ASSAY OF FOLIC ACID SERUM: CPT

## 2023-08-21 PROCEDURE — 82378 CARCINOEMBRYONIC ANTIGEN: CPT

## 2023-08-21 PROCEDURE — 83540 ASSAY OF IRON: CPT

## 2023-08-21 PROCEDURE — 36415 COLL VENOUS BLD VENIPUNCTURE: CPT

## 2023-08-21 PROCEDURE — 80053 COMPREHEN METABOLIC PANEL: CPT

## 2023-08-21 PROCEDURE — 82728 ASSAY OF FERRITIN: CPT

## 2023-08-21 PROCEDURE — 85025 COMPLETE CBC W/AUTO DIFF WBC: CPT

## 2023-08-21 PROCEDURE — 82607 VITAMIN B-12: CPT

## 2023-08-21 NOTE — PROGRESS NOTES
Patient Assistance        Navigator Type: Infusion  Documentation Type: New Patient  Contact Type: No Contact  Navigation Status: New Patient  Status of Patient Insurance Coverage: Patient has active coverage          Additional notes: Reviewed chart and no assistance is needed. Patient had Medicare and a supplement and does not need assistance.

## 2023-08-22 ENCOUNTER — TELEPHONE (OUTPATIENT)
Dept: ONCOLOGY | Facility: CLINIC | Age: 83
End: 2023-08-22
Payer: MEDICARE

## 2023-08-22 RX ORDER — CYANOCOBALAMIN/FOLIC AC/VIT B6 1-2.5-25MG
1 TABLET ORAL DAILY
Qty: 30 TABLET | Refills: 0 | Status: SHIPPED | OUTPATIENT
Start: 2023-08-22

## 2023-08-22 NOTE — PROGRESS NOTES
[facility]OGY  [address]  [facility] [address] [phone]    Patient Name: Carmine Garcia  Encounter Date: 08/28/2023  YOB: 1940  Patient Number: [id number]       REASON FOR VISIT: Mr. 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 He is seen 141 months since thoracoscopic surgical resection of the lung neoplasm. He is also followed for anemia of iron deficiency and B12 deficiency. It has been 69 months since last receipt of Injectafer.   On 6/21/23- PET-CT- showed a recurrent left lung neoplasm an isolated finding (15 mm left lung nodule adjacent to the hilum shows FDG uptake with SUV max = 3.3).  He has subsequently received definitive SBRT: 8/1/23- 8/14/23- 6000 cGy/5 fx) to NISA nodule.  He is here with his daughter, Monica MEJIA have reviewed the HPI and verified with the patient the accuracy of it. No changes to interval history since the information was documented. Cem Arteaga MD 08/28/23      DIAGNOSTIC ABNORMALITIES:   CT chest, 10/20/2011, [facility]: 1.5 cm left lower lobe nodule positioned just lateral to the hilum - granuloma versus malignancy. No pathologic lymphadenopathy.  PET CT scan, 10/26/2011: Increased metabolic activity in the left lower lobe. Lung nodule located centrally near the left hilum with maximum SUV of 7.4 units, suspicious for primary carcinoma. This corresponded to the lesion seen on CT scan of 10/20/2011. No abnormal mediastinal, hilar, or inguinal activity noted.  Diagnostic bronchoscopy, 11/14/2011: Negative for malignancy. There were no endobronchial lesions.  Labs, 11/29/2011: Ferritin 78.7, B12 235, folate 6.7, CEA 2.2, serum iron 16, TIBC 196 (225 to 420), iron saturation 8.2%.  Thyroid ultrasound, 11/30/2011. No thyroid nodule identified.  Labs,  12/01/2011: Serum cortisol 21.7. Urine sodium less than 5, serum osmolality 680 (601 to 850).  Left lower lobe lung biopsy, 11/28/2011, [facility]: Well-differentiated papillary adenocarcinoma (1.5 cm in greatest dimension).  Immunohistochemistry analysis, 11/30/2011: Tumor immunoreactive with TTF-1 and Napsin A while negative for thyroglobulin. Results support primary lung adenocarcinoma.  Labs, 01/03/2012: GFR 69.9. Ferritin 54, iron 97, TIBC 301, iron sat 32. B12 352, folate 16.4.   Chest x-ray 09/09/2015 at [facility]. Stable postoperative chest x-ray.  Labs, 03/27/2017: Hemoglobin 13.8, FZE668.6,  (313 to 618), TSH 0.7, T4 of 6.9 (each normal),   Comprehensive blood report, 03/27/2017: Mild anemia with history of macrocytosis. COMPREHENSIVE DIAGNOSIS. Review of peripheral blood smear: Very mild anemia with red blood cells showing fairly normal morphology at the time of this peripheral blood specimen. Normal white blood cell and platelet counts and morphology. No abnormal populations detected on flow cytometric studies. See comment. COMPREHENSIVE COMMENT. This patient's recent peripheral blood specimen demonstrating a mild macrocytosis with an MCV of 100.6 is noted. At the time of this current peripheral blood specimen, his MCV is within normal limits at 94.3. He has a minimal anemia. White blood cell and platelet counts are both within normal limits with normal morphology. Causes of these peripheral blood findings and macrocytosis could be liver disease, thyroid disease, vitamin/nutritional deficiencies and drugs/toxins. Correlation recommended.  -6/14/23- CT chest- New 1.5 cm central LEFT upper lobe pulmonary nodule. This is concerning for recurrent neoplasm. Management options include PET/CT versus sampling with EBUS.  -6/21/23- PET-CT- PET-CT findings compatible with recurrent left lung neoplasm an isolated finding (15 mm left lung nodule adjacent to the hilum shows FDG uptake with SUV max = 3.3).   No distant disease is seen.      PREVIOUS INTERVENTIONS:   Left thoracoscopic core needle biopsy, followed by video-assisted thoracoscopic surgery (VATS) left lower lobectomy, and mediastinal lymph node dissection, 11/28/2011: Pathology from the left lower lung nodule lung biopsy showed no histologic evidence of malignancy. Well differentiated papillary adenocarcinoma (1.5 cm in greatest diminish. All resection margins are free of malignancy. No visceral pleural invasion identified. Three peribronchial lymph nodes negative for metastatic carcinoma. Emphysematous changes. Level 10 (1 lymph node), Level 11 (1 lymph node), Level 5 (2 lymph nodes) all negative for metastatic carcinoma. 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 Special stains are pending.  Venofer 200 mg IV daily, 11/30/2011 through 12/03/2011 (800 mg); then 02/17/2014 through 02/24/2014 (800 mg).  B12 at 1000 mcg IM beginning 11/30/2011 through 12/06/2011 (1000 mg).  Foltx p.o. daily beginning 02/12/2014 through 01/14/2015.  Injectafer 750 mg, 12/01/2017; 11/01/2021  -SBRT: 8/1/23- 8/14/23- 6000 cGy/5 fx) to NISA nodule    Problem List Items Addressed This Visit          Other    Primary adenocarcinoma of lower lobe of left lung - Primary       Oncology/Hematology History   Primary adenocarcinoma of lower lobe of left lung   10/20/2011 Imaging    CT Chest:  1.5 cm left lower lobe nodule positioned just lateral to the hilum, granuloma vs. Malignancy  No pathologic lymphadenopathy     10/26/2011 Imaging    PET/CT:  Increased metabolic activity in the left lower lobe, lung nodule located centrally near the left hilum witih max SUV 7.4, suspicious for primary carcinoma. This corresponds to the lesion seen on CT scan of 10/20/2011.   No abnormal mediastinal, hilar, or inguinal activity     11/14/2011 Biopsy    Final Diagnosis   1.     Biopsy, left lower lobe nodule, lung:              A.     Fragments of bronchial mucosa with submucosa  demonstrating   stromal elastosis and minimal chronic inflammation.        B.     No evidence of granulomatous inflammation.        C.     No histologic evidence of malignancy.      2.     Bronchial washings, smears (four) and cell block:         A.     Mild acute inflammation.        B.     Negative for malignant cells.      3.     Bronchial brushings, left lower lobe of lung, smears (3) and ThinPrep   preparation (1):         Negative for malignant cells.        11/28/2011 Surgery    Final Diagnosis        1.     Biopsy, left lower lobe of lung (frozen section control):              A.     Fragment of fibrotic pulmonary parenchyma demonstrating stromal   elastosis and chronic active inflammation, moderate.        B.     No histologic evidence of malignancy.      2.     Level 10 lymph node (frozen section control):         Lymph node (one), negative for metastatic carcinoma.      3.     Left lower lobe of lung (frozen section control):         A.     Well-differentiated papillary adenocarcinoma (1.5 cm in greatest   dimension).        B.     The dominant and aberrant bronchial margins of surgical resection   are negative for malignancy.         C.     The vascular and parenchymal margins of surgical resection are   negative for malignancy.         D.     No visceral pleural invasion identified.         E.     Peribronchial lymph nodes (three), negative for metastatic   carcinoma.         F.     Emphysematous changes.      4.     Level 11 lymph node:   Lymph node (one), negative for metastatic   carcinoma.      5.     Level 5 lymph nodes:    Lymph nodes (two), negative for metastatic   carcinoma.      Comment:     Primary papillary adenocarcinomas of the lung do occur; however, they demonstrate a similar histology to that seen in papillary thyroid carcinoma.  The thyroid gland should be evaluated if not already   done to exclude the possibility of met        4/30/2014 Imaging    CXR:  SUMMARY:  1. Stable chronic  change.  2. No acute disease.     9/3/2014 Imaging    CXR:  IMPRESSION-   1. Emphysematous and postoperative changes in the chest without evidence  of mass lesion or acute cardiopulmonary process.     1/5/2016 Imaging    CXR:  IMPRESSION   Postoperative changes of the left hemithorax with associated volume  loss. No acute process identified.     7/1/2016 Imaging    CXR:  IMPRESSION-   1. No active disease is seen.  2. COPD/emphysema.  3. Prior lung surgery on the left.      1/4/2017 Imaging    CXR:  IMPRESSION:  1. COPD/emphysema.  2. Postoperative changes on the left.  3. No acute appearing infiltrate or effusion.      3/20/2017 Imaging    CXR:  IMPRESSION:  1. Chronic changes in the lungs and spine without evidence of acute  cardiopulmonary process. Overall, the appearance of the lungs is similar  to the study from 01/04/2017.     11/20/2017 Imaging    CXR:  Impression:  No significant interval change when compared to chest x-ray dated  07/03/2017.     2/12/2018 Imaging    CXR:  IMPRESSION:  1. Hyperinflated lungs suggesting COPD.  2. Postoperative changes of the left hemithorax.  3. No acute cardiopulmonary process identified.     7/6/2018 Imaging    CXR:  Impression:  No acute cardiopulmonary disease.     12/21/2018 Imaging    CXR:  IMPRESSION:  COPD. Chronic volume loss in the lung bases with mild  central vascular crowding. No acute infiltrates.     8/23/2019 Imaging    CXR:  IMPRESSION:  1. No acute disease.     6/19/2020 Imaging    CXR:  IMPRESSION:  1. Mild blunting of the left costophrenic angle, which could be seen  with scarring or small effusion.     2/22/2021 Imaging    CXR:  IMPRESSION:  1. COPD no acute cardiopulmonary process.     10/25/2021 Imaging    CXR:  IMPRESSION:  1. Streaky bibasilar opacities, similar to prior.     4/25/2022 Imaging    CT Chest:  IMPRESSION:  1. Status post resection of a left lower lobe pulmonary nodule. Moderate  changes of centrilobular emphysema are present.  2. Small  groundglass nodules one within the left upper lobe and one  within the periphery of the right lower lobe warranting follow-up per  Fleischner criteria. No additional lung lesions are present.  3. No enlarged mediastinal or axillary lymphadenopathy..  4. 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 The  thoracic aorta is ectatic. There is mild enlargement of the pulmonary  arteries suggesting pulmonary arterial hypertension. Heavy coronary  calcifications are present.         7/20/2022 Imaging    CT Chest:  IMPRESSION:  1.  Postoperative change of LEFT lower lobectomy without evidence of  recurrent or metastatic disease.   2.  No change in 7 mm LEFT upper lobe and RIGHT lower lobe groundglass  pulmonary nodules. Consider continued imaging surveillance to document  stability.  3.  Ectatic ascending aorta measuring 4 cm diameter.     12/21/2022 Imaging    CXR:  IMPRESSION:  1. Stable chronic change.  2. No acute disease.     1/27/2023 Imaging    CT Chest:  IMPRESSION:  1. A stable CT scan of the chest. Postsurgical changes/left lower  lobectomy. No finding to suggest recurrence.  2. A stable small groundglass opacity/nodule in the left upper lobe. No  features of malignancy at this time. A follow-up examination in one year  is recommended to ensure stability.  3. Chronic emphysematous lung changes.     6/14/2023 Imaging    CT Chest:  IMPRESSION:  New 1.5 cm central LEFT upper lobe pulmonary nodule. This is concerning  for recurrent neoplasm. Management options include PET/CT versus  sampling with EBUS.     6/21/2023 Imaging    PET/CT:  Summary:  1. PET-CT findings compatible with recurrent left lung neoplasm as an  isolated finding. No distant disease is seen.     6/22/2023 Procedure    Documentation per Dr. Arteaga:  Patient informed per Dr Arteaga recommendations a referral should be made to Rad/Onc for evaluation and possible SBRT  Patient informed once the apt is dom[place] he will be called  with time and date          PAST MEDICAL HISTORY:  ALLERGIES:  Allergies   Allergen Reactions    Penicillins Unknown - High Severity     Unknown       CURRENT MEDICATIONS:  Outpatient Encounter Medications as of 8/28/2023   Medication Sig Dispense Refill    acetaminophen (TYLENOL) 500 MG tablet Take 1 tablet by mouth Every 6 (Six) Hours As Needed for Mild Pain.      alfuzosin (UROXATRAL) 10 MG 24 hr tablet Take 2 tablets by mouth.      atenolol (TENORMIN) 25 MG tablet 1 tablet Daily.      busPIRone (BUSPAR) 5 MG tablet Take 1 tablet by mouth 2 (Two) Times a Day.      diphenhydrAMINE-acetaminophen (Tylenol PM Extra Strength)  MG tablet per tablet Take 1 tablet by mouth every night at bedtime.      fluticasone (FLONASE) 50 MCG/ACT nasal spray 1 spray 2 (Two) Times a Day.      folic acid-vit B6-vit B12 (Folbee) 2.5-25-1 MG tablet tablet Take 1 tablet by mouth Daily. 30 tablet 0    folic acid-vit B6-vit B12 (Folbee) 2.5-25-1 MG tablet tablet Take 1 tablet by mouth Daily. 30 tablet 0    furosemide (LASIX) 20 MG tablet Take 2 tablets by mouth As Needed. For swelling      LORazepam (Ativan) 1 MG tablet Take 30 minute before PET scan 1 tablet 0    losartan (COZAAR) 25 MG tablet 1 tablet 2 (Two) Times a Day.      lovastatin (MEVACOR) 40 MG tablet Take 1 tablet by mouth.      Multiple Vitamins-Minerals (PRESERVISION AREDS 2+MULTI VIT) capsule Take  by mouth.      pantoprazole (PROTONIX) 40 MG EC tablet 1 tablet Daily.      predniSONE (DELTASONE) 10 MG tablet 1 tablet Daily. With the 20 mg      predniSONE (DELTASONE) 20 MG tablet 0.5 tablets Daily.      pyridostigmine (MESTINON) 60 MG tablet 1 tablet 4 (Four) Times a Day.      sodium chloride 1 g tablet 1 tablet 2 (Two) Times a Day.      vitamin D (ERGOCALCIFEROL) 1.25 MG (08271 UT) capsule capsule Take 1 capsule by mouth 1 (One) Time Per Week.       No facility-administered encounter medications on file as of 8/28/2023.     ADULT ILLNESSES:   Lung cancer ( Stage Date:  11/28/2011, Stage IA (T1a, N0, M0)-Pathological  Date of Dx:2011 ; ICD-10:C34.32 ;Malignant neoplasm of lower lobe, left bronchus or lung )   Acute bronchitis ( ICD-10:J20.9 ;Acute bronchitis, unspecified   Cataracts ( ICD-10:H26.9 ;Unspecified cataract   Colonic polyps ( hyperplastic, 02/2010; ICD-10:K63.5 ;Polyp of colon )   Constipation ( ICD-10:K59.00 ;Constipation, unspecified   Former smoker ( ICD-10:Z87.891 ;Personal history of nicotine dependence   High carcinoembryonic antigen level ( ICD-10:R97.0 ;Elevated carcinoembryonic antigen [CEA]   Hyperlipidemia ( ICD-10:E78.5 ;Hyperlipidemia, unspecified   Hypertension ( ICD-10:I10 ;Essential (primary) hypertension   Hyperthyroidism ( ICD-10:E05.90 ;Thyrotoxicosis, unspecified without thyrotoxic crisis or storm   Iron deficiency anemia ( ICD-10:D50.9 ;Iron deficiency anemia, unspecified   Leukocytosis ( ICD-10:D72.829 ;Elevated white blood cell count, unspecified   Macrocytosis ( ICD-10:D75.89 ;Other specified diseases of blood and blood-forming organs   Malabsorption syndrome (disorder)   Peptic ulcers ( ICD-10:K27.7 ;Chronic peptic ulcer, site unspecified, without hemorrhage or perforation   Retention of urine ( postoperative following eye surgery, 10/2011; ICD-10:R33.9 ;Retention of urine, unspecified )   Strabismus (eye condition) ( ICD-10:H50.9 ;Unspecified strabismus   Vitamin B12 deficiency ( ICD-10:E53.8 ;Deficiency of other specified B group vitamins  Right leg edema. Venous Doppler of right lower extremity on 11/28/2017 ([facility]). Impression: There is no evidence of deep venous thrombosis or superficial thrombophlebitis of the right lower extremity.      SURGERIES:   Excision of non-melanoma skin cancer, 01/2012. Dr. Medrano   Video-assisted thoracoscopic (VATS) left lower lobectomy, 11/28/2011. Dr. Troy   Partial gastrectomy and vagotomy   Total knee replacement, right, 05/18/2017. Dr. Jarrett   Cataract surgery, 2004   Incision and  drainage of abscess, left groin, (I&D) in early 01/2014. Dr. Velarde   Strabismus surgery, left eye, 2011.   Colonoscopy on 4/10/18 ([facility]). Impressions: Preparation of the colon was inadequate. One 12 mm polyp in the cecum, removed with a hot snare and removed piecemeal using a hot snare. Resected and retrieved. Diverticulosis in the sigmoid colon and in the descending colon    ADULT ILLNESSES:  Patient Active Problem List   Diagnosis Code    Hx of colonic polyps Z86.010    Obesity, unspecified obesity severity, unspecified obesity type E66.9    History of colon polyps Z86.010    Primary adenocarcinoma of lower lobe of left lung C34.32    Iron deficiency anemia D50.9    B12 deficiency E53.8    Essential hypertension I10    Pulmonary emphysema J43.9    Slow transit constipation K59.01    Urinary retention R33.9    Former smoker Z87.891     SURGERIES:  Past Surgical History:   Procedure Laterality Date    CATARACT EXTRACTION      COLONOSCOPY  12/12/2014    Diverticulosis repeat exam in 3 years    COLONOSCOPY N/A 04/10/2018    Tubular adenoma cecum poor prep repeat in 3 months    COLONOSCOPY N/A 08/02/2018    2 Tubular adenomas transverse and ascending colon fair prep repeat in 3 years    COLONOSCOPY N/A 03/24/2022    Procedure: COLONOSCOPY WITH ANESTHESIA;  Surgeon: Ferdinand Cross MD;  Location: [facility] ENDOSCOPY;  Service: Gastroenterology;  Laterality: N/A;  pre hx colon polyps  post diverticulosis; inadequate prep  Efrain Dobson, DO    COLONOSCOPY W/ POLYPECTOMY  02/15/2010    2 Hyperplastic polyps at 25 cm and rectum repeat exam in 5 years    LUNG CANCER SURGERY      LUNG REMOVAL, PARTIAL      REPLACEMENT TOTAL KNEE Right 05/2017     HEALTH MAINTENANCE ITEMS:  Health Maintenance Due   Topic Date Due    TDAP/TD VACCINES (1 - Tdap) Never done    ZOSTER VACCINE (1 of 2) 04/06/2013    COVID-19 Vaccine (5 - Moderna risk series) 02/08/2022       <no information>  Last Completed Colonoscopy       This  "patient has no relevant Health Maintenance data.          Immunization History   Administered Date(s) Administered    COVID-19 (MODERNA) 1st,2nd,3rd Dose Monovalent 2021, 2021, 2021    COVID-19 (PFIZER) Purple Cap Monovalent 2021    Flu Vaccine Split Quad 10/11/2018    INFLUENZA SPLIT TRI 10/13/2014    Influenza Injectable Mdck Pf Quad 10/14/2021, 10/13/2022    Influenza TIV (IM) 2019, 10/28/2020    Influenza, Unspecified 10/14/2021, 10/13/2022    Pneumococcal Conjugate 13-Valent (PCV13) 2018    Pneumococcal Polysaccharide (PPSV23) 2011, 10/21/2013    Zostavax 2013     Last Completed Mammogram       This patient has no relevant Health Maintenance data.              FAMILY HISTORY:  Family History   Problem Relation Age of Onset    Colon polyps Mother     Stroke Mother     Diabetes Mother     Hypertension Mother     Cancer Father         asbestos    Stroke Maternal Grandmother     No Known Problems Maternal Grandfather     No Known Problems Paternal Grandmother     No Known Problems Paternal Grandfather     Diabetes Child     Colon cancer Neg Hx      SOCIAL HISTORY:  Social History     Socioeconomic History    Marital status:    Tobacco Use    Smoking status: Former     Packs/day: 2.00     Years: 30.00     Pack years: 60.00     Types: Cigarettes     Start date: 1955     Quit date: 1984     Years since quittin.6     Passive exposure: Past    Smokeless tobacco: Never   Vaping Use    Vaping Use: Never used   Substance and Sexual Activity    Alcohol use: No    Drug use: No    Sexual activity: Not Currently     Partners: Female       REVIEW OF SYSTEMS:  Constitutional:   The patient's appetite is good. \"Eating good but quit the snacks.\" His energy remains chronically low to fair. \"Same, I'm still not very active.\"  He remains sedentary.  He says has been able to walk better since the knee surgery (right knee arthroplasty, 2017) and has not needed a " "cane at home.  \"I have it for distance.\" Says the lower back pains and breathlessness slow him down.  \"Better since losing weight.\" He manages his personal ADLs, some chores, running errands and driving. He has lost 10 lb (in addition to 45 lb at his 3 prior visits) in the interval. Says he lost weight previously due to covid pneumonia for which he was admitted to [facility] for 2 weeks last 12/2022.  He lives with his daughter and her spouse. He denies fevers, chills, or drenching night sweats. His sleep habits had been appropriate.  Covid vaccines x 2, 04/2021 and booster last 12/2021  Ear/Nose/Throat/Mouth:   He reports some sinus congestion and postnasal drainage but no ear pains or sore throat.  He has not had nosebleeds. No sore tongue. He has no headaches. He denies any hoarseness, change in voice quality.  Ocular:   He reports recent bouts of diplopia, no eye pain, significant change in visual acuity, or blurry vision.  Respiratory:   He has baseline exertional dyspnea and says he is still sometimes short of breath with some of his routine activities but not worse overall. Has intermittent cough from postnasal drainage.  He denies shortness of breath at rest. He has postnasal drainage associated cough.   Cardiovascular:   He reports no exertional chest pain, chest pressure, or chest heaviness. He reports no claudication. He reports no palpitations or symptomatic orthostasis.  Gastrointestinal:   He reports no dysphagia, nausea, vomiting, postprandial abdominal pain, bloating, cramping, change in bowel habits, with no dark stools (oral iron intolerant). He reports no rectal bleeding. He reports no more constipation on \"a proprer diet.\"  No need for stool softeners (Dulcolax) without laxatives. He has no diarrhea. Repeat c-scope 03/24/22.  Diverticulosis.  Repeat 3 prn. He says that he has elected not to do anymore. \"At my age.\"  Genitourinary:   He denies prior problems with urinary burning, frequency, dribbling, " "or discoloration. He denies difficulty controlling his bladder.   Musculoskeletal:   He has chronic arthralgias.  The right knee feels more stable since surgery.  Has not needed a cane to walk unless he goes long distances.  He has occasional \"aches\" of the hips and his shoulders. He denies myalgias or nighttime leg cramping.  Extremities:   He normally has no fluid retention though has lingering mild right = left ankle/leg swelling.   Endocrine:   He reports no problems with excess thirst, excessive urination, vasomotor instability, or unexplained fatigue.  Heme/Lymphatic:   He reports easy bruising but no unexplained bleeding, petechial rashes, or swollen glands.  Neuro:   He reports no loss of consciousness, seizures, fainting spells, or dizziness. He reports no weakness of his face, arms, or legs. He has no difficulty with speech. He has no tremors. He reports occasional left = right foot tingling.   Psych:   He seems generally satisfied with life. He denies depression/anxiety. He reports no mood swings.       VITAL SIGNS: /80   Pulse 86   Temp 97.9 øF (36.6 øC) (Temporal)   Resp 18   Ht 182.9 cm (72\")   Wt 90.4 kg (199 lb 3.2 oz)   SpO2 95%   BMI 27.02 kg/mý Body surface area is 2.13 meters squared.  Pain Score    08/28/23 1043   PainSc: 0-No pain             PHYSICAL EXAMINATION:   General:   He is a pleasant, obese, and modestly-kept elderly male who appears comfortable while seated. He appears to be his stated age. His skin color is normal. He is ambulatory without a cane today.  ECOG PS = 1-2 (\"still about 50-50\")  Head/Neck:   The patient is anicteric and atraumatic. The trachea is midline. The neck is supple without evidence of jugular venous distention or cervical adenopathy.  Eyes:   The pupils are equal, round, and reactive to light. The extraocular movements are full. There is no scleral jaundice or erythema.  Chest:   Breath sounds are diminished, left > right. There are no wheezes, " rhonchi, or rales. The right thoracotomy wounds are healed.  Cardiovascular:   The patient has a regular cardiac rate and rhythm without murmurs, rubs, or gallops. The peripheral pulses are equal and full.  Abdomen:   The belly is soft and globose. There is no rebound, guarding, organomegaly, mass-effect, or tenderness. Bowel sounds are hypoactive but present.  Extremities:   There is no evidence of cyanosis or clubbing. There is no ankle/ leg edema (prior: trace-1+) but no calf tenderness.   Rheumatologic:   There is some evidence of osteoarthritic deformities of the hands. The gait is slow and stooped but is independent and not cane supported.  Cutaneous:   There are no overt rashes, disseminated lesions, purpura, or petechiae.  Lymphatics:   There is no evidence of adenopathy in the cervical, supraclavicular, or axillary areas.  Neurologic:   The patient is alert, oriented, cooperative, and pleasant. He is appropriately conversant. There is no overt impairment with no overt focal motor deficits.  Psych:   Mood and affect are appropriate for circumstance. Eye contact is appropriate.        LABS    Lab Results - Last 18 Months   Lab Units 08/21/23  1049 08/15/23  1023 04/26/23  1126 01/08/23  1035 12/31/22  0302 12/30/22  0324 11/10/22  1629 10/26/22  1120 04/25/22  1030   HEMOGLOBIN g/dL 12.9* 13.3* 12.6* 9.7* 9.7* 11.1*   < > 14.0 13.9   HEMATOCRIT % 42.3 41.1* 39.9 30.2* 30.5* 32.4*   < > 43.0 41.3   MCV fL 99.8* 98.6* 101.8* 101.0* 101.7* 96.1*   < > 98.2* 96.9   WBC 10*3/mm3 6.27 6.4 7.25 9.37 15.3* 13.2*   < > 6.08 4.54   RDW % 14.0 13.7 13.0 14.7 12.9 12.8   < > 12.5 12.5   MPV fL 9.0 10.1 9.3 9.0 9.2* 8.6*   < > 8.8 9.3   PLATELETS 10*3/mm3 174 213 247 241 216 233   < > 235 209   IMM GRAN % % 0.6*  --  0.4 1.3*  --   --   --  0.3 0.4   NEUTROS ABS 10*3/mm3 5.17 5.2 5.66 8.45* 14.3* 12.2*   < > 3.93 2.57   LYMPHS ABS 10*3/mm3 0.69* 0.7* 1.04 0.43* 0.6* 0.6*   < > 1.30 1.32   MONOS ABS 10*3/mm3 0.34 0.40 0.48  0.34 0.30 0.10   < > 0.74 0.54   EOS ABS 10*3/mm3 0.01 0.00 0.02 0.02 0.00 0.00   < > 0.06 0.07   BASOS ABS 10*3/mm3 0.02 0.00 0.02 0.01 0.00 0.00   < > 0.03 0.02   IMMATURE GRANS (ABS) 10*3/mm3 0.04 0.0 0.03 0.12* 0.2 0.2   < > 0.02 0.02   NRBC /100 WBC 0.0  --  0.0 0.0  --   --   --  0.0 0.0    < > = values in this interval not displayed.       Lab Results - Last 18 Months   Lab Units 08/21/23  1049 06/14/23  1417 04/26/23  1126 01/27/23  1337 01/08/23  1035 12/26/22  1008 12/25/22  1814 12/25/22  1051 12/24/22  0939 12/24/22  0456 11/11/22  1802 10/26/22  1120 07/05/22  0916 04/25/22  1050 04/25/22  1030   GLUCOSE mg/dL 130*  --  105*  --  113*  --   --   --   --   --   --  120*  --   --  112*   SODIUM mmol/L 138  --  138  --  135* 121* 120* 121*   < >  --    < > 133*  --   --  135*   POTASSIUM mmol/L 4.1  --  4.3  --  3.9  --   --   --   --  3.5  --  4.0  --   --  4.2   CO2 mmol/L 29.0  --  27.0  --  26.0  --   --   --   --   --   --  28.0  --   --  27.0   CHLORIDE mmol/L 101  --  103  --  100  --   --   --   --   --   --  96*  --   --  99   ANION GAP mmol/L 8.0  --  8.0  --  9.0  --   --   --   --   --   --  9.0  --   --  9.0   CREATININE mg/dL 0.81 0.90 0.89 1.00 0.81  --   --   --   --   --   --  0.96  --    < > 0.82   BUN mg/dL 11  --  10  --  14  --   --   --   --   --   --  11  --   --  9   BUN / CREAT RATIO  13.6  --  11.2  --  17.3  --   --   --   --   --   --  11.5  --   --  11.0   CALCIUM mg/dL 9.1  --  8.8  --  7.3*  --   --   --   --   --   --  9.5  --   --  9.1   EGFR IF NONAFRICN AM   --   --   --   --   --   --   --   --   --   --   --   --  >60  --   --    ALK PHOS U/L 72  --  73  --  62  --   --   --   --   --   --  90  --   --  89   TOTAL PROTEIN g/dL 7.0  --  6.2  --  4.3*  --   --   --   --   --   --  7.3  --   --  6.9   ALT (SGPT) U/L 26  --  23  --  47*  --   --   --   --   --   --  12  --   --  16   AST (SGOT) U/L 24  --  18  --  22  --   --   --   --   --   --  16  --   --  18   BILIRUBIN  mg/dL 0.3  --  0.4  --  0.8  --   --   --   --   --   --  0.5  --   --  0.4   ALBUMIN g/dL 4.0  --  4.3  --  3.4*  --   --   --   --   --   --  4.30  --   --  4.50   GLOBULIN gm/dL 3.0  --  1.9  --  0.9  --   --   --   --   --   --  3.0  --   --  2.4    < > = values in this interval not displayed.       Lab Results - Last 18 Months   Lab Units 08/21/23  1049 04/26/23  1126 11/12/22  0151 11/10/22  1919 10/26/22  1120 04/25/22  1030   URIC ACID mg/dL  --   --  5.1 4.3  --   --    CEA ng/mL 4.15 3.79  --   --  3.39 3.13       Lab Results - Last 18 Months   Lab Units 08/21/23  1049 04/26/23  1126 12/24/22  0450 10/26/22  1120 04/25/22  1030   IRON mcg/dL 100 95  --  123 128   TIBC mcg/dL 335 305  --  349 349   IRON SATURATION (TSAT) % 30 31  --  35 37   FERRITIN ng/mL 47.78 68.42 270.9 71.82 56.54   FOLATE ng/mL 13.10 18.80  --  17.50 13.80       PROBLEMS IDENTIFIED:   1.  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   Tumor burden: 1.5 cm left lower lung nodule.   Complications of tumor: None.   Tumor status: JAZMÍN following resection via left lower lobectomy.   Chest x-ray, 07/03/2017, [facility]. Impression: Stable appearance of the chest with postoperative changes in the left lung base and emphysematous changes.   Chest x-ray obtained 11/20/2017 at [facility] reveals no interval change when compared to chest x-ray dated 07/03/2017.   Chest x-ray on 02/12/2018 ([facility]). Impression: Hyperinflated lungs suggesting chronic obstructive pulmonary disease (COPD). Postoperative changes of the left hemithorax. No acute cardiopulmonary process identified.   Chest Xray on 07/06/2018 ([facility]). Impression: No acute cardiopulmonary disease.   Chest X-ray, 12/21/2018 at [facility]. COPD. Chronic volume loss in the lung bases with mild central vascular crowding. No acute infiltrates.   06/19/2020-chest x-ray.   Comparison: 8/23/2019-impression: Mild blunting of the left costophrenic angle which could be seen with scarring or small effusion.  02/22/2021- chest xray.  COPD no acute cardiopulmonary process  10/25/2021-chest x-ray.  Streaky bibasilar opacities.  Similar to prior.  04/25/22- CT chest- Status post resection of a left lower lobe pulmonary nodule. Moderate changes of centrilobular emphysema are present. Small groundglass nodules one within the left upper lobe and one within the periphery of the right lower lobe warranting follow-up per Fleischner criteria. No additional lung lesions are present. No enlarged mediastinal or axillary lymphadenopathy.  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 The thoracic aorta is ectatic. There is mild enlargement of the pulmonary arteries suggesting pulmonary arterial hypertension. Heavy coronary calcifications are present.   07/20/22-CT chest- postoperative change of LLL without evidence of recurrent or metastatic disease.  No change in 7 mm NISA and RLL groundglass pulmonary nodules.  Consider continued imaging surveillance to document stability.  Ectatic ascending aorta measuring 4 cm in diameter.  1/27/23- CT chest- A stable CT scan of the chest. Postsurgical changes/left lower lobectomy. No finding to suggest recurrence. A stable small groundglass opacity/nodule in the left upper lobe. No features of malignancy at this time. A follow-up examination in one year is recommended to ensure stability. Chronic emphysematous lung changes  6/14/23- CT chest- New 1.5 cm central LEFT upper lobe pulmonary nodule. This is concerning for recurrent neoplasm. Management options include PET/CT versus sampling with EBUS.  6/21/23- PET-CT- PET-CT findings compatible with recurrent left lung neoplasm an isolated finding (15 mm left lung nodule adjacent to the hilum shows FDG uptake with SUV max = 3.3).  No distant disease is seen.  SBRT: 8/1/23- 8/14/23- 6000 cGy/5 fx) to  "NISA nodule    2.  Mildly abnormal CEA.    --4.15, 8/21/23 (prior range: 2.2 - 5.0). Continued observation warranted.   3.  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 Underwent right knee replacement, 05/18/2017.   4.  Normocytic/macrocytic anemia with resolution of macrocytosis, repleted iron and B12 deficiencies (anemia of chronic disease).   --Stable, Hgb 12.9; MCV 99.8, 8/21/23 (prior range: Hgb 11.4 - 14.3; 94.4 - 102.1)  a. No myelodysplastic syndrome (MDS) on comprehensive blood report, normal thyroid function tests (TFTs), normal LDH, normal B12/folate.   b. Colonoscopy on 4/10/18 ([facility]). Impressions: Preparation of the colon was inadequate. One 12 mm polyp in the cecum, removed with a hot snare and removed piecemeal using a hot snare. Resected and retrieved. Diverticulosis in the sigmoid colon and in the descending colon.   c. Repeat with 2 day prep scheduled for 08/02/2018. \"Polyps\" Repeat 3 years.  d. Colonoscopy, 08/02/2018 (above).  Fair colon preparation.  1 5 mm polyp in the ascending colon, removed with hot snare.  One 12 mm polyp in the transverse colon, removed with a hot snare.  e.  Injectafer 750 mg, 11/01/2022  5.  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 Negative thyroid ultrasound, 11/30/2011. Is followed by Dr. Medrano (ENT) and Dr. Roth (endocrine).   6.  Low B12 levels. Recurrent.   7.  Constipation, multifactorial.  Regulated by stool softeners.  \"Occasionally.\"  8.  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   9.  Chronic intermittent irregular heart rate, Dr. Dobson following.   10. A 50 pack-year tobacco smoker with radiographic evidence of emphysema. Has not smoked since 2004.         11. Anxiety.          12.  Myasthenia gravis.  Followed by Dr. Hines         13.  COPD         14.  Weight loss.  Has stabilized.  Reassures me it is intentional.  \"Eating less and " "smarter.\"    RECOMMENDATIONS:   1.   Apprise of labs from 8/21/23 including the current heme (stable anemia otherwise normal CBC) and the other labs noting the stable CEA (4.15-prior:  2.2 - 5), glucose otherwise essentially normal CMP, repleted iron, repleted (30%- prior: 31%; 35%; 37%; 18%) Fe sat, depressed ferritin (47; prior:  68; 71; 56; 17; 45; 87; 58), depressed (279; from 366; 290; 388; >1000) B12/folate.  (Folbee called in)    2.   CT chest, 6/14/23 and PET scan, 6/21/23 (above).  New 1.5 cm central LEFT upper lobe pulmonary nodule compatible with recurrent neoplasm.    3.  Previously discussed that even without adjuvant chemotherapy 70.8% of people are alive in 5 years and none of those are alive due to therapy. Approximately 28% of those die of cancer and 1.3% die from other causes. Emphasized that very little data are available about the effects of adjuvant chemotherapy in Stage I lung cancer and most guidelines do not recommend the use of adjuvant therapy in this population and a meta-analysis even suggested that there was a trend toward a worse outcome in these patients. He preferred not to have chemo anyway.   4.  Review follow-up with pulmonary, 7/11/23 Re:  Diagnostic bronchoscopy and PFTs.  Patient declined any invasive procedures.  He said that he had PFT in the past that \"almost killed him\" and is not wanting a pulmonary function test at this time either.  RTC 3 months (~ 10/11/23)  5.  Review visit with radiation oncology (Dr. Herring), 6/28/23.   I recommended a course of definitive intent pulmonary SBRT to the PET avid left upper lobe nodule to an approximate dose of 0513-3063 cGy to be given over 4-5 every other day fractions, final course pending.  If SBRT cannot safely be delivered secondary to central location, would favor a hypofractionated course of treatment to an approximate dose of 1586-2279 cGy to be given over 15 daily fractions  (SBRT: 8/1/23- 8/14/23- 6000 cGy/5 fx)  6.  " Continue ongoing management per primary care physician and other specialists. Has appointment in 11/2023 with rad onc along with CT prior to visit  7.  Return to the office with pre-office CEA, serum iron, Fe sat, ferritin, B12/folate, CMP, and CBC with differential in 16 weeks.      I spent ~ 49 minutes caring for Carmine on this date of service. This time includes time spent by me in the following activities: preparing for the visit, reviewing tests, performing a medically appropriate examination and/or evaluation, counseling and educating the patient/family/caregiver, ordering medications, tests, or procedures and documenting information in the medical record        cc: DO Kieran Pennington MD Nicholas Lopez, MD

## 2023-08-22 NOTE — TELEPHONE ENCOUNTER
----- Message from Cem Arteaga MD sent at 8/21/2023  8:50 PM CDT -----  B12 [id number]- pls call in [facility] daily# 30  ----- Message -----  From: Lab, Background User  Sent: 8/21/2023  11:07 AM CDT  To: Cem Arteaga MD

## 2023-08-22 NOTE — TELEPHONE ENCOUNTER
Contacted patient Carmine Garcia, he was notified that his B-12 level @ 279, is low and a prescription for Folbee will be sent to his pharmacy for pick, take as directed and will check his labs again at reg domingo time. Pt v/u of instruction.    [facility]

## 2023-08-28 ENCOUNTER — OFFICE VISIT (OUTPATIENT)
Dept: ONCOLOGY | Facility: CLINIC | Age: 83
End: 2023-08-28
Payer: MEDICARE

## 2023-08-28 VITALS
BODY MASS INDEX: 26.98 KG/M2 | WEIGHT: 199.2 LBS | SYSTOLIC BLOOD PRESSURE: 142 MMHG | OXYGEN SATURATION: 95 % | DIASTOLIC BLOOD PRESSURE: 80 MMHG | RESPIRATION RATE: 18 BRPM | HEART RATE: 86 BPM | TEMPERATURE: 97.9 F | HEIGHT: 72 IN

## 2023-08-28 DIAGNOSIS — C34.32 PRIMARY ADENOCARCINOMA OF LOWER LOBE OF LEFT LUNG: Primary | ICD-10-CM

## 2023-09-12 ENCOUNTER — HOSPITAL ENCOUNTER (OUTPATIENT)
Dept: INFUSION THERAPY | Age: 83
Setting detail: INFUSION SERIES
Discharge: HOME | End: 2023-09-12
Payer: MEDICARE

## 2023-09-12 VITALS
OXYGEN SATURATION: 97 % | SYSTOLIC BLOOD PRESSURE: 149 MMHG | RESPIRATION RATE: 18 BRPM | DIASTOLIC BLOOD PRESSURE: 82 MMHG | WEIGHT: 195 LBS | HEART RATE: 61 BPM | TEMPERATURE: 97.5 F | BODY MASS INDEX: 26.45 KG/M2

## 2023-09-12 DIAGNOSIS — G70.00 MYASTHENIA GRAVIS (HCC): Primary | ICD-10-CM

## 2023-09-12 LAB
BASOPHILS # BLD: 0 K/UL (ref 0–0.2)
BASOPHILS NFR BLD: 0 % (ref 0–1)
EOSINOPHIL # BLD: 0 K/UL (ref 0–0.6)
EOSINOPHIL NFR BLD: 0.2 % (ref 0–5)
ERYTHROCYTE [DISTWIDTH] IN BLOOD BY AUTOMATED COUNT: 13.9 % (ref 11.5–14.5)
HCT VFR BLD AUTO: 35.1 % (ref 42–52)
HGB BLD-MCNC: 11.3 G/DL (ref 14–18)
IMM GRANULOCYTES # BLD: 0 K/UL
LYMPHOCYTES # BLD: 0.3 K/UL (ref 1.1–4.5)
LYMPHOCYTES NFR BLD: 6.4 % (ref 20–40)
MCH RBC QN AUTO: 31.8 PG (ref 27–31)
MCHC RBC AUTO-ENTMCNC: 32.2 G/DL (ref 33–37)
MCV RBC AUTO: 98.9 FL (ref 80–94)
MONOCYTES # BLD: 0.3 K/UL (ref 0–0.9)
MONOCYTES NFR BLD: 6 % (ref 0–10)
NEUTROPHILS # BLD: 4.1 K/UL (ref 1.5–7.5)
NEUTS SEG NFR BLD: 87 % (ref 50–65)
PLATELET # BLD AUTO: 163 K/UL (ref 130–400)
PMV BLD AUTO: 9.2 FL (ref 9.4–12.4)
RBC # BLD AUTO: 3.55 M/UL (ref 4.7–6.1)
REASON FOR REJECTION: NORMAL
REJECTED TEST: NORMAL
WBC # BLD AUTO: 4.7 K/UL (ref 4.8–10.8)

## 2023-09-12 PROCEDURE — 36415 COLL VENOUS BLD VENIPUNCTURE: CPT

## 2023-09-12 PROCEDURE — 96365 THER/PROPH/DIAG IV INF INIT: CPT

## 2023-09-12 PROCEDURE — 96366 THER/PROPH/DIAG IV INF ADDON: CPT

## 2023-09-12 PROCEDURE — 6370000000 HC RX 637 (ALT 250 FOR IP): Performed by: PSYCHIATRY & NEUROLOGY

## 2023-09-12 PROCEDURE — 85025 COMPLETE CBC W/AUTO DIFF WBC: CPT

## 2023-09-12 PROCEDURE — 6360000002 HC RX W HCPCS: Performed by: PSYCHIATRY & NEUROLOGY

## 2023-09-12 RX ORDER — ACETAMINOPHEN 325 MG/1
650 TABLET ORAL
OUTPATIENT
Start: 2023-10-10

## 2023-09-12 RX ORDER — HEPARIN 100 UNIT/ML
500 SYRINGE INTRAVENOUS PRN
OUTPATIENT
Start: 2023-10-10

## 2023-09-12 RX ORDER — SODIUM CHLORIDE 9 MG/ML
5-250 INJECTION, SOLUTION INTRAVENOUS PRN
OUTPATIENT
Start: 2023-10-10

## 2023-09-12 RX ORDER — ONDANSETRON 2 MG/ML
8 INJECTION INTRAMUSCULAR; INTRAVENOUS
OUTPATIENT
Start: 2023-10-10

## 2023-09-12 RX ORDER — ACETAMINOPHEN 325 MG/1
650 TABLET ORAL ONCE
OUTPATIENT
Start: 2023-10-10 | End: 2023-10-10

## 2023-09-12 RX ORDER — SODIUM CHLORIDE 0.9 % (FLUSH) 0.9 %
5-40 SYRINGE (ML) INJECTION PRN
Status: DISCONTINUED | OUTPATIENT
Start: 2023-09-12 | End: 2023-09-13 | Stop reason: HOSPADM

## 2023-09-12 RX ORDER — EPINEPHRINE 1 MG/ML
0.3 INJECTION, SOLUTION, CONCENTRATE INTRAVENOUS PRN
OUTPATIENT
Start: 2023-10-10

## 2023-09-12 RX ORDER — DIPHENHYDRAMINE HCL 25 MG
25 TABLET ORAL ONCE
OUTPATIENT
Start: 2023-10-10 | End: 2023-10-10

## 2023-09-12 RX ORDER — ACETAMINOPHEN 325 MG/1
650 TABLET ORAL ONCE
Status: COMPLETED | OUTPATIENT
Start: 2023-09-12 | End: 2023-09-12

## 2023-09-12 RX ORDER — DIPHENHYDRAMINE HYDROCHLORIDE 50 MG/ML
50 INJECTION INTRAMUSCULAR; INTRAVENOUS
OUTPATIENT
Start: 2023-10-10

## 2023-09-12 RX ORDER — DIPHENHYDRAMINE HCL 25 MG
25 TABLET ORAL ONCE
Status: COMPLETED | OUTPATIENT
Start: 2023-09-12 | End: 2023-09-12

## 2023-09-12 RX ORDER — SODIUM CHLORIDE 9 MG/ML
5-250 INJECTION, SOLUTION INTRAVENOUS PRN
Status: DISCONTINUED | OUTPATIENT
Start: 2023-09-12 | End: 2023-09-13 | Stop reason: HOSPADM

## 2023-09-12 RX ORDER — ALBUTEROL SULFATE 90 UG/1
4 AEROSOL, METERED RESPIRATORY (INHALATION) PRN
OUTPATIENT
Start: 2023-10-10

## 2023-09-12 RX ORDER — MEPERIDINE HYDROCHLORIDE 25 MG/ML
12.5 INJECTION INTRAMUSCULAR; INTRAVENOUS; SUBCUTANEOUS PRN
OUTPATIENT
Start: 2023-10-10

## 2023-09-12 RX ORDER — SODIUM CHLORIDE 9 MG/ML
INJECTION, SOLUTION INTRAVENOUS CONTINUOUS
OUTPATIENT
Start: 2023-10-10

## 2023-09-12 RX ORDER — SODIUM CHLORIDE 0.9 % (FLUSH) 0.9 %
5-40 SYRINGE (ML) INJECTION PRN
OUTPATIENT
Start: 2023-10-10

## 2023-09-12 RX ADMIN — IMMUNE GLOBULIN (HUMAN) 90 G: 10 INJECTION INTRAVENOUS; SUBCUTANEOUS at 09:44

## 2023-09-12 RX ADMIN — DIPHENHYDRAMINE HYDROCHLORIDE 25 MG: 25 TABLET ORAL at 09:20

## 2023-09-12 RX ADMIN — ACETAMINOPHEN 650 MG: 325 TABLET ORAL at 09:20

## 2023-09-12 NOTE — DISCHARGE INSTRUCTIONS
immune globulin (intravenous and subcutaneous)  Pronunciation:  jakob OREN murcia  Brand:  Gammagard Liquid, Gammaked, Gamunex-C  What is the most important information I should know about immune globulin? This medicine can cause blood clots. The risk is highest in older adults or in people who have had blood clots, heart problems, or blood circulation problems. Blood clots are also more likely during long-term bedrest, while using birth control pills or hormone replacement therapy, or while having a central intravenous (IV) catheter in place. Call your doctor at once if you have chest pain, trouble breathing, fast heartbeats, numbness or weakness, or swelling and warmth or discoloration in an arm or leg. This medicine can also harm your kidneys, especially if you have kidney disease or if you also use certain medicines. Tell your doctor right away if you have signs of kidney problems, such as swelling, rapid weight gain, and little or no urination. What is immune globulin? Immune globulin intravenous and subcutaneous (for injection into a vein or under the skin) is used to treat primary immunodeficiency. Immune globulin is also used to increase platelets (blood clotting cells) in people with idiopathic thrombocytopenic purpura. Immune globulin is also used to treat certain debilitating nerve disorders that cause muscle weakness and can affect daily activities. Immune globulin may also be used for purposes not listed in this medication guide. What should I discuss with my healthcare provider before using immune globulin? You should not use this medicine if:  you have had an allergic reaction to an immune globulin or blood product; or  you have immune globulin A (IgA) deficiency with antibody to IgA. Immune globulin can cause blood clots or kidney problems, especially in older adults or in people with certain conditions.  Tell your doctor if you have ever had:  heart problems, blood circulation inject this medicine into different body areas at the same time. Your healthcare provider will show you the best places on your body to inject the medication. Keep a diary of the days and times you gave the injection and where you injected it on your body. Drink plenty of liquids  while you are using this medicine to help improve your blood flow and keep your kidneys working properly. You may need frequent blood or urine tests. This medicine can affect the results of certain medical tests. Tell any doctor who treats you that you are using immune globulin. Store this medicine in its original carton in the refrigerator. Do not freeze immune globulin, and throw the medicine away if it has frozen. Take the medicine out of the refrigerator and let it reach room temperature for up to 1 hour before injecting your dose. You may also store immune globulin at room temperature. You will need to use immune globulin within a certain number months. This will depend on the how you store the medicine (at room temperature, or in a refrigerator). Carefully follow the storage instructions provided with your medicine. Do not use the medicine after the expiration date on the label has passed. Each vial (bottle) is for one use only. Throw it away after one use, even if there is still medicine left inside. Use disposable injection items (needle, catheter, tubing) only once and then place them in a puncture-proof \"sharps\" container. Follow state or local laws about how to dispose of this container. Keep it out of the reach of children and pets. What happens if I miss a dose? Call your doctor for instructions if you miss a dose. What happens if I overdose? Seek emergency medical attention or call the Poison Help line at 1-369.297.9587. What should I avoid while using immune globulin? Do not receive a \"live\" vaccine while using immune globulin. The vaccine may not work as well and may not fully protect you from disease.  Live

## 2023-09-12 NOTE — PROGRESS NOTES
PIV inserted and CBC drawn as ordered. 650 mg tylenol and 25 mg benadryl given as ordered for premeds. 90 g manuex C given. Pt tolerated well. Notified by lab that CBC hemolyzed. New sample collected before pt DC.

## 2023-09-14 RX ORDER — PANTOPRAZOLE SODIUM 40 MG/1
40 TABLET, DELAYED RELEASE ORAL
Qty: 30 TABLET | Refills: 5 | Status: SHIPPED | OUTPATIENT
Start: 2023-09-14

## 2023-09-14 NOTE — TELEPHONE ENCOUNTER
Requested Prescriptions     Pending Prescriptions Disp Refills    pantoprazole (PROTONIX) 40 MG tablet 30 tablet 5     Sig: Take 1 tablet by mouth every morning (before breakfast)       Last Office Visit: 8/10/2023  Next Office Visit: 10/5/2023  Last Medication Refill: 2/20/2023 with 5 RF

## 2023-10-05 ENCOUNTER — OFFICE VISIT (OUTPATIENT)
Dept: NEUROLOGY | Age: 83
End: 2023-10-05
Payer: MEDICARE

## 2023-10-05 VITALS
HEART RATE: 82 BPM | BODY MASS INDEX: 25.87 KG/M2 | SYSTOLIC BLOOD PRESSURE: 146 MMHG | HEIGHT: 72 IN | WEIGHT: 191 LBS | DIASTOLIC BLOOD PRESSURE: 87 MMHG

## 2023-10-05 DIAGNOSIS — G70.00 MYASTHENIA GRAVIS (HCC): Primary | ICD-10-CM

## 2023-10-05 DIAGNOSIS — Z87.09 HISTORY OF COPD: ICD-10-CM

## 2023-10-05 DIAGNOSIS — Z86.69 HISTORY OF DOUBLE VISION: ICD-10-CM

## 2023-10-05 PROCEDURE — 3077F SYST BP >= 140 MM HG: CPT | Performed by: PSYCHIATRY & NEUROLOGY

## 2023-10-05 PROCEDURE — G8427 DOCREV CUR MEDS BY ELIG CLIN: HCPCS | Performed by: PSYCHIATRY & NEUROLOGY

## 2023-10-05 PROCEDURE — 99213 OFFICE O/P EST LOW 20 MIN: CPT | Performed by: PSYCHIATRY & NEUROLOGY

## 2023-10-05 PROCEDURE — 1123F ACP DISCUSS/DSCN MKR DOCD: CPT | Performed by: PSYCHIATRY & NEUROLOGY

## 2023-10-05 PROCEDURE — G8484 FLU IMMUNIZE NO ADMIN: HCPCS | Performed by: PSYCHIATRY & NEUROLOGY

## 2023-10-05 PROCEDURE — 1036F TOBACCO NON-USER: CPT | Performed by: PSYCHIATRY & NEUROLOGY

## 2023-10-05 PROCEDURE — 3079F DIAST BP 80-89 MM HG: CPT | Performed by: PSYCHIATRY & NEUROLOGY

## 2023-10-05 PROCEDURE — G8417 CALC BMI ABV UP PARAM F/U: HCPCS | Performed by: PSYCHIATRY & NEUROLOGY

## 2023-10-05 RX ORDER — FOLIC ACID/VIT B COMPLEX AND C 5 MG
1 TABLET ORAL DAILY
COMMUNITY

## 2023-10-05 NOTE — PROGRESS NOTES
Chief Complaint   Patient presents with    Follow-up     Myasthenia Gravis       Ad Antunez is a 79y.o. year old male who is seen for evaluation of myasthenia gravis. I saw him in the hospital last year for right arm weakness. He admitted to prior eye surgery to correct double vision as well as jaw fatigue and intermittent dysphagia. Had some noted left eye ptosis. Acetylcholine receptor antibodies were markedly positive. He also has a history of COPD and breathing difficulties. He was placed on Mestinon and currently takes 60 mg 4 times a day . Also had hyponatremia but had just been recently prescribed an SSRI. Has a history of lung cancer, depression, hypertension and BPH. Since getting on Mestinon his arm strength  improved and he no longer has double vision. He was readmitted to the hospital 12/22 with COVID-pneumonia and COPD exacerbation. He had been on doxycycline for a few days. I saw him in consultation and discontinued that as well as his beta-blocker. \  Previously his prednisone was increased from 10 to 15 mg every morning. Increased this up to 20 mg a day a few weeks prior to his last visit a month ago. .  Having worsening left sided ptosis and diplopia. Symptoms come and go and are worse with fatigue. When last seen he was complaining that his arms feel weak he has ptosis on the left and occasional double vision but his breathing is okay. Currently getting radiation therapy for recurrent lung cancer. IVIG was ordered on his last visit. Since getting this was double vision and most of his other neurological symptoms have resolved. No side effects. Prednisone reduced to 15 mg a day.   Active Ambulatory Problems     Diagnosis Date Noted    Essential hypertension 05/19/2017    Iron deficiency anemia 05/19/2017    Pulmonary emphysema ([address]) 05/19/2017    Slow transit constipation 05/19/2017    Urinary retention 05/19/2017    Hyponatremia 11/10/2022    Palliative care patient

## 2023-10-05 NOTE — PROGRESS NOTES
REVIEW OF SYSTEMS    Constitutional: []Fever []Sweat []Chills [] Recent Injury [x] Denies all unless marked  HEENT:[]Headache  [] Head Injury/Hearing Loss  [] Sore Throat  [] Ear Ache/Dizziness  [x] Denies all unless marked  Spine:  [] Neck pain  [] Back pain  [] Sciaticia  [x] Denies all unless marked  Cardiovascular:[]Heart Disease []Chest Pain [] Palpitations  [x] Denies all unless marked  Pulmonary: []Shortness of Breath []Cough   [x] Denies all unless marke  Gastrointestinal: []Nausea  []Vomiting  []Abdominal Pain  []Constipation  []Diarrhea  []Dark Bloody Stools  [x] Denies all unless marked  Psychiatric/Behavioral:[] Depression [] Anxiety [x] Denies all unless marked  Genitourinary:   [] Frequency  [] Urgency  [] Incontinence [] Pain with Urination  [x] Denies all unless marked  Extremities: []Pain  []Swelling  [x] Denies all unless marked  Musculoskeletal: [] Muscle Pain  [] Joint Pain  [] Arthritis [] Muscle Cramps [] Muscle Twitches  [x] Denies all unless marked  Sleep: [] Insomnia [] Snoring [] Restless Legs [] Sleep Apnea  [] Daytime Sleepiness  [x] Denies all unless marked  Skin:[] Rash [] Skin Discoloration [x] Denies all unless marked   Neurological: []Visual Disturbance/Memory Loss [x] Loss of Balance [] Slurred Speech/Weakness [] Seizures  [] Vertigo/Dizziness [x] Denies all unless marked

## 2023-10-10 ENCOUNTER — HOSPITAL ENCOUNTER (OUTPATIENT)
Dept: INFUSION THERAPY | Age: 83
Setting detail: INFUSION SERIES
Discharge: HOME | End: 2023-10-10
Payer: MEDICARE

## 2023-10-10 VITALS
WEIGHT: 191 LBS | BODY MASS INDEX: 25.9 KG/M2 | HEART RATE: 63 BPM | OXYGEN SATURATION: 96 % | RESPIRATION RATE: 18 BRPM | SYSTOLIC BLOOD PRESSURE: 152 MMHG | TEMPERATURE: 97.6 F | DIASTOLIC BLOOD PRESSURE: 77 MMHG

## 2023-10-10 DIAGNOSIS — G70.00 MYASTHENIA GRAVIS (HCC): Primary | ICD-10-CM

## 2023-10-10 LAB
ALBUMIN SERPL-MCNC: 3.7 G/DL (ref 3.5–5.2)
ALP SERPL-CCNC: 73 U/L (ref 40–130)
ALT SERPL-CCNC: 25 U/L (ref 5–41)
ANION GAP SERPL CALCULATED.3IONS-SCNC: 10 MMOL/L (ref 7–19)
AST SERPL-CCNC: 25 U/L (ref 5–40)
BASOPHILS # BLD: 0 K/UL (ref 0–0.2)
BASOPHILS NFR BLD: 0.3 % (ref 0–1)
BILIRUB SERPL-MCNC: 0.4 MG/DL (ref 0.2–1.2)
BUN SERPL-MCNC: 8 MG/DL (ref 8–23)
CALCIUM SERPL-MCNC: 8.4 MG/DL (ref 8.8–10.2)
CHLORIDE SERPL-SCNC: 100 MMOL/L (ref 98–111)
CO2 SERPL-SCNC: 27 MMOL/L (ref 22–29)
CREAT SERPL-MCNC: 0.9 MG/DL (ref 0.5–1.2)
EOSINOPHIL # BLD: 0.1 K/UL (ref 0–0.6)
EOSINOPHIL NFR BLD: 0.7 % (ref 0–5)
ERYTHROCYTE [DISTWIDTH] IN BLOOD BY AUTOMATED COUNT: 14 % (ref 11.5–14.5)
GLUCOSE SERPL-MCNC: 134 MG/DL (ref 74–109)
HCT VFR BLD AUTO: 41.5 % (ref 42–52)
HGB BLD-MCNC: 12.7 G/DL (ref 14–18)
IMM GRANULOCYTES # BLD: 0.1 K/UL
LYMPHOCYTES # BLD: 0.9 K/UL (ref 1.1–4.5)
LYMPHOCYTES NFR BLD: 11.7 % (ref 20–40)
MCH RBC QN AUTO: 31.3 PG (ref 27–31)
MCHC RBC AUTO-ENTMCNC: 30.6 G/DL (ref 33–37)
MCV RBC AUTO: 102.2 FL (ref 80–94)
MONOCYTES # BLD: 0.4 K/UL (ref 0–0.9)
MONOCYTES NFR BLD: 5.6 % (ref 0–10)
NEUTROPHILS # BLD: 6.2 K/UL (ref 1.5–7.5)
NEUTS SEG NFR BLD: 80.9 % (ref 50–65)
PLATELET # BLD AUTO: 204 K/UL (ref 130–400)
PMV BLD AUTO: 9.4 FL (ref 9.4–12.4)
POTASSIUM SERPL-SCNC: 4 MMOL/L (ref 3.5–5)
PROT SERPL-MCNC: 6.7 G/DL (ref 6.6–8.7)
RBC # BLD AUTO: 4.06 M/UL (ref 4.7–6.1)
SODIUM SERPL-SCNC: 137 MMOL/L (ref 136–145)
WBC # BLD AUTO: 7.7 K/UL (ref 4.8–10.8)

## 2023-10-10 PROCEDURE — 96366 THER/PROPH/DIAG IV INF ADDON: CPT

## 2023-10-10 PROCEDURE — 80053 COMPREHEN METABOLIC PANEL: CPT

## 2023-10-10 PROCEDURE — 85025 COMPLETE CBC W/AUTO DIFF WBC: CPT

## 2023-10-10 PROCEDURE — 2580000003 HC RX 258: Performed by: PSYCHIATRY & NEUROLOGY

## 2023-10-10 PROCEDURE — 96365 THER/PROPH/DIAG IV INF INIT: CPT

## 2023-10-10 PROCEDURE — 6360000002 HC RX W HCPCS: Performed by: PSYCHIATRY & NEUROLOGY

## 2023-10-10 PROCEDURE — 6370000000 HC RX 637 (ALT 250 FOR IP): Performed by: PSYCHIATRY & NEUROLOGY

## 2023-10-10 RX ORDER — EPINEPHRINE 1 MG/ML
0.3 INJECTION, SOLUTION, CONCENTRATE INTRAVENOUS PRN
OUTPATIENT
Start: 2023-11-07

## 2023-10-10 RX ORDER — ONDANSETRON 2 MG/ML
8 INJECTION INTRAMUSCULAR; INTRAVENOUS
OUTPATIENT
Start: 2023-11-07

## 2023-10-10 RX ORDER — SODIUM CHLORIDE 9 MG/ML
5-250 INJECTION, SOLUTION INTRAVENOUS PRN
Status: DISCONTINUED | OUTPATIENT
Start: 2023-10-10 | End: 2023-10-11 | Stop reason: HOSPADM

## 2023-10-10 RX ORDER — SODIUM CHLORIDE 0.9 % (FLUSH) 0.9 %
5-40 SYRINGE (ML) INJECTION PRN
OUTPATIENT
Start: 2023-11-07

## 2023-10-10 RX ORDER — DIPHENHYDRAMINE HYDROCHLORIDE 50 MG/ML
50 INJECTION INTRAMUSCULAR; INTRAVENOUS
OUTPATIENT
Start: 2023-11-07

## 2023-10-10 RX ORDER — SODIUM CHLORIDE 9 MG/ML
5-250 INJECTION, SOLUTION INTRAVENOUS PRN
OUTPATIENT
Start: 2023-11-07

## 2023-10-10 RX ORDER — ACETAMINOPHEN 325 MG/1
650 TABLET ORAL
OUTPATIENT
Start: 2023-11-07

## 2023-10-10 RX ORDER — SODIUM CHLORIDE 9 MG/ML
INJECTION, SOLUTION INTRAVENOUS CONTINUOUS
OUTPATIENT
Start: 2023-11-07

## 2023-10-10 RX ORDER — ACETAMINOPHEN 325 MG/1
650 TABLET ORAL ONCE
OUTPATIENT
Start: 2023-11-07 | End: 2023-11-07

## 2023-10-10 RX ORDER — DIPHENHYDRAMINE HCL 25 MG
25 TABLET ORAL ONCE
OUTPATIENT
Start: 2023-11-07 | End: 2023-11-07

## 2023-10-10 RX ORDER — ALBUTEROL SULFATE 90 UG/1
4 AEROSOL, METERED RESPIRATORY (INHALATION) PRN
OUTPATIENT
Start: 2023-11-07

## 2023-10-10 RX ORDER — HEPARIN 100 UNIT/ML
500 SYRINGE INTRAVENOUS PRN
OUTPATIENT
Start: 2023-11-07

## 2023-10-10 RX ORDER — ACETAMINOPHEN 325 MG/1
650 TABLET ORAL ONCE
Status: COMPLETED | OUTPATIENT
Start: 2023-10-10 | End: 2023-10-10

## 2023-10-10 RX ORDER — MEPERIDINE HYDROCHLORIDE 25 MG/ML
12.5 INJECTION INTRAMUSCULAR; INTRAVENOUS; SUBCUTANEOUS PRN
OUTPATIENT
Start: 2023-11-07

## 2023-10-10 RX ORDER — DIPHENHYDRAMINE HCL 25 MG
25 TABLET ORAL ONCE
Status: COMPLETED | OUTPATIENT
Start: 2023-10-10 | End: 2023-10-10

## 2023-10-10 RX ADMIN — SODIUM CHLORIDE 20 ML/HR: 9 INJECTION, SOLUTION INTRAVENOUS at 09:57

## 2023-10-10 RX ADMIN — IMMUNE GLOBULIN (HUMAN) 90 G: 10 INJECTION INTRAVENOUS; SUBCUTANEOUS at 09:58

## 2023-10-10 RX ADMIN — DIPHENHYDRAMINE HYDROCHLORIDE 25 MG: 25 TABLET ORAL at 09:32

## 2023-10-10 RX ADMIN — ACETAMINOPHEN 650 MG: 325 TABLET ORAL at 09:32

## 2023-10-10 NOTE — DISCHARGE INSTRUCTIONS
immune globulin (intravenous) (IGIV)  Pronunciation:  jakob murcia  Brand:  Bivigam, Carimune, Flebogamma, Gammagard S/D, Gammaplex, Octagam, Panzyga, Privigen  What is the most important information I should know about immune globulin intravenous? This medicine can cause blood clots. The risk is highest in older adults or in people who have had blood clots, heart problems, or blood circulation problems. Blood clots are also more likely during long-term bedrest, while using birth control pills or hormone replacement therapy, or while having a central intravenous (IV) catheter in place. Call your doctor at once if you have chest pain, trouble breathing, fast heartbeats, numbness or weakness, or swelling and warmth or discoloration in an arm or leg. This medicine can also harm your kidneys, especially if you have kidney disease or you also use certain medicines. Tell your doctor right away if you have signs of kidney problems, such as swelling, rapid weight gain, and little or no urination. What is immune globulin intravenous (IGIV)? Immune globulin intravenous (IGIV, for injection into a vein) is used to treat primary immunodeficiency. IGIV is also used to increase platelets (blood clotting cells) in people with immune thrombocytopenic purpura. IGIV is also used in to help prevent certain infections in people with B-cell chronic lymphocytic leukemia. IGIV is also used in people with Kawasaki syndrome, to prevent aneurysm caused by a weakening of the main artery in the heart. IGIV may also be used for purposes not listed in this medication guide. What should I discuss with my healthcare provider before using IGIV? You may not be able to use this medicine if:  you have had an allergic reaction to an immune globulin or blood product;  you have immune globulin A (IgA) deficiency with antibody to IgA; or  you are allergic to corn.   IGIV can cause blood clots or kidney problems, especially in

## 2023-10-10 NOTE — PROGRESS NOTES
Chief Complaint  Emphysema    Subjective    History of Present Illness {  Problem List  Visit Diagnosis   Encounters  Notes  Medications  Labs  Result Review Imaging  Media     Carmine Garcia presents to [facility] for:    History of Present Illness  Mr. Garcia is here for follow up and management of left upper lobe nodule. He tells me he completed radiation to that nodule in August. He has a follow up ct chest scheduled for November per oncology. He has no pulmonary complaints today.        Prior to Admission medications    Medication Sig Start Date End Date Taking? Authorizing Provider   acetaminophen (TYLENOL) 500 MG tablet Take 1 tablet by mouth Every 6 (Six) Hours As Needed for Mild Pain.    Perry Myers MD   alfuzosin (UROXATRAL) 10 MG 24 hr tablet Take 2 tablets by mouth.    Perry Myers MD   atenolol (TENORMIN) 25 MG tablet 1 tablet Daily. 11/21/22   Perry Myers MD   busPIRone (BUSPAR) 5 MG tablet Take 1 tablet by mouth 2 (Two) Times a Day. 1/21/23   Perry Myers MD   diphenhydrAMINE-acetaminophen (Tylenol PM Extra Strength)  MG tablet per tablet Take 1 tablet by mouth every night at bedtime.    Perry Myers MD   fluticasone (FLONASE) 50 MCG/ACT nasal spray 1 spray 2 (Two) Times a Day. 1/21/23   Perry Myers MD   folic acid-vit B6-vit B12 (Folbee) 2.5-25-1 MG tablet tablet Take 1 tablet by mouth Daily. 10/27/22   Cem Arteaga MD   folic acid-vit B6-vit B12 (Folbee) 2.5-25-1 MG tablet tablet Take 1 tablet by mouth Daily. 8/22/23   Cem Arteaga MD   furosemide (LASIX) 20 MG tablet Take 2 tablets by mouth As Needed. For swelling    Perry Myers MD   LORazepam (Ativan) 1 MG tablet Take 30 minute before PET scan 6/15/23   Cem Arteaga MD   losartan (COZAAR) 25 MG tablet 1 tablet 2 (Two) Times a Day. 11/21/22   Perry Myers MD   lovastatin (MEVACOR) 40  "MG tablet Take 1 tablet by mouth.    Perry Myers MD   Multiple Vitamins-Minerals (PRESERVISION AREDS 2+MULTI VIT) capsule Take  by mouth.    Perry Myers MD   pantoprazole (PROTONIX) 40 MG EC tablet 1 tablet Daily. 22   Perry Myers MD   predniSONE (DELTASONE) 10 MG tablet 1 tablet Daily. With the 20 mg 23   Perry Myers MD   predniSONE (DELTASONE) 20 MG tablet 0.5 tablets Daily. 23   Perry Myers MD   pyridostigmine (MESTINON) 60 MG tablet 1 tablet 4 (Four) Times a Day. 23   Perry Myers MD   sodium chloride 1 g tablet 1 tablet 2 (Two) Times a Day. 23   Perry Myers MD   vitamin D (ERGOCALCIFEROL) 1.25 MG (30595 UT) capsule capsule Take 1 capsule by mouth 1 (One) Time Per Week. 23   Perry Myers MD       Social History     Socioeconomic History    Marital status:    Tobacco Use    Smoking status: Former     Packs/day: 2.00     Years: 30.00     Additional pack years: 0.00     Total pack years: 60.00     Types: Cigarettes     Start date: 1955     Quit date: 1984     Years since quittin.8     Passive exposure: Past    Smokeless tobacco: Never   Vaping Use    Vaping Use: Never used   Substance and Sexual Activity    Alcohol use: No    Drug use: No    Sexual activity: Not Currently     Partners: Female       Objective   Vital Signs:   /72   Pulse 77   Ht 182.9 cm (72\")   Wt 88.5 kg (195 lb)   SpO2 95% Comment: RA  BMI 26.45 kg/mý     Physical Exam  Constitutional:       General: He is not in acute distress.  HENT:      Head: Normocephalic.      Nose: Nose normal.      Mouth/Throat:      Mouth: Mucous membranes are moist.   Eyes:      General: No scleral icterus.  Cardiovascular:      Rate and Rhythm: Normal rate.   Pulmonary:      Effort: No respiratory distress.   Abdominal:      General: There is no distension.   Neurological:      Mental Status: He is alert and oriented to person, " place, and time.   Psychiatric:         Mood and Affect: Mood normal.         Behavior: Behavior is cooperative.        Result Review :{ Labs  Result Review  Imaging  Med Tab  Media :          No results found for this or any previous visit.                   Assessment and Plan {CC Problem List  Visit Diagnosis  ROS  Review (Popup)  Health Maintenance  Quality  BestPractice  Medications  SmartSets  SnapShot Encounters  Media      Diagnoses and all orders for this visit:    1. Left upper lobe pulmonary nodule (Primary)    2. 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 He already has follow up CT chest ordered for November. Will defer continued follow up imaging to oncology and or radiation oncology. Continue to follow up with those specialities. Ok to follow up with us as needed. Call if need be.     Stella Rain, APRN  10/11/2023  11:46 CDT    Follow Up {Instructions Charge Capture  Follow-up Communications   Return if symptoms worsen or fail to improve.    Patient was given instructions and counseling regarding his condition or for health maintenance advice. Please see specific information pulled into the AVS if appropriate.

## 2023-10-11 ENCOUNTER — OFFICE VISIT (OUTPATIENT)
Dept: PULMONOLOGY | Facility: CLINIC | Age: 83
End: 2023-10-11
Payer: MEDICARE

## 2023-10-11 VITALS
WEIGHT: 195 LBS | OXYGEN SATURATION: 95 % | DIASTOLIC BLOOD PRESSURE: 72 MMHG | BODY MASS INDEX: 26.41 KG/M2 | SYSTOLIC BLOOD PRESSURE: 128 MMHG | HEIGHT: 72 IN | HEART RATE: 77 BPM

## 2023-10-11 DIAGNOSIS — C34.32 PRIMARY ADENOCARCINOMA OF LOWER LOBE OF LEFT LUNG: ICD-10-CM

## 2023-10-11 DIAGNOSIS — R91.1 LEFT UPPER LOBE PULMONARY NODULE: Primary | ICD-10-CM

## 2023-10-11 PROCEDURE — 3078F DIAST BP <80 MM HG: CPT | Performed by: NURSE PRACTITIONER

## 2023-10-11 PROCEDURE — 3074F SYST BP LT 130 MM HG: CPT | Performed by: NURSE PRACTITIONER

## 2023-10-11 PROCEDURE — 1159F MED LIST DOCD IN RCRD: CPT | Performed by: NURSE PRACTITIONER

## 2023-10-11 PROCEDURE — 99213 OFFICE O/P EST LOW 20 MIN: CPT | Performed by: NURSE PRACTITIONER

## 2023-10-11 PROCEDURE — 1160F RVW MEDS BY RX/DR IN RCRD: CPT | Performed by: NURSE PRACTITIONER

## 2023-10-11 RX ORDER — BUPROPION HYDROCHLORIDE 150 MG/1
150 TABLET ORAL EVERY MORNING
COMMUNITY
Start: 2023-09-18

## 2023-10-11 RX ORDER — THIAMINE HCL 100 MG
TABLET ORAL DAILY
COMMUNITY

## 2023-11-07 ENCOUNTER — HOSPITAL ENCOUNTER (OUTPATIENT)
Dept: INFUSION THERAPY | Age: 83
Setting detail: INFUSION SERIES
Discharge: HOME | End: 2023-11-07
Payer: MEDICARE

## 2023-11-07 ENCOUNTER — APPOINTMENT (OUTPATIENT)
Dept: INFUSION THERAPY | Age: 83
End: 2023-11-07
Payer: MEDICARE

## 2023-11-07 VITALS
OXYGEN SATURATION: 93 % | SYSTOLIC BLOOD PRESSURE: 145 MMHG | WEIGHT: 196 LBS | TEMPERATURE: 97.2 F | RESPIRATION RATE: 18 BRPM | DIASTOLIC BLOOD PRESSURE: 82 MMHG | BODY MASS INDEX: 26.58 KG/M2 | HEART RATE: 63 BPM

## 2023-11-07 DIAGNOSIS — G70.00 MYASTHENIA GRAVIS (HCC): Primary | ICD-10-CM

## 2023-11-07 PROCEDURE — 6370000000 HC RX 637 (ALT 250 FOR IP): Performed by: PSYCHIATRY & NEUROLOGY

## 2023-11-07 PROCEDURE — 6360000002 HC RX W HCPCS: Performed by: PSYCHIATRY & NEUROLOGY

## 2023-11-07 PROCEDURE — 96366 THER/PROPH/DIAG IV INF ADDON: CPT

## 2023-11-07 PROCEDURE — 96365 THER/PROPH/DIAG IV INF INIT: CPT

## 2023-11-07 RX ORDER — SODIUM CHLORIDE 9 MG/ML
INJECTION, SOLUTION INTRAVENOUS CONTINUOUS
OUTPATIENT
Start: 2023-12-05

## 2023-11-07 RX ORDER — SODIUM CHLORIDE 0.9 % (FLUSH) 0.9 %
5-40 SYRINGE (ML) INJECTION PRN
OUTPATIENT
Start: 2023-12-05

## 2023-11-07 RX ORDER — HEPARIN 100 UNIT/ML
500 SYRINGE INTRAVENOUS PRN
OUTPATIENT
Start: 2023-12-05

## 2023-11-07 RX ORDER — SODIUM CHLORIDE 0.9 % (FLUSH) 0.9 %
5-40 SYRINGE (ML) INJECTION PRN
Status: DISCONTINUED | OUTPATIENT
Start: 2023-11-07 | End: 2023-11-08 | Stop reason: HOSPADM

## 2023-11-07 RX ORDER — MEPERIDINE HYDROCHLORIDE 25 MG/ML
12.5 INJECTION INTRAMUSCULAR; INTRAVENOUS; SUBCUTANEOUS PRN
OUTPATIENT
Start: 2023-12-05

## 2023-11-07 RX ORDER — ACETAMINOPHEN 325 MG/1
650 TABLET ORAL ONCE
OUTPATIENT
Start: 2023-12-05 | End: 2023-12-05

## 2023-11-07 RX ORDER — DIPHENHYDRAMINE HYDROCHLORIDE 50 MG/ML
50 INJECTION INTRAMUSCULAR; INTRAVENOUS
OUTPATIENT
Start: 2023-12-05

## 2023-11-07 RX ORDER — SODIUM CHLORIDE 9 MG/ML
5-250 INJECTION, SOLUTION INTRAVENOUS PRN
OUTPATIENT
Start: 2023-12-05

## 2023-11-07 RX ORDER — ONDANSETRON 2 MG/ML
8 INJECTION INTRAMUSCULAR; INTRAVENOUS
OUTPATIENT
Start: 2023-12-05

## 2023-11-07 RX ORDER — DIPHENHYDRAMINE HCL 25 MG
25 TABLET ORAL ONCE
OUTPATIENT
Start: 2023-12-05 | End: 2023-12-05

## 2023-11-07 RX ORDER — EPINEPHRINE 1 MG/ML
0.3 INJECTION, SOLUTION, CONCENTRATE INTRAVENOUS PRN
OUTPATIENT
Start: 2023-12-05

## 2023-11-07 RX ORDER — DIPHENHYDRAMINE HCL 25 MG
25 TABLET ORAL ONCE
Status: COMPLETED | OUTPATIENT
Start: 2023-11-07 | End: 2023-11-07

## 2023-11-07 RX ORDER — ACETAMINOPHEN 325 MG/1
650 TABLET ORAL ONCE
Status: COMPLETED | OUTPATIENT
Start: 2023-11-07 | End: 2023-11-07

## 2023-11-07 RX ORDER — ACETAMINOPHEN 325 MG/1
650 TABLET ORAL
OUTPATIENT
Start: 2023-12-05

## 2023-11-07 RX ORDER — ALBUTEROL SULFATE 90 UG/1
4 AEROSOL, METERED RESPIRATORY (INHALATION) PRN
OUTPATIENT
Start: 2023-12-05

## 2023-11-07 RX ADMIN — ACETAMINOPHEN 650 MG: 325 TABLET ORAL at 12:50

## 2023-11-07 RX ADMIN — IMMUNE GLOBULIN (HUMAN) 90 G: 10 INJECTION INTRAVENOUS; SUBCUTANEOUS at 13:21

## 2023-11-07 RX ADMIN — DIPHENHYDRAMINE HYDROCHLORIDE 25 MG: 25 TABLET ORAL at 12:50

## 2023-11-07 NOTE — DISCHARGE INSTRUCTIONS
immune globulin (intravenous and subcutaneous)  Pronunciation:  jakob OREN murcia  Brand:  Gammagard Liquid, Gammaked, Gamunex-C  What is the most important information I should know about immune globulin? This medicine can cause blood clots. The risk is highest in older adults or in people who have had blood clots, heart problems, or blood circulation problems. Blood clots are also more likely during long-term bedrest, while using birth control pills or hormone replacement therapy, or while having a central intravenous (IV) catheter in place. Call your doctor at once if you have chest pain, trouble breathing, fast heartbeats, numbness or weakness, or swelling and warmth or discoloration in an arm or leg. This medicine can also harm your kidneys, especially if you have kidney disease or if you also use certain medicines. Tell your doctor right away if you have signs of kidney problems, such as swelling, rapid weight gain, and little or no urination. What is immune globulin? Immune globulin intravenous and subcutaneous (for injection into a vein or under the skin) is used to treat primary immunodeficiency. Immune globulin is also used to increase platelets (blood clotting cells) in people with idiopathic thrombocytopenic purpura. Immune globulin is also used to treat certain debilitating nerve disorders that cause muscle weakness and can affect daily activities. Immune globulin may also be used for purposes not listed in this medication guide. What should I discuss with my healthcare provider before using immune globulin? You should not use this medicine if:  you have had an allergic reaction to an immune globulin or blood product; or  you have immune globulin A (IgA) deficiency with antibody to IgA. Immune globulin can cause blood clots or kidney problems, especially in older adults or in people with certain conditions.  Tell your doctor if you have ever had:  heart problems, blood circulation vaccines include measles, mumps, rubella (MMR), rotavirus, typhoid, yellow fever, varicella (chickenpox), zoster (shingles), and nasal flu (influenza) vaccine. What are the possible side effects of immune globulin? Get emergency medical help if you have signs of an allergic reaction: hives; wheezing, difficulty breathing; dizziness, feeling like you might pass out; swelling of your face, lips, tongue, or throat. Some side effects may occur during the injection. Tell your caregiver if you feel light-headed, itchy, chilled, sweaty, or have chest discomfort, fast heartbeats, severe headache, or pounding in your neck or ears. Call your doctor at once if you have:  a blood cell disorder --pale or yellowed skin, dark colored urine, fever, confusion or weakness;  dehydration symptoms --feeling very thirsty or hot, being unable to urinate, heavy sweating, or hot and dry skin;  kidney problems --little or no urination, swelling, rapid weight gain, feeling short of breath;  lung problems --chest pain, wheezing, trouble breathing, blue colored lips, fingers, or toes;  signs of a new infection --fever with a severe headache, neck stiffness, eye pain, and increased sensitivity to light; or  signs of a blood clot --shortness of breath, chest pain with deep breathing, rapid heart rate, numbness or weakness on one side of the body, swelling and warmth or discoloration in an arm or leg. Common side effects may include:  runny or stuffy nose, sinus pain, cough, sore throat;  fever, chills, weakness;  headache, back pain, muscle or joint pain;  dizziness, tiredness, depressed mood;  swelling in your hands or feet;  skin rash, redness, or bruising;  blisters or ulcers in your mouth, red or swollen gums, trouble swallowing;  nausea, diarrhea, stomach pain, upset stomach;  increased blood pressure; or  redness, swelling, or itching where an injection was given. This is not a complete list of side effects and others may occur.  Call

## 2023-11-08 ENCOUNTER — HOSPITAL ENCOUNTER (OUTPATIENT)
Dept: CT IMAGING | Facility: HOSPITAL | Age: 83
Discharge: HOME | End: 2023-11-08
Admitting: INTERNAL MEDICINE
Payer: MEDICARE

## 2023-11-08 DIAGNOSIS — C34.32 PRIMARY ADENOCARCINOMA OF LOWER LOBE OF LEFT LUNG: ICD-10-CM

## 2023-11-08 LAB — CREAT BLDA-MCNC: 0.9 MG/DL (ref 0.6–1.3)

## 2023-11-08 PROCEDURE — 82565 ASSAY OF CREATININE: CPT

## 2023-11-08 PROCEDURE — 71260 CT THORAX DX C+: CPT

## 2023-11-08 PROCEDURE — 25510000001 IOPAMIDOL 61 % SOLUTION: Performed by: INTERNAL MEDICINE

## 2023-11-08 RX ADMIN — IOPAMIDOL 100 ML: 612 INJECTION, SOLUTION INTRAVENOUS at 10:23

## 2023-11-14 ENCOUNTER — HOSPITAL ENCOUNTER (OUTPATIENT)
Dept: RADIATION ONCOLOGY | Facility: HOSPITAL | Age: 83
Setting detail: RADIATION/ONCOLOGY SERIES
End: 2023-11-14
Payer: MEDICARE

## 2023-11-14 PROBLEM — Z92.3 HISTORY OF RADIATION THERAPY: Status: ACTIVE | Noted: 2023-11-14

## 2023-11-14 NOTE — PROGRESS NOTES
[facility]Munir Samuels MD      Bryn Proctor, GRACE  ____________________________________________________________  [facility]  Department of Radiation Oncology  [address] [place] [id number]  Office:  [phone]  Fax: [phone]    DATE:  11/15/2023  PATIENT: Carmine Garcia   1940                                 MEDICAL RECORD #: [id number]    Reason for Follow up Visit:  Carmine Garcia is a very pleasant 83 y.o. patient that completed radiation to the lung and returns to the clinic today for inital follow up exam.  Reports SOB. Denies activity change, fatigue, unexpected weight change, nasuea/vomiting, diarrhea, light-headedness, weakness, and headaches. He continues to follow .     History of Present Illness:    10/20/2011 - CT Chest:  1.5 cm left lower lobe nodule positioned just lateral to the hilum, granuloma vs. Malignancy  No pathologic lymphadenopathy    10/26/2011 - PET Scan:  Increased metabolic activity in the left lower lobe, lung nodule located centrally near the left hilum witih max SUV 7.4, suspicious for primary carcinoma. This corresponds to the lesion seen on CT scan of 10/20/2011.   No abnormal mediastinal, hilar, or inguinal activity    11/14/2011 - Bronchoscopy with biopsy:  Biopsy, left lower lobe nodule, lung:    Fragments of bronchial mucosa with submucosa demonstrating stromal elastosis and minimal chronic inflammation.   No evidence of granulomatous inflammation.   No histologic evidence of malignancy.    Bronchial washings, smears (four) and cell block:    Mild acute inflammation.   Negative for malignant cells.    Bronchial brushings, left lower lobe of lung, smears (3) and ThinPrep preparation (1):    Negative for malignant cells.     11/28/2011 -  Left thoracoscopic core needle biopsy, followed by video-assisted thoracoscopic surgery (VATS) left lower lobectomy, and mediastinal lymph node dissection:  Biopsy, left  lower lobe of lung (frozen section control):    Fragment of fibrotic pulmonary parenchyma demonstrating stromal elastosis and chronic active inflammation, moderate.   No histologic evidence of malignancy.    Level 10 lymph node (frozen section control):    Lymph node (one), negative for metastatic carcinoma.    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   The dominant and aberrant bronchial margins of surgical resection are negative for malignancy.    The vascular and parenchymal margins of surgical resection are negative for malignancy.    No visceral pleural invasion identified.    Peribronchial lymph nodes (three), negative for metastatic carcinoma.    Emphysematous changes.    Level 11 lymph node: Lymph node (one), negative for metastatic carcinoma.    Level 5 lymph nodes: Lymph nodes (two), negative for metastatic carcinoma.    Comment: Primary papillary adenocarcinomas of the lung do occur; however, they demonstrate a similar histology to that seen in papillary thyroid carcinoma.  The thyroid gland should be evaluated if not already done to exclude the possibility of met     04/30/2014 - Chest x-ray:  Stable chronic change.  No acute disease.    09/03/2014 - Chest x-ray:   Emphysematous and postoperative changes in the chest without evidence of mass lesion or acute cardiopulmonary process.    01/05/2016 - Chest x-ray:   Postoperative changes of the left hemithorax with associated volume loss. No acute process identified.    07/01/2016 - Chest x-ray:   No active disease is seen.  COPD/emphysema.  Prior lung surgery on the left.     01/04/2017 - Chest x-ray:  COPD/emphysema.  Postoperative changes on the left.  No acute appearing infiltrate or effusion.     03/20/2017 - Chest x-ray:  Chronic changes in the lungs and spine without evidence of acute cardiopulmonary process. Overall, the appearance of the lungs is similar to the  study from 01/04/2017.    11/20/2017 - Chest x-ray:  No significant interval change when compared to chest x-ray dated 07/03/2017.    02/12/2018 - Chest x-ray:  Hyperinflated lungs suggesting COPD.  Postoperative changes of the left hemithorax.  No acute cardiopulmonary process identified.    07/06/2018 - Chest x-ray:  No acute cardiopulmonary disease.    12/21/2018 - Chest x-ray:  COPD. Chronic volume loss in the lung bases with mild central vascular crowding. No acute infiltrates.    08/23/2019 - Chest x-ray:  No acute disease.    06/19/2020 - Chest x-ray:  Mild blunting of the left costophrenic angle, which could be seen with scarring or small effusion.    02/22/2021 - Chest x-ray:  COPD no acute cardiopulmonary process.    10/25/2021 - Chest x-ray:  Streaky bibasilar opacities, similar to prior.    04/25/2022 - CT Chest with contrast:  Status post resection of a left lower lobe pulmonary nodule. Moderate changes of centrilobular emphysema are present.  Small groundglass nodules one within the left upper lobe and one within the periphery of the right lower lobe warranting follow-up per Fleischner criteria. No additional lung lesions are present.  No enlarged mediastinal or axillary lymphadenopathy..  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 The thoracic aorta is ectatic. There is mild enlargement of the pulmonary arteries suggesting pulmonary arterial hypertension. Heavy coronary calcifications are present.     07/20/2022 - CT Chest without contrast:  Postoperative change of LEFT lower lobectomy without evidence of recurrent or metastatic disease.   No change in 7 mm LEFT upper lobe and RIGHT lower lobe groundglass pulmonary nodules. Consider continued imaging surveillance to document stability.  Ectatic ascending aorta measuring 4 cm diameter.    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   Tumor burden: 1.5 cm left lower lung nodule.   Complications of tumor: None.   Tumor status: JAZMÍN following resection via left lower lobectomy.   Chest x-ray, 07/03/2017, [facility]. Impression: Stable appearance of the chest with postoperative changes in the left lung base and emphysematous changes.   Chest x-ray obtained 11/20/2017 at [facility] reveals no interval change when compared to chest x-ray dated 07/03/2017.   Chest x-ray on 02/12/2018 ([facility]). Impression: Hyperinflated lungs suggesting chronic obstructive pulmonary disease (COPD). Postoperative changes of the left hemithorax. No acute cardiopulmonary process identified.   Chest Xray on 07/06/2018 ([facility]). Impression: No acute cardiopulmonary disease.   Chest X-ray, 12/21/2018 at [facility]. COPD. Chronic volume loss in the lung bases with mild central vascular crowding. No acute infiltrates.   06/19/2020-chest x-ray.  Comparison: 8/23/2019-impression: Mild blunting of the left costophrenic angle which could be seen with scarring or small effusion.  02/22/2021- chest xray.  COPD no acute cardiopulmonary process  10/25/2021-chest x-ray.  Streaky bibasilar opacities.  Similar to prior.  04/25/2022- CT chest- Status post resection of a left lower lobe pulmonary nodule. Moderate changes of centrilobular emphysema are present. Small groundglass nodules one within the left upper lobe and one within the periphery of the right lower lobe warranting follow-up per Fleischner criteria. No additional lung lesions are present. No enlarged mediastinal or axillary lymphadenopathy.  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 The thoracic aorta is ectatic. There is mild enlargement of the pulmonary arteries suggesting pulmonary arterial hypertension. Heavy coronary calcifications are present.   07/20/2022-CT chest- postoperative change of LLL without evidence  "of recurrent or metastatic disease.  No change in 7 mm NISA and RLL groundglass pulmonary nodules.  Consider continued imaging surveillance to document stability.  Ectatic ascending aorta measuring 4 cm in diameter.  Mildly abnormal CEA.    --3.39, 10/26/2022 (prior range: 2.2 - 5.0). Continued observation warranted.   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 Underwent right knee replacement, 05/18/2017.   Normocytic/macrocytic anemia with resolution of macrocytosis, repleted iron and B12 deficiencies (anemia of chronic disease).   --Stable, Hgb 14; MCV 98.2, 10/26/2022 (prior range: Hgb 11.4 - 14.3; 94.4 - 102.1)  No myelodysplastic syndrome (MDS) on comprehensive blood report, normal thyroid function tests (TFTs), normal LDH, normal B12/folate.   Colonoscopy on 4/10/18 ([facility]). Impressions: Preparation of the colon was inadequate. One 12 mm polyp in the cecum, removed with a hot snare and removed piecemeal using a hot snare. Resected and retrieved. Diverticulosis in the sigmoid colon and in the descending colon.   Repeat with 2 day prep scheduled for 08/02/2018. \"Polyps\" Repeat 3 years.  Colonoscopy, 08/02/2018 (above).  Fair colon preparation.  1 5 mm polyp in the ascending colon, removed with hot snare.  One 12 mm polyp in the transverse colon, removed with a hot snare.  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 Negative thyroid ultrasound, 11/30/2011. Is followed by Dr. Medrano (ENT) and Dr. Roth (endocrine).   Low B12 levels. Recurrent.   Constipation, multifactorial.  Regulated by stool softeners.  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   Chronic intermittent irregular heart rate, Dr. Dobson following.   A 50 pack-year tobacco smoker with radiographic evidence of emphysema. Has not smoked since 2004.   Anxiety.    Diplopia.  Is scheduled to see " ophthalmology today  RECOMMENDATIONS:   Apprised of surveillance chest CT, 07/22/2022 (see above).  No evidence of recurrent metastatic disease.  No change in 7 mm NISA and RLL groundglass pulmonary nodules.  Apprise of labs from 10/26/2022 including the current heme (stable anemia otherwise normal CBC) and the other labs noting the stable CEA, essentially normal CMP, repleted iron, repleted (35%; from 37%; 18%) Fe sat, depressed ferritin (71; from 56; 17; 45; 87; 58), depressed (290; from 388; >1000) B12/folate.  Anne called in  Previously discussed that even without adjuvant chemotherapy 70.8% of people are alive in 5 years and none of those are alive due to therapy. Approximately 28% of those die of cancer and 1.3% die from other causes. Emphasized that very little data are available about the effects of adjuvant chemotherapy in Stage I lung cancer and most guidelines do not recommend the use of adjuvant therapy in this population and a meta-analysis even suggested that there was a trend toward a worse outcome in these patients. He preferred not to have chemo anyway.   Review follow-up by GRACE Bahena with pulmonary, 07/27/2022.  Review CT chest without contrast, 07/20/2022 (above).  Stable overall.  Plan: CT chest without contrast and follow-up in 6 months (01/27/2023).  Stay on Folbee   Continue ongoing management per primary care physician and other specialists. Is seeing the eye docs today  Schedule chest CT with contrast in 23 weeks at [facility]  Return to the office with pre-office CEA, serum iron, Fe sat, ferritin, B12/folate, CMP, and CBC with differential in 24 weeks.    12/21/2022 - Chest x-ray:  Stable chronic change.  No acute disease.    01/27/2023 - CT Chest with contrast:  A stable CT scan of the chest. Postsurgical changes/left lower lobectomy. No finding to suggest recurrence.  A stable small groundglass opacity/nodule in the left upper lobe. No features of malignancy at this time. A follow-up  examination in one year is recommended to ensure stability.  Chronic emphysematous lung changes.    06/14/2023 - CT Chest with contrast:  New 1.5 cm central LEFT upper lobe pulmonary nodule. This is concerning for recurrent neoplasm. Management options include PET/CT versus sampling with EBUS.    06/21/2023 - PET Scan:  PET-CT findings compatible with recurrent left lung neoplasm as an isolated finding. No distant disease is seen.    06/22/2023 - Documentation per Dr. Arteaga:  Patient informed per Dr Arteaga recommendations a referral should be made to Rad/Onc for evaluation and possible SBRT  Patient informed once the apt is domingo he will be called with time and date    06/28/2023 - Consult with  (Covering for ):  Following this discussion and in consideration of the diagnostic data/evaluation of the patient, I recommended a course of definitive intent pulmonary SBRT to the PET avid left upper lobe nodule to an approximate dose of 5252-8756 cGy to be given over 4-5 every other day fractions, final course pending.  If SBRT cannot safely be delivered secondary to central location, would favor a hypofractionated course of treatment to an approximate dose of 2711-3027 cGy to be given over 15 daily fractions (Monday through Friday).  Will simulate treatment fields today, 3D CT with MIPS to begin the planning process, final course pending.    08/01/2023 - 08/14/2023 - Completed radiation course:  Received 5000 cGy in 5 fractions to the NISA lung tumor.    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   Tumor burden: 1.5 cm left lower lung nodule.   Complications of tumor: None.   Tumor status: JAZMÍN following resection via left lower lobectomy.   Chest x-ray, 07/03/2017, [facility]. Impression: Stable appearance of the chest with postoperative changes in the  left lung base and emphysematous changes.   Chest x-ray obtained 11/20/2017 at [facility] reveals no interval change when compared to chest x-ray dated 07/03/2017.   Chest x-ray on 02/12/2018 ([facility]). Impression: Hyperinflated lungs suggesting chronic obstructive pulmonary disease (COPD). Postoperative changes of the left hemithorax. No acute cardiopulmonary process identified.   Chest Xray on 07/06/2018 ([facility]). Impression: No acute cardiopulmonary disease.   Chest X-ray, 12/21/2018 at [facility]. COPD. Chronic volume loss in the lung bases with mild central vascular crowding. No acute infiltrates.   06/19/2020-chest x-ray.  Comparison: 8/23/2019-impression: Mild blunting of the left costophrenic angle which could be seen with scarring or small effusion.  02/22/2021- chest xray.  COPD no acute cardiopulmonary process  10/25/2021-chest x-ray.  Streaky bibasilar opacities.  Similar to prior.  04/25/22- CT chest- Status post resection of a left lower lobe pulmonary nodule. Moderate changes of centrilobular emphysema are present. Small groundglass nodules one within the left upper lobe and one within the periphery of the right lower lobe warranting follow-up per Fleischner criteria. No additional lung lesions are present. No enlarged mediastinal or axillary lymphadenopathy.  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 The thoracic aorta is ectatic. There is mild enlargement of the pulmonary arteries suggesting pulmonary arterial hypertension. Heavy coronary calcifications are present.   07/20/22-CT chest- postoperative change of LLL without evidence of recurrent or metastatic disease.  No change in 7 mm NISA and RLL groundglass pulmonary nodules.  Consider continued imaging surveillance to document stability.  Ectatic ascending aorta measuring 4 cm in diameter.  1/27/23- CT chest- A stable CT scan of the chest. Postsurgical changes/left lower lobectomy. No  "finding to suggest recurrence. A stable small groundglass opacity/nodule in the left upper lobe. No features of malignancy at this time. A follow-up examination in one year is recommended to ensure stability. Chronic emphysematous lung changes  6/14/23- CT chest- New 1.5 cm central LEFT upper lobe pulmonary nodule. This is concerning for recurrent neoplasm. Management options include PET/CT versus sampling with EBUS.  6/21/23- PET-CT- PET-CT findings compatible with recurrent left lung neoplasm an isolated finding (15 mm left lung nodule adjacent to the hilum shows FDG uptake with SUV max = 3.3).  No distant disease is seen.  SBRT: 8/1/23- 8/14/23- 6000 cGy/5 fx) to NISA nodule  Mildly abnormal CEA.    --4.15, 8/21/23 (prior range: 2.2 - 5.0). Continued observation warranted.   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 Underwent right knee replacement, 05/18/2017.   Normocytic/macrocytic anemia with resolution of macrocytosis, repleted iron and B12 deficiencies (anemia of chronic disease).   --Stable, Hgb 12.9; MCV 99.8, 8/21/23 (prior range: Hgb 11.4 - 14.3; 94.4 - 102.1)  No myelodysplastic syndrome (MDS) on comprehensive blood report, normal thyroid function tests (TFTs), normal LDH, normal B12/folate.   Colonoscopy on 4/10/18 ([facility]). Impressions: Preparation of the colon was inadequate. One 12 mm polyp in the cecum, removed with a hot snare and removed piecemeal using a hot snare. Resected and retrieved. Diverticulosis in the sigmoid colon and in the descending colon.   Repeat with 2 day prep scheduled for 08/02/2018. \"Polyps\" Repeat 3 years.  Colonoscopy, 08/02/2018 (above).  Fair colon preparation.  1 5 mm polyp in the ascending colon, removed with hot snare.  One 12 mm polyp in the transverse colon, removed with a hot snare.  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 Negative thyroid " "ultrasound, 11/30/2011. Is followed by Dr. Medrano (ENT) and Dr. Roth (endocrine).   Low B12 levels. Recurrent.   Constipation, multifactorial.  Regulated by stool softeners.  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   Chronic intermittent irregular heart rate, Dr. Dobson following.   A 50 pack-year tobacco smoker with radiographic evidence of emphysema. Has not smoked since 2004.   Anxiety.    Myasthenia gravis.  Followed by Dr. Hines  COPD  Weight loss.  Has stabilized.  Reassures me it is intentional.  \"Eating less and smarter.\"  RECOMMENDATIONS:   Apprise of labs from 8/21/23 including the current heme (stable anemia otherwise normal CBC) and the other labs noting the stable CEA (4.15-prior:  2.2 - 5), glucose otherwise essentially normal CMP, repleted iron, repleted (30%- prior: 31%; 35%; 37%; 18%) Fe sat, depressed ferritin (47; prior:  68; 71; 56; 17; 45; 87; 58), depressed (279; from 366; 290; 388; >1000) B12/folate.  (Micke called in)  CT chest, 6/14/23 and PET scan, 6/21/23 (above).  New 1.5 cm central LEFT upper lobe pulmonary nodule compatible with recurrent neoplasm.  Previously discussed that even without adjuvant chemotherapy 70.8% of people are alive in 5 years and none of those are alive due to therapy. Approximately 28% of those die of cancer and 1.3% die from other causes. Emphasized that very little data are available about the effects of adjuvant chemotherapy in Stage I lung cancer and most guidelines do not recommend the use of adjuvant therapy in this population and a meta-analysis even suggested that there was a trend toward a worse outcome in these patients. He preferred not to have chemo anyway.   Review follow-up with pulmonary, 7/11/23 Re:  Diagnostic bronchoscopy and PFTs.  Patient declined any invasive procedures.  He said that he had PFT in the past that \"almost killed him\" and is not wanting a pulmonary function test at this time either.  RTC 3 " months (~ 10/11/23)  Review visit with radiation oncology (Dr. Herring), 6/28/23.   I recommended a course of definitive intent pulmonary SBRT to the PET avid left upper lobe nodule to an approximate dose of 8185-0998 cGy to be given over 4-5 every other day fractions, final course pending.  If SBRT cannot safely be delivered secondary to central location, would favor a hypofractionated course of treatment to an approximate dose of 1084-7500 cGy to be given over 15 daily fractions  (SBRT: 8/1/23- 8/14/23- 6000 cGy/5 fx)  Continue ongoing management per primary care physician and other specialists. Has appointment in 11/2023 with rad onc along with CT prior to visit  Return to the office with pre-office CEA, serum iron, Fe sat, ferritin, B12/folate, CMP, and CBC with differential in 16 weeks.    11/08/2023 - CT Chest with contrast:  Stable 1.5 cm left perihilar central upper lobe nodule, relatively unchanged from 6/14/2023. No new or increasing size nodule identified.  No pathologic lymphadenopathy. Stable volume loss left hemithorax from a prior left lower lobe lobectomy.        History obtained from  PATIENT and CHART    PAST MEDICAL HISTORY  Past Medical History:   Diagnosis Date    Anemia     Arthritis     COPD (chronic obstructive pulmonary disease)     Disease of thyroid gland     Emphysema lung     Hx of colonic polyps     Hyperlipidemia     Hypertension     Lung cancer     Lung nodule     Pneumonia     PUD (peptic ulcer disease)       PAST SURGICAL HISTORY  Past Surgical History:   Procedure Laterality Date    CATARACT EXTRACTION      COLONOSCOPY  12/12/2014    Diverticulosis repeat exam in 3 years    COLONOSCOPY N/A 04/10/2018    Tubular adenoma cecum poor prep repeat in 3 months    COLONOSCOPY N/A 08/02/2018    2 Tubular adenomas transverse and ascending colon fair prep repeat in 3 years    COLONOSCOPY N/A 03/24/2022    Procedure: COLONOSCOPY WITH ANESTHESIA;  Surgeon: Ferdinand Cross MD;  Location:   [facility];  Service: Gastroenterology;  Laterality: N/A;  pre hx colon polyps  post diverticulosis; inadequate prep  Efrain Dobson, DO    COLONOSCOPY W/ POLYPECTOMY  02/15/2010    2 Hyperplastic polyps at 25 cm and rectum repeat exam in 5 years    LUNG CANCER SURGERY      LUNG REMOVAL, PARTIAL      REPLACEMENT TOTAL KNEE Right 2017      FAMILY HISTORY  family history includes Cancer in his father; Colon polyps in his mother; Diabetes in his child and mother; Hypertension in his mother; No Known Problems in his maternal grandfather, paternal grandfather, and paternal grandmother; Stroke in his maternal grandmother and mother.    SOCIAL HISTORY  Social History     Tobacco Use    Smoking status: Former     Packs/day: 2.00     Years: 30.00     Additional pack years: 0.00     Total pack years: 60.00     Types: Cigarettes     Start date: 1955     Quit date: 1984     Years since quittin.9     Passive exposure: Past    Smokeless tobacco: Never   Vaping Use    Vaping Use: Never used   Substance Use Topics    Alcohol use: No    Drug use: No      Penicillins     MEDICATIONS  Current Outpatient Medications   Medication Sig Dispense Refill    alfuzosin (UROXATRAL) 10 MG 24 hr tablet Take 2 tablets by mouth.      buPROPion XL (WELLBUTRIN XL) 150 MG 24 hr tablet Take 1 tablet by mouth Every Morning.      Calcium Citrate-Vitamin D3 (CITRACAL) 315-6.25 MG-MCG tablet tablet Take  by mouth Daily.      fluticasone (FLONASE) 50 MCG/ACT nasal spray 1 spray 2 (Two) Times a Day.      folic acid-vit B6-vit B12 (Folbee) 2.5-25-1 MG tablet tablet Take 1 tablet by mouth Daily. 30 tablet 0    LORazepam (Ativan) 1 MG tablet Take 30 minute before PET scan 1 tablet 0    losartan (COZAAR) 25 MG tablet 1 tablet 2 (Two) Times a Day.      lovastatin (MEVACOR) 40 MG tablet Take 1 tablet by mouth.      Multiple Vitamins-Minerals (PRESERVISION AREDS 2+MULTI VIT) capsule Take  by mouth.      pantoprazole (PROTONIX) 40 MG EC tablet 1  "tablet Daily.      predniSONE (DELTASONE) 10 MG tablet 1 tablet Daily. 1.5 tab daily      pyridostigmine (MESTINON) 60 MG tablet 1 tablet 4 (Four) Times a Day.       No current facility-administered medications for this visit.      Current outpatient and discharge medications have been reconciled for the patient.  Reviewed by: Byron Samuels III, MD    The following portions of the patient's history were reviewed and updated as appropriate: allergies, current medications, past family history, past medical history, past social history, past surgical history and problem list.    REVIEW OF SYSTEMS  Review of Systems   Constitutional: Negative.  Negative for activity change and fatigue.   HENT: Negative.     Respiratory:  Positive for shortness of breath. Negative for cough.    Cardiovascular: Negative.  Negative for chest pain.   Gastrointestinal: Negative.    Genitourinary: Negative.    Musculoskeletal: Negative.    Skin: Negative.    Neurological: Negative.  Negative for dizziness.   Hematological: Negative.  Negative for adenopathy.   Psychiatric/Behavioral: Negative.     All other systems reviewed and are negative.    PHYSICAL EXAM  VITAL SIGNS:   Vitals:    11/15/23 1358   BP: 152/85   Pulse: 85   SpO2: 96%   Weight: 89.3 kg (196 lb 12.8 oz)   Height: 182.9 cm (72\")   PainSc: 0-No pain       Physical Exam  Vitals reviewed.   Constitutional:       Appearance: Normal appearance.   HENT:      Head: Normocephalic.   Cardiovascular:      Rate and Rhythm: Normal rate and regular rhythm.      Heart sounds: Normal heart sounds.   Pulmonary:      Effort: Pulmonary effort is normal.      Breath sounds: Normal breath sounds.   Abdominal:      General: Bowel sounds are normal.   Musculoskeletal:         General: Normal range of motion.      Cervical back: Normal range of motion and neck supple.   Lymphadenopathy:      Cervical: No cervical adenopathy.   Skin:     General: Skin is warm.      Capillary Refill: Capillary refill " takes less than 2 seconds.   Neurological:      General: No focal deficit present.      Mental Status: He is alert and oriented to person, place, and time.   Psychiatric:         Mood and Affect: Mood normal.         Behavior: Behavior normal.           Performance Status: ECOG (0) Fully active, able to carry on all predisease performance without restriction    Clinical Quality Measures  -Pain Documented  Carmine Garcia reports a pain score of 0.  Given his pain assessment as noted, treatment options were discussed and the following options were decided upon as a follow-up plan to address the patient's pain:  No pain,no plan given .  Pain Medications               buPROPion XL (WELLBUTRIN XL) 150 MG 24 hr tablet Take 1 tablet by mouth Every Morning.    predniSONE (DELTASONE) 10 MG tablet 1 tablet Daily. 1.5 tab daily          -Advanced Care Planning Advance Care Planning   ACP discussion was held with the patient during this visit. Patient has an advance directive in EMR which is still valid.     -Body Mass Index Screening and Follow-Up Plan  Body mass index is 26.69 kg/m².    -Tobacco Use: Screening and Cessation Intervention Social History    Tobacco Use      Smoking status: Former        Packs/day: 2.00        Years: 30.00        Additional pack years: 0.00        Total pack years: 60.00        Types: Cigarettes        Start date: 1955        Quit date: 1984        Years since quittin.9        Passive exposure: Past      Smokeless tobacco: Never    ASSESSMENT AND PLAN  1. 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 History of radiation therapy    3. Former smoker      Orders Placed This Encounter   Procedures    CT Chest With Contrast     Standing Status:   Future     Standing Expiration Date:   11/15/2024     Order Specific Question:   Release to patient     Answer:   Routine Release [[id number]]     RECOMMENDATIONS: Carmine Garcia is status post completion of radiation therapy to the  lung and presents to our clinic today for surveillance exam and to review imaging. Diagnosed with stage IA2 (cT1b cN0 cM0) neoplasm of left upper lung. He completed 5000 cGy in 5 fractions to the NISA lung tumor on 08/14/2023.     CT-scan of the chest on 11/08/2023 revealed a stable 1.5 cm left perihilar central upper lobe nodule, relatively unchanged from 6/14/2023. No new or increasing size nodule identified. No pathologic lymphadenopathy. Stable volume loss left hemithorax from a prior left lower lobe lobectomy.    On exam, I do not see evidence for recurrent or metastatic disease at this time. We will continue routine follow-up/surveillance as discussed in 3 months with follow up CT scan before visit and I have instructed him to continue to see the other health care providers as per their scheduling.    Patient Instructions   1) Return in 3 months with a CT chest before  2) You are in remission!     Todays appointment time was spent in counseling, coordination of care and surveillance related to patients diagnosis as well as radiation therapy possible and probable after effects.   Byron Samuels III, MD  11/15/2023

## 2023-11-15 ENCOUNTER — OFFICE VISIT (OUTPATIENT)
Dept: RADIATION ONCOLOGY | Facility: HOSPITAL | Age: 83
End: 2023-11-15
Payer: MEDICARE

## 2023-11-15 VITALS
HEART RATE: 85 BPM | WEIGHT: 196.8 LBS | HEIGHT: 72 IN | DIASTOLIC BLOOD PRESSURE: 85 MMHG | BODY MASS INDEX: 26.66 KG/M2 | OXYGEN SATURATION: 96 % | SYSTOLIC BLOOD PRESSURE: 152 MMHG

## 2023-11-15 DIAGNOSIS — Z92.3 HISTORY OF RADIATION THERAPY: ICD-10-CM

## 2023-11-15 DIAGNOSIS — Z87.891 FORMER SMOKER: ICD-10-CM

## 2023-11-15 DIAGNOSIS — C34.32 PRIMARY ADENOCARCINOMA OF LOWER LOBE OF LEFT LUNG: Primary | ICD-10-CM

## 2023-11-15 PROCEDURE — G0463 HOSPITAL OUTPT CLINIC VISIT: HCPCS | Performed by: RADIOLOGY

## 2023-11-20 RX ORDER — PREDNISONE 10 MG/1
15 TABLET ORAL DAILY
Qty: 60 TABLET | Refills: 2 | Status: SHIPPED | OUTPATIENT
Start: 2023-11-20

## 2023-11-20 NOTE — TELEPHONE ENCOUNTER
Requested Prescriptions     Pending Prescriptions Disp Refills    predniSONE (DELTASONE) 10 MG tablet 60 tablet 2     Sig: Take 1.5 tablets by mouth daily       Last Office Visit: 10/5/2023  Next Office Visit: 12/7/2023  Last Medication Refill: 7/10/202 with 2 ELIUD

## 2023-12-05 ENCOUNTER — HOSPITAL ENCOUNTER (OUTPATIENT)
Dept: INFUSION THERAPY | Age: 83
Setting detail: INFUSION SERIES
Discharge: HOME | End: 2023-12-05
Payer: MEDICARE

## 2023-12-05 VITALS
WEIGHT: 197 LBS | SYSTOLIC BLOOD PRESSURE: 158 MMHG | TEMPERATURE: 98 F | BODY MASS INDEX: 26.72 KG/M2 | DIASTOLIC BLOOD PRESSURE: 82 MMHG | OXYGEN SATURATION: 96 % | HEART RATE: 58 BPM | RESPIRATION RATE: 18 BRPM

## 2023-12-05 DIAGNOSIS — G70.00 MYASTHENIA GRAVIS (HCC): Primary | ICD-10-CM

## 2023-12-05 LAB
ALBUMIN SERPL-MCNC: 3.8 G/DL (ref 3.5–5.2)
ALP SERPL-CCNC: 71 U/L (ref 40–130)
ALT SERPL-CCNC: 13 U/L (ref 5–41)
ANION GAP SERPL CALCULATED.3IONS-SCNC: 11 MMOL/L (ref 7–19)
AST SERPL-CCNC: 17 U/L (ref 5–40)
BASOPHILS # BLD: 0 K/UL (ref 0–0.2)
BASOPHILS NFR BLD: 0.5 % (ref 0–1)
BILIRUB SERPL-MCNC: 0.4 MG/DL (ref 0.2–1.2)
BUN SERPL-MCNC: 8 MG/DL (ref 8–23)
CALCIUM SERPL-MCNC: 8.6 MG/DL (ref 8.8–10.2)
CHLORIDE SERPL-SCNC: 101 MMOL/L (ref 98–111)
CO2 SERPL-SCNC: 24 MMOL/L (ref 22–29)
CREAT SERPL-MCNC: 0.8 MG/DL (ref 0.5–1.2)
EOSINOPHIL # BLD: 0.1 K/UL (ref 0–0.6)
EOSINOPHIL NFR BLD: 1 % (ref 0–5)
ERYTHROCYTE [DISTWIDTH] IN BLOOD BY AUTOMATED COUNT: 13.9 % (ref 11.5–14.5)
GLUCOSE SERPL-MCNC: 142 MG/DL (ref 74–109)
HCT VFR BLD AUTO: 38.5 % (ref 42–52)
HGB BLD-MCNC: 12.4 G/DL (ref 14–18)
IMM GRANULOCYTES # BLD: 0.1 K/UL
LYMPHOCYTES # BLD: 0.7 K/UL (ref 1.1–4.5)
LYMPHOCYTES NFR BLD: 11 % (ref 20–40)
MCH RBC QN AUTO: 32.5 PG (ref 27–31)
MCHC RBC AUTO-ENTMCNC: 32.2 G/DL (ref 33–37)
MCV RBC AUTO: 100.8 FL (ref 80–94)
MONOCYTES # BLD: 0.5 K/UL (ref 0–0.9)
MONOCYTES NFR BLD: 7.6 % (ref 0–10)
NEUTROPHILS # BLD: 5 K/UL (ref 1.5–7.5)
NEUTS SEG NFR BLD: 79.1 % (ref 50–65)
PLATELET # BLD AUTO: 238 K/UL (ref 130–400)
PMV BLD AUTO: 8.9 FL (ref 9.4–12.4)
POTASSIUM SERPL-SCNC: 4 MMOL/L (ref 3.5–5)
PROT SERPL-MCNC: 6.5 G/DL (ref 6.6–8.7)
RBC # BLD AUTO: 3.82 M/UL (ref 4.7–6.1)
SODIUM SERPL-SCNC: 136 MMOL/L (ref 136–145)
WBC # BLD AUTO: 6.3 K/UL (ref 4.8–10.8)

## 2023-12-05 PROCEDURE — 6370000000 HC RX 637 (ALT 250 FOR IP): Performed by: PSYCHIATRY & NEUROLOGY

## 2023-12-05 PROCEDURE — 85025 COMPLETE CBC W/AUTO DIFF WBC: CPT

## 2023-12-05 PROCEDURE — 6360000002 HC RX W HCPCS: Performed by: PSYCHIATRY & NEUROLOGY

## 2023-12-05 PROCEDURE — 80053 COMPREHEN METABOLIC PANEL: CPT

## 2023-12-05 PROCEDURE — 96365 THER/PROPH/DIAG IV INF INIT: CPT

## 2023-12-05 PROCEDURE — 96366 THER/PROPH/DIAG IV INF ADDON: CPT

## 2023-12-05 RX ORDER — MEPERIDINE HYDROCHLORIDE 25 MG/ML
12.5 INJECTION INTRAMUSCULAR; INTRAVENOUS; SUBCUTANEOUS PRN
OUTPATIENT
Start: 2024-01-02

## 2023-12-05 RX ORDER — SODIUM CHLORIDE 9 MG/ML
5-250 INJECTION, SOLUTION INTRAVENOUS PRN
Status: DISCONTINUED | OUTPATIENT
Start: 2023-12-05 | End: 2023-12-06 | Stop reason: HOSPADM

## 2023-12-05 RX ORDER — SODIUM CHLORIDE 0.9 % (FLUSH) 0.9 %
5-40 SYRINGE (ML) INJECTION PRN
OUTPATIENT
Start: 2024-01-02

## 2023-12-05 RX ORDER — ONDANSETRON 2 MG/ML
8 INJECTION INTRAMUSCULAR; INTRAVENOUS
OUTPATIENT
Start: 2024-01-02

## 2023-12-05 RX ORDER — SODIUM CHLORIDE 9 MG/ML
5-250 INJECTION, SOLUTION INTRAVENOUS PRN
OUTPATIENT
Start: 2024-01-02

## 2023-12-05 RX ORDER — ALBUTEROL SULFATE 90 UG/1
4 AEROSOL, METERED RESPIRATORY (INHALATION) PRN
OUTPATIENT
Start: 2024-01-02

## 2023-12-05 RX ORDER — DIPHENHYDRAMINE HCL 25 MG
25 TABLET ORAL ONCE
Status: COMPLETED | OUTPATIENT
Start: 2023-12-05 | End: 2023-12-05

## 2023-12-05 RX ORDER — SODIUM CHLORIDE 9 MG/ML
INJECTION, SOLUTION INTRAVENOUS CONTINUOUS
OUTPATIENT
Start: 2024-01-02

## 2023-12-05 RX ORDER — DIPHENHYDRAMINE HYDROCHLORIDE 50 MG/ML
50 INJECTION INTRAMUSCULAR; INTRAVENOUS
OUTPATIENT
Start: 2024-01-02

## 2023-12-05 RX ORDER — DIPHENHYDRAMINE HCL 25 MG
25 TABLET ORAL ONCE
OUTPATIENT
Start: 2024-01-02 | End: 2024-01-02

## 2023-12-05 RX ORDER — ACETAMINOPHEN 325 MG/1
650 TABLET ORAL
OUTPATIENT
Start: 2024-01-02

## 2023-12-05 RX ORDER — HEPARIN 100 UNIT/ML
500 SYRINGE INTRAVENOUS PRN
OUTPATIENT
Start: 2024-01-02

## 2023-12-05 RX ORDER — EPINEPHRINE 1 MG/ML
0.3 INJECTION, SOLUTION, CONCENTRATE INTRAVENOUS PRN
OUTPATIENT
Start: 2024-01-02

## 2023-12-05 RX ORDER — ACETAMINOPHEN 325 MG/1
650 TABLET ORAL ONCE
OUTPATIENT
Start: 2024-01-02 | End: 2024-01-02

## 2023-12-05 RX ORDER — ACETAMINOPHEN 325 MG/1
650 TABLET ORAL ONCE
Status: COMPLETED | OUTPATIENT
Start: 2023-12-05 | End: 2023-12-05

## 2023-12-05 RX ADMIN — ACETAMINOPHEN 650 MG: 325 TABLET ORAL at 09:16

## 2023-12-05 RX ADMIN — IMMUNE GLOBULIN (HUMAN) 90 G: 10 INJECTION INTRAVENOUS; SUBCUTANEOUS at 09:36

## 2023-12-05 RX ADMIN — DIPHENHYDRAMINE HYDROCHLORIDE 25 MG: 25 TABLET ORAL at 09:16

## 2023-12-05 NOTE — FLOWSHEET NOTE
12/05/23 1235   AVS Reviewed   AVS & discharge instructions reviewed with patient and/or representative?  Yes   Reviewed instructions with Patient   Level of Understanding Questions answered;Verbalized understanding

## 2023-12-05 NOTE — PROGRESS NOTES
Patient completed IVIG infusion as ordered. Patient tolerated infusion without s/s of adverse event. Patient provided with medication education, appointment infusion schedule. Patient states understanding. PIV removed, patient discharged to home.  Electronically signed by Luz Fernandez RN on 12/5/2023 at 12:37 PM

## 2023-12-20 NOTE — PROGRESS NOTES
[facility] ON[facility]ATOLOGY AND ONCOLOGY  [address]  [place] [address] [phone]    Patient Name: Carmine Garcia  Encounter Date: 12/28/2023  YOB: 1940  Patient Number: [id number]     REASON FOR VISIT: Mr. 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 He is seen 145 months since thoracoscopic surgical resection of the lung neoplasm. He is also followed for anemia of iron deficiency and B12 deficiency. It has been 73 months since last receipt of Injectafer.   On 6/21/23- PET-CT- showed a recurrent left lung neoplasm an isolated finding (15 mm left lung nodule adjacent to the hilum shows FDG uptake with SUV max = 3.3).  He then received definitive SBRT: 8/1/23- 8/14/23- 6000 cGy/5 fx) to NISA nodule (4.5 months).  He is here alone (previously with his daughter, Monica)    I have reviewed the HPI and verified with the patient the accuracy of it. No changes to interval history since the information was documented. Cem Arteaga MD 12/28/23      DIAGNOSTIC ABNORMALITIES:   CT chest, 10/20/2011, [facility]: 1.5 cm left lower lobe nodule positioned just lateral to the hilum - granuloma versus malignancy. No pathologic lymphadenopathy.  PET CT scan, 10/26/2011: Increased metabolic activity in the left lower lobe. Lung nodule located centrally near the left hilum with maximum SUV of 7.4 units, suspicious for primary carcinoma. This corresponded to the lesion seen on CT scan of 10/20/2011. No abnormal mediastinal, hilar, or inguinal activity noted.  Diagnostic bronchoscopy, 11/14/2011: Negative for malignancy. There were no endobronchial lesions.  Labs, 11/29/2011: Ferritin 78.7, B12 235, folate 6.7, CEA 2.2, serum iron 16, TIBC 196 (225 to 420), iron saturation 8.2%.  Thyroid ultrasound, 11/30/2011. No thyroid nodule  identified.  Labs, 12/01/2011: Serum cortisol 21.7. Urine sodium less than 5, serum osmolality 680 (601 to 850).  Left lower lobe lung biopsy, 11/28/2011, [facility]: Well-differentiated papillary adenocarcinoma (1.5 cm in greatest dimension).  Immunohistochemistry analysis, 11/30/2011: Tumor immunoreactive with TTF-1 and Napsin A while negative for thyroglobulin. Results support primary lung adenocarcinoma.  Labs, 01/03/2012: GFR 69.9. Ferritin 54, iron 97, TIBC 301, iron sat 32. B12 352, folate 16.4.   Chest x-ray 09/09/2015 at [facility]. Stable postoperative chest x-ray.  Labs, 03/27/2017: Hemoglobin 13.8, CTD881.6,  (313 to 618), TSH 0.7, T4 of 6.9 (each normal),   Comprehensive blood report, 03/27/2017: Mild anemia with history of macrocytosis. COMPREHENSIVE DIAGNOSIS. Review of peripheral blood smear: Very mild anemia with red blood cells showing fairly normal morphology at the time of this peripheral blood specimen. Normal white blood cell and platelet counts and morphology. No abnormal populations detected on flow cytometric studies. See comment. COMPREHENSIVE COMMENT. This patient's recent peripheral blood specimen demonstrating a mild macrocytosis with an MCV of 100.6 is noted. At the time of this current peripheral blood specimen, his MCV is within normal limits at 94.3. He has a minimal anemia. White blood cell and platelet counts are both within normal limits with normal morphology. Causes of these peripheral blood findings and macrocytosis could be liver disease, thyroid disease, vitamin/nutritional deficiencies and drugs/toxins. Correlation recommended.  -6/14/23- CT chest- New 1.5 cm central LEFT upper lobe pulmonary nodule. This is concerning for recurrent neoplasm. Management options include PET/CT versus sampling with EBUS.  -6/21/23- PET-CT- PET-CT findings compatible with recurrent left lung neoplasm an isolated finding (15 mm left lung nodule adjacent to the hilum shows FDG uptake with  SUV max = 3.3).  No distant disease is seen.      PREVIOUS INTERVENTIONS:   Left thoracoscopic core needle biopsy, followed by video-assisted thoracoscopic surgery (VATS) left lower lobectomy, and mediastinal lymph node dissection, 11/28/2011: Pathology from the left lower lung nodule lung biopsy showed no histologic evidence of malignancy. Well differentiated papillary adenocarcinoma (1.5 cm in greatest diminish. All resection margins are free of malignancy. No visceral pleural invasion identified. Three peribronchial lymph nodes negative for metastatic carcinoma. Emphysematous changes. Level 10 (1 lymph node), Level 11 (1 lymph node), Level 5 (2 lymph nodes) all negative for metastatic carcinoma. 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 Special stains are pending.  Venofer 200 mg IV daily, 11/30/2011 through 12/03/2011 (800 mg); then 02/17/2014 through 02/24/2014 (800 mg).  B12 at 1000 mcg IM beginning 11/30/2011 through 12/06/2011 (1000 mg).  Foltx p.o. daily beginning 02/12/2014 through 01/14/2015.  Injectafer 750 mg, 12/01/2017; 11/01/2021  -SBRT: 8/1/23- 8/14/23- 6000 cGy/5 fx) to NISA nodule    Problem List Items Addressed This Visit    None        Oncology/Hematology History   Primary adenocarcinoma of lower lobe of left lung   10/20/2011 Imaging    CT Chest:  1.5 cm left lower lobe nodule positioned just lateral to the hilum, granuloma vs. Malignancy  No pathologic lymphadenopathy     10/26/2011 Imaging    PET/CT:  Increased metabolic activity in the left lower lobe, lung nodule located centrally near the left hilum witih max SUV 7.4, suspicious for primary carcinoma. This corresponds to the lesion seen on CT scan of 10/20/2011.   No abnormal mediastinal, hilar, or inguinal activity     11/14/2011 Biopsy    Final Diagnosis   1.     Biopsy, left lower lobe nodule, lung:              A.     Fragments of bronchial mucosa with submucosa demonstrating   stromal elastosis and minimal chronic  inflammation.        B.     No evidence of granulomatous inflammation.        C.     No histologic evidence of malignancy.      2.     Bronchial washings, smears (four) and cell block:         A.     Mild acute inflammation.        B.     Negative for malignant cells.      3.     Bronchial brushings, left lower lobe of lung, smears (3) and ThinPrep   preparation (1):         Negative for malignant cells.        11/28/2011 Surgery    Final Diagnosis        1.     Biopsy, left lower lobe of lung (frozen section control):              A.     Fragment of fibrotic pulmonary parenchyma demonstrating stromal   elastosis and chronic active inflammation, moderate.        B.     No histologic evidence of malignancy.      2.     Level 10 lymph node (frozen section control):         Lymph node (one), negative for metastatic carcinoma.      3.     Left lower lobe of lung (frozen section control):         A.     Well-differentiated papillary adenocarcinoma (1.5 cm in greatest   dimension).        B.     The dominant and aberrant bronchial margins of surgical resection   are negative for malignancy.         C.     The vascular and parenchymal margins of surgical resection are   negative for malignancy.         D.     No visceral pleural invasion identified.         E.     Peribronchial lymph nodes (three), negative for metastatic   carcinoma.         F.     Emphysematous changes.      4.     Level 11 lymph node:   Lymph node (one), negative for metastatic   carcinoma.      5.     Level 5 lymph nodes:    Lymph nodes (two), negative for metastatic   carcinoma.      Comment:     Primary papillary adenocarcinomas of the lung do occur; however, they demonstrate a similar histology to that seen in papillary thyroid carcinoma.  The thyroid gland should be evaluated if not already   done to exclude the possibility of met        4/30/2014 Imaging    CXR:  SUMMARY:  1. Stable chronic change.  2. No acute disease.     9/3/2014 Imaging     CXR:  IMPRESSION-   1. Emphysematous and postoperative changes in the chest without evidence  of mass lesion or acute cardiopulmonary process.     1/5/2016 Imaging    CXR:  IMPRESSION   Postoperative changes of the left hemithorax with associated volume  loss. No acute process identified.     7/1/2016 Imaging    CXR:  IMPRESSION-   1. No active disease is seen.  2. COPD/emphysema.  3. Prior lung surgery on the left.      1/4/2017 Imaging    CXR:  IMPRESSION:  1. COPD/emphysema.  2. Postoperative changes on the left.  3. No acute appearing infiltrate or effusion.      3/20/2017 Imaging    CXR:  IMPRESSION:  1. Chronic changes in the lungs and spine without evidence of acute  cardiopulmonary process. Overall, the appearance of the lungs is similar  to the study from 01/04/2017.     11/20/2017 Imaging    CXR:  Impression:  No significant interval change when compared to chest x-ray dated  07/03/2017.     2/12/2018 Imaging    CXR:  IMPRESSION:  1. Hyperinflated lungs suggesting COPD.  2. Postoperative changes of the left hemithorax.  3. No acute cardiopulmonary process identified.     7/6/2018 Imaging    CXR:  Impression:  No acute cardiopulmonary disease.     12/21/2018 Imaging    CXR:  IMPRESSION:  COPD. Chronic volume loss in the lung bases with mild  central vascular crowding. No acute infiltrates.     8/23/2019 Imaging    CXR:  IMPRESSION:  1. No acute disease.     6/19/2020 Imaging    CXR:  IMPRESSION:  1. Mild blunting of the left costophrenic angle, which could be seen  with scarring or small effusion.     2/22/2021 Imaging    CXR:  IMPRESSION:  1. COPD no acute cardiopulmonary process.     10/25/2021 Imaging    CXR:  IMPRESSION:  1. Streaky bibasilar opacities, similar to prior.     4/25/2022 Imaging    CT Chest:  IMPRESSION:  1. Status post resection of a left lower lobe pulmonary nodule. Moderate  changes of centrilobular emphysema are present.  2. Small groundglass nodules one within the left upper lobe and  one  within the periphery of the right lower lobe warranting follow-up per  Fleischner criteria. No additional lung lesions are present.  3. No enlarged mediastinal or axillary lymphadenopathy..  4. 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 The  thoracic aorta is ectatic. There is mild enlargement of the pulmonary  arteries suggesting pulmonary arterial hypertension. Heavy coronary  calcifications are present.         7/20/2022 Imaging    CT Chest:  IMPRESSION:  1.  Postoperative change of LEFT lower lobectomy without evidence of  recurrent or metastatic disease.   2.  No change in 7 mm LEFT upper lobe and RIGHT lower lobe groundglass  pulmonary nodules. Consider continued imaging surveillance to document  stability.  3.  Ectatic ascending aorta measuring 4 cm diameter.     12/21/2022 Imaging    CXR:  IMPRESSION:  1. Stable chronic change.  2. No acute disease.     1/27/2023 Imaging    CT Chest:  IMPRESSION:  1. A stable CT scan of the chest. Postsurgical changes/left lower  lobectomy. No finding to suggest recurrence.  2. A stable small groundglass opacity/nodule in the left upper lobe. No  features of malignancy at this time. A follow-up examination in one year  is recommended to ensure stability.  3. Chronic emphysematous lung changes.     6/14/2023 Imaging    CT Chest:  IMPRESSION:  New 1.5 cm central LEFT upper lobe pulmonary nodule. This is concerning  for recurrent neoplasm. Management options include PET/CT versus  sampling with EBUS.     6/21/2023 Imaging    PET/CT:  Summary:  1. PET-CT findings compatible with recurrent left lung neoplasm as an  isolated finding. No distant disease is seen.     6/22/2023 Procedure    Documentation per Dr. Arteaga:  Patient informed per Dr Arteaga recommendations a referral should be made to Rad/Onc for evaluation and possible SBRT  Patient informed once the apt is dom[place] he will be called with time and date          PAST MEDICAL  HISTORY:  ALLERGIES:  Allergies   Allergen Reactions    Penicillins Unknown - High Severity     Unknown       CURRENT MEDICATIONS:  Outpatient Encounter Medications as of 12/28/2023   Medication Sig Dispense Refill    alfuzosin (UROXATRAL) 10 MG 24 hr tablet Take 2 tablets by mouth.      buPROPion XL (WELLBUTRIN XL) 150 MG 24 hr tablet Take 1 tablet by mouth Every Morning.      Calcium Citrate-Vitamin D3 (CITRACAL) 315-6.25 MG-MCG tablet tablet Take  by mouth Daily.      fluticasone (FLONASE) 50 MCG/ACT nasal spray 1 spray 2 (Two) Times a Day.      folic acid-vit B6-vit B12 (Folbee) 2.5-25-1 MG tablet tablet Take 1 tablet by mouth Daily. 30 tablet 0    losartan (COZAAR) 25 MG tablet 1 tablet 2 (Two) Times a Day.      lovastatin (MEVACOR) 40 MG tablet Take 1 tablet by mouth.      Multiple Vitamins-Minerals (PRESERVISION AREDS 2+MULTI VIT) capsule Take  by mouth.      pantoprazole (PROTONIX) 40 MG EC tablet 1 tablet Daily.      predniSONE (DELTASONE) 10 MG tablet 1 tablet Daily. 1.5 tab daily      pyridostigmine (MESTINON) 60 MG tablet 1 tablet 4 (Four) Times a Day.      LORazepam (Ativan) 1 MG tablet Take 30 minute before PET scan (Patient not taking: Reported on 12/28/2023) 1 tablet 0     No facility-administered encounter medications on file as of 12/28/2023.     ADULT ILLNESSES:   Lung cancer ( Stage Date: 11/28/2011, Stage IA (T1a, N0, M0)-Pathological  Date of Dx:2011 ; ICD-10:C34.32 ;Malignant neoplasm of lower lobe, left bronchus or lung )   Acute bronchitis ( ICD-10:J20.9 ;Acute bronchitis, unspecified   Cataracts ( ICD-10:H26.9 ;Unspecified cataract   Colonic polyps ( hyperplastic, 02/2010; ICD-10:K63.5 ;Polyp of colon )   Constipation ( ICD-10:K59.00 ;Constipation, unspecified   Former smoker ( ICD-10:Z87.891 ;Personal history of nicotine dependence   High carcinoembryonic antigen level ( ICD-10:R97.0 ;Elevated carcinoembryonic antigen [CEA]   Hyperlipidemia ( ICD-10:E78.5 ;Hyperlipidemia, unspecified    Hypertension ( ICD-10:I10 ;Essential (primary) hypertension   Hyperthyroidism ( ICD-10:E05.90 ;Thyrotoxicosis, unspecified without thyrotoxic crisis or storm   Iron deficiency anemia ( ICD-10:D50.9 ;Iron deficiency anemia, unspecified   Leukocytosis ( ICD-10:D72.829 ;Elevated white blood cell count, unspecified   Macrocytosis ( ICD-10:D75.89 ;Other specified diseases of blood and blood-forming organs   Malabsorption syndrome (disorder)   Peptic ulcers ( ICD-10:K27.7 ;Chronic peptic ulcer, site unspecified, without hemorrhage or perforation   Retention of urine ( postoperative following eye surgery, 10/2011; ICD-10:R33.9 ;Retention of urine, unspecified )   Strabismus (eye condition) ( ICD-10:H50.9 ;Unspecified strabismus   Vitamin B12 deficiency ( ICD-10:E53.8 ;Deficiency of other specified B group vitamins  Right leg edema. Venous Doppler of right lower extremity [facility] 11/28/2017 ([facility] Impression: There is no evidence of deep venous thrombosis or superficial thrombophlebitis of the right lower extremity.      SURGERIES:   Excision of non-melanoma skin cancer, 01/2012. Dr. Medrano   Video-assisted thoracoscopic (VATS) left lower lobectomy, 11/28/2011. Dr. Troy   Partial gastrectomy and vagotomy   Total knee replacement, right, 05/18/2017. Dr. Jarrett   Cataract surgery, 2004   Incision and drainage of abscess, left groin, (I&D) in early 01/2014. Dr. Velarde   Strabismus surgery, left eye, 2011.   Colonoscopy on 4/10/18 ([facility]). Impressions: Preparation of the colon was inadequate. One 12 mm polyp in the cecum, removed with a hot snare and removed piecemeal using a hot snare. Resected and retrieved. Diverticulosis in the sigmoid colon and in the descending colon    ADULT ILLNESSES:  Patient Active Problem List   Diagnosis Code    Hx of colonic polyps Z86.010    Obesity, unspecified obesity severity, unspecified obesity type E66.9    History of colon polyps Z86.010    Primary  adenocarcinoma of lower lobe of left lung C34.32    Iron deficiency anemia D50.9    B12 deficiency E53.8    Essential hypertension I10    Pulmonary emphysema J43.9    Slow transit constipation K59.01    Urinary retention R33.9    Former smoker Z87.891    History of radiation therapy Z92.3     SURGERIES:  Past Surgical History:   Procedure Laterality Date    CATARACT EXTRACTION      COLONOSCOPY  12/12/2014    Diverticulosis repeat exam in 3 years    COLONOSCOPY N/A 04/10/2018    Tubular adenoma cecum poor prep repeat in 3 months    COLONOSCOPY N/A 08/02/2018    2 Tubular adenomas transverse and ascending colon fair prep repeat in 3 years    COLONOSCOPY N/A 03/24/2022    Procedure: COLONOSCOPY WITH ANESTHESIA;  Surgeon: Ferdinand Cross MD;  Location: [facility] ENDOSCOPY;  Service: Gastroenterology;  Laterality: N/A;  pre hx colon polyps  post diverticulosis; inadequate prep  Efrain Dobson, DO    COLONOSCOPY W/ POLYPECTOMY  02/15/2010    2 Hyperplastic polyps at 25 cm and rectum repeat exam in 5 years    LUNG CANCER SURGERY      LUNG REMOVAL, PARTIAL      REPLACEMENT TOTAL KNEE Right 05/2017     HEALTH MAINTENANCE ITEMS:  Health Maintenance Due   Topic Date Due    TDAP/TD VACCINES (1 - Tdap) Never done    ZOSTER VACCINE (1 of 2) 04/06/2013       <no information>  Last Completed Colonoscopy       This patient has no relevant Health Maintenance data.          Immunization History   Administered Date(s) Administered    COVID-19 (MODERNA) 1st,2nd,3rd Dose Monovalent 03/05/2021, 04/02/2021, 06/07/2021    COVID-19 (PFIZER) Purple Cap Monovalent 12/14/2021    COVID-19 F23 (PFIZER) 12YRS+ (COMIRNATY) 12/11/2023    Flu Vaccine Split Quad 10/11/2018    INFLUENZA SPLIT TRI 10/13/2014    Influenza Injectable Mdck Pf Quad 10/14/2021, 10/13/2022    Influenza TIV (IM) 11/05/2019, 10/28/2020    Influenza, Unspecified 10/14/2021, 10/13/2022    Pneumococcal Conjugate 13-Valent (PCV13) 11/08/2018    Pneumococcal Polysaccharide (PPSV23)  "2011, 10/21/2013    Zostavax 2013     Last Completed Mammogram       This patient has no relevant Health Maintenance data.              FAMILY HISTORY:  Family History   Problem Relation Age of Onset    Colon polyps Mother     Stroke Mother     Diabetes Mother     Hypertension Mother     Cancer Father         asbestos    Stroke Maternal Grandmother     No Known Problems Maternal Grandfather     No Known Problems Paternal Grandmother     No Known Problems Paternal Grandfather     Diabetes Child     Colon cancer Neg Hx      SOCIAL HISTORY:  Social History     Socioeconomic History    Marital status:    Tobacco Use    Smoking status: Former     Packs/day: 2.00     Years: 30.00     Additional pack years: 0.00     Total pack years: 60.00     Types: Cigarettes     Start date: 1955     Quit date: 1984     Years since quittin.0     Passive exposure: Past    Smokeless tobacco: Never   Vaping Use    Vaping Use: Never used   Substance and Sexual Activity    Alcohol use: No    Drug use: No    Sexual activity: Not Currently     Partners: Female       REVIEW OF SYSTEMS:  Constitutional:   The patient's appetite is good. \"Eating good but very little snacking.\" His energy remains chronically low to fair. \"Same, I don't exert much.\"  He remains sedentary.  He says has been able to walk better since the knee surgery (right knee arthroplasty, 2017) and has not needed a cane.  \"I haven't used it since last 2023.\" Says the lower back pains and breathlessness slow him down.  \"But lot better since losing weight.\" He manages his personal ADLs, some chores, running errands and driving. He has regained 4 lb (had lost 55 lb at his 4 prior visits) in the interval. Says he lost weight previously due to covid pneumonia for which he was admitted to [facility] for 2 weeks last 2022.  He lives with his daughter and her spouse. He denies fevers, chills, or drenching night sweats. His sleep habits had been " "appropriate.  Covid vaccines x 2, 04/2021 and booster last 12/2021  Ear/Nose/Throat/Mouth:   He reports some sinus congestion and postnasal drainage but no ear pains or sore throat.  He has not had nosebleeds. No sore tongue. He has no headaches. He denies any hoarseness, change in voice quality.  Ocular:   He reports recent bouts of diplopia, no eye pain, significant change in visual acuity, or blurry vision.  Respiratory:   He has baseline exertional dyspnea and says he is still sometimes short of breath with some of his routine activities but not worse overall. Has intermittent cough from postnasal drainage.  He denies shortness of breath at rest. He has postnasal drainage associated cough.   Cardiovascular:   He reports no exertional chest pain, chest pressure, or chest heaviness. He reports no claudication. He reports no palpitations or symptomatic orthostasis.  Gastrointestinal:   He reports no dysphagia, nausea, vomiting, postprandial abdominal pain, bloating, cramping, change in bowel habits, with no dark stools (oral iron intolerant). He reports no rectal bleeding. He reports improved constipation on \"a proper diet.\"  Infrequent use of stool softeners (Dulcolax) without laxatives. He has no diarrhea. Repeat c-scope 03/24/22.  Diverticulosis.  Repeat prn. He says that he has elected not to do anymore. \"Not at my age.\"  Genitourinary:   He denies prior problems with urinary burning, frequency, dribbling, or discoloration. He denies difficulty controlling his bladder.   Musculoskeletal:   He has chronic arthralgias.  The right knee feels more stable since surgery.  Has not needed a cane to walk unless he goes long distances.  He has only occasional \"aches\" of the hips and his shoulders. He denies myalgias or nighttime leg cramping.  Extremities:   He normally has no fluid retention though has lingering mild right = left ankle/leg swelling.   Endocrine:   He reports no problems with excess thirst, excessive " "urination, vasomotor instability, or unexplained fatigue.  Heme/Lymphatic:   He reports easy bruising but no unexplained bleeding, petechial rashes, or swollen glands.  Neuro:   He reports no loss of consciousness, seizures, fainting spells, or dizziness. He reports no weakness of his face, arms, or legs. He has no difficulty with speech. He has no tremors. He reports occasional left = right foot tingling.   Psych:   He seems generally satisfied with life. He denies depression/anxiety. He reports no mood swings.       VITAL SIGNS: /84   Pulse 90   Temp 98 °F (36.7 °C) (Temporal)   Resp 18   Ht 182.9 cm (72\")   Wt 92.4 kg (203 lb 11.2 oz)   SpO2 96%   BMI 27.63 kg/m² Body surface area is 2.15 meters squared.  Pain Score    12/28/23 1031   PainSc: 0-No pain         PHYSICAL EXAMINATION:   General:   He is a pleasant, obese, and modestly-kept elderly male who appears comfortable while seated. He appears to be his stated age. His skin color is normal. He is ambulatory without a cane today.  ECOG PS = 1-2 (\"same 50-50\")  Head/Neck:   The patient is anicteric and atraumatic. The trachea is midline. The neck is supple without evidence of jugular venous distention or cervical adenopathy.  Eyes:   The pupils are equal, round, and reactive to light. The extraocular movements are full. There is no scleral jaundice or erythema.  Chest:   Breath sounds are diminished, left > right. There are no wheezes, rhonchi, or rales. The right thoracotomy wounds are healed.  Cardiovascular:   The patient has a regular cardiac rate and rhythm without murmurs, rubs, or gallops. The peripheral pulses are equal and full.  Abdomen:   The belly is soft and globose. There is no rebound, guarding, organomegaly, mass-effect, or tenderness. Bowel sounds are hypoactive but present.  Extremities:   There is no evidence of cyanosis or clubbing. There is no ankle/ leg edema (prior: trace-1+) but no calf tenderness.   Rheumatologic:   There " is some evidence of osteoarthritic deformities of the hands. The gait is slow and stooped but is independent and not cane supported.  Cutaneous:   There are no overt rashes, disseminated lesions, purpura, or petechiae.  Lymphatics:   There is no evidence of adenopathy in the cervical, supraclavicular, or axillary areas.  Neurologic:   The patient is alert, oriented, cooperative, and pleasant. He is appropriately conversant. There is no overt impairment with no overt focal motor deficits.  Psych:   Mood and affect are appropriate for circumstance. Eye contact is appropriate.        LABS    Lab Results - Last 18 Months   Lab Units 12/21/23  1041 12/05/23  0912 10/10/23  0915 09/12/23  1249 08/21/23  1049 08/15/23  1023 04/26/23  1126 01/08/23  1035 11/10/22  1629 10/26/22  1120   HEMOGLOBIN g/dL 12.0* 12.4* 12.7* 11.3* 12.9* 13.3* 12.6* 9.7*   < > 14.0   HEMATOCRIT % 38.9 38.5* 41.5* 35.1* 42.3 41.1* 39.9 30.2*   < > 43.0   MCV fL 100.8* 100.8* 102.2* 98.9* 99.8* 98.6* 101.8* 101.0*   < > 98.2*   WBC 10*3/mm3 6.26 6.3 7.7 4.7* 6.27 6.4 7.25 9.37   < > 6.08   RDW % 13.8 13.9 14.0 13.9 14.0 13.7 13.0 14.7   < > 12.5   MPV fL 9.1 8.9* 9.4 9.2* 9.0 10.1 9.3 9.0   < > 8.8   PLATELETS 10*3/mm3 242 238 204 163 174 213 247 241   < > 235   IMM GRAN % % 0.8*  --   --   --  0.6*  --  0.4 1.3*  --  0.3   NEUTROS ABS 10*3/mm3 4.86 5.0 6.2 4.1 5.17 5.2 5.66 8.45*   < > 3.93   LYMPHS ABS 10*3/mm3 0.81 0.7* 0.9* 0.3* 0.69* 0.7* 1.04 0.43*   < > 1.30   MONOS ABS 10*3/mm3 0.51 0.50 0.40 0.30 0.34 0.40 0.48 0.34   < > 0.74   EOS ABS 10*3/mm3 0.01 0.10 0.10 0.00 0.01 0.00 0.02 0.02   < > 0.06   BASOS ABS 10*3/mm3 0.02 0.00 0.00 0.00 0.02 0.00 0.02 0.01   < > 0.03   IMMATURE GRANS (ABS) 10*3/mm3 0.05 0.1 0.1 0.0 0.04 0.0 0.03 0.12*   < > 0.02   NRBC /100 WBC 0.0  --   --   --  0.0  --  0.0 0.0  --  0.0    < > = values in this interval not displayed.       Lab Results - Last 18 Months   Lab Units 12/21/23  1041 11/08/23  1019  08/21/23  1049 06/14/23  1417 04/26/23  1126 01/27/23  1337 01/08/23  1035 12/26/22  1008 12/25/22  1814 12/24/22  0939 12/24/22  0456 11/11/22  1802 10/26/22  1120 07/05/22  0916   0000   GLUCOSE mg/dL 73  --  130*  --  105*  --  113*  --   --   --   --   --  120*  --   --    SODIUM mmol/L 138  --  138  --  138  --  135* 121* 120*   < >  --    < > 133*  --   --    POTASSIUM mmol/L 4.4  --  4.1  --  4.3  --  3.9  --   --   --  3.5  --  4.0  --   --    CO2 mmol/L 32.0*  --  29.0  --  27.0  --  26.0  --   --   --   --   --  28.0  --   --    CHLORIDE mmol/L 101  --  101  --  103  --  100  --   --   --   --   --  96*  --   --    ANION GAP mmol/L 5.0  --  8.0  --  8.0  --  9.0  --   --   --   --   --  9.0  --   --    CREATININE mg/dL 0.85 0.90 0.81 0.90 0.89 1.00 0.81  --   --   --   --   --  0.96  --    < >   BUN mg/dL 12  --  11  --  10  --  14  --   --   --   --   --  11  --   --    BUN / CREAT RATIO  14.1  --  13.6  --  11.2  --  17.3  --   --   --   --   --  11.5  --   --    CALCIUM mg/dL 8.3*  --  9.1  --  8.8  --  7.3*  --   --   --   --   --  9.5  --   --    EGFR IF NONAFRICN AM   --   --   --   --   --   --   --   --   --   --   --   --   --  >60  --    ALK PHOS U/L 79  --  72  --  73  --  62  --   --   --   --   --  90  --   --    TOTAL PROTEIN g/dL 7.2  --  7.0  --  6.2  --  4.3*  --   --   --   --   --  7.3  --   --    ALT (SGPT) U/L 19  --  26  --  23  --  47*  --   --   --   --   --  12  --   --    AST (SGOT) U/L 23  --  24  --  18  --  22  --   --   --   --   --  16  --   --    BILIRUBIN mg/dL 0.3  --  0.3  --  0.4  --  0.8  --   --   --   --   --  0.5  --   --    ALBUMIN g/dL 4.1  --  4.0  --  4.3  --  3.4*  --   --   --   --   --  4.30  --   --    GLOBULIN gm/dL 3.1  --  3.0  --  1.9  --  0.9  --   --   --   --   --  3.0  --   --     < > = values in this interval not displayed.       Lab Results - Last 18 Months   Lab Units 12/21/23  1041 08/21/23  1049 04/26/23  1126 11/12/22  0151 11/10/22  1919  10/26/22  1120   URIC ACID mg/dL  --   --   --  5.1 4.3  --    CEA ng/mL 4.05 4.15 3.79  --   --  3.39       Lab Results - Last 18 Months   Lab Units 12/21/23  1041 08/21/23  1049 04/26/23  1126 12/24/22  0450 10/26/22  1120   IRON mcg/dL 75 100 95  --  123   TIBC mcg/dL 347 335 305  --  349   IRON SATURATION (TSAT) % 22 30 31  --  35   FERRITIN ng/mL 42.61 47.78 68.42 270.9 71.82   FOLATE ng/mL 14.80 13.10 18.80  --  17.50       PROBLEMS IDENTIFIED:   1.  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   Tumor burden: 1.5 cm left lower lung nodule.   Complications of tumor: None.   Tumor status: JAZMÍN following resection via left lower lobectomy.   Chest x-ray, 07/03/2017, [facility]. Impression: Stable appearance of the chest with postoperative changes in the left lung base and emphysematous changes.   Chest x-ray obtained 11/20/2017 at [facility] reveals no interval change when compared to chest x-ray dated 07/03/2017.   Chest x-ray on 02/12/2018 ([facility]). Impression: Hyperinflated lungs suggesting chronic obstructive pulmonary disease (COPD). Postoperative changes of the left hemithorax. No acute cardiopulmonary process identified.   Chest Xray on 07/06/2018 ([facility]). Impression: No acute cardiopulmonary disease.   Chest X-ray, 12/21/2018 at [facility]. COPD. Chronic volume loss in the lung bases with mild central vascular crowding. No acute infiltrates.   06/19/2020-chest x-ray.  Comparison: 8/23/2019-impression: Mild blunting of the left costophrenic angle which could be seen with scarring or small effusion.  02/22/2021- chest xray.  COPD no acute cardiopulmonary process  10/25/2021-chest x-ray.  Streaky bibasilar opacities.  Similar to prior.  04/25/22- CT chest- Status post resection of a left lower lobe pulmonary nodule. Moderate changes of centrilobular emphysema are present. Small groundglass  nodules one within the left upper lobe and one within the periphery of the right lower lobe warranting follow-up per Fleischner criteria. No additional lung lesions are present. No enlarged mediastinal or axillary lymphadenopathy.  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 The thoracic aorta is ectatic. There is mild enlargement of the pulmonary arteries suggesting pulmonary arterial hypertension. Heavy coronary calcifications are present.   07/20/22-CT chest- postoperative change of LLL without evidence of recurrent or metastatic disease.  No change in 7 mm NISA and RLL groundglass pulmonary nodules.  Consider continued imaging surveillance to document stability.  Ectatic ascending aorta measuring 4 cm in diameter.  1/27/23- CT chest- A stable CT scan of the chest. Postsurgical changes/left lower lobectomy. No finding to suggest recurrence. A stable small groundglass opacity/nodule in the left upper lobe. No features of malignancy at this time. A follow-up examination in one year is recommended to ensure stability. Chronic emphysematous lung changes  6/14/23- CT chest- New 1.5 cm central LEFT upper lobe pulmonary nodule. This is concerning for recurrent neoplasm. Management options include PET/CT versus sampling with EBUS.  6/21/23- PET-CT- PET-CT findings compatible with recurrent left lung neoplasm an isolated finding (15 mm left lung nodule adjacent to the hilum shows FDG uptake with SUV max = 3.3).  No distant disease is seen.  SBRT: 8/1/23- 8/14/23- 6000 cGy/5 fx) to NISA nodule  11/8/23- CT chest- Stable 1.5 cm left perihilar central upper lobe nodule, relatively unchanged from 6/14/2023. No new or increasing size nodule identified. No pathologic lymphadenopathy. Stable volume loss left hemithorax from a  prior left lower lobe lobectomy.  11/15/23- Follow-up Dr. Samuels- Diagnosed with stage IA2 (cT1b cN0 cM0) neoplasm of left upper lung. He completed 5000 cGy in 5 fractions to the NISA  "lung tumor on 08/14/2023.  CT chest, 11/8/23 reviewed.  I do not see evidence for recurrent or metastatic disease at this time. We will continue routine follow-up/surveillance as discussed in 3 months with follow up CT scan before visit. Remission!    2.  Mildly abnormal CEA.    --4.05, 12/21/23 (prior range: 2.2 - 5.0). Continued observation warranted.   3.  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 Underwent right knee replacement, 05/18/2017.   4.  Normocytic/macrocytic anemia with resolution of macrocytosis, repleted iron and B12 deficiencies (anemia of chronic disease).   --Stable, Hgb 12; .8, 12/21/23 (prior range: Hgb 11.4 - 14.3; 94.4 - 102.1)  a. No myelodysplastic syndrome (MDS) on comprehensive blood report, normal thyroid function tests (TFTs), normal LDH, normal B12/folate.   b. Colonoscopy on 4/10/18 ([facility]). Impressions: Preparation of the colon was inadequate. One 12 mm polyp in the cecum, removed with a hot snare and removed piecemeal using a hot snare. Resected and retrieved. Diverticulosis in the sigmoid colon and in the descending colon.   c. Repeat with 2 day prep scheduled for 08/02/2018. \"Polyps\" Repeat 3 years.  d. Colonoscopy, 08/02/2018 (above).  Fair colon preparation.  1 5 mm polyp in the ascending colon, removed with hot snare.  One 12 mm polyp in the transverse colon, removed with a hot snare.  e.  Injectafer 750 mg, 11/01/2022  5.  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 Negative thyroid ultrasound, 11/30/2011. Is followed by Dr. Medrano (ENT) and Dr. Roth (endocrine).   6.  Low B12 levels. Recurrent.   7.  Constipation, multifactorial.  Regulated by stool softeners.  \"Occasionally.\"  8.  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   9.  Chronic intermittent irregular heart rate, Dr. Dobson following.   10. A 50 pack-year tobacco smoker with radiographic evidence of " "emphysema. Has not smoked since 2004.         11. Anxiety.          12.  Myasthenia gravis.  Followed by Dr. Hines         13.  COPD         14.  Weight loss.  Has stabilized.  Reassures me it is intentional.  \"Eating less and smarter.\"    RECOMMENDATIONS:   1.   Apprise of labs from 12/21/23 including the current heme (stable anemia otherwise normal CBC) and the other labs with CEA p (prior:  2.2 - 5), calcium 8.3 otherwise essentially normal CMP, repleted iron, repleted (22%- prior: 30%; 31%; 35%; 37%; 18%) Fe sat, depressed ferritin (42- prior:  47; 68; 71; 56; 17; 45; 87; 58) repleted (350; from 279; 366; 290; 388; >1000) B12/folate.     2.   Apprised of CT chest, 11/8/23 (above).  Stable 1.5 cm left perihilar central upper lobe nodule, relatively unchanged from 6/14/2023. No new or increasing size nodule identified. No pathologic lymphadenopathy  3.  Previously discussed that even without adjuvant chemotherapy 70.8% of people are alive in 5 years and none of those are alive due to therapy. Approximately 28% of those die of cancer and 1.3% die from other causes. Emphasized that very little data are available about the effects of adjuvant chemotherapy in Stage I lung cancer and most guidelines do not recommend the use of adjuvant therapy in this population and a meta-analysis even suggested that there was a trend toward a worse outcome in these patients. He preferred not to have chemo anyway.   4.  Review visit with radiation oncology (Dr. Samuels), 11/15/23- Diagnosed with stage IA2 (cT1b cN0 cM0) neoplasm of left upper lung. He completed 5000 cGy in 5 fractions to the NISA lung tumor on 08/14/2023.  CT chest, 11/8/23 reviewed.  I do not see evidence for recurrent or metastatic disease at this time. We will continue routine follow-up/surveillance as discussed in 3 months with follow up CT scan before visit. Remission!  5.  Review follow-up with GRACE Bahena with pulmonary, 10/11/23-A/P: NISA nodule. Follow-up " prn  6.  Continue ongoing management per primary care physician and other specialists. Has appointment in 2/2024 with rad onc along with CT prior to visit  7.  Return to the office with pre-office CEA, serum iron, Fe sat, ferritin, B12/folate, CMP, and CBC with differential in 12 weeks.      I spent ~ 43 minutes caring for Carmine on this date of service. This time includes time spent by me in the following activities: preparing for the visit, reviewing tests, performing a medically appropriate examination and/or evaluation, counseling and educating the patient/family/caregiver, ordering medications, tests, or procedures and documenting information in the medical record        cc: DO Kieran Pennington MD Nicholas Lopez, MD

## 2023-12-21 ENCOUNTER — LAB (OUTPATIENT)
Dept: LAB | Facility: HOSPITAL | Age: 83
End: 2023-12-21
Payer: MEDICARE

## 2023-12-21 DIAGNOSIS — C34.32 PRIMARY ADENOCARCINOMA OF LOWER LOBE OF LEFT LUNG: ICD-10-CM

## 2023-12-21 LAB
ALBUMIN SERPL-MCNC: 4.1 G/DL (ref 3.5–5.2)
ALBUMIN/GLOB SERPL: 1.3 G/DL
ALP SERPL-CCNC: 79 U/L (ref 39–117)
ALT SERPL W P-5'-P-CCNC: 19 U/L (ref 1–41)
ANION GAP SERPL CALCULATED.3IONS-SCNC: 5 MMOL/L (ref 5–15)
AST SERPL-CCNC: 23 U/L (ref 1–40)
BASOPHILS # BLD AUTO: 0.02 10*3/MM3 (ref 0–0.2)
BASOPHILS NFR BLD AUTO: 0.3 % (ref 0–1.5)
BILIRUB SERPL-MCNC: 0.3 MG/DL (ref 0–1.2)
BUN SERPL-MCNC: 12 MG/DL (ref 8–23)
BUN/CREAT SERPL: 14.1 (ref 7–25)
CALCIUM SPEC-SCNC: 8.3 MG/DL (ref 8.6–10.5)
CEA SERPL-MCNC: 4.05 NG/ML
CHLORIDE SERPL-SCNC: 101 MMOL/L (ref 98–107)
CO2 SERPL-SCNC: 32 MMOL/L (ref 22–29)
CREAT SERPL-MCNC: 0.85 MG/DL (ref 0.76–1.27)
DEPRECATED RDW RBC AUTO: 51.1 FL (ref 37–54)
EGFRCR SERPLBLD CKD-EPI 2021: 85.7 ML/MIN/1.73
EOSINOPHIL # BLD AUTO: 0.01 10*3/MM3 (ref 0–0.4)
EOSINOPHIL NFR BLD AUTO: 0.2 % (ref 0.3–6.2)
ERYTHROCYTE [DISTWIDTH] IN BLOOD BY AUTOMATED COUNT: 13.8 % (ref 12.3–15.4)
FERRITIN SERPL-MCNC: 42.61 NG/ML (ref 30–400)
FOLATE SERPL-MCNC: 14.8 NG/ML (ref 4.78–24.2)
GLOBULIN UR ELPH-MCNC: 3.1 GM/DL
GLUCOSE SERPL-MCNC: 73 MG/DL (ref 65–99)
HCT VFR BLD AUTO: 38.9 % (ref 37.5–51)
HGB BLD-MCNC: 12 G/DL (ref 13–17.7)
IMM GRANULOCYTES # BLD AUTO: 0.05 10*3/MM3 (ref 0–0.05)
IMM GRANULOCYTES NFR BLD AUTO: 0.8 % (ref 0–0.5)
IRON 24H UR-MRATE: 75 MCG/DL (ref 59–158)
IRON SATN MFR SERPL: 22 % (ref 20–50)
LYMPHOCYTES # BLD AUTO: 0.81 10*3/MM3 (ref 0.7–3.1)
LYMPHOCYTES NFR BLD AUTO: 12.9 % (ref 19.6–45.3)
MCH RBC QN AUTO: 31.1 PG (ref 26.6–33)
MCHC RBC AUTO-ENTMCNC: 30.8 G/DL (ref 31.5–35.7)
MCV RBC AUTO: 100.8 FL (ref 79–97)
MONOCYTES # BLD AUTO: 0.51 10*3/MM3 (ref 0.1–0.9)
MONOCYTES NFR BLD AUTO: 8.1 % (ref 5–12)
NEUTROPHILS NFR BLD AUTO: 4.86 10*3/MM3 (ref 1.7–7)
NEUTROPHILS NFR BLD AUTO: 77.7 % (ref 42.7–76)
NRBC BLD AUTO-RTO: 0 /100 WBC (ref 0–0.2)
PLATELET # BLD AUTO: 242 10*3/MM3 (ref 140–450)
PMV BLD AUTO: 9.1 FL (ref 6–12)
POTASSIUM SERPL-SCNC: 4.4 MMOL/L (ref 3.5–5.2)
PROT SERPL-MCNC: 7.2 G/DL (ref 6–8.5)
RBC # BLD AUTO: 3.86 10*6/MM3 (ref 4.14–5.8)
SODIUM SERPL-SCNC: 138 MMOL/L (ref 136–145)
TIBC SERPL-MCNC: 347 MCG/DL (ref 298–536)
TRANSFERRIN SERPL-MCNC: 233 MG/DL (ref 200–360)
VIT B12 BLD-MCNC: 350 PG/ML (ref 211–946)
WBC NRBC COR # BLD AUTO: 6.26 10*3/MM3 (ref 3.4–10.8)

## 2023-12-21 PROCEDURE — 80053 COMPREHEN METABOLIC PANEL: CPT

## 2023-12-21 PROCEDURE — 82607 VITAMIN B-12: CPT

## 2023-12-21 PROCEDURE — 83540 ASSAY OF IRON: CPT

## 2023-12-21 PROCEDURE — 84466 ASSAY OF TRANSFERRIN: CPT

## 2023-12-21 PROCEDURE — 82728 ASSAY OF FERRITIN: CPT

## 2023-12-21 PROCEDURE — 36415 COLL VENOUS BLD VENIPUNCTURE: CPT

## 2023-12-21 PROCEDURE — 85025 COMPLETE CBC W/AUTO DIFF WBC: CPT

## 2023-12-21 PROCEDURE — 82746 ASSAY OF FOLIC ACID SERUM: CPT

## 2023-12-21 PROCEDURE — 82378 CARCINOEMBRYONIC ANTIGEN: CPT

## 2023-12-28 ENCOUNTER — OFFICE VISIT (OUTPATIENT)
Dept: ONCOLOGY | Facility: CLINIC | Age: 83
End: 2023-12-28
Payer: MEDICARE

## 2023-12-28 VITALS
HEART RATE: 90 BPM | SYSTOLIC BLOOD PRESSURE: 148 MMHG | TEMPERATURE: 98 F | RESPIRATION RATE: 18 BRPM | DIASTOLIC BLOOD PRESSURE: 84 MMHG | HEIGHT: 72 IN | WEIGHT: 203.7 LBS | OXYGEN SATURATION: 96 % | BODY MASS INDEX: 27.59 KG/M2

## 2023-12-28 DIAGNOSIS — C34.32 PRIMARY ADENOCARCINOMA OF LOWER LOBE OF LEFT LUNG: Primary | ICD-10-CM

## 2024-01-10 RX ORDER — PYRIDOSTIGMINE BROMIDE 60 MG/1
60 TABLET ORAL EVERY 6 HOURS
Qty: 120 TABLET | Refills: 5 | Status: SHIPPED | OUTPATIENT
Start: 2024-01-10

## 2024-01-10 NOTE — TELEPHONE ENCOUNTER
Requested Prescriptions     Pending Prescriptions Disp Refills    pyRIDostigmine (MESTINON) 60 MG tablet 120 tablet 5     Sig: Take 1 tablet by mouth every 6 hours       Last Office Visit: 10/5/2023  Next Office Visit: 1/29/2024  Last Medication Refill:  6/22/23 with 5 ELIUD

## 2024-01-29 ENCOUNTER — OFFICE VISIT (OUTPATIENT)
Dept: NEUROLOGY | Age: 84
End: 2024-01-29
Payer: MEDICARE

## 2024-01-29 VITALS
HEIGHT: 72 IN | BODY MASS INDEX: 26.95 KG/M2 | SYSTOLIC BLOOD PRESSURE: 157 MMHG | DIASTOLIC BLOOD PRESSURE: 82 MMHG | HEART RATE: 60 BPM | WEIGHT: 199 LBS

## 2024-01-29 DIAGNOSIS — Z86.69 HISTORY OF DOUBLE VISION: ICD-10-CM

## 2024-01-29 DIAGNOSIS — Z87.09 HISTORY OF COPD: ICD-10-CM

## 2024-01-29 DIAGNOSIS — G70.00 MYASTHENIA GRAVIS (HCC): Primary | ICD-10-CM

## 2024-01-29 PROCEDURE — G8484 FLU IMMUNIZE NO ADMIN: HCPCS | Performed by: PSYCHIATRY & NEUROLOGY

## 2024-01-29 PROCEDURE — G8417 CALC BMI ABV UP PARAM F/U: HCPCS | Performed by: PSYCHIATRY & NEUROLOGY

## 2024-01-29 PROCEDURE — 99214 OFFICE O/P EST MOD 30 MIN: CPT | Performed by: PSYCHIATRY & NEUROLOGY

## 2024-01-29 PROCEDURE — 1036F TOBACCO NON-USER: CPT | Performed by: PSYCHIATRY & NEUROLOGY

## 2024-01-29 PROCEDURE — 3079F DIAST BP 80-89 MM HG: CPT | Performed by: PSYCHIATRY & NEUROLOGY

## 2024-01-29 PROCEDURE — G8427 DOCREV CUR MEDS BY ELIG CLIN: HCPCS | Performed by: PSYCHIATRY & NEUROLOGY

## 2024-01-29 PROCEDURE — 1123F ACP DISCUSS/DSCN MKR DOCD: CPT | Performed by: PSYCHIATRY & NEUROLOGY

## 2024-01-29 PROCEDURE — 3077F SYST BP >= 140 MM HG: CPT | Performed by: PSYCHIATRY & NEUROLOGY

## 2024-01-29 RX ORDER — THIAMINE HCL 100 MG
TABLET ORAL DAILY
COMMUNITY

## 2024-01-29 NOTE — PROGRESS NOTES
REVIEW OF SYSTEMS    Constitutional: []Fever []Sweat []Chills [] Recent Injury [x] Denies all unless marked  HEENT:[]Headache  [] Head Injury/Hearing Loss  [] Sore Throat  [] Ear Ache/Dizziness  [x] Denies all unless marked  Spine:  [] Neck pain  [] Back pain  [] Sciaticia  [x] Denies all unless marked  Cardiovascular:[]Heart Disease []Chest Pain [] Palpitations  [x] Denies all unless marked  Pulmonary: []Shortness of Breath []Cough   [x] Denies all unless marke  Gastrointestinal: []Nausea  []Vomiting  []Abdominal Pain  []Constipation  []Diarrhea  []Dark Bloody Stools  [x] Denies all unless marked  Psychiatric/Behavioral:[] Depression [] Anxiety [x] Denies all unless marked  Genitourinary:   [] Frequency  [] Urgency  [] Incontinence [] Pain with Urination  [x] Denies all unless marked  Extremities: []Pain  []Swelling  [x] Denies all unless marked  Musculoskeletal: [] Muscle Pain  [] Joint Pain  [] Arthritis [] Muscle Cramps [] Muscle Twitches  [x] Denies all unless marked  Sleep: [] Insomnia [] Snoring [] Restless Legs [] Sleep Apnea  [] Daytime Sleepiness  [x] Denies all unless marked  Skin:[] Rash [] Skin Discoloration [x] Denies all unless marked   Neurological: []Visual Disturbance/Memory Loss [] Loss of Balance [] Slurred Speech/Weakness [] Seizures  [] Vertigo/Dizziness [x] Denies all unless marked       
          Myasthenia gravis is stable at this time.  Continue without IVIG for now.  After about 1 month I would like him to try to alternate his prednisone 15 mg every other day with 10 mg every other day.  Notify us for worsening.  Double vision has resolved.   COPD stable.    Plan    Return in about 3 months (around 4/29/2024).    (Please note that portions of this note were completed with a voice recognition program. Efforts were made to edit the dictations but occasionally words are mis-transcribed.)

## 2024-02-01 ENCOUNTER — HOSPITAL ENCOUNTER (OUTPATIENT)
Dept: CT IMAGING | Facility: HOSPITAL | Age: 84
Discharge: HOME | End: 2024-02-01
Payer: MEDICARE

## 2024-02-01 DIAGNOSIS — Z87.891 FORMER SMOKER: ICD-10-CM

## 2024-02-01 DIAGNOSIS — Z92.3 HISTORY OF RADIATION THERAPY: ICD-10-CM

## 2024-02-01 DIAGNOSIS — C34.32 PRIMARY ADENOCARCINOMA OF LOWER LOBE OF LEFT LUNG: ICD-10-CM

## 2024-02-01 PROCEDURE — 25510000001 IOPAMIDOL 61 % SOLUTION

## 2024-02-01 PROCEDURE — 71260 CT THORAX DX C+: CPT

## 2024-02-01 RX ADMIN — IOPAMIDOL 100 ML: 612 INJECTION, SOLUTION INTRAVENOUS at 13:05

## 2024-02-02 ENCOUNTER — HOSPITAL ENCOUNTER (OUTPATIENT)
Dept: RADIATION ONCOLOGY | Facility: HOSPITAL | Age: 84
Setting detail: RADIATION/ONCOLOGY SERIES
End: 2024-02-02
Payer: MEDICARE

## 2024-02-04 NOTE — PROGRESS NOTES
[facility]Munir Samuels MD      Bryn Proctor APRN  ____________________________________________________________  [facility]  Department of Radiation Oncology  [address] [place] [id number]  Office:  [phone]  Fax: [phone]    DATE:  02/05/2024  PATIENT: Carmine Garcia   1940                                 MEDICAL RECORD #: [id number]    Reason for Follow up Visit:  Carmine Garcia is a very pleasant 83 y.o. patient that completed radiation to the lung and returns to the clinic today for routine follow up exam. Denies appetite change, activity change, fatigue, nasuea/vomiting, diarrhea, light-headedness, weakness, and headaches. He continues to follow .     History of Present Illness:  Diagnosed with stage IA2 (cT1b cN0 cM0) left upper lobe lung 1.5. He completed 5000 cGy in 5 fractions to the NISA lung tumor on 08/14/2023.     10/20/2011 - CT Chest:  1.5 cm left lower lobe nodule positioned just lateral to the hilum, granuloma vs. Malignancy  No pathologic lymphadenopathy    10/26/2011 - PET Scan:  Increased metabolic activity in the left lower lobe, lung nodule located centrally near the left hilum witih max SUV 7.4, suspicious for primary carcinoma. This corresponds to the lesion seen on CT scan of 10/20/2011.   No abnormal mediastinal, hilar, or inguinal activity    11/14/2011 - Bronchoscopy with biopsy:  Biopsy, left lower lobe nodule, lung:    Fragments of bronchial mucosa with submucosa demonstrating stromal elastosis and minimal chronic inflammation.   No evidence of granulomatous inflammation.   No histologic evidence of malignancy.    Bronchial washings, smears (four) and cell block:    Mild acute inflammation.   Negative for malignant cells.    Bronchial brushings, left lower lobe of lung, smears (3) and ThinPrep preparation (1):    Negative for malignant cells.     11/28/2011 -  Left thoracoscopic core needle biopsy, followed by  video-assisted thoracoscopic surgery (VATS) left lower lobectomy, and mediastinal lymph node dissection:  Biopsy, left lower lobe of lung (frozen section control):    Fragment of fibrotic pulmonary parenchyma demonstrating stromal elastosis and chronic active inflammation, moderate.   No histologic evidence of malignancy.    Level 10 lymph node (frozen section control):    Lymph node (one), negative for metastatic carcinoma.    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   The dominant and aberrant bronchial margins of surgical resection are negative for malignancy.    The vascular and parenchymal margins of surgical resection are negative for malignancy.    No visceral pleural invasion identified.    Peribronchial lymph nodes (three), negative for metastatic carcinoma.    Emphysematous changes.    Level 11 lymph node: Lymph node (one), negative for metastatic carcinoma.    Level 5 lymph nodes: Lymph nodes (two), negative for metastatic carcinoma.    Comment: Primary papillary adenocarcinomas of the lung do occur; however, they demonstrate a similar histology to that seen in papillary thyroid carcinoma.  The thyroid gland should be evaluated if not already done to exclude the possibility of met     04/30/2014 - Chest x-ray:  Stable chronic change.  No acute disease.    09/03/2014 - Chest x-ray:   Emphysematous and postoperative changes in the chest without evidence of mass lesion or acute cardiopulmonary process.    01/05/2016 - Chest x-ray:   Postoperative changes of the left hemithorax with associated volume loss. No acute process identified.    07/01/2016 - Chest x-ray:   No active disease is seen.  COPD/emphysema.  Prior lung surgery on the left.     01/04/2017 - Chest x-ray:  COPD/emphysema.  Postoperative changes on the left.  No acute appearing infiltrate or effusion.     03/20/2017 - Chest x-ray:  Chronic changes in the  lungs and spine without evidence of acute cardiopulmonary process. Overall, the appearance of the lungs is similar to the study from 01/04/2017.    11/20/2017 - Chest x-ray:  No significant interval change when compared to chest x-ray dated 07/03/2017.    02/12/2018 - Chest x-ray:  Hyperinflated lungs suggesting COPD.  Postoperative changes of the left hemithorax.  No acute cardiopulmonary process identified.    07/06/2018 - Chest x-ray:  No acute cardiopulmonary disease.    12/21/2018 - Chest x-ray:  COPD. Chronic volume loss in the lung bases with mild central vascular crowding. No acute infiltrates.    08/23/2019 - Chest x-ray:  No acute disease.    06/19/2020 - Chest x-ray:  Mild blunting of the left costophrenic angle, which could be seen with scarring or small effusion.    02/22/2021 - Chest x-ray:  COPD no acute cardiopulmonary process.    10/25/2021 - Chest x-ray:  Streaky bibasilar opacities, similar to prior.    04/25/2022 - CT Chest with contrast:  Status post resection of a left lower lobe pulmonary nodule. Moderate changes of centrilobular emphysema are present.  Small groundglass nodules one within the left upper lobe and one within the periphery of the right lower lobe warranting follow-up per Fleischner criteria. No additional lung lesions are present.  No enlarged mediastinal or axillary lymphadenopathy..  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 The thoracic aorta is ectatic. There is mild enlargement of the pulmonary arteries suggesting pulmonary arterial hypertension. Heavy coronary calcifications are present.     07/20/2022 - CT Chest without contrast:  Postoperative change of LEFT lower lobectomy without evidence of recurrent or metastatic disease.   No change in 7 mm LEFT upper lobe and RIGHT lower lobe groundglass pulmonary nodules. Consider continued imaging surveillance to document stability.  Ectatic ascending aorta measuring 4 cm diameter.    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   Tumor burden: 1.5 cm left lower lung nodule.   Complications of tumor: None.   Tumor status: JAZMÍN following resection via left lower lobectomy.   Chest x-ray, 07/03/2017, [facility]. Impression: Stable appearance of the chest with postoperative changes in the left lung base and emphysematous changes.   Chest x-ray obtained 11/20/2017 at [facility] reveals no interval change when compared to chest x-ray dated 07/03/2017.   Chest x-ray on 02/12/2018 ([facility]). Impression: Hyperinflated lungs suggesting chronic obstructive pulmonary disease (COPD). Postoperative changes of the left hemithorax. No acute cardiopulmonary process identified.   Chest Xray on 07/06/2018 ([facility]). Impression: No acute cardiopulmonary disease.   Chest X-ray, 12/21/2018 at [facility]. COPD. Chronic volume loss in the lung bases with mild central vascular crowding. No acute infiltrates.   06/19/2020-chest x-ray.  Comparison: 8/23/2019-impression: Mild blunting of the left costophrenic angle which could be seen with scarring or small effusion.  02/22/2021- chest xray.  COPD no acute cardiopulmonary process  10/25/2021-chest x-ray.  Streaky bibasilar opacities.  Similar to prior.  04/25/2022- CT chest- Status post resection of a left lower lobe pulmonary nodule. Moderate changes of centrilobular emphysema are present. Small groundglass nodules one within the left upper lobe and one within the periphery of the right lower lobe warranting follow-up per Fleischner criteria. No additional lung lesions are present. No enlarged mediastinal or axillary lymphadenopathy.  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 The thoracic aorta is ectatic. There is mild enlargement of the pulmonary arteries suggesting pulmonary  "arterial hypertension. Heavy coronary calcifications are present.   07/20/2022-CT chest- postoperative change of LLL without evidence of recurrent or metastatic disease.  No change in 7 mm NISA and RLL groundglass pulmonary nodules.  Consider continued imaging surveillance to document stability.  Ectatic ascending aorta measuring 4 cm in diameter.  Mildly abnormal CEA.    --3.39, 10/26/2022 (prior range: 2.2 - 5.0). Continued observation warranted.   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 Underwent right knee replacement, 05/18/2017.   Normocytic/macrocytic anemia with resolution of macrocytosis, repleted iron and B12 deficiencies (anemia of chronic disease).   --Stable, Hgb 14; MCV 98.2, 10/26/2022 (prior range: Hgb 11.4 - 14.3; 94.4 - 102.1)  No myelodysplastic syndrome (MDS) on comprehensive blood report, normal thyroid function tests (TFTs), normal LDH, normal B12/folate.   Colonoscopy on 4/10/18 ([facility]). Impressions: Preparation of the colon was inadequate. One 12 mm polyp in the cecum, removed with a hot snare and removed piecemeal using a hot snare. Resected and retrieved. Diverticulosis in the sigmoid colon and in the descending colon.   Repeat with 2 day prep scheduled for 08/02/2018. \"Polyps\" Repeat 3 years.  Colonoscopy, 08/02/2018 (above).  Fair colon preparation.  1 5 mm polyp in the ascending colon, removed with hot snare.  One 12 mm polyp in the transverse colon, removed with a hot snare.  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 Negative thyroid ultrasound, 11/30/2011. Is followed by Dr. Medrano (ENT) and Dr. Roth (endocrine).   Low B12 levels. Recurrent.   Constipation, multifactorial.  Regulated by stool softeners.  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   Chronic intermittent irregular heart rate, Dr. Dobson following.   A 50 " pack-year tobacco smoker with radiographic evidence of emphysema. Has not smoked since 2004.   Anxiety.    Diplopia.  Is scheduled to see ophthalmology today  RECOMMENDATIONS:   Apprised of surveillance chest CT, 07/22/2022 (see above).  No evidence of recurrent metastatic disease.  No change in 7 mm NISA and RLL groundglass pulmonary nodules.  Apprise of labs from 10/26/2022 including the current heme (stable anemia otherwise normal CBC) and the other labs noting the stable CEA, essentially normal CMP, repleted iron, repleted (35%; from 37%; 18%) Fe sat, depressed ferritin (71; from 56; 17; 45; 87; 58), depressed (290; from 388; >1000) B12/folate.  Anne called in  Previously discussed that even without adjuvant chemotherapy 70.8% of people are alive in 5 years and none of those are alive due to therapy. Approximately 28% of those die of cancer and 1.3% die from other causes. Emphasized that very little data are available about the effects of adjuvant chemotherapy in Stage I lung cancer and most guidelines do not recommend the use of adjuvant therapy in this population and a meta-analysis even suggested that there was a trend toward a worse outcome in these patients. He preferred not to have chemo anyway.   Review follow-up by GRACE Bahena with pulmonary, 07/27/2022.  Review CT chest without contrast, 07/20/2022 (above).  Stable overall.  Plan: CT chest without contrast and follow-up in 6 months (01/27/2023).  Stay on [place]   Continue ongoing management per primary care physician and other specialists. Is seeing the eye docs today  Schedule chest CT with contrast in 23 weeks at [facility]  Return to the office with pre-office CEA, serum iron, Fe sat, ferritin, B12/folate, CMP, and CBC with differential in 24 weeks.    12/21/2022 - Chest x-ray:  Stable chronic change.  No acute disease.    01/27/2023 - CT Chest with contrast:  A stable CT scan of the chest. Postsurgical changes/left lower lobectomy. No finding to  suggest recurrence.  A stable small groundglass opacity/nodule in the left upper lobe. No features of malignancy at this time. A follow-up examination in one year is recommended to ensure stability.  Chronic emphysematous lung changes.    06/14/2023 - CT Chest with contrast:  New 1.5 cm central LEFT upper lobe pulmonary nodule. This is concerning for recurrent neoplasm. Management options include PET/CT versus sampling with EBUS.    06/21/2023 - PET Scan:  PET-CT findings compatible with recurrent left lung neoplasm as an isolated finding. No distant disease is seen.    06/22/2023 - Documentation per Dr. Arteaga:  Patient informed per Dr Arteaga recommendations a referral should be made to Rad/Onc for evaluation and possible SBRT  Patient informed once the apt is dom[place] he will be called with time and date    06/28/2023 - Consult with  (Covering for ):  Following this discussion and in consideration of the diagnostic data/evaluation of the patient, I recommended a course of definitive intent pulmonary SBRT to the PET avid left upper lobe nodule to an approximate dose of 3285-3817 cGy to be given over 4-5 every other day fractions, final course pending.  If SBRT cannot safely be delivered secondary to central location, would favor a hypofractionated course of treatment to an approximate dose of 2215-1929 cGy to be given over 15 daily fractions (Monday through Friday).  Will simulate treatment fields today, 3D CT with MIPS to begin the planning process, final course pending.    08/01/2023 - 08/14/2023 - Completed radiation course:  Received 5000 cGy in 5 fractions to the NISA lung tumor.    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   Tumor burden: 1.5 cm left lower lung nodule.   Complications of tumor: None.   Tumor status: JAZMÍN following resection via left lower  lobectomy.   Chest x-ray, 07/03/2017, [facility]. Impression: Stable appearance of the chest with postoperative changes in the left lung base and emphysematous changes.   Chest x-ray obtained 11/20/2017 at [facility] reveals no interval change when compared to chest x-ray dated 07/03/2017.   Chest x-ray on 02/12/2018 ([facility]). Impression: Hyperinflated lungs suggesting chronic obstructive pulmonary disease (COPD). Postoperative changes of the left hemithorax. No acute cardiopulmonary process identified.   Chest Xray on 07/06/2018 ([facility]). Impression: No acute cardiopulmonary disease.   Chest X-ray, 12/21/2018 at [facility]. COPD. Chronic volume loss in the lung bases with mild central vascular crowding. No acute infiltrates.   06/19/2020-chest x-ray.  Comparison: 8/23/2019-impression: Mild blunting of the left costophrenic angle which could be seen with scarring or small effusion.  02/22/2021- chest xray.  COPD no acute cardiopulmonary process  10/25/2021-chest x-ray.  Streaky bibasilar opacities.  Similar to prior.  04/25/22- CT chest- Status post resection of a left lower lobe pulmonary nodule. Moderate changes of centrilobular emphysema are present. Small groundglass nodules one within the left upper lobe and one within the periphery of the right lower lobe warranting follow-up per Fleischner criteria. No additional lung lesions are present. No enlarged mediastinal or axillary lymphadenopathy.  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 The thoracic aorta is ectatic. There is mild enlargement of the pulmonary arteries suggesting pulmonary arterial hypertension. Heavy coronary calcifications are present.   07/20/22-CT chest- postoperative change of LLL without evidence of recurrent or metastatic disease.  No change in 7 mm NISA and RLL groundglass pulmonary nodules.  Consider continued imaging surveillance to document stability.  Ectatic  "ascending aorta measuring 4 cm in diameter.  1/27/23- CT chest- A stable CT scan of the chest. Postsurgical changes/left lower lobectomy. No finding to suggest recurrence. A stable small groundglass opacity/nodule in the left upper lobe. No features of malignancy at this time. A follow-up examination in one year is recommended to ensure stability. Chronic emphysematous lung changes  6/14/23- CT chest- New 1.5 cm central LEFT upper lobe pulmonary nodule. This is concerning for recurrent neoplasm. Management options include PET/CT versus sampling with EBUS.  6/21/23- PET-CT- PET-CT findings compatible with recurrent left lung neoplasm an isolated finding (15 mm left lung nodule adjacent to the hilum shows FDG uptake with SUV max = 3.3).  No distant disease is seen.  SBRT: 8/1/23- 8/14/23- 6000 cGy/5 fx) to NISA nodule  Mildly abnormal CEA.    --4.15, 8/21/23 (prior range: 2.2 - 5.0). Continued observation warranted.   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 Underwent right knee replacement, 05/18/2017.   Normocytic/macrocytic anemia with resolution of macrocytosis, repleted iron and B12 deficiencies (anemia of chronic disease).   --Stable, Hgb 12.9; MCV 99.8, 8/21/23 (prior range: Hgb 11.4 - 14.3; 94.4 - 102.1)  No myelodysplastic syndrome (MDS) on comprehensive blood report, normal thyroid function tests (TFTs), normal LDH, normal B12/folate.   Colonoscopy on 4/10/18 ([facility]). Impressions: Preparation of the colon was inadequate. One 12 mm polyp in the cecum, removed with a hot snare and removed piecemeal using a hot snare. Resected and retrieved. Diverticulosis in the sigmoid colon and in the descending colon.   Repeat with 2 day prep scheduled for 08/02/2018. \"Polyps\" Repeat 3 years.  Colonoscopy, 08/02/2018 (above).  Fair colon preparation.  1 5 mm polyp in the ascending colon, removed with hot snare.  One 12 mm polyp in the transverse colon, " "removed with a hot snare.  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 Negative thyroid ultrasound, 11/30/2011. Is followed by Dr. Medrano (ENT) and Dr. Roth (endocrine).   Low B12 levels. Recurrent.   Constipation, multifactorial.  Regulated by stool softeners.  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   Chronic intermittent irregular heart rate, Dr. Dobson following.   A 50 pack-year tobacco smoker with radiographic evidence of emphysema. Has not smoked since 2004.   Anxiety.    Myasthenia gravis.  Followed by Dr. Hines  COPD  Weight loss.  Has stabilized.  Reassures me it is intentional.  \"Eating less and smarter.\"  RECOMMENDATIONS:   Apprise of labs from 8/21/23 including the current heme (stable anemia otherwise normal CBC) and the other labs noting the stable CEA (4.15-prior:  2.2 - 5), glucose otherwise essentially normal CMP, repleted iron, repleted (30%- prior: 31%; 35%; 37%; 18%) Fe sat, depressed ferritin (47; prior:  68; 71; 56; 17; 45; 87; 58), depressed (279; from 366; 290; 388; >1000) B12/folate.  (Anne called in)  CT chest, 6/14/23 and PET scan, 6/21/23 (above).  New 1.5 cm central LEFT upper lobe pulmonary nodule compatible with recurrent neoplasm.  Previously discussed that even without adjuvant chemotherapy 70.8% of people are alive in 5 years and none of those are alive due to therapy. Approximately 28% of those die of cancer and 1.3% die from other causes. Emphasized that very little data are available about the effects of adjuvant chemotherapy in Stage I lung cancer and most guidelines do not recommend the use of adjuvant therapy in this population and a meta-analysis even suggested that there was a trend toward a worse outcome in these patients. He preferred not to have chemo anyway.   Review follow-up with pulmonary, 7/11/23 Re:  Diagnostic bronchoscopy and PFTs.  Patient declined any invasive " "procedures.  He said that he had PFT in the past that \"almost killed him\" and is not wanting a pulmonary function test at this time either.  RTC 3 months (~ 10/11/23)  Review visit with radiation oncology (Dr. Herring), 6/28/23.   I recommended a course of definitive intent pulmonary SBRT to the PET avid left upper lobe nodule to an approximate dose of 5546-0076 cGy to be given over 4-5 every other day fractions, final course pending.  If SBRT cannot safely be delivered secondary to central location, would favor a hypofractionated course of treatment to an approximate dose of 0722-5468 cGy to be given over 15 daily fractions  (SBRT: 8/1/23- 8/14/23- 6000 cGy/5 fx)  Continue ongoing management per primary care physician and other specialists. Has appointment in 11/2023 with rad onc along with CT prior to visit  Return to the office with pre-office CEA, serum iron, Fe sat, ferritin, B12/folate, CMP, and CBC with differential in 16 weeks.    11/08/2023 - CT Chest with contrast:  Stable 1.5 cm left perihilar central upper lobe nodule, relatively unchanged from 6/14/2023. No new or increasing size nodule identified.  No pathologic lymphadenopathy. Stable volume loss left hemithorax from a prior left lower lobe lobectomy.    11/15/2023 - Appointment with :  Follow up in 3 months     12/28/2023 - Appointment with :  RECOMMENDATIONS:   Apprise of labs from 12/21/23 including the current heme (stable anemia otherwise normal CBC) and the other labs with CEA p (prior:  2.2 - 5), calcium 8.3 otherwise essentially normal CMP, repleted iron, repleted (22%- prior: 30%; 31%; 35%; 37%; 18%) Fe sat, depressed ferritin (42- prior:  47; 68; 71; 56; 17; 45; 87; 58) repleted (350; from 279; 366; 290; 388; >1000) B12/folate.   Apprised of CT chest, 11/8/23 (above).  Stable 1.5 cm left perihilar central upper lobe nodule, relatively unchanged from 6/14/2023. No new or increasing size nodule identified. No pathologic " lymphadenopathy  Previously discussed that even without adjuvant chemotherapy 70.8% of people are alive in 5 years and none of those are alive due to therapy. Approximately 28% of those die of cancer and 1.3% die from other causes. Emphasized that very little data are available about the effects of adjuvant chemotherapy in Stage I lung cancer and most guidelines do not recommend the use of adjuvant therapy in this population and a meta-analysis even suggested that there was a trend toward a worse outcome in these patients. He preferred not to have chemo anyway.   Review visit with radiation oncology (Dr. Samuels), 11/15/23- Diagnosed with stage IA2 (cT1b cN0 cM0) neoplasm of left upper lung. He completed 5000 cGy in 5 fractions to the NISA lung tumor on 08/14/2023.  CT chest, 11/8/23 reviewed.  I do not see evidence for recurrent or metastatic disease at this time. We will continue routine follow-up/surveillance as discussed in 3 months with follow up CT scan before visit. Remission!  Review follow-up with GRACE Bahena with pulmonary, 10/11/23-A/P: NISA nodule. Follow-up prn  Continue ongoing management per primary care physician and other specialists. Has appointment in 2/2024 with rad onc along with CT prior to visit  Return to the office with pre-office CEA, serum iron, Fe sat, ferritin, B12/folate, CMP, and CBC with differential in 12 weeks.    02/01/2024 - CT Chest with contrast:  Stable chest CT appearance.  No new or progressive neoplastic disease.      History obtained from  PATIENT and CHART    PAST MEDICAL HISTORY  Past Medical History:   Diagnosis Date    Anemia     Arthritis     COPD (chronic obstructive pulmonary disease)     Disease of thyroid gland     Emphysema lung     Hx of colonic polyps     Hyperlipidemia     Hypertension     Lung cancer     Lung nodule     Pneumonia     PUD (peptic ulcer disease)       PAST SURGICAL HISTORY  Past Surgical History:   Procedure Laterality Date    CATARACT  EXTRACTION      COLONOSCOPY  2014    Diverticulosis repeat exam in 3 years    COLONOSCOPY N/A 04/10/2018    Tubular adenoma cecum poor prep repeat in 3 months    COLONOSCOPY N/A 2018    2 Tubular adenomas transverse and ascending colon fair prep repeat in 3 years    COLONOSCOPY N/A 2022    Procedure: COLONOSCOPY WITH ANESTHESIA;  Surgeon: Ferdinand Cross MD;  Location: [facility] ENDOSCOPY;  Service: Gastroenterology;  Laterality: N/A;  pre hx colon polyps  post diverticulosis; inadequate prep  Efrain Dobson DO    COLONOSCOPY W/ POLYPECTOMY  02/15/2010    2 Hyperplastic polyps at 25 cm and rectum repeat exam in 5 years    LUNG CANCER SURGERY      LUNG REMOVAL, PARTIAL      REPLACEMENT TOTAL KNEE Right 2017      FAMILY HISTORY  family history includes Cancer in his father; Colon polyps in his mother; Diabetes in his child and mother; Hypertension in his mother; No Known Problems in his maternal grandfather, paternal grandfather, and paternal grandmother; Stroke in his maternal grandmother and mother.    SOCIAL HISTORY  Social History     Tobacco Use    Smoking status: Former     Packs/day: 2.00     Years: 30.00     Additional pack years: 0.00     Total pack years: 60.00     Types: Cigarettes     Start date: 1955     Quit date: 1984     Years since quittin.1     Passive exposure: Past    Smokeless tobacco: Never   Vaping Use    Vaping Use: Never used   Substance Use Topics    Alcohol use: No    Drug use: No      Penicillins     MEDICATIONS  Current Outpatient Medications   Medication Sig Dispense Refill    alfuzosin (UROXATRAL) 10 MG 24 hr tablet Take 2 tablets by mouth.      buPROPion XL (WELLBUTRIN XL) 150 MG 24 hr tablet Take 1 tablet by mouth Every Morning.      Calcium Citrate-Vitamin D3 (CITRACAL) 315-6.25 MG-MCG tablet tablet Take  by mouth Daily.      fluticasone (FLONASE) 50 MCG/ACT nasal spray 1 spray 2 (Two) Times a Day.      folic acid-vit B6-vit B12 (Folbee) 2.5-25-1 MG  "tablet tablet Take 1 tablet by mouth Daily. 30 tablet 0    LORazepam (Ativan) 1 MG tablet Take 30 minute before PET scan 1 tablet 0    losartan (COZAAR) 25 MG tablet 1 tablet 2 (Two) Times a Day.      lovastatin (MEVACOR) 40 MG tablet Take 1 tablet by mouth.      Multiple Vitamins-Minerals (PRESERVISION AREDS 2+MULTI VIT) capsule Take  by mouth.      pantoprazole (PROTONIX) 40 MG EC tablet 1 tablet Daily.      predniSONE (DELTASONE) 10 MG tablet 1 tablet Daily. 1.5 tab daily      pyridostigmine (MESTINON) 60 MG tablet 1 tablet 4 (Four) Times a Day.       No current facility-administered medications for this visit.      Current outpatient and discharge medications have been reconciled for the patient.  Reviewed by: Byron Samuels III, MD    The following portions of the patient's history were reviewed and updated as appropriate: allergies, current medications, past family history, past medical history, past social history, past surgical history and problem list.    REVIEW OF SYSTEMS  Review of Systems    PHYSICAL EXAM  VITAL SIGNS:   Vitals:    02/05/24 1341   BP: 147/80   Pulse: 85   SpO2: 95%  Comment: room air   Weight: 92.1 kg (203 lb)   Height: 182.9 cm (72\")   PainSc: 0-No pain       Physical Exam      Performance Status: ECOG (0) Fully active, able to carry on all predisease performance without restriction    Clinical Quality Measures  -Pain Documented  Carmine Garcia reports a pain score of 0.  Given his pain assessment as noted, treatment options were discussed and the following options were decided upon as a follow-up plan to address the patient's pain:  No pain,no plan given .  Pain Medications               buPROPion XL (WELLBUTRIN XL) 150 MG 24 hr tablet Take 1 tablet by mouth Every Morning.    predniSONE (DELTASONE) 10 MG tablet 1 tablet Daily. 1.5 tab daily          -Advanced Care Planning Advance Care Planning   ACP discussion was held with the patient during this visit. Patient has an advance " directive in EMR which is still valid.     -Body Mass Index Screening and Follow-Up Plan  Body mass index is 27.53 kg/m².    -Tobacco Use: Screening and Cessation Intervention Social History    Tobacco Use      Smoking status: Former        Packs/day: 2.00        Years: 30.00        Additional pack years: 0.00        Total pack years: 60.00        Types: Cigarettes        Start date: 1955        Quit date: 1984        Years since quittin.1        Passive exposure: Past      Smokeless tobacco: Never    ASSESSMENT AND PLAN  1. 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 History of radiation therapy    3. Former smoker      Orders Placed This Encounter   Procedures    CT Chest With Contrast     Standing Status:   Future     Standing Expiration Date:   2025     Order Specific Question:   Release to patient     Answer:   Routine Release [[id number]]     RECOMMENDATIONS: Carmine Garcia is status post completion of radiation therapy to the lung and presents to our clinic today for surveillance exam and to review imaging. Diagnosed with stage IA2 (cT1b cN0 cM0) neoplasm of left upper lung. He completed 5000 cGy in 5 fractions to the NISA lung tumor on 2023.     CT-scan of the chest on 2024 revealed a stable chest CT appearance. No new or progressive neoplastic disease.    On exam, I do not see evidence for recurrent or metastatic disease at this time. We will continue routine follow-up/surveillance as discussed in 3 months with follow up CT scan before visit and I have instructed him to continue to see the other health care providers as per their scheduling.    Patient Instructions   1) Return in 3 months with a CT chest before    Todays appointment time of 32 minutes was spent in counseling, coordination of care and surveillance related to patients diagnosis as well as radiation therapy possible and probable after effects.   Byron Samuels III, MD  2024

## 2024-02-05 ENCOUNTER — OFFICE VISIT (OUTPATIENT)
Dept: RADIATION ONCOLOGY | Facility: HOSPITAL | Age: 84
End: 2024-02-05
Payer: MEDICARE

## 2024-02-05 VITALS
SYSTOLIC BLOOD PRESSURE: 147 MMHG | OXYGEN SATURATION: 95 % | HEIGHT: 72 IN | HEART RATE: 85 BPM | DIASTOLIC BLOOD PRESSURE: 80 MMHG | BODY MASS INDEX: 27.5 KG/M2 | WEIGHT: 203 LBS

## 2024-02-05 DIAGNOSIS — Z92.3 HISTORY OF RADIATION THERAPY: ICD-10-CM

## 2024-02-05 DIAGNOSIS — Z87.891 FORMER SMOKER: ICD-10-CM

## 2024-02-05 DIAGNOSIS — C34.32 PRIMARY ADENOCARCINOMA OF LOWER LOBE OF LEFT LUNG: Primary | ICD-10-CM

## 2024-02-05 PROCEDURE — G0463 HOSPITAL OUTPT CLINIC VISIT: HCPCS | Performed by: RADIOLOGY

## 2024-02-08 NOTE — TELEPHONE ENCOUNTER
Requested Prescriptions     Pending Prescriptions Disp Refills    pantoprazole (PROTONIX) 40 MG tablet [Pharmacy Med Name: Pantoprazole Sodium 40 MG Oral Tablet Delayed Release] 30 tablet 0     Sig: TAKE 1 TABLET BY MOUTH ONCE DAILY IN THE MORNING BEFORE BREAKFAST       Last Office Visit: 1/29/2024  Next Office Visit: 4/29/2024  Last Medication Refill: 9/14/2023 with 5 RF       Dr. Richmond patient

## 2024-02-10 RX ORDER — PANTOPRAZOLE SODIUM 40 MG/1
TABLET, DELAYED RELEASE ORAL
Qty: 30 TABLET | Refills: 5 | Status: SHIPPED | OUTPATIENT
Start: 2024-02-10

## 2024-02-19 LAB — CREAT BLDA-MCNC: 1 MG/DL (ref 0.6–1.3)

## 2024-03-07 ENCOUNTER — LAB (OUTPATIENT)
Dept: LAB | Facility: HOSPITAL | Age: 84
End: 2024-03-07
Payer: MEDICARE

## 2024-03-07 DIAGNOSIS — C34.32 PRIMARY ADENOCARCINOMA OF LOWER LOBE OF LEFT LUNG: ICD-10-CM

## 2024-03-07 LAB
ALBUMIN SERPL-MCNC: 4.1 G/DL (ref 3.5–5.2)
ALBUMIN/GLOB SERPL: 1.6 G/DL
ALP SERPL-CCNC: 73 U/L (ref 39–117)
ALT SERPL W P-5'-P-CCNC: 15 U/L (ref 1–41)
ANION GAP SERPL CALCULATED.3IONS-SCNC: 8 MMOL/L (ref 5–15)
AST SERPL-CCNC: 18 U/L (ref 1–40)
BASOPHILS # BLD AUTO: 0.02 10*3/MM3 (ref 0–0.2)
BASOPHILS NFR BLD AUTO: 0.2 % (ref 0–1.5)
BILIRUB SERPL-MCNC: 0.4 MG/DL (ref 0–1.2)
BUN SERPL-MCNC: 11 MG/DL (ref 8–23)
BUN/CREAT SERPL: 10.9 (ref 7–25)
CALCIUM SPEC-SCNC: 8.9 MG/DL (ref 8.6–10.5)
CEA SERPL-MCNC: 4.29 NG/ML
CHLORIDE SERPL-SCNC: 99 MMOL/L (ref 98–107)
CO2 SERPL-SCNC: 30 MMOL/L (ref 22–29)
CREAT SERPL-MCNC: 1.01 MG/DL (ref 0.76–1.27)
DEPRECATED RDW RBC AUTO: 46.3 FL (ref 37–54)
EGFRCR SERPLBLD CKD-EPI 2021: 73.3 ML/MIN/1.73
EOSINOPHIL # BLD AUTO: 0.02 10*3/MM3 (ref 0–0.4)
EOSINOPHIL NFR BLD AUTO: 0.2 % (ref 0.3–6.2)
ERYTHROCYTE [DISTWIDTH] IN BLOOD BY AUTOMATED COUNT: 13.6 % (ref 12.3–15.4)
FERRITIN SERPL-MCNC: 25.52 NG/ML (ref 30–400)
FOLATE SERPL-MCNC: 16.9 NG/ML (ref 4.78–24.2)
GLOBULIN UR ELPH-MCNC: 2.5 GM/DL
GLUCOSE SERPL-MCNC: 95 MG/DL (ref 65–99)
HCT VFR BLD AUTO: 37 % (ref 37.5–51)
HGB BLD-MCNC: 11.4 G/DL (ref 13–17.7)
IMM GRANULOCYTES # BLD AUTO: 0.04 10*3/MM3 (ref 0–0.05)
IMM GRANULOCYTES NFR BLD AUTO: 0.4 % (ref 0–0.5)
IRON 24H UR-MRATE: 50 MCG/DL (ref 59–158)
IRON SATN MFR SERPL: 12 % (ref 20–50)
LYMPHOCYTES # BLD AUTO: 0.97 10*3/MM3 (ref 0.7–3.1)
LYMPHOCYTES NFR BLD AUTO: 10.5 % (ref 19.6–45.3)
MCH RBC QN AUTO: 29 PG (ref 26.6–33)
MCHC RBC AUTO-ENTMCNC: 30.8 G/DL (ref 31.5–35.7)
MCV RBC AUTO: 94.1 FL (ref 79–97)
MONOCYTES # BLD AUTO: 0.6 10*3/MM3 (ref 0.1–0.9)
MONOCYTES NFR BLD AUTO: 6.5 % (ref 5–12)
NEUTROPHILS NFR BLD AUTO: 7.57 10*3/MM3 (ref 1.7–7)
NEUTROPHILS NFR BLD AUTO: 82.2 % (ref 42.7–76)
NRBC BLD AUTO-RTO: 0 /100 WBC (ref 0–0.2)
PLATELET # BLD AUTO: 252 10*3/MM3 (ref 140–450)
PMV BLD AUTO: 8.9 FL (ref 6–12)
POTASSIUM SERPL-SCNC: 4.6 MMOL/L (ref 3.5–5.2)
PROT SERPL-MCNC: 6.6 G/DL (ref 6–8.5)
RBC # BLD AUTO: 3.93 10*6/MM3 (ref 4.14–5.8)
SODIUM SERPL-SCNC: 137 MMOL/L (ref 136–145)
TIBC SERPL-MCNC: 405 MCG/DL (ref 298–536)
TRANSFERRIN SERPL-MCNC: 272 MG/DL (ref 200–360)
VIT B12 BLD-MCNC: 306 PG/ML (ref 211–946)
WBC NRBC COR # BLD AUTO: 9.22 10*3/MM3 (ref 3.4–10.8)

## 2024-03-07 PROCEDURE — 82728 ASSAY OF FERRITIN: CPT

## 2024-03-07 PROCEDURE — 83540 ASSAY OF IRON: CPT

## 2024-03-07 PROCEDURE — 85025 COMPLETE CBC W/AUTO DIFF WBC: CPT

## 2024-03-07 PROCEDURE — 36415 COLL VENOUS BLD VENIPUNCTURE: CPT

## 2024-03-07 PROCEDURE — 82746 ASSAY OF FOLIC ACID SERUM: CPT

## 2024-03-07 PROCEDURE — 82607 VITAMIN B-12: CPT

## 2024-03-07 PROCEDURE — 82378 CARCINOEMBRYONIC ANTIGEN: CPT

## 2024-03-07 PROCEDURE — 84466 ASSAY OF TRANSFERRIN: CPT

## 2024-03-07 PROCEDURE — 80053 COMPREHEN METABOLIC PANEL: CPT

## 2024-03-07 RX ORDER — FAMOTIDINE 10 MG/ML
20 INJECTION, SOLUTION INTRAVENOUS AS NEEDED
OUTPATIENT
Start: 2024-03-21

## 2024-03-07 RX ORDER — FAMOTIDINE 10 MG/ML
20 INJECTION, SOLUTION INTRAVENOUS AS NEEDED
OUTPATIENT
Start: 2024-03-14

## 2024-03-07 RX ORDER — SODIUM CHLORIDE 9 MG/ML
20 INJECTION, SOLUTION INTRAVENOUS ONCE
OUTPATIENT
Start: 2024-03-21

## 2024-03-07 RX ORDER — DIPHENHYDRAMINE HYDROCHLORIDE 50 MG/ML
50 INJECTION INTRAMUSCULAR; INTRAVENOUS AS NEEDED
OUTPATIENT
Start: 2024-03-21

## 2024-03-07 RX ORDER — SODIUM CHLORIDE 9 MG/ML
20 INJECTION, SOLUTION INTRAVENOUS ONCE
OUTPATIENT
Start: 2024-03-14

## 2024-03-07 RX ORDER — DIPHENHYDRAMINE HYDROCHLORIDE 50 MG/ML
50 INJECTION INTRAMUSCULAR; INTRAVENOUS AS NEEDED
OUTPATIENT
Start: 2024-03-14

## 2024-03-09 NOTE — PROGRESS NOTES
[facility]  [address]  [place] [address] [phone]    Patient Name: Carmine Garcia  Encounter Date: 03/14/2024  YOB: 1940  Patient Number: [id number]     REASON FOR VISIT: Mr. 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 He is seen 147.5 months since thoracoscopic surgical resection of the lung neoplasm. He is also followed for anemia of iron deficiency and B12 deficiency. It has been 73 months since last receipt of Injectafer.   On 6/21/23- PET-CT- showed a recurrent left lung neoplasm an isolated finding (15 mm left lung nodule adjacent to the hilum shows FDG uptake with SUV max = 3.3).  He then received definitive SBRT: 8/1/23- 8/14/23- 6000 cGy/5 fx) to NISA nodule (7 months).  He is here alone (previously with his daughter, Monica)    I have reviewed the HPI and verified with the patient the accuracy of it. No changes to interval history since the information was documented. Cem Arteaga MD 03/14/24      DIAGNOSTIC ABNORMALITIES:   CT chest, 10/20/2011, [facility]: 1.5 cm left lower lobe nodule positioned just lateral to the hilum - granuloma versus malignancy. No pathologic lymphadenopathy.  PET CT scan, 10/26/2011: Increased metabolic activity in the left lower lobe. Lung nodule located centrally near the left hilum with maximum SUV of 7.4 units, suspicious for primary carcinoma. This corresponded to the lesion seen on CT scan of 10/20/2011. No abnormal mediastinal, hilar, or inguinal activity noted.  Diagnostic bronchoscopy, 11/14/2011: Negative for malignancy. There were no endobronchial lesions.  Labs, 11/29/2011: Ferritin 78.7, B12 235, folate 6.7, CEA 2.2, serum iron 16, TIBC 196 (225 to 420), iron saturation 8.2%.  Thyroid ultrasound, 11/30/2011. No thyroid nodule  identified.  Labs, 12/01/2011: Serum cortisol 21.7. Urine sodium less than 5, serum osmolality 680 (601 to 850).  Left lower lobe lung biopsy, 11/28/2011, [facility]: Well-differentiated papillary adenocarcinoma (1.5 cm in greatest dimension).  Immunohistochemistry analysis, 11/30/2011: Tumor immunoreactive with TTF-1 and Napsin A while negative for thyroglobulin. Results support primary lung adenocarcinoma.  Labs, 01/03/2012: GFR 69.9. Ferritin 54, iron 97, TIBC 301, iron sat 32. B12 352, folate 16.4.   Chest x-ray 09/09/2015 at [facility]. Stable postoperative chest x-ray.  Labs, 03/27/2017: Hemoglobin 13.8, ZIG869.6,  (313 to 618), TSH 0.7, T4 of 6.9 (each normal),   Comprehensive blood report, 03/27/2017: Mild anemia with history of macrocytosis. COMPREHENSIVE DIAGNOSIS. Review of peripheral blood smear: Very mild anemia with red blood cells showing fairly normal morphology at the time of this peripheral blood specimen. Normal white blood cell and platelet counts and morphology. No abnormal populations detected on flow cytometric studies. See comment. COMPREHENSIVE COMMENT. This patient's recent peripheral blood specimen demonstrating a mild macrocytosis with an MCV of 100.6 is noted. At the time of this current peripheral blood specimen, his MCV is within normal limits at 94.3. He has a minimal anemia. White blood cell and platelet counts are both within normal limits with normal morphology. Causes of these peripheral blood findings and macrocytosis could be liver disease, thyroid disease, vitamin/nutritional deficiencies and drugs/toxins. Correlation recommended.  -6/14/23- CT chest- New 1.5 cm central LEFT upper lobe pulmonary nodule. This is concerning for recurrent neoplasm. Management options include PET/CT versus sampling with EBUS.  -6/21/23- PET-CT- PET-CT findings compatible with recurrent left lung neoplasm an isolated finding (15 mm left lung nodule adjacent to the hilum shows FDG uptake with  SUV max = 3.3).  No distant disease is seen.      PREVIOUS INTERVENTIONS:   Left thoracoscopic core needle biopsy, followed by video-assisted thoracoscopic surgery (VATS) left lower lobectomy, and mediastinal lymph node dissection, 11/28/2011: Pathology from the left lower lung nodule lung biopsy showed no histologic evidence of malignancy. Well differentiated papillary adenocarcinoma (1.5 cm in greatest diminish. All resection margins are free of malignancy. No visceral pleural invasion identified. Three peribronchial lymph nodes negative for metastatic carcinoma. Emphysematous changes. Level 10 (1 lymph node), Level 11 (1 lymph node), Level 5 (2 lymph nodes) all negative for metastatic carcinoma. 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 Special stains are pending.  Venofer 200 mg IV daily, 11/30/2011 through 12/03/2011 (800 mg); then 02/17/2014 through 02/24/2014 (800 mg).  B12 at 1000 mcg IM beginning 11/30/2011 through 12/06/2011 (1000 mg).  Foltx p.o. daily beginning 02/12/2014 through 01/14/2015.  Injectafer 750 mg, 12/01/2017; 11/01/2021  -SBRT: 8/1/23- 8/14/23- 6000 cGy/5 fx) to NISA nodule    Problem List Items Addressed This Visit    None        Oncology/Hematology History   Primary adenocarcinoma of lower lobe of left lung   10/20/2011 Imaging    CT Chest:  1.5 cm left lower lobe nodule positioned just lateral to the hilum, granuloma vs. Malignancy  No pathologic lymphadenopathy     10/26/2011 Imaging    PET/CT:  Increased metabolic activity in the left lower lobe, lung nodule located centrally near the left hilum witih max SUV 7.4, suspicious for primary carcinoma. This corresponds to the lesion seen on CT scan of 10/20/2011.   No abnormal mediastinal, hilar, or inguinal activity     11/14/2011 Biopsy    Final Diagnosis   1.     Biopsy, left lower lobe nodule, lung:              A.     Fragments of bronchial mucosa with submucosa demonstrating   stromal elastosis and minimal chronic  inflammation.        B.     No evidence of granulomatous inflammation.        C.     No histologic evidence of malignancy.      2.     Bronchial washings, smears (four) and cell block:         A.     Mild acute inflammation.        B.     Negative for malignant cells.      3.     Bronchial brushings, left lower lobe of lung, smears (3) and ThinPrep   preparation (1):         Negative for malignant cells.        11/28/2011 Surgery    Final Diagnosis        1.     Biopsy, left lower lobe of lung (frozen section control):              A.     Fragment of fibrotic pulmonary parenchyma demonstrating stromal   elastosis and chronic active inflammation, moderate.        B.     No histologic evidence of malignancy.      2.     Level 10 lymph node (frozen section control):         Lymph node (one), negative for metastatic carcinoma.      3.     Left lower lobe of lung (frozen section control):         A.     Well-differentiated papillary adenocarcinoma (1.5 cm in greatest   dimension).        B.     The dominant and aberrant bronchial margins of surgical resection   are negative for malignancy.         C.     The vascular and parenchymal margins of surgical resection are   negative for malignancy.         D.     No visceral pleural invasion identified.         E.     Peribronchial lymph nodes (three), negative for metastatic   carcinoma.         F.     Emphysematous changes.      4.     Level 11 lymph node:   Lymph node (one), negative for metastatic   carcinoma.      5.     Level 5 lymph nodes:    Lymph nodes (two), negative for metastatic   carcinoma.      Comment:     Primary papillary adenocarcinomas of the lung do occur; however, they demonstrate a similar histology to that seen in papillary thyroid carcinoma.  The thyroid gland should be evaluated if not already   done to exclude the possibility of met        4/30/2014 Imaging    CXR:  SUMMARY:  1. Stable chronic change.  2. No acute disease.     9/3/2014 Imaging     CXR:  IMPRESSION-   1. Emphysematous and postoperative changes in the chest without evidence  of mass lesion or acute cardiopulmonary process.     1/5/2016 Imaging    CXR:  IMPRESSION   Postoperative changes of the left hemithorax with associated volume  loss. No acute process identified.     7/1/2016 Imaging    CXR:  IMPRESSION-   1. No active disease is seen.  2. COPD/emphysema.  3. Prior lung surgery on the left.      1/4/2017 Imaging    CXR:  IMPRESSION:  1. COPD/emphysema.  2. Postoperative changes on the left.  3. No acute appearing infiltrate or effusion.      3/20/2017 Imaging    CXR:  IMPRESSION:  1. Chronic changes in the lungs and spine without evidence of acute  cardiopulmonary process. Overall, the appearance of the lungs is similar  to the study from 01/04/2017.     11/20/2017 Imaging    CXR:  Impression:  No significant interval change when compared to chest x-ray dated  07/03/2017.     2/12/2018 Imaging    CXR:  IMPRESSION:  1. Hyperinflated lungs suggesting COPD.  2. Postoperative changes of the left hemithorax.  3. No acute cardiopulmonary process identified.     7/6/2018 Imaging    CXR:  Impression:  No acute cardiopulmonary disease.     12/21/2018 Imaging    CXR:  IMPRESSION:  COPD. Chronic volume loss in the lung bases with mild  central vascular crowding. No acute infiltrates.     8/23/2019 Imaging    CXR:  IMPRESSION:  1. No acute disease.     6/19/2020 Imaging    CXR:  IMPRESSION:  1. Mild blunting of the left costophrenic angle, which could be seen  with scarring or small effusion.     2/22/2021 Imaging    CXR:  IMPRESSION:  1. COPD no acute cardiopulmonary process.     10/25/2021 Imaging    CXR:  IMPRESSION:  1. Streaky bibasilar opacities, similar to prior.     4/25/2022 Imaging    CT Chest:  IMPRESSION:  1. Status post resection of a left lower lobe pulmonary nodule. Moderate  changes of centrilobular emphysema are present.  2. Small groundglass nodules one within the left upper lobe and  one  within the periphery of the right lower lobe warranting follow-up per  Fleischner criteria. No additional lung lesions are present.  3. No enlarged mediastinal or axillary lymphadenopathy..  4. 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 The  thoracic aorta is ectatic. There is mild enlargement of the pulmonary  arteries suggesting pulmonary arterial hypertension. Heavy coronary  calcifications are present.         7/20/2022 Imaging    CT Chest:  IMPRESSION:  1.  Postoperative change of LEFT lower lobectomy without evidence of  recurrent or metastatic disease.   2.  No change in 7 mm LEFT upper lobe and RIGHT lower lobe groundglass  pulmonary nodules. Consider continued imaging surveillance to document  stability.  3.  Ectatic ascending aorta measuring 4 cm diameter.     12/21/2022 Imaging    CXR:  IMPRESSION:  1. Stable chronic change.  2. No acute disease.     1/27/2023 Imaging    CT Chest:  IMPRESSION:  1. A stable CT scan of the chest. Postsurgical changes/left lower  lobectomy. No finding to suggest recurrence.  2. A stable small groundglass opacity/nodule in the left upper lobe. No  features of malignancy at this time. A follow-up examination in one year  is recommended to ensure stability.  3. Chronic emphysematous lung changes.     6/14/2023 Imaging    CT Chest:  IMPRESSION:  New 1.5 cm central LEFT upper lobe pulmonary nodule. This is concerning  for recurrent neoplasm. Management options include PET/CT versus  sampling with EBUS.     6/21/2023 Imaging    PET/CT:  Summary:  1. PET-CT findings compatible with recurrent left lung neoplasm as an  isolated finding. No distant disease is seen.     6/22/2023 Procedure    Documentation per Dr. Arteaga:  Patient informed per Dr Arteaga recommendations a referral should be made to Rad/Onc for evaluation and possible SBRT  Patient informed once the apt is domingo he will be called with time and date            PAST MEDICAL  HISTORY:  ALLERGIES:  Allergies   Allergen Reactions    Penicillins Unknown - High Severity     Unknown       CURRENT MEDICATIONS:  Outpatient Encounter Medications as of 3/14/2024   Medication Sig Dispense Refill    alfuzosin (UROXATRAL) 10 MG 24 hr tablet Take 2 tablets by mouth.      buPROPion XL (WELLBUTRIN XL) 150 MG 24 hr tablet Take 1 tablet by mouth Every Morning.      Calcium Citrate-Vitamin D3 (CITRACAL) 315-6.25 MG-MCG tablet tablet Take  by mouth Daily.      fluticasone (FLONASE) 50 MCG/ACT nasal spray 1 spray 2 (Two) Times a Day.      folic acid-vit B6-vit B12 (Folbee) 2.5-25-1 MG tablet tablet Take 1 tablet by mouth Daily. 30 tablet 0    losartan (COZAAR) 25 MG tablet 1 tablet 2 (Two) Times a Day.      lovastatin (MEVACOR) 40 MG tablet Take 1 tablet by mouth.      Multiple Vitamins-Minerals (PRESERVISION AREDS 2+MULTI VIT) capsule Take  by mouth.      pantoprazole (PROTONIX) 40 MG EC tablet 1 tablet Daily.      predniSONE (DELTASONE) 10 MG tablet 1 tablet Daily. 1.5 tab daily      pyridostigmine (MESTINON) 60 MG tablet 1 tablet 4 (Four) Times a Day.      LORazepam (Ativan) 1 MG tablet Take 30 minute before PET scan (Patient not taking: Reported on 3/14/2024) 1 tablet 0     No facility-administered encounter medications on file as of 3/14/2024.     ADULT ILLNESSES:   Lung cancer ( Stage Date: 11/28/2011, Stage IA (T1a, N0, M0)-Pathological  Date of Dx:2011 ; ICD-10:C34.32 ;Malignant neoplasm of lower lobe, left bronchus or lung )   Acute bronchitis ( ICD-10:J20.9 ;Acute bronchitis, unspecified   Cataracts ( ICD-10:H26.9 ;Unspecified cataract   Colonic polyps ( hyperplastic, 02/2010; ICD-10:K63.5 ;Polyp of colon )   Constipation ( ICD-10:K59.00 ;Constipation, unspecified   Former smoker ( ICD-10:Z87.891 ;Personal history of nicotine dependence   High carcinoembryonic antigen level ( ICD-10:R97.0 ;Elevated carcinoembryonic antigen [CEA]   Hyperlipidemia ( ICD-10:E78.5 ;Hyperlipidemia, unspecified    Hypertension ( ICD-10:I10 ;Essential (primary) hypertension   Hyperthyroidism ( ICD-10:E05.90 ;Thyrotoxicosis, unspecified without thyrotoxic crisis or storm   Iron deficiency anemia ( ICD-10:D50.9 ;Iron deficiency anemia, unspecified   Leukocytosis ( ICD-10:D72.829 ;Elevated white blood cell count, unspecified   Macrocytosis ( ICD-10:D75.89 ;Other specified diseases of blood and blood-forming organs   Malabsorption syndrome (disorder)   Peptic ulcers ( ICD-10:K27.7 ;Chronic peptic ulcer, site unspecified, without hemorrhage or perforation   Retention of urine ( postoperative following eye surgery, 10/2011; ICD-10:R33.9 ;Retention of urine, unspecified )   Strabismus (eye condition) ( ICD-10:H50.9 ;Unspecified strabismus   Vitamin B12 deficiency ( ICD-10:E53.8 ;Deficiency of other specified B group vitamins  Right leg edema. Venous Doppler of right lower extremity on 11/28/2017 ([facility]). Impression: There is no evidence of deep venous thrombosis or superficial thrombophlebitis of the right lower extremity.      SURGERIES:   Excision of non-melanoma skin cancer, 01/2012. Dr. Medrano   Video-assisted thoracoscopic (VATS) left lower lobectomy, 11/28/2011. Dr. Troy   Partial gastrectomy and vagotomy   Total knee replacement, right, 05/18/2017. Dr. Jarrett   Cataract surgery, 2004   Incision and drainage of abscess, left groin, (I&D) in early 01/2014. Dr. Velarde   Strabismus surgery, left eye, 2011.   Colonoscopy on 4/10/18 ([facility]). Impressions: Preparation of the colon was inadequate. One 12 mm polyp in the cecum, removed with a hot snare and removed piecemeal using a hot snare. Resected and retrieved. Diverticulosis in the sigmoid colon and in the descending colon    ADULT ILLNESSES:  Patient Active Problem List   Diagnosis Code    Hx of colonic polyps Z86.010    Obesity, unspecified obesity severity, unspecified obesity type E66.9    History of colon polyps Z86.010    Primary  adenocarcinoma of lower lobe of left lung C34.32    Iron deficiency anemia D50.9    B12 deficiency E53.8    Essential hypertension I10    Pulmonary emphysema J43.9    Slow transit constipation K59.01    Urinary retention R33.9    Former smoker Z87.891    History of radiation therapy Z92.3     SURGERIES:  Past Surgical History:   Procedure Laterality Date    CATARACT EXTRACTION      COLONOSCOPY  12/12/2014    Diverticulosis repeat exam in 3 years    COLONOSCOPY N/A 04/10/2018    Tubular adenoma cecum poor prep repeat in 3 months    COLONOSCOPY N/A 08/02/2018    2 Tubular adenomas transverse and ascending colon fair prep repeat in 3 years    COLONOSCOPY N/A 03/24/2022    Procedure: COLONOSCOPY WITH ANESTHESIA;  Surgeon: Ferdinand Cross MD;  Location: [facility] ENDOSCOPY;  Service: Gastroenterology;  Laterality: N/A;  pre hx colon polyps  post diverticulosis; inadequate prep  Efrain Dosbon, DO    COLONOSCOPY W/ POLYPECTOMY  02/15/2010    2 Hyperplastic polyps at 25 cm and rectum repeat exam in 5 years    LUNG CANCER SURGERY      LUNG REMOVAL, PARTIAL      REPLACEMENT TOTAL KNEE Right 05/2017     HEALTH MAINTENANCE ITEMS:  Health Maintenance Due   Topic Date Due    TDAP/TD VACCINES (1 - Tdap) Never done    RSV Vaccine - Adults (1 - 1-dose 60+ series) Never done    ZOSTER VACCINE (1 of 2) 04/06/2013    BMI FOLLOWUP  01/30/2024    COVID-19 Vaccine (6 - 2023-24 season) 02/05/2024       <no information>  Last Completed Colonoscopy       This patient has no relevant Health Maintenance data.          Immunization History   Administered Date(s) Administered    COVID-19 (MODERNA) 1st,2nd,3rd Dose Monovalent 03/05/2021, 04/02/2021, 06/07/2021    COVID-19 (PFIZER) Purple Cap Monovalent 12/14/2021    COVID-19 F23 (PFIZER) 12YRS+ (COMIRNATY) 12/11/2023    Flu Vaccine Split Quad 10/11/2018    INFLUENZA SPLIT TRI 10/13/2014    Influenza Injectable Mdck Pf Quad 10/14/2021, 10/13/2022    Influenza TIV (IM) 11/05/2019, 10/28/2020     "Influenza, Unspecified 10/14/2021, 10/13/2022    Pneumococcal Conjugate 13-Valent (PCV13) 2018    Pneumococcal Polysaccharide (PPSV23) 2011, 10/21/2013    Zostavax 2013     Last Completed Mammogram       This patient has no relevant Health Maintenance data.              FAMILY HISTORY:  Family History   Problem Relation Age of Onset    Colon polyps Mother     Stroke Mother     Diabetes Mother     Hypertension Mother     Cancer Father         asbestos    Stroke Maternal Grandmother     No Known Problems Maternal Grandfather     No Known Problems Paternal Grandmother     No Known Problems Paternal Grandfather     Diabetes Child     Colon cancer Neg Hx      SOCIAL HISTORY:  Social History     Socioeconomic History    Marital status:    Tobacco Use    Smoking status: Former     Current packs/day: 0.00     Average packs/day: 2.0 packs/day for 30.0 years (60.0 ttl pk-yrs)     Types: Cigarettes     Start date: 1955     Quit date: 1984     Years since quittin.2     Passive exposure: Past    Smokeless tobacco: Never   Vaping Use    Vaping status: Never Used   Substance and Sexual Activity    Alcohol use: No    Drug use: No    Sexual activity: Not Currently     Partners: Female       REVIEW OF SYSTEMS:  Constitutional:   The patient's appetite is good. \"Eating good but eating healthier.\" His energy remains chronically low to fair. \"Better.  I walk more.\"  He says has been able to walk better since the knee surgery (right knee arthroplasty, 2017) and has not needed a cane.  \"I haven't used it since last 2023.\" Says the lower back pains and breathlessness slow him down.  \"Much better since losing weight.\" He manages his personal ADLs, some chores, running errands and driving. He has no weight changes (had gained 4 lb at his prior visit - had lost 55 lb at his 4 visits prior to that) in the interval. Says he lost weight previously due to covid pneumonia for which he was admitted to " "[facility] for 2 weeks last 12/2022.  He lives with his daughter and her spouse. He denies fevers, chills, or drenching night sweats. His sleep habits had been appropriate.  Covid vaccines x 2, 04/2021 and booster last 12/2021  Ear/Nose/Throat/Mouth:   He reports some sinus congestion and postnasal drainage but no ear pains or sore throat.  He has not had nosebleeds. No sore tongue. He has no headaches. He denies any hoarseness, change in voice quality.  Ocular:   He reports recent bouts of diplopia, no eye pain, significant change in visual acuity, or blurry vision.  Respiratory:   He has baseline exertional dyspnea but says he is less short of breath with some of his routine activities. Has intermittent cough from postnasal drainage.  He denies shortness of breath at rest. He has postnasal drainage associated cough.   Cardiovascular:   He reports no exertional chest pain, chest pressure, or chest heaviness. He reports no claudication. He reports no palpitations or symptomatic orthostasis.  Gastrointestinal:   He reports no dysphagia, nausea, vomiting, postprandial abdominal pain, bloating, cramping, change in bowel habits, with no dark stools (oral iron intolerant). He reports no rectal bleeding. He reports improved constipation on \"a proper diet.\"  Infrequent use of stool softeners (Dulcolax) without laxatives. He has no diarrhea. Repeat c-scope 03/24/22.  Diverticulosis.  Repeat prn. He says that he has elected not to do anymore. \"Not at my age.\"  Genitourinary:   He denies prior problems with urinary burning, frequency, dribbling, or discoloration. He denies difficulty controlling his bladder.   Musculoskeletal:   He has chronic arthralgias.  The right knee feels more stable since surgery.  Has not needed a cane to walk unless he goes long distances.  He has only occasional \"aches\" of the hips and his shoulders. He denies myalgias or nighttime leg cramping.  Extremities:   He normally has no fluid retention though " "has lingering mild right = left ankle/leg swelling.   Endocrine:   He reports no problems with excess thirst, excessive urination, vasomotor instability, or unexplained fatigue.  Heme/Lymphatic:   He reports easy bruising but no unexplained bleeding, petechial rashes, or swollen glands.  Neuro:   He reports no loss of consciousness, seizures, fainting spells, or dizziness. He reports no weakness of his face, arms, or legs. He has no difficulty with speech. He has no tremors. He reports occasional left = right foot tingling.   Psych:   He seems generally satisfied with life. He denies depression/anxiety. He reports no mood swings.       VITAL SIGNS: /78   Pulse 80   Temp 97.3 °F (36.3 °C) (Temporal)   Resp 18   Ht 182.9 cm (72\")   Wt 92.3 kg (203 lb 6.4 oz)   SpO2 96%   BMI 27.59 kg/m² Body surface area is 2.15 meters squared.  Pain Score    03/14/24 1110   PainSc: 0-No pain           PHYSICAL EXAMINATION:   General:   He is a pleasant, obese, and modestly-kept elderly male who appears comfortable while seated. He appears to be his stated age. His skin color is normal. He is ambulatory without a cane today.  ECOG PS = 1-2 (\"same 50-50\")  Head/Neck:   The patient is anicteric and atraumatic. The trachea is midline. The neck is supple without evidence of jugular venous distention or cervical adenopathy.  Eyes:   The pupils are equal, round, and reactive to light. The extraocular movements are full. There is no scleral jaundice or erythema.  Chest:   Breath sounds are diminished, left > right. There are no wheezes, rhonchi, or rales. The right thoracotomy wounds are healed.  Cardiovascular:   The patient has a regular cardiac rate and rhythm without murmurs, rubs, or gallops. The peripheral pulses are equal and full.  Abdomen:   The belly is soft and globose. There is no rebound, guarding, organomegaly, mass-effect, or tenderness. Bowel sounds are present.  Extremities:   There is no evidence of cyanosis or " clubbing. There is no ankle/ leg edema but no calf tenderness.   Rheumatologic:   There is some evidence of osteoarthritic deformities of the hands. The gait is slow and stooped but is independent and not cane supported.  Cutaneous:   There are no overt rashes, disseminated lesions, purpura, or petechiae.  Lymphatics:   There is no evidence of adenopathy in the cervical, supraclavicular, or axillary areas.  Neurologic:   The patient is alert, oriented, cooperative, and pleasant. He is appropriately conversant. There is no overt impairment with no overt focal motor deficits.  Psych:   Mood and affect are appropriate for circumstance. Eye contact is appropriate.        LABS    Lab Results - Last 18 Months   Lab Units 03/07/24  1046 12/21/23  1041 12/05/23  0912 10/10/23  0915 09/12/23  1249 08/21/23  1049 08/15/23  1023 04/26/23  1126 01/08/23  1035 11/10/22  1629 10/26/22  1120   HEMOGLOBIN g/dL 11.4* 12.0* 12.4* 12.7* 11.3* 12.9*   < > 12.6* 9.7*   < > 14.0   HEMATOCRIT % 37.0* 38.9 38.5* 41.5* 35.1* 42.3   < > 39.9 30.2*   < > 43.0   MCV fL 94.1 100.8* 100.8* 102.2* 98.9* 99.8*   < > 101.8* 101.0*   < > 98.2*   WBC 10*3/mm3 9.22 6.26 6.3 7.7 4.7* 6.27   < > 7.25 9.37   < > 6.08   RDW % 13.6 13.8 13.9 14.0 13.9 14.0   < > 13.0 14.7   < > 12.5   MPV fL 8.9 9.1 8.9* 9.4 9.2* 9.0   < > 9.3 9.0   < > 8.8   PLATELETS 10*3/mm3 252 242 238 204 163 174   < > 247 241   < > 235   IMM GRAN % % 0.4 0.8*  --   --   --  0.6*  --  0.4 1.3*  --  0.3   NEUTROS ABS 10*3/mm3 7.57* 4.86 5.0 6.2 4.1 5.17   < > 5.66 8.45*   < > 3.93   LYMPHS ABS 10*3/mm3 0.97 0.81 0.7* 0.9* 0.3* 0.69*   < > 1.04 0.43*   < > 1.30   MONOS ABS 10*3/mm3 0.60 0.51 0.50 0.40 0.30 0.34   < > 0.48 0.34   < > 0.74   EOS ABS 10*3/mm3 0.02 0.01 0.10 0.10 0.00 0.01   < > 0.02 0.02   < > 0.06   BASOS ABS 10*3/mm3 0.02 0.02 0.00 0.00 0.00 0.02   < > 0.02 0.01   < > 0.03   IMMATURE GRANS (ABS) 10*3/mm3 0.04 0.05 0.1 0.1 0.0 0.04   < > 0.03 0.12*   < > 0.02   NRBC /100  WBC 0.0 0.0  --   --   --  0.0  --  0.0 0.0  --  0.0    < > = values in this interval not displayed.       Lab Results - Last 18 Months   Lab Units 03/07/24  1046 02/01/24  1303 12/21/23  1041 11/08/23  1019 08/21/23  1049 06/14/23  1417 04/26/23  1126 01/27/23  1337 01/08/23  1035 12/26/22  1008 12/24/22  0939 12/24/22  0456 11/11/22  1802 10/26/22  1120   GLUCOSE mg/dL 95  --  73  --  130*  --  105*  --  113*  --   --   --   --  120*   SODIUM mmol/L 137  --  138  --  138  --  138  --  135* 121*   < >  --    < > 133*   POTASSIUM mmol/L 4.6  --  4.4  --  4.1  --  4.3  --  3.9  --   --  3.5  --  4.0   CO2 mmol/L 30.0*  --  32.0*  --  29.0  --  27.0  --  26.0  --   --   --   --  28.0   CHLORIDE mmol/L 99  --  101  --  101  --  103  --  100  --   --   --   --  96*   ANION GAP mmol/L 8.0  --  5.0  --  8.0  --  8.0  --  9.0  --   --   --   --  9.0   CREATININE mg/dL 1.01 1.00 0.85 0.90 0.81 0.90 0.89   < > 0.81  --   --   --   --  0.96   BUN mg/dL 11  --  12  --  11  --  10  --  14  --   --   --   --  11   BUN / CREAT RATIO  10.9  --  14.1  --  13.6  --  11.2  --  17.3  --   --   --   --  11.5   CALCIUM mg/dL 8.9  --  8.3*  --  9.1  --  8.8  --  7.3*  --   --   --   --  9.5   ALK PHOS U/L 73  --  79  --  72  --  73  --  62  --   --   --   --  90   TOTAL PROTEIN g/dL 6.6  --  7.2  --  7.0  --  6.2  --  4.3*  --   --   --   --  7.3   ALT (SGPT) U/L 15  --  19  --  26  --  23  --  47*  --   --   --   --  12   AST (SGOT) U/L 18  --  23  --  24  --  18  --  22  --   --   --   --  16   BILIRUBIN mg/dL 0.4  --  0.3  --  0.3  --  0.4  --  0.8  --   --   --   --  0.5   ALBUMIN g/dL 4.1  --  4.1  --  4.0  --  4.3  --  3.4*  --   --   --   --  4.30   GLOBULIN gm/dL 2.5  --  3.1  --  3.0  --  1.9  --  0.9  --   --   --   --  3.0    < > = values in this interval not displayed.       Lab Results - Last 18 Months   Lab Units 03/07/24  1046 12/21/23  1041 08/21/23  1049 04/26/23  1126 11/12/22  0151 11/10/22  1919 10/26/22  1120   URIC  ACID mg/dL  --   --   --   --  5.1 4.3  --    CEA ng/mL 4.29 4.05 4.15 3.79  --   --  3.39       Lab Results - Last 18 Months   Lab Units 03/07/24  1046 12/21/23  1041 08/21/23  1049 04/26/23  1126 12/24/22  0450 10/26/22  1120   IRON mcg/dL 50* 75 100 95  --  123   TIBC mcg/dL 405 347 335 305  --  349   IRON SATURATION (TSAT) % 12* 22 30 31  --  35   FERRITIN ng/mL 25.52* 42.61 47.78 68.42 270.9 71.82   FOLATE ng/mL 16.90 14.80 13.10 18.80  --  17.50       PROBLEMS IDENTIFIED:   1.  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   Tumor burden: 1.5 cm left lower lung nodule.   Complications of tumor: None.   Tumor status: JAZMÍN following resection via left lower lobectomy.   Chest x-ray, 07/03/2017, [facility]. Impression: Stable appearance of the chest with postoperative changes in the left lung base and emphysematous changes.   Chest x-ray obtained 11/20/2017 at [facility] reveals no interval change when compared to chest x-ray dated 07/03/2017.   Chest x-ray on 02/12/2018 ([facility]). Impression: Hyperinflated lungs suggesting chronic obstructive pulmonary disease (COPD). Postoperative changes of the left hemithorax. No acute cardiopulmonary process identified.   Chest Xray on 07/06/2018 ([facility]). Impression: No acute cardiopulmonary disease.   Chest X-ray, 12/21/2018 at [facility]. COPD. Chronic volume loss in the lung bases with mild central vascular crowding. No acute infiltrates.   06/19/2020-chest x-ray.  Comparison: 8/23/2019-impression: Mild blunting of the left costophrenic angle which could be seen with scarring or small effusion.  02/22/2021- chest xray.  COPD no acute cardiopulmonary process  10/25/2021-chest x-ray.  Streaky bibasilar opacities.  Similar to prior.  04/25/22- CT chest- Status post resection of a left lower lobe pulmonary nodule. Moderate changes of centrilobular emphysema are  present. Small groundglass nodules one within the left upper lobe and one within the periphery of the right lower lobe warranting follow-up per Fleischner criteria. No additional lung lesions are present. No enlarged mediastinal or axillary lymphadenopathy.  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 The thoracic aorta is ectatic. There is mild enlargement of the pulmonary arteries suggesting pulmonary arterial hypertension. Heavy coronary calcifications are present.   07/20/22-CT chest- postoperative change of LLL without evidence of recurrent or metastatic disease.  No change in 7 mm NISA and RLL groundglass pulmonary nodules.  Consider continued imaging surveillance to document stability.  Ectatic ascending aorta measuring 4 cm in diameter.  1/27/23- CT chest- A stable CT scan of the chest. Postsurgical changes/left lower lobectomy. No finding to suggest recurrence. A stable small groundglass opacity/nodule in the left upper lobe. No features of malignancy at this time. A follow-up examination in one year is recommended to ensure stability. Chronic emphysematous lung changes  6/14/23- CT chest- New 1.5 cm central LEFT upper lobe pulmonary nodule. This is concerning for recurrent neoplasm. Management options include PET/CT versus sampling with EBUS.  6/21/23- PET-CT- PET-CT findings compatible with recurrent left lung neoplasm an isolated finding (15 mm left lung nodule adjacent to the hilum shows FDG uptake with SUV max = 3.3).  No distant disease is seen.  SBRT: 8/1/23- 8/14/23- 6000 cGy/5 fx) to NISA nodule  11/8/23- CT chest- Stable 1.5 cm left perihilar central upper lobe nodule, relatively unchanged from 6/14/2023. No new or increasing size nodule identified. No pathologic lymphadenopathy. Stable volume loss left hemithorax from a  prior left lower lobe lobectomy.  11/15/23- Follow-up Dr. Samuels- Diagnosed with stage IA2 (cT1b cN0 cM0) neoplasm of left upper lung. He completed 5000 cGy  "in 5 fractions to the NISA lung tumor on 08/14/2023.  CT chest, 11/8/23 reviewed.  I do not see evidence for recurrent or metastatic disease at this time. We will continue routine follow-up/surveillance as discussed in 3 months with follow up CT scan before visit. Remission!  2/1/24- CT chest- Stable chest CT appearance. No new or progressive neoplastic disease.  2/5/24- Follow-up Dr. Samuels-, I do not see evidence for recurrent or metastatic disease at this time. We will continue routine follow-up/surveillance as discussed in 3 months with follow up CT scan before visit.     2.  Mildly abnormal CEA.    --4.20, 3/7/24 (prior range: 2.2 - 5.0). Continued observation warranted.   3.  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 Underwent right knee replacement, 05/18/2017.   4.  Normocytic/macrocytic anemia with resolution of macrocytosis, repleted iron and B12 deficiencies (anemia of chronic disease).   --Stable, Hgb 11.4; MCV 94.1, 3/7/24 (prior range: Hgb 11.4 - 14.3; 94.4 - 102.1)  a. No myelodysplastic syndrome (MDS) on comprehensive blood report, normal thyroid function tests (TFTs), normal LDH, normal B12/folate.   b. Colonoscopy on 4/10/18 ([facility]). Impressions: Preparation of the colon was inadequate. One 12 mm polyp in the cecum, removed with a hot snare and removed piecemeal using a hot snare. Resected and retrieved. Diverticulosis in the sigmoid colon and in the descending colon.   c. Repeat with 2 day prep scheduled for 08/02/2018. \"Polyps\" Repeat 3 years.  d. Colonoscopy, 08/02/2018 (above).  Fair colon preparation.  1 5 mm polyp in the ascending colon, removed with hot snare.  One 12 mm polyp in the transverse colon, removed with a hot snare.  e.  Injectafer 750 mg, 11/01/2022  5.  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 Negative thyroid ultrasound, 11/30/2011. Is followed by Dr. Medrano (ENT) and Dr. Roth " "(endocrine).   6.  Low B12 levels. Recurrent.   7.  Constipation, multifactorial.  Regulated by stool softeners.  \"Occasionally.\"  8.  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   9.  Chronic intermittent irregular heart rate, Dr. Dobson following.   10. A 50 pack-year tobacco smoker with radiographic evidence of emphysema. Has not smoked since 2004.         11. Anxiety.          12.  Myasthenia gravis.  Followed by Dr. Hines         13.  COPD         14.  Weight loss.  Has stabilized.  Reassures me it is intentional.  \"Eating less and smarter.\"    RECOMMENDATIONS:   1.   Apprise of labs from 3/7/24 including the current heme (stable anemia otherwise normal CBC) and the other labs with CEA 4.29 (prior:  2.2 - 5), calcium 8.9 otherwise essentially normal CMP, low iron (50), low (12%- prior: 22%; 30%; 31%; 35%; 37%; 18%) Fe sat, depressed ferritin (25 -  prior:  42; 47; 68; 71; 56; 17; 45; 87; 58) repleted (306; 350; 279; 366; 290; 388; >1000) B12/folate.     2.   Apprised of CT chest, 2/1/24 (above).   Stable chest CT appearance.  No new or progressive neoplastic disease.  3.   Keep appointments of Injectafer 750 mg IV weekly x 2  4.  Previously discussed that even without adjuvant chemotherapy 70.8% of people are alive in 5 years and none of those are alive due to therapy. Approximately 28% of those die of cancer and 1.3% die from other causes. Emphasized that very little data are available about the effects of adjuvant chemotherapy in Stage I lung cancer and most guidelines do not recommend the use of adjuvant therapy in this population and a meta-analysis even suggested that there was a trend toward a worse outcome in these patients. He preferred not to have chemo anyway.   5.  Review visit with radiation oncology (Dr. 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 He completed 5000 cGy in 5 fractions to the NISA lung tumor on 08/14/2023.  CT chest, 2/1/24 " reviewed.  I do not see evidence for recurrent or metastatic disease at this time. We will continue routine follow-up/surveillance as discussed in 3 months with follow up CT scan before visit.     6.  Continue ongoing management per primary care physician and other specialists. Has appointment in 5/2024 with rad onc along with CT prior to visit  7.  Return to the office with pre-office CEA, serum iron, Fe sat, ferritin, B12/folate, CMP, and CBC with differential in 12 weeks.      I spent ~ 40 minutes caring for Carmine on this date of service. This time includes time spent by me in the following activities: preparing for the visit, reviewing tests, performing a medically appropriate examination and/or evaluation, counseling and educating the patient/family/caregiver, ordering medications, tests, or procedures and documenting information in the medical record        cc: DO Kieran Pennington MD Nicholas Lopez, MD

## 2024-03-14 ENCOUNTER — INFUSION (OUTPATIENT)
Dept: ONCOLOGY | Facility: HOSPITAL | Age: 84
End: 2024-03-14
Payer: MEDICARE

## 2024-03-14 ENCOUNTER — OFFICE VISIT (OUTPATIENT)
Dept: ONCOLOGY | Facility: CLINIC | Age: 84
End: 2024-03-14
Payer: MEDICARE

## 2024-03-14 VITALS
SYSTOLIC BLOOD PRESSURE: 138 MMHG | HEIGHT: 72 IN | BODY MASS INDEX: 27.58 KG/M2 | RESPIRATION RATE: 16 BRPM | OXYGEN SATURATION: 93 % | DIASTOLIC BLOOD PRESSURE: 72 MMHG | TEMPERATURE: 97.9 F | HEART RATE: 66 BPM | WEIGHT: 203.6 LBS

## 2024-03-14 VITALS
DIASTOLIC BLOOD PRESSURE: 78 MMHG | HEIGHT: 72 IN | RESPIRATION RATE: 18 BRPM | TEMPERATURE: 97.3 F | WEIGHT: 203.4 LBS | HEART RATE: 80 BPM | BODY MASS INDEX: 27.55 KG/M2 | SYSTOLIC BLOOD PRESSURE: 138 MMHG | OXYGEN SATURATION: 96 %

## 2024-03-14 DIAGNOSIS — D50.9 IRON DEFICIENCY ANEMIA, UNSPECIFIED IRON DEFICIENCY ANEMIA TYPE: Primary | ICD-10-CM

## 2024-03-14 DIAGNOSIS — C34.32 PRIMARY ADENOCARCINOMA OF LOWER LOBE OF LEFT LUNG: Primary | ICD-10-CM

## 2024-03-14 PROCEDURE — 96365 THER/PROPH/DIAG IV INF INIT: CPT

## 2024-03-14 PROCEDURE — 25010000002 FERRIC CARBOXYMALTOSE 750 MG/15ML SOLUTION 15 ML VIAL: Performed by: INTERNAL MEDICINE

## 2024-03-14 PROCEDURE — 25810000003 SODIUM CHLORIDE 0.9 % SOLUTION: Performed by: INTERNAL MEDICINE

## 2024-03-14 RX ORDER — FAMOTIDINE 10 MG/ML
20 INJECTION, SOLUTION INTRAVENOUS AS NEEDED
Status: DISCONTINUED | OUTPATIENT
Start: 2024-03-14 | End: 2024-03-14 | Stop reason: HOSPADM

## 2024-03-14 RX ORDER — DIPHENHYDRAMINE HYDROCHLORIDE 50 MG/ML
50 INJECTION INTRAMUSCULAR; INTRAVENOUS AS NEEDED
Status: DISCONTINUED | OUTPATIENT
Start: 2024-03-14 | End: 2024-03-14 | Stop reason: HOSPADM

## 2024-03-14 RX ORDER — SODIUM CHLORIDE 9 MG/ML
20 INJECTION, SOLUTION INTRAVENOUS ONCE
Status: COMPLETED | OUTPATIENT
Start: 2024-03-14 | End: 2024-03-14

## 2024-03-14 RX ADMIN — SODIUM CHLORIDE 20 ML/HR: 9 INJECTION, SOLUTION INTRAVENOUS at 12:36

## 2024-03-14 RX ADMIN — FERRIC CARBOXYMALTOSE INJECTION 750 MG: 50 INJECTION, SOLUTION INTRAVENOUS at 12:36

## 2024-03-15 RX ORDER — PREDNISONE 10 MG/1
15 TABLET ORAL DAILY
Qty: 60 TABLET | Refills: 2 | Status: SHIPPED | OUTPATIENT
Start: 2024-03-15

## 2024-03-15 NOTE — TELEPHONE ENCOUNTER
Requested Prescriptions     Pending Prescriptions Disp Refills    predniSONE (DELTASONE) 10 MG tablet 60 tablet 2     Sig: Take 1.5 tablets by mouth daily       Last Office Visit: 1/29/2024  Next Office Visit: 4/29/2024  Last Medication Refill: 11/20/23

## 2024-03-21 ENCOUNTER — INFUSION (OUTPATIENT)
Dept: ONCOLOGY | Facility: HOSPITAL | Age: 84
End: 2024-03-21
Payer: MEDICARE

## 2024-03-21 VITALS
WEIGHT: 205.8 LBS | HEIGHT: 72 IN | SYSTOLIC BLOOD PRESSURE: 114 MMHG | RESPIRATION RATE: 18 BRPM | TEMPERATURE: 97.3 F | OXYGEN SATURATION: 97 % | HEART RATE: 72 BPM | DIASTOLIC BLOOD PRESSURE: 53 MMHG | BODY MASS INDEX: 27.87 KG/M2

## 2024-03-21 DIAGNOSIS — D50.9 IRON DEFICIENCY ANEMIA, UNSPECIFIED IRON DEFICIENCY ANEMIA TYPE: Primary | ICD-10-CM

## 2024-03-21 PROCEDURE — 25010000002 FERRIC CARBOXYMALTOSE 750 MG/15ML SOLUTION 15 ML VIAL: Performed by: INTERNAL MEDICINE

## 2024-03-21 PROCEDURE — 96365 THER/PROPH/DIAG IV INF INIT: CPT

## 2024-03-21 PROCEDURE — 25810000003 SODIUM CHLORIDE 0.9 % SOLUTION: Performed by: INTERNAL MEDICINE

## 2024-03-21 RX ORDER — DIPHENHYDRAMINE HYDROCHLORIDE 50 MG/ML
50 INJECTION INTRAMUSCULAR; INTRAVENOUS AS NEEDED
Status: DISCONTINUED | OUTPATIENT
Start: 2024-03-21 | End: 2024-03-21 | Stop reason: HOSPADM

## 2024-03-21 RX ORDER — FAMOTIDINE 10 MG/ML
20 INJECTION, SOLUTION INTRAVENOUS AS NEEDED
Status: DISCONTINUED | OUTPATIENT
Start: 2024-03-21 | End: 2024-03-21 | Stop reason: HOSPADM

## 2024-03-21 RX ORDER — SODIUM CHLORIDE 9 MG/ML
20 INJECTION, SOLUTION INTRAVENOUS ONCE
Status: COMPLETED | OUTPATIENT
Start: 2024-03-21 | End: 2024-03-21

## 2024-03-21 RX ADMIN — FERRIC CARBOXYMALTOSE INJECTION 750 MG: 50 INJECTION, SOLUTION INTRAVENOUS at 10:25

## 2024-03-21 RX ADMIN — SODIUM CHLORIDE 20 ML/HR: 9 INJECTION, SOLUTION INTRAVENOUS at 10:20

## 2024-04-29 ENCOUNTER — OFFICE VISIT (OUTPATIENT)
Dept: NEUROLOGY | Age: 84
End: 2024-04-29
Payer: MEDICARE

## 2024-04-29 VITALS
WEIGHT: 205 LBS | HEART RATE: 71 BPM | SYSTOLIC BLOOD PRESSURE: 152 MMHG | DIASTOLIC BLOOD PRESSURE: 93 MMHG | HEIGHT: 72 IN | BODY MASS INDEX: 27.77 KG/M2

## 2024-04-29 DIAGNOSIS — G70.00 MYASTHENIA GRAVIS (HCC): Primary | ICD-10-CM

## 2024-04-29 DIAGNOSIS — Z87.09 HISTORY OF COPD: ICD-10-CM

## 2024-04-29 DIAGNOSIS — Z86.69 HISTORY OF DOUBLE VISION: ICD-10-CM

## 2024-04-29 PROCEDURE — G8427 DOCREV CUR MEDS BY ELIG CLIN: HCPCS | Performed by: PSYCHIATRY & NEUROLOGY

## 2024-04-29 PROCEDURE — 1036F TOBACCO NON-USER: CPT | Performed by: PSYCHIATRY & NEUROLOGY

## 2024-04-29 PROCEDURE — G8417 CALC BMI ABV UP PARAM F/U: HCPCS | Performed by: PSYCHIATRY & NEUROLOGY

## 2024-04-29 PROCEDURE — 99214 OFFICE O/P EST MOD 30 MIN: CPT | Performed by: PSYCHIATRY & NEUROLOGY

## 2024-04-29 PROCEDURE — 1123F ACP DISCUSS/DSCN MKR DOCD: CPT | Performed by: PSYCHIATRY & NEUROLOGY

## 2024-04-29 PROCEDURE — 3077F SYST BP >= 140 MM HG: CPT | Performed by: PSYCHIATRY & NEUROLOGY

## 2024-04-29 PROCEDURE — 3079F DIAST BP 80-89 MM HG: CPT | Performed by: PSYCHIATRY & NEUROLOGY

## 2024-04-29 NOTE — PROGRESS NOTES
REVIEW OF SYSTEMS    Constitutional: []Fever []Sweat []Chills [] Recent Injury [x] Denies all unless marked  HEENT:[]Headache  [] Head Injury/Hearing Loss  [] Sore Throat  [] Ear Ache/Dizziness  [x] Denies all unless marked  Spine:  [] Neck pain  [] Back pain  [] Sciaticia  [x] Denies all unless marked  Cardiovascular:[]Heart Disease []Chest Pain [] Palpitations  [x] Denies all unless marked  Pulmonary: []Shortness of Breath []Cough   [x] Denies all unless marke  Gastrointestinal: []Nausea  []Vomiting  []Abdominal Pain  []Constipation  []Diarrhea  []Dark Bloody Stools  [x] Denies all unless marked  Psychiatric/Behavioral:[] Depression [] Anxiety [x] Denies all unless marked  Genitourinary:   [] Frequency  [] Urgency  [] Incontinence [] Pain with Urination  [x] Denies all unless marked  Extremities: []Pain  []Swelling  [x] Denies all unless marked  Musculoskeletal: [] Muscle Pain  [] Joint Pain  [] Arthritis [] Muscle Cramps [] Muscle Twitches  [x] Denies all unless marked  Sleep: [] Insomnia [] Snoring [] Restless Legs [] Sleep Apnea  [] Daytime Sleepiness  [x] Denies all unless marked  Skin:[] Rash [] Skin Discoloration [x] Denies all unless marked   Neurological: []Visual Disturbance/Memory Loss [] Loss of Balance [] Slurred Speech/Weakness [] Seizures  [] Vertigo/Dizziness [x] Denies all unless marked       
noted, no bony deformities, gait no gross ataxia  Extremities-no clubbing, cyanosis or edema  Skin - warm, dry, and intact.  No rash, erythema, or pallor.  Psychiatric - mood, affect, and behavior appear normal.      Neurological exam  Awake, alert, fluent oriented x 3 appropriate affect  Attention and concentration appear appropriate  Recent and remote memory appears unremarkable  Speech normal without dysarthria  No clear issues with language of fund of knowledge    Cranial Nerve Exam   CN II- Visual fields grossly unremarkable. VA adequate.   CN III, IV,VI- PERRLA, EOMI, No nystagmus, conjugate eye movements, minimal left ptosis  CN VII-no facial asymmetry  CN VIII-Hearing intact   CN IX and X- Palate elevates in midline  CN XI-good shoulder shrug  CN XII-Tongue midline with no fasciculations or fibrillations  Able to bury his lashes.  Normal neck flexion  Motor Exam relatively normal throughout ; he is able to stand without using his arms.      Reflexes trace throughout    Tremors- no tremors in hands or head noted    Gait  Normal base and speed  No ataxia. No Romberg sign    Coordination  Finger to nose and MIKE-unremarkable    Lab Results   Component Value Date    FTGEDSOO78 224 05/20/2017     Lab Results   Component Value Date    WBC 6.3 12/05/2023    HGB 12.4 (L) 12/05/2023    HCT 38.5 (L) 12/05/2023    .8 (H) 12/05/2023     12/05/2023     Lab Results   Component Value Date     12/05/2023    K 4.0 12/05/2023     12/05/2023    CO2 24 12/05/2023    BUN 8 12/05/2023    CREATININE 0.8 12/05/2023    GLUCOSE 142 (H) 12/05/2023    CALCIUM 8.6 (L) 12/05/2023    BILITOT 0.4 12/05/2023    ALKPHOS 71 12/05/2023    AST 17 12/05/2023    ALT 13 12/05/2023    LABGLOM >60 12/05/2023    GFRAA >60 07/05/2022           Assessment    ICD-10-CM    1. Myasthenia gravis (HCC)  G70.00       2. History of double vision  Z86.69       3. History of COPD  Z87.09                 Myasthenia gravis is stable at

## 2024-05-06 ENCOUNTER — HOSPITAL ENCOUNTER (OUTPATIENT)
Dept: CT IMAGING | Facility: HOSPITAL | Age: 84
Discharge: HOME | End: 2024-05-06
Admitting: RADIOLOGY
Payer: MEDICARE

## 2024-05-06 DIAGNOSIS — Z92.3 HISTORY OF RADIATION THERAPY: ICD-10-CM

## 2024-05-06 DIAGNOSIS — Z87.891 FORMER SMOKER: ICD-10-CM

## 2024-05-06 DIAGNOSIS — C34.32 PRIMARY ADENOCARCINOMA OF LOWER LOBE OF LEFT LUNG: ICD-10-CM

## 2024-05-06 LAB — CREAT BLDA-MCNC: 0.9 MG/DL (ref 0.6–1.3)

## 2024-05-06 PROCEDURE — 25510000001 IOPAMIDOL 61 % SOLUTION: Performed by: RADIOLOGY

## 2024-05-06 PROCEDURE — 82565 ASSAY OF CREATININE: CPT

## 2024-05-06 PROCEDURE — 71260 CT THORAX DX C+: CPT

## 2024-05-06 RX ADMIN — IOPAMIDOL 100 ML: 612 INJECTION, SOLUTION INTRAVENOUS at 15:09

## 2024-05-08 ENCOUNTER — HOSPITAL ENCOUNTER (OUTPATIENT)
Dept: RADIATION ONCOLOGY | Facility: HOSPITAL | Age: 84
Setting detail: RADIATION/ONCOLOGY SERIES
End: 2024-05-08
Payer: MEDICARE

## 2024-05-09 ENCOUNTER — OFFICE VISIT (OUTPATIENT)
Age: 84
End: 2024-05-09
Payer: MEDICARE

## 2024-05-09 VITALS
WEIGHT: 204 LBS | SYSTOLIC BLOOD PRESSURE: 158 MMHG | OXYGEN SATURATION: 94 % | DIASTOLIC BLOOD PRESSURE: 88 MMHG | HEIGHT: 72 IN | BODY MASS INDEX: 27.63 KG/M2 | HEART RATE: 84 BPM

## 2024-05-09 DIAGNOSIS — Z92.3 HISTORY OF RADIATION THERAPY: ICD-10-CM

## 2024-05-09 DIAGNOSIS — C34.32 PRIMARY ADENOCARCINOMA OF LOWER LOBE OF LEFT LUNG: Primary | ICD-10-CM

## 2024-05-09 DIAGNOSIS — Z87.891 FORMER SMOKER: ICD-10-CM

## 2024-05-09 PROCEDURE — G0463 HOSPITAL OUTPT CLINIC VISIT: HCPCS | Performed by: RADIOLOGY

## 2024-05-16 ENCOUNTER — TRANSCRIBE ORDERS (OUTPATIENT)
Dept: ADMINISTRATIVE | Facility: HOSPITAL | Age: 84
End: 2024-05-16
Payer: MEDICARE

## 2024-05-16 ENCOUNTER — HOSPITAL ENCOUNTER (OUTPATIENT)
Dept: GENERAL RADIOLOGY | Facility: HOSPITAL | Age: 84
Discharge: HOME | End: 2024-05-16
Admitting: INTERNAL MEDICINE
Payer: MEDICARE

## 2024-05-16 DIAGNOSIS — M54.50 BILATERAL LOW BACK PAIN WITHOUT SCIATICA, UNSPECIFIED CHRONICITY: ICD-10-CM

## 2024-05-16 DIAGNOSIS — M54.50 BILATERAL LOW BACK PAIN WITHOUT SCIATICA, UNSPECIFIED CHRONICITY: Primary | ICD-10-CM

## 2024-05-16 PROCEDURE — 72110 X-RAY EXAM L-2 SPINE 4/>VWS: CPT

## 2024-05-23 ENCOUNTER — TRANSCRIBE ORDERS (OUTPATIENT)
Dept: PHYSICAL THERAPY | Facility: CLINIC | Age: 84
End: 2024-05-23
Payer: MEDICARE

## 2024-05-23 DIAGNOSIS — M19.90 ARTHRITIS: Primary | ICD-10-CM

## 2024-05-23 DIAGNOSIS — M54.9 BACK PAIN, UNSPECIFIED BACK LOCATION, UNSPECIFIED BACK PAIN LATERALITY, UNSPECIFIED CHRONICITY: ICD-10-CM

## 2024-05-28 ENCOUNTER — TREATMENT (OUTPATIENT)
Dept: PHYSICAL THERAPY | Facility: CLINIC | Age: 84
End: 2024-05-28
Payer: MEDICARE

## 2024-05-28 DIAGNOSIS — M54.50 ACUTE MIDLINE LOW BACK PAIN WITHOUT SCIATICA: Primary | ICD-10-CM

## 2024-05-28 PROCEDURE — 97162 PT EVAL MOD COMPLEX 30 MIN: CPT

## 2024-05-28 NOTE — PROGRESS NOTES
Physical Therapy Initial Evaluation and Plan of Care  [address] [place] [address]    Patient: Carmine Garcia               : 1940  Visit Date: 2024  Referring practitioner: Efrain Dobson DO  Date of Initial Visit: 2024  Patient seen for 1 sessions    Visit Diagnoses:    ICD-10-CM ICD-9-CM   1. Acute midline low back pain without sciatica  M54.50 724.2     Past Medical History:   Diagnosis Date    Anemia     Arthritis     COPD (chronic obstructive pulmonary disease)     Disease of thyroid gland     Emphysema lung     Hx of colonic polyps     Hyperlipidemia     Hypertension     Lung cancer     Lung nodule     Pneumonia     PUD (peptic ulcer disease)      Past Surgical History:   Procedure Laterality Date    CATARACT EXTRACTION      COLONOSCOPY  2014    Diverticulosis repeat exam in 3 years    COLONOSCOPY N/A 04/10/2018    Tubular adenoma cecum poor prep repeat in 3 months    COLONOSCOPY N/A 2018    2 Tubular adenomas transverse and ascending colon fair prep repeat in 3 years    COLONOSCOPY N/A 2022    Procedure: COLONOSCOPY WITH ANESTHESIA;  Surgeon: Ferdinand Cross MD;  Location: [facility] ENDOSCOPY;  Service: Gastroenterology;  Laterality: N/A;  pre hx colon polyps  post diverticulosis; inadequate prep  Efrain Dobson DO    COLONOSCOPY W/ POLYPECTOMY  02/15/2010    2 Hyperplastic polyps at 25 cm and rectum repeat exam in 5 years    LUNG CANCER SURGERY      LUNG REMOVAL, PARTIAL      REPLACEMENT TOTAL KNEE Right 2017         SUBJECTIVE     Subjective Evaluation    History of Present Illness  Mechanism of injury: Pt has been experiencing low back pain. It starts in the middle of his back and spreads out to both sides. It started hurting about 3-4 weeks ago. He thinks it may be related to him sleeping on his side with his legs draped forward to where his spine isnt straight. The pain is constant, and is sometimes keeps  him up at night. It feels like a headache, but in his low back. Moving or lying any which way is painful, and standing is as well.       XR IMPRESSION:  1. Osteopenia.  2. Extensive degenerative disc, endplate, and facet disease.  3. Mild chronic appearing compression deformities of L1 and L4.      Quality of life: good    Pain  Current pain rating: 3  At best pain ratin  At worst pain ratin  Location: midline low back  Quality: throbbing and dull ache (like a headache in the low back. sometimes feels like he can feel his heartbeat in the pain)  Relieving factors: medications and relaxation (EZ chair)  Aggravating factors: standing, sleeping and movement  Progression: no change    Patient Goals  Patient goals for therapy: decreased pain  Patient goal: sleep normal again       Outcome Measure:   FIOR:        OBJECTIVE     Objective        Special Questions      Additional Special Questions  Denies B/B changes, saddle anesthesia       Postural Observations  Seated posture: fair  Standing posture: fair  Correction of posture: makes symptoms worse      Palpation     Additional Palpation Details  Thoracolumbar mm tenderness   R SIJ tenderness   Lower lumbar SP tenderness     Neurological Testing     Additional Neurological Details  Unable to relax for accurate testing     Tests     Lumbar     Left   Positive quadrant and vertical compression.   Negative crossed SLR and passive SLR.     Right   Positive quadrant and vertical compression.   Negative crossed SLR and passive SLR.     Left Pelvic Girdle/Sacrum   Negative: sacrum compression.     Right Pelvic Girdle/Sacrum   Negative: sacrum compression.     Additional Tests Details  L NIXON caused pain on R lumbar, R FADIR caused same pain    Ambulation     Comments   Lightly antalgic, forward crouched somewhat       Spine ROM     ROM  Pain / laterality    LUMBAR     Flexion  80%    Extension  25%*    L lateral flexion  50%* L   R lateral flexion  50%* R   L  Rotation  75%* R   R Rotation  75%* L   *pain      Lower Extremity MMT and ROM    LEFT RIGHT   HIP Strength ROM Strength R ROM   flexion 4-*  4-*    extension 4-  3+    ABD 3-  4+**    ADD        IR  30  31*   ER  45  58          KNEE       flexion 4  4+    extension 4+  5            ANKLE       dorsiflexion 4  5    plantarflexion       eversion       inversion       *pain      Therapy Education/Self Care [id number]   Education offered today Nature of condition, POC moving forward   Med[employer] Code    Ongoing HEP   Will assign at next visit   Timed Minutes        Total Timed Treatment:     0   mins  Total Time of Visit:            45   mins    ASSESSMENT/PLAN     GOALS:  Goals                                                  Progress Note due by 6/27/24                                                              Recert due by 8/25/24   STG by: 6 weeks Comments Date Status   Improve renetta thoracolumbar mobility       Improve muscle relaxation of B thoracolumbar paraspinals       Improve  bilateral hip ER/IR to within 10 deg of WNL                LTG by: 12 weeks       Able to perform bed mobility without exacerbation of pain       SLS either leg x 5 secs       Reports pain no greater than 2/10 when it does occur.       Improve Modified Oswestry to 8/50 or less      Independent with HEP for flexibility and core/hip stability.          Assessment & Plan       Assessment  Impairments: abnormal coordination, abnormal gait, abnormal muscle firing, abnormal or restricted ROM, activity intolerance, impaired balance, impaired physical strength, lacks appropriate home exercise program and pain with function   Functional limitations: carrying objects, lifting, sleeping, walking, uncomfortable because of pain, moving in bed, sitting, standing, stooping and unable to perform repetitive tasks   Assessment details: Pt is a 84 y.o. male presenting with LBP onset ~3 weeks ago with no specific MIKALA. He has greatest difficulty with bed  mobility, sitting up first thing in the morning, and general constant pain in the lumbar region. It is difficult for him to specify one spot of pain, as it tends to move. Per Radiograph impression, he has Osteopenia, Extensive degenerative disc, endplate, and facet disease, and Mild chronic appearing compression deformities of L1 and L4, which are likely contributors to his condition, though he may have a right sacroiliac component to his pain as well. The Pt would benefit from skilled PTx to decrease pain, improve functional mobility, progress toward goals, and increase overall quality of life.      Prognosis: good    Plan  Therapy options: will be seen for skilled therapy services  Planned modality interventions: cryotherapy, dry needling, electrical stimulation/Russian stimulation, iontophoresis, low level laser therapy, TENS, thermotherapy (hydrocollator packs), traction, ultrasound and high voltage pulsed current (spasm management)  Planned therapy interventions: abdominal trunk stabilization, ADL retraining, balance/weight-bearing training, body mechanics training, fine motor coordination training, flexibility, functional ROM exercises, gait training, home exercise program, IADL retraining, joint mobilization, manual therapy, motor coordination training, neuromuscular re-education, postural training, soft tissue mobilization, spinal/joint mobilization, strengthening, stretching and therapeutic activities  Frequency: 2x week  Duration in weeks: 12  Treatment plan discussed with: patient  Plan details: Assign HEP at next visit. Trial flexion based approach, as well as working toward strengthening BLEs, hips, and supporting SI musculature.         SIGNATURE: Nelly Evans PT DPT, [place] License #: [id number]    Electronically Signed on 5/28/2024      Initial Certification  Certification Period: 5/28/2024 through 8/25/2024  I certify that the therapy services are furnished while this patient is under my care.  The  services outlined above are required by this patient, and will be reviewed every 90 days.     PHYSICIAN: Efrain Dobson DO (NPI: [id number])    Signature____________________________________________DATE: _________     Please sign and return via fax to [phone].   [email or web address]          [facility] [place]. [id number]  [phone]

## 2024-05-30 ENCOUNTER — TREATMENT (OUTPATIENT)
Dept: PHYSICAL THERAPY | Facility: CLINIC | Age: 84
End: 2024-05-30
Payer: MEDICARE

## 2024-05-30 DIAGNOSIS — M54.50 ACUTE MIDLINE LOW BACK PAIN WITHOUT SCIATICA: Primary | ICD-10-CM

## 2024-05-30 PROCEDURE — 97032 APPL MODALITY 1+ESTIM EA 15: CPT | Performed by: PHYSICAL THERAPIST

## 2024-05-30 PROCEDURE — 97535 SELF CARE MNGMENT TRAINING: CPT | Performed by: PHYSICAL THERAPIST

## 2024-05-30 PROCEDURE — 97140 MANUAL THERAPY 1/> REGIONS: CPT | Performed by: PHYSICAL THERAPIST

## 2024-05-30 NOTE — PROGRESS NOTES
Physical Therapy Treatment Note  [address] [place] [address]    Patient: Carmine Garcia                                                 Visit Date: 2024  :     1940    Referring practitioner:    Efrain Dobson DO  Date of Initial Visit:          Type: THERAPY  Noted: 2024    Patient seen for 2 sessions    Visit Diagnoses:    ICD-10-CM ICD-9-CM   1. Acute midline low back pain without sciatica  M54.50 724.2     SUBJECTIVE     Subjective: States he woke up with some increased pain, but after taking some tylenol the pain went down. Notes it is enough to be noticeable and annoying.      PAIN: -3/10       OBJECTIVE     Objective     Modalities Comments   Cold laser/Estim combo Lx       Minutes 10       Manual Therapy     63981  Comments   STM to Lx                    Timed Minutes 20       Therapy Education/Self Care [id number]   Education offered today Nature of condition, POC moving forward   Mednilame Code     Ongoing HEP   Access Code: [email or web address]  URL: [email or web address]  Date: 2024  Prepared by: Tera Brandon    Exercises  - Seated March  - 1 x daily - 7 x weekly - 3 sets - 10 reps  - Seated Lumbar Flexion Stretch  - 1 x daily - 7 x weekly - 3 sets - 10 reps  - Seated Hip Abduction with Resistance  - 1 x daily - 7 x weekly - 3 sets - 10 reps  - Seated Hip Adduction Isometrics with Ball  - 1 x daily - 7 x weekly - 3 sets - 10 reps  - Seated Pelvic Tilt  - 1 x daily - 7 x weekly - 3 sets - 10 reps   Timed Minutes  10       Total Timed Treatment:     40   mins  Total Time of Visit:             40   mins         ASSESSMENT/PLAN     GOALS  Goals                                                  Progress Note due by 24                                                              Recert due by 24   STG by: 6 weeks Comments Date Status   Improve renetta thoracolumbar mobility         Improve muscle relaxation of B  thoracolumbar paraspinals         Improve  bilateral hip ER/IR to within 10 deg of WNL                    LTG by: 12 weeks         Able to perform bed mobility without exacerbation of pain         SLS either leg x 5 secs         Reports pain no greater than 2/10 when it does occur.         Improve Modified Oswestry to 8/50 or less         Independent with HEP for flexibility and core/hip stability.             Assessment/Plan     ASSESSMENT: No goals assessed d/t being first visit after eval.  Pt presented with cont pain today, noting it is usually worse in the mornings and takes tylenol to help the pain. He had some increased pain and difficulty laying on his Lx, and when he rolled to his side his pain increased as well. Once he rested on his side for a minute the pain went down. He presented with tightness and TP activity in his B Lx regions.    PLAN:   Trial flexion based approach, as well as working toward strengthening BLEs, hips, and supporting SI musculature     SIGNATURE: Tera Brandon PTA, [place] License #: [id number]  Electronically Signed on 5/30/2024        [address] [place] [address] [place] [address]  [phone]

## 2024-06-02 NOTE — PROGRESS NOTES
[facility] HEMATOLOGY AND ONCOLOGY  [address]  [facility] [address] [phone]    Patient Name: Carmine Garcia  Encounter Date: 06/13/2024  YOB: 1940  Patient Number: [id number]     REASON FOR VISIT: Mr. 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 He is seen 150.5 months since thoracoscopic surgical resection of the lung neoplasm. He is also followed for anemia of iron deficiency and B12 deficiency. It has been 76 months since last receipt of Injectafer.   On 6/21/23- PET-CT- showed a recurrent left lung neoplasm an isolated finding (15 mm left lung nodule adjacent to the hilum shows FDG uptake with SUV max = 3.3).  He then received definitive SBRT: 8/1/23- 8/14/23- 6000 cGy/5 fx) to NISA nodule (10 months).  He is here alone (previously with his daughter, Monica)    I have reviewed the HPI and verified with the patient the accuracy of it. No changes to interval history since the information was documented. Cem Arteaga MD 06/13/24      DIAGNOSTIC ABNORMALITIES:   CT chest, 10/20/2011, [facility]: 1.5 cm left lower lobe nodule positioned just lateral to the hilum - granuloma versus malignancy. No pathologic lymphadenopathy.  PET CT scan, 10/26/2011: Increased metabolic activity in the left lower lobe. Lung nodule located centrally near the left hilum with maximum SUV of 7.4 units, suspicious for primary carcinoma. This corresponded to the lesion seen on CT scan of 10/20/2011. No abnormal mediastinal, hilar, or inguinal activity noted.  Diagnostic bronchoscopy, 11/14/2011: Negative for malignancy. There were no endobronchial lesions.  Labs, 11/29/2011: Ferritin 78.7, B12 235, folate 6.7, CEA 2.2, serum iron 16, TIBC 196 (225 to 420), iron saturation 8.2%.  Thyroid ultrasound, 11/30/2011. No thyroid nodule  identified.  Labs, 12/01/2011: Serum cortisol 21.7. Urine sodium less than 5, serum osmolality 680 (601 to 850).  Left lower lobe lung biopsy, 11/28/2011, [facility]: Well-differentiated papillary adenocarcinoma (1.5 cm in greatest dimension).  Immunohistochemistry analysis, 11/30/2011: Tumor immunoreactive with TTF-1 and Napsin A while negative for thyroglobulin. Results support primary lung adenocarcinoma.  Labs, 01/03/2012: GFR 69.9. Ferritin 54, iron 97, TIBC 301, iron sat 32. B12 352, folate 16.4.   Chest x-ray 09/09/2015 at [facility]. Stable postoperative chest x-ray.  Labs, 03/27/2017: Hemoglobin 13.8, OBQ436.6,  (313 to 618), TSH 0.7, T4 of 6.9 (each normal),   Comprehensive blood report, 03/27/2017: Mild anemia with history of macrocytosis. COMPREHENSIVE DIAGNOSIS. Review of peripheral blood smear: Very mild anemia with red blood cells showing fairly normal morphology at the time of this peripheral blood specimen. Normal white blood cell and platelet counts and morphology. No abnormal populations detected on flow cytometric studies. See comment. COMPREHENSIVE COMMENT. This patient's recent peripheral blood specimen demonstrating a mild macrocytosis with an MCV of 100.6 is noted. At the time of this current peripheral blood specimen, his MCV is within normal limits at 94.3. He has a minimal anemia. White blood cell and platelet counts are both within normal limits with normal morphology. Causes of these peripheral blood findings and macrocytosis could be liver disease, thyroid disease, vitamin/nutritional deficiencies and drugs/toxins. Correlation recommended.  -6/14/23- CT chest- New 1.5 cm central LEFT upper lobe pulmonary nodule. This is concerning for recurrent neoplasm. Management options include PET/CT versus sampling with EBUS.  -6/21/23- PET-CT- PET-CT findings compatible with recurrent left lung neoplasm an isolated finding (15 mm left lung nodule adjacent to the hilum shows FDG uptake with  SUV max = 3.3).  No distant disease is seen.      PREVIOUS INTERVENTIONS:   Left thoracoscopic core needle biopsy, followed by video-assisted thoracoscopic surgery (VATS) left lower lobectomy, and mediastinal lymph node dissection, 11/28/2011: Pathology from the left lower lung nodule lung biopsy showed no histologic evidence of malignancy. Well differentiated papillary adenocarcinoma (1.5 cm in greatest diminish. All resection margins are free of malignancy. No visceral pleural invasion identified. Three peribronchial lymph nodes negative for metastatic carcinoma. Emphysematous changes. Level 10 (1 lymph node), Level 11 (1 lymph node), Level 5 (2 lymph nodes) all negative for metastatic carcinoma. 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 Special stains are pending.  Venofer 200 mg IV daily, 11/30/2011 through 12/03/2011 (800 mg); then 02/17/2014 through 02/24/2014 (800 mg).  B12 at 1000 mcg IM beginning 11/30/2011 through 12/06/2011 (1000 mg).  Foltx p.o. daily beginning 02/12/2014 through 01/14/2015.  Injectafer 750 mg, 12/01/2017; 11/01/2021  -SBRT: 8/1/23- 8/14/23- 6000 cGy/5 fx) to NISA nodule    Problem List Items Addressed This Visit          Other    Primary adenocarcinoma of lower lobe of left lung - Primary         Oncology/Hematology History   Primary adenocarcinoma of lower lobe of left lung   10/20/2011 Imaging    CT Chest:  1.5 cm left lower lobe nodule positioned just lateral to the hilum, granuloma vs. Malignancy  No pathologic lymphadenopathy     10/26/2011 Imaging    PET/CT:  Increased metabolic activity in the left lower lobe, lung nodule located centrally near the left hilum witih max SUV 7.4, suspicious for primary carcinoma. This corresponds to the lesion seen on CT scan of 10/20/2011.   No abnormal mediastinal, hilar, or inguinal activity     11/14/2011 Biopsy    Final Diagnosis   1.     Biopsy, left lower lobe nodule, lung:              A.     Fragments of bronchial mucosa with  submucosa demonstrating   stromal elastosis and minimal chronic inflammation.        B.     No evidence of granulomatous inflammation.        C.     No histologic evidence of malignancy.      2.     Bronchial washings, smears (four) and cell block:         A.     Mild acute inflammation.        B.     Negative for malignant cells.      3.     Bronchial brushings, left lower lobe of lung, smears (3) and ThinPrep   preparation (1):         Negative for malignant cells.        11/28/2011 Surgery    Final Diagnosis        1.     Biopsy, left lower lobe of lung (frozen section control):              A.     Fragment of fibrotic pulmonary parenchyma demonstrating stromal   elastosis and chronic active inflammation, moderate.        B.     No histologic evidence of malignancy.      2.     Level 10 lymph node (frozen section control):         Lymph node (one), negative for metastatic carcinoma.      3.     Left lower lobe of lung (frozen section control):         A.     Well-differentiated papillary adenocarcinoma (1.5 cm in greatest   dimension).        B.     The dominant and aberrant bronchial margins of surgical resection   are negative for malignancy.         C.     The vascular and parenchymal margins of surgical resection are   negative for malignancy.         D.     No visceral pleural invasion identified.         E.     Peribronchial lymph nodes (three), negative for metastatic   carcinoma.         F.     Emphysematous changes.      4.     Level 11 lymph node:   Lymph node (one), negative for metastatic   carcinoma.      5.     Level 5 lymph nodes:    Lymph nodes (two), negative for metastatic   carcinoma.      Comment:     Primary papillary adenocarcinomas of the lung do occur; however, they demonstrate a similar histology to that seen in papillary thyroid carcinoma.  The thyroid gland should be evaluated if not already   done to exclude the possibility of met        4/30/2014 Imaging    CXR:  SUMMARY:  1. Stable  chronic change.  2. No acute disease.     9/3/2014 Imaging    CXR:  IMPRESSION-   1. Emphysematous and postoperative changes in the chest without evidence  of mass lesion or acute cardiopulmonary process.     1/5/2016 Imaging    CXR:  IMPRESSION   Postoperative changes of the left hemithorax with associated volume  loss. No acute process identified.     7/1/2016 Imaging    CXR:  IMPRESSION-   1. No active disease is seen.  2. COPD/emphysema.  3. Prior lung surgery on the left.      1/4/2017 Imaging    CXR:  IMPRESSION:  1. COPD/emphysema.  2. Postoperative changes on the left.  3. No acute appearing infiltrate or effusion.      3/20/2017 Imaging    CXR:  IMPRESSION:  1. Chronic changes in the lungs and spine without evidence of acute  cardiopulmonary process. Overall, the appearance of the lungs is similar  to the study from 01/04/2017.     11/20/2017 Imaging    CXR:  Impression:  No significant interval change when compared to chest x-ray dated  07/03/2017.     2/12/2018 Imaging    CXR:  IMPRESSION:  1. Hyperinflated lungs suggesting COPD.  2. Postoperative changes of the left hemithorax.  3. No acute cardiopulmonary process identified.     7/6/2018 Imaging    CXR:  Impression:  No acute cardiopulmonary disease.     12/21/2018 Imaging    CXR:  IMPRESSION:  COPD. Chronic volume loss in the lung bases with mild  central vascular crowding. No acute infiltrates.     8/23/2019 Imaging    CXR:  IMPRESSION:  1. No acute disease.     6/19/2020 Imaging    CXR:  IMPRESSION:  1. Mild blunting of the left costophrenic angle, which could be seen  with scarring or small effusion.     2/22/2021 Imaging    CXR:  IMPRESSION:  1. COPD no acute cardiopulmonary process.     10/25/2021 Imaging    CXR:  IMPRESSION:  1. Streaky bibasilar opacities, similar to prior.     4/25/2022 Imaging    CT Chest:  IMPRESSION:  1. Status post resection of a left lower lobe pulmonary nodule. Moderate  changes of centrilobular emphysema are present.  2.  Small groundglass nodules one within the left upper lobe and one  within the periphery of the right lower lobe warranting follow-up per  Fleischner criteria. No additional lung lesions are present.  3. No enlarged mediastinal or axillary lymphadenopathy..  4. 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 The  thoracic aorta is ectatic. There is mild enlargement of the pulmonary  arteries suggesting pulmonary arterial hypertension. Heavy coronary  calcifications are present.         7/20/2022 Imaging    CT Chest:  IMPRESSION:  1.  Postoperative change of LEFT lower lobectomy without evidence of  recurrent or metastatic disease.   2.  No change in 7 mm LEFT upper lobe and RIGHT lower lobe groundglass  pulmonary nodules. Consider continued imaging surveillance to document  stability.  3.  Ectatic ascending aorta measuring 4 cm diameter.     12/21/2022 Imaging    CXR:  IMPRESSION:  1. Stable chronic change.  2. No acute disease.     1/27/2023 Imaging    CT Chest:  IMPRESSION:  1. A stable CT scan of the chest. Postsurgical changes/left lower  lobectomy. No finding to suggest recurrence.  2. A stable small groundglass opacity/nodule in the left upper lobe. No  features of malignancy at this time. A follow-up examination in one year  is recommended to ensure stability.  3. Chronic emphysematous lung changes.     6/14/2023 Imaging    CT Chest:  IMPRESSION:  New 1.5 cm central LEFT upper lobe pulmonary nodule. This is concerning  for recurrent neoplasm. Management options include PET/CT versus  sampling with EBUS.     6/21/2023 Imaging    PET/CT:  Summary:  1. PET-CT findings compatible with recurrent left lung neoplasm as an  isolated finding. No distant disease is seen.     6/22/2023 Procedure    Documentation per Dr. Arteaga:  Patient informed per Dr Arteaga recommendations a referral should be made to Rad/Onc for evaluation and possible SBRT  Patient informed once the apt is domingo he will be  called with time and date            PAST MEDICAL HISTORY:  ALLERGIES:  Allergies   Allergen Reactions    Penicillins Unknown - High Severity     Unknown       CURRENT MEDICATIONS:  Outpatient Encounter Medications as of 6/13/2024   Medication Sig Dispense Refill    alfuzosin (UROXATRAL) 10 MG 24 hr tablet Take 2 tablets by mouth.      buPROPion XL (WELLBUTRIN XL) 150 MG 24 hr tablet Take 1 tablet by mouth Every Morning.      Calcium Citrate-Vitamin D3 (CITRACAL) 315-6.25 MG-MCG tablet tablet Take  by mouth Daily.      losartan (COZAAR) 25 MG tablet 1 tablet 2 (Two) Times a Day.      lovastatin (MEVACOR) 40 MG tablet Take 1 tablet by mouth.      Multiple Vitamins-Minerals (PRESERVISION AREDS 2+MULTI VIT) capsule Take  by mouth.      pantoprazole (PROTONIX) 40 MG EC tablet 1 tablet Daily.      predniSONE (DELTASONE) 10 MG tablet 1 tablet Daily. 1.5 tab daily      pyridostigmine (MESTINON) 60 MG tablet Take 3 tablets by mouth Daily.      [DISCONTINUED] fluticasone (FLONASE) 50 MCG/ACT nasal spray 1 spray 2 (Two) Times a Day. (Patient not taking: Reported on 6/13/2024)      [DISCONTINUED] folic acid-vit B6-vit B12 (Folbee) 2.5-25-1 MG tablet tablet Take 1 tablet by mouth Daily. (Patient not taking: Reported on 6/13/2024) 30 tablet 0    [DISCONTINUED] LORazepam (Ativan) 1 MG tablet Take 30 minute before PET scan (Patient not taking: Reported on 6/13/2024) 1 tablet 0     No facility-administered encounter medications on file as of 6/13/2024.     ADULT ILLNESSES:   Lung cancer ( Stage Date: 11/28/2011, Stage IA (T1a, N0, M0)-Pathological  Date of Dx:2011 ; ICD-10:C34.32 ;Malignant neoplasm of lower lobe, left bronchus or lung )   Acute bronchitis ( ICD-10:J20.9 ;Acute bronchitis, unspecified   Cataracts ( ICD-10:H26.9 ;Unspecified cataract   Colonic polyps ( hyperplastic, 02/2010; ICD-10:K63.5 ;Polyp of colon )   Constipation ( ICD-10:K59.00 ;Constipation, unspecified   Former smoker ( ICD-10:Z87.891 ;Personal history of  nicotine dependence   High carcinoembryonic antigen level ( ICD-10:R97.0 ;Elevated carcinoembryonic antigen [CEA]   Hyperlipidemia ( ICD-10:E78.5 ;Hyperlipidemia, unspecified   Hypertension ( ICD-10:I10 ;Essential (primary) hypertension   Hyperthyroidism ( ICD-10:E05.90 ;Thyrotoxicosis, unspecified without thyrotoxic crisis or storm   Iron deficiency anemia ( ICD-10:D50.9 ;Iron deficiency anemia, unspecified   Leukocytosis ( ICD-10:D72.829 ;Elevated white blood cell count, unspecified   Macrocytosis ( ICD-10:D75.89 ;Other specified diseases of blood and blood-forming organs   Malabsorption syndrome (disorder)   Peptic ulcers ( ICD-10:K27.7 ;Chronic peptic ulcer, site unspecified, without hemorrhage or perforation   Retention of urine ( postoperative following eye surgery, 10/2011; ICD-10:R33.9 ;Retention of urine, unspecified )   Strabismus (eye condition) ( ICD-10:H50.9 ;Unspecified strabismus   Vitamin B12 deficiency ( ICD-10:E53.8 ;Deficiency of other specified B group vitamins  Right leg edema. Venous Doppler of right lower extremity on 11/28/2017 ([facility]). Impression: There is no evidence of deep venous thrombosis or superficial thrombophlebitis of the right lower extremity.      SURGERIES:   Excision of non-melanoma skin cancer, 01/2012. Dr. Medrano   Video-assisted thoracoscopic (VATS) left lower lobectomy, 11/28/2011. Dr. Troy   Partial gastrectomy and vagotomy   Total knee replacement, right, 05/18/2017. Dr. Jarrett   Cataract surgery, 2004   Incision and drainage of abscess, left groin, (I&D) in early 01/2014. Dr. Velarde   Strabismus surgery, left eye, 2011.   Colonoscopy on 4/10/18 ([facility]). Impressions: Preparation of the colon was inadequate. One 12 mm polyp in the cecum, removed with a hot snare and removed piecemeal using a hot snare. Resected and retrieved. Diverticulosis in the sigmoid colon and in the descending colon    ADULT ILLNESSES:  Patient Active Problem List    Diagnosis Code    Hx of colonic polyps Z86.010    Obesity, unspecified obesity severity, unspecified obesity type E66.9    History of colon polyps Z86.010    Primary adenocarcinoma of lower lobe of left lung C34.32    Iron deficiency anemia D50.9    B12 deficiency E53.8    Essential hypertension I10    Pulmonary emphysema J43.9    Slow transit constipation K59.01    Urinary retention R33.9    Former smoker Z87.891    History of radiation therapy Z92.3     SURGERIES:  Past Surgical History:   Procedure Laterality Date    CATARACT EXTRACTION      COLONOSCOPY  12/12/2014    Diverticulosis repeat exam in 3 years    COLONOSCOPY N/A 04/10/2018    Tubular adenoma cecum poor prep repeat in 3 months    COLONOSCOPY N/A 08/02/2018    2 Tubular adenomas transverse and ascending colon fair prep repeat in 3 years    COLONOSCOPY N/A 03/24/2022    Procedure: COLONOSCOPY WITH ANESTHESIA;  Surgeon: Ferdinand Cross MD;  Location: [facility] ENDOSCOPY;  Service: Gastroenterology;  Laterality: N/A;  pre hx colon polyps  post diverticulosis; inadequate prep  Efrain Dobson, DO    COLONOSCOPY W/ POLYPECTOMY  02/15/2010    2 Hyperplastic polyps at 25 cm and rectum repeat exam in 5 years    LUNG CANCER SURGERY      LUNG REMOVAL, PARTIAL      REPLACEMENT TOTAL KNEE Right 05/2017     HEALTH MAINTENANCE ITEMS:  Health Maintenance Due   Topic Date Due    TDAP/TD VACCINES (1 - Tdap) Never done    RSV Vaccine - Adults (1 - 1-dose 60+ series) Never done    ZOSTER VACCINE (1 of 2) 04/06/2013    COVID-19 Vaccine (6 - 2023-24 season) 02/05/2024       <no information>  Last Completed Colonoscopy       This patient has no relevant Health Maintenance data.          Immunization History   Administered Date(s) Administered    COVID-19 (MODERNA) 1st,2nd,3rd Dose Monovalent 03/05/2021, 04/02/2021, 06/07/2021    COVID-19 (PFIZER) Purple Cap Monovalent 12/14/2021    COVID-19 F23 (PFIZER) 12YRS+ (COMIRNATY) 12/11/2023    Flu Vaccine Split Quad 10/11/2018     "INFLUENZA SPLIT TRI 10/13/2014    Influenza Injectable Mdck Pf Quad 10/14/2021, 10/13/2022    Influenza TIV (IM) 2019, 10/28/2020    Influenza, Unspecified 10/14/2021, 10/13/2022    Pneumococcal Conjugate 13-Valent (PCV13) 2018    Pneumococcal Polysaccharide (PPSV23) 2011, 10/21/2013    Zostavax 2013     Last Completed Mammogram       This patient has no relevant Health Maintenance data.              FAMILY HISTORY:  Family History   Problem Relation Age of Onset    Colon polyps Mother     Stroke Mother     Diabetes Mother     Hypertension Mother     Cancer Father         asbestos    Stroke Maternal Grandmother     No Known Problems Maternal Grandfather     No Known Problems Paternal Grandmother     No Known Problems Paternal Grandfather     Diabetes Child     Colon cancer Neg Hx      SOCIAL HISTORY:  Social History     Socioeconomic History    Marital status:    Tobacco Use    Smoking status: Former     Current packs/day: 0.00     Average packs/day: 2.0 packs/day for 30.0 years (60.0 ttl pk-yrs)     Types: Cigarettes     Start date: 1955     Quit date: 1984     Years since quittin.4     Passive exposure: Past    Smokeless tobacco: Never   Vaping Use    Vaping status: Never Used   Substance and Sexual Activity    Alcohol use: No    Drug use: No    Sexual activity: Not Currently     Partners: Female       REVIEW OF SYSTEMS:  Constitutional:   The patient's appetite is good. \"Just eating healthier.\" His energy remains chronically low to fair. He says has been able to walk better since the knee surgery (right knee arthroplasty, 2017) and has not needed a cane.  Again says, \"I haven't used it since last 2023.\" Says the lower back pains and breathlessness slow him down.  \"Though much better since losing weight.\" He manages his personal ADLs, some chores, running errands and driving. He has lost 1 lb (no weight changes at his prior visit) in the interval.  He lives with " "his daughter and her spouse. He denies fevers, chills, or drenching night sweats. His sleep habits had been appropriate.  Covid vaccines x 2, 04/2021 and booster last 12/2021  Ear/Nose/Throat/Mouth:   He reports some sinus congestion and postnasal drainage but no ear pains or sore throat.  He has not had nosebleeds. No sore tongue. He has no headaches. He denies any hoarseness, change in voice quality.  Ocular:   He reports recent bouts of diplopia, no eye pain, significant change in visual acuity, or blurry vision.  Respiratory:   He has baseline exertional dyspnea but says he is less short of breath with his routine activities. Has intermittent cough from postnasal drainage.  He denies shortness of breath at rest. He has postnasal drainage associated cough.   Cardiovascular:   He reports no exertional chest pain, chest pressure, or chest heaviness. He reports no claudication. He reports no palpitations or symptomatic orthostasis.  Gastrointestinal:   He reports no dysphagia, nausea, vomiting, postprandial abdominal pain, bloating, cramping, change in bowel habits, with no dark stools (oral iron intolerant). He reports no rectal bleeding. He reports improved constipation on \"the right diet.\"  Infrequent use of stool softeners (Dulcolax) without laxatives. He has no diarrhea. Repeat c-scope 03/24/22.  Diverticulosis.  Repeat prn. He says that he has elected not to do anymore. \"Not at my age anymore.\"  Genitourinary:   He denies prior problems with urinary burning, frequency, dribbling, or discoloration. He denies difficulty controlling his bladder.   Musculoskeletal:   He has chronic arthralgias.  The right knee feels more stable since surgery.  Has not needed a cane to walk unless he goes long distances.  He has only occasional \"aches\" of the hips and his shoulders. He denies myalgias or nighttime leg cramping.  Extremities:   He normally has no fluid retention though has lingering mild right = left ankle/leg " "swelling.   Endocrine:   He reports no problems with excess thirst, excessive urination, vasomotor instability, or unexplained fatigue.  Heme/Lymphatic:   He reports easy bruising but no unexplained bleeding, petechial rashes, or swollen glands.  Neuro:   He reports no loss of consciousness, seizures, fainting spells, or dizziness. He reports no weakness of his face, arms, or legs. He has no difficulty with speech. He has no tremors. He reports occasional left = right foot tingling.   Psych:   He seems generally satisfied with life. He denies depression/anxiety. He reports no mood swings.       VITAL SIGNS: /76   Pulse 91   Temp 97.4 °F (36.3 °C) (Temporal)   Resp 18   Ht 182.9 cm (72\")   Wt 91.6 kg (202 lb)   SpO2 94%   BMI 27.40 kg/m² Body surface area is 2.14 meters squared.  Pain Score    06/13/24 1037   PainSc:   1   PainLoc: Back             PHYSICAL EXAMINATION:   General:   He is a pleasant, obese, and modestly-kept elderly male who appears comfortable while seated. He appears to be his stated age. His skin color is normal. He is ambulatory without a cane.  ECOG PS = 1-2 (\"same 50-50\")  Head/Neck:   The patient is anicteric and atraumatic. The trachea is midline. The neck is supple without evidence of jugular venous distention or cervical adenopathy.  Eyes:   The pupils are equal, round, and reactive to light. The extraocular movements are full. There is no scleral jaundice or erythema.  Chest:   Breath sounds are diminished, left > right. There are no wheezes, rhonchi, or rales. The right thoracotomy wounds are healed.  Cardiovascular:   The patient has a regular cardiac rate and rhythm without murmurs, rubs, or gallops. The peripheral pulses are equal and full.  Abdomen:   The belly is soft and globose. There is no rebound, guarding, organomegaly, mass-effect, or tenderness. Bowel sounds are present.  Extremities:   There is no evidence of cyanosis or clubbing. There is no ankle/ leg edema but " no calf tenderness.   Rheumatologic:   There is some evidence of osteoarthritic deformities of the hands. The gait is slow and stooped but is independent and no longer cane supported.  Cutaneous:   There are no overt rashes, disseminated lesions, purpura, or petechiae.  Lymphatics:   There is no evidence of adenopathy in the cervical, supraclavicular, or axillary areas.  Neurologic:   The patient is alert, oriented, cooperative, and pleasant. He is appropriately conversant. There is no overt impairment with no overt focal motor deficits.   Psych:   Mood and affect are appropriate for circumstance. Eye contact is appropriate.        LABS    Lab Results - Last 18 Months   Lab Units 06/06/24  1036 03/07/24  1046 12/21/23  1041 12/05/23  0912 10/10/23  0915 09/12/23  1249 08/21/23  1049 08/15/23  1023 04/26/23  1126 01/08/23  1035   HEMOGLOBIN g/dL 12.8* 11.4* 12.0* 12.4* 12.7* 11.3* 12.9*   < > 12.6* 9.7*   HEMATOCRIT % 40.2 37.0* 38.9 38.5* 41.5* 35.1* 42.3   < > 39.9 30.2*   MCV fL 97.1* 94.1 100.8* 100.8* 102.2* 98.9* 99.8*   < > 101.8* 101.0*   WBC 10*3/mm3 8.77 9.22 6.26 6.3 7.7 4.7* 6.27   < > 7.25 9.37   RDW % 15.6* 13.6 13.8 13.9 14.0 13.9 14.0   < > 13.0 14.7   MPV fL 8.7 8.9 9.1 8.9* 9.4 9.2* 9.0   < > 9.3 9.0   PLATELETS 10*3/mm3 217 252 242 238 204 163 174   < > 247 241   IMM GRAN % % 0.5 0.4 0.8*  --   --   --  0.6*  --  0.4 1.3*   NEUTROS ABS 10*3/mm3 7.25* 7.57* 4.86 5.0 6.2 4.1 5.17   < > 5.66 8.45*   LYMPHS ABS 10*3/mm3 0.78 0.97 0.81 0.7* 0.9* 0.3* 0.69*   < > 1.04 0.43*   MONOS ABS 10*3/mm3 0.65 0.60 0.51 0.50 0.40 0.30 0.34   < > 0.48 0.34   EOS ABS 10*3/mm3 0.03 0.02 0.01 0.10 0.10 0.00 0.01   < > 0.02 0.02   BASOS ABS 10*3/mm3 0.02 0.02 0.02 0.00 0.00 0.00 0.02   < > 0.02 0.01   IMMATURE GRANS (ABS) 10*3/mm3 0.04 0.04 0.05 0.1 0.1 0.0 0.04   < > 0.03 0.12*   NRBC /100 WBC 0.0 0.0 0.0  --   --   --  0.0  --  0.0 0.0    < > = values in this interval not displayed.       Lab Results - Last 18 Months    Lab Units 06/06/24  1036 05/06/24  1453 03/07/24  1046 02/01/24  1303 12/21/23  1041 11/08/23  1019 08/21/23  1049 06/14/23  1417 04/26/23  1126 01/27/23  1337 01/08/23  1035   GLUCOSE mg/dL 83  --  95  --  73  --  130*  --  105*  --  113*   SODIUM mmol/L 134*  --  137  --  138  --  138  --  138  --  135*   POTASSIUM mmol/L 4.2  --  4.6  --  4.4  --  4.1  --  4.3  --  3.9   CO2 mmol/L 27.0  --  30.0*  --  32.0*  --  29.0  --  27.0  --  26.0   CHLORIDE mmol/L 99  --  99  --  101  --  101  --  103  --  100   ANION GAP mmol/L 8.0  --  8.0  --  5.0  --  8.0  --  8.0  --  9.0   CREATININE mg/dL 0.76 0.90 1.01 1.00 0.85 0.90 0.81   < > 0.89   < > 0.81   BUN mg/dL 9  --  11  --  12  --  11  --  10  --  14   BUN / CREAT RATIO  11.8  --  10.9  --  14.1  --  13.6  --  11.2  --  17.3   CALCIUM mg/dL 9.0  --  8.9  --  8.3*  --  9.1  --  8.8  --  7.3*   ALK PHOS U/L 200*  --  73  --  79  --  72  --  73  --  62   TOTAL PROTEIN g/dL 6.5  --  6.6  --  7.2  --  7.0  --  6.2  --  4.3*   ALT (SGPT) U/L 24  --  15  --  19  --  26  --  23  --  47*   AST (SGOT) U/L 20  --  18  --  23  --  24  --  18  --  22   BILIRUBIN mg/dL 0.4  --  0.4  --  0.3  --  0.3  --  0.4  --  0.8   ALBUMIN g/dL 4.2  --  4.1  --  4.1  --  4.0  --  4.3  --  3.4*   GLOBULIN gm/dL 2.3  --  2.5  --  3.1  --  3.0  --  1.9  --  0.9    < > = values in this interval not displayed.       Lab Results - Last 18 Months   Lab Units 06/06/24  1036 03/07/24  1046 12/21/23  1041 08/21/23  1049 04/26/23  1126   CEA ng/mL 4.20 4.29 4.05 4.15 3.79       Lab Results - Last 18 Months   Lab Units 06/06/24  1036 03/07/24  1046 12/21/23  1041 08/21/23  1049 04/26/23  1126 12/24/22  0450   IRON mcg/dL 98 50* 75 100 95  --    TIBC mcg/dL 304 405 347 335 305  --    IRON SATURATION (TSAT) % 32 12* 22 30 31  --    FERRITIN ng/mL 201.60 25.52* 42.61 47.78 68.42 270.9   FOLATE ng/mL 12.20 16.90 14.80 13.10 18.80  --        PROBLEMS IDENTIFIED:   1.  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   Tumor burden: 1.5 cm left lower lung nodule.   Complications of tumor: None.   Tumor status: JAZMÍN following resection via left lower lobectomy.   Chest x-ray, 07/03/2017, [facility]. Impression: Stable appearance of the chest with postoperative changes in the left lung base and emphysematous changes.   Chest x-ray obtained 11/20/2017 at [facility] reveals no interval change when compared to chest x-ray dated 07/03/2017.   Chest x-ray on 02/12/2018 ([facility]). Impression: Hyperinflated lungs suggesting chronic obstructive pulmonary disease (COPD). Postoperative changes of the left hemithorax. No acute cardiopulmonary process identified.   Chest Xray on 07/06/2018 ([facility]). Impression: No acute cardiopulmonary disease.   Chest X-ray, 12/21/2018 at [facility]. COPD. Chronic volume loss in the lung bases with mild central vascular crowding. No acute infiltrates.   06/19/2020-chest x-ray.  Comparison: 8/23/2019-impression: Mild blunting of the left costophrenic angle which could be seen with scarring or small effusion.  02/22/2021- chest xray.  COPD no acute cardiopulmonary process  10/25/2021-chest x-ray.  Streaky bibasilar opacities.  Similar to prior.  04/25/22- CT chest- Status post resection of a left lower lobe pulmonary nodule. Moderate changes of centrilobular emphysema are present. Small groundglass nodules one within the left upper lobe and one within the periphery of the right lower lobe warranting follow-up per Fleischner criteria. No additional lung lesions are present. No enlarged mediastinal or axillary lymphadenopathy.  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 The thoracic aorta is ectatic. There is mild enlargement of the pulmonary arteries suggesting pulmonary arterial hypertension. Heavy coronary calcifications are present.   07/20/22-CT chest-  postoperative change of LLL without evidence of recurrent or metastatic disease.  No change in 7 mm NISA and RLL groundglass pulmonary nodules.  Consider continued imaging surveillance to document stability.  Ectatic ascending aorta measuring 4 cm in diameter.  1/27/23- CT chest- A stable CT scan of the chest. Postsurgical changes/left lower lobectomy. No finding to suggest recurrence. A stable small groundglass opacity/nodule in the left upper lobe. No features of malignancy at this time. A follow-up examination in one year is recommended to ensure stability. Chronic emphysematous lung changes  6/14/23- CT chest- New 1.5 cm central LEFT upper lobe pulmonary nodule. This is concerning for recurrent neoplasm. Management options include PET/CT versus sampling with EBUS.  6/21/23- PET-CT- PET-CT findings compatible with recurrent left lung neoplasm an isolated finding (15 mm left lung nodule adjacent to the hilum shows FDG uptake with SUV max = 3.3).  No distant disease is seen.  SBRT: 8/1/23- 8/14/23- 6000 cGy/5 fx) to NISA nodule  11/8/23- CT chest- Stable 1.5 cm left perihilar central upper lobe nodule, relatively unchanged from 6/14/2023. No new or increasing size nodule identified. No pathologic lymphadenopathy. Stable volume loss left hemithorax from a  prior left lower lobe lobectomy.  11/15/23- Follow-up Dr. Samuels- Diagnosed with stage IA2 (cT1b cN0 cM0) neoplasm of left upper lung. He completed 5000 cGy in 5 fractions to the NISA lung tumor on 08/14/2023.  CT chest, 11/8/23 reviewed.  I do not see evidence for recurrent or metastatic disease at this time. We will continue routine follow-up/surveillance as discussed in 3 months with follow up CT scan before visit. Remission!  2/1/24- CT chest- Stable chest CT appearance. No new or progressive neoplastic disease.  2/5/24- Follow-up Dr. Samuels-, I do not see evidence for recurrent or metastatic disease at this time. We will continue routine follow-up/surveillance  "as discussed in 3 months with follow up CT scan before visit.   5/6/24- CT chest- Stable CT chest without new or progressive disease.     2.  Mildly abnormal CEA.    --Stable. 4.20, 6/6/24 (prior range: 2.2 - 5.0).     3.  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 Underwent right knee replacement, 05/18/2017.   --5/16/24- xray lumbar spine- Osteopenia. Extensive degenerative disc, endplate, and facet disease. Mild chronic appearing compression deformities of L1 and L4.    4.  Normocytic/macrocytic anemia with resolution of macrocytosis, repleted iron and B12 deficiencies (anemia of chronic disease).   --Stable, Hgb 12.8; MCV 97.1, 6/6/24 (prior range: Hgb 11.4 - 14.3; 94.4 - 102.1)  a. No myelodysplastic syndrome (MDS) on comprehensive blood report, normal thyroid function tests (TFTs), normal LDH, normal B12/folate.   b. Colonoscopy on 4/10/18 ([facility]). Impressions: Preparation of the colon was inadequate. One 12 mm polyp in the cecum, removed with a hot snare and removed piecemeal using a hot snare. Resected and retrieved. Diverticulosis in the sigmoid colon and in the descending colon.   c. Repeat with 2 day prep scheduled for 08/02/2018. \"Polyps\" Repeat 3 years.  d. Colonoscopy, 08/02/2018 (above).  Fair colon preparation.  1 5 mm polyp in the ascending colon, removed with hot snare.  One 12 mm polyp in the transverse colon, removed with a hot snare.  e.  Injectafer 750 mg, 11/01/2022; 3/14/24; 3/21/24  5.  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 Negative thyroid ultrasound, 11/30/2011. Is followed by Dr. Medrano (ENT) and Dr. Roth (endocrine).   6.  Low B12 levels. Recurrent.   7.  Constipation, multifactorial.  Regulated by stool softeners.  \"Occasionally.\"  8.  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   9.  Chronic intermittent irregular heart rate, Dr. Lorch following.   10. A 50 pack-year " "tobacco smoker with radiographic evidence of emphysema. Has not smoked since 2004.         11. Anxiety.          12.  Myasthenia gravis.  Followed by Dr. Hines        13.  COPD        14.  Weight loss.  Has stabilized.  Reassures me it is intentional.  \"Eating less and smarter.\"         15.  Elevated alk phos, 200 on 6/6/2024. Osteopenia?      RECOMMENDATIONS:   1.   Apprise of labs from 6/6/24 including the current heme (stable anemia otherwise normal CBC) and the other labs with CEA 4.2 (prior:  2.2 - 5), alk phos 200, sodium 134, otherwise normal CMP, repleted iron (98), repleted (32%- prior: 12%; 22%; 30%; 31%; 35%; 37%; 18%) Fe sat, depressed ferritin (201 -  prior: 25; 42; 47; 68; 71; 56; 17; 45; 87; 58) repleted (306; 350; 279; 366; 290; 388; >1000) B12/folate.     2.   Apprised of lumbar xray, 5/16/24 (above).  Osteopenia. Extensive degenerative disc, endplate, and facet disease. Mild chronic appearing compression deformities of L1 and L4.  3.   Review visit with radiation oncology ([facility] with Dr. 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 He completed 5000 cGy in 5 fractions to the NISA lung tumor on 08/14/2023.  CT chest, 5/6/24 reviewed.  I do not see evidence for recurrent or metastatic disease at this time. We will continue routine follow-up/surveillance as discussed in 3 months (8/9/24) with follow up CT scan before visit.   4.   Apprised of CT chest, 5/6/24 (above).   JAZMÍN  5.   Previously discussed that even without adjuvant chemotherapy 70.8% of people are alive in 5 years and none of those are alive due to therapy. Approximately 28% of those die of cancer and 1.3% die from other causes. Emphasized that very little data are available about the effects of adjuvant chemotherapy in Stage I lung cancer and most guidelines do not recommend the use of adjuvant therapy in this population and a meta-analysis even suggested that there was a trend toward " a worse outcome in these patients. He preferred not to have chemo anyway.       6.  Continue ongoing management per primary care physician and other specialists. Has appointment in 8/9/24 with rad onc along with CT prior to visit  7.  Return to the office with pre-office CEA, serum iron, Fe sat, ferritin, B12/folate, CMP, and CBC with differential in 12 weeks.      I spent ~ 40 minutes caring for Carmine on this date of service. This time includes time spent by me in the following activities: preparing for the visit, reviewing tests, performing a medically appropriate examination and/or evaluation, counseling and educating the patient/family/caregiver, ordering medications, tests, or procedures and documenting information in the medical record        cc: DO Kieran Pennington MD Nicholas Lopez, MD

## 2024-06-04 NOTE — PROGRESS NOTES
REVIEW OF SYSTEMS    Constitutional: []Fever []Sweat []Chills [] Recent Injury [x] Denies all unless marked  HEENT:[]Headache  [] Head Injury/Hearing Loss  [] Sore Throat  [] Ear Ache/Dizziness  [x] Denies all unless marked  Spine:  [] Neck pain  [] Back pain  [] Sciaticia  [x] Denies all unless marked  Cardiovascular:[]Heart Disease []Chest Pain [] Palpitations  [x] Denies all unless marked  Pulmonary: []Shortness of Breath []Cough   [x] Denies all unless marke  Gastrointestinal: []Nausea  []Vomiting  []Abdominal Pain  []Constipation  []Diarrhea  []Dark Bloody Stools  [x] Denies all unless marked  Psychiatric/Behavioral:[] Depression [] Anxiety [x] Denies all unless marked  Genitourinary:   [] Frequency  [] Urgency  [] Incontinence [] Pain with Urination  [x] Denies all unless marked  Extremities: []Pain  []Swelling  [x] Denies all unless marked  Musculoskeletal: [] Muscle Pain  [] Joint Pain  [] Arthritis [] Muscle Cramps [] Muscle Twitches  [x] Denies all unless marked  Sleep: [] Insomnia [] Snoring [] Restless Legs [] Sleep Apnea  [] Daytime Sleepiness  [x] Denies all unless marked  Skin:[] Rash [] Skin Discoloration [x] Denies all unless marked   Neurological: [x]Visual Disturbance/Memory Loss [] Loss of Balance [] Slurred Speech/Weakness [] Seizures  [] Vertigo/Dizziness [x] Denies all unless marked
[de-identified] : A discussion regarding available pain management treatment options occurred with the patient.  These included interventional, rehabilitative, pharmacological, and alternative modalities. We will proceed with the followin. Opted to perform a left GTB injection in office today. Patient has decrease range of motion and pain in the greater trochanteric bursa. Will perform a left greater trochanteric bursa injection with fluoroscopy guidance to better visualize the joint. If ongoing relief of greater than 30% for 4 months can be repeated in 4-6 months VS PRP or PDA or Stem Cells. Risks with the procedure such as bleeding and infection was discussed in detail.  Medication based treatment options: - ordered Lyrica 50mg BID for a duration of 4 weeks.  - patient is aware to take for 2 weeks straight than take 1 week off before repeating.  -f/u in 4 weeks for reassessment.    I, Frankie Gil, attest that this documentation has been prepared under the direction and in the presence of Provider Jose Cardoso, DO The documentation recorded by the scribe, in my presence, accurately reflects the service I personally performed, and the decisions made by me with my edits as appropriate.   Best Regards, Jose Cardoso D.O.
from there. COPD stable. COPD stable  Plan    Return in about 4 weeks (around 8/7/2023).     (Please note that portions of this note were completed with a voice recognition program. Efforts were made to edit the dictations but occasionally words are mis-transcribed.)

## 2024-06-06 ENCOUNTER — LAB (OUTPATIENT)
Dept: LAB | Facility: HOSPITAL | Age: 84
End: 2024-06-06
Payer: MEDICARE

## 2024-06-06 DIAGNOSIS — C34.32 PRIMARY ADENOCARCINOMA OF LOWER LOBE OF LEFT LUNG: ICD-10-CM

## 2024-06-06 LAB
ALBUMIN SERPL-MCNC: 4.2 G/DL (ref 3.5–5.2)
ALBUMIN/GLOB SERPL: 1.8 G/DL
ALP SERPL-CCNC: 200 U/L (ref 39–117)
ALT SERPL W P-5'-P-CCNC: 24 U/L (ref 1–41)
ANION GAP SERPL CALCULATED.3IONS-SCNC: 8 MMOL/L (ref 5–15)
AST SERPL-CCNC: 20 U/L (ref 1–40)
BASOPHILS # BLD AUTO: 0.02 10*3/MM3 (ref 0–0.2)
BASOPHILS NFR BLD AUTO: 0.2 % (ref 0–1.5)
BILIRUB SERPL-MCNC: 0.4 MG/DL (ref 0–1.2)
BUN SERPL-MCNC: 9 MG/DL (ref 8–23)
BUN/CREAT SERPL: 11.8 (ref 7–25)
CALCIUM SPEC-SCNC: 9 MG/DL (ref 8.6–10.5)
CEA SERPL-MCNC: 4.2 NG/ML
CHLORIDE SERPL-SCNC: 99 MMOL/L (ref 98–107)
CO2 SERPL-SCNC: 27 MMOL/L (ref 22–29)
CREAT SERPL-MCNC: 0.76 MG/DL (ref 0.76–1.27)
DEPRECATED RDW RBC AUTO: 55.8 FL (ref 37–54)
EGFRCR SERPLBLD CKD-EPI 2021: 88.1 ML/MIN/1.73
EOSINOPHIL # BLD AUTO: 0.03 10*3/MM3 (ref 0–0.4)
EOSINOPHIL NFR BLD AUTO: 0.3 % (ref 0.3–6.2)
ERYTHROCYTE [DISTWIDTH] IN BLOOD BY AUTOMATED COUNT: 15.6 % (ref 12.3–15.4)
FERRITIN SERPL-MCNC: 201.6 NG/ML (ref 30–400)
FOLATE SERPL-MCNC: 12.2 NG/ML (ref 4.78–24.2)
GLOBULIN UR ELPH-MCNC: 2.3 GM/DL
GLUCOSE SERPL-MCNC: 83 MG/DL (ref 65–99)
HCT VFR BLD AUTO: 40.2 % (ref 37.5–51)
HGB BLD-MCNC: 12.8 G/DL (ref 13–17.7)
IMM GRANULOCYTES # BLD AUTO: 0.04 10*3/MM3 (ref 0–0.05)
IMM GRANULOCYTES NFR BLD AUTO: 0.5 % (ref 0–0.5)
IRON 24H UR-MRATE: 98 MCG/DL (ref 59–158)
IRON SATN MFR SERPL: 32 % (ref 20–50)
LYMPHOCYTES # BLD AUTO: 0.78 10*3/MM3 (ref 0.7–3.1)
LYMPHOCYTES NFR BLD AUTO: 8.9 % (ref 19.6–45.3)
MCH RBC QN AUTO: 30.9 PG (ref 26.6–33)
MCHC RBC AUTO-ENTMCNC: 31.8 G/DL (ref 31.5–35.7)
MCV RBC AUTO: 97.1 FL (ref 79–97)
MONOCYTES # BLD AUTO: 0.65 10*3/MM3 (ref 0.1–0.9)
MONOCYTES NFR BLD AUTO: 7.4 % (ref 5–12)
NEUTROPHILS NFR BLD AUTO: 7.25 10*3/MM3 (ref 1.7–7)
NEUTROPHILS NFR BLD AUTO: 82.7 % (ref 42.7–76)
NRBC BLD AUTO-RTO: 0 /100 WBC (ref 0–0.2)
PLATELET # BLD AUTO: 217 10*3/MM3 (ref 140–450)
PMV BLD AUTO: 8.7 FL (ref 6–12)
POTASSIUM SERPL-SCNC: 4.2 MMOL/L (ref 3.5–5.2)
PROT SERPL-MCNC: 6.5 G/DL (ref 6–8.5)
RBC # BLD AUTO: 4.14 10*6/MM3 (ref 4.14–5.8)
SODIUM SERPL-SCNC: 134 MMOL/L (ref 136–145)
TIBC SERPL-MCNC: 304 MCG/DL (ref 298–536)
TRANSFERRIN SERPL-MCNC: 204 MG/DL (ref 200–360)
VIT B12 BLD-MCNC: 380 PG/ML (ref 211–946)
WBC NRBC COR # BLD AUTO: 8.77 10*3/MM3 (ref 3.4–10.8)

## 2024-06-06 PROCEDURE — 82728 ASSAY OF FERRITIN: CPT

## 2024-06-06 PROCEDURE — 82378 CARCINOEMBRYONIC ANTIGEN: CPT

## 2024-06-06 PROCEDURE — 36415 COLL VENOUS BLD VENIPUNCTURE: CPT

## 2024-06-06 PROCEDURE — 85025 COMPLETE CBC W/AUTO DIFF WBC: CPT

## 2024-06-06 PROCEDURE — 83540 ASSAY OF IRON: CPT

## 2024-06-06 PROCEDURE — 80053 COMPREHEN METABOLIC PANEL: CPT

## 2024-06-06 PROCEDURE — 84466 ASSAY OF TRANSFERRIN: CPT

## 2024-06-06 PROCEDURE — 82746 ASSAY OF FOLIC ACID SERUM: CPT

## 2024-06-06 PROCEDURE — 82607 VITAMIN B-12: CPT

## 2024-06-13 ENCOUNTER — OFFICE VISIT (OUTPATIENT)
Dept: ONCOLOGY | Facility: CLINIC | Age: 84
End: 2024-06-13
Payer: MEDICARE

## 2024-06-13 VITALS
BODY MASS INDEX: 27.36 KG/M2 | DIASTOLIC BLOOD PRESSURE: 76 MMHG | RESPIRATION RATE: 18 BRPM | HEIGHT: 72 IN | WEIGHT: 202 LBS | OXYGEN SATURATION: 94 % | SYSTOLIC BLOOD PRESSURE: 130 MMHG | HEART RATE: 91 BPM | TEMPERATURE: 97.4 F

## 2024-06-13 DIAGNOSIS — C34.32 PRIMARY ADENOCARCINOMA OF LOWER LOBE OF LEFT LUNG: Primary | ICD-10-CM

## 2024-06-14 ENCOUNTER — TREATMENT (OUTPATIENT)
Dept: PHYSICAL THERAPY | Facility: CLINIC | Age: 84
End: 2024-06-14
Payer: MEDICARE

## 2024-06-14 DIAGNOSIS — M54.50 ACUTE MIDLINE LOW BACK PAIN WITHOUT SCIATICA: Primary | ICD-10-CM

## 2024-06-14 NOTE — PROGRESS NOTES
Physical Therapy Treatment Note  [address] [place] [address]    Patient: Carmine Garcia                                                 Visit Date: 2024  :     1940    Referring practitioner:    Efrain Dobson DO  Date of Initial Visit:          Type: THERAPY  Noted: 2024    Patient seen for 3 sessions    Visit Diagnoses:    ICD-10-CM ICD-9-CM   1. Acute midline low back pain without sciatica  M54.50 724.2     SUBJECTIVE     Subjective: States he is doing alright this morning, but is having some pain on the back side of his L hip just below his back. Notes it started this morning..    PAIN: 3/10     OBJECTIVE     Objective     Therapeutic Exercises    90802 Units Comments   Seated 3 way Lx flex stretch w/ SB 5 min    Seated pec stretch w/ bolster     Seated pelvic tilt x15    LLE clamshells x20 R SL, required tactile cues for pelvic stability   Timed Minutes 12       Manual Therapy     [id number]  Comments   STM to Lx R SL   IASTM w/ Powerboost L1 ball to L Lx region R SL   TPR to L Lx region R SL   L Sacral PA mobs R SL   Timed Minutes 28       Therapy Education/Self Care [id number]   Education offered today Nature of condition, POC moving forward   [employer] Code     Ongoing HEP   Access Code: [email or web address]  URL: [email or web address]  Date: 2024  Prepared by: Tera Brandon    Exercises  - Seated March  - 1 x daily - 7 x weekly - 3 sets - 10 reps  - Seated Lumbar Flexion Stretch  - 1 x daily - 7 x weekly - 3 sets - 10 reps  - Seated Hip Abduction with Resistance  - 1 x daily - 7 x weekly - 3 sets - 10 reps  - Seated Hip Adduction Isometrics with Ball  - 1 x daily - 7 x weekly - 3 sets - 10 reps  - Seated Pelvic Tilt  - 1 x daily - 7 x weekly - 3 sets - 10 reps   Timed Minutes         Total Timed Treatment:     40   mins  Total Time of Visit:             40   mins         ASSESSMENT/PLAN     GOALS  Goals                                                   Progress Note due by 6/27/24                                                              Recert due by 8/25/24   STG by: 6 weeks Comments Date Status   Improve renetta thoracolumbar mobility         Improve muscle relaxation of B thoracolumbar paraspinals         Improve  bilateral hip ER/IR to within 10 deg of WNL                    LTG by: 12 weeks         Able to perform bed mobility without exacerbation of pain         SLS either leg x 5 secs         Reports pain no greater than 2/10 when it does occur.         Improve Modified Oswestry to 8/50 or less         Independent with HEP for flexibility and core/hip stability.             Assessment/Plan     ASSESSMENT: Pt some cont pain today, noting it was a little lateral from his L SIJ and started this morning. He noted a stretch sensation during Lx flex 3 way, and had no discomfort during any of the exercises. He presented with tightness and TP activity in his L Lx/SI/glute med regions. He reported decreased pain and tightness following treatment.     PLAN:   Trial flexion based approach, as well as working toward strengthening BLEs, hips, and supporting SI musculature     SIGNATURE: Tera Brandon PTA, [place] License #: [id number]  Electronically Signed on 6/14/2024        [facility] [address]  [place] [address]  [phone]

## 2024-06-18 ENCOUNTER — TREATMENT (OUTPATIENT)
Dept: PHYSICAL THERAPY | Facility: CLINIC | Age: 84
End: 2024-06-18
Payer: MEDICARE

## 2024-06-18 DIAGNOSIS — M54.50 ACUTE MIDLINE LOW BACK PAIN WITHOUT SCIATICA: Primary | ICD-10-CM

## 2024-06-18 NOTE — PROGRESS NOTES
Physical Therapy Treatment Note  [address] [place] [address]    Patient: Carmine Garcia                                                 Visit Date: 2024  :     1940    Referring practitioner:    Efrain Dobson DO  Date of Initial Visit:          Type: THERAPY  Noted: 2024    Patient seen for 4 sessions    Visit Diagnoses:    ICD-10-CM ICD-9-CM   1. Acute midline low back pain without sciatica  M54.50 724.2     SUBJECTIVE     Subjective: States he is having some increased pain across his back today, noting it was worse yesterday. Notes when he woke up this morning his pain was 7/10.    PAIN: 3/10     OBJECTIVE     Objective     Therapeutic Exercises    29877 Units Comments   Seated 3 way Lx flex stretch w/ SB 5 min    Seated pec stretch w/ bolster     Timed Minutes 10       Manual Therapy     [id number]  Comments   STM to Lx L SL   IASTM w/ Powerboost L1 flat to L Lx region L SL   TPR to L Lx region L SL   L Sacral PA mobs L SL   Timed Minutes 20     Modalities Comments   Cold laser/Estim combo R Lx       Minutes 10       Therapy Education/Self Care 72752   Education offered today Nature of condition, POC moving forward   [employer] Code     Ongoing HEP   Access Code: [email or web address]  URL: [email or web address]  Date: 2024  Prepared by: Tera Brandon    Exercises  - Seated March  - 1 x daily - 7 x weekly - 3 sets - 10 reps  - Seated Lumbar Flexion Stretch  - 1 x daily - 7 x weekly - 3 sets - 10 reps  - Seated Hip Abduction with Resistance  - 1 x daily - 7 x weekly - 3 sets - 10 reps  - Seated Hip Adduction Isometrics with Ball  - 1 x daily - 7 x weekly - 3 sets - 10 reps  - Seated Pelvic Tilt  - 1 x daily - 7 x weekly - 3 sets - 10 reps   Timed Minutes         Total Timed Treatment:     40   mins  Total Time of Visit:             40   mins         ASSESSMENT/PLAN     GOALS  Goals                                                   Progress Note due by 6/27/24                                                              Recert due by 8/25/24   STG by: 6 weeks Comments Date Status   Improve renetta thoracolumbar mobility         Improve muscle relaxation of B thoracolumbar paraspinals         Improve  bilateral hip ER/IR to within 10 deg of WNL                    LTG by: 12 weeks         Able to perform bed mobility without exacerbation of pain         SLS either leg x 5 secs         Reports pain no greater than 2/10 when it does occur.         Improve Modified Oswestry to 8/50 or less         Independent with HEP for flexibility and core/hip stability.             Assessment/Plan     ASSESSMENT: Pt reported increased pain the last 2 days, noting when he has gotten up in the morning his pain has been worse than normal. Yesterday his pain was on his R side but today it is across his Lx L>R. He presented with cont TP activity around his B SIJ, noting decreased pain when sitting up following treatment.     PLAN:   Trial flexion based approach, as well as working toward strengthening BLEs, hips, and supporting SI musculature     SIGNATURE: Tera Brandon PTA, [place] License #: [id number]  Electronically Signed on 6/18/2024        [address] [place] [address] [employer]  [place] [address]  [phone]

## 2024-06-21 ENCOUNTER — TREATMENT (OUTPATIENT)
Dept: PHYSICAL THERAPY | Facility: CLINIC | Age: 84
End: 2024-06-21
Payer: MEDICARE

## 2024-06-21 DIAGNOSIS — M54.50 ACUTE MIDLINE LOW BACK PAIN WITHOUT SCIATICA: Primary | ICD-10-CM

## 2024-06-21 NOTE — PROGRESS NOTES
Physical Therapy Treatment Note  [address] [place] [address]    Patient: Carmine Garcia                                                 Visit Date: 2024  :     1940    Referring practitioner:    Efrain Dobson DO  Date of Initial Visit:          Type: THERAPY  Noted: 2024    Patient seen for 5 sessions    Visit Diagnoses:    ICD-10-CM ICD-9-CM   1. Acute midline low back pain without sciatica  M54.50 724.2     SUBJECTIVE     Subjective:He reports he has had a rough couple of days and for a while he felt like his R LE wasn't going to hold him up.       PAIN: 2.5/10         OBJECTIVE     Objective       Manual Therapy     [id number]  Comments   STM B lower lumbar region L SL   STM R glute and piriformis L SL   B unilateral hip inferior lateral glides Grade 2 followed by ER and IR stretches HL   Manual B pec and thoracic stretches with 1/2 roller in sitting        Timed Minutes 41         Therapy Education/Self Care 71307   Education offered today    [employer]bride Code    Ongoing HEP   Access Code: [email or web address]  URL: [email or web address]  Date: 2024  Prepared by: Tera Brandon     Exercises  - Seated March  - 1 x daily - 7 x weekly - 3 sets - 10 reps  - Seated Lumbar Flexion Stretch  - 1 x daily - 7 x weekly - 3 sets - 10 reps  - Seated Hip Abduction with Resistance  - 1 x daily - 7 x weekly - 3 sets - 10 reps  - Seated Hip Adduction Isometrics with Ball  - 1 x daily - 7 x weekly - 3 sets - 10 reps  - Seated Pelvic Tilt  - 1 x daily - 7 x weekly - 3 sets - 10 reps   Timed Minutes        Total Timed Treatment:     41   mins  Total Time of Visit:             41   mins         ASSESSMENT/PLAN     GOALS  Goals                                                  Progress Note due by 24                                                              Recert due by 24   STG by: 6 weeks Comments Date Status   Improve renetta thoracolumbar  mobility         Improve muscle relaxation of B thoracolumbar paraspinals         Improve  bilateral hip ER/IR to within 10 deg of WNL                    LTG by: 12 weeks         Able to perform bed mobility without exacerbation of pain         SLS either leg x 5 secs         Reports pain no greater than 2/10 when it does occur.  2.5/10 today  6/21  Ongoing   Improve Modified Oswestry to 8/50 or less         Independent with HEP for flexibility and core/hip stability.             Assessment/Plan     ASSESSMENT:   A lack of overall flexibility is one of his biggest issues. In addition, he was a bit guarded in his presentation today; therefore, I focused on manual techniques with a gentle approach today    PLAN:   Continue to progress as symptoms allow.    SIGNATURE: [facility], [place] License #: [id number]  Electronically Signed on 6/21/2024        [address] [place] [address]. [address]  [phone]

## 2024-06-25 ENCOUNTER — TREATMENT (OUTPATIENT)
Dept: PHYSICAL THERAPY | Facility: CLINIC | Age: 84
End: 2024-06-25
Payer: MEDICARE

## 2024-06-25 DIAGNOSIS — M54.50 ACUTE MIDLINE LOW BACK PAIN WITHOUT SCIATICA: Primary | ICD-10-CM

## 2024-06-28 ENCOUNTER — TREATMENT (OUTPATIENT)
Dept: PHYSICAL THERAPY | Facility: CLINIC | Age: 84
End: 2024-06-28
Payer: MEDICARE

## 2024-06-28 DIAGNOSIS — M54.50 ACUTE MIDLINE LOW BACK PAIN WITHOUT SCIATICA: Primary | ICD-10-CM

## 2024-06-28 NOTE — PROGRESS NOTES
Physical Therapy Treatment Note  [address] [place] [address]    Patient: Carmine Garcia                                                 Visit Date: 2024  :     1940    Referring practitioner:    Efrain Dobson DO  Date of Initial Visit:          Type: THERAPY  Noted: 2024    Patient seen for 7 sessions    Visit Diagnoses:    ICD-10-CM ICD-9-CM   1. Acute midline low back pain without sciatica  M54.50 724.2       SUBJECTIVE     Subjective: States he is still having a constant ache on the R side of his back, and the pain increases when lying down or sitting up.       PAIN: 2-3/10         OBJECTIVE     Objective     Therapeutic Exercises    21113 Units Comments   R clamshells x20 L SL   R hip ABD x20 L SL   LTR on SB 2 min    B HS ISO w/ SB x20    Timed Minutes  10     Manual Therapy     [id number]  Comments   R hip flexor stretch L SL   STM to Lx/R glute    IASTM w/ Powerboost to R glute med/TFL L SL       Timed Minutes 30          Therapy Education/Self Care 95183   Education offered today    Medbride Code    Ongoing HEP   Access Code: [email or web address]  URL: [email or web address]  Date: 2024  Prepared by: Tera Brandon     Exercises  - Seated March  - 1 x daily - 7 x weekly - 3 sets - 10 reps  - Seated Lumbar Flexion Stretch  - 1 x daily - 7 x weekly - 3 sets - 10 reps  - Seated Hip Abduction with Resistance  - 1 x daily - 7 x weekly - 3 sets - 10 reps  - Seated Hip Adduction Isometrics with Ball  - 1 x daily - 7 x weekly - 3 sets - 10 reps  - Seated Pelvic Tilt  - 1 x daily - 7 x weekly - 3 sets - 10 reps   Timed Minutes        Total Timed Treatment:     40   mins  Total Time of Visit:             40   mins         ASSESSMENT/PLAN     GOALS  Goals                                                  Progress Note due by 24                                                              Recert due by 24   STG by: 6 weeks  Comments Date Status   Improve renetta thoracolumbar mobility  See table for details   6/25  Ongoing   Improve muscle relaxation of B thoracolumbar paraspinals  improved, though continued guarding   6/25  Ongoing   Improve  bilateral hip ER/IR to within 10 deg of WNL  L: ER 63; IR 35  R: ER 68; IR 50  6/25 Ongoing             LTG by: 12 weeks         Able to perform bed mobility without exacerbation of pain  Reports that this has been going better.   6/25  Ongoing   SLS either leg x 5 secs  3 sec B   6/25  Ongoing   Reports pain no greater than 2/10 when it does occur.  3-3.5/10 at worst over the past week   6/25  Ongoing   Improve Modified Oswestry to 8/50 or less  6/25: 23/50 6/25  Ongoing   Independent with HEP for flexibility and core/hip stability.  Performs every other day and has increased his reps   6/25  Ongoing       Assessment/Plan     ASSESSMENT: Pt reported cont R SIJ pain today, noting when he changes positions is when his pain is the worst. He noted no discomfort during exercises, and had cont R hip tightness in his hip flexors. He cont to have some TP activity in his R glute med/TFL.      PLAN:   Continue to progress as symptoms allow.    SIGNATURE: Tera Brandon PTA, [place] License #: [id number]    Electronically Signed on 6/28/2024        [place] [address]  [phone]

## 2024-07-01 ENCOUNTER — TREATMENT (OUTPATIENT)
Dept: PHYSICAL THERAPY | Facility: CLINIC | Age: 84
End: 2024-07-01
Payer: MEDICARE

## 2024-07-01 DIAGNOSIS — M54.50 ACUTE MIDLINE LOW BACK PAIN WITHOUT SCIATICA: Primary | ICD-10-CM

## 2024-07-01 NOTE — PROGRESS NOTES
Physical Therapy Treatment Note  [address] [place] [address]    Patient: Carmine Garcia                                                 Visit Date: 2024  :     1940    Referring practitioner:    Efrain Dobson DO  Date of Initial Visit:          Type: THERAPY  Noted: 2024    Patient seen for 8 sessions    Visit Diagnoses:    ICD-10-CM ICD-9-CM   1. Acute midline low back pain without sciatica  M54.50 724.2       SUBJECTIVE     Subjective: States he has been hurting since he woke up this morning, noting it is more on his L side today. Notes no matter what he does today it hurts.       PAIN: 2-3/10     OBJECTIVE     Objective     Manual Therapy     17870  Comments   Seated 3 way Lx flex stretch w/ SB    STM to Lx Massage chair   Gentle TPR to Lx massage chair   Timed Minutes 28     Modalities Comments   IFC estim to Lx 31 mA       Minutes 15          Therapy Education/Self Care 18833   Education offered today    Medbride Code    Ongoing HEP   Access Code: [email or web address]  URL: [email or web address]  Date: 2024  Prepared by: Tera Brandon     Exercises  - Seated March  - 1 x daily - 7 x weekly - 3 sets - 10 reps  - Seated Lumbar Flexion Stretch  - 1 x daily - 7 x weekly - 3 sets - 10 reps  - Seated Hip Abduction with Resistance  - 1 x daily - 7 x weekly - 3 sets - 10 reps  - Seated Hip Adduction Isometrics with Ball  - 1 x daily - 7 x weekly - 3 sets - 10 reps  - Seated Pelvic Tilt  - 1 x daily - 7 x weekly - 3 sets - 10 reps   Timed Minutes        Total Timed Treatment:     43   mins  Total Time of Visit:             43   mins         ASSESSMENT/PLAN     GOALS  Goals                                                  Progress Note due by 24                                                              Recert due by 24   STG by: 6 weeks Comments Date Status   Improve renetta thoracolumbar mobility  See table for details    6/25  Ongoing   Improve muscle relaxation of B thoracolumbar paraspinals  improved, though continued guarding   6/25  Ongoing   Improve  bilateral hip ER/IR to within 10 deg of WNL  L: ER 63; IR 35  R: ER 68; IR 50  6/25 Ongoing             LTG by: 12 weeks         Able to perform bed mobility without exacerbation of pain  Reports that this has been going better.   6/25  Ongoing   SLS either leg x 5 secs  3 sec B   6/25  Ongoing   Reports pain no greater than 2/10 when it does occur.  3-3.5/10 at worst over the past week   6/25  Ongoing   Improve Modified Oswestry to 8/50 or less  6/25: 23/50 6/25  Ongoing   Independent with HEP for flexibility and core/hip stability.  Performs every other day and has increased his reps   6/25  Ongoing       Assessment/Plan     ASSESSMENT: Pt reported cont Lx pain today, noting no position has helped his pain decrease since he woke up this morning. Noted it is still around his R SIJ, but also on the L side of his Lx. He presented with cont tightness and TP activity in his B Lx anaya/SIJ regions. He reported some decreased pain and tightness following treatment.      PLAN:   Continue to progress as symptoms allow.    SIGNATURE: Tera Brandon PTA, [place] License #: [id number]    Electronically Signed on 7/1/2024        [place]. [address]  [phone]

## 2024-07-03 ENCOUNTER — TREATMENT (OUTPATIENT)
Dept: PHYSICAL THERAPY | Facility: CLINIC | Age: 84
End: 2024-07-03
Payer: MEDICARE

## 2024-07-03 DIAGNOSIS — M54.50 ACUTE MIDLINE LOW BACK PAIN WITHOUT SCIATICA: Primary | ICD-10-CM

## 2024-07-03 NOTE — PROGRESS NOTES
Physical Therapy Treatment Note  [address] [place] [address]    Patient: Carmine Garcia                                                 Visit Date: 7/3/2024  :     1940    Referring practitioner:    Efrain Dobson DO  Date of Initial Visit:          Type: THERAPY  Noted: 2024    Patient seen for 9 sessions    Visit Diagnoses:    ICD-10-CM ICD-9-CM   1. Acute midline low back pain without sciatica  M54.50 724.2       SUBJECTIVE     Subjective: States he felt good waking up this morning, but noted some pain when he stood out of bed. Notes he had a good day yesterday, and only had a slight ache all day with no real pain.       PAIN: -2/10     OBJECTIVE     Objective     Manual Therapy     83803  Comments   L inferior sacral mobs R SL   IASTM w/ Powerboost L1 ball to L Lx/glute R SL   STM to L Lx/glute R SL   TPR to L Lx/glute R SL   Timed Minutes 30     Modalities Comments   Cold laser/estim combo L Lx       Minutes 10          Therapy Education/Self Care 33054   Education offered today    Medbride Code    Ongoing HEP   Access Code: [email or web address]  URL: [email or web address]  Date: 2024  Prepared by: Tera Brandon     Exercises  - Seated March  - 1 x daily - 7 x weekly - 3 sets - 10 reps  - Seated Lumbar Flexion Stretch  - 1 x daily - 7 x weekly - 3 sets - 10 reps  - Seated Hip Abduction with Resistance  - 1 x daily - 7 x weekly - 3 sets - 10 reps  - Seated Hip Adduction Isometrics with Ball  - 1 x daily - 7 x weekly - 3 sets - 10 reps  - Seated Pelvic Tilt  - 1 x daily - 7 x weekly - 3 sets - 10 reps   Timed Minutes        Total Timed Treatment:     40   mins  Total Time of Visit:             40   mins         ASSESSMENT/PLAN     GOALS  Goals                                                  Progress Note due by 24                                                              Recert due by 24   STG by: 6 weeks Comments  Date Status   Improve renetta thoracolumbar mobility  See table for details   6/25  Ongoing   Improve muscle relaxation of B thoracolumbar paraspinals  improved, though continued guarding   6/25  Ongoing   Improve  bilateral hip ER/IR to within 10 deg of WNL  L: ER 63; IR 35  R: ER 68; IR 50  6/25 Ongoing             LTG by: 12 weeks         Able to perform bed mobility without exacerbation of pain  Reports that this has been going better.   6/25  Ongoing   SLS either leg x 5 secs  3 sec B   6/25  Ongoing   Reports pain no greater than 2/10 when it does occur.  3-3.5/10 at worst over the past week   6/25  Ongoing   Improve Modified Oswestry to 8/50 or less  6/25: 23/50 6/25  Ongoing   Independent with HEP for flexibility and core/hip stability.  Performs every other day and has increased his reps   6/25  Ongoing       Assessment/Plan     ASSESSMENT: Pt reported cont L Lx pain today, noting he only had a slight ache yesterday. He did have slight ain when standing out of bed this morning, but it is better than before. He presented with cont tightness and TP activity in his B Lx anaya/SIJ regions. He reported decreased pain and tightness during sidelying to sit and sit to stand following treatment.      PLAN:   Continue to progress as symptoms allow.    SIGNATURE: Tera Brandon PTA, [place] License #: [id number]    Electronically Signed on 7/3/2024        [facility]  [place] [address]  [phone]

## 2024-07-09 ENCOUNTER — TREATMENT (OUTPATIENT)
Dept: PHYSICAL THERAPY | Facility: CLINIC | Age: 84
End: 2024-07-09
Payer: MEDICARE

## 2024-07-09 DIAGNOSIS — M54.50 ACUTE MIDLINE LOW BACK PAIN WITHOUT SCIATICA: Primary | ICD-10-CM

## 2024-07-09 RX ORDER — PYRIDOSTIGMINE BROMIDE 60 MG/1
60 TABLET ORAL EVERY 6 HOURS
Qty: 120 TABLET | Refills: 5 | Status: SHIPPED | OUTPATIENT
Start: 2024-07-09

## 2024-07-09 NOTE — PROGRESS NOTES
Physical Therapy Treatment Note  [address] [place] [address]    Patient: Carmine Garcia                                                 Visit Date: 2024  :     1940    Referring practitioner:    Efrain Dobson DO  Date of Initial Visit:          Type: THERAPY  Noted: 2024    Patient seen for 10 sessions    Visit Diagnoses:    ICD-10-CM ICD-9-CM   1. Acute midline low back pain without sciatica  M54.50 724.2     SUBJECTIVE     Subjective:He reports he is still having pain in his R lower back that comes and goes and that is frustrating      PAIN: 2.5/10         OBJECTIVE     Objective     Manual Therapy     91128  Comments   STM B lower lumbar region L SL   STM R glute and piriformis with ratchet roller L SL               Timed Minutes 19         Therapeutic Exercises    80713 Units Comments   B HF stretches in SL     B clamshells in SL with yellow T band     B hip ER stretches with intermittent inferior lateral glides               Timed Minutes 25        Therapy Education/Self Care 28689   Education offered today    Medbride Code    Ongoing HEP   Access Code: [email or web address]  URL: [email or web address]  Date: 2024  Prepared by: Tera Brandon     Exercises  - Seated March  - 1 x daily - 7 x weekly - 3 sets - 10 reps  - Seated Lumbar Flexion Stretch  - 1 x daily - 7 x weekly - 3 sets - 10 reps  - Seated Hip Abduction with Resistance  - 1 x daily - 7 x weekly - 3 sets - 10 reps  - Seated Hip Adduction Isometrics with Ball  - 1 x daily - 7 x weekly - 3 sets - 10 reps  - Seated Pelvic Tilt  - 1 x daily - 7 x weekly - 3 sets - 10 reps   Timed Minutes        Total Timed Treatment:     44   mins  Total Time of Visit:             44   mins         ASSESSMENT/PLAN     GOALS  Goals                                                  Progress Note due by 24                                                              Recert due by  8/25/24   STG by: 6 weeks Comments Date Status   Improve renetta thoracolumbar mobility  See table for details   6/25  Ongoing   Improve muscle relaxation of B thoracolumbar paraspinals  improved, though continued guarding   6/25  Ongoing   Improve  bilateral hip ER/IR to within 10 deg of WNL  L: ER 63; IR 35  R: ER 68; IR 50  6/25 Ongoing             LTG by: 12 weeks         Able to perform bed mobility without exacerbation of pain  Reports that this has been going better.   6/25  Ongoing   SLS either leg x 5 secs  3 sec B   6/25  Ongoing   Reports pain no greater than 2/10 when it does occur.  2.5/10 today  7/9  Ongoing   Improve Modified Oswestry to 8/50 or less  6/25: 23/50 6/25  Ongoing   Independent with HEP for flexibility and core/hip stability.  Performs every other day and has increased his reps   6/25  Ongoing       Assessment/Plan     ASSESSMENT:   Rolling and moving from supine to sitting is difficult for him and causes pain. His pain rating today was less than previous treatments.    PLAN:   Continue to work on improving overall spinal mobility as well as introduce some postural control activities    SIGNATURE: [facility], [place] License #: [id number]  Electronically Signed on 7/9/2024        [address] [place] [address]. [address]  [phone]

## 2024-07-09 NOTE — TELEPHONE ENCOUNTER
Requested Prescriptions     Pending Prescriptions Disp Refills    pyRIDostigmine (MESTINON) 60 MG tablet 120 tablet 5     Sig: Take 1 tablet by mouth every 6 hours       Last Office Visit: 4/29/2024  Next Office Visit: 8/12/2024  Last Medication Refill: 1/10/2024 with Franci KLINE

## 2024-07-11 ENCOUNTER — HOSPITAL ENCOUNTER (OUTPATIENT)
Dept: CT IMAGING | Facility: HOSPITAL | Age: 84
Discharge: HOME | End: 2024-07-11
Admitting: INTERNAL MEDICINE
Payer: MEDICARE

## 2024-07-11 ENCOUNTER — TRANSCRIBE ORDERS (OUTPATIENT)
Dept: ADMINISTRATIVE | Facility: HOSPITAL | Age: 84
End: 2024-07-11
Payer: MEDICARE

## 2024-07-11 ENCOUNTER — TREATMENT (OUTPATIENT)
Dept: PHYSICAL THERAPY | Facility: CLINIC | Age: 84
End: 2024-07-11
Payer: MEDICARE

## 2024-07-11 DIAGNOSIS — M47.816 OSTEOARTHRITIS OF FACET JOINT OF LUMBAR SPINE: Primary | ICD-10-CM

## 2024-07-11 DIAGNOSIS — M54.50 ACUTE MIDLINE LOW BACK PAIN WITHOUT SCIATICA: Primary | ICD-10-CM

## 2024-07-11 DIAGNOSIS — M47.816 OSTEOARTHRITIS OF FACET JOINT OF LUMBAR SPINE: ICD-10-CM

## 2024-07-11 PROCEDURE — 72131 CT LUMBAR SPINE W/O DYE: CPT

## 2024-07-11 NOTE — PROGRESS NOTES
Physical Therapy Treatment Note  [address] [place] [address]    Patient: Carmine Garcia                                                 Visit Date: 2024  :     1940    Referring practitioner:    Efrain Dobson DO  Date of Initial Visit:          Type: THERAPY  Noted: 2024    Patient seen for 11 sessions    Visit Diagnoses:    ICD-10-CM ICD-9-CM   1. Acute midline low back pain without sciatica  M54.50 724.2     SUBJECTIVE     Subjective:He felt pretty good after his last session until he got home and he is in increased pain today. He hasn't felt like doing his exercises. He didn't sleep well last night because he couldn't relax. He almost called and cancelled today's appt.     PAIN: 2-2.5/10     OBJECTIVE     Objective     Manual Therapy     08770  Comments   STM B lower lumbar region Massage chair    Percussive IASTM using Powerboost to B gluteals at L1 using ball attachment  Massage chair    IASTM to thoracic with ratchet roller Massage chair    Thoracic PA mobs  Massage chair            Timed Minutes 40      Therapy Education/Self Care 87754   Education offered today    Medbride Code    Ongoing HEP   Access Code: [email or web address]  URL: [email or web address]  Date: 2024  Prepared by: Tera Brandon     Exercises  - Seated March  - 1 x daily - 7 x weekly - 3 sets - 10 reps  - Seated Lumbar Flexion Stretch  - 1 x daily - 7 x weekly - 3 sets - 10 reps  - Seated Hip Abduction with Resistance  - 1 x daily - 7 x weekly - 3 sets - 10 reps  - Seated Hip Adduction Isometrics with Ball  - 1 x daily - 7 x weekly - 3 sets - 10 reps  - Seated Pelvic Tilt  - 1 x daily - 7 x weekly - 3 sets - 10 reps   Timed Minutes        Total Timed Treatment:     40   mins  Total Time of Visit:             40   mins         ASSESSMENT/PLAN     GOALS  Goals                                                  Progress Note due by 24                     "                                          Recert due by 8/25/24   STG by: 6 weeks Comments Date Status   Improve renetta thoracolumbar mobility  See table for details   6/25  Ongoing   Improve muscle relaxation of B thoracolumbar paraspinals  improved, though continued guarding   6/25  Ongoing   Improve  bilateral hip ER/IR to within 10 deg of WNL  L: ER 63; IR 35  R: ER 68; IR 50  6/25 Ongoing             LTG by: 12 weeks         Able to perform bed mobility without exacerbation of pain  Reports that this has been going better.   6/25  Ongoing   SLS either leg x 5 secs  3 sec B   6/25  Ongoing   Reports pain no greater than 2/10 when it does occur.  2.5/10 today  7/9  Ongoing   Improve Modified Oswestry to 8/50 or less  6/25: 23/50 6/25  Ongoing   Independent with HEP for flexibility and core/hip stability.  Performs every other day and has increased his reps   6/25  Ongoing     Assessment/Plan     ASSESSMENT:   P arrived to clinic reporting increased pain and requested to \"go easy on him today\" therefore we prioritized symptom relief through manual techniques. By conclusion of session, he was glad he didn't't cancel today.     PLAN:   Continue to work on improving overall spinal mobility as well as introduce some postural control activities    SIGNATURE: Alise Schultz PTA, [place] License #: [id number]  Electronically Signed on 7/11/2024        [place]. [address]  [phone]    "

## 2024-07-16 ENCOUNTER — TREATMENT (OUTPATIENT)
Dept: PHYSICAL THERAPY | Facility: CLINIC | Age: 84
End: 2024-07-16
Payer: MEDICARE

## 2024-07-16 ENCOUNTER — TRANSCRIBE ORDERS (OUTPATIENT)
Dept: ADMINISTRATIVE | Facility: HOSPITAL | Age: 84
End: 2024-07-16
Payer: MEDICARE

## 2024-07-16 DIAGNOSIS — M54.50 ACUTE MIDLINE LOW BACK PAIN WITHOUT SCIATICA: Primary | ICD-10-CM

## 2024-07-16 DIAGNOSIS — M15.0 PRIMARY GENERALIZED (OSTEO)ARTHRITIS: Primary | ICD-10-CM

## 2024-07-16 NOTE — PROGRESS NOTES
Physical Therapy Treatment Note  [address] [place] [address]    Patient: Carmine Garcia                                                 Visit Date: 2024  :     1940    Referring practitioner:    Efrain Dobson DO  Date of Initial Visit:          Type: THERAPY  Noted: 2024    Patient seen for 12 sessions    Visit Diagnoses:    ICD-10-CM ICD-9-CM   1. Acute midline low back pain without sciatica  M54.50 724.2     SUBJECTIVE     Subjective: States he is still having some pain on the R side of his back and in his buttock, noting his R leg has been sore for a few days. Notes he had his CT scan the other day and was told that he had 2 compression fractures that looked recent compared to his last scans.      PAIN: -2/10     OBJECTIVE     Objective     Manual Therapy     33665  Comments   TPR to B Lx region Massage chair   STM B lower lumbar region Massage chair    Percussive IASTM using Powerboost to B Lx/gluteals at L1 using ball attachment  Massage chair    IASTM to thoracic with ratchet roller Massage chair        Timed Minutes 40      Therapy Education/Self Care 97829   Education offered today    Medbride Code    Ongoing HEP   Access Code: [email or web address]  URL: [email or web address]  Date: 2024  Prepared by: Tera Brandon     Exercises  - Seated March  - 1 x daily - 7 x weekly - 3 sets - 10 reps  - Seated Lumbar Flexion Stretch  - 1 x daily - 7 x weekly - 3 sets - 10 reps  - Seated Hip Abduction with Resistance  - 1 x daily - 7 x weekly - 3 sets - 10 reps  - Seated Hip Adduction Isometrics with Ball  - 1 x daily - 7 x weekly - 3 sets - 10 reps  - Seated Pelvic Tilt  - 1 x daily - 7 x weekly - 3 sets - 10 reps   Timed Minutes        Total Timed Treatment:     40   mins  Total Time of Visit:             40   mins         ASSESSMENT/PLAN     GOALS  Goals                                                  Progress Note due by  7/25/24                                                              Recert due by 8/25/24   STG by: 6 weeks Comments Date Status   Improve renetta thoracolumbar mobility  See table for details   6/25  Ongoing   Improve muscle relaxation of B thoracolumbar paraspinals  improved, though continued guarding   6/25  Ongoing   Improve  bilateral hip ER/IR to within 10 deg of WNL  L: ER 63; IR 35  R: ER 68; IR 50  6/25 Ongoing             LTG by: 12 weeks         Able to perform bed mobility without exacerbation of pain  Reports that this has been going better.   6/25  Ongoing   SLS either leg x 5 secs  3 sec B   6/25  Ongoing   Reports pain no greater than 2/10 when it does occur.  2.5/10 today  7/9  Ongoing   Improve Modified Oswestry to 8/50 or less  6/25: 23/50 6/25  Ongoing   Independent with HEP for flexibility and core/hip stability.  Performs every other day and has increased his reps   6/25  Ongoing     Assessment/Plan     ASSESSMENT:   Pt reported some cont pain in his R Lx region today, noting he got the results from his most recent CT scan and he has 2 compression fractures. He goes to the doctor tomorrow to discuss what they want him to do moving forward. He presented with cont tightness and increased TP activity in his B Lx/SIJ regions R>L. He reported some decreased pain and tightness following treatment.    PLAN:   Continue to work on improving overall spinal mobility as well as introduce some postural control activities    SIGNATURE: Tera Brandon PTA, [place] License #: [id number]  Electronically Signed on 7/16/2024        [address] [place] [address] [place] [address]  [phone]

## 2024-07-18 ENCOUNTER — TELEPHONE (OUTPATIENT)
Dept: ORTHOPEDICS | Facility: OTHER | Age: 84
End: 2024-07-18
Payer: MEDICARE

## 2024-07-18 ENCOUNTER — TRANSCRIBE ORDERS (OUTPATIENT)
Dept: ADMINISTRATIVE | Facility: HOSPITAL | Age: 84
End: 2024-07-18
Payer: MEDICARE

## 2024-07-18 DIAGNOSIS — Z13.820 SCREENING FOR OSTEOPOROSIS: Primary | ICD-10-CM

## 2024-08-07 ENCOUNTER — HOSPITAL ENCOUNTER (OUTPATIENT)
Dept: CT IMAGING | Facility: HOSPITAL | Age: 84
Discharge: HOME | End: 2024-08-07
Payer: MEDICARE

## 2024-08-07 DIAGNOSIS — Z92.3 HISTORY OF RADIATION THERAPY: ICD-10-CM

## 2024-08-07 DIAGNOSIS — C34.32 PRIMARY ADENOCARCINOMA OF LOWER LOBE OF LEFT LUNG: ICD-10-CM

## 2024-08-07 DIAGNOSIS — Z87.891 FORMER SMOKER: ICD-10-CM

## 2024-08-07 PROCEDURE — 71250 CT THORAX DX C-: CPT

## 2024-08-12 ENCOUNTER — OFFICE VISIT (OUTPATIENT)
Dept: NEUROLOGY | Age: 84
End: 2024-08-12
Payer: MEDICARE

## 2024-08-12 VITALS — BODY MASS INDEX: 25.9 KG/M2 | HEIGHT: 72 IN | WEIGHT: 191.2 LBS

## 2024-08-12 DIAGNOSIS — G70.00 MYASTHENIA GRAVIS (HCC): Primary | ICD-10-CM

## 2024-08-12 DIAGNOSIS — Z86.69 HISTORY OF DOUBLE VISION: ICD-10-CM

## 2024-08-12 DIAGNOSIS — M81.6 LOCALIZED OSTEOPOROSIS (LEQUESNE): ICD-10-CM

## 2024-08-12 DIAGNOSIS — S32.010S COMPRESSION FRACTURE OF L1 VERTEBRA, SEQUELA: ICD-10-CM

## 2024-08-12 DIAGNOSIS — Z87.09 HISTORY OF COPD: ICD-10-CM

## 2024-08-12 PROCEDURE — G8417 CALC BMI ABV UP PARAM F/U: HCPCS | Performed by: PSYCHIATRY & NEUROLOGY

## 2024-08-12 PROCEDURE — G8427 DOCREV CUR MEDS BY ELIG CLIN: HCPCS | Performed by: PSYCHIATRY & NEUROLOGY

## 2024-08-12 PROCEDURE — 1036F TOBACCO NON-USER: CPT | Performed by: PSYCHIATRY & NEUROLOGY

## 2024-08-12 PROCEDURE — 99214 OFFICE O/P EST MOD 30 MIN: CPT | Performed by: PSYCHIATRY & NEUROLOGY

## 2024-08-12 PROCEDURE — 1123F ACP DISCUSS/DSCN MKR DOCD: CPT | Performed by: PSYCHIATRY & NEUROLOGY

## 2024-08-12 NOTE — PROGRESS NOTES
[facility]   [address]  [place] [address]      REVIEW OF SYSTEMS     Constitutional: []Fever []Sweats []Chills [] Recent Injury   [x] Denies all unless marked  HENT:[]Headache  [] Head Injury  [] Sore Throat  [] Ear Pain  [] Dizziness [] Hearing Loss   [] Denies all unless marked  Musculoskeletal: [] Arthralgia  [x] Myalgias [] Muscle cramps  [] Muscle twitches   [] Denies all unless marked   Spine:  [] Neck pain  [x] Back pain  [] Sciatica  [] Denies all unless marked  Neurological:[] Visual Disturbance [] Double Vision [] Slurred Speech [] Trouble swallowing  [] Vertigo [] Tingling [] Numbness [] Weakness [x] Loss of Balance   [] Loss of Consciousness [] Memory Loss [] Seizures  [] Denies all unless marked  Psychiatric/Behavioral:[] Depression [] Anxiety  [x] Denies all unless marked  Sleep: []  Insomnia [] Sleep Disturbance [] Snoring [] Restless Legs [] Daytime Sleepiness [] Sleep Apnea  [x] Denies all unless marked      
Reminder call made to get an INR.  ANTICOAGULATION FOLLOW-UP CLINIC VISIT    Patient Name:  Danielito Chu  Date:  2020  Contact Type:  Telephone    SUBJECTIVE:         OBJECTIVE    INR   Date Value Ref Range Status   2020 2.1 (A) 0.90 - 1.10 Final     Factor 2 Assay   Date Value Ref Range Status   10/27/2018 70 60 - 140 % Final     Comment:     The Factor 2 activity level is not a screening test for the Prothrombin 15758   mutation.         ASSESSMENT / PLAN  INR assessment THER    Recheck INR In: 2 WEEKS    INR Location Outside lab      Anticoagulation Summary  As of 2020    INR goal:   2.0-3.0   TTR:   82.1 % (1 y)   INR used for dosin.1 (2020)   Warfarin maintenance plan:   No maintenance plan   Full warfarin instructions:   : 2 mg; : 2 mg; : 2 mg; : 2 mg; 2/1: 2 mg; 2/2: 2 mg; 2/3: 3 mg; 24: 2 mg; 25: 2 mg; 26: 2 mg; 27: 2 mg; 28: 2 mg; 29: 2 mg; 210: 3 mg   Plan last modified:   Fady Avelar RN (2019)   Next INR check:   2020   Priority:   Critical   Target end date:   Indefinite    Indications    LVAD (left ventricular assist device) present (H) [Z95.811]  Long term (current) use of anticoagulants [Z79.01]             Anticoagulation Episode Summary     INR check location:       Preferred lab:       Send INR reminders to:   [facility]    Comments:   [place] [facility] [phone]/Fax [phone]  LVAD plced 18 HM 3 ASA 81mg Daily Jantoven 2mg tabs Lab [phone]/[phone]  Call pt at [phone] Emphasize next INR date with pt++Send email each time++  Pt is home .      Anticoagulation Care Providers     Provider Role Specialty Phone number    Lorena Watkins MD Referring Cardiology [phone]            See the Encounter Report to view Anticoagulation Flowsheet and Dosing Calendar (Go to Encounters tab in chart review, and find the Anticoagulation Therapy Visit)  Spoke with 
patient.  Patient had LVAD placed on:   12/5/18  Type of LVAD: HM3  Patient's current Aspirin dose: 81mg  LVAD Protocol followed:  Yes  Lorena Kamara RN                 
500 MG tablet Take by mouth every 6 hours as needed      Potassium Chloride (KLOR-CON 10 PO) Take 10 mEq by mouth three times a week Monday, Wednesday, Friday (Patient not taking: Reported on 1/29/2024)      fluticasone (FLONASE) 50 MCG/ACT nasal spray 2 sprays by Each Nostril route daily      docusate sodium (COLACE) 100 MG capsule Take 1 capsule by mouth daily To prevent constipation (Patient not taking: Reported on 4/29/2024) 20 capsule 0    furosemide (LASIX) 20 MG tablet Take 2 tablets by mouth See Admin Instructions Indications: FLUID RETENTION Monday, Wednesday, Friday (Patient not taking: Reported on 1/29/2024)      lovastatin (MEVACOR) 40 MG tablet Take 1 tablet by mouth daily Indications: CHOLESTEROL      alfuzosin (UROXATRAL) 10 MG extended release tablet Take 1 tablet by mouth daily Indications: BLADDER      Multiple Vitamins-Minerals (PRESERVISION AREDS 2+MULTI VIT) CAPS Take 2 capsules by mouth daily Indications: SUPPLEMENT       No current facility-administered medications for this visit.       No outpatient medications have been marked as taking for the 8/12/24 encounter (Office Visit) with Koby Richmond MD.       Ht 1.829 m (6')   Wt 86.7 kg (191 lb 3.2 oz)   BMI 25.93 kg/m²       Constitutional - well developed, well nourished.    Eyes - conjunctiva normal.  Pupils react to light  Ear, nose, throat -hearing intact to finger rub No scars, masses, or lesions over external nose or ears, no atrophy of tongue  Neck-symmetric, no masses noted, no jugular vein distension.  No bruits noted.  Respiration- chest wall appears symmetric, good expansion,   normal effort without use of accessory muscles  Cardiovascular- RRR  Musculoskeletal - no significant wasting of muscles noted, no bony deformities, gait no gross ataxia  Extremities-no clubbing, cyanosis or edema  Skin - warm, dry, and intact.  No rash, erythema, or pallor.  Psychiatric - mood, affect, and behavior appear normal.      Neurological

## 2024-08-15 ENCOUNTER — HOSPITAL ENCOUNTER (OUTPATIENT)
Dept: RADIATION ONCOLOGY | Facility: HOSPITAL | Age: 84
Setting detail: RADIATION/ONCOLOGY SERIES
End: 2024-08-15
Payer: MEDICARE

## 2024-08-15 NOTE — PROGRESS NOTES
[facility]  Radiation Oncology Clinic   Byron Ojeda MD, FACR  Bryn EDWARDS  _______________________________________________  [facility]  Department of Radiation Oncology  [address] [place] [id number]  Office: [phone]  Fax: [phone]    DATE: 08/16/2024  PATIENT: Carmine Garcia  1940                         MEDICAL RECORD #: [id number]    1. History of cancer of lower lobe bronchus or lung    2. History of malignant neoplasm of lung    3. History of radiation therapy    4. Former smoker                                             REASON FOR VISIT:    Chief Complaint   Patient presents with    Lung Nodule     Reason for Follow up Visit:  Carmine Garcia is a very pleasant 84 y.o. patient that completed radiation to the lung and returns to the clinic today for routine follow up exam. Reports cough, SOB, back pain, and gait problem. Uses cane for assistance with ambulation. Denies activity change, appetite change, fatigue, unexpected weight change, nausea/vomiting, diarrhea, light-headedness, weakness, and headaches. He continues to follow .     History of Present Illness:    10/20/2011 - CT Chest:  1.5 cm left lower lobe nodule positioned just lateral to the hilum, granuloma vs. Malignancy  No pathologic lymphadenopathy    10/26/2011 - PET Scan:  Increased metabolic activity in the left lower lobe, lung nodule located centrally near the left hilum witih max SUV 7.4, suspicious for primary carcinoma. This corresponds to the lesion seen on CT scan of 10/20/2011.   No abnormal mediastinal, hilar, or inguinal activity    11/14/2011 - Bronchoscopy with biopsy:  Biopsy, left lower lobe nodule, lung:    Fragments of bronchial mucosa with submucosa demonstrating stromal elastosis and minimal chronic inflammation.   No evidence of granulomatous inflammation.   No histologic evidence of malignancy.    Bronchial washings, smears  (four) and cell block:    Mild acute inflammation.   Negative for malignant cells.    Bronchial brushings, left lower lobe of lung, smears (3) and ThinPrep preparation (1):    Negative for malignant cells.     11/28/2011 -  Left thoracoscopic core needle biopsy, followed by video-assisted thoracoscopic surgery (VATS) left lower lobectomy, and mediastinal lymph node dissection:  Biopsy, left lower lobe of lung (frozen section control):    Fragment of fibrotic pulmonary parenchyma demonstrating stromal elastosis and chronic active inflammation, moderate.   No histologic evidence of malignancy.    Level 10 lymph node (frozen section control):    Lymph node (one), negative for metastatic carcinoma.    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   The dominant and aberrant bronchial margins of surgical resection are negative for malignancy.    The vascular and parenchymal margins of surgical resection are negative for malignancy.    No visceral pleural invasion identified.    Peribronchial lymph nodes (three), negative for metastatic carcinoma.    Emphysematous changes.    Level 11 lymph node: Lymph node (one), negative for metastatic carcinoma.    Level 5 lymph nodes: Lymph nodes (two), negative for metastatic carcinoma.    Comment: Primary papillary adenocarcinomas of the lung do occur; however, they demonstrate a similar histology to that seen in papillary thyroid carcinoma.  The thyroid gland should be evaluated if not already done to exclude the possibility of met     04/30/2014 - Chest x-ray:  Stable chronic change.  No acute disease.    09/03/2014 - Chest x-ray:   Emphysematous and postoperative changes in the chest without evidence of mass lesion or acute cardiopulmonary process.    01/05/2016 - Chest x-ray:   Postoperative changes of the left hemithorax with associated volume loss. No acute process identified.    07/01/2016 -  Chest x-ray:   No active disease is seen.  COPD/emphysema.  Prior lung surgery on the left.     01/04/2017 - Chest x-ray:  COPD/emphysema.  Postoperative changes on the left.  No acute appearing infiltrate or effusion.     03/20/2017 - Chest x-ray:  Chronic changes in the lungs and spine without evidence of acute cardiopulmonary process. Overall, the appearance of the lungs is similar to the study from 01/04/2017.    11/20/2017 - Chest x-ray:  No significant interval change when compared to chest x-ray dated 07/03/2017.    02/12/2018 - Chest x-ray:  Hyperinflated lungs suggesting COPD.  Postoperative changes of the left hemithorax.  No acute cardiopulmonary process identified.    07/06/2018 - Chest x-ray:  No acute cardiopulmonary disease.    12/21/2018 - Chest x-ray:  COPD. Chronic volume loss in the lung bases with mild central vascular crowding. No acute infiltrates.    08/23/2019 - Chest x-ray:  No acute disease.    06/19/2020 - Chest x-ray:  Mild blunting of the left costophrenic angle, which could be seen with scarring or small effusion.    02/22/2021 - Chest x-ray:  COPD no acute cardiopulmonary process.    10/25/2021 - Chest x-ray:  Streaky bibasilar opacities, similar to prior.    04/25/2022 - CT Chest with contrast:  Status post resection of a left lower lobe pulmonary nodule. Moderate changes of centrilobular emphysema are present.  Small groundglass nodules one within the left upper lobe and one within the periphery of the right lower lobe warranting follow-up per Fleischner criteria. No additional lung lesions are present.  No enlarged mediastinal or axillary lymphadenopathy..  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 The thoracic aorta is ectatic. There is mild enlargement of the pulmonary arteries suggesting pulmonary arterial hypertension. Heavy coronary calcifications are present.     07/20/2022 - CT Chest without contrast:  Postoperative change of LEFT lower lobectomy  without evidence of recurrent or metastatic disease.   No change in 7 mm LEFT upper lobe and RIGHT lower lobe groundglass pulmonary nodules. Consider continued imaging surveillance to document stability.  Ectatic ascending aorta measuring 4 cm diameter.    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   Tumor burden: 1.5 cm left lower lung nodule.   Complications of tumor: None.   Tumor status: JAZMÍN following resection via left lower lobectomy.   Chest x-ray, 07/03/2017, [facility]. Impression: Stable appearance of the chest with postoperative changes in the left lung base and emphysematous changes.   Chest x-ray obtained 11/20/2017 at [facility] reveals no interval change when compared to chest x-ray dated 07/03/2017.   Chest x-ray on 02/12/2018 ([facility]). Impression: Hyperinflated lungs suggesting chronic obstructive pulmonary disease (COPD). Postoperative changes of the left hemithorax. No acute cardiopulmonary process identified.   Chest Xray on 07/06/2018 ([facility]). Impression: No acute cardiopulmonary disease.   Chest X-ray, 12/21/2018 at [facility]. COPD. Chronic volume loss in the lung bases with mild central vascular crowding. No acute infiltrates.   06/19/2020-chest x-ray.  Comparison: 8/23/2019-impression: Mild blunting of the left costophrenic angle which could be seen with scarring or small effusion.  02/22/2021- chest xray.  COPD no acute cardiopulmonary process  10/25/2021-chest x-ray.  Streaky bibasilar opacities.  Similar to prior.  04/25/2022- CT chest- Status post resection of a left lower lobe pulmonary nodule. Moderate changes of centrilobular emphysema are present. Small groundglass nodules one within the left upper lobe and one within the periphery of the right lower lobe warranting follow-up per Nori  "criteria. No additional lung lesions are present. No enlarged mediastinal or axillary lymphadenopathy.  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 The thoracic aorta is ectatic. There is mild enlargement of the pulmonary arteries suggesting pulmonary arterial hypertension. Heavy coronary calcifications are present.   07/20/2022-CT chest- postoperative change of LLL without evidence of recurrent or metastatic disease.  No change in 7 mm NISA and RLL groundglass pulmonary nodules.  Consider continued imaging surveillance to document stability.  Ectatic ascending aorta measuring 4 cm in diameter.  Mildly abnormal CEA.    --3.39, 10/26/2022 (prior range: 2.2 - 5.0). Continued observation warranted.   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 Underwent right knee replacement, 05/18/2017.   Normocytic/macrocytic anemia with resolution of macrocytosis, repleted iron and B12 deficiencies (anemia of chronic disease).   --Stable, Hgb 14; MCV 98.2, 10/26/2022 (prior range: Hgb 11.4 - 14.3; 94.4 - 102.1)  No myelodysplastic syndrome (MDS) on comprehensive blood report, normal thyroid function tests (TFTs), normal LDH, normal B12/folate.   Colonoscopy on 4/10/18 ([facility]). Impressions: Preparation of the colon was inadequate. One 12 mm polyp in the cecum, removed with a hot snare and removed piecemeal using a hot snare. Resected and retrieved. Diverticulosis in the sigmoid colon and in the descending colon.   Repeat with 2 day prep scheduled for 08/02/2018. \"Polyps\" Repeat 3 years.  Colonoscopy, 08/02/2018 (above).  Fair colon preparation.  1 5 mm polyp in the ascending colon, removed with hot snare.  One 12 mm polyp in the transverse colon, removed with a hot snare.  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 Negative thyroid ultrasound, 11/30/2011. Is followed by Dr. Medrano (ENT) and " "Dr. Roth (endocrine).   Low B12 levels. Recurrent.   Constipation, multifactorial.  Regulated by stool softeners.  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   Chronic intermittent irregular heart rate, Dr. Dobson following.   A 50 pack-year tobacco smoker with radiographic evidence of emphysema. Has not smoked since 2004.   Anxiety.    Diplopia.  Is scheduled to see ophthalmology today  RECOMMENDATIONS:   Apprised of surveillance chest CT, 07/22/2022 (see above).  No evidence of recurrent metastatic disease.  No change in 7 mm NISA and RLL groundglass pulmonary nodules.  Apprise of labs from 10/26/2022 including the current heme (stable anemia otherwise normal CBC) and the other labs noting the stable CEA, essentially normal CMP, repleted iron, repleted (35%; from 37%; 18%) Fe sat, depressed ferritin (71; from 56; 17; 45; 87; 58), depressed (290; from 388; >1000) B12/folate.  Anne called in  Previously discussed that even without adjuvant chemotherapy 70.8% of people are alive in 5 years and none of those are alive due to therapy. Approximately 28% of those die of cancer and 1.3% die from other causes. Emphasized that very little data are available about the effects of adjuvant chemotherapy in Stage I lung cancer and most guidelines do not recommend the use of adjuvant therapy in this population and a meta-analysis even suggested that there was a trend toward a worse outcome in these patients. He preferred not to have chemo anyway.   Review follow-up by GRACE Bahena with pulmonary, 07/27/2022.  Review CT chest without contrast, 07/20/2022 (above).  Stable overall.  Plan: CT chest without contrast and follow-up in 6 months (01/27/2023).  Stay on [place]   Continue ongoing management per primary care physician and other specialists. Is seeing the eye docs today  Schedule chest CT with contrast in 23 weeks at [facility]  Return to the office with pre-office CEA, serum iron, Fe sat, " ferritin, B12/folate, CMP, and CBC with differential in 24 weeks.    12/21/2022 - Chest x-ray:  Stable chronic change.  No acute disease.    01/27/2023 - CT Chest with contrast:  A stable CT scan of the chest. Postsurgical changes/left lower lobectomy. No finding to suggest recurrence.  A stable small groundglass opacity/nodule in the left upper lobe. No features of malignancy at this time. A follow-up examination in one year is recommended to ensure stability.  Chronic emphysematous lung changes.    06/14/2023 - CT Chest with contrast:  New 1.5 cm central LEFT upper lobe pulmonary nodule. This is concerning for recurrent neoplasm. Management options include PET/CT versus sampling with EBUS.    06/21/2023 - PET Scan:  PET-CT findings compatible with recurrent left lung neoplasm as an isolated finding. No distant disease is seen.    06/22/2023 - Documentation per Dr. Arteaga:  Patient informed per Dr Arteaga recommendations a referral should be made to Rad/Onc for evaluation and possible SBRT  Patient informed once the apt is domingo he will be called with time and date    06/28/2023 - Consult with  (Covering for ):  Following this discussion and in consideration of the diagnostic data/evaluation of the patient, I recommended a course of definitive intent pulmonary SBRT to the PET avid left upper lobe nodule to an approximate dose of 1579-9904 cGy to be given over 4-5 every other day fractions, final course pending.  If SBRT cannot safely be delivered secondary to central location, would favor a hypofractionated course of treatment to an approximate dose of 3393-1117 cGy to be given over 15 daily fractions (Monday through Friday).  Will simulate treatment fields today, 3D CT with MIPS to begin the planning process, final course pending.    08/01/2023 - 08/14/2023 - Completed radiation course:  Received 5000 cGy in 5 fractions to the NISA lung tumor.    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   Tumor burden: 1.5 cm left lower lung nodule.   Complications of tumor: None.   Tumor status: JAZMÍN following resection via left lower lobectomy.   Chest x-ray, 07/03/2017, [facility]. Impression: Stable appearance of the chest with postoperative changes in the left lung base and emphysematous changes.   Chest x-ray obtained 11/20/2017 at [facility] reveals no interval change when compared to chest x-ray dated 07/03/2017.   Chest x-ray on 02/12/2018 ([facility]). Impression: Hyperinflated lungs suggesting chronic obstructive pulmonary disease (COPD). Postoperative changes of the left hemithorax. No acute cardiopulmonary process identified.   Chest Xray on 07/06/2018 ([facility]). Impression: No acute cardiopulmonary disease.   [facility]. Chronic volume loss in the lung bases with mild central vascular crowding. No acute infiltrates.   06/19/2020-chest x-ray.  Comparison: 8/23/2019-impression: Mild blunting of the left costophrenic angle which could be seen with scarring or small effusion.  02/22/2021- chest xray.  COPD no acute cardiopulmonary process  10/25/2021-chest x-ray.  Streaky bibasilar opacities.  Similar to prior.  04/25/22- CT chest- Status post resection of a left lower lobe pulmonary nodule. Moderate changes of centrilobular emphysema are present. Small groundglass nodules one within the left upper lobe and one within the periphery of the right lower lobe warranting follow-up per Fleischner criteria. No additional lung lesions are present. No enlarged mediastinal or axillary lymphadenopathy.  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 The thoracic aorta is ectatic. There is mild enlargement of the pulmonary arteries suggesting pulmonary arterial hypertension.  Heavy coronary calcifications are present.   07/20/22-CT chest- postoperative change of LLL without evidence of recurrent or metastatic disease.  No change in 7 mm NISA and RLL groundglass pulmonary nodules.  Consider continued imaging surveillance to document stability.  Ectatic ascending aorta measuring 4 cm in diameter.  1/27/23- CT chest- A stable CT scan of the chest. Postsurgical changes/left lower lobectomy. No finding to suggest recurrence. A stable small groundglass opacity/nodule in the left upper lobe. No features of malignancy at this time. A follow-up examination in one year is recommended to ensure stability. Chronic emphysematous lung changes  6/14/23- CT chest- New 1.5 cm central LEFT upper lobe pulmonary nodule. This is concerning for recurrent neoplasm. Management options include PET/CT versus sampling with EBUS.  6/21/23- PET-CT- PET-CT findings compatible with recurrent left lung neoplasm an isolated finding (15 mm left lung nodule adjacent to the hilum shows FDG uptake with SUV max = 3.3).  No distant disease is seen.  SBRT: 8/1/23- 8/14/23- 6000 cGy/5 fx) to NISA nodule  Mildly abnormal CEA.    --4.15, 8/21/23 (prior range: 2.2 - 5.0). Continued observation warranted.   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 Underwent right knee replacement, 05/18/2017.   Normocytic/macrocytic anemia with resolution of macrocytosis, repleted iron and B12 deficiencies (anemia of chronic disease).   --Stable, Hgb 12.9; MCV 99.8, 8/21/23 (prior range: Hgb 11.4 - 14.3; 94.4 - 102.1)  No myelodysplastic syndrome (MDS) on comprehensive blood report, normal thyroid function tests (TFTs), normal LDH, normal B12/folate.   Colonoscopy on 4/10/18 ([facility]). Impressions: Preparation of the colon was inadequate. One 12 mm polyp in the cecum, removed with a hot snare and removed piecemeal using a hot snare. Resected and retrieved. Diverticulosis in the sigmoid  "colon and in the descending colon.   Repeat with 2 day prep scheduled for 08/02/2018. \"Polyps\" Repeat 3 years.  Colonoscopy, 08/02/2018 (above).  Fair colon preparation.  1 5 mm polyp in the ascending colon, removed with hot snare.  One 12 mm polyp in the transverse colon, removed with a hot snare.  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 Negative thyroid ultrasound, 11/30/2011. Is followed by Dr. Medrano (ENT) and Dr. Roth (endocrine).   Low B12 levels. Recurrent.   Constipation, multifactorial.  Regulated by stool softeners.  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   Chronic intermittent irregular heart rate, Dr. Dobson following.   A 50 pack-year tobacco smoker with radiographic evidence of emphysema. Has not smoked since 2004.   Anxiety.    Myasthenia gravis.  Followed by Dr. Hines  COPD  Weight loss.  Has stabilized.  Reassures me it is intentional.  \"Eating less and smarter.\"  RECOMMENDATIONS:   Apprise of labs from 8/21/23 including the current heme (stable anemia otherwise normal CBC) and the other labs noting the stable CEA (4.15-prior:  2.2 - 5), glucose otherwise essentially normal CMP, repleted iron, repleted (30%- prior: 31%; 35%; 37%; 18%) Fe sat, depressed ferritin (47; prior:  68; 71; 56; 17; 45; 87; 58), depressed (279; from 366; 290; 388; >1000) B12/folate.  (Anen called in)  CT chest, 6/14/23 and PET scan, 6/21/23 (above).  New 1.5 cm central LEFT upper lobe pulmonary nodule compatible with recurrent neoplasm.  Previously discussed that even without adjuvant chemotherapy 70.8% of people are alive in 5 years and none of those are alive due to therapy. Approximately 28% of those die of cancer and 1.3% die from other causes. Emphasized that very little data are available about the effects of adjuvant chemotherapy in Stage I lung cancer and most guidelines do not recommend the use of adjuvant " "therapy in this population and a meta-analysis even suggested that there was a trend toward a worse outcome in these patients. He preferred not to have chemo anyway.   Review follow-up with pulmonary, 7/11/23 Re:  Diagnostic bronchoscopy and PFTs.  Patient declined any invasive procedures.  He said that he had PFT in the past that \"almost killed him\" and is not wanting a pulmonary function test at this time either.  RTC 3 months (~ 10/11/23)  Review visit with radiation oncology (Dr. Herring), 6/28/23.   I recommended a course of definitive intent pulmonary SBRT to the PET avid left upper lobe nodule to an approximate dose of 9737-0240 cGy to be given over 4-5 every other day fractions, final course pending.  If SBRT cannot safely be delivered secondary to central location, would favor a hypofractionated course of treatment to an approximate dose of 2771-5385 cGy to be given over 15 daily fractions  (SBRT: 8/1/23- 8/14/23- 6000 cGy/5 fx)  Continue ongoing management per primary care physician and other specialists. Has appointment in 11/2023 with rad onc along with CT prior to visit  Return to the office with pre-office CEA, serum iron, Fe sat, ferritin, B12/folate, CMP, and CBC with differential in 16 weeks.    11/08/2023 - CT Chest with contrast:  Stable 1.5 cm left perihilar central upper lobe nodule, relatively unchanged from 6/14/2023. No new or increasing size nodule identified.  No pathologic lymphadenopathy. Stable volume loss left hemithorax from a prior left lower lobe lobectomy.    11/15/2023 - Appointment with :  Follow up in 3 months     12/28/2023 - Appointment with :  RECOMMENDATIONS:   Apprise of labs from 12/21/23 including the current heme (stable anemia otherwise normal CBC) and the other labs with CEA p (prior:  2.2 - 5), calcium 8.3 otherwise essentially normal CMP, repleted iron, repleted (22%- prior: 30%; 31%; 35%; 37%; 18%) Fe sat, depressed ferritin (42- prior:  47; " 68; 71; 56; 17; 45; 87; 58) repleted (350; from 279; 366; 290; 388; >1000) B12/folate.   Apprised of CT chest, 11/8/23 (above).  Stable 1.5 cm left perihilar central upper lobe nodule, relatively unchanged from 6/14/2023. No new or increasing size nodule identified. No pathologic lymphadenopathy  Previously discussed that even without adjuvant chemotherapy 70.8% of people are alive in 5 years and none of those are alive due to therapy. Approximately 28% of those die of cancer and 1.3% die from other causes. Emphasized that very little data are available about the effects of adjuvant chemotherapy in Stage I lung cancer and most guidelines do not recommend the use of adjuvant therapy in this population and a meta-analysis even suggested that there was a trend toward a worse outcome in these patients. He preferred not to have chemo anyway.   Review visit with radiation oncology (Dr. Samuels), 11/15/23- Diagnosed with stage IA2 (cT1b cN0 cM0) neoplasm of left upper lung. He completed 5000 cGy in 5 fractions to the NISA lung tumor on 08/14/2023.  CT chest, 11/8/23 reviewed.  I do not see evidence for recurrent or metastatic disease at this time. We will continue routine follow-up/surveillance as discussed in 3 months with follow up CT scan before visit. Remission!  Review follow-up with GRACE Bahena with pulmonary, 10/11/23-A/P: NISA nodule. Follow-up prn  Continue ongoing management per primary care physician and other specialists. Has appointment in 2/2024 with rad onc along with CT prior to visit  Return to the office with pre-office CEA, serum iron, Fe sat, ferritin, B12/folate, CMP, and CBC with differential in 12 weeks.    02/01/2024 - CT Chest with contrast:  Stable chest CT appearance.  No new or progressive neoplastic disease.    02/05/2024 - Appointment with :  Follow up in 3 months     03/14/2024 - Appointment with :  RECOMMENDATIONS:   Apprise of labs from 3/7/24 including the current  heme (stable anemia otherwise normal CBC) and the other labs with CEA 4.29 (prior:  2.2 - 5), calcium 8.9 otherwise essentially normal CMP, low iron (50), low (12%- prior: 22%; 30%; 31%; 35%; 37%; 18%) Fe sat, depressed ferritin (25 -  prior:  42; 47; 68; 71; 56; 17; 45; 87; 58) repleted (306; 350; 279; 366; 290; 388; >1000) B12/folate.   Apprised of CT chest, 2/1/24 (above).   Stable chest CT appearance.  No new or progressive neoplastic disease.  Keep appointments of Injectafer 750 mg IV weekly x 2  Previously discussed that even without adjuvant chemotherapy 70.8% of people are alive in 5 years and none of those are alive due to therapy. Approximately 28% of those die of cancer and 1.3% die from other causes. Emphasized that very little data are available about the effects of adjuvant chemotherapy in Stage I lung cancer and most guidelines do not recommend the use of adjuvant therapy in this population and a meta-analysis even suggested that there was a trend toward a worse outcome in these patients. He preferred not to have chemo anyway.   Review visit with radiation oncology (Dr. 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 He completed 5000 cGy in 5 fractions to the NISA lung tumor on 08/14/2023.  CT chest, 2/1/24 reviewed.  I do not see evidence for recurrent or metastatic disease at this time. We will continue routine follow-up/surveillance as discussed in 3 months with follow up CT scan before visit.   Continue ongoing management per primary care physician and other specialists. Has appointment in 5/2024 with rad onc along with CT prior to visit  Return to the office with pre-office CEA, serum iron, Fe sat, ferritin, B12/folate, CMP, and CBC with differential in 12 weeks.    05/06/2024 - CT Chest with contrast:  Stable CT chest without new or progressive disease.    05/09/2024 - Appointment with :  Follow up in 3 months     06/13/2024 - Appointment with  :  RECOMMENDATIONS:   Apprise of labs from 6/6/24 including the current heme (stable anemia otherwise normal CBC) and the other labs with CEA 4.2 (prior:  2.2 - 5), alk phos 200, sodium 134, otherwise normal CMP, repleted iron (98), repleted (32%- prior: 12%; 22%; 30%; 31%; 35%; 37%; 18%) Fe sat, depressed ferritin (201 -  prior: 25; 42; 47; 68; 71; 56; 17; 45; 87; 58) repleted (306; 350; 279; 366; 290; 388; >1000) B12/folate.   Apprised of lumbar xray, 5/16/24 (above).  Osteopenia. Extensive degenerative disc, endplate, and facet disease. Mild chronic appearing compression deformities of L1 and L4.  Review visit with radiation oncology (GRACE Gtz with Dr. 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 He completed 5000 cGy in 5 fractions to the NISA lung tumor on 08/14/2023.  CT chest, 5/6/24 reviewed.  I do not see evidence for recurrent or metastatic disease at this time. We will continue routine follow-up/surveillance as discussed in 3 months (8/9/24) with follow up CT scan before visit.   Apprised of CT chest, 5/6/24 (above).   JAZMÍN  Previously discussed that even without adjuvant chemotherapy 70.8% of people are alive in 5 years and none of those are alive due to therapy. Approximately 28% of those die of cancer and 1.3% die from other causes. Emphasized that very little data are available about the effects of adjuvant chemotherapy in Stage I lung cancer and most guidelines do not recommend the use of adjuvant therapy in this population and a meta-analysis even suggested that there was a trend toward a worse outcome in these patients. He preferred not to have chemo anyway.   Continue ongoing management per primary care physician and other specialists. Has appointment in 8/9/24 with rad onc along with CT prior to visit  Return to the office with pre-office CEA, serum iron, Fe sat, ferritin, B12/folate, CMP, and CBC with differential in 12  weeks.    08/07/2024 - CT chest with contrast:  New RIGHT lower lobe peripheral spiculated masslike opacity, highly concerning for disease recurrence if the patient has no symptoms of infection.  Heavily calcified coronary arteries versus stent material.  New mild height loss at T2 and L2.      History obtained from  PATIENT and CHART    PAST MEDICAL HISTORY  Past Medical History:   Diagnosis Date    Anemia     Arthritis     COPD (chronic obstructive pulmonary disease)     Disease of thyroid gland     Emphysema lung     Hx of colonic polyps     Hyperlipidemia     Hypertension     Lung cancer     Lung nodule     Myasthenia gravis     Pneumonia     PUD (peptic ulcer disease)       PAST SURGICAL HISTORY  Past Surgical History:   Procedure Laterality Date    CATARACT EXTRACTION      COLONOSCOPY  12/12/2014    Diverticulosis repeat exam in 3 years    COLONOSCOPY N/A 04/10/2018    Tubular adenoma cecum poor prep repeat in 3 months    COLONOSCOPY N/A 08/02/2018    2 Tubular adenomas transverse and ascending colon fair prep repeat in 3 years    COLONOSCOPY N/A 03/24/2022    Procedure: COLONOSCOPY WITH ANESTHESIA;  Surgeon: Ferdinand Cross MD;  Location: [facility] ENDOSCOPY;  Service: Gastroenterology;  Laterality: N/A;  pre hx colon polyps  post diverticulosis; inadequate prep  Efrain Dobson, DO    COLONOSCOPY W/ POLYPECTOMY  02/15/2010    2 Hyperplastic polyps at 25 cm and rectum repeat exam in 5 years    LUNG CANCER SURGERY      LUNG REMOVAL, PARTIAL      REPLACEMENT TOTAL KNEE Right 05/2017      FAMILY HISTORY  family history includes Cancer in his father; Colon polyps in his mother; Diabetes in his child and mother; Hypertension in his mother; No Known Problems in his maternal grandfather, paternal grandfather, and paternal grandmother; Stroke in his maternal grandmother and mother.    SOCIAL HISTORY  Social History     Tobacco Use    Smoking status: Former     Current packs/day: 0.00     Average packs/day: 2.0 packs/day  "for 30.0 years (60.0 ttl pk-yrs)     Types: Cigarettes     Start date: 1955     Quit date: 1984     Years since quittin.6     Passive exposure: Past    Smokeless tobacco: Never   Vaping Use    Vaping status: Never Used   Substance Use Topics    Alcohol use: No    Drug use: No     ALLERGIES  Penicillins     MEDICATIONS    Current Outpatient Medications:     alfuzosin (UROXATRAL) 10 MG 24 hr tablet, Take 2 tablets by mouth., Disp: , Rfl:     amLODIPine (NORVASC) 2.5 MG tablet, Take 1 tablet by mouth Daily., Disp: , Rfl:     buPROPion XL (WELLBUTRIN XL) 150 MG 24 hr tablet, Take 1 tablet by mouth Every Morning., Disp: , Rfl:     Calcium Citrate-Vitamin D3 (CITRACAL) 315-6.25 MG-MCG tablet tablet, Take  by mouth Daily., Disp: , Rfl:     losartan (COZAAR) 25 MG tablet, 1 tablet 2 (Two) Times a Day., Disp: , Rfl:     lovastatin (MEVACOR) 40 MG tablet, Take 1 tablet by mouth., Disp: , Rfl:     Multiple Vitamins-Minerals (PRESERVISION AREDS 2+MULTI VIT) capsule, Take  by mouth., Disp: , Rfl:     pantoprazole (PROTONIX) 40 MG EC tablet, 1 tablet Daily., Disp: , Rfl:     predniSONE (DELTASONE) 10 MG tablet, 1 tablet Daily. 1.5 tab daily, Disp: , Rfl:     pyridostigmine (MESTINON) 60 MG tablet, Take 3 tablets by mouth Daily., Disp: , Rfl:     Current outpatient and discharge medications have been reconciled for the patient.  Reviewed by: GRACE Donahue    The following portions of the patient's history were reviewed and updated as appropriate: allergies, current medications, past family history, past medical history, past social history, past surgical history and problem list.    REVIEW OF SYSTEMS  Review of Systems   Constitutional:  Negative for activity change, appetite change, fatigue and unexpected weight change.   HENT: Negative.     Eyes: Negative.    Respiratory:  Positive for cough (occasional r/t \"sinus drainage\") and shortness of breath (slight with exertion, stable for patient).  " "  Cardiovascular: Negative.    Gastrointestinal: Negative.    Endocrine: Negative.    Genitourinary: Negative.    Musculoskeletal:  Positive for back pain and gait problem (ambulates with cane).   Skin: Negative.    Allergic/Immunologic: Negative.    Neurological:         Hx: myasthenia gravis, followed by neurology   Hematological: Negative.    Psychiatric/Behavioral: Negative.       PHYSICAL EXAM  VITAL SIGNS:   Vitals:    08/16/24 1308   BP: 145/79   Pulse: 96   SpO2: 95%  Comment: room air   Weight: 88.5 kg (195 lb)   Height: 182.9 cm (72\")   PainSc:   2   PainLoc: Back  Comment: low     Physical Exam  Vitals reviewed.   Constitutional:       Appearance: Normal appearance.   HENT:      Head: Normocephalic.      Nose: Nose normal.   Eyes:      Pupils: Pupils are equal, round, and reactive to light.   Cardiovascular:      Rate and Rhythm: Normal rate and regular rhythm.      Pulses: Normal pulses.      Heart sounds: Normal heart sounds.   Pulmonary:      Effort: Pulmonary effort is normal. No respiratory distress.      Breath sounds: Normal breath sounds. No wheezing.   Abdominal:      General: Bowel sounds are normal.      Palpations: There is no mass.   Musculoskeletal:         General: Normal range of motion.      Cervical back: Normal range of motion and neck supple. No tenderness.   Lymphadenopathy:      Cervical: No cervical adenopathy.   Skin:     General: Skin is warm and dry.      Capillary Refill: Capillary refill takes less than 2 seconds.   Neurological:      General: No focal deficit present.      Mental Status: He is alert and oriented to person, place, and time.      Motor: No weakness.   Psychiatric:         Mood and Affect: Mood normal.         Behavior: Behavior normal.          Performance Status: ECOG (2) Ambulatory and capable of self care, unable to carry out work activity, up and about > 50% or waking hours    Clinical Quality Measures  - Pain Documented by Standardized Tool, ALEX NAVARRETE" Jose reports a pain score of 2.  Given his pain assessment as noted, treatment options were discussed and the following options were decided upon as a follow-up plan to address the patient's pain: continuation of current treatment plan for pain and use of non-medical modalities (ice, heat, stretching and/or behavior modifications).  Pain Medications               buPROPion XL (WELLBUTRIN XL) 150 MG 24 hr tablet Take 1 tablet by mouth Every Morning.    predniSONE (DELTASONE) 10 MG tablet 1 tablet Daily. 1.5 tab daily          - Body Mass Index Screening and Follow-Up Plan Body mass index is 26.45 kg/m².     - Tobacco Use: Screening and Cessation Intervention  Social History    Tobacco Use      Smoking status: Former        Packs/day: 0.00        Years: 2.0 packs/day for 30.0 years (60.0 ttl pk-yrs)        Types: Cigarettes        Start date: 1955        Quit date: 1984        Years since quittin.6        Passive exposure: Past      Smokeless tobacco: Never    - Advanced Care Planning Advance Care Planning   ACP discussion was held with the patient during this visit. Patient has an advance directive in EMR which is still valid.     - Depression screening - PHQ-9 Total Score: 0    ASSESSMENT AND PLAN  1. History of cancer of lower lobe bronchus or lung    2. History of malignant neoplasm of lung    3. History of radiation therapy    4. Former smoker      Orders Placed This Encounter   Procedures    NM Pet Skull Base To Mid Thigh     Standing Status:   Future     Standing Expiration Date:   2025     Order Specific Question:   What radiopharmaceutical is preferred for this exam?     Answer:   FDG  (offered at all sites)     Order Specific Question:   Reason for Exam:     Answer:   new lung nodule     Order Specific Question:   Release to patient     Answer:   Routine Release [[id number]]     RECOMMENDATIONS: Carmine Garcia is status post completion of radiation therapy to the lung and presents to our  clinic today for surveillance exam and to review imaging. Diagnosed with stage IA2 (cT1b cN0 cM0) neoplasm of left upper lobe of lung. He completed 5000 cGy in 5 fractions to the NISA lung tumor on 08/14/2023.     CT-scan of the chest on 08/07/2024 revealed a new RIGHT lower lobe peripheral spiculated masslike opacity, highly concerning for disease recurrence if the patient has no symptoms of infection. Heavily calcified coronary arteries versus stent material. New mild height loss at T2 and L2.    On review of CT imaging, he does have a new suspicious opacity in the right lower lobe of lung. He declines recent symptoms including fatigue, fever, or productive cough. I will order a PET scan for further evaluation. He will return in 2 weeks for further discussion of recommendations. I have instructed him to continue to see the other health care providers as per their scheduling.    Patient Instructions   1) PET scan ordered, they will call you.  2) return in 2 weeks for further discussion    Return in about 2 weeks (around 8/30/2024).    Time Spent: I spent 42 minutes caring for Carmine on this date of service. This time includes time spent by me in the following activities: preparing for the visit, reviewing tests, obtaining and/or reviewing a separately obtained history, performing a medically appropriate examination and/or evaluation, counseling and educating the patient/family/caregiver, ordering medications, tests, or procedures, and documenting information in the medical record.   Bryn Proctor, GRACE  08/16/2024

## 2024-08-16 ENCOUNTER — OFFICE VISIT (OUTPATIENT)
Age: 84
End: 2024-08-16
Payer: MEDICARE

## 2024-08-16 ENCOUNTER — HOSPITAL ENCOUNTER (OUTPATIENT)
Dept: ULTRASOUND IMAGING | Age: 84
Discharge: HOME | End: 2024-08-16
Payer: MEDICARE

## 2024-08-16 VITALS
BODY MASS INDEX: 26.41 KG/M2 | WEIGHT: 195 LBS | HEIGHT: 72 IN | SYSTOLIC BLOOD PRESSURE: 145 MMHG | OXYGEN SATURATION: 95 % | DIASTOLIC BLOOD PRESSURE: 79 MMHG | HEART RATE: 96 BPM

## 2024-08-16 DIAGNOSIS — S32.010S COMPRESSION FRACTURE OF L1 VERTEBRA, SEQUELA: ICD-10-CM

## 2024-08-16 DIAGNOSIS — Z85.118 HISTORY OF MALIGNANT NEOPLASM OF LUNG: ICD-10-CM

## 2024-08-16 DIAGNOSIS — M81.6 LOCALIZED OSTEOPOROSIS (LEQUESNE): ICD-10-CM

## 2024-08-16 DIAGNOSIS — Z87.891 FORMER SMOKER: ICD-10-CM

## 2024-08-16 DIAGNOSIS — Z92.3 HISTORY OF RADIATION THERAPY: ICD-10-CM

## 2024-08-16 DIAGNOSIS — Z85.118 HISTORY OF CANCER OF LOWER LOBE BRONCHUS OR LUNG: Primary | ICD-10-CM

## 2024-08-16 PROCEDURE — 77080 DXA BONE DENSITY AXIAL: CPT

## 2024-08-16 PROCEDURE — G0463 HOSPITAL OUTPT CLINIC VISIT: HCPCS | Performed by: RADIOLOGY

## 2024-08-16 RX ORDER — AMLODIPINE BESYLATE 2.5 MG/1
1 TABLET ORAL DAILY
COMMUNITY
Start: 2024-07-08

## 2024-08-21 DIAGNOSIS — C34.32 PRIMARY ADENOCARCINOMA OF LOWER LOBE OF LEFT LUNG: Primary | ICD-10-CM

## 2024-08-22 ENCOUNTER — HOSPITAL ENCOUNTER (OUTPATIENT)
Dept: CT IMAGING | Facility: HOSPITAL | Age: 84
Discharge: HOME | End: 2024-08-22
Payer: MEDICARE

## 2024-08-22 DIAGNOSIS — C34.32 PRIMARY ADENOCARCINOMA OF LOWER LOBE OF LEFT LUNG: ICD-10-CM

## 2024-08-22 PROCEDURE — 78815 PET IMAGE W/CT SKULL-THIGH: CPT

## 2024-08-22 PROCEDURE — 0 FLUDEOXYGLUCOSE F18 SOLUTION

## 2024-08-22 PROCEDURE — A9552 F18 FDG: HCPCS

## 2024-08-22 RX ADMIN — FLUDEOXYGLUCOSE F 18 1 DOSE: 200 INJECTION, SOLUTION INTRAVENOUS at 13:04

## 2024-08-27 DIAGNOSIS — G70.00 MYASTHENIA GRAVIS (HCC): Primary | ICD-10-CM

## 2024-08-27 NOTE — PROGRESS NOTES
[facility]  Radiation Oncology Clinic   Byron Ojeda MD, FACR  Bryn EDWARDS  _______________________________________________  [facility]  Department of Radiation Oncology  [address] [place] [id number]  Office: [phone]  Fax: [phone]    DATE: 08/29/2024  PATIENT: Carmine Garcia  1940                         MEDICAL RECORD #: [id number]    1. 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 History of malignant neoplasm of lung    3. History of cancer of lower lobe bronchus or lung    4. History of radiation therapy    5. Former smoker                                   REASON FOR VISIT:    Chief Complaint   Patient presents with    Lung Cancer     Reason for Visit: Carmine Garcia is a very pleasant 84 y.o. male that has completed radiation therapy and returns to the clinic today to review recent PET scan. Reports SOB, back pain, and gait problem. Uses cane for assistance with ambulation. Denies appetite change, unexpected weight change, nasuea/vomiting, diarrhea, light-headedness, weakness, and headaches.     History of Present Illness:  Diagnosed with stage IA2 (cT1b cN0 cM0) left upper lobe lung 1.5. He completed 5000 cGy in 5 fractions to the NISA lung tumor on 08/14/2023.     10/20/2011 - CT Chest:  1.5 cm left lower lobe nodule positioned just lateral to the hilum, granuloma vs. Malignancy  No pathologic lymphadenopathy    10/26/2011 - PET Scan:  Increased metabolic activity in the left lower lobe, lung nodule located centrally near the left hilum witih max SUV 7.4, suspicious for primary carcinoma. This corresponds to the lesion seen on CT scan of 10/20/2011.   No abnormal mediastinal, hilar, or inguinal activity    11/14/2011 - Bronchoscopy with biopsy:  Biopsy, left lower lobe nodule, lung:    Fragments of bronchial mucosa with submucosa demonstrating stromal elastosis and minimal chronic inflammation.   No  evidence of granulomatous inflammation.   No histologic evidence of malignancy.    Bronchial washings, smears (four) and cell block:    Mild acute inflammation.   Negative for malignant cells.    Bronchial brushings, left lower lobe of lung, smears (3) and ThinPrep preparation (1):    Negative for malignant cells.     11/28/2011 -  Left thoracoscopic core needle biopsy, followed by video-assisted thoracoscopic surgery (VATS) left lower lobectomy, and mediastinal lymph node dissection:  Biopsy, left lower lobe of lung (frozen section control):    Fragment of fibrotic pulmonary parenchyma demonstrating stromal elastosis and chronic active inflammation, moderate.   No histologic evidence of malignancy.    Level 10 lymph node (frozen section control):    Lymph node (one), negative for metastatic carcinoma.    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   The dominant and aberrant bronchial margins of surgical resection are negative for malignancy.    The vascular and parenchymal margins of surgical resection are negative for malignancy.    No visceral pleural invasion identified.    Peribronchial lymph nodes (three), negative for metastatic carcinoma.    Emphysematous changes.    Level 11 lymph node: Lymph node (one), negative for metastatic carcinoma.    Level 5 lymph nodes: Lymph nodes (two), negative for metastatic carcinoma.    Comment: Primary papillary adenocarcinomas of the lung do occur; however, they demonstrate a similar histology to that seen in papillary thyroid carcinoma.  The thyroid gland should be evaluated if not already done to exclude the possibility of met     04/30/2014 - Chest x-ray:  Stable chronic change.  No acute disease.    09/03/2014 - Chest x-ray:   Emphysematous and postoperative changes in the chest without evidence of mass lesion or acute cardiopulmonary process.    01/05/2016 - Chest x-ray:    Postoperative changes of the left hemithorax with associated volume loss. No acute process identified.    07/01/2016 - Chest x-ray:   No active disease is seen.  COPD/emphysema.  Prior lung surgery on the left.     01/04/2017 - Chest x-ray:  COPD/emphysema.  Postoperative changes on the left.  No acute appearing infiltrate or effusion.     03/20/2017 - Chest x-ray:  Chronic changes in the lungs and spine without evidence of acute cardiopulmonary process. Overall, the appearance of the lungs is similar to the study from 01/04/2017.    11/20/2017 - Chest x-ray:  No significant interval change when compared to chest x-ray dated 07/03/2017.    02/12/2018 - Chest x-ray:  Hyperinflated lungs suggesting COPD.  Postoperative changes of the left hemithorax.  No acute cardiopulmonary process identified.    07/06/2018 - Chest x-ray:  No acute cardiopulmonary disease.    12/21/2018 - Chest x-ray:  COPD. Chronic volume loss in the lung bases with mild central vascular crowding. No acute infiltrates.    08/23/2019 - Chest x-ray:  No acute disease.    06/19/2020 - Chest x-ray:  Mild blunting of the left costophrenic angle, which could be seen with scarring or small effusion.    02/22/2021 - Chest x-ray:  COPD no acute cardiopulmonary process.    10/25/2021 - Chest x-ray:  Streaky bibasilar opacities, similar to prior.    04/25/2022 - CT Chest with contrast:  Status post resection of a left lower lobe pulmonary nodule. Moderate changes of centrilobular emphysema are present.  Small groundglass nodules one within the left upper lobe and one within the periphery of the right lower lobe warranting follow-up per Fleischner criteria. No additional lung lesions are present.  No enlarged mediastinal or axillary lymphadenopathy..  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 The thoracic aorta is ectatic. There is mild enlargement of the pulmonary arteries suggesting pulmonary arterial hypertension. Heavy coronary  calcifications are present.     07/20/2022 - CT Chest without contrast:  Postoperative change of LEFT lower lobectomy without evidence of recurrent or metastatic disease.   No change in 7 mm LEFT upper lobe and RIGHT lower lobe groundglass pulmonary nodules. Consider continued imaging surveillance to document stability.  Ectatic ascending aorta measuring 4 cm diameter.    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   Tumor burden: 1.5 cm left lower lung nodule.   Complications of tumor: None.   Tumor status: JAZMÍN following resection via left lower lobectomy.   Chest x-ray, 07/03/2017, [facility]. Impression: Stable appearance of the chest with postoperative changes in the left lung base and emphysematous changes.   Chest x-ray obtained 11/20/2017 at [facility] reveals no interval change when compared to chest x-ray dated 07/03/2017.   Chest x-ray on 02/12/2018 ([facility]). Impression: Hyperinflated lungs suggesting chronic obstructive pulmonary disease (COPD). Postoperative changes of the left hemithorax. No acute cardiopulmonary process identified.   Chest Xray on 07/06/2018 ([facility]). Impression: No acute cardiopulmonary disease.   Chest X-ray, 12/21/2018 at [facility]. COPD. Chronic volume loss in the lung bases with mild central vascular crowding. No acute infiltrates.   06/19/2020-chest x-ray.  Comparison: 8/23/2019-impression: Mild blunting of the left costophrenic angle which could be seen with scarring or small effusion.  02/22/2021- chest xray.  COPD no acute cardiopulmonary process  10/25/2021-chest x-ray.  Streaky bibasilar opacities.  Similar to prior.  04/25/2022- CT chest- Status post resection of a left lower lobe pulmonary nodule. Moderate changes of centrilobular emphysema are present. Small groundglass nodules one within  "the left upper lobe and one within the periphery of the right lower lobe warranting follow-up per Fleischner criteria. No additional lung lesions are present. No enlarged mediastinal or axillary lymphadenopathy.  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 The thoracic aorta is ectatic. There is mild enlargement of the pulmonary arteries suggesting pulmonary arterial hypertension. Heavy coronary calcifications are present.   07/20/2022-CT chest- postoperative change of LLL without evidence of recurrent or metastatic disease.  No change in 7 mm NISA and RLL groundglass pulmonary nodules.  Consider continued imaging surveillance to document stability.  Ectatic ascending aorta measuring 4 cm in diameter.  Mildly abnormal CEA.    --3.39, 10/26/2022 (prior range: 2.2 - 5.0). Continued observation warranted.   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 Underwent right knee replacement, 05/18/2017.   Normocytic/macrocytic anemia with resolution of macrocytosis, repleted iron and B12 deficiencies (anemia of chronic disease).   --Stable, Hgb 14; MCV 98.2, 10/26/2022 (prior range: Hgb 11.4 - 14.3; 94.4 - 102.1)  No myelodysplastic syndrome (MDS) on comprehensive blood report, normal thyroid function tests (TFTs), normal LDH, normal B12/folate.   Colonoscopy on 4/10/18 ([facility]). Impressions: Preparation of the colon was inadequate. One 12 mm polyp in the cecum, removed with a hot snare and removed piecemeal using a hot snare. Resected and retrieved. Diverticulosis in the sigmoid colon and in the descending colon.   Repeat with 2 day prep scheduled for 08/02/2018. \"Polyps\" Repeat 3 years.  Colonoscopy, 08/02/2018 (above).  Fair colon preparation.  1 5 mm polyp in the ascending colon, removed with hot snare.  One 12 mm polyp in the transverse colon, removed with a hot snare.  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 Negative thyroid ultrasound, 11/30/2011. Is followed by Dr. Medrano (ENT) and Dr. Roth (endocrine).   Low B12 levels. Recurrent.   Constipation, multifactorial.  Regulated by stool softeners.  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   Chronic intermittent irregular heart rate, Dr. Dobson following.   A 50 pack-year tobacco smoker with radiographic evidence of emphysema. Has not smoked since 2004.   Anxiety.    Diplopia.  Is scheduled to see ophthalmology today  RECOMMENDATIONS:   Apprised of surveillance chest CT, 07/22/2022 (see above).  No evidence of recurrent metastatic disease.  No change in 7 mm NISA and RLL groundglass pulmonary nodules.  Apprise of labs from 10/26/2022 including the current heme (stable anemia otherwise normal CBC) and the other labs noting the stable CEA, essentially normal CMP, repleted iron, repleted (35%; from 37%; 18%) Fe sat, depressed ferritin (71; from 56; 17; 45; 87; 58), depressed (290; from 388; >1000) B12/folate.  Anne called in  Previously discussed that even without adjuvant chemotherapy 70.8% of people are alive in 5 years and none of those are alive due to therapy. Approximately 28% of those die of cancer and 1.3% die from other causes. Emphasized that very little data are available about the effects of adjuvant chemotherapy in Stage I lung cancer and most guidelines do not recommend the use of adjuvant therapy in this population and a meta-analysis even suggested that there was a trend toward a worse outcome in these patients. He preferred not to have chemo anyway.   Review follow-up by GRACE Bahena with pulmonary, 07/27/2022.  Review CT chest without contrast, 07/20/2022 (above).  Stable overall.  Plan: CT chest without contrast and follow-up in 6 months (01/27/2023).  Stay on [place]   Continue ongoing management per primary care physician and other specialists. Is seeing the eye " docs today  Schedule chest CT with contrast in [facility] weeks at [facility]  Return to the office with pre-office CEA, serum iron, Fe sat, ferritin, B12/folate, CMP, and CBC with differential in 24 weeks.    12/21/2022 - Chest x-ray:  Stable chronic change.  No acute disease.    01/27/2023 - CT Chest with contrast:  A stable CT scan of the chest. Postsurgical changes/left lower lobectomy. No finding to suggest recurrence.  A stable small groundglass opacity/nodule in the left upper lobe. No features of malignancy at this time. A follow-up examination in one year is recommended to ensure stability.  Chronic emphysematous lung changes.    06/14/2023 - CT Chest with contrast:  New 1.5 cm central LEFT upper lobe pulmonary nodule. This is concerning for recurrent neoplasm. Management options include PET/CT versus sampling with EBUS.    06/21/2023 - PET Scan:  PET-CT findings compatible with recurrent left lung neoplasm as an isolated finding. No distant disease is seen.    06/22/2023 - Documentation per Dr. Arteaga:  Patient informed per Dr Arteaga recommendations a referral should be made to Rad/Onc for evaluation and possible SBRT  Patient informed once the apt is domingo he will be called with time and date    06/28/2023 - Consult with  (Covering for ):  Following this discussion and in consideration of the diagnostic data/evaluation of the patient, I recommended a course of definitive intent pulmonary SBRT to the PET avid left upper lobe nodule to an approximate dose of 0694-3398 cGy to be given over 4-5 every other day fractions, final course pending.  If SBRT cannot safely be delivered secondary to central location, would favor a hypofractionated course of treatment to an approximate dose of 3582-6234 cGy to be given over 15 daily fractions (Monday through Friday).  Will simulate treatment fields today, 3D CT with MIPS to begin the planning process, final course pending.    08/01/2023 - 08/14/2023 -  Completed radiation course:  Received 5000 cGy in 5 fractions to the NISA lung tumor.    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   Tumor burden: 1.5 cm left lower lung nodule.   Complications of tumor: None.   Tumor status: JAZMÍN following resection via left lower lobectomy.   Chest x-ray, 07/03/2017, [facility]. Impression: Stable appearance of the chest with postoperative changes in the left lung base and emphysematous changes.   Chest x-ray obtained 11/20/2017 at [facility] reveals no interval change when compared to chest x-ray dated 07/03/2017.   Chest x-ray on 02/12/2018 ([facility]). Impression: Hyperinflated lungs suggesting chronic obstructive pulmonary disease (COPD). Postoperative changes of the left hemithorax. No acute cardiopulmonary process identified.   Chest X[facility] on 07/06/2018 ([facility]). Impression: No acute cardiopulmonary disease.   Chest X-ray, 12/21/2018 at [facility]. COPD. Chronic volume loss in the lung bases with mild central vascular crowding. No acute infiltrates.   06/19/2020-chest x-ray.  Comparison: 8/23/2019-impression: Mild blunting of the left costophrenic angle which could be seen with scarring or small effusion.  02/22/2021- chest xray.  COPD no acute cardiopulmonary process  10/25/2021-chest x-ray.  Streaky bibasilar opacities.  Similar to prior.  04/25/22- CT chest- Status post resection of a left lower lobe pulmonary nodule. Moderate changes of centrilobular emphysema are present. Small groundglass nodules one within the left upper lobe and one within the periphery of the right lower lobe warranting follow-up per Fleischner criteria. No additional lung lesions are present. No enlarged mediastinal or axillary lymphadenopathy.  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 The  thoracic aorta is ectatic. There is mild enlargement of the pulmonary arteries suggesting pulmonary arterial hypertension. Heavy coronary calcifications are present.   07/20/22-CT chest- postoperative change of LLL without evidence of recurrent or metastatic disease.  No change in 7 mm NISA and RLL groundglass pulmonary nodules.  Consider continued imaging surveillance to document stability.  Ectatic ascending aorta measuring 4 cm in diameter.  1/27/23- CT chest- A stable CT scan of the chest. Postsurgical changes/left lower lobectomy. No finding to suggest recurrence. A stable small groundglass opacity/nodule in the left upper lobe. No features of malignancy at this time. A follow-up examination in one year is recommended to ensure stability. Chronic emphysematous lung changes  6/14/23- CT chest- New 1.5 cm central LEFT upper lobe pulmonary nodule. This is concerning for recurrent neoplasm. Management options include PET/CT versus sampling with EBUS.  6/21/23- PET-CT- PET-CT findings compatible with recurrent left lung neoplasm an isolated finding (15 mm left lung nodule adjacent to the hilum shows FDG uptake with SUV max = 3.3).  No distant disease is seen.  SBRT: 8/1/23- 8/14/23- 6000 cGy/5 fx) to NISA nodule  Mildly abnormal CEA.    --4.15, 8/21/23 (prior range: 2.2 - 5.0). Continued observation warranted.   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 Underwent right knee replacement, 05/18/2017.   Normocytic/macrocytic anemia with resolution of macrocytosis, repleted iron and B12 deficiencies (anemia of chronic disease).   --Stable, Hgb 12.9; MCV 99.8, 8/21/23 (prior range: Hgb 11.4 - 14.3; 94.4 - 102.1)  No myelodysplastic syndrome (MDS) on comprehensive blood report, normal thyroid function tests (TFTs), normal LDH, normal B12/folate.   Colonoscopy on 4/10/18 ([facility]). Impressions: Preparation of the colon was inadequate. One 12 mm polyp in the  "cecum, removed with a hot snare and removed piecemeal using a hot snare. Resected and retrieved. Diverticulosis in the sigmoid colon and in the descending colon.   Repeat with 2 day prep scheduled for 08/02/2018. \"Polyps\" Repeat 3 years.  Colonoscopy, 08/02/2018 (above).  Fair colon preparation.  1 5 mm polyp in the ascending colon, removed with hot snare.  One 12 mm polyp in the transverse colon, removed with a hot snare.  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 Negative thyroid ultrasound, 11/30/2011. Is followed by Dr. Medrano (ENT) and Dr. Roth (endocrine).   Low B12 levels. Recurrent.   Constipation, multifactorial.  Regulated by stool softeners.  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   Chronic intermittent irregular heart rate, Dr. Dobson following.   A 50 pack-year tobacco smoker with radiographic evidence of emphysema. Has not smoked since 2004.   Anxiety.    Myasthenia gravis.  Followed by Dr. Hines  COPD  Weight loss.  Has stabilized.  Reassures me it is intentional.  \"Eating less and smarter.\"  RECOMMENDATIONS:   Apprise of labs from 8/21/23 including the current heme (stable anemia otherwise normal CBC) and the other labs noting the stable CEA (4.15-prior:  2.2 - 5), glucose otherwise essentially normal CMP, repleted iron, repleted (30%- prior: 31%; 35%; 37%; 18%) Fe sat, depressed ferritin (47; prior:  68; 71; 56; 17; 45; 87; 58), depressed (279; from 366; 290; 388; >1000) B12/folate.  (Anne called in)  CT chest, 6/14/23 and PET scan, 6/21/23 (above).  New 1.5 cm central LEFT upper lobe pulmonary nodule compatible with recurrent neoplasm.  Previously discussed that even without adjuvant chemotherapy 70.8% of people are alive in 5 years and none of those are alive due to therapy. Approximately 28% of those die of cancer and 1.3% die from other causes. Emphasized that very little data are available " "about the effects of adjuvant chemotherapy in Stage I lung cancer and most guidelines do not recommend the use of adjuvant therapy in this population and a meta-analysis even suggested that there was a trend toward a worse outcome in these patients. He preferred not to have chemo anyway.   Review follow-up with pulmonary, 7/11/23 Re:  Diagnostic bronchoscopy and PFTs.  Patient declined any invasive procedures.  He said that he had PFT in the past that \"almost killed him\" and is not wanting a pulmonary function test at this time either.  RTC 3 months (~ 10/11/23)  Review visit with radiation oncology (Dr. Herring), 6/28/23.   I recommended a course of definitive intent pulmonary SBRT to the PET avid left upper lobe nodule to an approximate dose of 0677-5870 cGy to be given over 4-5 every other day fractions, final course pending.  If SBRT cannot safely be delivered secondary to central location, would favor a hypofractionated course of treatment to an approximate dose of 9656-7358 cGy to be given over 15 daily fractions  (SBRT: 8/1/23- 8/14/23- 6000 cGy/5 fx)  Continue ongoing management per primary care physician and other specialists. Has appointment in 11/2023 with rad onc along with CT prior to visit  Return to the office with pre-office CEA, serum iron, Fe sat, ferritin, B12/folate, CMP, and CBC with differential in 16 weeks.    11/08/2023 - CT Chest with contrast:  Stable 1.5 cm left perihilar central upper lobe nodule, relatively unchanged from 6/14/2023. No new or increasing size nodule identified.  No pathologic lymphadenopathy. Stable volume loss left hemithorax from a prior left lower lobe lobectomy.    11/15/2023 - Appointment with :  Follow up in 3 months     12/28/2023 - Appointment with :  RECOMMENDATIONS:   Apprise of labs from 12/21/23 including the current heme (stable anemia otherwise normal CBC) and the other labs with CEA p (prior:  2.2 - 5), calcium 8.3 otherwise " essentially normal CMP, repleted iron, repleted (22%- prior: 30%; 31%; 35%; 37%; 18%) Fe sat, depressed ferritin (42- prior:  47; 68; 71; 56; 17; 45; 87; 58) repleted (350; from 279; 366; 290; 388; >1000) B12/folate.   Apprised of CT chest, 11/8/23 (above).  Stable 1.5 cm left perihilar central upper lobe nodule, relatively unchanged from 6/14/2023. No new or increasing size nodule identified. No pathologic lymphadenopathy  Previously discussed that even without adjuvant chemotherapy 70.8% of people are alive in 5 years and none of those are alive due to therapy. Approximately 28% of those die of cancer and 1.3% die from other causes. Emphasized that very little data are available about the effects of adjuvant chemotherapy in Stage I lung cancer and most guidelines do not recommend the use of adjuvant therapy in this population and a meta-analysis even suggested that there was a trend toward a worse outcome in these patients. He preferred not to have chemo anyway.   Review visit with radiation oncology (Dr. Samuels), 11/15/23- Diagnosed with stage IA2 (cT1b cN0 cM0) neoplasm of left upper lung. He completed 5000 cGy in 5 fractions to the NISA lung tumor on 08/14/2023.  CT chest, 11/8/23 reviewed.  I do not see evidence for recurrent or metastatic disease at this time. We will continue routine follow-up/surveillance as discussed in 3 months with follow up CT scan before visit. Remission!  Review follow-up with GRACE Bahena with pulmonary, 10/11/23-A/P: NISA nodule. Follow-up prn  Continue ongoing management per primary care physician and other specialists. Has appointment in 2/2024 with rad onc along with CT prior to visit  Return to the office with pre-office CEA, serum iron, Fe sat, ferritin, B12/folate, CMP, and CBC with differential in 12 weeks.    02/01/2024 - CT Chest with contrast:  Stable chest CT appearance.  No new or progressive neoplastic disease.    02/05/2024 - Appointment with :  Follow up  in 3 months     03/14/2024 - Appointment with :  RECOMMENDATIONS:   Apprise of labs from 3/7/24 including the current heme (stable anemia otherwise normal CBC) and the other labs with CEA 4.29 (prior:  2.2 - 5), calcium 8.9 otherwise essentially normal CMP, low iron (50), low (12%- prior: 22%; 30%; 31%; 35%; 37%; 18%) Fe sat, depressed ferritin (25 -  prior:  42; 47; 68; 71; 56; 17; 45; 87; 58) repleted (306; 350; 279; 366; 290; 388; >1000) B12/folate.   Apprised of CT chest, 2/1/24 (above).   Stable chest CT appearance.  No new or progressive neoplastic disease.  Keep appointments of Injectafer 750 mg IV weekly x 2  Previously discussed that even without adjuvant chemotherapy 70.8% of people are alive in 5 years and none of those are alive due to therapy. Approximately 28% of those die of cancer and 1.3% die from other causes. Emphasized that very little data are available about the effects of adjuvant chemotherapy in Stage I lung cancer and most guidelines do not recommend the use of adjuvant therapy in this population and a meta-analysis even suggested that there was a trend toward a worse outcome in these patients. He preferred not to have chemo anyway.   Review visit with radiation oncology (Dr. 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 He completed 5000 cGy in 5 fractions to the NISA lung tumor on 08/14/2023.  CT chest, 2/1/24 reviewed.  I do not see evidence for recurrent or metastatic disease at this time. We will continue routine follow-up/surveillance as discussed in 3 months with follow up CT scan before visit.   Continue ongoing management per primary care physician and other specialists. Has appointment in 5/2024 with rad onc along with CT prior to visit  Return to the office with pre-office CEA, serum iron, Fe sat, ferritin, B12/folate, CMP, and CBC with differential in 12 weeks.    05/06/2024 - CT Chest with contrast:  Stable CT chest without new  or progressive disease.    05/09/2024 - Appointment with :  Follow up in 3 months     06/13/2024 - Appointment with :  RECOMMENDATIONS:   Apprise of labs from 6/6/24 including the current heme (stable anemia otherwise normal CBC) and the other labs with CEA 4.2 (prior:  2.2 - 5), alk phos 200, sodium 134, otherwise normal CMP, repleted iron (98), repleted (32%- prior: 12%; 22%; 30%; 31%; 35%; 37%; 18%) Fe sat, depressed ferritin (201 -  prior: 25; 42; 47; 68; 71; 56; 17; 45; 87; 58) repleted (306; 350; 279; 366; 290; 388; >1000) B12/folate.   Apprised of lumbar xray, 5/16/24 (above).  Osteopenia. Extensive degenerative disc, endplate, and facet disease. Mild chronic appearing compression deformities of L1 and L4.  Review visit with radiation oncology ([facility] with Dr. 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 He completed 5000 cGy in 5 fractions to the NISA lung tumor on 08/14/2023.  CT chest, 5/6/24 reviewed.  I do not see evidence for recurrent or metastatic disease at this time. We will continue routine follow-up/surveillance as discussed in 3 months (8/9/24) with follow up CT scan before visit.   Apprised of CT chest, 5/6/24 (above).   JAZMÍN  Previously discussed that even without adjuvant chemotherapy 70.8% of people are alive in 5 years and none of those are alive due to therapy. Approximately 28% of those die of cancer and 1.3% die from other causes. Emphasized that very little data are available about the effects of adjuvant chemotherapy in Stage I lung cancer and most guidelines do not recommend the use of adjuvant therapy in this population and a meta-analysis even suggested that there was a trend toward a worse outcome in these patients. He preferred not to have chemo anyway.   Continue ongoing management per primary care physician and other specialists. Has appointment in 8/9/24 with rad onc along with CT prior to visit  Return to  the office with pre-office CEA, serum iron, Fe sat, ferritin, B12/folate, CMP, and CBC with differential in 12 weeks.    08/07/2024 - CT chest with contrast:  New RIGHT lower lobe peripheral spiculated masslike opacity, highly concerning for disease recurrence if the patient has no symptoms of infection.  Heavily calcified coronary arteries versus stent material.  New mild height loss at T2 and L2.    08/16/2024 - Appointment with :  On review of CT imaging, he does have a new suspicious opacity in the right lower lobe of lung. He declines recent symptoms including fatigue, fever, or productive cough. I will order a PET scan for further evaluation. He will return in 2 weeks for further discussion of recommendations. I have instructed him to continue to see the other health care providers as per their scheduling.  Plan:  PET scan ordered, they will call you.  return in 2 weeks for further discussion    08/22/2024 - PET Scan:  Intense abnormal uptake associated with the peripheral nodule in the right lower lobe which abuts the major fissure along its anterior margin. Given the high SUV measurement neoplasia should be excluded and is the primary consideration. It is of note that the nodule has changed somewhat from the previous examination demonstrating increase in size in the transverse dimension but overall diminishment in size in the coronal dimension. This is somewhat unusual for neoplasia suggesting that this finding could potentially be inflammatory/infectious in nature. No associated metabolically active hilar or mediastinal adenopathy. Previously noted metabolically active left perihilar nodule no longer demonstrates any abnormal metabolic activity posttreatment.  No additional sites of abnormal metabolic activity demonstrated.  There are emphysematous changes of the lungs. No additional lung lesions noted by CT imaging. No cervical or mediastinal adenopathy. There is extensive diverticular disease of  the descending and sigmoid colon without diverticulitis. Diffuse enlargement of the prostate gland is present with mass effect on the base of the bladder. Prominence of the urinary bladder wall likely is related to muscular hypertrophy from an element of chronic bladder outlet obstruction.       History obtained from  PATIENT, FAMILY, and CHART    PAST MEDICAL HISTORY  Past Medical History:   Diagnosis Date    Anemia     Arthritis     COPD (chronic obstructive pulmonary disease)     Disease of thyroid gland     Emphysema lung     Hx of colonic polyps     Hyperlipidemia     Hypertension     Lung cancer     Lung nodule     Myasthenia gravis     Pneumonia     PUD (peptic ulcer disease)       PAST SURGICAL HISTORY  Past Surgical History:   Procedure Laterality Date    CATARACT EXTRACTION      COLONOSCOPY  12/12/2014    Diverticulosis repeat exam in 3 years    COLONOSCOPY N/A 04/10/2018    Tubular adenoma cecum poor prep repeat in 3 months    COLONOSCOPY N/A 08/02/2018    2 Tubular adenomas transverse and ascending colon fair prep repeat in 3 years    COLONOSCOPY N/A 03/24/2022    Procedure: COLONOSCOPY WITH ANESTHESIA;  Surgeon: Ferdinand Cross MD;  Location: [facility] ENDOSCOPY;  Service: Gastroenterology;  Laterality: N/A;  pre hx colon polyps  post diverticulosis; inadequate prep  Efrain Dobson, DO    COLONOSCOPY W/ POLYPECTOMY  02/15/2010    2 Hyperplastic polyps at 25 cm and rectum repeat exam in 5 years    LUNG CANCER SURGERY      LUNG REMOVAL, PARTIAL      REPLACEMENT TOTAL KNEE Right 05/2017      FAMILY HISTORY  family history includes Cancer in his father; Colon polyps in his mother; Diabetes in his child and mother; Hypertension in his mother; No Known Problems in his maternal grandfather, paternal grandfather, and paternal grandmother; Stroke in his maternal grandmother and mother.    SOCIAL HISTORY  Social History     Tobacco Use    Smoking status: Former     Current packs/day: 0.00     Average packs/day: 2.0  packs/day for 30.0 years (60.0 ttl pk-yrs)     Types: Cigarettes     Start date: 1955     Quit date: 1984     Years since quittin.6     Passive exposure: Past    Smokeless tobacco: Never   Vaping Use    Vaping status: Never Used   Substance Use Topics    Alcohol use: No    Drug use: No     ALLERGIES  Penicillins     MEDICATIONS    Current Outpatient Medications:     alfuzosin (UROXATRAL) 10 MG 24 hr tablet, Take 2 tablets by mouth., Disp: , Rfl:     amLODIPine (NORVASC) 2.5 MG tablet, Take 1 tablet by mouth Daily., Disp: , Rfl:     buPROPion XL (WELLBUTRIN XL) 150 MG 24 hr tablet, Take 1 tablet by mouth Every Morning., Disp: , Rfl:     Calcium Citrate-Vitamin D3 (CITRACAL) 315-6.25 MG-MCG tablet tablet, Take  by mouth Daily., Disp: , Rfl:     losartan (COZAAR) 25 MG tablet, 1 tablet 2 (Two) Times a Day., Disp: , Rfl:     lovastatin (MEVACOR) 40 MG tablet, Take 1 tablet by mouth., Disp: , Rfl:     Multiple Vitamins-Minerals (PRESERVISION AREDS 2+MULTI VIT) capsule, Take  by mouth., Disp: , Rfl:     pantoprazole (PROTONIX) 40 MG EC tablet, 1 tablet Daily., Disp: , Rfl:     predniSONE (DELTASONE) 10 MG tablet, 1 tablet Daily. 1.5 tab daily, Disp: , Rfl:     pyridostigmine (MESTINON) 60 MG tablet, Take 3 tablets by mouth Daily., Disp: , Rfl:     The following portions of the patient's history were reviewed and updated as appropriate: allergies, current medications, past family history, past medical history, past social history, past surgical history and problem list.    Current outpatient and discharge medications have been reconciled for the patient.  Reviewed by: Byron Samuels III, MD    REVIEW OF SYSTEMS  Review of Systems   Constitutional: Negative.    HENT: Negative.     Eyes: Negative.    Respiratory:  Positive for shortness of breath (with exertion, stable for patient).    Cardiovascular: Negative.    Gastrointestinal: Negative.    Endocrine: Negative.    Genitourinary: Negative.    Musculoskeletal:  " Positive for back pain (low back) and gait problem (ambulates with cane).   Skin: Negative.    Allergic/Immunologic: Negative.    Hematological: Negative.    Psychiatric/Behavioral: Negative.       PHYSICAL EXAM  VITAL SIGNS:   Vitals:    24 1449   BP: 150/81   SpO2: 95%  Comment: room air   Weight: 88 kg (194 lb)   Height: 182.9 cm (72\")   PainSc:   2   PainLoc: Back  Comment: low     Physical Exam    Performance Status: ECOG {[facility] ECOG Status:09998}    Clinical Quality Measures  - Pain Documented by Standardized Tool, FPS Carmine Garcia reports a pain score of 2.  Given his pain assessment as noted, treatment options were discussed and the following options were decided upon as a follow-up plan to address the patient's pain: continuation of current treatment plan for pain and use of non-medical modalities (ice, heat, stretching and/or behavior modifications).  Pain Medications               buPROPion XL (WELLBUTRIN XL) 150 MG 24 hr tablet Take 1 tablet by mouth Every Morning.    predniSONE (DELTASONE) 10 MG tablet 1 tablet Daily. 1.5 tab daily          - Body Mass Index Screening and Follow-Up Plan Body mass index is 26.31 kg/m².     - Tobacco Use: Screening and Cessation Intervention  Social History    Tobacco Use      Smoking status: Former        Packs/day: 0.00        Years: 2.0 packs/day for 30.0 years (60.0 ttl pk-yrs)        Types: Cigarettes        Start date: 1955        Quit date: 1984        Years since quittin.6        Passive exposure: Past      Smokeless tobacco: Never    - Advanced Care Planning Advance Care Planning   ACP discussion was held with the patient during this visit. Patient has an advance directive in EMR which is still valid.     - Depression screening - PHQ-9 Total Score: 0    ASSESSMENT AND PLAN  1. 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 History of malignant neoplasm of lung    3. History of cancer of lower lobe bronchus or lung  "   4. History of radiation therapy    5. Former smoker      No orders of the defined types were placed in this encounter.    RECOMMENDATIONS:    Carmine Garcia is status post completion of radiation therapy and presents to our clinic today to review PET scan.    Diagnosed with stage IA2 (cT1b cN0 cM0) left upper lobe lung 1.5. He completed 5000 cGy in 5 fractions to the NISA lung tumor on 08/14/2023.     PET scan completed on 08/22/2024 revealing intense abnormal uptake associated with the peripheral nodule in the right lower lobe which abuts the major fissure along its anterior margin. Given the high SUV measurement neoplasia should be excluded and is the primary consideration. It is of note that the nodule has changed somewhat from the previous examination demonstrating increase in size in the transverse dimension but overall diminishment in size in the coronal dimension. This is somewhat unusual for neoplasia suggesting that this finding could potentially be inflammatory/infectious in nature. No associated metabolically active hilar or mediastinal adenopathy. Previously noted metabolically active left perihilar nodule no longer demonstrates any abnormal metabolic activity posttreatment. No additional sites of abnormal metabolic activity demonstrated. There are emphysematous changes of the lungs. No additional lung lesions noted by CT imaging. No cervical or mediastinal adenopathy. 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 Diffuse enlargement of the prostate gland is present with mass effect on the base of the bladder. Prominence of the urinary bladder wall likely is related to muscular hypertrophy from an element of chronic bladder outlet obstruction.    Following this discussion and in consideration of the diagnostic data/evaluation of the patient, I recommended a course of {Rad Onc Lung Dose (Optional):46971}. Continue ongoing management per primary care  physician and other specialists.     There are no Patient Instructions on file for this visit.    No follow-ups on file.    Time Spent: I spent *** minutes caring for Carmine on this date of service. This time includes time spent by me in the following activities: preparing for the visit, reviewing tests, obtaining and/or reviewing a separately obtained history, performing a medically appropriate examination and/or evaluation, counseling and educating the patient/family/caregiver, ordering medications, tests, or procedures, and documenting information in the medical record.   Byron Samuels III, MD  08/29/2024       MD  08/29/2024

## 2024-08-28 RX ORDER — PREDNISONE 10 MG/1
15 TABLET ORAL DAILY
Qty: 60 TABLET | Refills: 2 | Status: SHIPPED | OUTPATIENT
Start: 2024-08-28

## 2024-08-28 NOTE — TELEPHONE ENCOUNTER
Requested Prescriptions     Pending Prescriptions Disp Refills    predniSONE (DELTASONE) 10 MG tablet 60 tablet 2     Sig: Take 1.5 tablets by mouth daily       Last Office Visit: 8/12/2024  Next Office Visit: 11/18/2024  Last Medication Refill: 3/15/2024 with 2 ELIUD

## 2024-08-29 ENCOUNTER — OFFICE VISIT (OUTPATIENT)
Age: 84
End: 2024-08-29
Payer: MEDICARE

## 2024-08-29 ENCOUNTER — HOSPITAL ENCOUNTER (OUTPATIENT)
Dept: RADIATION ONCOLOGY | Facility: HOSPITAL | Age: 84
Setting detail: RADIATION/ONCOLOGY SERIES
Discharge: HOME | End: 2024-08-29
Payer: MEDICARE

## 2024-08-29 VITALS
SYSTOLIC BLOOD PRESSURE: 150 MMHG | BODY MASS INDEX: 26.28 KG/M2 | OXYGEN SATURATION: 95 % | HEIGHT: 72 IN | DIASTOLIC BLOOD PRESSURE: 81 MMHG | WEIGHT: 194 LBS

## 2024-08-29 DIAGNOSIS — Z92.3 HISTORY OF RADIATION THERAPY: ICD-10-CM

## 2024-08-29 DIAGNOSIS — Z87.891 FORMER SMOKER: ICD-10-CM

## 2024-08-29 DIAGNOSIS — Z85.118 HISTORY OF MALIGNANT NEOPLASM OF LUNG: ICD-10-CM

## 2024-08-29 DIAGNOSIS — Z85.118 HISTORY OF CANCER OF LOWER LOBE BRONCHUS OR LUNG: ICD-10-CM

## 2024-08-29 DIAGNOSIS — C34.32 PRIMARY ADENOCARCINOMA OF LOWER LOBE OF LEFT LUNG: Primary | ICD-10-CM

## 2024-08-29 PROCEDURE — G0463 HOSPITAL OUTPT CLINIC VISIT: HCPCS | Performed by: RADIOLOGY

## 2024-08-29 PROCEDURE — 77334 RADIATION TREATMENT AID(S): CPT | Performed by: RADIOLOGY

## 2024-08-30 DIAGNOSIS — G70.00 MYASTHENIA GRAVIS (HCC): Primary | ICD-10-CM

## 2024-08-30 LAB
RAD ONC ARIA COURSE END DATE: NORMAL
RAD ONC ARIA COURSE ID: NORMAL
RAD ONC ARIA COURSE LAST TREATMENT DATE: NORMAL
RAD ONC ARIA COURSE START DATE: NORMAL
RAD ONC ARIA COURSE TREATMENT ELAPSED DAYS: 13
RAD ONC ARIA FIRST TREATMENT DATE: NORMAL
RAD ONC ARIA PLAN FRACTIONS TREATED TO DATE: 5
RAD ONC ARIA PLAN ID: NORMAL
RAD ONC ARIA PLAN NAME: NORMAL
RAD ONC ARIA PLAN PRESCRIBED DOSE PER FRACTION: 10 GY
RAD ONC ARIA PLAN PRIMARY REFERENCE POINT: NORMAL
RAD ONC ARIA PLAN TOTAL FRACTIONS PRESCRIBED: 5
RAD ONC ARIA PLAN TOTAL PRESCRIBED DOSE: 5000 CGY
RAD ONC ARIA REFERENCE POINT DOSAGE GIVEN TO DATE: 50 GY
RAD ONC ARIA REFERENCE POINT ID: NORMAL

## 2024-08-30 RX ORDER — PANTOPRAZOLE SODIUM 40 MG/1
TABLET, DELAYED RELEASE ORAL
Qty: 90 TABLET | Refills: 0 | Status: SHIPPED | OUTPATIENT
Start: 2024-08-30

## 2024-08-30 NOTE — TELEPHONE ENCOUNTER
Requested Prescriptions     Pending Prescriptions Disp Refills    pantoprazole (PROTONIX) 40 MG tablet [Pharmacy Med Name: Pantoprazole Sodium 40 MG Oral Tablet Delayed Release] 90 tablet 0     Sig: TAKE 1 TABLET BY MOUTH ONCE DAILY IN THE MORNING BEFORE BREAKFAST       Last Office Visit: 8/12/2024  Next Office Visit: 11/18/2024  Last Medication Refill: 2/10/24 w rf 5

## 2024-09-03 ENCOUNTER — HOSPITAL ENCOUNTER (OUTPATIENT)
Dept: RADIATION ONCOLOGY | Facility: HOSPITAL | Age: 84
Setting detail: RADIATION/ONCOLOGY SERIES
End: 2024-09-03
Payer: MEDICARE

## 2024-09-03 PROCEDURE — 77470 SPECIAL RADIATION TREATMENT: CPT | Performed by: RADIOLOGY

## 2024-09-03 PROCEDURE — 77263 THER RADIOLOGY TX PLNG CPLX: CPT | Performed by: RADIOLOGY

## 2024-09-04 PROCEDURE — 77293 RESPIRATOR MOTION MGMT SIMUL: CPT | Performed by: RADIOLOGY

## 2024-09-04 PROCEDURE — 77301 RADIOTHERAPY DOSE PLAN IMRT: CPT | Performed by: RADIOLOGY

## 2024-09-04 PROCEDURE — 77300 RADIATION THERAPY DOSE PLAN: CPT | Performed by: RADIOLOGY

## 2024-09-04 PROCEDURE — 77338 DESIGN MLC DEVICE FOR IMRT: CPT | Performed by: RADIOLOGY

## 2024-09-05 ENCOUNTER — LAB (OUTPATIENT)
Dept: LAB | Facility: HOSPITAL | Age: 84
End: 2024-09-05
Payer: MEDICARE

## 2024-09-05 DIAGNOSIS — C34.32 PRIMARY ADENOCARCINOMA OF LOWER LOBE OF LEFT LUNG: ICD-10-CM

## 2024-09-05 LAB
ALBUMIN SERPL-MCNC: 3.8 G/DL (ref 3.5–5.2)
ALBUMIN/GLOB SERPL: 1.5 G/DL
ALP SERPL-CCNC: 128 U/L (ref 39–117)
ALT SERPL W P-5'-P-CCNC: 18 U/L (ref 1–41)
ANION GAP SERPL CALCULATED.3IONS-SCNC: 11 MMOL/L (ref 5–15)
AST SERPL-CCNC: 16 U/L (ref 1–40)
BASOPHILS # BLD AUTO: 0.02 10*3/MM3 (ref 0–0.2)
BASOPHILS NFR BLD AUTO: 0.2 % (ref 0–1.5)
BILIRUB SERPL-MCNC: 0.3 MG/DL (ref 0–1.2)
BUN SERPL-MCNC: 8 MG/DL (ref 8–23)
BUN/CREAT SERPL: 10 (ref 7–25)
CALCIUM SPEC-SCNC: 8.6 MG/DL (ref 8.6–10.5)
CEA SERPL-MCNC: 3.77 NG/ML
CHLORIDE SERPL-SCNC: 98 MMOL/L (ref 98–107)
CO2 SERPL-SCNC: 26 MMOL/L (ref 22–29)
CREAT SERPL-MCNC: 0.8 MG/DL (ref 0.76–1.27)
DEPRECATED RDW RBC AUTO: 49.9 FL (ref 37–54)
EGFRCR SERPLBLD CKD-EPI 2021: 86.7 ML/MIN/1.73
EOSINOPHIL # BLD AUTO: 0.04 10*3/MM3 (ref 0–0.4)
EOSINOPHIL NFR BLD AUTO: 0.4 % (ref 0.3–6.2)
ERYTHROCYTE [DISTWIDTH] IN BLOOD BY AUTOMATED COUNT: 13.4 % (ref 12.3–15.4)
FERRITIN SERPL-MCNC: 216.4 NG/ML (ref 30–400)
FOLATE SERPL-MCNC: 10.7 NG/ML (ref 4.78–24.2)
GLOBULIN UR ELPH-MCNC: 2.6 GM/DL
GLUCOSE SERPL-MCNC: 70 MG/DL (ref 65–99)
HCT VFR BLD AUTO: 37.3 % (ref 37.5–51)
HGB BLD-MCNC: 11.6 G/DL (ref 13–17.7)
IMM GRANULOCYTES # BLD AUTO: 0.05 10*3/MM3 (ref 0–0.05)
IMM GRANULOCYTES NFR BLD AUTO: 0.5 % (ref 0–0.5)
IRON 24H UR-MRATE: 101 MCG/DL (ref 59–158)
IRON SATN MFR SERPL: 36 % (ref 20–50)
LYMPHOCYTES # BLD AUTO: 0.73 10*3/MM3 (ref 0.7–3.1)
LYMPHOCYTES NFR BLD AUTO: 7.8 % (ref 19.6–45.3)
MCH RBC QN AUTO: 31.4 PG (ref 26.6–33)
MCHC RBC AUTO-ENTMCNC: 31.1 G/DL (ref 31.5–35.7)
MCV RBC AUTO: 100.8 FL (ref 79–97)
MONOCYTES # BLD AUTO: 0.68 10*3/MM3 (ref 0.1–0.9)
MONOCYTES NFR BLD AUTO: 7.2 % (ref 5–12)
NEUTROPHILS NFR BLD AUTO: 7.86 10*3/MM3 (ref 1.7–7)
NEUTROPHILS NFR BLD AUTO: 83.9 % (ref 42.7–76)
NRBC BLD AUTO-RTO: 0 /100 WBC (ref 0–0.2)
PLATELET # BLD AUTO: 239 10*3/MM3 (ref 140–450)
PMV BLD AUTO: 8.5 FL (ref 6–12)
POTASSIUM SERPL-SCNC: 4.3 MMOL/L (ref 3.5–5.2)
PROT SERPL-MCNC: 6.4 G/DL (ref 6–8.5)
RBC # BLD AUTO: 3.7 10*6/MM3 (ref 4.14–5.8)
SODIUM SERPL-SCNC: 135 MMOL/L (ref 136–145)
TIBC SERPL-MCNC: 283 MCG/DL (ref 298–536)
TRANSFERRIN SERPL-MCNC: 190 MG/DL (ref 200–360)
VIT B12 BLD-MCNC: 317 PG/ML (ref 211–946)
WBC NRBC COR # BLD AUTO: 9.38 10*3/MM3 (ref 3.4–10.8)

## 2024-09-05 PROCEDURE — 82746 ASSAY OF FOLIC ACID SERUM: CPT

## 2024-09-05 PROCEDURE — 82378 CARCINOEMBRYONIC ANTIGEN: CPT

## 2024-09-05 PROCEDURE — 36415 COLL VENOUS BLD VENIPUNCTURE: CPT

## 2024-09-05 PROCEDURE — 84466 ASSAY OF TRANSFERRIN: CPT

## 2024-09-05 PROCEDURE — 80053 COMPREHEN METABOLIC PANEL: CPT

## 2024-09-05 PROCEDURE — 85025 COMPLETE CBC W/AUTO DIFF WBC: CPT

## 2024-09-05 PROCEDURE — 83540 ASSAY OF IRON: CPT

## 2024-09-05 PROCEDURE — 82728 ASSAY OF FERRITIN: CPT

## 2024-09-05 PROCEDURE — 82607 VITAMIN B-12: CPT

## 2024-09-06 NOTE — PROGRESS NOTES
[facility] ONC [facility] HEMATOLOGY AND ONCOLOGY  [address]  [place] [address] [phone]    Patient Name: Carmine Garcia  Encounter Date: 2024  YOB: 1940  Patient Number: [id number]       REASON FOR VISIT: Mr. 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 He is seen 153.5 months since thoracoscopic surgical resection of the lung neoplasm. He is also followed for anemia of iron deficiency and B12 deficiency. It has been 79 months since last receipt of Injectafer.   On 23- PET-CT- showed a recurrent left lung neoplasm an isolated finding (15 mm left lung nodule adjacent to the hilum shows FDG uptake with SUV max = 3.3).  He then received definitive SBRT: 23- 23- 6000 cGy/5 fx) to NISA nodule (13 months).  He is now receiving SBRT to the right lun/10/24-present (3/5 fx).  He is here alone (previously with his daughter, Monica)     I have reviewed the HPI and verified with the patient the accuracy of it. No changes to interval history since the information was documented. Cem Arteaga MD 24       DIAGNOSTIC ABNORMALITIES:   CT chest, 10/20/2011, [facility]: 1.5 cm left lower lobe nodule positioned just lateral to the hilum - granuloma versus malignancy. No pathologic lymphadenopathy.  PET CT scan, 10/26/2011: Increased metabolic activity in the left lower lobe. Lung nodule located centrally near the left hilum with maximum SUV of 7.4 units, suspicious for primary carcinoma. This corresponded to the lesion seen on CT scan of 10/20/2011. No abnormal mediastinal, hilar, or inguinal activity noted.  Diagnostic bronchoscopy, 2011: Negative for malignancy. There were no endobronchial lesions.  Labs, 2011: Ferritin 78.7, B12 235, folate 6.7, CEA 2.2, serum iron 16, TIBC 196 (225 to  420), iron saturation 8.2%.  Thyroid ultrasound, 11/30/2011. No thyroid nodule identified.  Labs, 12/01/2011: Serum cortisol 21.7. Urine sodium less than 5, serum osmolality 680 (601 to 850).  Left lower lobe lung biopsy, 11/28/2011, [facility]: Well-differentiated papillary adenocarcinoma (1.5 cm in greatest dimension).  Immunohistochemistry analysis, 11/30/2011: Tumor immunoreactive with TTF-1 and Napsin A while negative for thyroglobulin. Results support primary lung adenocarcinoma.  Labs, 01/03/2012: GFR 69.9. Ferritin 54, iron 97, TIBC 301, iron sat 32. B12 352, folate 16.4.   Chest x-ray 09/09/2015 at [facility]. Stable postoperative chest x-ray.  Labs, 03/27/2017: Hemoglobin 13.8, LPX588.6,  (313 to 618), TSH 0.7, T4 of 6.9 (each normal),   Comprehensive blood report, 03/27/2017: Mild anemia with history of macrocytosis. COMPREHENSIVE DIAGNOSIS. Review of peripheral blood smear: Very mild anemia with red blood cells showing fairly normal morphology at the time of this peripheral blood specimen. Normal white blood cell and platelet counts and morphology. No abnormal populations detected on flow cytometric studies. See comment. COMPREHENSIVE COMMENT. This patient's recent peripheral blood specimen demonstrating a mild macrocytosis with an MCV of 100.6 is noted. At the time of this current peripheral blood specimen, his MCV is within normal limits at 94.3. He has a minimal anemia. White blood cell and platelet counts are both within normal limits with normal morphology. Causes of these peripheral blood findings and macrocytosis could be liver disease, thyroid disease, vitamin/nutritional deficiencies and drugs/toxins. Correlation recommended.  -6/14/23- CT chest- New 1.5 cm central LEFT upper lobe pulmonary nodule. This is concerning for recurrent neoplasm. Management options include PET/CT versus sampling with EBUS.  -6/21/23- PET-CT- PET-CT findings compatible with recurrent left lung neoplasm an  isolated finding (15 mm left lung nodule adjacent to the hilum shows FDG uptake with SUV max = 3.3).  No distant disease is seen.      PREVIOUS INTERVENTIONS:   Left thoracoscopic core needle biopsy, followed by video-assisted thoracoscopic surgery (VATS) left lower lobectomy, and mediastinal lymph node dissection, 2011: Pathology from the left lower lung nodule lung biopsy showed no histologic evidence of malignancy. Well differentiated papillary adenocarcinoma (1.5 cm in greatest diminish. All resection margins are free of malignancy. No visceral pleural invasion identified. Three peribronchial lymph nodes negative for metastatic carcinoma. Emphysematous changes. Level 10 (1 lymph node), Level 11 (1 lymph node), Level 5 (2 lymph nodes) all negative for metastatic carcinoma. 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 Special stains are pending.  Venofer 200 mg IV daily, 2011 through 2011 (800 mg); then 2014 through 2014 (800 mg).  B12 at 1000 mcg IM beginning 2011 through 2011 (1000 mg).  Foltx p.o. daily beginning 2014 through 2015.  Injectafer 750 mg, 2017; 2021  -SBRT: 23- 23- 6000 cGy/5 fx) to NISA nodule  SBRT to the right lun/10/24-present (3/5 fx).    Problem List Items Addressed This Visit          Other    Primary adenocarcinoma of lower lobe of left lung - Primary           Oncology/Hematology History   Primary adenocarcinoma of lower lobe of left lung   10/20/2011 Imaging    CT Chest:  1.5 cm left lower lobe nodule positioned just lateral to the hilum, granuloma vs. Malignancy  No pathologic lymphadenopathy     10/26/2011 Imaging    PET/CT:  Increased metabolic activity in the left lower lobe, lung nodule located centrally near the left hilum witih max SUV 7.4, suspicious for primary carcinoma. This corresponds to the lesion seen on CT scan of 10/20/2011.   No abnormal mediastinal, hilar, or inguinal activity      11/14/2011 Biopsy    Final Diagnosis   1.     Biopsy, left lower lobe nodule, lung:              A.     Fragments of bronchial mucosa with submucosa demonstrating   stromal elastosis and minimal chronic inflammation.        B.     No evidence of granulomatous inflammation.        C.     No histologic evidence of malignancy.      2.     Bronchial washings, smears (four) and cell block:         A.     Mild acute inflammation.        B.     Negative for malignant cells.      3.     Bronchial brushings, left lower lobe of lung, smears (3) and ThinPrep   preparation (1):         Negative for malignant cells.        11/28/2011 Surgery    Final Diagnosis        1.     Biopsy, left lower lobe of lung (frozen section control):              A.     Fragment of fibrotic pulmonary parenchyma demonstrating stromal   elastosis and chronic active inflammation, moderate.        B.     No histologic evidence of malignancy.      2.     Level 10 lymph node (frozen section control):         Lymph node (one), negative for metastatic carcinoma.      3.     Left lower lobe of lung (frozen section control):         A.     Well-differentiated papillary adenocarcinoma (1.5 cm in greatest   dimension).        B.     The dominant and aberrant bronchial margins of surgical resection   are negative for malignancy.         C.     The vascular and parenchymal margins of surgical resection are   negative for malignancy.         D.     No visceral pleural invasion identified.         E.     Peribronchial lymph nodes (three), negative for metastatic   carcinoma.         F.     Emphysematous changes.      4.     Level 11 lymph node:   Lymph node (one), negative for metastatic   carcinoma.      5.     Level 5 lymph nodes:    Lymph nodes (two), negative for metastatic   carcinoma.      Comment:     Primary papillary adenocarcinomas of the lung do occur; however, they demonstrate a similar histology to that seen in papillary thyroid carcinoma.  The  thyroid gland should be evaluated if not already   done to exclude the possibility of met        4/30/2014 Imaging    CXR:  SUMMARY:  1. Stable chronic change.  2. No acute disease.     9/3/2014 Imaging    CXR:  IMPRESSION-   1. Emphysematous and postoperative changes in the chest without evidence  of mass lesion or acute cardiopulmonary process.     1/5/2016 Imaging    CXR:  IMPRESSION   Postoperative changes of the left hemithorax with associated volume  loss. No acute process identified.     7/1/2016 Imaging    CXR:  IMPRESSION-   1. No active disease is seen.  2. COPD/emphysema.  3. Prior lung surgery on the left.      1/4/2017 Imaging    CXR:  IMPRESSION:  1. COPD/emphysema.  2. Postoperative changes on the left.  3. No acute appearing infiltrate or effusion.      3/20/2017 Imaging    CXR:  IMPRESSION:  1. Chronic changes in the lungs and spine without evidence of acute  cardiopulmonary process. Overall, the appearance of the lungs is similar  to the study from 01/04/2017.     11/20/2017 Imaging    CXR:  Impression:  No significant interval change when compared to chest x-ray dated  07/03/2017.     2/12/2018 Imaging    CXR:  IMPRESSION:  1. Hyperinflated lungs suggesting COPD.  2. Postoperative changes of the left hemithorax.  3. No acute cardiopulmonary process identified.     7/6/2018 Imaging    CXR:  Impression:  No acute cardiopulmonary disease.     12/21/2018 Imaging    CXR:  IMPRESSION:  COPD. Chronic volume loss in the lung bases with mild  central vascular crowding. No acute infiltrates.     8/23/2019 Imaging    CXR:  IMPRESSION:  1. No acute disease.     6/19/2020 Imaging    CXR:  IMPRESSION:  1. Mild blunting of the left costophrenic angle, which could be seen  with scarring or small effusion.     2/22/2021 Imaging    CXR:  IMPRESSION:  1. COPD no acute cardiopulmonary process.     10/25/2021 Imaging    CXR:  IMPRESSION:  1. Streaky bibasilar opacities, similar to prior.     4/25/2022 Imaging    CT  Chest:  IMPRESSION:  1. Status post resection of a left lower lobe pulmonary nodule. Moderate  changes of centrilobular emphysema are present.  2. Small groundglass nodules one within the left upper lobe and one  within the periphery of the right lower lobe warranting follow-up per  Fleischner criteria. No additional lung lesions are present.  3. No enlarged mediastinal or axillary lymphadenopathy..  4. 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 The  thoracic aorta is ectatic. There is mild enlargement of the pulmonary  arteries suggesting pulmonary arterial hypertension. Heavy coronary  calcifications are present.         7/20/2022 Imaging    CT Chest:  IMPRESSION:  1.  Postoperative change of LEFT lower lobectomy without evidence of  recurrent or metastatic disease.   2.  No change in 7 mm LEFT upper lobe and RIGHT lower lobe groundglass  pulmonary nodules. Consider continued imaging surveillance to document  stability.  3.  Ectatic ascending aorta measuring 4 cm diameter.     12/21/2022 Imaging    CXR:  IMPRESSION:  1. Stable chronic change.  2. No acute disease.     1/27/2023 Imaging    CT Chest:  IMPRESSION:  1. A stable CT scan of the chest. Postsurgical changes/left lower  lobectomy. No finding to suggest recurrence.  2. A stable small groundglass opacity/nodule in the left upper lobe. No  features of malignancy at this time. A follow-up examination in one year  is recommended to ensure stability.  3. Chronic emphysematous lung changes.     6/14/2023 Imaging    CT Chest:  IMPRESSION:  New 1.5 cm central LEFT upper lobe pulmonary nodule. This is concerning  for recurrent neoplasm. Management options include PET/CT versus  sampling with EBUS.     6/21/2023 Imaging    PET/CT:  Summary:  1. PET-CT findings compatible with recurrent left lung neoplasm as an  isolated finding. No distant disease is seen.     6/22/2023 Procedure    Documentation per Dr. Arteaga:  Patient informed per   Arteaga recommendations a referral should be made to Rad/Onc for evaluation and possible SBRT  Patient informed once the apt is domingo he will be called with time and date            PAST MEDICAL HISTORY:  ALLERGIES:  Allergies   Allergen Reactions    Penicillins Unknown - High Severity     Unknown       CURRENT MEDICATIONS:  Outpatient Encounter Medications as of 9/12/2024   Medication Sig Dispense Refill    alfuzosin (UROXATRAL) 10 MG 24 hr tablet Take 2 tablets by mouth.      amLODIPine (NORVASC) 2.5 MG tablet Take 1 tablet by mouth Daily.      buPROPion XL (WELLBUTRIN XL) 150 MG 24 hr tablet Take 1 tablet by mouth Every Morning.      Calcium Citrate-Vitamin D3 (CITRACAL) 315-6.25 MG-MCG tablet tablet Take  by mouth Daily.      losartan (COZAAR) 25 MG tablet 1 tablet 2 (Two) Times a Day.      lovastatin (MEVACOR) 40 MG tablet Take 1 tablet by mouth.      Multiple Vitamins-Minerals (PRESERVISION AREDS 2+MULTI VIT) capsule Take  by mouth.      pantoprazole (PROTONIX) 40 MG EC tablet 1 tablet Daily.      predniSONE (DELTASONE) 10 MG tablet 1 tablet Daily. 1.5 tab daily      pyridostigmine (MESTINON) 60 MG tablet Take 3 tablets by mouth Daily.       No facility-administered encounter medications on file as of 9/12/2024.     ADULT ILLNESSES:   Lung cancer ( Stage Date: 11/28/2011, Stage IA (T1a, N0, M0)-Pathological  Date of Dx:2011 ; ICD-10:C34.32 ;Malignant neoplasm of lower lobe, left bronchus or lung )   Acute bronchitis ( ICD-10:J20.9 ;Acute bronchitis, unspecified   Cataracts ( ICD-10:H26.9 ;Unspecified cataract   Colonic polyps ( hyperplastic, 02/2010; ICD-10:K63.5 ;Polyp of colon )   Constipation ( ICD-10:K59.00 ;Constipation, unspecified   Former smoker ( ICD-10:Z87.891 ;Personal history of nicotine dependence   High carcinoembryonic antigen level ( ICD-10:R97.0 ;Elevated carcinoembryonic antigen [CEA]   Hyperlipidemia ( ICD-10:E78.5 ;Hyperlipidemia, unspecified   Hypertension ( ICD-10:I10 ;Essential  (primary) hypertension   Hyperthyroidism ( ICD-10:E05.90 ;Thyrotoxicosis, unspecified without thyrotoxic crisis or storm   Iron deficiency anemia ( ICD-10:D50.9 ;Iron deficiency anemia, unspecified   Leukocytosis ( ICD-10:D72.829 ;Elevated white blood cell count, unspecified   Macrocytosis ( ICD-10:D75.89 ;Other specified diseases of blood and blood-forming organs   Malabsorption syndrome (disorder)   Peptic ulcers ( ICD-10:K27.7 ;Chronic peptic ulcer, site unspecified, without hemorrhage or perforation   Retention of urine ( postoperative following eye surgery, 10/2011; ICD-10:R33.9 ;Retention of urine, unspecified )   Strabismus (eye condition) ( ICD-10:H50.9 ;Unspecified strabismus   Vitamin B12 deficiency ( ICD-10:E53.8 ;Deficiency of other specified B group vitamins  Right leg edema. Venous Doppler of right lower extremity on 11/28/2017 ([facility]). Impression: There is no evidence of deep venous thrombosis or superficial thrombophlebitis of the right lower extremity.      SURGERIES:   Excision of non-melanoma skin cancer, 01/2012. Dr. Medrano   Video-assisted thoracoscopic (VATS) left lower lobectomy, 11/28/2011. Dr. Troy   Partial gastrectomy and vagotomy   Total knee replacement, right, 05/18/2017. Dr. Jarrett   Cataract surgery, 2004   Incision and drainage of abscess, left groin, (I&D) in early 01/2014. Dr. Velarde   Strabismus surgery, left eye, 2011.   Colonoscopy on 4/10/18 ([facility]). Impressions: Preparation of the colon was inadequate. One 12 mm polyp in the cecum, removed with a hot snare and removed piecemeal using a hot snare. Resected and retrieved. Diverticulosis in the sigmoid colon and in the descending colon    ADULT ILLNESSES:  Patient Active Problem List   Diagnosis Code    Hx of colonic polyps Z86.010    Obesity, unspecified obesity severity, unspecified obesity type E66.9    History of colon polyps Z86.010    Primary adenocarcinoma of lower lobe of left lung C34.32     Iron deficiency anemia D50.9    B12 deficiency E53.8    Essential hypertension I10    Pulmonary emphysema J43.9    Slow transit constipation K59.01    Urinary retention R33.9    Former smoker Z87.891    History of radiation therapy Z92.3    History of malignant neoplasm of lung Z85.118     SURGERIES:  Past Surgical History:   Procedure Laterality Date    CATARACT EXTRACTION      COLONOSCOPY  12/12/2014    Diverticulosis repeat exam in 3 years    COLONOSCOPY N/A 04/10/2018    Tubular adenoma cecum poor prep repeat in 3 months    COLONOSCOPY N/A 08/02/2018    2 Tubular adenomas transverse and ascending colon fair prep repeat in 3 years    COLONOSCOPY N/A 03/24/2022    Procedure: COLONOSCOPY WITH ANESTHESIA;  Surgeon: Ferdinand Cross MD;  Location: [facility] ENDOSCOPY;  Service: Gastroenterology;  Laterality: N/A;  pre hx colon polyps  post diverticulosis; inadequate prep  Erfain Dobson, DO    COLONOSCOPY W/ POLYPECTOMY  02/15/2010    2 Hyperplastic polyps at 25 cm and rectum repeat exam in 5 years    LUNG CANCER SURGERY      LUNG REMOVAL, PARTIAL      REPLACEMENT TOTAL KNEE Right 05/2017     HEALTH MAINTENANCE ITEMS:  Health Maintenance Due   Topic Date Due    TDAP/TD VACCINES (1 - Tdap) Never done    RSV Vaccine - Adults (1 - 1-dose 60+ series) Never done    ZOSTER VACCINE (1 of 2) 04/06/2013    MOST FORM  07/11/2024    COVID-19 Vaccine (6 - 2023-24 season) 09/01/2024    INFLUENZA VACCINE  08/01/2024    ANNUAL WELLNESS VISIT  12/04/2024       <no information>  Last Completed Colonoscopy       This patient has no relevant Health Maintenance data.          Immunization History   Administered Date(s) Administered    COVID-19 (MODERNA) 1st,2nd,3rd Dose Monovalent 03/05/2021, 04/02/2021, 06/07/2021    COVID-19 (PFIZER) Purple Cap Monovalent 12/14/2021    COVID-19 F23 (PFIZER) 12YRS+ (COMIRNATY) 12/11/2023    Flu Vaccine Split Quad 10/11/2018    INFLUENZA SPLIT TRI 10/13/2014    Influenza Injectable Mdck Pf Quad  "10/14/2021, 10/13/2022    Influenza TIV (IM) 2019, 10/28/2020    Influenza, Unspecified 10/14/2021, 10/13/2022    Pneumococcal Conjugate 13-Valent (PCV13) 2018    Pneumococcal Polysaccharide (PPSV23) 2011, 10/21/2013    Zostavax 2013     Last Completed Mammogram       This patient has no relevant Health Maintenance data.              FAMILY HISTORY:  Family History   Problem Relation Age of Onset    Colon polyps Mother     Stroke Mother     Diabetes Mother     Hypertension Mother     Cancer Father         asbestos    Stroke Maternal Grandmother     No Known Problems Maternal Grandfather     No Known Problems Paternal Grandmother     No Known Problems Paternal Grandfather     Diabetes Child     Colon cancer Neg Hx      SOCIAL HISTORY:  Social History     Socioeconomic History    Marital status:    Tobacco Use    Smoking status: Former     Current packs/day: 0.00     Average packs/day: 2.0 packs/day for 30.0 years (60.0 ttl pk-yrs)     Types: Cigarettes     Start date: 1955     Quit date: 1984     Years since quittin.7     Passive exposure: Past    Smokeless tobacco: Never   Vaping Use    Vaping status: Never Used   Substance and Sexual Activity    Alcohol use: No    Drug use: No    Sexual activity: Not Currently     Partners: Female       REVIEW OF SYSTEMS:  Constitutional:   The patient's appetite is good. \"Eating smarter.\" His energy remains chronically low to fair. He says has been able to walk better since the knee surgery (right knee arthroplasty, 2017) though is again using a cane.  Says the lower back pains and breathlessness slow him down.  He manages his personal ADLs, some chores, running errands and driving. He has lost 6 lb (in addition to 1 lb at his prior visit) in the interval.  He lives with his daughter and her spouse. He denies fevers, chills, or drenching night sweats. His sleep habits had been appropriate.  Covid vaccines x 2, 2021 and booster " "last 2021  Ear/Nose/Throat/Mouth:   He reports some sinus congestion and postnasal drainage but no ear pains or sore throat.  He has not had nosebleeds. No sore tongue. He has no headaches. He denies any hoarseness, change in voice quality.  Ocular:   He reports recent bouts of diplopia, no eye pain, significant change in visual acuity, or blurry vision.  Respiratory:   He has baseline exertional dyspnea but says he is less short of breath with his routine activities. Has intermittent cough from postnasal drainage.  He denies shortness of breath at rest. He has postnasal drainage associated cough. Undergoing SBRT to the right lun/10/24-present (3/5 fx).  Cardiovascular:   He reports no exertional chest pain, chest pressure, or chest heaviness. He reports no claudication. He reports no palpitations or symptomatic orthostasis.  Gastrointestinal:   He reports no dysphagia, nausea, vomiting, postprandial abdominal pain, bloating, cramping, change in bowel habits, with no dark stools (oral iron intolerant). He reports no rectal bleeding. He reports improved constipation on \"the right diet.\"  Infrequent use of stool softeners (Dulcolax) without laxatives. He has no diarrhea. Repeat c-scope 22.  Diverticulosis.  Repeat prn. He says that he has elected not to do anymore. \"Not at my age anymore.\"  Genitourinary:   He denies prior problems with urinary burning, frequency, dribbling, or discoloration. He denies difficulty controlling his bladder.   Musculoskeletal:   He has chronic arthralgias.  The right knee feels more stable since surgery.  Has not needed a cane to walk unless he goes long distances.  He has only occasional \"aches\" of the hips and his shoulders. He denies myalgias or nighttime leg cramping.  Extremities:   He normally has no fluid retention though has lingering mild right = left ankle/leg swelling.   Endocrine:   He reports no problems with excess thirst, excessive urination, vasomotor " "instability, or unexplained fatigue.  Heme/Lymphatic:   He reports easy bruising but no unexplained bleeding, petechial rashes, or swollen glands.  Neuro:   He reports no loss of consciousness, seizures, fainting spells, or dizziness. He reports no weakness of his face, arms, or legs. He has no difficulty with speech. He has no tremors. He reports occasional left = right foot tingling.   Psych:   He seems generally satisfied with life. He denies depression/anxiety. He reports no mood swings.       VITAL SIGNS: /80   Pulse 99   Temp 98.3 °F (36.8 °C) (Temporal)   Resp 18   Ht 182.9 cm (72\")   Wt 89.1 kg (196 lb 8 oz)   SpO2 95%   BMI 26.65 kg/m² Body surface area is 2.11 meters squared.  Pain Score    09/12/24 1111   PainSc:   2   PainLoc: Back         PHYSICAL EXAMINATION:   General:   He is a pleasant, obese, and modestly-kept elderly male who appears comfortable while seated. He appears to be his stated age. His skin color is normal. He is ambulatory with a cane.  ECOG PS = 1-2 (\"same 50-50\")  Head/Neck:   The patient is anicteric and atraumatic. The trachea is midline. The neck is supple without evidence of jugular venous distention or cervical adenopathy.  Eyes:   The pupils are equal, round, and reactive to light. The extraocular movements are full. There is no scleral jaundice or erythema.  Chest:   Breath sounds are diminished, left > right. There are no wheezes, rhonchi, or rales. The right thoracotomy wounds are healed.  Cardiovascular:   The patient has a regular cardiac rate and rhythm without murmurs, rubs, or gallops. The peripheral pulses are equal and full.  Abdomen:   The belly is soft and globose. There is no rebound, guarding, organomegaly, mass-effect, or tenderness. Bowel sounds are present.  Extremities:   There is no evidence of cyanosis or clubbing. There is no ankle/ leg edema but no calf tenderness.   Rheumatologic:   There is some evidence of osteoarthritic deformities of the " hands. The gait is slow, stooped and cane supported.  Cutaneous:   There are no overt rashes, disseminated lesions, purpura, or petechiae.  Lymphatics:   There is no evidence of adenopathy in the cervical, supraclavicular, or axillary areas.  Neurologic:   The patient is alert, oriented, cooperative, and pleasant. He is appropriately conversant. There is no overt impairment with no overt focal motor deficits.   Psych:   Mood and affect are appropriate for circumstance. Eye contact is appropriate.        LABS    Lab Results - Last 18 Months   Lab Units 09/05/24  1114 06/06/24  1036 03/07/24  1046 12/21/23  1041 12/05/23  0912 10/10/23  0915 09/12/23  1249 08/21/23  1049 08/15/23  1023 04/26/23  1126   HEMOGLOBIN g/dL 11.6* 12.8* 11.4* 12.0* 12.4* 12.7*   < > 12.9*   < > 12.6*   HEMATOCRIT % 37.3* 40.2 37.0* 38.9 38.5* 41.5*   < > 42.3   < > 39.9   MCV fL 100.8* 97.1* 94.1 100.8* 100.8* 102.2*   < > 99.8*   < > 101.8*   WBC 10*3/mm3 9.38 8.77 9.22 6.26 6.3 7.7   < > 6.27   < > 7.25   RDW % 13.4 15.6* 13.6 13.8 13.9 14.0   < > 14.0   < > 13.0   MPV fL 8.5 8.7 8.9 9.1 8.9* 9.4   < > 9.0   < > 9.3   PLATELETS 10*3/mm3 239 217 252 242 238 204   < > 174   < > 247   IMM GRAN % % 0.5 0.5 0.4 0.8*  --   --   --  0.6*  --  0.4   NEUTROS ABS 10*3/mm3 7.86* 7.25* 7.57* 4.86 5.0 6.2   < > 5.17   < > 5.66   LYMPHS ABS 10*3/mm3 0.73 0.78 0.97 0.81 0.7* 0.9*   < > 0.69*   < > 1.04   MONOS ABS 10*3/mm3 0.68 0.65 0.60 0.51 0.50 0.40   < > 0.34   < > 0.48   EOS ABS 10*3/mm3 0.04 0.03 0.02 0.01 0.10 0.10   < > 0.01   < > 0.02   BASOS ABS 10*3/mm3 0.02 0.02 0.02 0.02 0.00 0.00   < > 0.02   < > 0.02   IMMATURE GRANS (ABS) 10*3/mm3 0.05 0.04 0.04 0.05 0.1 0.1   < > 0.04   < > 0.03   NRBC /100 WBC 0.0 0.0 0.0 0.0  --   --   --  0.0  --  0.0    < > = values in this interval not displayed.       Lab Results - Last 18 Months   Lab Units 09/05/24  1114 06/06/24  1036 05/06/24  1453 03/07/24  1046 02/01/24  1303 12/21/23  1041 11/08/23  1019  08/21/23  1049 06/14/23  1417 04/26/23  1126   GLUCOSE mg/dL 70 83  --  95  --  73  --  130*  --  105*   SODIUM mmol/L 135* 134*  --  137  --  138  --  138  --  138   POTASSIUM mmol/L 4.3 4.2  --  4.6  --  4.4  --  4.1  --  4.3   CO2 mmol/L 26.0 27.0  --  30.0*  --  32.0*  --  29.0  --  27.0   CHLORIDE mmol/L 98 99  --  99  --  101  --  101  --  103   ANION GAP mmol/L 11.0 8.0  --  8.0  --  5.0  --  8.0  --  8.0   CREATININE mg/dL 0.80 0.76 0.90 1.01 1.00 0.85   < > 0.81   < > 0.89   BUN mg/dL 8 9  --  11  --  12  --  11  --  10   BUN / CREAT RATIO  10.0 11.8  --  10.9  --  14.1  --  13.6  --  11.2   CALCIUM mg/dL 8.6 9.0  --  8.9  --  8.3*  --  9.1  --  8.8   ALK PHOS U/L 128* 200*  --  73  --  79  --  72  --  73   TOTAL PROTEIN g/dL 6.4 6.5  --  6.6  --  7.2  --  7.0  --  6.2   ALT (SGPT) U/L 18 24  --  15  --  19  --  26  --  23   AST (SGOT) U/L 16 20  --  18  --  23  --  24  --  18   BILIRUBIN mg/dL 0.3 0.4  --  0.4  --  0.3  --  0.3  --  0.4   ALBUMIN g/dL 3.8 4.2  --  4.1  --  4.1  --  4.0  --  4.3   GLOBULIN gm/dL 2.6 2.3  --  2.5  --  3.1  --  3.0  --  1.9    < > = values in this interval not displayed.       Lab Results - Last 18 Months   Lab Units 09/05/24  1114 06/06/24  1036 03/07/24  1046 12/21/23  1041 08/21/23  1049 04/26/23  1126   CEA ng/mL 3.77 4.20 4.29 4.05 4.15 3.79       Lab Results - Last 18 Months   Lab Units 09/05/24  1114 06/06/24  1036 03/07/24  1046 12/21/23  1041 08/21/23  1049 04/26/23  1126   IRON mcg/dL 101 98 50* 75 100 95   TIBC mcg/dL 283* 304 405 347 335 305   IRON SATURATION (TSAT) % 36 32 12* 22 30 31   FERRITIN ng/mL 216.40 201.60 25.52* 42.61 47.78 68.42   FOLATE ng/mL 10.70 12.20 16.90 14.80 13.10 18.80       PROBLEMS IDENTIFIED:   1.  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   Tumor burden: 1.5 cm left lower lung nodule.   Complications of tumor: None.   Tumor status: JAZMÍN following resection via left lower  lobectomy.   Chest x-ray, 07/03/2017, [facility]. Impression: Stable appearance of the chest with postoperative changes in the left lung base and emphysematous changes.   Chest x-ray obtained 11/20/2017 at [facility] reveals no interval change when compared to chest x-ray dated 07/03/2017.   Chest x-ray on 02/12/2018 ([facility]). Impression: Hyperinflated lungs suggesting chronic obstructive pulmonary disease (COPD). Postoperative changes of the left hemithorax. No acute cardiopulmonary process identified.   Chest Xray on 07/06/2018 ([facility]). Impression: No acute cardiopulmonary disease.   Chest X-ray, 12/21/2018 at [facility]. COPD. Chronic volume loss in the lung bases with mild central vascular crowding. No acute infiltrates.   06/19/2020-chest x-ray.  Comparison: 8/23/2019-impression: Mild blunting of the left costophrenic angle which could be seen with scarring or small effusion.  02/22/2021- chest xray.  COPD no acute cardiopulmonary process  10/25/2021-chest x-ray.  Streaky bibasilar opacities.  Similar to prior.  04/25/22- CT chest- Status post resection of a left lower lobe pulmonary nodule. Moderate changes of centrilobular emphysema are present. Small groundglass nodules one within the left upper lobe and one within the periphery of the right lower lobe warranting follow-up per Fleischner criteria. No additional lung lesions are present. No enlarged mediastinal or axillary lymphadenopathy.  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 The thoracic aorta is ectatic. There is mild enlargement of the pulmonary arteries suggesting pulmonary arterial hypertension. Heavy coronary calcifications are present.   07/20/22-CT chest- postoperative change of LLL without evidence of recurrent or metastatic disease.  No change in 7 mm NISA and RLL groundglass pulmonary nodules.  Consider continued imaging surveillance to document stability.  Ectatic  ascending aorta measuring 4 cm in diameter.  1/27/23- CT chest- A stable CT scan of the chest. Postsurgical changes/left lower lobectomy. No finding to suggest recurrence. A stable small groundglass opacity/nodule in the left upper lobe. No features of malignancy at this time. A follow-up examination in one year is recommended to ensure stability. Chronic emphysematous lung changes  6/14/23- CT chest- New 1.5 cm central LEFT upper lobe pulmonary nodule. This is concerning for recurrent neoplasm. Management options include PET/CT versus sampling with EBUS.  6/21/23- PET-CT- PET-CT findings compatible with recurrent left lung neoplasm an isolated finding (15 mm left lung nodule adjacent to the hilum shows FDG uptake with SUV max = 3.3).  No distant disease is seen.  SBRT: 8/1/23- 8/14/23- 6000 cGy/5 fx) to NISA nodule  11/8/23- CT chest- Stable 1.5 cm left perihilar central upper lobe nodule, relatively unchanged from 6/14/2023. No new or increasing size nodule identified. No pathologic lymphadenopathy. Stable volume loss left hemithorax from a  prior left lower lobe lobectomy.  11/15/23- Follow-up Dr. Samuels- Diagnosed with stage IA2 (cT1b cN0 cM0) neoplasm of left upper lung. He completed 5000 cGy in 5 fractions to the NISA lung tumor on 08/14/2023.  CT chest, 11/8/23 reviewed.  I do not see evidence for recurrent or metastatic disease at this time. We will continue routine follow-up/surveillance as discussed in 3 months with follow up CT scan before visit. Remission!  2/1/24- CT chest- Stable chest CT appearance. No new or progressive neoplastic disease.  2/5/24- Follow-up Dr. Samuels-, I do not see evidence for recurrent or metastatic disease at this time. We will continue routine follow-up/surveillance as discussed in 3 months with follow up CT scan before visit.   5/6/24- CT chest- Stable CT chest without new or progressive disease.   8/22/24- PET scan-1. Intense abnormal uptake associated with the peripheral  nodule in the right lower lobe which abuts the major fissure along its anterior margin. Given the high SUV measurement neoplasia should be excluded and is the primary consideration. It is of note that the nodule has changed somewhat from the previous examination demonstrating increase in size in the transverse dimension but overall diminishment in size in the coronal dimension. This is somewhat unusual for neoplasia suggesting that this finding could potentially be inflammatory/infectious in nature. No associated metabolically active hilar or mediastinal adenopathy. Previously noted metabolically active left perihilar nodule no longer demonstrates any abnormal metabolic activity posttreatment. No additional sites of abnormal metabolic activity demonstrated. There are emphysematous changes of the lungs. No additional lung lesions noted by CT imaging. No cervical or mediastinal adenopathy. 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 Diffuse enlargement of the prostate gland is present with mass effect on the base of the bladder. Prominence of the urinary bladder wall likely is related to muscular hypertrophy from an element of chronic bladder outlet obstruction.  8/29/24- Follow-up Dr. Samuels:  Diagnosed with stage IA2 (cT1b cN0 cM0) left upper lobe lung 1.5. He completed 5000 cGy in 5 fractions to the NISA lung tumor on 08/14/2023.  He now has a new tumor in the right lower lobe on PET scan, 8/22/24.  I recommended a course of stereotactic radiosurgery to right lower lobe tumor, I anticipate 2975-9774 cGy over 3-5 treatments - started 9/10/24-present.     2.  Mildly abnormal CEA.    --Stable. 3.7, 9/5/24 (prior range: 2.2 - 5.0).     3.  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 Underwent right knee replacement, 05/18/2017.   --5/16/24- xray lumbar spine- Osteopenia. Extensive degenerative disc, endplate, and facet  "disease. Mild chronic appearing compression deformities of L1 and L4.    4.  Normocytic/macrocytic anemia with resolution of macrocytosis, repleted iron and B12 deficiencies (anemia of chronic disease).   --Stable, Hgb 11.6; .8, 9/5/24 (prior range: Hgb 11.4 - 14.3; 94.4 - 102.1)  a. No myelodysplastic syndrome (MDS) on comprehensive blood report, normal thyroid function tests (TFTs), normal LDH, normal B12/folate.   b. Colonoscopy on 4/10/18 ([facility]). Impressions: Preparation of the colon was inadequate. One 12 mm polyp in the cecum, removed with a hot snare and removed piecemeal using a hot snare. Resected and retrieved. Diverticulosis in the sigmoid colon and in the descending colon.   c. Repeat with 2 day prep scheduled for 08/02/2018. \"Polyps\" Repeat 3 years.  d. Colonoscopy, 08/02/2018 (above).  Fair colon preparation.  1 5 mm polyp in the ascending colon, removed with hot snare.  One 12 mm polyp in the transverse colon, removed with a hot snare.  e.  Injectafer 750 mg, 11/01/2022; 3/14/24; 3/21/24  5.  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 Negative thyroid ultrasound, 11/30/2011. Is followed by Dr. Medrano (ENT) and Dr. Roth (endocrine).   6.  Low B12 levels. Recurrent.   7.  Constipation, multifactorial.  Regulated by stool softeners.  \"Occasionally.\"  8.  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   9.  Chronic intermittent irregular heart rate, Dr. Dobson following.   10. A 50 pack-year tobacco smoker with radiographic evidence of emphysema. Has not smoked since 2004.         11. Anxiety.          12.  Myasthenia gravis.  Followed by Dr. Hines        13.  COPD        14.  Weight loss.  Has stabilized.  Reassures me it is intentional.  \"Eating less and smarter.\"         15.  Elevated alk phos,   - Improved.  128, 9/5/24 (prior: 200 on 6/6/2024). Osteopenia?  - Lumbar xray, 5/16/24 (above).  Osteopenia. Extensive degenerative disc, " endplate, and facet disease. Mild chronic appearing compression deformities of L1 and L4.    RECOMMENDATIONS:   1.   Apprise of labs from 9/5/24 including the current heme (stable anemia otherwise normal CBC) and the other labs with CEA 3.7 (prior:  2.2 - 5), alk phos 128 (prior: 200), sodium 135, otherwise normal CMP, repleted iron (101), repleted (36%- prior: 32%; 12%; 22%; 30%; 31%; 35%; 37%; 18%) Fe sat, depressed ferritin (216 -  prior: 201; 25; 42; 47; 68; 71; 56; 17; 45; 87; 58) repleted (317; 306; 350; 279; 366; 290; 388; >1000) B12/folate.     2.   Review visit with radiation oncology, Dr. Samuels on  8/29/24- Diagnosed with stage IA2 (cT1b cN0 cM0) left upper lobe lung 1.5. He completed 5000 cGy in 5 fractions to the NISA lung tumor on 08/14/2023.  He now has a new tumor in the right lower lobe on PET scan, 8/22/24.  I recommended a course of stereotactic radiosurgery to right lower lobe tumor, I anticipate 2709-8125 cGy over 3-5 treatments. Treatments beginning 9/10/24-9/16/24  3.   PET scan, 8/22/24 (above).  Neoplasia vs inflammatory lung nodule  4.   Previously discussed that even without adjuvant chemotherapy 70.8% of people are alive in 5 years and none of those are alive due to therapy. Approximately 28% of those die of cancer and 1.3% die from other causes. Emphasized that very little data are available about the effects of adjuvant chemotherapy in Stage I lung cancer and most guidelines do not recommend the use of adjuvant therapy in this population and a meta-analysis even suggested that there was a trend toward a worse outcome in these patients. He preferred not to have chemo anyway.     5.  Continue ongoing management per primary care physician and other specialists.   6.  Return to the office with pre-office CEA, serum iron, Fe sat, ferritin, B12/folate, CMP, and CBC with differential in 12 weeks-defer imaging to radiation oncology.      I spent ~ 40 minutes caring for Carmine on this date of  service. This time includes time spent by me in the following activities: preparing for the visit, reviewing tests, performing a medically appropriate examination and/or evaluation, counseling and educating the patient/family/caregiver, ordering medications, tests, or procedures and documenting information in the medical record        cc: DO Kieran Pennington MD Nicholas Lopez, MD

## 2024-09-10 ENCOUNTER — HOSPITAL ENCOUNTER (OUTPATIENT)
Dept: RADIATION ONCOLOGY | Facility: HOSPITAL | Age: 84
Discharge: HOME | End: 2024-09-10

## 2024-09-10 VITALS — OXYGEN SATURATION: 98 % | HEART RATE: 82 BPM | SYSTOLIC BLOOD PRESSURE: 151 MMHG | DIASTOLIC BLOOD PRESSURE: 89 MMHG

## 2024-09-10 PROCEDURE — 77373 STRTCTC BDY RAD THER TX DLVR: CPT | Performed by: RADIOLOGY

## 2024-09-11 ENCOUNTER — HOSPITAL ENCOUNTER (OUTPATIENT)
Dept: RADIATION ONCOLOGY | Facility: HOSPITAL | Age: 84
Discharge: HOME | End: 2024-09-11

## 2024-09-11 VITALS — OXYGEN SATURATION: 95 % | SYSTOLIC BLOOD PRESSURE: 145 MMHG | HEART RATE: 91 BPM | DIASTOLIC BLOOD PRESSURE: 69 MMHG

## 2024-09-11 LAB
RAD ONC ARIA COURSE ID: NORMAL
RAD ONC ARIA COURSE LAST TREATMENT DATE: NORMAL
RAD ONC ARIA COURSE START DATE: NORMAL
RAD ONC ARIA COURSE TREATMENT ELAPSED DAYS: 1
RAD ONC ARIA FIRST TREATMENT DATE: NORMAL
RAD ONC ARIA PLAN FRACTIONS TREATED TO DATE: 2
RAD ONC ARIA PLAN ID: NORMAL
RAD ONC ARIA PLAN PRESCRIBED DOSE PER FRACTION: 10 GY
RAD ONC ARIA PLAN PRIMARY REFERENCE POINT: NORMAL
RAD ONC ARIA PLAN TOTAL FRACTIONS PRESCRIBED: 5
RAD ONC ARIA PLAN TOTAL PRESCRIBED DOSE: 5000 CGY
RAD ONC ARIA REFERENCE POINT DOSAGE GIVEN TO DATE: 20 GY
RAD ONC ARIA REFERENCE POINT ID: NORMAL
RAD ONC ARIA REFERENCE POINT SESSION DOSAGE GIVEN: 10 GY

## 2024-09-11 PROCEDURE — 77373 STRTCTC BDY RAD THER TX DLVR: CPT | Performed by: RADIOLOGY

## 2024-09-12 ENCOUNTER — HOSPITAL ENCOUNTER (OUTPATIENT)
Dept: RADIATION ONCOLOGY | Facility: HOSPITAL | Age: 84
Setting detail: RADIATION/ONCOLOGY SERIES
Discharge: HOME | End: 2024-09-12
Payer: MEDICARE

## 2024-09-12 ENCOUNTER — OFFICE VISIT (OUTPATIENT)
Dept: ONCOLOGY | Facility: CLINIC | Age: 84
End: 2024-09-12
Payer: MEDICARE

## 2024-09-12 VITALS
BODY MASS INDEX: 26.61 KG/M2 | SYSTOLIC BLOOD PRESSURE: 136 MMHG | TEMPERATURE: 98.3 F | DIASTOLIC BLOOD PRESSURE: 80 MMHG | OXYGEN SATURATION: 95 % | HEART RATE: 99 BPM | WEIGHT: 196.5 LBS | HEIGHT: 72 IN | RESPIRATION RATE: 18 BRPM

## 2024-09-12 VITALS — DIASTOLIC BLOOD PRESSURE: 82 MMHG | SYSTOLIC BLOOD PRESSURE: 143 MMHG | HEART RATE: 92 BPM | OXYGEN SATURATION: 95 %

## 2024-09-12 DIAGNOSIS — C34.32 PRIMARY ADENOCARCINOMA OF LOWER LOBE OF LEFT LUNG: Primary | ICD-10-CM

## 2024-09-12 LAB
RAD ONC ARIA COURSE ID: NORMAL
RAD ONC ARIA COURSE LAST TREATMENT DATE: NORMAL
RAD ONC ARIA COURSE START DATE: NORMAL
RAD ONC ARIA COURSE TREATMENT ELAPSED DAYS: 2
RAD ONC ARIA FIRST TREATMENT DATE: NORMAL
RAD ONC ARIA PLAN FRACTIONS TREATED TO DATE: 3
RAD ONC ARIA PLAN ID: NORMAL
RAD ONC ARIA PLAN PRESCRIBED DOSE PER FRACTION: 10 GY
RAD ONC ARIA PLAN PRIMARY REFERENCE POINT: NORMAL
RAD ONC ARIA PLAN TOTAL FRACTIONS PRESCRIBED: 5
RAD ONC ARIA PLAN TOTAL PRESCRIBED DOSE: 5000 CGY
RAD ONC ARIA REFERENCE POINT DOSAGE GIVEN TO DATE: 30 GY
RAD ONC ARIA REFERENCE POINT ID: NORMAL
RAD ONC ARIA REFERENCE POINT SESSION DOSAGE GIVEN: 10 GY

## 2024-09-12 PROCEDURE — 77336 RADIATION PHYSICS CONSULT: CPT | Performed by: RADIOLOGY

## 2024-09-12 PROCEDURE — 77373 STRTCTC BDY RAD THER TX DLVR: CPT | Performed by: RADIOLOGY

## 2024-09-13 ENCOUNTER — HOSPITAL ENCOUNTER (OUTPATIENT)
Dept: RADIATION ONCOLOGY | Facility: HOSPITAL | Age: 84
Setting detail: RADIATION/ONCOLOGY SERIES
Discharge: HOME | End: 2024-09-13
Payer: MEDICARE

## 2024-09-13 VITALS — OXYGEN SATURATION: 96 % | SYSTOLIC BLOOD PRESSURE: 172 MMHG | DIASTOLIC BLOOD PRESSURE: 90 MMHG | HEART RATE: 81 BPM

## 2024-09-13 LAB

## 2024-09-13 PROCEDURE — 77373 STRTCTC BDY RAD THER TX DLVR: CPT | Performed by: RADIOLOGY

## 2024-09-17 ENCOUNTER — HOSPITAL ENCOUNTER (OUTPATIENT)
Dept: RADIATION ONCOLOGY | Facility: HOSPITAL | Age: 84
Setting detail: RADIATION/ONCOLOGY SERIES
Discharge: HOME | End: 2024-09-17
Payer: MEDICARE

## 2024-09-17 VITALS — HEART RATE: 91 BPM | OXYGEN SATURATION: 96 % | SYSTOLIC BLOOD PRESSURE: 176 MMHG | DIASTOLIC BLOOD PRESSURE: 91 MMHG

## 2024-09-17 DIAGNOSIS — C34.32 PRIMARY ADENOCARCINOMA OF LOWER LOBE OF LEFT LUNG: Primary | ICD-10-CM

## 2024-09-17 DIAGNOSIS — Z85.118 HISTORY OF CANCER OF LOWER LOBE BRONCHUS OR LUNG: ICD-10-CM

## 2024-09-17 DIAGNOSIS — Z85.118 HISTORY OF MALIGNANT NEOPLASM OF LUNG: ICD-10-CM

## 2024-09-17 DIAGNOSIS — Z92.3 HISTORY OF RADIATION THERAPY: ICD-10-CM

## 2024-09-17 LAB
RAD ONC ARIA COURSE ID: NORMAL
RAD ONC ARIA COURSE LAST TREATMENT DATE: NORMAL
RAD ONC ARIA COURSE START DATE: NORMAL
RAD ONC ARIA COURSE TREATMENT ELAPSED DAYS: 7
RAD ONC ARIA FIRST TREATMENT DATE: NORMAL
RAD ONC ARIA PLAN FRACTIONS TREATED TO DATE: 5
RAD ONC ARIA PLAN ID: NORMAL
RAD ONC ARIA PLAN PRESCRIBED DOSE PER FRACTION: 10 GY
RAD ONC ARIA PLAN PRIMARY REFERENCE POINT: NORMAL
RAD ONC ARIA PLAN TOTAL FRACTIONS PRESCRIBED: 5
RAD ONC ARIA PLAN TOTAL PRESCRIBED DOSE: 5000 CGY
RAD ONC ARIA REFERENCE POINT DOSAGE GIVEN TO DATE: 50 GY
RAD ONC ARIA REFERENCE POINT ID: NORMAL
RAD ONC ARIA REFERENCE POINT SESSION DOSAGE GIVEN: 10 GY

## 2024-09-17 PROCEDURE — 77373 STRTCTC BDY RAD THER TX DLVR: CPT | Performed by: RADIOLOGY

## 2024-10-07 ENCOUNTER — APPOINTMENT (OUTPATIENT)
Dept: GENERAL RADIOLOGY | Facility: HOSPITAL | Age: 84
End: 2024-10-07
Payer: MEDICARE

## 2024-10-07 ENCOUNTER — HOSPITAL ENCOUNTER (EMERGENCY)
Facility: HOSPITAL | Age: 84
Discharge: HOME | End: 2024-10-08
Payer: MEDICARE

## 2024-10-07 ENCOUNTER — APPOINTMENT (OUTPATIENT)
Dept: CT IMAGING | Facility: HOSPITAL | Age: 84
End: 2024-10-07
Payer: MEDICARE

## 2024-10-07 DIAGNOSIS — S02.30XA ORBITAL FLOOR (BLOW-OUT) CLOSED FRACTURE: ICD-10-CM
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
## 2024-10-07 DIAGNOSIS — S62.610A CLOSED DISPLACED FRACTURE OF PROXIMAL PHALANX OF RIGHT INDEX FINGER, INITIAL ENCOUNTER: ICD-10-CM

## 2024-10-07 DIAGNOSIS — S32.020A COMPRESSION FRACTURE OF L2 VERTEBRA, INITIAL ENCOUNTER: ICD-10-CM

## 2024-10-07 DIAGNOSIS — S02.2XXB OPEN FRACTURE OF NASAL BONE, INITIAL ENCOUNTER: ICD-10-CM

## 2024-10-07 DIAGNOSIS — S01.81XA FACIAL LACERATION, INITIAL ENCOUNTER: ICD-10-CM

## 2024-10-07 PROCEDURE — 70450 CT HEAD/BRAIN W/O DYE: CPT

## 2024-10-07 PROCEDURE — 99284 EMERGENCY DEPT VISIT MOD MDM: CPT

## 2024-10-07 PROCEDURE — 25010000002 TETANUS-DIPHTH-ACELL PERTUSSIS 5-2.5-18.5 LF-MCG/0.5 SUSPENSION PREFILLED SYRINGE: Performed by: NURSE PRACTITIONER

## 2024-10-07 PROCEDURE — 72125 CT NECK SPINE W/O DYE: CPT

## 2024-10-07 PROCEDURE — 90472 IMMUNIZATION ADMIN EACH ADD: CPT | Performed by: NURSE PRACTITIONER

## 2024-10-07 PROCEDURE — 90715 TDAP VACCINE 7 YRS/> IM: CPT | Performed by: NURSE PRACTITIONER

## 2024-10-07 PROCEDURE — 25010000002 LIDOCAINE-EPINEPHRINE 2 %-1:100000 SOLUTION: Performed by: NURSE PRACTITIONER

## 2024-10-07 PROCEDURE — 73130 X-RAY EXAM OF HAND: CPT

## 2024-10-07 PROCEDURE — 90471 IMMUNIZATION ADMIN: CPT | Performed by: NURSE PRACTITIONER

## 2024-10-07 PROCEDURE — 70486 CT MAXILLOFACIAL W/O DYE: CPT

## 2024-10-07 PROCEDURE — 72131 CT LUMBAR SPINE W/O DYE: CPT

## 2024-10-07 PROCEDURE — 72128 CT CHEST SPINE W/O DYE: CPT

## 2024-10-07 RX ORDER — ACETAMINOPHEN 500 MG
500 TABLET ORAL ONCE
Status: COMPLETED | OUTPATIENT
Start: 2024-10-07 | End: 2024-10-08

## 2024-10-07 RX ORDER — LIDOCAINE HYDROCHLORIDE AND EPINEPHRINE BITARTRATE 20; .01 MG/ML; MG/ML
10 INJECTION, SOLUTION SUBCUTANEOUS ONCE
Status: COMPLETED | OUTPATIENT
Start: 2024-10-07 | End: 2024-10-07

## 2024-10-07 RX ORDER — ONDANSETRON 4 MG/1
4 TABLET, ORALLY DISINTEGRATING ORAL ONCE
Status: COMPLETED | OUTPATIENT
Start: 2024-10-07 | End: 2024-10-08

## 2024-10-07 RX ADMIN — TETANUS TOXOID, REDUCED DIPHTHERIA TOXOID AND ACELLULAR PERTUSSIS VACCINE, ADSORBED 0.5 ML: 5; 2.5; 8; 8; 2.5 SUSPENSION INTRAMUSCULAR at 21:59

## 2024-10-07 RX ADMIN — LIDOCAINE HYDROCHLORIDE,EPINEPHRINE BITARTRATE 10 ML: 20; .01 INJECTION, SOLUTION INFILTRATION; PERINEURAL at 23:22

## 2024-10-08 VITALS
TEMPERATURE: 98.5 F | HEIGHT: 72 IN | RESPIRATION RATE: 18 BRPM | OXYGEN SATURATION: 95 % | SYSTOLIC BLOOD PRESSURE: 145 MMHG | DIASTOLIC BLOOD PRESSURE: 89 MMHG | WEIGHT: 190 LBS | BODY MASS INDEX: 25.73 KG/M2 | HEART RATE: 116 BPM

## 2024-10-08 PROCEDURE — 63710000001 ONDANSETRON ODT 4 MG TABLET DISPERSIBLE: Performed by: NURSE PRACTITIONER

## 2024-10-08 PROCEDURE — 25010000002 LIDOCAINE-EPINEPHRINE 2 %-1:100000 SOLUTION: Performed by: NURSE PRACTITIONER

## 2024-10-08 RX ORDER — HYDROCODONE BITARTRATE AND ACETAMINOPHEN 5; 325 MG/1; MG/1
1 TABLET ORAL EVERY 8 HOURS PRN
Qty: 9 TABLET | Refills: 0 | Status: SHIPPED | OUTPATIENT
Start: 2024-10-08

## 2024-10-08 RX ORDER — CEPHALEXIN 500 MG/1
500 CAPSULE ORAL 3 TIMES DAILY
Qty: 30 CAPSULE | Refills: 0 | Status: SHIPPED | OUTPATIENT
Start: 2024-10-08 | End: 2024-10-18

## 2024-10-08 RX ORDER — GINSENG 100 MG
CAPSULE ORAL 2 TIMES DAILY
Qty: 14 G | Refills: 0 | Status: SHIPPED | OUTPATIENT
Start: 2024-10-08

## 2024-10-08 RX ORDER — ONDANSETRON 4 MG/1
4 TABLET, ORALLY DISINTEGRATING ORAL EVERY 6 HOURS PRN
Qty: 12 TABLET | Refills: 0 | Status: SHIPPED | OUTPATIENT
Start: 2024-10-08

## 2024-10-08 RX ADMIN — ONDANSETRON 4 MG: 4 TABLET, ORALLY DISINTEGRATING ORAL at 00:33

## 2024-10-08 RX ADMIN — ACETAMINOPHEN 500 MG: 500 TABLET, FILM COATED ORAL at 00:33

## 2024-10-08 NOTE — DISCHARGE INSTRUCTIONS
ENT will call you in the morning regarding f/u of your comminuted nasal bone fracture and orbital floor fracture. Avoid blowing your nose or bending over/straining    Maintain fall precautions; wear LSO brace in particular with ambulating; f/u with neurosurgery regarding what appears to be a new superior endplate fracture to the L2    Avoid any peroxide or alcohol to the wounds. Use mild soap only for cleaning. Apply thin layer of bacitracin twice daily to the wounds. Wait to apply bacitracin to the nose once the steri strips have fallen off. Have sutures removed in 7 days    F/u with orthopedics regarding fracture of the finger (tiny proximal phalanx fracture)

## 2024-10-08 NOTE — ED NOTES
Wounds cleaned at this time with sterile saline and surgical scrub. Bleeding not controlled and pressure dressing reapplied and APRN aware.

## 2024-10-08 NOTE — ED NOTES
Pressure dressing placed on forehead with bactrim ointment. Three steristrips placed on the nose at this time

## 2024-10-08 NOTE — ED PROVIDER NOTES
Subjective   History of Present Illness  Patient is an 84-year-old male who presents to the ER secondary to a fall.  Patient states he had gotten up to go to the bathroom and tripped over his dog in the hallway.  He states that his dog is the same color as the hardwood stephanie and with the lights that he did not see the dog standing there and reportedly tripped falling over the dog.  He hit his head without loss of consciousness.  He has a large laceration to the forehead and nose.  He complains of facial pain as well as neck and back pain due to the fall.  He also complains of pain to his hands bilaterally.  He indicates earlier today he slipped and fell landing on his back getting out of the shower.  He has known 3 chronic vertebral fractures.  He denies any loss of bowel or bladder control or saddle anesthesia.  He states he was able to walk and had no significant pain after the fall which occurred earlier this morning.  He states he would not be here for this fall if he did not have this laceration that needed to be repaired.  Past medical history significant for anemia, arthritis, COPD, thyroid disease, emphysema, colon polyps, hyperlipidemia, hypertension, lung cancer, myasthenia gravis, pneumonia, peptic ulcer disease        Review of Systems   Constitutional: Negative.  Negative for fever.   HENT: Negative.  Negative for congestion.    Respiratory: Negative.  Negative for cough and shortness of breath.    Cardiovascular: Negative.  Negative for chest pain.   Gastrointestinal: Negative.  Negative for abdominal pain, constipation, diarrhea, nausea and vomiting.   Genitourinary: Negative.  Negative for dysuria.   Musculoskeletal:  Positive for back pain and neck pain.   Skin:  Positive for wound.        Laceration to the nose and forehead   Neurological: Negative.  Negative for weakness.   All other systems reviewed and are negative.      Past Medical History:   Diagnosis Date    Anemia     Arthritis     COPD  (chronic obstructive pulmonary disease)     Disease of thyroid gland     Emphysema lung     Hx of colonic polyps     Hyperlipidemia     Hypertension     Lung cancer     Lung nodule     Myasthenia gravis     Pneumonia     PUD (peptic ulcer disease)        Allergies   Allergen Reactions    Penicillins Unknown - High Severity     Unknown         Past Surgical History:   Procedure Laterality Date    CATARACT EXTRACTION      COLONOSCOPY  2014    Diverticulosis repeat exam in 3 years    COLONOSCOPY N/A 04/10/2018    Tubular adenoma cecum poor prep repeat in 3 months    COLONOSCOPY N/A 2018    2 Tubular adenomas transverse and ascending colon fair prep repeat in 3 years    COLONOSCOPY N/A 2022    Procedure: COLONOSCOPY WITH ANESTHESIA;  Surgeon: Ferdinand Cross MD;  Location: [facility] ENDOSCOPY;  Service: Gastroenterology;  Laterality: N/A;  pre hx colon polyps  post diverticulosis; inadequate prep  Efrain Dobson, DO    COLONOSCOPY W/ POLYPECTOMY  02/15/2010    2 Hyperplastic polyps at 25 cm and rectum repeat exam in 5 years    LUNG CANCER SURGERY      LUNG REMOVAL, PARTIAL      REPLACEMENT TOTAL KNEE Right 2017       Family History   Problem Relation Age of Onset    Colon polyps Mother     Stroke Mother     Diabetes Mother     Hypertension Mother     Cancer Father         asbestos    Stroke Maternal Grandmother     No Known Problems Maternal Grandfather     No Known Problems Paternal Grandmother     No Known Problems Paternal Grandfather     Diabetes Child     Colon cancer Neg Hx        Social History     Socioeconomic History    Marital status:    Tobacco Use    Smoking status: Former     Current packs/day: 0.00     Average packs/day: 2.0 packs/day for 30.0 years (60.0 ttl pk-yrs)     Types: Cigarettes     Start date: 1955     Quit date: 1984     Years since quittin.7     Passive exposure: Past    Smokeless tobacco: Never   Vaping Use    Vaping status: Never Used   Substance and  Sexual Activity    Alcohol use: No    Drug use: No    Sexual activity: Not Currently     Partners: Female           Objective   Physical Exam  Vitals and nursing note reviewed.   Constitutional:       General: He is not in acute distress.     Appearance: He is well-developed. He is not diaphoretic.   HENT:      Head: Atraumatic.      Nose: Nose normal.      Mouth/Throat:      Pharynx: Oropharynx is clear.   Eyes:      General: No scleral icterus.     Extraocular Movements: Extraocular movements intact.      Conjunctiva/sclera: Conjunctivae normal.      Pupils: Pupils are equal, round, and reactive to light.   Neck:      Thyroid: No thyromegaly.      Vascular: No JVD.   Cardiovascular:      Rate and Rhythm: Normal rate and regular rhythm.      Heart sounds: Normal heart sounds. No murmur heard.  Pulmonary:      Effort: Pulmonary effort is normal. No respiratory distress.      Breath sounds: Normal breath sounds. No wheezing or rales.   Chest:      Chest wall: No tenderness.   Abdominal:      General: Bowel sounds are normal. There is no distension.      Palpations: Abdomen is soft. There is no mass.      Tenderness: There is no abdominal tenderness. There is no guarding or rebound.   Musculoskeletal:         General: Tenderness present.      Cervical back: Normal range of motion and neck supple.      Comments: 2 cm laceration noted to the bridge of the nose with bleeding controlled    Patient has a pressure dressing to the forehead, appears to be approximately 4.5 cm laceration to the forehead    Patient has pain to palpation of the cervical spine, thoracic spine, and lumbar spine on without step-off or vertebral point tenderness noted, patient has no pain to palpation to the hips bilaterally, able to move legs freely without difficulty or pain, denies any loss of bowel or bladder control or saddle anesthesia, appears neurologically intact    Patient has pain to hands bilaterally in particular to the right index  finger with swelling and bruising identified   Lymphadenopathy:      Cervical: No cervical adenopathy.   Skin:     General: Skin is warm and dry.      Coloration: Skin is not pale.      Findings: No erythema or rash.   Neurological:      Mental Status: He is alert and oriented to person, place, and time.      Cranial Nerves: No cranial nerve deficit.      Coordination: Coordination normal.      Deep Tendon Reflexes: Reflexes are normal and symmetric.   Psychiatric:         Mood and Affect: Mood normal.         Behavior: Behavior normal.         Thought Content: Thought content normal.         Judgment: Judgment normal.         Laceration Repair    Date/Time: 10/8/2024 12:30 AM    Performed by: Miriam Hines APRN  Authorized by: Miriam Hines APRN    Consent:     Consent obtained:  Verbal    Consent given by:  Patient    Risks discussed:  Infection and pain  Universal protocol:     Patient identity confirmed:  Verbally with patient  Laceration details:     Location:  Face    Face location:  Nose    Length (cm):  2  Pre-procedure details:     Preparation:  Patient was prepped and draped in usual sterile fashion  Exploration:     Imaging outcome: foreign body not noted    Treatment:     Area cleansed with:  Shur-Clens and saline    Amount of cleaning:  Standard    Irrigation solution:  Sterile saline    Irrigation method:  Tap    Visualized foreign bodies/material removed: no    Skin repair:     Repair method:  Sutures and Steri-Strips    Suture size:  5-0    Suture material:  Nylon    Suture technique:  Simple interrupted    Number of sutures:  4    Number of Steri-Strips: Additional Steri-Strips applied by nursing staff for added support.  Repair type:     Repair type:  Simple  Post-procedure details:     Procedure completion:  Tolerated well, no immediate complications  Laceration Repair    Date/Time: 10/8/2024 1:00 AM    Performed by: Miriam Hines APRN  Authorized by: Miriam Hines APRN     Consent:     Consent obtained:  Verbal    Consent given by:  Patient    Risks discussed:  Infection and pain  Universal protocol:     Patient identity confirmed:  Verbally with patient  Laceration details:     Location:  Face    Face location:  Forehead    Length (cm):  4.5  Pre-procedure details:     Preparation:  Patient was prepped and draped in usual sterile fashion  Exploration:     Imaging outcome: foreign body not noted    Treatment:     Area cleansed with:  Shur-Clens and saline    Amount of cleaning:  Standard    Irrigation solution:  Sterile saline    Irrigation method:  Syringe and tap  Skin repair:     Repair method:  Sutures    Suture size:  5-0    Suture material:  Nylon    Suture technique:  Simple interrupted    Number of sutures:  16  Approximation:     Approximation:  Close  Post-procedure details:     Dressing:  Antibiotic ointment and non-adherent dressing    Procedure completion:  Tolerated well, no immediate complications             ED Course  ED Course as of 10/08/24 1635   Tue Oct 08, 2024   0057 Spoke with Victorino EDWARDS with ENT who will have the office call him tomorrow with follow up [TW]      ED Course User Index  [TW] Miriam Hines APRN                                             Medical Decision Making  Patient is an 84-year-old male who presents to the ER secondary to a fall.  Patient states he had gotten up to go to the bathroom and tripped over his dog in the hallway.  He states that his dog is the same color as the hardwood stephanie and with the lights that he did not see the dog standing there and reportedly tripped falling over the dog.  He hit his head without loss of consciousness.  He has a large laceration to the forehead and nose.  He complains of facial pain as well as neck and back pain due to the fall.  He also complains of pain to his hands bilaterally.  He indicates earlier today he slipped and fell landing on his back getting out of the shower.  He has  known 3 chronic vertebral fractures.  He denies any loss of bowel or bladder control or saddle anesthesia.  He states he was able to walk and had no significant pain after the fall which occurred earlier this morning.  He states he would not be here for this fall if he did not have this laceration that needed to be repaired.  Past medical history significant for anemia, arthritis, COPD, thyroid disease, emphysema, colon polyps, hyperlipidemia, hypertension, lung cancer, myasthenia gravis, pneumonia, peptic ulcer disease  Differential diagnosis: Laceration, bony fracture, spinous fracture, intracranial hemorrhage, skull fracture, and other    Labs Reviewed - No data to display     XR Hand 3+ View Left   Final Result         1. Osteopenia and osteoarthritis.         This report was signed and finalized on 10/7/2024 9:54 PM by Raman Campos.          XR Hand 3+ View Right   Final Result         1. Fracture involving the proximal aspect of the middle phalanx of the    second digit.    2. Osteopenia and osteoarthritis.         This report was signed and finalized on 10/7/2024 9:55 PM by Raman Campos.          CT Head Without Contrast   Final Result    1. Frontal scalp soft tissue defect at the forehead and chronic changes.    No acute intracranial abnormality is seen.                   CT CERVICAL SPINE:         TECHNIQUE: Unenhanced CT images of the cervical spine were obtained with    multiplanar reformats.         FINDINGS:         There is osteopenia and multilevel spondylosis with disc base narrowing    and spurring of the endplates of the cervical spine. There is osseous    fusion at C4-C5. There is fusion of the facet joints on the right at    C4-C5. There is facet arthropathy and no spondylolisthesis. No acute    fracture is seen. Prevertebral soft tissues are normal.         The visualized soft tissues of the neck are unremarkable.         Visualized lung apices are clear.         IMPRESSION:          Multilevel spondylosis and facet arthropathy without fracture or    spondylolisthesis.         This report was signed and finalized on 10/7/2024 9:50 PM by Raman Campos.          CT Cervical Spine Without Contrast   Final Result    1. Frontal scalp soft tissue defect at the forehead and chronic changes.    No acute intracranial abnormality is seen.                   CT CERVICAL SPINE:         TECHNIQUE: Unenhanced CT images of the cervical spine were obtained with    multiplanar reformats.         FINDINGS:         There is osteopenia and multilevel spondylosis with disc base narrowing    and spurring of the endplates of the cervical spine. There is osseous    fusion at C4-C5. There is fusion of the facet joints on the right at    C4-C5. There is facet arthropathy and no spondylolisthesis. No acute    fracture is seen. Prevertebral soft tissues are normal.         The visualized soft tissues of the neck are unremarkable.         Visualized lung apices are clear.         IMPRESSION:         Multilevel spondylosis and facet arthropathy without fracture or    spondylolisthesis.         This report was signed and finalized on 10/7/2024 9:50 PM by Raman Campos.          CT Facial Bones Without Contrast   Final Result         Comminuted fracture of the left nasal bone and fracture of the left    orbital floor.                   This report was signed and finalized on 10/7/2024 9:52 PM by Raman Campos.          CT Thoracic Spine Without Contrast   Final Result         Osteopenia with spondylosis and facet arthropathy and no acute fracture    of the thoracic spine identified.         This report was signed and finalized on 10/7/2024 10:01 PM by Raman Campos.          CT Lumbar Spine Without Contrast   Final Result    1. Superior endplate compression fracture at L2 which is new from    7/11/2024 may be subacute or acute. Clinical correlation is recommended.    2. Chronic compression fractures at  L1, L3, and L4.    3. Multilevel spondylosis and facet arthropathy.                   This report was signed and finalized on 10/7/2024 9:59 PM by Raman Campos.       Patient was updated on his Tdap.  Lacerations were repaired.  There was no postprocedural bleeding noted.  LSO brace was placed and he will need to follow-up with neurosurgery regarding what appears to be a new versus subacute superior endplate compression fracture of L2.  Discussed case with Victorino EDWARDS on call for ENT regarding nasal bone fracture and orbital floor fracture.  She states ENT will call the patient in the morning regarding follow-up.  Patient was prescribed antibiotics as well as pain medication and instructed to maintain fall precautions.  Additionally he has a phalanx fracture and a finger splint was applied.  He will need to follow-up with orthopedics for reevaluation.  Patient will need to return in 7 days for suture removal.  Patient was discharged home in stable condition.     Instructions given to the patient:  ENT will call you in the morning regarding f/u of your comminuted nasal bone fracture and orbital floor fracture. Avoid blowing your nose or bending over/straining    Maintain fall precautions; wear LSO brace in particular with ambulating; f/u with neurosurgery regarding what appears to be a new superior endplate fracture to the L2    Avoid any peroxide or alcohol to the wounds. Use mild soap only for cleaning. Apply thin layer of bacitracin twice daily to the wounds. Wait to apply bacitracin to the nose once the steri strips have fallen off. Have sutures removed in 7 days    F/u with orthopedics regarding fracture of the finger (tiny proximal phalanx fracture)      Problems Addressed:  Closed displaced fracture of proximal phalanx of right index finger, initial encounter: complicated acute illness or injury  Compression fracture of L2 vertebra, initial encounter: complicated acute illness or injury  Facial  laceration, initial encounter: complicated acute illness or injury  Fall, initial encounter: complicated acute illness or injury  Open fracture of nasal bone, initial encounter: complicated acute illness or injury  Orbital floor (blow-out) closed fracture: complicated acute illness or injury    Amount and/or Complexity of Data Reviewed  Radiology: ordered.    Risk  OTC drugs.  Prescription drug management.        Final diagnoses:   Fall, initial encounter   Facial laceration, initial encounter   Open fracture of nasal bone, initial encounter   Closed displaced fracture of proximal phalanx of right index finger, initial encounter   Orbital floor (blow-out) closed fracture   Compression fracture of L2 vertebra, initial encounter       ED Disposition  ED Disposition       ED Disposition   Discharge    Condition   Good    Comment   --               Sammy Martinez, DO  [address] [facility]  [address]  [place] [id number] [phone]          Alexx Martinez MD  [address] [facility]   [address]  [facility] [address]  [phone]      they will call you tomorrow regarding your f/u appointment    Efrain Thakur MD  [address] [id number] [phone]               Medication List        New Prescriptions      bacitracin 500 UNIT/GM ointment  Apply  topically to the appropriate area as directed 2 (Two) Times a Day.     cephalexin 500 MG capsule  Commonly known as: KEFLEX  Take 1 capsule by mouth 3 (Three) Times a Day for 10 days.     HYDROcodone-acetaminophen 5-325 MG per tablet  Commonly known as: NORCO  Take 1 tablet by mouth Every 8 (Eight) Hours As Needed for Moderate Pain.     ondansetron ODT 4 MG disintegrating tablet  Commonly known as: ZOFRAN-ODT  Place 1 tablet on the tongue Every 6 (Six) Hours As Needed for Nausea.               Where to Get Your Medications        These medications were sent to [facility] [address] [place] [address] [address] [facility] [phone] [facility] [phone]    [address] [place] [address]      Phone: [phone]   bacitracin 500 UNIT/GM ointment  cephalexin 500 MG capsule  HYDROcodone-acetaminophen 5-325 MG per tablet  ondansetron ODT 4 MG disintegrating tablet            Miriam Hines, APRN  10/08/24 0276     Speaking Coherently

## 2024-10-09 RX ORDER — PYRIDOSTIGMINE BROMIDE 60 MG/1
60 TABLET ORAL EVERY 6 HOURS
Qty: 120 TABLET | Refills: 5 | Status: SHIPPED | OUTPATIENT
Start: 2024-10-09

## 2024-10-09 NOTE — TELEPHONE ENCOUNTER
Requested Prescriptions     Pending Prescriptions Disp Refills    pyRIDostigmine (MESTINON) 60 MG tablet 120 tablet 5     Sig: Take 1 tablet by mouth every 6 hours       Last Office Visit: 8/12/2024  Next Office Visit: 11/18/2024  Last Medication Refill: 7/9/2024 with 5 ELIUD

## 2024-10-10 ENCOUNTER — OFFICE VISIT (OUTPATIENT)
Dept: OTOLARYNGOLOGY | Facility: CLINIC | Age: 84
End: 2024-10-10
Payer: MEDICARE

## 2024-10-10 VITALS
SYSTOLIC BLOOD PRESSURE: 160 MMHG | WEIGHT: 190 LBS | DIASTOLIC BLOOD PRESSURE: 88 MMHG | HEART RATE: 72 BPM | HEIGHT: 72 IN | TEMPERATURE: 98.4 F | BODY MASS INDEX: 25.73 KG/M2

## 2024-10-10 DIAGNOSIS — S02.85XA CLOSED FRACTURE OF ORBIT, INITIAL ENCOUNTER: ICD-10-CM

## 2024-10-10 DIAGNOSIS — S02.2XXA CLOSED FRACTURE OF NASAL BONE, INITIAL ENCOUNTER: Primary | ICD-10-CM

## 2024-10-10 NOTE — PATIENT INSTRUCTIONS
CONTACT INFORMATION:  The main office phone number is [phone]. For emergencies after hours and on weekends, this number will convert over to our answering service and the on call provider will answer. Please try to keep non emergent phone calls/ questions to office hours 9am-5pm Monday through Friday.      Portal[facility]  As an alternative, you can sign up and use the Epic MyChart system for more direct and quicker access for non emergent questions/ problems.  Spor allows you to send messages to your doctor, view your test results, renew your prescriptions, schedule appointments, and more. To sign up, go to [employer] and click on the Sign Up Now link in the New User? box. Enter your Portal[employer] Activation Code exactly as it appears below along with the last four digits of your Social Security Number and your Date of Birth () to complete the sign-up process. If you do not sign up before the expiration date, you must request a new code.     Portalarium Activation Code: Activation code not generated  Current Portalarium Status: Active     If you have questions, you can email [email or web address] or call [phone] to talk to our [employer] staff. Remember, Portalarium is NOT to be used for urgent needs. For medical emergencies, dial 911.     IF YOU SMOKE OR USE TOBACCO PLEASE READ THE FOLLOWING:  Why is smoking bad for me?  Smoking increases the risk of heart disease, lung disease, vascular disease, stroke, and cancer. If you smoke, STOP!        IF YOU SMOKE OR USE TOBACCO PLEASE READ THE FOLLOWING:  Why is smoking bad for me?  Smoking increases the risk of heart disease, lung disease, vascular disease, stroke, and cancer. If you smoke, STOP!     For more information:  Quit Now [place]  -QUIT-NOW  [email or web address]

## 2024-10-10 NOTE — PROGRESS NOTES
YOB: 1940  Location: [facility]  Location Address: [address] [place] [address], [facility], [address] [place] [address]  Location Phone: [phone]    Chief Complaint   Patient presents with    Follow-up     Patient was seen 10/7 in ED for nasal and orbital fracture.   Patient has appointment with ophthalmologist Monday.        History of Present Illness  Carmine Garcia is a 84 y.o. male.  Carmine Garcia is here for evaluation of ENT complaints. The patient has had problems with nasal fracture and left orbital fracture. He states Dax night he fell at home. He presented to the ER where a CT facial bones reveals nasal and orbital fracture. He denies nasal congestion, difficulty breathing, and changes to visit. He has an appointment with eye doctor next week. He is not interested in surgical intervention.    Reviewed:     Study Result    Narrative & Impression   CT FACIAL BONES WO CONTRAST- 10/7/2024 8:30 PM     HISTORY: fall; trauma      COMPARISON: None available     DOSE LENGTH PRODUCT: 2568.08 mGy.cm. Automated exposure control was also  utilized to decrease patient radiation dose.     TECHNIQUE: Serial helical tomographic images of the facial bones were  obtained without contrast. Multiplanar reformatted images were provided  for review.     FINDINGS:     There is a comminuted fracture of the left nasal bone. There is mild  mucosal thickening of the right maxillary sinus. Soft tissue defect is  noted at the midline anteriorly at the frontal scalp soft tissue. There  is a fracture of the left orbital floor with no evidence of extraocular  muscle entrapment. There is nasal septal deviation to the right.     IMPRESSION:     Comminuted fracture of the left nasal bone and fracture of the left  orbital floor.           This report was signed and finalized on 10/7/2024 9:52 PM by Raman Campos.        Past Medical History:   Diagnosis Date    Anemia     Arthritis     COPD (chronic obstructive pulmonary disease)      Disease of thyroid gland     Emphysema lung     Fracture of nasal bones     Hx of colonic polyps     Hyperlipidemia     Hypertension     Lung cancer     Lung nodule     Myasthenia gravis     Pneumonia     PUD (peptic ulcer disease)        Past Surgical History:   Procedure Laterality Date    CATARACT EXTRACTION      COLONOSCOPY  12/12/2014    Diverticulosis repeat exam in 3 years    COLONOSCOPY N/A 04/10/2018    Tubular adenoma cecum poor prep repeat in 3 months    COLONOSCOPY N/A 08/02/2018    2 Tubular adenomas transverse and ascending colon fair prep repeat in 3 years    COLONOSCOPY N/A 03/24/2022    Procedure: COLONOSCOPY WITH ANESTHESIA;  Surgeon: Ferdinand Cross MD;  Location: [facility];  Service: Gastroenterology;  Laterality: N/A;  pre hx colon polyps  post diverticulosis; inadequate prep  Efrain Dobson,     COLONOSCOPY W/ POLYPECTOMY  02/15/2010    2 Hyperplastic polyps at 25 cm and rectum repeat exam in 5 years    LUNG CANCER SURGERY      LUNG REMOVAL, PARTIAL      REPLACEMENT TOTAL KNEE Right 05/2017    TONSILLECTOMY         Outpatient Medications Marked as Taking for the 10/10/24 encounter (Office Visit) with Alexx Martinez MD   Medication Sig Dispense Refill    alfuzosin (UROXATRAL) 10 MG 24 hr tablet Take 2 tablets by mouth.      amLODIPine (NORVASC) 2.5 MG tablet Take 1 tablet by mouth Daily.      bacitracin 500 UNIT/GM ointment Apply  topically to the appropriate area as directed 2 (Two) Times a Day. 14 g 0    buPROPion XL (WELLBUTRIN XL) 150 MG 24 hr tablet Take 1 tablet by mouth Every Morning.      Calcium Citrate-Vitamin D3 (CITRACAL) 315-6.25 MG-MCG tablet tablet Take  by mouth Daily.      cephalexin (KEFLEX) 500 MG capsule Take 1 capsule by mouth 3 (Three) Times a Day for 10 days. 30 capsule 0    HYDROcodone-acetaminophen (NORCO) 5-325 MG per tablet Take 1 tablet by mouth Every 8 (Eight) Hours As Needed for Moderate Pain. 9 tablet 0    losartan (COZAAR) 25 MG tablet 1 tablet  2 (Two) Times a Day.      lovastatin (MEVACOR) 40 MG tablet Take 1 tablet by mouth.      Multiple Vitamins-Minerals (PRESERVISION AREDS 2+MULTI VIT) capsule Take  by mouth.      pantoprazole (PROTONIX) 40 MG EC tablet 1 tablet Daily.      pyridostigmine (MESTINON) 60 MG tablet Take 3 tablets by mouth Daily.         Penicillins    Family History   Problem Relation Age of Onset    Colon polyps Mother     Stroke Mother     Diabetes Mother     Hypertension Mother     Cancer Father         asbestos    Stroke Maternal Grandmother     No Known Problems Maternal Grandfather     No Known Problems Paternal Grandmother     No Known Problems Paternal Grandfather     Diabetes Child     Colon cancer Neg Hx        Social History     Socioeconomic History    Marital status:    Tobacco Use    Smoking status: Former     Current packs/day: 0.00     Average packs/day: 2.0 packs/day for 30.0 years (60.0 ttl pk-yrs)     Types: Cigarettes     Start date: 1955     Quit date: 1984     Years since quittin.8     Passive exposure: Past    Smokeless tobacco: Never   Vaping Use    Vaping status: Never Used   Substance and Sexual Activity    Alcohol use: Never    Drug use: Never    Sexual activity: Not Currently     Partners: Female       Review of Systems   Constitutional: Negative.    HENT: Negative.         Vitals:    10/10/24 1001   BP: 160/88   Pulse: 72   Temp: 98.4 °F (36.9 °C)       Body mass index is 25.77 kg/m².    Objective     Physical Exam  Vitals reviewed.   Constitutional:       Appearance: Normal appearance.   HENT:      Head: Normocephalic.      Comments: Steri strips to nasal bridge  Significant bruising bilateral periorbital area as well as nasal area     Right Ear: External ear normal.      Left Ear: External ear normal.      Nose: Septal deviation present.      Comments: No active clots or bleeding noted     Mouth/Throat:      Lips: Pink.      Mouth: Mucous membranes are moist.   Musculoskeletal:       Cervical back: Full passive range of motion without pain.   Neurological:      Mental Status: He is alert.   Psychiatric:         Behavior: Behavior is cooperative.         Assessment & Plan   Diagnoses and all orders for this visit:    1. Closed fracture of nasal bone, initial encounter (Primary)    2. Closed fracture of orbit, initial encounter      * Surgery not found *  No orders of the defined types were placed in this encounter.    Patient defers surgical intervention at this time    Return if symptoms worsen or fail to improve.       Patient Instructions   CONTACT INFORMATION:  The main office phone number is [phone]. For emergencies after hours and on weekends, this number will convert over to our answering service and the on call provider will answer. Please try to keep non emergent phone calls/ questions to office hours 9am-5pm Monday through Friday.      [employer]  As an alternative, you can sign up and use the Epic MyChart system for more direct and quicker access for non emergent questions/ problems.  [facility] allows you to send messages to your doctor, view your test results, renew your prescriptions, schedule appointments, and more. To sign up, go to [employer] and click on the Sign Up Now link in the New User? box. Enter your Over[employer] Activation Code exactly as it appears below along with the last four digits of your Social Security Number and your Date of Birth () to complete the sign-up process. If you do not sign up before the expiration date, you must request a new code.     [employer] Activation Code: Activation code not generated  Current Overdog Status: Active     If you have questions, you can email [email or web address] or call [phone] to talk to our [employer] staff. Remember, Overdog is NOT to be used for urgent needs. For medical emergencies, dial 911.     IF YOU SMOKE OR USE TOBACCO PLEASE READ THE FOLLOWING:  Why is smoking bad for me?  Smoking  increases the risk of heart disease, lung disease, vascular disease, stroke, and cancer. If you smoke, STOP!        IF YOU SMOKE OR USE TOBACCO PLEASE READ THE FOLLOWING:  Why is smoking bad for me?  Smoking increases the risk of heart disease, lung disease, vascular disease, stroke, and cancer. If you smoke, STOP!     For more information:  Quit Now [place]  1-800-QUIT-NOW  https://[email or web address].quitlogix.org/en-[place]/

## 2024-10-29 DIAGNOSIS — G70.00 MYASTHENIA GRAVIS (HCC): ICD-10-CM

## 2024-10-30 RX ORDER — PREDNISONE 10 MG/1
15 TABLET ORAL DAILY
Qty: 60 TABLET | Refills: 2 | Status: SHIPPED | OUTPATIENT
Start: 2024-10-30

## 2024-10-30 NOTE — TELEPHONE ENCOUNTER
Requested Prescriptions     Pending Prescriptions Disp Refills    predniSONE (DELTASONE) 10 MG tablet 60 tablet 2     Sig: Take 1.5 tablets by mouth daily       Last Office Visit: 8/12/2024  Next Office Visit: 11/18/2024  Last Medication Refill: 8/28/2024 with 2 ELIUD

## 2024-11-18 ENCOUNTER — OFFICE VISIT (OUTPATIENT)
Dept: NEUROLOGY | Age: 84
End: 2024-11-18
Payer: MEDICARE

## 2024-11-18 VITALS
HEART RATE: 77 BPM | OXYGEN SATURATION: 98 % | RESPIRATION RATE: 16 BRPM | WEIGHT: 191 LBS | HEIGHT: 72 IN | SYSTOLIC BLOOD PRESSURE: 130 MMHG | BODY MASS INDEX: 25.87 KG/M2 | DIASTOLIC BLOOD PRESSURE: 72 MMHG

## 2024-11-18 DIAGNOSIS — G70.00 MYASTHENIA GRAVIS (HCC): Primary | ICD-10-CM

## 2024-11-18 DIAGNOSIS — Z87.09 HISTORY OF COPD: ICD-10-CM

## 2024-11-18 DIAGNOSIS — Z86.69 HISTORY OF DOUBLE VISION: ICD-10-CM

## 2024-11-18 PROCEDURE — G8417 CALC BMI ABV UP PARAM F/U: HCPCS | Performed by: PSYCHIATRY & NEUROLOGY

## 2024-11-18 PROCEDURE — G8484 FLU IMMUNIZE NO ADMIN: HCPCS | Performed by: PSYCHIATRY & NEUROLOGY

## 2024-11-18 PROCEDURE — 3075F SYST BP GE 130 - 139MM HG: CPT | Performed by: PSYCHIATRY & NEUROLOGY

## 2024-11-18 PROCEDURE — 3078F DIAST BP <80 MM HG: CPT | Performed by: PSYCHIATRY & NEUROLOGY

## 2024-11-18 PROCEDURE — 1036F TOBACCO NON-USER: CPT | Performed by: PSYCHIATRY & NEUROLOGY

## 2024-11-18 PROCEDURE — G8427 DOCREV CUR MEDS BY ELIG CLIN: HCPCS | Performed by: PSYCHIATRY & NEUROLOGY

## 2024-11-18 PROCEDURE — 1123F ACP DISCUSS/DSCN MKR DOCD: CPT | Performed by: PSYCHIATRY & NEUROLOGY

## 2024-11-18 PROCEDURE — 1159F MED LIST DOCD IN RCRD: CPT | Performed by: PSYCHIATRY & NEUROLOGY

## 2024-11-18 PROCEDURE — 99214 OFFICE O/P EST MOD 30 MIN: CPT | Performed by: PSYCHIATRY & NEUROLOGY

## 2024-11-18 RX ORDER — AMLODIPINE BESYLATE 5 MG/1
5 TABLET ORAL DAILY
COMMUNITY
Start: 2024-11-11

## 2024-11-18 NOTE — PROGRESS NOTES
REVIEW OF SYSTEMS    Constitutional: []Fever []Sweat []Chills [] Recent Injury [x] Denies all unless marked  HEENT:[]Headache  [] Head Injury/Hearing Loss  [] Sore Throat  [] Ear Ache/Dizziness  [x] Denies all unless marked  Spine:  [] Neck pain  [] Back pain  [] Sciaticia  [x] Denies all unless marked  Cardiovascular:[]Heart Disease []Chest Pain [] Palpitations  [x] Denies all unless marked  Pulmonary: []Shortness of Breath []Cough   [x] Denies all unless marke  Gastrointestinal: []Nausea  []Vomiting  []Abdominal Pain  []Constipation  []Diarrhea  []Dark Bloody Stools  [x] Denies all unless marked  Psychiatric/Behavioral:[] Depression [] Anxiety [x] Denies all unless marked  Genitourinary:   [] Frequency  [] Urgency  [] Incontinence [] Pain with Urination  [x] Denies all unless marked  Extremities: []Pain  []Swelling  [x] Denies all unless marked  Musculoskeletal: [] Muscle Pain  [] Joint Pain  [] Arthritis [] Muscle Cramps [] Muscle Twitches  [x] Denies all unless marked  Sleep: [] Insomnia [] Snoring [] Restless Legs [] Sleep Apnea  [] Daytime Sleepiness  [x] Denies all unless marked  Skin:[] Rash [] Skin Discoloration [x] Denies all unless marked   Neurological: [x]Visual Disturbance/Memory Loss [] Loss of Balance [] Slurred Speech/Weakness [] Seizures  [] Vertigo/Dizziness [x] Denies all unless marked     
  normal effort without use of accessory muscles  Cardiovascular- RRR  Musculoskeletal - no significant wasting of muscles noted, no bony deformities, gait no gross ataxia  Extremities-no clubbing, cyanosis or edema  Skin - warm, dry, and intact.  No rash, erythema, or pallor.  Psychiatric - mood, affect, and behavior appear normal.      Neurological exam  Awake, alert, fluent oriented x 3 appropriate affect  Attention and concentration appear appropriate  Recent and remote memory appears unremarkable  Speech normal without dysarthria  No clear issues with language of fund of knowledge    Cranial Nerve Exam   CN II- Visual fields grossly unremarkable. VA adequate.   CN III, IV,VI- PERRLA, EOMI, No nystagmus, conjugate eye movements, minimal left ptosis  CN VII-no facial asymmetry  CN VIII-Hearing intact   CN IX and X- Palate elevates in midline  CN XI-good shoulder shrug  CN XII-Tongue midline with no fasciculations or fibrillations  For the most part can vary his lashes although not completely on the left..  Normal neck flexion  Motor Exam relatively normal throughout ; he is able to stand without using his arms.      Reflexes trace throughout    Tremors- no tremors in hands or head noted    Gait  Normal base and speed  No ataxia. No Romberg sign    Coordination  Finger to nose and MIKE-unremarkable    Lab Results   Component Value Date    MFTLNINW58 224 05/20/2017     Lab Results   Component Value Date    WBC 6.3 12/05/2023    HGB 12.4 (L) 12/05/2023    HCT 38.5 (L) 12/05/2023    .8 (H) 12/05/2023     12/05/2023     Lab Results   Component Value Date     12/05/2023    K 4.0 12/05/2023     12/05/2023    CO2 24 12/05/2023    BUN 8 12/05/2023    CREATININE 0.8 12/05/2023    GLUCOSE 142 (H) 12/05/2023    CALCIUM 8.6 (L) 12/05/2023    BILITOT 0.4 12/05/2023    ALKPHOS 71 12/05/2023    AST 17 12/05/2023    ALT 13 12/05/2023    LABGLOM >60 12/05/2023    GFRAA >60 07/05/2022           Assessment

## 2024-11-29 DIAGNOSIS — G70.00 MYASTHENIA GRAVIS (HCC): ICD-10-CM

## 2024-12-02 DIAGNOSIS — G70.00 MYASTHENIA GRAVIS (HCC): ICD-10-CM

## 2024-12-02 RX ORDER — PANTOPRAZOLE SODIUM 40 MG/1
TABLET, DELAYED RELEASE ORAL
Qty: 90 TABLET | Refills: 5 | Status: CANCELLED | OUTPATIENT
Start: 2024-12-02

## 2024-12-02 RX ORDER — PANTOPRAZOLE SODIUM 40 MG/1
TABLET, DELAYED RELEASE ORAL
Qty: 90 TABLET | Refills: 5 | Status: SHIPPED | OUTPATIENT
Start: 2024-12-02

## 2024-12-02 NOTE — TELEPHONE ENCOUNTER
Requested Prescriptions     Pending Prescriptions Disp Refills    pantoprazole (PROTONIX) 40 MG tablet [Pharmacy Med Name: Pantoprazole Sodium 40 MG Oral Tablet Delayed Release] 90 tablet 0     Sig: TAKE 1 TABLET BY MOUTH ONCE DAILY IN THE MORNING BEFORE BREAKFAST       Last Office Visit: 11/18/2024  Next Office Visit: 2/19/2025  Last Medication Refill: 8/30/2024 with 0 RF

## 2024-12-09 ENCOUNTER — LAB (OUTPATIENT)
Dept: LAB | Facility: HOSPITAL | Age: 84
End: 2024-12-09
Payer: MEDICARE

## 2024-12-09 DIAGNOSIS — C34.32 PRIMARY ADENOCARCINOMA OF LOWER LOBE OF LEFT LUNG: ICD-10-CM

## 2024-12-09 LAB
ALBUMIN SERPL-MCNC: 3.6 G/DL (ref 3.5–5.2)
ALBUMIN/GLOB SERPL: 1.1 G/DL
ALP SERPL-CCNC: 105 U/L (ref 39–117)
ALT SERPL W P-5'-P-CCNC: 53 U/L (ref 1–41)
ANION GAP SERPL CALCULATED.3IONS-SCNC: 9 MMOL/L (ref 5–15)
AST SERPL-CCNC: 28 U/L (ref 1–40)
BASOPHILS # BLD AUTO: 0.02 10*3/MM3 (ref 0–0.2)
BASOPHILS NFR BLD AUTO: 0.1 % (ref 0–1.5)
BILIRUB SERPL-MCNC: 0.3 MG/DL (ref 0–1.2)
BUN SERPL-MCNC: 12 MG/DL (ref 8–23)
BUN/CREAT SERPL: 16.2 (ref 7–25)
CALCIUM SPEC-SCNC: 8.3 MG/DL (ref 8.6–10.5)
CEA SERPL-MCNC: 4.75 NG/ML
CHLORIDE SERPL-SCNC: 99 MMOL/L (ref 98–107)
CO2 SERPL-SCNC: 27 MMOL/L (ref 22–29)
CREAT SERPL-MCNC: 0.74 MG/DL (ref 0.76–1.27)
DEPRECATED RDW RBC AUTO: 49.7 FL (ref 37–54)
EGFRCR SERPLBLD CKD-EPI 2021: 88.8 ML/MIN/1.73
EOSINOPHIL # BLD AUTO: 0 10*3/MM3 (ref 0–0.4)
EOSINOPHIL NFR BLD AUTO: 0 % (ref 0.3–6.2)
ERYTHROCYTE [DISTWIDTH] IN BLOOD BY AUTOMATED COUNT: 13.3 % (ref 12.3–15.4)
FERRITIN SERPL-MCNC: 290.8 NG/ML (ref 30–400)
FOLATE SERPL-MCNC: 10.6 NG/ML (ref 4.78–24.2)
GLOBULIN UR ELPH-MCNC: 3.2 GM/DL
GLUCOSE SERPL-MCNC: 114 MG/DL (ref 65–99)
HCT VFR BLD AUTO: 39.4 % (ref 37.5–51)
HGB BLD-MCNC: 12.2 G/DL (ref 13–17.7)
IMM GRANULOCYTES # BLD AUTO: 0.17 10*3/MM3 (ref 0–0.05)
IMM GRANULOCYTES NFR BLD AUTO: 1.2 % (ref 0–0.5)
IRON 24H UR-MRATE: 58 MCG/DL (ref 59–158)
IRON SATN MFR SERPL: 26 % (ref 20–50)
LYMPHOCYTES # BLD AUTO: 0.49 10*3/MM3 (ref 0.7–3.1)
LYMPHOCYTES NFR BLD AUTO: 3.6 % (ref 19.6–45.3)
MCH RBC QN AUTO: 31.4 PG (ref 26.6–33)
MCHC RBC AUTO-ENTMCNC: 31 G/DL (ref 31.5–35.7)
MCV RBC AUTO: 101.3 FL (ref 79–97)
MONOCYTES # BLD AUTO: 0.59 10*3/MM3 (ref 0.1–0.9)
MONOCYTES NFR BLD AUTO: 4.3 % (ref 5–12)
NEUTROPHILS NFR BLD AUTO: 12.45 10*3/MM3 (ref 1.7–7)
NEUTROPHILS NFR BLD AUTO: 90.8 % (ref 42.7–76)
NRBC BLD AUTO-RTO: 0 /100 WBC (ref 0–0.2)
PLATELET # BLD AUTO: 401 10*3/MM3 (ref 140–450)
PMV BLD AUTO: 8.7 FL (ref 6–12)
POTASSIUM SERPL-SCNC: 4.6 MMOL/L (ref 3.5–5.2)
PROT SERPL-MCNC: 6.8 G/DL (ref 6–8.5)
RBC # BLD AUTO: 3.89 10*6/MM3 (ref 4.14–5.8)
SODIUM SERPL-SCNC: 135 MMOL/L (ref 136–145)
TIBC SERPL-MCNC: 219 MCG/DL (ref 298–536)
TRANSFERRIN SERPL-MCNC: 147 MG/DL (ref 200–360)
VIT B12 BLD-MCNC: 254 PG/ML (ref 211–946)
WBC NRBC COR # BLD AUTO: 13.72 10*3/MM3 (ref 3.4–10.8)

## 2024-12-09 PROCEDURE — 82378 CARCINOEMBRYONIC ANTIGEN: CPT

## 2024-12-09 PROCEDURE — 84466 ASSAY OF TRANSFERRIN: CPT

## 2024-12-09 PROCEDURE — 80053 COMPREHEN METABOLIC PANEL: CPT

## 2024-12-09 PROCEDURE — 82746 ASSAY OF FOLIC ACID SERUM: CPT

## 2024-12-09 PROCEDURE — 36415 COLL VENOUS BLD VENIPUNCTURE: CPT

## 2024-12-09 PROCEDURE — 83540 ASSAY OF IRON: CPT

## 2024-12-09 PROCEDURE — 85025 COMPLETE CBC W/AUTO DIFF WBC: CPT

## 2024-12-09 PROCEDURE — 82607 VITAMIN B-12: CPT

## 2024-12-09 PROCEDURE — 82728 ASSAY OF FERRITIN: CPT

## 2024-12-10 ENCOUNTER — TELEPHONE (OUTPATIENT)
Dept: ONCOLOGY | Facility: CLINIC | Age: 84
End: 2024-12-10
Payer: MEDICARE

## 2024-12-10 RX ORDER — CYANOCOBALAMIN/FOLIC AC/VIT B6 1-2.5-25MG
1 TABLET ORAL DAILY
Qty: 30 TABLET | Refills: 0 | Status: SHIPPED | OUTPATIENT
Start: 2024-12-10

## 2024-12-10 NOTE — TELEPHONE ENCOUNTER
Called and sw patient discussing lab results. Dr. Arteaga wants the patient to take Folbee for low Vitamin B12. He voiced understanding and no c/o at this time.

## 2024-12-14 NOTE — PROGRESS NOTES
[facility] ON[facility]EMATOLOG[facility]COLOGY  [address]  [place] [facility] [address] [phone]    Patient Name: Carmine Garcia  Encounter Date: 2024  YOB: 1940  Patient Number: [id number]     REASON FOR VISIT: Mr. 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 He is seen 156.5 months since thoracoscopic surgical resection of the lung neoplasm. He is also followed for anemia of iron deficiency and B12 deficiency. It has been 82 months since last receipt of Injectafer.   On 23- PET-CT- showed a recurrent left lung neoplasm an isolated finding (15 mm left lung nodule adjacent to the hilum shows FDG uptake with SUV max = 3.3).  He then received definitive SBRT: 23- 23- 6000 cGy/5 fx) to NISA nodule (16 months).  He since received SBRT to the right lun/10/24-24 (5000 cGy/5 fx).  He is here alone (previously with his daughter, Monica)    I have reviewed the HPI and verified with the patient the accuracy of it. No changes to interval history since the information was documented. Cem Arteaga MD 24      DIAGNOSTIC ABNORMALITIES:   CT chest, 10/20/2011, [facility]: 1.5 cm left lower lobe nodule positioned just lateral to the hilum - granuloma versus malignancy. No pathologic lymphadenopathy.  PET CT scan, 10/26/2011: Increased metabolic activity in the left lower lobe. Lung nodule located centrally near the left hilum with maximum SUV of 7.4 units, suspicious for primary carcinoma. This corresponded to the lesion seen on CT scan of 10/20/2011. No abnormal mediastinal, hilar, or inguinal activity noted.  Diagnostic bronchoscopy, 2011: Negative for malignancy. There were no endobronchial lesions.  Labs, 2011: Ferritin 78.7, B12 235, folate 6.7, CEA 2.2, serum iron 16, TIBC 196 (225 to  420), iron saturation 8.2%.  Thyroid ultrasound, 11/30/2011. No thyroid nodule identified.  Labs, 12/01/2011: Serum cortisol 21.7. Urine sodium less than 5, serum osmolality 680 (601 to 850).  Left lower lobe lung biopsy, 11/28/2011, [facility]: Well-differentiated papillary adenocarcinoma (1.5 cm in greatest dimension).  Immunohistochemistry analysis, 11/30/2011: Tumor immunoreactive with TTF-1 and Napsin A while negative for thyroglobulin. Results support primary lung adenocarcinoma.  Labs, 01/03/2012: GFR 69.9. Ferritin 54, iron 97, TIBC 301, iron sat 32. B12 352, folate 16.4.   Chest x-ray 09/09/2015 at [facility]. Stable postoperative chest x-ray.  Labs, 03/27/2017: Hemoglobin 13.8, KKX130.6,  (313 to 618), TSH 0.7, T4 of 6.9 (each normal),   Comprehensive blood report, 03/27/2017: Mild anemia with history of macrocytosis. COMPREHENSIVE DIAGNOSIS. Review of peripheral blood smear: Very mild anemia with red blood cells showing fairly normal morphology at the time of this peripheral blood specimen. Normal white blood cell and platelet counts and morphology. No abnormal populations detected on flow cytometric studies. See comment. COMPREHENSIVE COMMENT. This patient's recent peripheral blood specimen demonstrating a mild macrocytosis with an MCV of 100.6 is noted. At the time of this current peripheral blood specimen, his MCV is within normal limits at 94.3. He has a minimal anemia. White blood cell and platelet counts are both within normal limits with normal morphology. Causes of these peripheral blood findings and macrocytosis could be liver disease, thyroid disease, vitamin/nutritional deficiencies and drugs/toxins. Correlation recommended.  -6/14/23- CT chest- New 1.5 cm central LEFT upper lobe pulmonary nodule. This is concerning for recurrent neoplasm. Management options include PET/CT versus sampling with EBUS.  -6/21/23- PET-CT- PET-CT findings compatible with recurrent left lung neoplasm an  isolated finding (15 mm left lung nodule adjacent to the hilum shows FDG uptake with SUV max = 3.3).  No distant disease is seen.      PREVIOUS INTERVENTIONS:   Left thoracoscopic core needle biopsy, followed by video-assisted thoracoscopic surgery (VATS) left lower lobectomy, and mediastinal lymph node dissection, 2011: Pathology from the left lower lung nodule lung biopsy showed no histologic evidence of malignancy. Well differentiated papillary adenocarcinoma (1.5 cm in greatest diminish. All resection margins are free of malignancy. No visceral pleural invasion identified. Three peribronchial lymph nodes negative for metastatic carcinoma. Emphysematous changes. Level 10 (1 lymph node), Level 11 (1 lymph node), Level 5 (2 lymph nodes) all negative for metastatic carcinoma. 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 Special stains are pending.  Venofer 200 mg IV daily, 2011 through 2011 (800 mg); then 2014 through 2014 (800 mg).  B12 at 1000 mcg IM beginning 2011 through 2011 (1000 mg).  Foltx p.o. daily beginning 2014 through 2015.  Injectafer 750 mg, 2017; 2021  -SBRT: 23- 23- 6000 cGy/5 fx) to NISA nodule  SBRT to the right lun/10/24-24 (5000 cGy/5 fx).    Problem List Items Addressed This Visit    None            Oncology/Hematology History   Primary adenocarcinoma of lower lobe of left lung   10/20/2011 Imaging    CT Chest:  1.5 cm left lower lobe nodule positioned just lateral to the hilum, granuloma vs. Malignancy  No pathologic lymphadenopathy     10/26/2011 Imaging    PET/CT:  Increased metabolic activity in the left lower lobe, lung nodule located centrally near the left hilum witih max SUV 7.4, suspicious for primary carcinoma. This corresponds to the lesion seen on CT scan of 10/20/2011.   No abnormal mediastinal, hilar, or inguinal activity     2011 Biopsy    Final Diagnosis   1.     Biopsy, left lower  lobe nodule, lung:              A.     Fragments of bronchial mucosa with submucosa demonstrating   stromal elastosis and minimal chronic inflammation.        B.     No evidence of granulomatous inflammation.        C.     No histologic evidence of malignancy.      2.     Bronchial washings, smears (four) and cell block:         A.     Mild acute inflammation.        B.     Negative for malignant cells.      3.     Bronchial brushings, left lower lobe of lung, smears (3) and ThinPrep   preparation (1):         Negative for malignant cells.        11/28/2011 Surgery    Final Diagnosis        1.     Biopsy, left lower lobe of lung (frozen section control):              A.     Fragment of fibrotic pulmonary parenchyma demonstrating stromal   elastosis and chronic active inflammation, moderate.        B.     No histologic evidence of malignancy.      2.     Level 10 lymph node (frozen section control):         Lymph node (one), negative for metastatic carcinoma.      3.     Left lower lobe of lung (frozen section control):         A.     Well-differentiated papillary adenocarcinoma (1.5 cm in greatest   dimension).        B.     The dominant and aberrant bronchial margins of surgical resection   are negative for malignancy.         C.     The vascular and parenchymal margins of surgical resection are   negative for malignancy.         D.     No visceral pleural invasion identified.         E.     Peribronchial lymph nodes (three), negative for metastatic   carcinoma.         F.     Emphysematous changes.      4.     Level 11 lymph node:   Lymph node (one), negative for metastatic   carcinoma.      5.     Level 5 lymph nodes:    Lymph nodes (two), negative for metastatic   carcinoma.      Comment:     Primary papillary adenocarcinomas of the lung do occur; however, they demonstrate a similar histology to that seen in papillary thyroid carcinoma.  The thyroid gland should be evaluated if not already   done to exclude the  possibility of met        4/30/2014 Imaging    CXR:  SUMMARY:  1. Stable chronic change.  2. No acute disease.     9/3/2014 Imaging    CXR:  IMPRESSION-   1. Emphysematous and postoperative changes in the chest without evidence  of mass lesion or acute cardiopulmonary process.     1/5/2016 Imaging    CXR:  IMPRESSION   Postoperative changes of the left hemithorax with associated volume  loss. No acute process identified.     7/1/2016 Imaging    CXR:  IMPRESSION-   1. No active disease is seen.  2. COPD/emphysema.  3. Prior lung surgery on the left.      1/4/2017 Imaging    CXR:  IMPRESSION:  1. COPD/emphysema.  2. Postoperative changes on the left.  3. No acute appearing infiltrate or effusion.      3/20/2017 Imaging    CXR:  IMPRESSION:  1. Chronic changes in the lungs and spine without evidence of acute  cardiopulmonary process. Overall, the appearance of the lungs is similar  to the study from 01/04/2017.     11/20/2017 Imaging    CXR:  Impression:  No significant interval change when compared to chest x-ray dated  07/03/2017.     2/12/2018 Imaging    CXR:  IMPRESSION:  1. Hyperinflated lungs suggesting COPD.  2. Postoperative changes of the left hemithorax.  3. No acute cardiopulmonary process identified.     7/6/2018 Imaging    CXR:  Impression:  No acute cardiopulmonary disease.     12/21/2018 Imaging    CXR:  IMPRESSION:  COPD. Chronic volume loss in the lung bases with mild  central vascular crowding. No acute infiltrates.     8/23/2019 Imaging    CXR:  IMPRESSION:  1. No acute disease.     6/19/2020 Imaging    CXR:  IMPRESSION:  1. Mild blunting of the left costophrenic angle, which could be seen  with scarring or small effusion.     2/22/2021 Imaging    CXR:  IMPRESSION:  1. COPD no acute cardiopulmonary process.     10/25/2021 Imaging    CXR:  IMPRESSION:  1. Streaky bibasilar opacities, similar to prior.     4/25/2022 Imaging    CT Chest:  IMPRESSION:  1. Status post resection of a left lower lobe  pulmonary nodule. Moderate  changes of centrilobular emphysema are present.  2. Small groundglass nodules one within the left upper lobe and one  within the periphery of the right lower lobe warranting follow-up per  Fleischner criteria. No additional lung lesions are present.  3. No enlarged mediastinal or axillary lymphadenopathy..  4. 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 The  thoracic aorta is ectatic. There is mild enlargement of the pulmonary  arteries suggesting pulmonary arterial hypertension. Heavy coronary  calcifications are present.         7/20/2022 Imaging    CT Chest:  IMPRESSION:  1.  Postoperative change of LEFT lower lobectomy without evidence of  recurrent or metastatic disease.   2.  No change in 7 mm LEFT upper lobe and RIGHT lower lobe groundglass  pulmonary nodules. Consider continued imaging surveillance to document  stability.  3.  Ectatic ascending aorta measuring 4 cm diameter.     12/21/2022 Imaging    CXR:  IMPRESSION:  1. Stable chronic change.  2. No acute disease.     1/27/2023 Imaging    CT Chest:  IMPRESSION:  1. A stable CT scan of the chest. Postsurgical changes/left lower  lobectomy. No finding to suggest recurrence.  2. A stable small groundglass opacity/nodule in the left upper lobe. No  features of malignancy at this time. A follow-up examination in one year  is recommended to ensure stability.  3. Chronic emphysematous lung changes.     6/14/2023 Imaging    CT Chest:  IMPRESSION:  New 1.5 cm central LEFT upper lobe pulmonary nodule. This is concerning  for recurrent neoplasm. Management options include PET/CT versus  sampling with EBUS.     6/21/2023 Imaging    PET/CT:  Summary:  1. PET-CT findings compatible with recurrent left lung neoplasm as an  isolated finding. No distant disease is seen.     6/22/2023 Procedure    Documentation per Dr. Arteaga:  Patient informed per Dr Arteaga recommendations a referral should be made to Rad/Onc for  evaluation and possible SBRT  Patient informed once the apt is domingo he will be called with time and date        11/8/2023 Imaging    CT Chest:  IMPRESSION:  Stable 1.5 cm left perihilar central upper lobe nodule, relatively  unchanged from 6/14/2023. No new or increasing size nodule identified.  No pathologic lymphadenopathy. Stable volume loss left hemithorax from a  prior left lower lobe lobectomy.     2/1/2024 Imaging    CT Chest:  IMPRESSION:  1. Stable chest CT appearance.  2. No new or progressive neoplastic disease.        5/6/2024 Imaging    CT Chest:  IMPRESSION:  Stable CT chest without new or progressive disease.        8/7/2024 Imaging    CT Chest:  IMPRESSION:  1. New RIGHT lower lobe peripheral spiculated masslike opacity, highly  concerning for disease recurrence if the patient has no symptoms of  infection.  2. Heavily calcified coronary arteries versus stent material.  3. New mild height loss at T2 and L2.     8/22/2024 Imaging    PET/CT:  IMPRESSION:  1. Intense abnormal uptake associated with the peripheral nodule in the  right lower lobe which abuts the major fissure along its anterior  margin. Given the high SUV measurement neoplasia should be excluded and  is the primary consideration. It is of note that the nodule has changed  somewhat from the previous examination demonstrating increase in size in  the transverse dimension but overall diminishment in size in the coronal  dimension. This is somewhat unusual for neoplasia suggesting that this  finding could potentially be inflammatory/infectious in nature. No  associated metabolically active hilar or mediastinal adenopathy.  Previously noted metabolically active left perihilar nodule no longer  demonstrates any abnormal metabolic activity posttreatment.  2. No additional sites of abnormal metabolic activity demonstrated.  3. There are emphysematous changes of the lungs. No additional lung  lesions noted by CT imaging. No cervical or mediastinal  adenopathy.  There is extensive diverticular disease of the descending and sigmoid  colon without diverticulitis. Diffuse enlargement of the prostate gland  is present with mass effect on the base of the bladder. Prominence of  the urinary bladder wall likely is related to muscular hypertrophy from  an element of chronic bladder outlet obstruction.            PAST MEDICAL HISTORY:  ALLERGIES:  Allergies   Allergen Reactions    Penicillins Unknown - High Severity     Unknown       CURRENT MEDICATIONS:  Outpatient Encounter Medications as of 12/19/2024   Medication Sig Dispense Refill    alendronate (FOSAMAX) 70 MG tablet Take 1 tablet by mouth Every 7 (Seven) Days.      alfuzosin (UROXATRAL) 10 MG 24 hr tablet Take 2 tablets by mouth.      amLODIPine (NORVASC) 2.5 MG tablet Take 1 tablet by mouth Daily.      bacitracin 500 UNIT/GM ointment Apply  topically to the appropriate area as directed 2 (Two) Times a Day. 14 g 0    buPROPion XL (WELLBUTRIN XL) 150 MG 24 hr tablet Take 1 tablet by mouth Every Morning.      Calcium Citrate-Vitamin D3 (CITRACAL) 315-6.25 MG-MCG tablet tablet Take  by mouth Daily.      fluticasone (FLONASE) 50 MCG/ACT nasal spray Administer  into the nostril(s) as directed by provider Daily.      folic acid-vit B6-vit B12 (Folbee) 2.5-25-1 MG tablet tablet Take 1 tablet by mouth Daily. 30 tablet 0    losartan (COZAAR) 25 MG tablet 1 tablet 2 (Two) Times a Day.      lovastatin (MEVACOR) 40 MG tablet Take 1 tablet by mouth.      Multiple Vitamins-Minerals (PRESERVISION AREDS 2+MULTI VIT) capsule Take  by mouth.      ondansetron ODT (ZOFRAN-ODT) 4 MG disintegrating tablet Place 1 tablet on the tongue Every 6 (Six) Hours As Needed for Nausea. 12 tablet 0    pantoprazole (PROTONIX) 40 MG EC tablet 1 tablet Daily.      predniSONE (DELTASONE) 10 MG tablet 1 tablet Daily. 1.5 tab daily      pyridostigmine (MESTINON) 60 MG tablet Take 3 tablets by mouth Daily.      HYDROcodone-acetaminophen (NORCO) 5-325  MG per tablet Take 1 tablet by mouth Every 8 (Eight) Hours As Needed for Moderate Pain. (Patient not taking: Reported on 12/19/2024) 9 tablet 0     No facility-administered encounter medications on file as of 12/19/2024.     ADULT ILLNESSES:   Lung cancer ( Stage Date: 11/28/2011, Stage IA (T1a, N0, M0)-Pathological  Date of Dx:2011 ; ICD-10:C34.32 ;Malignant neoplasm of lower lobe, left bronchus or lung )   Acute bronchitis ( ICD-10:J20.9 ;Acute bronchitis, unspecified   Cataracts ( ICD-10:H26.9 ;Unspecified cataract   Colonic polyps ( hyperplastic, 02/2010; ICD-10:K63.5 ;Polyp of colon )   Constipation ( ICD-10:K59.00 ;Constipation, unspecified   Former smoker ( ICD-10:Z87.891 ;Personal history of nicotine dependence   High carcinoembryonic antigen level ( ICD-10:R97.0 ;Elevated carcinoembryonic antigen [CEA]   Hyperlipidemia ( ICD-10:E78.5 ;Hyperlipidemia, unspecified   Hypertension ( ICD-10:I10 ;Essential (primary) hypertension   Hyperthyroidism ( ICD-10:E05.90 ;Thyrotoxicosis, unspecified without thyrotoxic crisis or storm   Iron deficiency anemia ( ICD-10:D50.9 ;Iron deficiency anemia, unspecified   Leukocytosis ( ICD-10:D72.829 ;Elevated white blood cell count, unspecified   Macrocytosis ( ICD-10:D75.89 ;Other specified diseases of blood and blood-forming organs   Malabsorption syndrome (disorder)   Peptic ulcers ( ICD-10:K27.7 ;Chronic peptic ulcer, site unspecified, without hemorrhage or perforation   Retention of urine ( postoperative following eye surgery, 10/2011; ICD-10:R33.9 ;Retention of urine, unspecified )   Strabismus (eye condition) ( ICD-10:H50.9 ;Unspecified strabismus   Vitamin B12 deficiency ( ICD-10:E53.8 ;Deficiency of other specified B group vitamins  Right leg edema. Venous Doppler of right lower extremity on 11/28/2017 ([facility]). Impression: There is no evidence of deep venous thrombosis or superficial thrombophlebitis of the right lower extremity.      SURGERIES:    Excision of non-melanoma skin cancer, 01/2012. Dr. Medrano   Video-assisted thoracoscopic (VATS) left lower lobectomy, 11/28/2011. Dr. Troy   Partial gastrectomy and vagotomy   Total knee replacement, right, 05/18/2017. Dr. Jarrett   Cataract surgery, 2004   Incision and drainage of abscess, left groin, (I&D) in early 01/2014. Dr. Velarde   Strabismus surgery, left eye, 2011.   Colonoscopy on 4/10/18 ([facility]). Impressions: Preparation of the colon was inadequate. One 12 mm polyp in the cecum, removed with a hot snare and removed piecemeal using a hot snare. Resected and retrieved. Diverticulosis in the sigmoid colon and in the descending colon    ADULT ILLNESSES:  Patient Active Problem List   Diagnosis Code    Hx of colonic polyps Z86.0100    Obesity, unspecified obesity severity, unspecified obesity type E66.9    History of colon polyps Z86.0100    Primary adenocarcinoma of lower lobe of left lung C34.32    Iron deficiency anemia D50.9    B12 deficiency E53.8    Essential hypertension I10    Pulmonary emphysema J43.9    Slow transit constipation K59.01    Urinary retention R33.9    Former smoker Z87.891    History of radiation therapy Z92.3    History of malignant neoplasm of lung Z85.118     SURGERIES:  Past Surgical History:   Procedure Laterality Date    CATARACT EXTRACTION      COLONOSCOPY  12/12/2014    Diverticulosis repeat exam in 3 years    COLONOSCOPY N/A 04/10/2018    Tubular adenoma cecum poor prep repeat in 3 months    COLONOSCOPY N/A 08/02/2018    2 Tubular adenomas transverse and ascending colon fair prep repeat in 3 years    COLONOSCOPY N/A 03/24/2022    Procedure: COLONOSCOPY WITH ANESTHESIA;  Surgeon: Ferdinand Cross MD;  Location: [facility] ENDOSCOPY;  Service: Gastroenterology;  Laterality: N/A;  pre hx colon polyps  post diverticulosis; inadequate prep  Efrain Dobson, DO    COLONOSCOPY W/ POLYPECTOMY  02/15/2010    2 Hyperplastic polyps at 25 cm and rectum repeat exam in 5 years     LUNG CANCER SURGERY      LUNG REMOVAL, PARTIAL      REPLACEMENT TOTAL KNEE Right 2017    TONSILLECTOMY       HEALTH MAINTENANCE ITEMS:  Health Maintenance Due   Topic Date Due    RSV Vaccine - Adults (1 - 1-dose 75+ series) Never done    MOST FORM  2024    LIPID PANEL  2024       <no information>  Last Completed Colonoscopy       This patient has no relevant Health Maintenance data.          Immunization History   Administered Date(s) Administered    COVID-19 (MODERNA) 1st,2nd,3rd Dose Monovalent 2021, 2021, 2021    COVID-19 (PFIZER) 12YRS+ (COMIRNATY) 2023, 2024    COVID-19 (PFIZER) Purple Cap Monovalent 2021    Flu Vaccine Split Quad 10/11/2018    INFLUENZA SPLIT TRI 10/13/2014    Influenza Injectable Mdck Pf Quad 10/14/2021, 10/13/2022    Influenza TIV (IM) 2019, 10/28/2020    Influenza, Unspecified 10/14/2021, 10/13/2022    Pneumococcal Conjugate 13-Valent (PCV13) 2018    Pneumococcal Polysaccharide (PPSV23) 2011, 10/21/2013    Tdap 10/07/2024    Zostavax 2013     Last Completed Mammogram       This patient has no relevant Health Maintenance data.              FAMILY HISTORY:  Family History   Problem Relation Age of Onset    Colon polyps Mother     Stroke Mother     Diabetes Mother     Hypertension Mother     Cancer Father         asbestos    Stroke Maternal Grandmother     No Known Problems Maternal Grandfather     No Known Problems Paternal Grandmother     No Known Problems Paternal Grandfather     Diabetes Child     Colon cancer Neg Hx      SOCIAL HISTORY:  Social History     Socioeconomic History    Marital status:    Tobacco Use    Smoking status: Former     Current packs/day: 0.00     Average packs/day: 2.0 packs/day for 30.0 years (60.0 ttl pk-yrs)     Types: Cigarettes     Start date: 1955     Quit date: 1984     Years since quittin.9     Passive exposure: Past    Smokeless tobacco: Never   Vaping Use     "Vaping status: Never Used   Substance and Sexual Activity    Alcohol use: Never    Drug use: Never    Sexual activity: Not Currently     Partners: Female       REVIEW OF SYSTEMS:  Constitutional:   The patient's appetite is fair. \"Not as hungry due to this sinus infection.\" His energy remains chronically low to fair. He says has been able to walk better since the knee surgery (right knee arthroplasty, 05/18/2017) though is again using a cane.  Says the lower back pains and breathlessness slow him down.  He manages his personal ADLs, some chores, running errands and driving short distances. He has lost 7 lb (in addition to 7 lb at his 2 prior visits) in the interval.  He lives with his daughter and her spouse. He denies fevers, chills, or drenching night sweats. His sleep habits had been appropriate.  Covid vaccines x 2, 04/2021 and booster last 12/2021  Ear/Nose/Throat/Mouth:   He reports sinus congestion and postnasal drainage but no ear pains or sore throat.  He has not had nosebleeds. No sore tongue. He has no headaches. He denies any hoarseness, change in voice quality.  Ocular:   He reports recent bouts of diplopia, no eye pain, significant change in visual acuity, or blurry vision.  Respiratory:   He has baseline exertional dyspnea but says he is less short of breath with his routine activities. Has worsening cough that he attributes to postnasal drainage.  He denies shortness of breath at rest.  Completed SBRT 8/1/23- 8/14/23- 6000 cGy/5 fx) to NISA nodule  Cardiovascular:   He reports no exertional chest pain, chest pressure, or chest heaviness. He reports no claudication. He reports no palpitations or symptomatic orthostasis.  Gastrointestinal:   He reports no dysphagia, nausea, vomiting, postprandial abdominal pain, bloating, cramping, change in bowel habits, with no dark stools (oral iron intolerant). He reports no rectal bleeding. He reports no constipation on \"the right diet.\"  Infrequent use of stool " "softeners (Dulcolax) without laxatives. He has no diarrhea. Repeat c-scope 03/24/22.  Diverticulosis.  Repeat prn. He says that he has elected not to do anymore. \"Not at my age anymore.\"  Genitourinary:   He denies prior problems with urinary burning, frequency, dribbling, or discoloration. He denies difficulty controlling his bladder.   Musculoskeletal:   He has chronic arthralgias.  The right knee feels more stable since surgery.  Has not needed a cane to walk unless he goes long distances.  He has only occasional \"aches\" of the hips and his shoulders. He denies myalgias or nighttime leg cramping.  Extremities:   He normally has no fluid retention though has lingering mild right = left ankle/leg swelling.   Endocrine:   He reports no problems with excess thirst, excessive urination, vasomotor instability, or unexplained fatigue.  Heme/Lymphatic:   He reports easy bruising but no unexplained bleeding, petechial rashes, or swollen glands.  Neuro:   He reports no loss of consciousness, seizures, fainting spells, or dizziness. He reports no weakness of his face, arms, or legs. He has no difficulty with speech. He has no tremors. He reports occasional left = right foot tingling.   Psych:   He seems generally satisfied with life. He denies depression/anxiety. He reports no mood swings.       VITAL SIGNS: /78   Pulse 97   Temp 98.1 °F (36.7 °C) (Tympanic)   Resp 18   Ht 182.9 cm (72\")   Wt 86 kg (189 lb 11.2 oz)   SpO2 99%   BMI 25.73 kg/m² Body surface area is 2.08 meters squared.  Pain Score    12/19/24 1312   PainSc: 0-No pain           PHYSICAL EXAMINATION:   General:   He is a pleasant, obese, and modestly-kept elderly male who appears comfortable while seated. He appears to be his stated age. His skin color is normal. He is ambulatory with a cane.  ECOG PS = 1-2 (\"same 50-50\")  Head/Neck:   The patient is anicteric and atraumatic. The trachea is midline. The neck is supple without evidence of jugular " venous distention or cervical adenopathy.  Eyes:   The pupils are equal, round, and reactive to light. The extraocular movements are full. There is no scleral jaundice or erythema.  Chest:   Breath sounds are diminished, left > right. There are no wheezes, rhonchi, or rales. The right thoracotomy wounds are healed.  Cardiovascular:   The patient has a regular cardiac rate and rhythm without murmurs, rubs, or gallops. The peripheral pulses are equal and full.  Abdomen:   The belly is soft and globose. There is no rebound, guarding, organomegaly, mass-effect, or tenderness. Bowel sounds are present.  Extremities:   There is no evidence of cyanosis or clubbing. There is no ankle/ leg edema but no calf tenderness.   Rheumatologic:   There is some evidence of osteoarthritic deformities of the hands. The gait is slow, stooped and cane supported.  Cutaneous:   There are no overt rashes, disseminated lesions, purpura, or petechiae.  Lymphatics:   There is no evidence of adenopathy in the cervical, supraclavicular, or axillary areas.  Neurologic:   The patient is alert, oriented, cooperative, and pleasant. He is appropriately conversant. There is no overt impairment with no overt focal motor deficits.   Psych:   Mood and affect are appropriate for circumstance. Eye contact is appropriate.        LABS    Lab Results - Last 18 Months   Lab Units 12/09/24  1303 09/05/24  1114 06/06/24  1036 03/07/24  1046 12/21/23  1041 12/05/23  0912 09/12/23  1249 08/21/23  1049   HEMOGLOBIN g/dL 12.2* 11.6* 12.8* 11.4* 12.0* 12.4*   < > 12.9*   HEMATOCRIT % 39.4 37.3* 40.2 37.0* 38.9 38.5*   < > 42.3   MCV fL 101.3* 100.8* 97.1* 94.1 100.8* 100.8*   < > 99.8*   WBC 10*3/mm3 13.72* 9.38 8.77 9.22 6.26 6.3   < > 6.27   RDW % 13.3 13.4 15.6* 13.6 13.8 13.9   < > 14.0   MPV fL 8.7 8.5 8.7 8.9 9.1 8.9*   < > 9.0   PLATELETS 10*3/mm3 401 239 217 252 242 238   < > 174   IMM GRAN % % 1.2* 0.5 0.5 0.4 0.8*  --   --  0.6*   NEUTROS ABS 10*3/mm3 12.45*  7.86* 7.25* 7.57* 4.86 5.0   < > 5.17   LYMPHS ABS 10*3/mm3 0.49* 0.73 0.78 0.97 0.81 0.7*   < > 0.69*   MONOS ABS 10*3/mm3 0.59 0.68 0.65 0.60 0.51 0.50   < > 0.34   EOS ABS 10*3/mm3 0.00 0.04 0.03 0.02 0.01 0.10   < > 0.01   BASOS ABS 10*3/mm3 0.02 0.02 0.02 0.02 0.02 0.00   < > 0.02   IMMATURE GRANS (ABS) 10*3/mm3 0.17* 0.05 0.04 0.04 0.05 0.1   < > 0.04   NRBC /100 WBC 0.0 0.0 0.0 0.0 0.0  --   --  0.0    < > = values in this interval not displayed.       Lab Results - Last 18 Months   Lab Units 12/09/24  1303 09/05/24  1114 06/06/24  1036 05/06/24  1453 03/07/24  1046 02/01/24  1303 12/21/23  1041 11/08/23  1019 08/21/23  1049   GLUCOSE mg/dL 114* 70 83  --  95  --  73  --  130*   SODIUM mmol/L 135* 135* 134*  --  137  --  138  --  138   POTASSIUM mmol/L 4.6 4.3 4.2  --  4.6  --  4.4  --  4.1   CO2 mmol/L 27.0 26.0 27.0  --  30.0*  --  32.0*  --  29.0   CHLORIDE mmol/L 99 98 99  --  99  --  101  --  101   ANION GAP mmol/L 9.0 11.0 8.0  --  8.0  --  5.0  --  8.0   CREATININE mg/dL 0.74* 0.80 0.76 0.90 1.01 1.00 0.85   < > 0.81   BUN mg/dL 12 8 9  --  11  --  12  --  11   BUN / CREAT RATIO  16.2 10.0 11.8  --  10.9  --  14.1  --  13.6   CALCIUM mg/dL 8.3* 8.6 9.0  --  8.9  --  8.3*  --  9.1   ALK PHOS U/L 105 128* 200*  --  73  --  79  --  72   TOTAL PROTEIN g/dL 6.8 6.4 6.5  --  6.6  --  7.2  --  7.0   ALT (SGPT) U/L 53* 18 24  --  15  --  19  --  26   AST (SGOT) U/L 28 16 20  --  18  --  23  --  24   BILIRUBIN mg/dL 0.3 0.3 0.4  --  0.4  --  0.3  --  0.3   ALBUMIN g/dL 3.6 3.8 4.2  --  4.1  --  4.1  --  4.0   GLOBULIN gm/dL 3.2 2.6 2.3  --  2.5  --  3.1  --  3.0    < > = values in this interval not displayed.       Lab Results - Last 18 Months   Lab Units 12/09/24  1303 09/05/24  1114 06/06/24  1036 03/07/24  1046 12/21/23  1041 08/21/23  1049   CEA ng/mL 4.75 3.77 4.20 4.29 4.05 4.15       Lab Results - Last 18 Months   Lab Units 12/09/24  1303 09/05/24  1114 06/06/24  1036 03/07/24  1046 12/21/23  1041  08/21/23  1049   IRON mcg/dL 58* 101 98 50* 75 100   TIBC mcg/dL 219* 283* 304 405 347 335   IRON SATURATION (TSAT) % 26 36 32 12* 22 30   FERRITIN ng/mL 290.80 216.40 201.60 25.52* 42.61 47.78   FOLATE ng/mL 10.60 10.70 12.20 16.90 14.80 13.10       PROBLEMS IDENTIFIED:   1.  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   Tumor burden: 1.5 cm left lower lung nodule.   Complications of tumor: None.   Tumor status: JAZMÍN following resection via left lower lobectomy.   Chest x-ray, 07/03/2017, [facility]. Impression: Stable appearance of the chest with postoperative changes in the left lung base and emphysematous changes.   Chest x-ray obtained 11/20/2017 at [facility] reveals no interval change when compared to chest x-ray dated 07/03/2017.   Chest x-ray on 02/12/2018 ([facility]). Impression: Hyperinflated lungs suggesting chronic obstructive pulmonary disease (COPD). Postoperative changes of the left hemithorax. No acute cardiopulmonary process identified.   Chest Xray on 07/06/2018 ([facility]). Impression: No acute cardiopulmonary disease.   Chest X-ray, 12/21/2018 at [facility]. COPD. Chronic volume loss in the lung bases with mild central vascular crowding. No acute infiltrates.   06/19/2020-chest x-ray.  Comparison: 8/23/2019-impression: Mild blunting of the left costophrenic angle which could be seen with scarring or small effusion.  02/22/2021- chest xray.  COPD no acute cardiopulmonary process  10/25/2021-chest x-ray.  Streaky bibasilar opacities.  Similar to prior.  04/25/22- CT chest- Status post resection of a left lower lobe pulmonary nodule. Moderate changes of centrilobular emphysema are present. Small groundglass nodules one within the left upper lobe and one within the periphery of the right lower lobe warranting follow-up per Fleischner criteria. No additional lung lesions are present. No  enlarged mediastinal or axillary lymphadenopathy.  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 The thoracic aorta is ectatic. There is mild enlargement of the pulmonary arteries suggesting pulmonary arterial hypertension. Heavy coronary calcifications are present.   07/20/22-CT chest- postoperative change of LLL without evidence of recurrent or metastatic disease.  No change in 7 mm NISA and RLL groundglass pulmonary nodules.  Consider continued imaging surveillance to document stability.  Ectatic ascending aorta measuring 4 cm in diameter.  1/27/23- CT chest- A stable CT scan of the chest. Postsurgical changes/left lower lobectomy. No finding to suggest recurrence. A stable small groundglass opacity/nodule in the left upper lobe. No features of malignancy at this time. A follow-up examination in one year is recommended to ensure stability. Chronic emphysematous lung changes  6/14/23- CT chest- New 1.5 cm central LEFT upper lobe pulmonary nodule. This is concerning for recurrent neoplasm. Management options include PET/CT versus sampling with EBUS.  6/21/23- PET-CT- PET-CT findings compatible with recurrent left lung neoplasm an isolated finding (15 mm left lung nodule adjacent to the hilum shows FDG uptake with SUV max = 3.3).  No distant disease is seen.  SBRT: 8/1/23- 8/14/23- 6000 cGy/5 fx) to NISA nodule  11/8/23- CT chest- Stable 1.5 cm left perihilar central upper lobe nodule, relatively unchanged from 6/14/2023. No new or increasing size nodule identified. No pathologic lymphadenopathy. Stable volume loss left hemithorax from a  prior left lower lobe lobectomy.  11/15/23- Follow-up Dr. Samuels- Diagnosed with stage IA2 (cT1b cN0 cM0) neoplasm of left upper lung. He completed 5000 cGy in 5 fractions to the NISA lung tumor on 08/14/2023.  CT chest, 11/8/23 reviewed.  I do not see evidence for recurrent or metastatic disease at this time. We will continue routine follow-up/surveillance as  discussed in 3 months with follow up CT scan before visit. Remission!  2/1/24- CT chest- Stable chest CT appearance. No new or progressive neoplastic disease.  2/5/24- Follow-up Dr. Samuels-, I do not see evidence for recurrent or metastatic disease at this time. We will continue routine follow-up/surveillance as discussed in 3 months with follow up CT scan before visit.   5/6/24- CT chest- Stable CT chest without new or progressive disease.   8/22/24- PET scan-1. Intense abnormal uptake associated with the peripheral nodule in the right lower lobe which abuts the major fissure along its anterior margin. Given the high SUV measurement neoplasia should be excluded and is the primary consideration. It is of note that the nodule has changed somewhat from the previous examination demonstrating increase in size in the transverse dimension but overall diminishment in size in the coronal dimension. This is somewhat unusual for neoplasia suggesting that this finding could potentially be inflammatory/infectious in nature. No associated metabolically active hilar or mediastinal adenopathy. Previously noted metabolically active left perihilar nodule no longer demonstrates any abnormal metabolic activity posttreatment. No additional sites of abnormal metabolic activity demonstrated. There are emphysematous changes of the lungs. No additional lung lesions noted by CT imaging. No cervical or mediastinal adenopathy. 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 Diffuse enlargement of the prostate gland is present with mass effect on the base of the bladder. Prominence of the urinary bladder wall likely is related to muscular hypertrophy from an element of chronic bladder outlet obstruction.  8/29/24- Follow-up Dr. Samuels:  Diagnosed with stage IA2 (cT1b cN0 cM0) left upper lobe lung 1.5. He completed 5000 cGy in 5 fractions to the NISA lung tumor on 08/14/2023.  He now has a new  "tumor in the right lower lobe on PET scan, 8/22/24.  I recommended a course of stereotactic radiosurgery to right lower lobe tumor, I anticipate 1718-8570 cGy over 3-5 treatments - started 9/10/24-9/17/24.   12/17/24-CT chest- 1.  Marked decrease in size of 12 mm right lower lobe pulmonary nodule (previously 3.2 cm). No new or enlarging pulmonary nodules.2.  Fairly extensive consolidation and groundglass opacity throughout the left upper lobe. Favor infectious process/pneumonia but recommendfollow-up to ensure resolution and exclude a neoplastic process. Small left-sided pleural effusion. Postoperative change of left lower lobectomy.    2.  Mildly abnormal CEA.    --Stable. 4.75, 12/9/24 (prior range: 2.2 - 5.0).     3.  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 Underwent right knee replacement, 05/18/2017.   --5/16/24- xray lumbar spine- Osteopenia. Extensive degenerative disc, endplate, and facet disease. Mild chronic appearing compression deformities of L1 and L4.    4.  Normocytic/macrocytic anemia with resolution of macrocytosis, repleted iron and B12 deficiencies (anemia of chronic disease).   --Stable, Hgb 12.2; .3, 12/9/24 (prior range: Hgb 11.4 - 14.3; 94.4 - 102.1)  a. No myelodysplastic syndrome (MDS) on comprehensive blood report, normal thyroid function tests (TFTs), normal LDH, normal B12/folate.   b. Colonoscopy on 4/10/18 ([facility]). Impressions: Preparation of the colon was inadequate. One 12 mm polyp in the cecum, removed with a hot snare and removed piecemeal using a hot snare. Resected and retrieved. Diverticulosis in the sigmoid colon and in the descending colon.   c. Repeat with 2 day prep scheduled for 08/02/2018. \"Polyps\" Repeat 3 years.  d. Colonoscopy, 08/02/2018 (above).  Fair colon preparation.  1 5 mm polyp in the ascending colon, removed with hot snare.  One 12 mm polyp in the transverse colon, removed with a hot " "snare.  e.  Injectafer 750 mg, 11/01/2022; 3/14/24; 3/21/24  5.  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 Negative thyroid ultrasound, 11/30/2011. Is followed by Dr. Medrano (ENT) and Dr. Roth (endocrine).   6.  Low B12 levels. Recurrent.   7.  Constipation, multifactorial.  Regulated by stool softeners.  \"Occasionally.\"  8.  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   9.  Chronic intermittent irregular heart rate, Dr. Dosbon following.   10. A 50 pack-year tobacco smoker with radiographic evidence of emphysema. Has not smoked since 2004.         11. Anxiety.          12.  Myasthenia gravis.  Followed by Dr. Hines        13.  COPD        14.  Weight loss.  Has stabilized.  Reassures me it is intentional.  \"Eating less and smarter.\"         15.  Elevated alk phos,   - Improved.  105, 12/9/24 (prior: 200 on 6/6/2024). Osteopenia?  - Lumbar xray, 5/16/24 (above).  Osteopenia. Extensive degenerative disc, endplate, and facet disease. Mild chronic appearing compression deformities of L1 and L4.    RECOMMENDATIONS:   1.   Apprise of labs from 12/9/24 with WBC 13.7, stable anemia otherwise normal CBC, CEA 4.75 (prior:  2.2 - 5), alk phos 105 (prior: 200), sodium 135, otherwise normal CMP, repleted iron (101), repleted (26%- 36%; 32%; 12%; 22%; 30%; 31%; 35%; 37%; 18%) Fe sat, depressed ferritin (290- prior 216; 201; 25; 42; 47; 68; 71; 56; 17; 45; 87; 58) depressed (254; 317; 306; 350; 279; 366; 290; 388; >1000) B12, folate 10.6.     2.   Review chest CT, 12/17/24 (above). Decrease in size of 12 mm right lower lobe pulmonary nodule (previously 3.2 cm). No new or enlarging pulmonary nodules.  Fairly extensive consolidation and groundglass opacity throughout the left upper lobe.  Needs follow-up  3.   Review visit with radiation oncology, Dr. Samuels on  8/29/24- Diagnosed with stage IA2 (cT1b cN0 cM0) left upper lobe lung 1.5. He completed 5000 cGy in 5 fractions to the NISA " lung tumor on 08/14/2023.  He now has a new tumor in the right lower lobe on PET scan, 8/22/24.  I recommended a course of stereotactic radiosurgery to right lower lobe tumor, I anticipate 8509-3084 cGy over 3-5 treatments. Treatments beginning 9/10/24-9/16/24  4.   Previously discussed that even without adjuvant chemotherapy 70.8% of people are alive in 5 years and none of those are alive due to therapy. Approximately 28% of those die of cancer and 1.3% die from other causes. Emphasized that very little data are available about the effects of adjuvant chemotherapy in Stage I lung cancer and most guidelines do not recommend the use of adjuvant therapy in this population and a meta-analysis even suggested that there was a trend toward a worse outcome in these patients. He preferred not to have chemo anyway.     5.  Continue ongoing management per primary care physician (Dr. Dobson) and other specialists (has follow-up appointment with Dr. Samuels later today, 12/19/2024).     6.  Rx:  Doxycycline 100 mg po q 12h x 5 days    7.  Schedule CT chest in 11 weeks  8.  Return to the office with pre-office CEA, serum iron, Fe sat, ferritin, B12/folate, CMP, and CBC with differential in 12 weeks.      I spent ~ 47 minutes caring for Carmine on this date of service. This time includes time spent by me in the following activities: preparing for the visit, reviewing tests, performing a medically appropriate examination and/or evaluation, counseling and educating the patient/family/caregiver, ordering medications, tests, or procedures and documenting information in the medical record        cc: DO Kieran Pennington MD Nicholas Lopez, MD

## 2024-12-17 ENCOUNTER — HOSPITAL ENCOUNTER (OUTPATIENT)
Dept: CT IMAGING | Facility: HOSPITAL | Age: 84
Discharge: HOME | End: 2024-12-17
Payer: MEDICARE

## 2024-12-17 DIAGNOSIS — C34.32 PRIMARY ADENOCARCINOMA OF LOWER LOBE OF LEFT LUNG: ICD-10-CM

## 2024-12-17 DIAGNOSIS — Z85.118 HISTORY OF MALIGNANT NEOPLASM OF LUNG: ICD-10-CM

## 2024-12-17 DIAGNOSIS — Z85.118 HISTORY OF CANCER OF LOWER LOBE BRONCHUS OR LUNG: ICD-10-CM

## 2024-12-17 DIAGNOSIS — Z92.3 HISTORY OF RADIATION THERAPY: ICD-10-CM

## 2024-12-17 PROCEDURE — 71250 CT THORAX DX C-: CPT

## 2024-12-17 NOTE — PROGRESS NOTES
[facility]  Radiation Oncology Clinic   Byron Ojeda MD, FACR  Bryn Hitesh EDWARDS  _______________________________________________  [facility]  Department of Radiation Oncology  [address] [place] [id number]  Office: [phone]  Fax: [phone]    DATE: 12/19/2024  PATIENT: Carmine Garcia  1940                         MEDICAL RECORD #: [id number]    1. History of primary non-small cell carcinoma of left lung    2. History of primary non-small cell carcinoma of right lung    3. History of radiation therapy    4. Former smoker                                             REASON FOR VISIT:    Chief Complaint   Patient presents with    Lung Cancer     Reason for Follow up Visit:  Carmine Garcia is a very pleasant 84 y.o. patient that completed radiation to the lung and returns to the clinic today for inital follow up exam. Reports fatigue, cough, SOB, and gait problem. Uses cane for assistance with ambulation. Uses cane for assistance with ambulation. Denies appetite change, unexpected weight change, nausea/vomiting, diarrhea, light-headedness, weakness, and headaches. He continues to follow .    History of Present Illness:  Diagnosed with a new tumor in the right lower lobe on PET scan on 08/22/2024. She completed 5000 cGy in 5 fractions to the right lower lobe tumor on 09/17/2024.  Diagnosed with stage IA2 (cT1b cN0 cM0) left upper lobe lung 1.5. He completed 5000 cGy in 5 fractions to the NISA lung tumor on 08/14/2023.     10/20/2011 - CT Chest:  1.5 cm left lower lobe nodule positioned just lateral to the hilum, granuloma vs. Malignancy  No pathologic lymphadenopathy    10/26/2011 - PET Scan:  Increased metabolic activity in the left lower lobe, lung nodule located centrally near the left hilum witih max SUV 7.4, suspicious for primary carcinoma. This corresponds to the lesion seen on CT scan of 10/20/2011.   No abnormal  mediastinal, hilar, or inguinal activity    11/14/2011 - Bronchoscopy with biopsy:  Biopsy, left lower lobe nodule, lung:    Fragments of bronchial mucosa with submucosa demonstrating stromal elastosis and minimal chronic inflammation.   No evidence of granulomatous inflammation.   No histologic evidence of malignancy.    Bronchial washings, smears (four) and cell block:    Mild acute inflammation.   Negative for malignant cells.    Bronchial brushings, left lower lobe of lung, smears (3) and ThinPrep preparation (1):    Negative for malignant cells.     11/28/2011 -  Left thoracoscopic core needle biopsy, followed by video-assisted thoracoscopic surgery (VATS) left lower lobectomy, and mediastinal lymph node dissection:  Biopsy, left lower lobe of lung (frozen section control):    Fragment of fibrotic pulmonary parenchyma demonstrating stromal elastosis and chronic active inflammation, moderate.   No histologic evidence of malignancy.    Level 10 lymph node (frozen section control):    Lymph node (one), negative for metastatic carcinoma.    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   The dominant and aberrant bronchial margins of surgical resection are negative for malignancy.    The vascular and parenchymal margins of surgical resection are negative for malignancy.    No visceral pleural invasion identified.    Peribronchial lymph nodes (three), negative for metastatic carcinoma.    Emphysematous changes.    Level 11 lymph node: Lymph node (one), negative for metastatic carcinoma.    Level 5 lymph nodes: Lymph nodes (two), negative for metastatic carcinoma.    Comment: Primary papillary adenocarcinomas of the lung do occur; however, they demonstrate a similar histology to that seen in papillary thyroid carcinoma.  The thyroid gland should be evaluated if not already done to exclude the possibility of met     04/30/2014 - Chest  x-ray:  Stable chronic change.  No acute disease.    09/03/2014 - Chest x-ray:   Emphysematous and postoperative changes in the chest without evidence of mass lesion or acute cardiopulmonary process.    01/05/2016 - Chest x-ray:   Postoperative changes of the left hemithorax with associated volume loss. No acute process identified.    07/01/2016 - Chest x-ray:   No active disease is seen.  COPD/emphysema.  Prior lung surgery on the left.     01/04/2017 - Chest x-ray:  COPD/emphysema.  Postoperative changes on the left.  No acute appearing infiltrate or effusion.     03/20/2017 - Chest x-ray:  Chronic changes in the lungs and spine without evidence of acute cardiopulmonary process. Overall, the appearance of the lungs is similar to the study from 01/04/2017.    11/20/2017 - Chest x-ray:  No significant interval change when compared to chest x-ray dated 07/03/2017.    02/12/2018 - Chest x-ray:  Hyperinflated lungs suggesting COPD.  Postoperative changes of the left hemithorax.  No acute cardiopulmonary process identified.    07/06/2018 - Chest x-ray:  No acute cardiopulmonary disease.    12/21/2018 - Chest x-ray:  COPD. Chronic volume loss in the lung bases with mild central vascular crowding. No acute infiltrates.    08/23/2019 - Chest x-ray:  No acute disease.    06/19/2020 - Chest x-ray:  Mild blunting of the left costophrenic angle, which could be seen with scarring or small effusion.    02/22/2021 - Chest x-ray:  COPD no acute cardiopulmonary process.    10/25/2021 - Chest x-ray:  Streaky bibasilar opacities, similar to prior.    04/25/2022 - CT Chest with contrast:  Status post resection of a left lower lobe pulmonary nodule. Moderate changes of centrilobular emphysema are present.  Small groundglass nodules one within the left upper lobe and one within the periphery of the right lower lobe warranting follow-up per Fleischner criteria. No additional lung lesions are present.  No enlarged mediastinal or axillary  lymphadenopathy..  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 The thoracic aorta is ectatic. There is mild enlargement of the pulmonary arteries suggesting pulmonary arterial hypertension. Heavy coronary calcifications are present.     07/20/2022 - CT Chest without contrast:  Postoperative change of LEFT lower lobectomy without evidence of recurrent or metastatic disease.   No change in 7 mm LEFT upper lobe and RIGHT lower lobe groundglass pulmonary nodules. Consider continued imaging surveillance to document stability.  Ectatic ascending aorta measuring 4 cm diameter.    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   Tumor burden: 1.5 cm left lower lung nodule.   Complications of tumor: None.   Tumor status: JAZMÍN following resection via left lower lobectomy.   Chest x-ray, 07/03/2017, [facility]. Impression: Stable appearance of the chest with postoperative changes in the left lung base and emphysematous changes.   Chest x-ray obtained 11/20/2017 at [facility] reveals no interval change when compared to chest x-ray dated 07/03/2017.   Chest x-ray on 02/12/2018 ([facility]). Impression: Hyperinflated lungs suggesting chronic obstructive pulmonary disease (COPD). Postoperative changes of the left hemithorax. No acute cardiopulmonary process identified.   Chest Xray on 07/06/2018 ([facility]). Impression: No acute cardiopulmonary disease.   Chest X-ray, 12/21/2018 at [facility]. COPD. Chronic volume loss in the lung bases with mild central vascular crowding. No acute infiltrates.   06/19/2020-chest x-ray.  Comparison: 8/23/2019-impression: Mild blunting of the left costophrenic angle which could be seen with scarring or small effusion.  02/22/2021- chest xray.  COPD no acute cardiopulmonary process  10/25/2021-chest x-ray.  " Streaky bibasilar opacities.  Similar to prior.  04/25/2022- CT chest- Status post resection of a left lower lobe pulmonary nodule. Moderate changes of centrilobular emphysema are present. Small groundglass nodules one within the left upper lobe and one within the periphery of the right lower lobe warranting follow-up per Fleischner criteria. No additional lung lesions are present. No enlarged mediastinal or axillary lymphadenopathy.  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 The thoracic aorta is ectatic. There is mild enlargement of the pulmonary arteries suggesting pulmonary arterial hypertension. Heavy coronary calcifications are present.   07/20/2022-CT chest- postoperative change of LLL without evidence of recurrent or metastatic disease.  No change in 7 mm NISA and RLL groundglass pulmonary nodules.  Consider continued imaging surveillance to document stability.  Ectatic ascending aorta measuring 4 cm in diameter.  Mildly abnormal CEA.    --3.39, 10/26/2022 (prior range: 2.2 - 5.0). Continued observation warranted.   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 Underwent right knee replacement, 05/18/2017.   Normocytic/macrocytic anemia with resolution of macrocytosis, repleted iron and B12 deficiencies (anemia of chronic disease).   --Stable, Hgb 14; MCV 98.2, 10/26/2022 (prior range: Hgb 11.4 - 14.3; 94.4 - 102.1)  No myelodysplastic syndrome (MDS) on comprehensive blood report, normal thyroid function tests (TFTs), normal LDH, normal B12/folate.   Colonoscopy on 4/10/18 ([facility]). Impressions: Preparation of the colon was inadequate. One 12 mm polyp in the cecum, removed with a hot snare and removed piecemeal using a hot snare. Resected and retrieved. Diverticulosis in the sigmoid colon and in the descending colon.   Repeat with 2 day prep scheduled for 08/02/2018. \"Polyps\" Repeat 3 years.  Colonoscopy, 08/02/2018 (above). " " Fair colon preparation.  1 5 mm polyp in the ascending colon, removed with hot snare.  One 12 mm polyp in the transverse colon, removed with a hot snare.  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 Negative thyroid ultrasound, 11/30/2011. Is followed by Dr. Medrano (ENT) and Dr. Roth (endocrine).   Low B12 levels. Recurrent.   Constipation, multifactorial.  Regulated by stool softeners.  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   Chronic intermittent irregular heart rate, Dr. Dobson following.   A 50 pack-year tobacco smoker with radiographic evidence of emphysema. Has not smoked since 2004.   Anxiety.    Diplopia.  Is scheduled to see ophthalmology today  RECOMMENDATIONS:   Apprised of surveillance chest CT, 07/22/2022 (see above).  No evidence of recurrent metastatic disease.  No change in 7 mm NISA and RLL groundglass pulmonary nodules.  Apprise of labs from 10/26/2022 including the current heme (stable anemia otherwise normal CBC) and the other labs noting the stable CEA, essentially normal CMP, repleted iron, repleted (35%; from 37%; 18%) Fe sat, depressed ferritin (71; from 56; 17; 45; 87; 58), depressed (290; from 388; >1000) B12/folate.  Anne called in  Previously discussed that even without adjuvant chemotherapy 70.8% of people are alive in 5 years and none of those are alive due to therapy. Approximately 28% of those die of cancer and 1.3% die from other causes. Emphasized that very little data are available about the effects of adjuvant chemotherapy in Stage I lung cancer and most guidelines do not recommend the use of adjuvant therapy in this population and a meta-analysis even suggested that there was a trend toward a worse outcome in these patients. He preferred not to have chemo anyway.   Review follow-up by GRACE Bahena with pulmonary, 07/27/2022.  Review CT chest without contrast, 07/20/2022 " (above).  Stable overall.  Plan: CT chest without contrast and follow-up in 6 months (01/27/2023).  Stay on [place]   Continue ongoing management per primary care physician and other specialists. Is seeing the eye docs today  Schedule chest CT with contrast in 23 weeks at [facility]  Return to the office with pre-office CEA, serum iron, Fe sat, ferritin, B12/folate, CMP, and CBC with differential in 24 weeks.    12/21/2022 - Chest x-ray:  Stable chronic change.  No acute disease.    01/27/2023 - CT Chest with contrast:  A stable CT scan of the chest. Postsurgical changes/left lower lobectomy. No finding to suggest recurrence.  A stable small groundglass opacity/nodule in the left upper lobe. No features of malignancy at this time. A follow-up examination in one year is recommended to ensure stability.  Chronic emphysematous lung changes.    06/14/2023 - CT Chest with contrast:  New 1.5 cm central LEFT upper lobe pulmonary nodule. This is concerning for recurrent neoplasm. Management options include PET/CT versus sampling with EBUS.    06/21/2023 - PET Scan:  PET-CT findings compatible with recurrent left lung neoplasm as an isolated finding. No distant disease is seen.    06/22/2023 - Documentation per Dr. Arteaga:  Patient informed per Dr Arteaga recommendations a referral should be made to Rad/Onc for evaluation and possible SBRT  Patient informed once the apt is domingo he will be called with time and date    06/28/2023 - Consult with  (Covering for ):  Following this discussion and in consideration of the diagnostic data/evaluation of the patient, I recommended a course of definitive intent pulmonary SBRT to the PET avid left upper lobe nodule to an approximate dose of 2189-8043 cGy to be given over 4-5 every other day fractions, final course pending.  If SBRT cannot safely be delivered secondary to central location, would favor a hypofractionated course of treatment to an approximate dose of  2751-7707 cGy to be given over 15 daily fractions (Monday through Friday).  Will simulate treatment fields today, 3D CT with MIPS to begin the planning process, final course pending.    08/01/2023 - 08/14/2023 - Completed radiation course:  Received 5000 cGy in 5 fractions to the NISA lung tumor.    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   Tumor burden: 1.5 cm left lower lung nodule.   Complications of tumor: None.   Tumor status: JAZMÍN following resection via left lower lobectomy.   Chest x-ray, 07/03/2017, [facility]. Impression: Stable appearance of the chest with postoperative changes in the left lung base and emphysematous changes.   Chest x-ray obtained 11/20/2017 at [facility] reveals no interval change when compared to chest x-ray dated 07/03/2017.   Chest x-ray on 02/12/2018 ([facility]). Impression: Hyperinflated lungs suggesting chronic obstructive pulmonary disease (COPD). Postoperative changes of the left hemithorax. No acute cardiopulmonary process identified.   Chest Xray on 07/06/2018 ([facility]). Impression: No acute cardiopulmonary disease.   Chest X-ray, 12/21/2018 at [facility]. COPD. Chronic volume loss in the lung bases with mild central vascular crowding. No acute infiltrates.   06/19/2020-chest x-ray.  Comparison: 8/23/2019-impression: Mild blunting of the left costophrenic angle which could be seen with scarring or small effusion.  02/22/2021- chest xray.  COPD no acute cardiopulmonary process  10/25/2021-chest x-ray.  Streaky bibasilar opacities.  Similar to prior.  04/25/22- CT chest- Status post resection of a left lower lobe pulmonary nodule. Moderate changes of centrilobular emphysema are present. Small groundglass nodules one within the left upper lobe and one within the periphery of the right lower lobe  warranting follow-up per Fleischner criteria. No additional lung lesions are present. No enlarged mediastinal or axillary lymphadenopathy.  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 The thoracic aorta is ectatic. There is mild enlargement of the pulmonary arteries suggesting pulmonary arterial hypertension. Heavy coronary calcifications are present.   07/20/22-CT chest- postoperative change of LLL without evidence of recurrent or metastatic disease.  No change in 7 mm NISA and RLL groundglass pulmonary nodules.  Consider continued imaging surveillance to document stability.  Ectatic ascending aorta measuring 4 cm in diameter.  1/27/23- CT chest- A stable CT scan of the chest. Postsurgical changes/left lower lobectomy. No finding to suggest recurrence. A stable small groundglass opacity/nodule in the left upper lobe. No features of malignancy at this time. A follow-up examination in one year is recommended to ensure stability. Chronic emphysematous lung changes  6/14/23- CT chest- New 1.5 cm central LEFT upper lobe pulmonary nodule. This is concerning for recurrent neoplasm. Management options include PET/CT versus sampling with EBUS.  6/21/23- PET-CT- PET-CT findings compatible with recurrent left lung neoplasm an isolated finding (15 mm left lung nodule adjacent to the hilum shows FDG uptake with SUV max = 3.3).  No distant disease is seen.  SBRT: 8/1/23- 8/14/23- 6000 cGy/5 fx) to NISA nodule  Mildly abnormal CEA.    --4.15, 8/21/23 (prior range: 2.2 - 5.0). Continued observation warranted.   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 Underwent right knee replacement, 05/18/2017.   Normocytic/macrocytic anemia with resolution of macrocytosis, repleted iron and B12 deficiencies (anemia of chronic disease).   --Stable, Hgb 12.9; MCV 99.8, 8/21/23 (prior range: Hgb 11.4 - 14.3; 94.4 - 102.1)  No myelodysplastic syndrome (MDS) on comprehensive blood  "report, normal thyroid function tests (TFTs), normal LDH, normal B12/folate.   Colonoscopy on 4/10/18 ([facility]). Impressions: Preparation of the colon was inadequate. One 12 mm polyp in the cecum, removed with a hot snare and removed piecemeal using a hot snare. Resected and retrieved. Diverticulosis in the sigmoid colon and in the descending colon.   Repeat with 2 day prep scheduled for 08/02/2018. \"Polyps\" Repeat 3 years.  Colonoscopy, 08/02/2018 (above).  Fair colon preparation.  1 5 mm polyp in the ascending colon, removed with hot snare.  One 12 mm polyp in the transverse colon, removed with a hot snare.  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 Negative thyroid ultrasound, 11/30/2011. Is followed by Dr. Medrano (ENT) and Dr. Roth (endocrine).   Low B12 levels. Recurrent.   Constipation, multifactorial.  Regulated by stool softeners.  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   Chronic intermittent irregular heart rate, Dr. Dobson following.   A 50 pack-year tobacco smoker with radiographic evidence of emphysema. Has not smoked since 2004.   Anxiety.    Myasthenia gravis.  Followed by Dr. Hines  COPD  Weight loss.  Has stabilized.  Reassures me it is intentional.  \"Eating less and smarter.\"  RECOMMENDATIONS:   Apprise of labs from 8/21/23 including the current heme (stable anemia otherwise normal CBC) and the other labs noting the stable CEA (4.15-prior:  2.2 - 5), glucose otherwise essentially normal CMP, repleted iron, repleted (30%- prior: 31%; 35%; 37%; 18%) Fe sat, depressed ferritin (47; prior:  68; 71; 56; 17; 45; 87; 58), depressed (279; from 366; 290; 388; >1000) B12/folate.  (Anne called in)  CT chest, 6/14/23 and PET scan, 6/21/23 (above).  New 1.5 cm central LEFT upper lobe pulmonary nodule compatible with recurrent neoplasm.  Previously discussed that even without adjuvant " "chemotherapy 70.8% of people are alive in 5 years and none of those are alive due to therapy. Approximately 28% of those die of cancer and 1.3% die from other causes. Emphasized that very little data are available about the effects of adjuvant chemotherapy in Stage I lung cancer and most guidelines do not recommend the use of adjuvant therapy in this population and a meta-analysis even suggested that there was a trend toward a worse outcome in these patients. He preferred not to have chemo anyway.   Review follow-up with pulmonary, 7/11/23 Re:  Diagnostic bronchoscopy and PFTs.  Patient declined any invasive procedures.  He said that he had PFT in the past that \"almost killed him\" and is not wanting a pulmonary function test at this time either.  RTC 3 months (~ 10/11/23)  Review visit with radiation oncology (Dr. Herring), 6/28/23.   I recommended a course of definitive intent pulmonary SBRT to the PET avid left upper lobe nodule to an approximate dose of 5054-1129 cGy to be given over 4-5 every other day fractions, final course pending.  If SBRT cannot safely be delivered secondary to central location, would favor a hypofractionated course of treatment to an approximate dose of 8498-3884 cGy to be given over 15 daily fractions  (SBRT: 8/1/23- 8/14/23- 6000 cGy/5 fx)  Continue ongoing management per primary care physician and other specialists. Has appointment in 11/2023 with rad onc along with CT prior to visit  Return to the office with pre-office CEA, serum iron, Fe sat, ferritin, B12/folate, CMP, and CBC with differential in 16 weeks.    11/08/2023 - CT Chest with contrast:  Stable 1.5 cm left perihilar central upper lobe nodule, relatively unchanged from 6/14/2023. No new or increasing size nodule identified.  No pathologic lymphadenopathy. Stable volume loss left hemithorax from a prior left lower lobe lobectomy.    11/15/2023 - Appointment with :  Follow up in 3 months     12/28/2023 - Appointment " with :  RECOMMENDATIONS:   Apprise of labs from 12/21/23 including the current heme (stable anemia otherwise normal CBC) and the other labs with CEA p (prior:  2.2 - 5), calcium 8.3 otherwise essentially normal CMP, repleted iron, repleted (22%- prior: 30%; 31%; 35%; 37%; 18%) Fe sat, depressed ferritin (42- prior:  47; 68; 71; 56; 17; 45; 87; 58) repleted (350; from 279; 366; 290; 388; >1000) B12/folate.   Apprised of CT chest, 11/8/23 (above).  Stable 1.5 cm left perihilar central upper lobe nodule, relatively unchanged from 6/14/2023. No new or increasing size nodule identified. No pathologic lymphadenopathy  Previously discussed that even without adjuvant chemotherapy 70.8% of people are alive in 5 years and none of those are alive due to therapy. Approximately 28% of those die of cancer and 1.3% die from other causes. Emphasized that very little data are available about the effects of adjuvant chemotherapy in Stage I lung cancer and most guidelines do not recommend the use of adjuvant therapy in this population and a meta-analysis even suggested that there was a trend toward a worse outcome in these patients. He preferred not to have chemo anyway.   Review visit with radiation oncology (Dr. Samuels), 11/15/23- Diagnosed with stage IA2 (cT1b cN0 cM0) neoplasm of left upper lung. He completed 5000 cGy in 5 fractions to the NISA lung tumor on 08/14/2023.  CT chest, 11/8/23 reviewed.  I do not see evidence for recurrent or metastatic disease at this time. We will continue routine follow-up/surveillance as discussed in 3 months with follow up CT scan before visit. Remission!  Review follow-up with GRACE Bahena with pulmonary, 10/11/23-A/P: NISA nodule. Follow-up prn  Continue ongoing management per primary care physician and other specialists. Has appointment in 2/2024 with rad onc along with CT prior to visit  Return to the office with pre-office CEA, serum iron, Fe sat, ferritin, B12/folate, CMP,  and CBC with differential in 12 weeks.    02/01/2024 - CT Chest with contrast:  Stable chest CT appearance.  No new or progressive neoplastic disease.    02/05/2024 - Appointment with :  Follow up in 3 months     03/14/2024 - Appointment with :  RECOMMENDATIONS:   Apprise of labs from 3/7/24 including the current heme (stable anemia otherwise normal CBC) and the other labs with CEA 4.29 (prior:  2.2 - 5), calcium 8.9 otherwise essentially normal CMP, low iron (50), low (12%- prior: 22%; 30%; 31%; 35%; 37%; 18%) Fe sat, depressed ferritin (25 -  prior:  42; 47; 68; 71; 56; 17; 45; 87; 58) repleted (306; 350; 279; 366; 290; 388; >1000) B12/folate.   Apprised of CT chest, 2/1/24 (above).   Stable chest CT appearance.  No new or progressive neoplastic disease.  Keep appointments of Injectafer 750 mg IV weekly x 2  Previously discussed that even without adjuvant chemotherapy 70.8% of people are alive in 5 years and none of those are alive due to therapy. Approximately 28% of those die of cancer and 1.3% die from other causes. Emphasized that very little data are available about the effects of adjuvant chemotherapy in Stage I lung cancer and most guidelines do not recommend the use of adjuvant therapy in this population and a meta-analysis even suggested that there was a trend toward a worse outcome in these patients. He preferred not to have chemo anyway.   Review visit with radiation oncology (Dr. 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 He completed 5000 cGy in 5 fractions to the NISA lung tumor on 08/14/2023.  CT chest, 2/1/24 reviewed.  I do not see evidence for recurrent or metastatic disease at this time. We will continue routine follow-up/surveillance as discussed in 3 months with follow up CT scan before visit.   Continue ongoing management per primary care physician and other specialists. Has appointment in 5/2024 with rad onc along with CT prior  to visit  Return to the office with pre-office CEA, serum iron, Fe sat, ferritin, B12/folate, CMP, and CBC with differential in 12 weeks.    05/06/2024 - CT Chest with contrast:  Stable CT chest without new or progressive disease.    05/09/2024 - Appointment with :  Follow up in 3 months     06/13/2024 - Appointment with :  RECOMMENDATIONS:   Apprise of labs from 6/6/24 including the current heme (stable anemia otherwise normal CBC) and the other labs with CEA 4.2 (prior:  2.2 - 5), alk phos 200, sodium 134, otherwise normal CMP, repleted iron (98), repleted (32%- prior: 12%; 22%; 30%; 31%; 35%; 37%; 18%) Fe sat, depressed ferritin (201 -  prior: 25; 42; 47; 68; 71; 56; 17; 45; 87; 58) repleted (306; 350; 279; 366; 290; 388; >1000) B12/folate.   Apprised of lumbar xray, 5/16/24 (above).  Osteopenia. Extensive degenerative disc, endplate, and facet disease. Mild chronic appearing compression deformities of L1 and L4.  Review visit with radiation oncology (GRACE Gtz with Dr. 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 He completed 5000 cGy in 5 fractions to the NISA lung tumor on 08/14/2023.  CT chest, 5/6/24 reviewed.  I do not see evidence for recurrent or metastatic disease at this time. We will continue routine follow-up/surveillance as discussed in 3 months (8/9/24) with follow up CT scan before visit.   Apprised of CT chest, 5/6/24 (above).   JAZMÍN  Previously discussed that even without adjuvant chemotherapy 70.8% of people are alive in 5 years and none of those are alive due to therapy. Approximately 28% of those die of cancer and 1.3% die from other causes. Emphasized that very little data are available about the effects of adjuvant chemotherapy in Stage I lung cancer and most guidelines do not recommend the use of adjuvant therapy in this population and a meta-analysis even suggested that there was a trend toward a worse outcome in these  patients. He preferred not to have chemo anyway.   Continue ongoing management per primary care physician and other specialists. Has appointment in 8/9/24 with rad onc along with CT prior to visit  Return to the office with pre-office CEA, serum iron, Fe sat, ferritin, B12/folate, CMP, and CBC with differential in 12 weeks.    08/07/2024 - CT chest with contrast:  New RIGHT lower lobe peripheral spiculated masslike opacity, highly concerning for disease recurrence if the patient has no symptoms of infection.  Heavily calcified coronary arteries versus stent material.  New mild height loss at T2 and L2.    08/16/2024 - Appointment with :  On review of CT imaging, he does have a new suspicious opacity in the right lower lobe of lung. He declines recent symptoms including fatigue, fever, or productive cough. I will order a PET scan for further evaluation. He will return in 2 weeks for further discussion of recommendations. I have instructed him to continue to see the other health care providers as per their scheduling.  Plan:  PET scan ordered, they will call you.  return in 2 weeks for further discussion    08/22/2024 - PET Scan:  Intense abnormal uptake associated with the peripheral nodule in the right lower lobe which abuts the major fissure along its anterior margin. Given the high SUV measurement neoplasia should be excluded and is the primary consideration. It is of note that the nodule has changed somewhat from the previous examination demonstrating increase in size in the transverse dimension but overall diminishment in size in the coronal dimension. This is somewhat unusual for neoplasia suggesting that this finding could potentially be inflammatory/infectious in nature. No associated metabolically active hilar or mediastinal adenopathy. Previously noted metabolically active left perihilar nodule no longer demonstrates any abnormal metabolic activity posttreatment.  No additional sites of abnormal  metabolic activity demonstrated.  There are emphysematous changes of the lungs. No additional lung lesions noted by CT imaging. No cervical or mediastinal adenopathy. 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 Diffuse enlargement of the prostate gland is present with mass effect on the base of the bladder. Prominence of the urinary bladder wall likely is related to muscular hypertrophy from an element of chronic bladder outlet obstruction.    08/29/2024 - Appointment with :  Following this discussion and in consideration of the diagnostic data/evaluation of the patient, I recommended a course of stereotactic radiosurgery to right lower lobe tumor, I anticipate 5017-9784 cGy over 3-5 treatments. Will simulate treatment fields today, 4D CT with MIPS to begin the planning process, final course pending. Continue ongoing management per primary care physician and other specialists.     09/10/2024 - 09/17/2024 - Completed radiation course:  Received 5000 cGy in 5 fractions to the RLL lung tumor.     09/12/2024 - Appointment with :  RECOMMENDATIONS:   Apprise of labs from 9/5/24 including the current heme (stable anemia otherwise normal CBC) and the other labs with CEA 3.7 (prior:  2.2 - 5), alk phos 128 (prior: 200), sodium 135, otherwise normal CMP, repleted iron (101), repleted (36%- prior: 32%; 12%; 22%; 30%; 31%; 35%; 37%; 18%) Fe sat, depressed ferritin (216 -  prior: 201; 25; 42; 47; 68; 71; 56; 17; 45; 87; 58) repleted (317; 306; 350; 279; 366; 290; 388; >1000) B12/folate.   Review visit with radiation oncology, Dr. Samuels on  8/29/24- Diagnosed with stage IA2 (cT1b cN0 cM0) left upper lobe lung 1.5. He completed 5000 cGy in 5 fractions to the NISA lung tumor on 08/14/2023.  He now has a new tumor in the right lower lobe on PET scan, 8/22/24.  I recommended a course of stereotactic radiosurgery to right lower lobe tumor, I anticipate  5366-7259 cGy over 3-5 treatments. Treatments beginning 9/10/24-9/16/24  PET scan, 8/22/24 (above).  Neoplasia vs inflammatory lung nodule  Previously discussed that even without adjuvant chemotherapy 70.8% of people are alive in 5 years and none of those are alive due to therapy. Approximately 28% of those die of cancer and 1.3% die from other causes. Emphasized that very little data are available about the effects of adjuvant chemotherapy in Stage I lung cancer and most guidelines do not recommend the use of adjuvant therapy in this population and a meta-analysis even suggested that there was a trend toward a worse outcome in these patients. He preferred not to have chemo anyway.   Continue ongoing management per primary care physician and other specialists.   Return to the office with pre-office CEA, serum iron, Fe sat, ferritin, B12/folate, CMP, and CBC with differential in 12 weeks-defer imaging to radiation oncology.    12/17/2024 - CT Chest without contrast:  Marked decrease in size of 12 mm right lower lobe pulmonary nodule (previously 3.2 cm). No new or enlarging pulmonary nodules.   Fairly extensive consolidation and groundglass opacity throughout the left upper lobe. Favor infectious process/pneumonia but recommend follow-up to ensure resolution and exclude a neoplastic process. Small left-sided pleural effusion. Postoperative change of left lower lobectomy.    History obtained from  PATIENT and CHART    PAST MEDICAL HISTORY  Past Medical History:   Diagnosis Date    Anemia     Arthritis     COPD (chronic obstructive pulmonary disease)     Disease of thyroid gland     Emphysema lung     Fracture of nasal bones     Hx of colonic polyps     Hyperlipidemia     Hypertension     Lung cancer     Lung nodule     Myasthenia gravis     Pneumonia     PUD (peptic ulcer disease)       PAST SURGICAL HISTORY  Past Surgical History:   Procedure Laterality Date    CATARACT EXTRACTION      COLONOSCOPY  12/12/2014     Diverticulosis repeat exam in 3 years    COLONOSCOPY N/A 04/10/2018    Tubular adenoma cecum poor prep repeat in 3 months    COLONOSCOPY N/A 2018    2 Tubular adenomas transverse and ascending colon fair prep repeat in 3 years    COLONOSCOPY N/A 2022    Procedure: COLONOSCOPY WITH ANESTHESIA;  Surgeon: Ferdinand Cross MD;  Location: [facility];  Service: Gastroenterology;  Laterality: N/A;  pre hx colon polyps  post diverticulosis; inadequate prep  Efrain Dobson, DO    COLONOSCOPY W/ POLYPECTOMY  02/15/2010    2 Hyperplastic polyps at 25 cm and rectum repeat exam in 5 years    LUNG CANCER SURGERY      LUNG REMOVAL, PARTIAL      REPLACEMENT TOTAL KNEE Right 2017    TONSILLECTOMY        FAMILY HISTORY  family history includes Cancer in his father; Colon polyps in his mother; Diabetes in his child and mother; Hypertension in his mother; No Known Problems in his maternal grandfather, paternal grandfather, and paternal grandmother; Stroke in his maternal grandmother and mother.    SOCIAL HISTORY  Social History     Tobacco Use    Smoking status: Former     Current packs/day: 0.00     Average packs/day: 2.0 packs/day for 30.0 years (60.0 ttl pk-yrs)     Types: Cigarettes     Start date: 1955     Quit date: 1984     Years since quittin.9     Passive exposure: Past    Smokeless tobacco: Never   Vaping Use    Vaping status: Never Used   Substance Use Topics    Alcohol use: Never    Drug use: Never     ALLERGIES  Penicillins     MEDICATIONS    Current Outpatient Medications:     alendronate (FOSAMAX) 70 MG tablet, Take 1 tablet by mouth Every 7 (Seven) Days., Disp: , Rfl:     alfuzosin (UROXATRAL) 10 MG 24 hr tablet, Take 2 tablets by mouth., Disp: , Rfl:     amLODIPine (NORVASC) 2.5 MG tablet, Take 1 tablet by mouth Daily., Disp: , Rfl:     bacitracin 500 UNIT/GM ointment, Apply  topically to the appropriate area as directed 2 (Two) Times a Day., Disp: 14 g, Rfl: 0    buPROPion XL  "(WELLBUTRIN XL) 150 MG 24 hr tablet, Take 1 tablet by mouth Every Morning., Disp: , Rfl:     fluticasone (FLONASE) 50 MCG/ACT nasal spray, Administer  into the nostril(s) as directed by provider Daily., Disp: , Rfl:     folic acid-vit B6-vit B12 (Folbee) 2.5-25-1 MG tablet tablet, Take 1 tablet by mouth Daily., Disp: 30 tablet, Rfl: 0    HYDROcodone-acetaminophen (NORCO) 5-325 MG per tablet, Take 1 tablet by mouth Every 8 (Eight) Hours As Needed for Moderate Pain., Disp: 9 tablet, Rfl: 0    losartan (COZAAR) 25 MG tablet, 1 tablet 2 (Two) Times a Day., Disp: , Rfl:     lovastatin (MEVACOR) 40 MG tablet, Take 1 tablet by mouth., Disp: , Rfl:     ondansetron ODT (ZOFRAN-ODT) 4 MG disintegrating tablet, Place 1 tablet on the tongue Every 6 (Six) Hours As Needed for Nausea., Disp: 12 tablet, Rfl: 0    pantoprazole (PROTONIX) 40 MG EC tablet, 1 tablet Daily., Disp: , Rfl:     predniSONE (DELTASONE) 10 MG tablet, 1 tablet Daily. 1.5 tab daily, Disp: , Rfl:     pyridostigmine (MESTINON) 60 MG tablet, Take 3 tablets by mouth Daily., Disp: , Rfl:     doxycycline (VIBRAMYICN) 100 MG tablet, Take 1 tablet by mouth 2 (Two) Times a Day., Disp: 10 tablet, Rfl: 0    Current outpatient and discharge medications have been reconciled for the patient.  Reviewed by: GRACE Donahue    The following portions of the patient's history were reviewed and updated as appropriate: allergies, current medications, past family history, past medical history, past social history, past surgical history and problem list.    REVIEW OF SYSTEMS  Review of Systems   Constitutional:  Positive for fatigue (\"this sinus infection has me not wanting to do anything\"). Negative for appetite change and unexpected weight change.   HENT: Negative.     Eyes: Negative.    Respiratory:  Positive for cough (non productive) and shortness of breath (\"I get short of breath easily\").    Cardiovascular: Negative.  Negative for chest pain.   Gastrointestinal: Negative. " "   Endocrine: Negative.    Genitourinary: Negative.    Musculoskeletal:  Positive for gait problem (ambulates with cane).   Skin: Negative.    Allergic/Immunologic: Negative.    Hematological: Negative.    Psychiatric/Behavioral: Negative.       PHYSICAL EXAM  VITAL SIGNS:   Vitals:    12/19/24 1410   BP: 148/78   Weight: 85.7 kg (189 lb)   Height: 182.9 cm (72\")   PainSc: 0-No pain     Physical Exam  Vitals reviewed.   Constitutional:       Appearance: Normal appearance.   HENT:      Head: Normocephalic.      Nose: Nose normal.   Eyes:      Pupils: Pupils are equal, round, and reactive to light.   Cardiovascular:      Rate and Rhythm: Normal rate and regular rhythm.      Pulses: Normal pulses.      Heart sounds: Normal heart sounds.   Pulmonary:      Effort: Pulmonary effort is normal. No respiratory distress.      Breath sounds: Normal breath sounds. No wheezing.   Abdominal:      General: Bowel sounds are normal.      Palpations: There is no mass.   Musculoskeletal:         General: Normal range of motion.      Cervical back: Normal range of motion and neck supple. No tenderness.   Lymphadenopathy:      Cervical: No cervical adenopathy.   Skin:     General: Skin is warm and dry.      Capillary Refill: Capillary refill takes less than 2 seconds.   Neurological:      General: No focal deficit present.      Mental Status: He is alert and oriented to person, place, and time.      Motor: No weakness.   Psychiatric:         Mood and Affect: Mood normal.         Behavior: Behavior normal.          Performance Status: ECOG (2) Ambulatory and capable of self care, unable to carry out work activity, up and about > 50% or waking hours    Clinical Quality Measures  - Pain Documented by Standardized Tool, FPS Carmine Garcia reports a pain score of 0. Given his pain assessment as noted, treatment options were discussed and the following options were decided upon as a follow-up plan to address the patient's pain:  No pain, no " plan given .  Pain Medications               buPROPion XL (WELLBUTRIN XL) 150 MG 24 hr tablet Take 1 tablet by mouth Every Morning.    HYDROcodone-acetaminophen (NORCO) 5-325 MG per tablet Take 1 tablet by mouth Every 8 (Eight) Hours As Needed for Moderate Pain.    predniSONE (DELTASONE) 10 MG tablet 1 tablet Daily. 1.5 tab daily          - Body Mass Index Screening and Follow-Up Plan Body mass index is 25.63 kg/m².     - Tobacco Use: Screening and Cessation Intervention  Social History    Tobacco Use      Smoking status: Former        Packs/day: 0.00        Years: 2.0 packs/day for 30.0 years (60.0 ttl pk-yrs)        Types: Cigarettes        Start date: 1955        Quit date: 1984        Years since quittin.9        Passive exposure: Past      Smokeless tobacco: Never    - Advanced Care Planning Advance Care Planning   ACP discussion was held with the patient during this visit. Patient has an advance directive in EMR which is still valid.     - PHQ-2 Depression Screening  Little interest or pleasure in doing things? Not at all   Feeling down, depressed, or hopeless? Not at all   PHQ-2 Total Score 0     ASSESSMENT AND PLAN  1. History of primary non-small cell carcinoma of left lung    2. History of primary non-small cell carcinoma of right lung    3. History of radiation therapy    4. Former smoker      No orders of the defined types were placed in this encounter.    RECOMMENDATIONS: Carmine Garcia is status post completion of radiation therapy to the lung and presents to our clinic today for surveillance exam and to review imaging.  Diagnosed with stage IA2 (cT1b cN0 cM0) left upper lobe lung. He completed 5000 cGy in 5 fractions to the NISA lung tumor on 2023.   Diagnosed with a right lower lobe lung nodule. He completed 5000 cGy in 5 fractions to the right lower lobe tumor on 2024.    CT-scan of the chest on 2024 revealed marked decrease in size of 12 mm right lower lobe pulmonary  nodule (previously 3.2 cm). No new or enlarging pulmonary nodules. Fairly extensive consolidation and groundglass opacity throughout the left upper lobe. Favor infectious process/pneumonia but recommend follow-up to ensure resolution and exclude a neoplastic process. Small left-sided pleural effusion. Postoperative change of left lower lobectomy.    On exam, I do not see evidence for recurrent or metastatic disease at this time. He does have an area in the left upper lobe favored to be infection for which medical oncology has sent an antibiotic.     We will continue routine follow-up/surveillance as discussed in 3 months with follow up CT scan before visit and I have instructed him to continue to see the other health care providers as per their scheduling.    Patient Instructions   1) Return in 3 months     Return in about 3 months (around 3/19/2025).    Time Spent: I spent 44 minutes caring for Carmine on this date of service. This time includes time spent by me in the following activities: preparing for the visit, reviewing tests, obtaining and/or reviewing a separately obtained history, performing a medically appropriate examination and/or evaluation, counseling and educating the patient/family/caregiver, ordering medications, tests, or procedures, and documenting information in the medical record.   Bryn Proctor, APRN  12/19/2024

## 2024-12-18 ENCOUNTER — HOSPITAL ENCOUNTER (OUTPATIENT)
Dept: RADIATION ONCOLOGY | Facility: HOSPITAL | Age: 84
Setting detail: RADIATION/ONCOLOGY SERIES
End: 2024-12-18
Payer: MEDICARE

## 2024-12-18 RX ORDER — CYANOCOBALAMIN/FOLIC AC/VIT B6 1-2.5-25MG
1 TABLET ORAL DAILY
Qty: 30 TABLET | Refills: 0 | Status: CANCELLED | OUTPATIENT
Start: 2024-12-18

## 2024-12-19 ENCOUNTER — OFFICE VISIT (OUTPATIENT)
Age: 84
End: 2024-12-19
Payer: MEDICARE

## 2024-12-19 ENCOUNTER — OFFICE VISIT (OUTPATIENT)
Dept: ONCOLOGY | Facility: CLINIC | Age: 84
End: 2024-12-19
Payer: MEDICARE

## 2024-12-19 VITALS
BODY MASS INDEX: 25.7 KG/M2 | TEMPERATURE: 98.1 F | HEART RATE: 97 BPM | SYSTOLIC BLOOD PRESSURE: 148 MMHG | HEIGHT: 72 IN | OXYGEN SATURATION: 99 % | WEIGHT: 189.7 LBS | RESPIRATION RATE: 18 BRPM | DIASTOLIC BLOOD PRESSURE: 78 MMHG

## 2024-12-19 VITALS
HEIGHT: 72 IN | DIASTOLIC BLOOD PRESSURE: 78 MMHG | WEIGHT: 189 LBS | SYSTOLIC BLOOD PRESSURE: 148 MMHG | BODY MASS INDEX: 25.6 KG/M2

## 2024-12-19 DIAGNOSIS — Z87.891 FORMER SMOKER: ICD-10-CM

## 2024-12-19 DIAGNOSIS — Z85.118 HISTORY OF MALIGNANT NEOPLASM OF LUNG: Primary | ICD-10-CM

## 2024-12-19 DIAGNOSIS — C34.32 PRIMARY ADENOCARCINOMA OF LOWER LOBE OF LEFT LUNG: ICD-10-CM

## 2024-12-19 DIAGNOSIS — Z85.118 HISTORY OF PRIMARY NON-SMALL CELL CARCINOMA OF LEFT LUNG: Primary | ICD-10-CM

## 2024-12-19 DIAGNOSIS — C34.32 PRIMARY ADENOCARCINOMA OF LOWER LOBE OF LEFT LUNG: Primary | ICD-10-CM

## 2024-12-19 DIAGNOSIS — Z85.118 HISTORY OF PRIMARY NON-SMALL CELL CARCINOMA OF RIGHT LUNG: ICD-10-CM

## 2024-12-19 DIAGNOSIS — Z92.3 HISTORY OF RADIATION THERAPY: ICD-10-CM

## 2024-12-19 PROCEDURE — G0463 HOSPITAL OUTPT CLINIC VISIT: HCPCS | Performed by: RADIOLOGY

## 2024-12-19 RX ORDER — DOXYCYCLINE HYCLATE 100 MG
100 TABLET ORAL 2 TIMES DAILY
Qty: 10 TABLET | Refills: 0 | Status: SHIPPED | OUTPATIENT
Start: 2024-12-19

## 2024-12-19 RX ORDER — ALENDRONATE SODIUM 70 MG/1
70 TABLET ORAL
COMMUNITY

## 2024-12-19 RX ORDER — FLUTICASONE PROPIONATE 50 MCG
SPRAY, SUSPENSION (ML) NASAL DAILY
COMMUNITY

## 2024-12-23 ENCOUNTER — TELEPHONE (OUTPATIENT)
Dept: ONCOLOGY | Facility: CLINIC | Age: 84
End: 2024-12-23
Payer: MEDICARE

## 2024-12-23 NOTE — TELEPHONE ENCOUNTER
These instructions were addressed day of his apt  Doxcycline 100 mg bid x 5 days was sent to patient preferred pharmacy for fill, pt aware to  RX  CT Scan domingo x 11 weeks per Dr Mixon note instructions.

## 2024-12-23 NOTE — TELEPHONE ENCOUNTER
----- Message from Cem Arteaga sent at 12/19/2024  1:52 PM CST -----  Rx: Doxycycline 100 mg po q 12h x 5 days    Schedule CT chest in 11 weeks

## 2024-12-24 ENCOUNTER — APPOINTMENT (OUTPATIENT)
Dept: GENERAL RADIOLOGY | Facility: HOSPITAL | Age: 84
End: 2024-12-24
Payer: MEDICARE

## 2024-12-24 ENCOUNTER — HOSPITAL ENCOUNTER (INPATIENT)
Facility: HOSPITAL | Age: 84
LOS: 1 days | Discharge: HOME | End: 2024-12-27
Admitting: STUDENT IN AN ORGANIZED HEALTH CARE EDUCATION/TRAINING PROGRAM
Payer: MEDICARE

## 2024-12-24 DIAGNOSIS — J12.3 HUMAN METAPNEUMOVIRUS (HMPV) PNEUMONIA: Primary | ICD-10-CM

## 2024-12-24 DIAGNOSIS — R09.02 HYPOXIA: ICD-10-CM

## 2024-12-24 DIAGNOSIS — E87.1 HYPONATREMIA: ICD-10-CM

## 2024-12-24 DIAGNOSIS — Z78.9 FAILURE OF OUTPATIENT TREATMENT: ICD-10-CM

## 2024-12-24 LAB
ALBUMIN SERPL-MCNC: 3.5 G/DL (ref 3.5–5.2)
ALBUMIN/GLOB SERPL: 1 G/DL
ALP SERPL-CCNC: 109 U/L (ref 39–117)
ALT SERPL W P-5'-P-CCNC: 29 U/L (ref 1–41)
ANION GAP SERPL CALCULATED.3IONS-SCNC: 12 MMOL/L (ref 5–15)
ARTERIAL PATENCY WRIST A: POSITIVE
AST SERPL-CCNC: 22 U/L (ref 1–40)
ATMOSPHERIC PRESS: 758 MMHG
B PARAPERT DNA SPEC QL NAA+PROBE: NOT DETECTED
B PERT DNA SPEC QL NAA+PROBE: NOT DETECTED
BASE EXCESS BLDA CALC-SCNC: 3.5 MMOL/L (ref 0–2)
BASOPHILS # BLD AUTO: 0.01 10*3/MM3 (ref 0–0.2)
BASOPHILS NFR BLD AUTO: 0.1 % (ref 0–1.5)
BDY SITE: ABNORMAL
BILIRUB SERPL-MCNC: 0.4 MG/DL (ref 0–1.2)
BODY TEMPERATURE: 37
BUN SERPL-MCNC: 7 MG/DL (ref 8–23)
BUN/CREAT SERPL: 10 (ref 7–25)
C PNEUM DNA NPH QL NAA+NON-PROBE: NOT DETECTED
CALCIUM SPEC-SCNC: 8.5 MG/DL (ref 8.6–10.5)
CHLORIDE SERPL-SCNC: 95 MMOL/L (ref 98–107)
CO2 SERPL-SCNC: 23 MMOL/L (ref 22–29)
COHGB MFR BLD: 0.6 % (ref 0–5)
CREAT SERPL-MCNC: 0.7 MG/DL (ref 0.76–1.27)
DEPRECATED RDW RBC AUTO: 47.6 FL (ref 37–54)
EGFRCR SERPLBLD CKD-EPI 2021: 90.3 ML/MIN/1.73
EOSINOPHIL # BLD AUTO: 0 10*3/MM3 (ref 0–0.4)
EOSINOPHIL NFR BLD AUTO: 0 % (ref 0.3–6.2)
ERYTHROCYTE [DISTWIDTH] IN BLOOD BY AUTOMATED COUNT: 13.2 % (ref 12.3–15.4)
FLUAV SUBTYP SPEC NAA+PROBE: NOT DETECTED
FLUBV RNA ISLT QL NAA+PROBE: NOT DETECTED
GLOBULIN UR ELPH-MCNC: 3.4 GM/DL
GLUCOSE SERPL-MCNC: 109 MG/DL (ref 65–99)
HADV DNA SPEC NAA+PROBE: NOT DETECTED
HCO3 BLDA-SCNC: 26.9 MMOL/L (ref 20–26)
HCOV 229E RNA SPEC QL NAA+PROBE: NOT DETECTED
HCOV HKU1 RNA SPEC QL NAA+PROBE: NOT DETECTED
HCOV NL63 RNA SPEC QL NAA+PROBE: NOT DETECTED
HCOV OC43 RNA SPEC QL NAA+PROBE: NOT DETECTED
HCT VFR BLD AUTO: 39 % (ref 37.5–51)
HCT VFR BLD CALC: 37.8 % (ref 38–51)
HGB BLD-MCNC: 12.7 G/DL (ref 13–17.7)
HGB BLDA-MCNC: 12.3 G/DL (ref 14–18)
HMPV RNA NPH QL NAA+NON-PROBE: DETECTED
HOLD SPECIMEN: NORMAL
HOLD SPECIMEN: NORMAL
HPIV1 RNA ISLT QL NAA+PROBE: NOT DETECTED
HPIV2 RNA SPEC QL NAA+PROBE: NOT DETECTED
HPIV3 RNA NPH QL NAA+PROBE: NOT DETECTED
HPIV4 P GENE NPH QL NAA+PROBE: NOT DETECTED
IMM GRANULOCYTES # BLD AUTO: 0.06 10*3/MM3 (ref 0–0.05)
IMM GRANULOCYTES NFR BLD AUTO: 0.4 % (ref 0–0.5)
LYMPHOCYTES # BLD AUTO: 0.74 10*3/MM3 (ref 0.7–3.1)
LYMPHOCYTES NFR BLD AUTO: 5.4 % (ref 19.6–45.3)
Lab: ABNORMAL
Lab: ABNORMAL
M PNEUMO IGG SER IA-ACNC: NOT DETECTED
MAGNESIUM SERPL-MCNC: 1.8 MG/DL (ref 1.6–2.4)
MCH RBC QN AUTO: 31.7 PG (ref 26.6–33)
MCHC RBC AUTO-ENTMCNC: 32.6 G/DL (ref 31.5–35.7)
MCV RBC AUTO: 97.3 FL (ref 79–97)
METHGB BLD QL: 0.8 % (ref 0–3)
MODALITY: ABNORMAL
MONOCYTES # BLD AUTO: 0.81 10*3/MM3 (ref 0.1–0.9)
MONOCYTES NFR BLD AUTO: 5.9 % (ref 5–12)
NEUTROPHILS NFR BLD AUTO: 12.04 10*3/MM3 (ref 1.7–7)
NEUTROPHILS NFR BLD AUTO: 88.2 % (ref 42.7–76)
NOTIFIED BY: ABNORMAL
NOTIFIED WHO: ABNORMAL
NRBC BLD AUTO-RTO: 0 /100 WBC (ref 0–0.2)
OXYHGB MFR BLDV: 89.2 % (ref 94–99)
PCO2 BLDA: 36.1 MM HG (ref 35–45)
PCO2 TEMP ADJ BLD: 36.1 MM HG (ref 35–45)
PH BLDA: 7.48 PH UNITS (ref 7.35–7.45)
PH, TEMP CORRECTED: 7.48 PH UNITS (ref 7.35–7.45)
PLATELET # BLD AUTO: 380 10*3/MM3 (ref 140–450)
PMV BLD AUTO: 8.4 FL (ref 6–12)
PO2 BLDA: 53.1 MM HG (ref 83–108)
PO2 TEMP ADJ BLD: 53.1 MM HG (ref 83–108)
POTASSIUM BLDA-SCNC: 4 MMOL/L (ref 3.5–5.2)
POTASSIUM SERPL-SCNC: 4.5 MMOL/L (ref 3.5–5.2)
PROCALCITONIN SERPL-MCNC: 0.05 NG/ML (ref 0–0.25)
PROT SERPL-MCNC: 6.9 G/DL (ref 6–8.5)
RBC # BLD AUTO: 4.01 10*6/MM3 (ref 4.14–5.8)
RHINOVIRUS RNA SPEC NAA+PROBE: NOT DETECTED
RSV RNA NPH QL NAA+NON-PROBE: NOT DETECTED
SAO2 % BLDCOA: 90.5 % (ref 94–99)
SARS-COV-2 RNA RESP QL NAA+PROBE: NOT DETECTED
SODIUM BLDA-SCNC: 130 MMOL/L (ref 136–145)
SODIUM SERPL-SCNC: 130 MMOL/L (ref 136–145)
VENTILATOR MODE: ABNORMAL
WBC NRBC COR # BLD AUTO: 13.66 10*3/MM3 (ref 3.4–10.8)
WHOLE BLOOD HOLD COAG: NORMAL
WHOLE BLOOD HOLD SPECIMEN: NORMAL

## 2024-12-24 PROCEDURE — 99285 EMERGENCY DEPT VISIT HI MDM: CPT

## 2024-12-24 PROCEDURE — 36415 COLL VENOUS BLD VENIPUNCTURE: CPT

## 2024-12-24 PROCEDURE — 84145 PROCALCITONIN (PCT): CPT | Performed by: NURSE PRACTITIONER

## 2024-12-24 PROCEDURE — 0202U NFCT DS 22 TRGT SARS-COV-2: CPT | Performed by: EMERGENCY MEDICINE

## 2024-12-24 PROCEDURE — 82375 ASSAY CARBOXYHB QUANT: CPT

## 2024-12-24 PROCEDURE — 81003 URINALYSIS AUTO W/O SCOPE: CPT | Performed by: NURSE PRACTITIONER

## 2024-12-24 PROCEDURE — 36600 WITHDRAWAL OF ARTERIAL BLOOD: CPT

## 2024-12-24 PROCEDURE — 87449 NOS EACH ORGANISM AG IA: CPT | Performed by: STUDENT IN AN ORGANIZED HEALTH CARE EDUCATION/TRAINING PROGRAM

## 2024-12-24 PROCEDURE — 83735 ASSAY OF MAGNESIUM: CPT | Performed by: NURSE PRACTITIONER

## 2024-12-24 PROCEDURE — 80053 COMPREHEN METABOLIC PANEL: CPT | Performed by: NURSE PRACTITIONER

## 2024-12-24 PROCEDURE — 25010000002 METHYLPREDNISOLONE PER 125 MG: Performed by: NURSE PRACTITIONER

## 2024-12-24 PROCEDURE — 25010000002 CEFTRIAXONE PER 250 MG: Performed by: NURSE PRACTITIONER

## 2024-12-24 PROCEDURE — 85025 COMPLETE CBC W/AUTO DIFF WBC: CPT | Performed by: NURSE PRACTITIONER

## 2024-12-24 PROCEDURE — 83605 ASSAY OF LACTIC ACID: CPT | Performed by: NURSE PRACTITIONER

## 2024-12-24 PROCEDURE — 71045 X-RAY EXAM CHEST 1 VIEW: CPT

## 2024-12-24 PROCEDURE — 83050 HGB METHEMOGLOBIN QUAN: CPT

## 2024-12-24 PROCEDURE — 87040 BLOOD CULTURE FOR BACTERIA: CPT | Performed by: NURSE PRACTITIONER

## 2024-12-24 PROCEDURE — 82805 BLOOD GASES W/O2 SATURATION: CPT

## 2024-12-24 RX ORDER — METHYLPREDNISOLONE SODIUM SUCCINATE 125 MG/2ML
125 INJECTION, POWDER, LYOPHILIZED, FOR SOLUTION INTRAMUSCULAR; INTRAVENOUS ONCE
Status: COMPLETED | OUTPATIENT
Start: 2024-12-24 | End: 2024-12-24

## 2024-12-24 RX ORDER — SODIUM CHLORIDE 0.9 % (FLUSH) 0.9 %
10 SYRINGE (ML) INJECTION AS NEEDED
Status: DISCONTINUED | OUTPATIENT
Start: 2024-12-24 | End: 2024-12-27 | Stop reason: HOSPADM

## 2024-12-24 RX ADMIN — SODIUM CHLORIDE 1000 MG: 900 INJECTION INTRAVENOUS at 23:50

## 2024-12-24 RX ADMIN — METHYLPREDNISOLONE SODIUM SUCCINATE 125 MG: 125 INJECTION, POWDER, FOR SOLUTION INTRAMUSCULAR; INTRAVENOUS at 23:45

## 2024-12-25 PROBLEM — J18.9 PNEUMONIA: Status: ACTIVE | Noted: 2024-12-25

## 2024-12-25 LAB
ALBUMIN SERPL-MCNC: 2.9 G/DL (ref 3.5–5.2)
ALBUMIN/GLOB SERPL: 1.2 G/DL
ALP SERPL-CCNC: 80 U/L (ref 39–117)
ALT SERPL W P-5'-P-CCNC: 19 U/L (ref 1–41)
ANION GAP SERPL CALCULATED.3IONS-SCNC: 10 MMOL/L (ref 5–15)
AST SERPL-CCNC: 15 U/L (ref 1–40)
BILIRUB SERPL-MCNC: 0.3 MG/DL (ref 0–1.2)
BILIRUB UR QL STRIP: NEGATIVE
BUN SERPL-MCNC: 7 MG/DL (ref 8–23)
BUN/CREAT SERPL: 11.1 (ref 7–25)
CALCIUM SPEC-SCNC: 7.2 MG/DL (ref 8.6–10.5)
CHLORIDE SERPL-SCNC: 95 MMOL/L (ref 98–107)
CLARITY UR: CLEAR
CO2 SERPL-SCNC: 24 MMOL/L (ref 22–29)
COLOR UR: YELLOW
CREAT SERPL-MCNC: 0.63 MG/DL (ref 0.76–1.27)
D-LACTATE SERPL-SCNC: 1 MMOL/L (ref 0.5–2)
DEPRECATED RDW RBC AUTO: 47.3 FL (ref 37–54)
EGFRCR SERPLBLD CKD-EPI 2021: 93.2 ML/MIN/1.73
ERYTHROCYTE [DISTWIDTH] IN BLOOD BY AUTOMATED COUNT: 13.2 % (ref 12.3–15.4)
FOLATE SERPL-MCNC: 12.3 NG/ML (ref 4.78–24.2)
GLOBULIN UR ELPH-MCNC: 2.5 GM/DL
GLUCOSE SERPL-MCNC: 108 MG/DL (ref 65–99)
GLUCOSE UR STRIP-MCNC: NEGATIVE MG/DL
HBA1C MFR BLD: 6.2 % (ref 4.8–5.6)
HCT VFR BLD AUTO: 33.2 % (ref 37.5–51)
HGB BLD-MCNC: 10.5 G/DL (ref 13–17.7)
HGB UR QL STRIP.AUTO: NEGATIVE
KETONES UR QL STRIP: NEGATIVE
L PNEUMO1 AG UR QL IA: NEGATIVE
LEUKOCYTE ESTERASE UR QL STRIP.AUTO: NEGATIVE
MCH RBC QN AUTO: 30.8 PG (ref 26.6–33)
MCHC RBC AUTO-ENTMCNC: 31.6 G/DL (ref 31.5–35.7)
MCV RBC AUTO: 97.4 FL (ref 79–97)
NITRITE UR QL STRIP: NEGATIVE
PH UR STRIP.AUTO: 8 [PH] (ref 5–8)
PLATELET # BLD AUTO: 285 10*3/MM3 (ref 140–450)
PMV BLD AUTO: 8.5 FL (ref 6–12)
POTASSIUM SERPL-SCNC: 4 MMOL/L (ref 3.5–5.2)
PROT SERPL-MCNC: 5.4 G/DL (ref 6–8.5)
PROT UR QL STRIP: NEGATIVE
RBC # BLD AUTO: 3.41 10*6/MM3 (ref 4.14–5.8)
S PNEUM AG SPEC QL LA: POSITIVE
SODIUM SERPL-SCNC: 129 MMOL/L (ref 136–145)
SP GR UR STRIP: 1.01 (ref 1–1.03)
UROBILINOGEN UR QL STRIP: NORMAL
VIT B12 BLD-MCNC: 712 PG/ML (ref 211–946)
WBC NRBC COR # BLD AUTO: 13.6 10*3/MM3 (ref 3.4–10.8)

## 2024-12-25 PROCEDURE — 25010000002 CEFTRIAXONE PER 250 MG: Performed by: NURSE PRACTITIONER

## 2024-12-25 PROCEDURE — 25010000002 ENOXAPARIN PER 10 MG

## 2024-12-25 PROCEDURE — 25010000002 AZITHROMYCIN PER 500 MG: Performed by: NURSE PRACTITIONER

## 2024-12-25 PROCEDURE — 82746 ASSAY OF FOLIC ACID SERUM: CPT | Performed by: STUDENT IN AN ORGANIZED HEALTH CARE EDUCATION/TRAINING PROGRAM

## 2024-12-25 PROCEDURE — 83036 HEMOGLOBIN GLYCOSYLATED A1C: CPT | Performed by: STUDENT IN AN ORGANIZED HEALTH CARE EDUCATION/TRAINING PROGRAM

## 2024-12-25 PROCEDURE — 85027 COMPLETE CBC AUTOMATED: CPT

## 2024-12-25 PROCEDURE — G0378 HOSPITAL OBSERVATION PER HR: HCPCS

## 2024-12-25 PROCEDURE — 36415 COLL VENOUS BLD VENIPUNCTURE: CPT

## 2024-12-25 PROCEDURE — 82607 VITAMIN B-12: CPT | Performed by: STUDENT IN AN ORGANIZED HEALTH CARE EDUCATION/TRAINING PROGRAM

## 2024-12-25 PROCEDURE — 25810000003 SODIUM CHLORIDE 0.9 % SOLUTION 250 ML FLEX CONT: Performed by: NURSE PRACTITIONER

## 2024-12-25 PROCEDURE — 63710000001 PREDNISONE PER 5 MG: Performed by: STUDENT IN AN ORGANIZED HEALTH CARE EDUCATION/TRAINING PROGRAM

## 2024-12-25 PROCEDURE — 80053 COMPREHEN METABOLIC PANEL: CPT

## 2024-12-25 RX ORDER — SODIUM CHLORIDE 0.9 % (FLUSH) 0.9 %
10 SYRINGE (ML) INJECTION AS NEEDED
Status: DISCONTINUED | OUTPATIENT
Start: 2024-12-25 | End: 2024-12-26

## 2024-12-25 RX ORDER — SODIUM CHLORIDE 9 MG/ML
40 INJECTION, SOLUTION INTRAVENOUS AS NEEDED
Status: DISCONTINUED | OUTPATIENT
Start: 2024-12-25 | End: 2024-12-27 | Stop reason: HOSPADM

## 2024-12-25 RX ORDER — ANTIOX #8/OM3/DHA/EPA/LUT/ZEAX 250-2.5 MG
1 CAPSULE ORAL 2 TIMES DAILY
COMMUNITY

## 2024-12-25 RX ORDER — PYRIDOSTIGMINE BROMIDE 60 MG/1
60 TABLET ORAL EVERY 6 HOURS
Status: DISCONTINUED | OUTPATIENT
Start: 2024-12-25 | End: 2024-12-27 | Stop reason: HOSPADM

## 2024-12-25 RX ORDER — FLUTICASONE PROPIONATE 50 MCG
1 SPRAY, SUSPENSION (ML) NASAL DAILY
Status: DISCONTINUED | OUTPATIENT
Start: 2024-12-25 | End: 2024-12-27 | Stop reason: HOSPADM

## 2024-12-25 RX ORDER — ENOXAPARIN SODIUM 100 MG/ML
40 INJECTION SUBCUTANEOUS DAILY
Status: DISCONTINUED | OUTPATIENT
Start: 2024-12-25 | End: 2024-12-27 | Stop reason: HOSPADM

## 2024-12-25 RX ORDER — ATORVASTATIN CALCIUM 10 MG/1
10 TABLET, FILM COATED ORAL DAILY
Status: DISCONTINUED | OUTPATIENT
Start: 2024-12-25 | End: 2024-12-27 | Stop reason: HOSPADM

## 2024-12-25 RX ORDER — SODIUM CHLORIDE 0.9 % (FLUSH) 0.9 %
10 SYRINGE (ML) INJECTION EVERY 12 HOURS SCHEDULED
Status: DISCONTINUED | OUTPATIENT
Start: 2024-12-25 | End: 2024-12-27 | Stop reason: HOSPADM

## 2024-12-25 RX ORDER — BUPROPION HYDROCHLORIDE 150 MG/1
150 TABLET ORAL EVERY MORNING
Status: DISCONTINUED | OUTPATIENT
Start: 2024-12-26 | End: 2024-12-27 | Stop reason: HOSPADM

## 2024-12-25 RX ORDER — TAMSULOSIN HYDROCHLORIDE 0.4 MG/1
0.4 CAPSULE ORAL NIGHTLY
Status: DISCONTINUED | OUTPATIENT
Start: 2024-12-25 | End: 2024-12-27 | Stop reason: HOSPADM

## 2024-12-25 RX ORDER — IPRATROPIUM BROMIDE AND ALBUTEROL SULFATE 2.5; .5 MG/3ML; MG/3ML
3 SOLUTION RESPIRATORY (INHALATION) EVERY 6 HOURS PRN
Status: DISCONTINUED | OUTPATIENT
Start: 2024-12-25 | End: 2024-12-27 | Stop reason: HOSPADM

## 2024-12-25 RX ORDER — AMLODIPINE BESYLATE 10 MG/1
10 TABLET ORAL DAILY
Status: DISCONTINUED | OUTPATIENT
Start: 2024-12-26 | End: 2024-12-27 | Stop reason: HOSPADM

## 2024-12-25 RX ORDER — BISACODYL 5 MG/1
10 TABLET, DELAYED RELEASE ORAL NIGHTLY
COMMUNITY

## 2024-12-25 RX ORDER — PANTOPRAZOLE SODIUM 40 MG/1
40 TABLET, DELAYED RELEASE ORAL
Status: DISCONTINUED | OUTPATIENT
Start: 2024-12-26 | End: 2024-12-27 | Stop reason: HOSPADM

## 2024-12-25 RX ORDER — LOSARTAN POTASSIUM 25 MG/1
25 TABLET ORAL 2 TIMES DAILY
Status: DISCONTINUED | OUTPATIENT
Start: 2024-12-25 | End: 2024-12-27 | Stop reason: HOSPADM

## 2024-12-25 RX ADMIN — SODIUM CHLORIDE 1000 MG: 900 INJECTION INTRAVENOUS at 01:25

## 2024-12-25 RX ADMIN — AZITHROMYCIN DIHYDRATE 500 MG: 500 INJECTION, POWDER, LYOPHILIZED, FOR SOLUTION INTRAVENOUS at 02:03

## 2024-12-25 RX ADMIN — Medication 10 ML: at 08:03

## 2024-12-25 RX ADMIN — ENOXAPARIN SODIUM 40 MG: 100 INJECTION SUBCUTANEOUS at 08:03

## 2024-12-25 RX ADMIN — Medication 5 MG: at 20:56

## 2024-12-25 RX ADMIN — Medication 1 TABLET: at 14:38

## 2024-12-25 RX ADMIN — TAMSULOSIN HYDROCHLORIDE 0.4 MG: 0.4 CAPSULE ORAL at 20:56

## 2024-12-25 RX ADMIN — LOSARTAN POTASSIUM 25 MG: 25 TABLET, FILM COATED ORAL at 20:56

## 2024-12-25 RX ADMIN — Medication 5 MG: at 02:59

## 2024-12-25 RX ADMIN — PREDNISONE 15 MG: 5 TABLET ORAL at 14:38

## 2024-12-25 RX ADMIN — ATORVASTATIN CALCIUM 10 MG: 10 TABLET, FILM COATED ORAL at 14:38

## 2024-12-25 RX ADMIN — PYRIDOSTIGMINE BROMIDE 60 MG: 60 TABLET ORAL at 14:39

## 2024-12-25 RX ADMIN — Medication 10 ML: at 02:52

## 2024-12-25 RX ADMIN — PYRIDOSTIGMINE BROMIDE 60 MG: 60 TABLET ORAL at 20:56

## 2024-12-25 RX ADMIN — Medication 10 ML: at 20:57

## 2024-12-25 NOTE — H&P
[facility] Medicine Services  HISTORY AND PHYSICAL    Date of Admission: 12/24/2024  Primary Care Physician: Efrain Dobson, DO    Subjective   Primary Historian: Patient    Chief Complaint: Generalized weakness      HPI:  This is an 84-year-old male patient with a medical history of myasthenia gravis, lung cancer status postresection and radiation, history of COPD, presenting to the ED for generalized weakness.  He states that he was feeling on and off since November, with acuity acquired pneumonia that was treated previously with doxycycline but he did not feel any improvement.  Patient reports having cough, congestion, runny nose, and decreased appetite.  He denies any chest pain, or shortness of breath.  He denies any abdominal pain nausea vomiting or diarrhea.  He also denies any fever or chills.        Review of Systems   Otherwise complete ROS reviewed and negative except as mentioned in the HPI.    Past Medical History:   Past Medical History:   Diagnosis Date    Anemia     Arthritis     COPD (chronic obstructive pulmonary disease)     Disease of thyroid gland     Emphysema lung     Fracture of nasal bones     Hx of colonic polyps     Hyperlipidemia     Hypertension     Lung cancer     Lung nodule     Myasthenia gravis     Pneumonia     PUD (peptic ulcer disease)      Past Surgical History:  Past Surgical History:   Procedure Laterality Date    CATARACT EXTRACTION      COLONOSCOPY  12/12/2014    Diverticulosis repeat exam in 3 years    COLONOSCOPY N/A 04/10/2018    Tubular adenoma cecum poor prep repeat in 3 months    COLONOSCOPY N/A 08/02/2018    2 Tubular adenomas transverse and ascending colon fair prep repeat in 3 years    COLONOSCOPY N/A 03/24/2022    Procedure: COLONOSCOPY WITH ANESTHESIA;  Surgeon: Ferdinand Cross MD;  Location: [facility] ENDOSCOPY;  Service: Gastroenterology;  Laterality: N/A;  pre hx colon polyps  post diverticulosis; inadequate prep  Efrain Dobson  R, DO    COLONOSCOPY W/ POLYPECTOMY  02/15/2010    2 Hyperplastic polyps at 25 cm and rectum repeat exam in 5 years    LUNG CANCER SURGERY      LUNG REMOVAL, PARTIAL      REPLACEMENT TOTAL KNEE Right 05/2017    TONSILLECTOMY       Social History:  reports that he quit smoking about 41 years ago. His smoking use included cigarettes. He started smoking about 70 years ago. He has a 60 pack-year smoking history. He has been exposed to tobacco smoke. He has never used smokeless tobacco. He reports that he does not drink alcohol and does not use drugs.    Family History: family history includes Cancer in his father; Colon polyps in his mother; Diabetes in his child and mother; Hypertension in his mother; No Known Problems in his maternal grandfather, paternal grandfather, and paternal grandmother; Stroke in his maternal grandmother and mother.       Allergies:  Allergies   Allergen Reactions    Penicillins Unknown - High Severity     Unknown         Medications:  Prior to Admission medications    Medication Sig Start Date End Date Taking? Authorizing Provider   alendronate (FOSAMAX) 70 MG tablet Take 1 tablet by mouth Every 7 (Seven) Days.    Perry Myers MD   alfuzosin (UROXATRAL) 10 MG 24 hr tablet Take 2 tablets by mouth.    Perry Myers MD   amLODIPine (NORVASC) 2.5 MG tablet Take 1 tablet by mouth Daily. 7/8/24   Perry Myers MD   bacitracin 500 UNIT/GM ointment Apply  topically to the appropriate area as directed 2 (Two) Times a Day. 10/8/24   Miriam Hines APRN   buPROPion XL (WELLBUTRIN XL) 150 MG 24 hr tablet Take 1 tablet by mouth Every Morning. 9/18/23   Perry Myers MD   doxycycline (VIBRAMYICN) 100 MG tablet Take 1 tablet by mouth 2 (Two) Times a Day. 12/19/24   Cem Arteaga MD   fluticasone (FLONASE) 50 MCG/ACT nasal spray Administer  into the nostril(s) as directed by provider Daily.    Perry Myers MD   folic acid-vit B6-vit B12 (Folbee) 2.5-25-1  "MG tablet tablet Take 1 tablet by mouth Daily. 12/10/24   Cem Arteaga MD   HYDROcodone-acetaminophen (NORCO) 5-325 MG per tablet Take 1 tablet by mouth Every 8 (Eight) Hours As Needed for Moderate Pain. 10/8/24   Miriam Hines APRN   losartan (COZAAR) 25 MG tablet 1 tablet 2 (Two) Times a Day. 11/21/22   Perry Myers MD   lovastatin (MEVACOR) 40 MG tablet Take 1 tablet by mouth.    Perry Myers MD   ondansetron ODT (ZOFRAN-ODT) 4 MG disintegrating tablet Place 1 tablet on the tongue Every 6 (Six) Hours As Needed for Nausea. 10/8/24   Miriam Hines APRN   pantoprazole (PROTONIX) 40 MG EC tablet 1 tablet Daily. 11/21/22   Perry Myers MD   predniSONE (DELTASONE) 10 MG tablet 1 tablet Daily. 1.5 tab daily 1/21/23   Perry Myers MD   pyridostigmine (MESTINON) 60 MG tablet Take 3 tablets by mouth Daily. 1/20/23   Perry Myers MD     I have utilized all available immediate resources to obtain, update, or review the patient's current medications (including all prescriptions, over-the-counter products, herbals, cannabis/cannabidiol products, and vitamin/mineral/dietary (nutritional) supplements).    Objective     Vital Signs: /66   Pulse 96   Temp 98.3 °F (36.8 °C) (Oral)   Resp 22   Ht 182.9 cm (72\")   Wt 85.3 kg (188 lb)   SpO2 92%   BMI 25.50 kg/m²   Physical Exam  Constitutional:       Appearance: Normal appearance.   Cardiovascular:      Rate and Rhythm: Normal rate and regular rhythm.      Pulses: Normal pulses.      Heart sounds: Normal heart sounds. No murmur heard.  Pulmonary:      Effort: Pulmonary effort is normal. No respiratory distress.      Breath sounds: Normal breath sounds. No wheezing or rales.   Abdominal:      General: Bowel sounds are normal. There is no distension.      Palpations: Abdomen is soft.      Tenderness: There is no abdominal tenderness. There is no guarding.   Musculoskeletal:      Right lower leg: No edema.    "   Left lower leg: No edema.   Neurological:      General: No focal deficit present.      Mental Status: He is alert and oriented to person, place, and time.              Results Reviewed:  Lab Results (last 24 hours)       Procedure Component Value Units Date/Time    Lactic Acid, Plasma [[id number]]  (Normal) Collected: 12/24/24 2346    Specimen: Blood Updated: 12/25/24 0025     Lactate 1.0 mmol/L     Blood Culture - Blood, Arm, Right [[id number]] Collected: 12/24/24 2345    Specimen: Blood from Arm, Right Updated: 12/25/24 0006    Blood Culture - Blood, Arm, Right [[id number]] Collected: 12/24/24 2340    Specimen: Blood from Arm, Right Updated: 12/25/24 0006    Urinalysis With Microscopic If Indicated (No Culture) - Urine, Clean Catch [[id number]]  (Normal) Collected: 12/24/24 2341    Specimen: Urine, Clean Catch Updated: 12/25/24 0002     Color, UA Yellow     Appearance, UA Clear     pH, UA 8.0     Specific Gravity, UA 1.012     Glucose, UA Negative     Ketones, UA Negative     Bilirubin, UA Negative     Blood, UA Negative     Protein, UA Negative     Leuk Esterase, UA Negative     Nitrite, UA Negative     Urobilinogen, UA 0.2 E.U./dL    Narrative:      Urine microscopic not indicated.    Blood Gas, Arterial With Co-Ox [[id number]]  (Abnormal) Collected: 12/24/24 2247    Specimen: Arterial Blood Updated: 12/24/24 2247     Site Left Radial     Nikhil's Test Positive     pH, Arterial 7.482 pH units      Comment: 83 Value above reference range        pCO2, Arterial 36.1 mm Hg      pO2, Arterial 53.1 mm Hg      Comment: 85 Value below critical limit        HCO3, Arterial 26.9 mmol/L      Comment: 83 Value above reference range        Base Excess, Arterial 3.5 mmol/L      Comment: 83 Value above reference range        O2 Saturation, Arterial 90.5 %      Comment: 84 Value below reference range        Hemoglobin, Blood Gas 12.3 g/dL      Comment: 84 Value below reference range        Hematocrit, Blood Gas 37.8 %      Comment:  "84 Value below reference range        Oxyhemoglobin 89.2 %      Comment: 84 Value below reference range        Methemoglobin 0.80 %      Carboxyhemoglobin 0.6 %      Temperature 37.0     Sodium, Arterial 130 mmol/L      Comment: 84 Value below reference range        Potassium, Arterial 4.0 mmol/L      Barometric Pressure for Blood Gas 758 mmHg      Modality Room Air     Ventilator Mode NA     Notified Who  RN DE     Notified By Zena Jackson RRT     Notified Time 12/24/2024 22:48     Collected by [id number]     Comment: Meter: [id number]     :  Zena Jackson RRT        pH, Temp Corrected 7.482 pH Units      pCO2, Temperature Corrected 36.1 mm Hg      pO2, Temperature Corrected 53.1 mm Hg     Procalcitonin [[id number]]  (Normal) Collected: 12/24/24 2116    Specimen: Blood Updated: 12/24/24 2240     Procalcitonin 0.05 ng/mL     Narrative:      As a Marker for Sepsis (Non-Neonates):    1. <0.5 ng/mL represents a low risk of severe sepsis and/or septic shock.  2. >2 ng/mL represents a high risk of severe sepsis and/or septic shock.    As a Marker for Lower Respiratory Tract Infections that require antibiotic therapy:    PCT on Admission    Antibiotic Therapy       6-12 Hrs later    >0.5                Strongly Recommended  >0.25 - <0.5        Recommended   0.1 - 0.25          Discouraged              Remeasure/reassess PCT  <0.1                Strongly Discouraged     Remeasure/reassess PCT    As 28 day mortality risk marker: \"Change in Procalcitonin Result\" (>80% or <=80%) if Day 0 (or Day 1) and Day 4 values are available. Refer to http://www.[facility]-pct-calculator.com    Change in PCT <=80%  A decrease of PCT levels below or equal to 80% defines a positive change in PCT test result representing a higher risk for 28-day all-cause mortality of patients diagnosed with severe sepsis for septic shock.    Change in PCT >80%  A decrease of PCT levels of more than 80% defines a negative change in PCT result " representing a lower risk for 28-day all-cause mortality of patients diagnosed with severe sepsis or septic shock.       Magnesium [[id number]]  (Normal) Collected: 12/24/24 2116    Specimen: Blood Updated: 12/24/24 2234     Magnesium 1.8 mg/dL     Comprehensive Metabolic Panel [[id number]]  (Abnormal) Collected: 12/24/24 2116    Specimen: Blood Updated: 12/24/24 2234     Glucose 109 mg/dL      BUN 7 mg/dL      Creatinine 0.70 mg/dL      Sodium 130 mmol/L      Potassium 4.5 mmol/L      Chloride 95 mmol/L      CO2 23.0 mmol/L      Calcium 8.5 mg/dL      Total Protein 6.9 g/dL      Albumin 3.5 g/dL      ALT (SGPT) 29 U/L      AST (SGOT) 22 U/L      Alkaline Phosphatase 109 U/L      Total Bilirubin 0.4 mg/dL      Globulin 3.4 gm/dL      A/G Ratio 1.0 g/dL      BUN/Creatinine Ratio 10.0     Anion Gap 12.0 mmol/L      eGFR 90.3 mL/min/1.73     Narrative:      GFR Categories in Chronic Kidney Disease (CKD)      GFR Category          GFR (mL/min/1.73)    Interpretation  G1                     90 or greater         Normal or high (1)  G2                      60-89                Mild decrease (1)  G3a                   45-59                Mild to moderate decrease  G3b                   30-44                Moderate to severe decrease  G4                    15-29                Severe decrease  G5                    14 or less           Kidney failure          (1)In the absence of evidence of kidney disease, neither GFR category G1 or G2 fulfill the criteria for CKD.    eGFR calculation 2021 CKD-EPI creatinine equation, which does not include race as a factor    Respiratory Panel PCR w/COVID-19(SARS-CoV-2) ALEENA/MILADIS/YULIA/PAD/COR/NIKIA In-House, NP Swab in [facility], 2 HR TAT - Swab, Nasopharynx [[id number]]  (Abnormal) Collected: 12/24/24 2110    Specimen: Swab from Nasopharynx Updated: 12/24/24 2221     ADENOVIRUS, PCR Not Detected     Coronavirus 229E Not Detected     Coronavirus HKU1 Not Detected     Coronavirus NL63 Not Detected      Coronavirus OC43 Not Detected     COVID19 Not Detected     Human Metapneumovirus Detected     Human Rhinovirus/Enterovirus Not Detected     Influenza A PCR Not Detected     Influenza B PCR Not Detected     Parainfluenza Virus 1 Not Detected     Parainfluenza Virus 2 Not Detected     Parainfluenza Virus 3 Not Detected     Parainfluenza Virus 4 Not Detected     RSV, PCR Not Detected     Bordetella pertussis pcr Not Detected     Bordetella parapertussis PCR Not Detected     Chlamydophila pneumoniae PCR Not Detected     Mycoplasma pneumo by PCR Not Detected    Narrative:      In the setting of a positive respiratory panel with a viral infection PLUS a negative procalcitonin without other underlying concern for bacterial infection, consider observing off antibiotics or discontinuation of antibiotics and continue supportive care. If the respiratory panel is positive for atypical bacterial infection (Bordetella pertussis, Chlamydophila pneumoniae, or Mycoplasma pneumoniae), consider antibiotic de-escalation to target atypical bacterial infection.    CBC & Differential [[id number]]  (Abnormal) Collected: 12/24/24 2116    Specimen: Blood Updated: 12/24/24 2214    Narrative:      The following orders were created for panel order CBC & Differential.  Procedure                               Abnormality         Status                     ---------                               -----------         ------                     CBC Auto Differential[[id number]]        Abnormal            Final result                 Please view results for these tests on the individual orders.    CBC Auto Differential [[id number]]  (Abnormal) Collected: 12/24/24 2116    Specimen: Blood Updated: 12/24/24 2214     WBC 13.66 10*3/mm3      RBC 4.01 10*6/mm3      Hemoglobin 12.7 g/dL      Hematocrit 39.0 %      MCV 97.3 fL      MCH 31.7 pg      MCHC 32.6 g/dL      RDW 13.2 %      RDW-SD 47.6 fl      MPV 8.4 fL      Platelets 380 10*3/mm3      Neutrophil  % 88.2 %      Lymphocyte % 5.4 %      Monocyte % 5.9 %      Eosinophil % 0.0 %      Basophil % 0.1 %      Immature Grans % 0.4 %      Neutrophils, Absolute 12.04 10*3/mm3      Lymphocytes, Absolute 0.74 10*3/mm3      Monocytes, Absolute 0.81 10*3/mm3      Eosinophils, Absolute 0.00 10*3/mm3      Basophils, Absolute 0.01 10*3/mm3      Immature Grans, Absolute 0.06 10*3/mm3      nRBC 0.0 /100 WBC     [place] Draw [[id number]] Collected: 12/24/24 2116    Specimen: Blood Updated: 12/24/24 2131    Narrative:      The following orders were created for panel order [place] Draw.  Procedure                               Abnormality         Status                     ---------                               -----------         ------                     Green Top (Gel)[[id number]]                                  Final result               Lavender Top[[id number]]                                     Final result               Red Top[[id number]]                                          Final result               Light Blue Top[[id number]]                                   Final result                 Please view results for these tests on the individual orders.    Green Top (Gel) [[id number]] Collected: 12/24/24 2116    Specimen: Blood Updated: 12/24/24 2131     Extra Tube Hold for add-ons.     Comment: Auto resulted.       Lavender Top [[id number]] Collected: 12/24/24 2116    Specimen: Blood Updated: 12/24/24 2131     Extra Tube hold for add-on     Comment: Auto resulted       Red Top [[id number]] Collected: 12/24/24 2116    Specimen: Blood Updated: 12/24/24 2131     Extra Tube Hold for add-ons.     Comment: Auto resulted.       Light Blue Top [[id number]] Collected: 12/24/24 2116    Specimen: Blood Updated: 12/24/24 2131     Extra Tube Hold for add-ons.     Comment: Auto resulted             Imaging Results (Last 24 Hours)       Procedure Component Value Units Date/Time    XR Chest 1 View [[id number]] Resulted: 12/24/24 2217      Updated: 12/24/24 2226          I have personally reviewed and interpreted the radiology studies and ECG obtained at time of admission.     Assessment / Plan   Assessment:   Active Hospital Problems    Diagnosis     **Pneumonia      Assessment and plan:    #Human metapneumovirus infection.  #Community-acquired pneumonia, quiring oxygen    -Admitting to floor.  -ED started him on ceftriaxone and azithromycin, will continue the same.  Blood cultures pending.  -Applying contact and droplet isolation.  -Patient received Solu-Medrol dose of 125 mg in the ED, given the fact that he was on prednisone previously for his myasthenia gravis.  I will hold off on adding additional dose for now.  Day team to follow.  -Oxygen as needed.  -DuoNeb as needed.  -DVT prophylaxis with Lovenox.    I discussed CODE STATUS with the patient and he wants to be DNI DNR.    To note med rec is not complete.  Day team to follow.      Electronically signed by Leatha Espinoza MD, 12/25/24, 02:22 CST.

## 2024-12-25 NOTE — ED PROVIDER NOTES
"Subjective   History of Present Illness  84-year-old male patient presents to the ED with complaints of not feeling well \"off and on\" since November.  Patient states that he just completed an antibiotic for pneumonia this morning.  Chart review indicates that Dr. Henderson prescribed doxycycline 100 mg orally twice daily for 5 days.  Patient reports cough, congestion, rhinorrhea, and decreased appetite.  SpO2 90% on room air upon arrival.  Denies SOA and chest pain. Patient has a history of COPD and emphysema.  Patient denies any home O2 use.  Patient does take prednisone daily for myasthenia gravis.  He denies any nausea, vomiting, or diarrhea.  Denies any known fevers.    ED Triage Vitals   Temp Heart Rate Resp BP SpO2   12/24/24 2102 12/24/24 2102 12/24/24 2102 12/24/24 2102 12/24/24 2102   98.3 °F (36.8 °C) 118 22 (!) 199/94 90 %      Temp src Heart Rate Source Patient Position BP Location FiO2 (%)   12/24/24 2102 12/24/24 2053 12/24/24 2102 12/24/24 2102 --   Oral Monitor Sitting Right arm        Review of Systems   Constitutional:  Positive for appetite change. Negative for fever.   HENT:  Positive for rhinorrhea.    Respiratory:  Positive for cough. Negative for shortness of breath.    Cardiovascular:  Negative for chest pain.   Gastrointestinal:  Negative for diarrhea, nausea and vomiting.       Past Medical History:   Diagnosis Date    Anemia     Arthritis     COPD (chronic obstructive pulmonary disease)     Disease of thyroid gland     Emphysema lung     Fracture of nasal bones     Hx of colonic polyps     Hyperlipidemia     Hypertension     Lung cancer     Lung nodule     Myasthenia gravis     Pneumonia     PUD (peptic ulcer disease)        Allergies   Allergen Reactions    Penicillins Unknown - High Severity     Unknown         Past Surgical History:   Procedure Laterality Date    CATARACT EXTRACTION      COLONOSCOPY  12/12/2014    Diverticulosis repeat exam in 3 years    COLONOSCOPY N/A 04/10/2018    " Tubular adenoma cecum poor prep repeat in 3 months    COLONOSCOPY N/A 2018    2 Tubular adenomas transverse and ascending colon fair prep repeat in 3 years    COLONOSCOPY N/A 2022    Procedure: COLONOSCOPY WITH ANESTHESIA;  Surgeon: Ferdinand Cross MD;  Location: [facility] ENDOSCOPY;  Service: Gastroenterology;  Laterality: N/A;  pre hx colon polyps  post diverticulosis; inadequate prep  Efrain Dobson, DO    COLONOSCOPY W/ POLYPECTOMY  02/15/2010    2 Hyperplastic polyps at 25 cm and rectum repeat exam in 5 years    LUNG CANCER SURGERY      LUNG REMOVAL, PARTIAL      REPLACEMENT TOTAL KNEE Right 2017    TONSILLECTOMY         Family History   Problem Relation Age of Onset    Colon polyps Mother     Stroke Mother     Diabetes Mother     Hypertension Mother     Cancer Father         asbestos    Stroke Maternal Grandmother     No Known Problems Maternal Grandfather     No Known Problems Paternal Grandmother     No Known Problems Paternal Grandfather     Diabetes Child     Colon cancer Neg Hx        Social History     Socioeconomic History    Marital status:    Tobacco Use    Smoking status: Former     Current packs/day: 0.00     Average packs/day: 2.0 packs/day for 30.0 years (60.0 ttl pk-yrs)     Types: Cigarettes     Start date: 1955     Quit date: 1984     Years since quittin.0     Passive exposure: Past    Smokeless tobacco: Never   Vaping Use    Vaping status: Never Used   Substance and Sexual Activity    Alcohol use: Never    Drug use: Never    Sexual activity: Not Currently     Partners: Female           Objective   Physical Exam  Vitals and nursing note reviewed.   Constitutional:       General: He is awake. He is not in acute distress.     Appearance: He is ill-appearing (chronically). He is not toxic-appearing or diaphoretic.   HENT:      Head: Normocephalic and atraumatic.      Mouth/Throat:      Mouth: Mucous membranes are dry.      Pharynx: Oropharynx is clear.   Eyes:       Conjunctiva/sclera: Conjunctivae normal.      Pupils: Pupils are equal, round, and reactive to light.   Cardiovascular:      Rate and Rhythm: Regular rhythm. Tachycardia present.      Pulses: Normal pulses.      Heart sounds: Normal heart sounds.   Pulmonary:      Effort: Pulmonary effort is normal. No respiratory distress.      Breath sounds: No stridor. Rhonchi present. No wheezing.   Chest:      Chest wall: No tenderness.   Abdominal:      General: Bowel sounds are normal.      Palpations: Abdomen is soft.      Tenderness: There is no abdominal tenderness.   Musculoskeletal:      Cervical back: Normal range of motion and neck supple.      Comments: Moves all extremities equally and independently. Able to stand and ambulate.    Skin:     General: Skin is warm and dry.      Capillary Refill: Capillary refill takes 2 to 3 seconds.   Neurological:      Mental Status: He is alert and oriented to person, place, and time.   Psychiatric:         Mood and Affect: Mood normal.         Behavior: Behavior normal. Behavior is cooperative.         Procedures       Lab Results (last 24 hours)       Procedure Component Value Units Date/Time    Respiratory Panel PCR w/COVID-19(SARS-CoV-2) ALEENA/MILADIS/YULIA/PAD/COR/NIKIA In-House, NP Swab in UTM/VTM, 2 HR TAT - Swab, Nasopharynx [[id number]]  (Abnormal) Collected: 12/24/24 2110    Specimen: Swab from Nasopharynx Updated: 12/24/24 2221     ADENOVIRUS, PCR Not Detected     Coronavirus 229E Not Detected     Coronavirus HKU1 Not Detected     Coronavirus NL63 Not Detected     Coronavirus OC43 Not Detected     COVID19 Not Detected     Human Metapneumovirus Detected     Human Rhinovirus/Enterovirus Not Detected     Influenza A PCR Not Detected     Influenza B PCR Not Detected     Parainfluenza Virus 1 Not Detected     Parainfluenza Virus 2 Not Detected     Parainfluenza Virus 3 Not Detected     Parainfluenza Virus 4 Not Detected     RSV, PCR Not Detected     Bordetella pertussis pcr Not Detected      Bordetella parapertussis PCR Not Detected     Chlamydophila pneumoniae PCR Not Detected     Mycoplasma pneumo by PCR Not Detected    Narrative:      In the setting of a positive respiratory panel with a viral infection PLUS a negative procalcitonin without other underlying concern for bacterial infection, consider observing off antibiotics or discontinuation of antibiotics and continue supportive care. If the respiratory panel is positive for atypical bacterial infection (Bordetella pertussis, Chlamydophila pneumoniae, or Mycoplasma pneumoniae), consider antibiotic de-escalation to target atypical bacterial infection.    CBC & Differential [[id number]]  (Abnormal) Collected: 12/24/24 2116    Specimen: Blood Updated: 12/24/24 2214    Narrative:      The following orders were created for panel order CBC & Differential.  Procedure                               Abnormality         Status                     ---------                               -----------         ------                     CBC Auto Differential[[id number]]        Abnormal            Final result                 Please view results for these tests on the individual orders.    Comprehensive Metabolic Panel [[id number]]  (Abnormal) Collected: 12/24/24 2116    Specimen: Blood Updated: 12/24/24 2234     Glucose 109 mg/dL      BUN 7 mg/dL      Creatinine 0.70 mg/dL      Sodium 130 mmol/L      Potassium 4.5 mmol/L      Chloride 95 mmol/L      CO2 23.0 mmol/L      Calcium 8.5 mg/dL      Total Protein 6.9 g/dL      Albumin 3.5 g/dL      ALT (SGPT) 29 U/L      AST (SGOT) 22 U/L      Alkaline Phosphatase 109 U/L      Total Bilirubin 0.4 mg/dL      Globulin 3.4 gm/dL      A/G Ratio 1.0 g/dL      BUN/Creatinine Ratio 10.0     Anion Gap 12.0 mmol/L      eGFR 90.3 mL/min/1.73     Narrative:      GFR Categories in Chronic Kidney Disease (CKD)      GFR Category          GFR (mL/min/1.73)    Interpretation  G1                     90 or greater         Normal or high  "(1)  G2                      60-89                Mild decrease (1)  G3a                   45-59                Mild to moderate decrease  G3b                   30-44                Moderate to severe decrease  G4                    15-29                Severe decrease  G5                    14 or less           Kidney failure          (1)In the absence of evidence of kidney disease, neither GFR category G1 or G2 fulfill the criteria for CKD.    eGFR calculation 2021 CKD-EPI creatinine equation, which does not include race as a factor    Magnesium [[id number]]  (Normal) Collected: 12/24/24 2116    Specimen: Blood Updated: 12/24/24 2234     Magnesium 1.8 mg/dL     Procalcitonin [[id number]]  (Normal) Collected: 12/24/24 2116    Specimen: Blood Updated: 12/24/24 2240     Procalcitonin 0.05 ng/mL     Narrative:      As a Marker for Sepsis (Non-Neonates):    1. <0.5 ng/mL represents a low risk of severe sepsis and/or septic shock.  2. >2 ng/mL represents a high risk of severe sepsis and/or septic shock.    As a Marker for Lower Respiratory Tract Infections that require antibiotic therapy:    PCT on Admission    Antibiotic Therapy       6-12 Hrs later    >0.5                Strongly Recommended  >0.25 - <0.5        Recommended   0.1 - 0.25          Discouraged              Remeasure/reassess PCT  <0.1                Strongly Discouraged     Remeasure/reassess PCT    As 28 day mortality risk marker: \"Change in Procalcitonin Result\" (>80% or <=80%) if Day 0 (or Day 1) and Day 4 values are available. Refer to http://www.[employer]-pct-calculator.com    Change in PCT <=80%  A decrease of PCT levels below or equal to 80% defines a positive change in PCT test result representing a higher risk for 28-day all-cause mortality of patients diagnosed with severe sepsis for septic shock.    Change in PCT >80%  A decrease of PCT levels of more than 80% defines a negative change in PCT result representing a lower risk for 28-day all-cause " mortality of patients diagnosed with severe sepsis or septic shock.       CBC Auto Differential [[id number]]  (Abnormal) Collected: 12/24/24 2116    Specimen: Blood Updated: 12/24/24 2214     WBC 13.66 10*3/mm3      RBC 4.01 10*6/mm3      Hemoglobin 12.7 g/dL      Hematocrit 39.0 %      MCV 97.3 fL      MCH 31.7 pg      MCHC 32.6 g/dL      RDW 13.2 %      RDW-SD 47.6 fl      MPV 8.4 fL      Platelets 380 10*3/mm3      Neutrophil % 88.2 %      Lymphocyte % 5.4 %      Monocyte % 5.9 %      Eosinophil % 0.0 %      Basophil % 0.1 %      Immature Grans % 0.4 %      Neutrophils, Absolute 12.04 10*3/mm3      Lymphocytes, Absolute 0.74 10*3/mm3      Monocytes, Absolute 0.81 10*3/mm3      Eosinophils, Absolute 0.00 10*3/mm3      Basophils, Absolute 0.01 10*3/mm3      Immature Grans, Absolute 0.06 10*3/mm3      nRBC 0.0 /100 WBC     Blood Gas, Arterial With Co-Ox [[id number]]  (Abnormal) Collected: 12/24/24 2247    Specimen: Arterial Blood Updated: 12/24/24 2247     Site Left Radial     Nikhil's Test Positive     pH, Arterial 7.482 pH units      Comment: 83 Value above reference range        pCO2, Arterial 36.1 mm Hg      pO2, Arterial 53.1 mm Hg      Comment: 85 Value below critical limit        HCO3, Arterial 26.9 mmol/L      Comment: 83 Value above reference range        Base Excess, Arterial 3.5 mmol/L      Comment: 83 Value above reference range        O2 Saturation, Arterial 90.5 %      Comment: 84 Value below reference range        Hemoglobin, Blood Gas 12.3 g/dL      Comment: 84 Value below reference range        Hematocrit, Blood Gas 37.8 %      Comment: 84 Value below reference range        Oxyhemoglobin 89.2 %      Comment: 84 Value below reference range        Methemoglobin 0.80 %      Carboxyhemoglobin 0.6 %      Temperature 37.0     Sodium, Arterial 130 mmol/L      Comment: 84 Value below reference range        Potassium, Arterial 4.0 mmol/L      Barometric Pressure for Blood Gas 758 mmHg      Modality Room Air      Ventilator Mode NA     Notified Who  RN DE     Notified By Zena Jackson, RRT     Notified Time 12/24/2024 22:48     Collected by [id number]     Comment: Meter: [id number]     :  Zena Jackson, JOSE MARIA        pH, Temp Corrected 7.482 pH Units      pCO2, Temperature Corrected 36.1 mm Hg      pO2, Temperature Corrected 53.1 mm Hg     Blood Culture - Blood, Arm, Right [[id number]] Collected: 12/24/24 2340    Specimen: Blood from Arm, Right Updated: 12/25/24 0006    Urinalysis With Microscopic If Indicated (No Culture) - Urine, Clean Catch [[id number]]  (Normal) Collected: 12/24/24 2341    Specimen: Urine, Clean Catch Updated: 12/25/24 0002     Color, UA Yellow     Appearance, UA Clear     pH, UA 8.0     Specific Gravity, UA 1.012     Glucose, UA Negative     Ketones, UA Negative     Bilirubin, UA Negative     Blood, UA Negative     Protein, UA Negative     Leuk Esterase, UA Negative     Nitrite, UA Negative     Urobilinogen, UA 0.2 E.U./dL    Narrative:      Urine microscopic not indicated.    Blood Culture - Blood, Arm, Right [[id number]] Collected: 12/24/24 2345    Specimen: Blood from Arm, Right Updated: 12/25/24 0006    Lactic Acid, Plasma [[id number]]  (Normal) Collected: 12/24/24 2346    Specimen: Blood Updated: 12/25/24 0025     Lactate 1.0 mmol/L                   ED Course  ED Course as of 12/25/24 1044   Tue Dec 24, 2024   2251 pO2 53.1, 2L O2 started via NC per RT.  [TD]   2252 Respiratory Panel PCR w/COVID-19(SARS-CoV-2) ALEENA/MILADIS/YULIA/PAD/COR/NIKIA In-House, NP Swab in UTM/VTM, 2 HR TAT - Swab, Nasopharynx(!)  Human Metapneumovirus detected.  [TD]   Wed Dec 25, 2024   0113 ED attending (Jennifer) consulted. Plan for admission. Give 2g dose of IV Rocephin in the ED. Repeat 1g dose. Add Azithromycin. Medications ordered. ED  consulted regarding need to consult Hospitalist.  [TD]   0134 Consulted Hospitalist (Alexis). Okay to admit.  [TD]      ED Course User Index  [TD] Rosa M Villarreal, APRN                  "                                      Medical Decision Making  84-year-old male patient presents to the ED with complaints of not feeling well \"off and on\" since November.  Patient states that he just completed an antibiotic for pneumonia this morning.  Chart review indicates that Dr. Henderson prescribed doxycycline 100 mg orally twice daily for 5 days.  Patient reports cough, congestion, rhinorrhea, and decreased appetite.  SpO2 90% on room air upon arrival.  Denies SOA and chest pain. Patient has a history of COPD and emphysema.  Patient denies any home O2 use.  Patient does take prednisone daily for myasthenia gravis.  He denies any nausea, vomiting, or diarrhea.  Denies any known fevers.    Based on the patient's symptoms and examination findings, potential differential diagnoses include but are not limited to: recurrent or unresolved pneumonia, acute exacerbation of COPD, viral respiratory infection, atypical pneumonia, interstitial lung disease (chronic use of prednisone and underlying myasthenia gravis could predispose to pulmonary complications), dehydration, lung cancer or malignancy        Course of treatment in the ED:  Labs Reviewed  RESPIRATORY PANEL PCR W/ COVID-19 (SARS-COV-2), NP SWAB IN UT/VTP, 2 HR TAT - Abnormal; Notable for the following components:     Human Metapneumovirus         Detected (*)            All other components within normal limits         Narrative: In the setting of a positive respiratory panel with a viral infection PLUS a negative procalcitonin without other underlying concern for bacterial infection, consider observing off antibiotics or discontinuation of antibiotics and continue supportive care. If the respiratory panel is positive for atypical bacterial infection (Bordetella pertussis, Chlamydophila pneumoniae, or Mycoplasma pneumoniae), consider antibiotic de-escalation to target atypical bacterial infection.  COMPREHENSIVE METABOLIC PANEL - Abnormal; Notable for the " following components:     Glucose                       109 (*)                BUN                           7 (*)                  Creatinine                    0.70 (*)               Sodium                        130 (*)                Chloride                      95 (*)                 Calcium                       8.5 (*)             All other components within normal limits         Narrative: GFR Categories in Chronic Kidney Disease (CKD)                                      GFR Category          GFR (mL/min/1.73)    Interpretation                  G1                     90 or greater         Normal or high (1)                  G2                      60-89                Mild decrease (1)                  G3a                   45-59                Mild to moderate decrease                  G3b                   30-44                Moderate to severe decrease                  G4                    15-29                Severe decrease                  G5                    14 or less           Kidney failure                                          (1)In the absence of evidence of kidney disease, neither GFR category G1 or G2 fulfill the criteria for CKD.                                    eGFR calculation 2021 CKD-EPI creatinine equation, which does not include race as a factor  CBC WITH AUTO DIFFERENTIAL - Abnormal; Notable for the following components:     WBC                           13.66 (*)               RBC                           4.01 (*)               Hemoglobin                    12.7 (*)               MCV                           97.3 (*)               Neutrophil %                  88.2 (*)               Lymphocyte %                  5.4 (*)                Eosinophil %                  0.0 (*)                Neutrophils, Absolute         12.04 (*)               Immature Grans, Absolute      0.06 (*)            All other components within normal limits  BLOOD GAS, ARTERIAL W/CO-OXIMETRY -  "Abnormal; Notable for the following components:     pH, Arterial                  7.482 (*)               pO2, Arterial                 53.1 (*)               HCO3, Arterial                26.9 (*)               Base Excess, Arterial         3.5 (*)                O2 Saturation, Arterial       90.5 (*)               Hemoglobin, Blood Gas         12.3 (*)               Hematocrit, Blood Gas         37.8 (*)               Oxyhemoglobin                 89.2 (*)               Sodium, Arterial              130 (*)                pH, Temp Corrected            7.482 (*)               pO2, Temperature Corrected    53.1 (*)            All other components within normal limits  MAGNESIUM - Normal  LACTIC ACID, PLASMA - Normal  PROCALCITONIN - Normal         Narrative: As a Marker for Sepsis (Non-Neonates):                                    1. <0.5 ng/mL represents a low risk of severe sepsis and/or septic shock.                  2. >2 ng/mL represents a high risk of severe sepsis and/or septic shock.                                    As a Marker for Lower Respiratory Tract Infections that require antibiotic therapy:                                    PCT on Admission    Antibiotic Therapy       6-12 Hrs later                                    >0.5                Strongly Recommended                  >0.25 - <0.5        Recommended                   0.1 - 0.25          Discouraged              Remeasure/reassess PCT                  <0.1                Strongly Discouraged     Remeasure/reassess PCT                                    As 28 day mortality risk marker: \"Change in Procalcitonin Result\" (>80% or <=80%) if Day 0 (or Day 1) and Day 4 values are available. Refer to http://[email or web address]-pct-[email or web address].com                                    Change in PCT <=80%                  A decrease of PCT levels below or equal to 80% defines a positive change in PCT test result representing a higher risk for 28-day all-cause " mortality of patients diagnosed with severe sepsis for septic shock.                                    Change in PCT >80%                  A decrease of PCT levels of more than 80% defines a negative change in PCT result representing a lower risk for 28-day all-cause mortality of patients diagnosed with severe sepsis or septic shock.                     URINALYSIS W/ MICROSCOPIC IF INDICATED (NO CULTURE) - Normal         Narrative: Urine microscopic not indicated.  BLOOD CULTURE  BLOOD CULTURE  RAINBOW DRAW         Narrative: The following orders were created for panel order [facility] Draw.                  Procedure                               Abnormality         Status                                     ---------                               -----------         ------                                     Green Top (Gel)[[id number]]                                  Final result                               Lavender Top[[id number]]                                     Final result                               Red Top[[id number]]                                          Final result                               Light Blue Top[[id number]]                                   Final result                                                 Please view results for these tests on the individual orders.  BLOOD GAS, VENOUS W/CO-OXIMETRY  GREEN TOP  LAVENDER TOP  RED TOP  LIGHT BLUE TOP  CBC AND DIFFERENTIAL    Medications  sodium chloride 0.9 % flush 10 mL (has no administration in time range)  cefTRIAXone (ROCEPHIN) 1,000 mg in sodium chloride 0.9 % 100 mL MBP (1,000 mg Intravenous New Bag 12/25/24 0125)  azithromycin (ZITHROMAX) 500 mg in sodium chloride 0.9 % 250 mL IVPB-VTB (500 mg Intravenous New Bag 12/25/24 0203)  methylPREDNISolone sodium succinate (SOLU-Medrol) injection 125 mg (125 mg Intravenous Given 12/24/24 2345)  cefTRIAXone (ROCEPHIN) 1,000 mg in sodium chloride 0.9 % 100 mL MBP (0 mg Intravenous Stopped 12/25/24  0050)    XR Chest 1 View    (Results Pending)        The patient, an 84-year-old male with a history of COPD and emphysema, requires admission for further management of his respiratory condition, as outpatient treatment has failed. The patient has a positive Human Metapneumovirus respiratory PCR, which may be contributing to his symptoms of cough, congestion, and decreased appetite. His SpO2 is 90% on room air, and arterial blood gas analysis shows a pO2 of 53.1 mm Hg and O2 saturation of 90.5%, indicating significant hypoxia. The patient is also at risk for further respiratory deterioration. His lab results show an elevated WBC count (13.66) suggesting possible infection, and his renal function appears stable with an GFR of 90.3. Additionally, the patient's comprehensive metabolic panel, including sodium (130) and blood gas findings, reflect mild electrolyte imbalances and respiratory compromise. Given these findings, he will be closely monitored in the hospital for oxygenation support, continued treatment, and further evaluation of his underlying condition.     Problems Addressed:  Failure of outpatient treatment: complicated acute illness or injury  Human metapneumovirus (hMPV) pneumonia: complicated acute illness or injury  Hyponatremia: complicated acute illness or injury  Hypoxia: complicated acute illness or injury    Amount and/or Complexity of Data Reviewed  Labs: ordered. Decision-making details documented in ED Course.  Radiology: ordered.    Risk  Prescription drug management.  Decision regarding hospitalization.        Final diagnoses:   Hyponatremia   Hypoxia   Failure of outpatient treatment   Human metapneumovirus (hMPV) pneumonia       ED Disposition  ED Disposition       ED Disposition   Decision to Admit    Condition   --    Comment   Level of Care: Med/Surg [1]   Diagnosis: Pneumonia [921761]   Admitting Physician: CORBIN BANDA [[id number]]   Attending Physician: CORBIN BANDA [[id number]]                  No follow-up provider specified.       Medication List      No changes were made to your prescriptions during this visit.            Rosa M Villarreal, APRN  12/25/24 1047

## 2024-12-25 NOTE — ED NOTES
Nursing report ED to floor  Carmine Garcia  84 y.o.  male    HPI:   Chief Complaint   Patient presents with    Illness       Admitting doctor:   Leatha Espinoza MD    Consulting provider(s):  Consults       No orders found for last 30 day(s).             Admitting diagnosis:   The primary encounter diagnosis was Hypoxia. Diagnoses of Hyponatremia, Pneumonia due to infectious organism, unspecified laterality, unspecified part of lung, and Failure of outpatient treatment were also pertinent to this visit.    Code status:   Current Code Status       Date Active Code Status Order ID Comments User Context       12/25/2024 0221 No CPR (Do Not Attempt to Resuscitate) [id number]  Leatha Espinoza MD ED        Question Answer    Code Status (Patient has no pulse and is not breathing) No CPR (Do Not Attempt to Resuscitate)    Medical Interventions (Patient has pulse or is breathing) Limited Support    Medical Intervention Limits: No intubation (DNI)    Level Of Support Discussed With Patient                    Allergies:   Penicillins    Intake and Output    Intake/Output Summary (Last 24 hours) at 12/25/2024 0229  Last data filed at 12/25/2024 0203  Gross per 24 hour   Intake 100 ml   Output --   Net 100 ml       Weight:       12/24/24 2102   Weight: 85.3 kg (188 lb)       Most recent vitals:   Vitals:    12/25/24 0016 12/25/24 0040 12/25/24 0127 12/25/24 0146   BP: 110/64  113/69 115/66   BP Location:       Patient Position:       Pulse: 103 91 92 96   Resp:       Temp:       TempSrc:       SpO2: 92% 94% 92% 92%   Weight:       Height:         Oxygen Therapy: .    Active LDAs/IV Access:   Lines, Drains & Airways       Active LDAs       Name Placement date Placement time Site Days    Peripheral IV 12/24/24 2340 Anterior;Right Hand 12/24/24  2340  Hand  less than 1                    Labs (abnormal labs have a star):   Labs Reviewed   RESPIRATORY PANEL PCR W/ COVID-19 (SARS-COV-2), NP SWAB IN UTM/VTP, 2 HR TAT - Abnormal;  Notable for the following components:       Result Value    Human Metapneumovirus Detected (*)     All other components within normal limits    Narrative:     In the setting of a positive respiratory panel with a viral infection PLUS a negative procalcitonin without other underlying concern for bacterial infection, consider observing off antibiotics or discontinuation of antibiotics and continue supportive care. If the respiratory panel is positive for atypical bacterial infection (Bordetella pertussis, Chlamydophila pneumoniae, or Mycoplasma pneumoniae), consider antibiotic de-escalation to target atypical bacterial infection.   COMPREHENSIVE METABOLIC PANEL - Abnormal; Notable for the following components:    Glucose 109 (*)     BUN 7 (*)     Creatinine 0.70 (*)     Sodium 130 (*)     Chloride 95 (*)     Calcium 8.5 (*)     All other components within normal limits    Narrative:     GFR Categories in Chronic Kidney Disease (CKD)      GFR Category          GFR (mL/min/1.73)    Interpretation  G1                     90 or greater         Normal or high (1)  G2                      60-89                Mild decrease (1)  G3a                   45-59                Mild to moderate decrease  G3b                   30-44                Moderate to severe decrease  G4                    15-29                Severe decrease  G5                    14 or less           Kidney failure          (1)In the absence of evidence of kidney disease, neither GFR category G1 or G2 fulfill the criteria for CKD.    eGFR calculation 2021 CKD-EPI creatinine equation, which does not include race as a factor   CBC WITH AUTO DIFFERENTIAL - Abnormal; Notable for the following components:    WBC 13.66 (*)     RBC 4.01 (*)     Hemoglobin 12.7 (*)     MCV 97.3 (*)     Neutrophil % 88.2 (*)     Lymphocyte % 5.4 (*)     Eosinophil % 0.0 (*)     Neutrophils, Absolute 12.04 (*)     Immature Grans, Absolute 0.06 (*)     All other components within  "normal limits   BLOOD GAS, ARTERIAL W/CO-OXIMETRY - Abnormal; Notable for the following components:    pH, Arterial 7.482 (*)     pO2, Arterial 53.1 (*)     HCO3, Arterial 26.9 (*)     Base Excess, Arterial 3.5 (*)     O2 Saturation, Arterial 90.5 (*)     Hemoglobin, Blood Gas 12.3 (*)     Hematocrit, Blood Gas 37.8 (*)     Oxyhemoglobin 89.2 (*)     Sodium, Arterial 130 (*)     pH, Temp Corrected 7.482 (*)     pO2, Temperature Corrected 53.1 (*)     All other components within normal limits   MAGNESIUM - Normal   LACTIC ACID, PLASMA - Normal   PROCALCITONIN - Normal    Narrative:     As a Marker for Sepsis (Non-Neonates):    1. <0.5 ng/mL represents a low risk of severe sepsis and/or septic shock.  2. >2 ng/mL represents a high risk of severe sepsis and/or septic shock.    As a Marker for Lower Respiratory Tract Infections that require antibiotic therapy:    PCT on Admission    Antibiotic Therapy       6-12 Hrs later    >0.5                Strongly Recommended  >0.25 - <0.5        Recommended   0.1 - 0.25          Discouraged              Remeasure/reassess PCT  <0.1                Strongly Discouraged     Remeasure/reassess PCT    As 28 day mortality risk marker: \"Change in Procalcitonin Result\" (>80% or <=80%) if Day 0 (or Day 1) and Day 4 values are available. Refer to http://www.Test.tvs-pct-calculator.com    Change in PCT <=80%  A decrease of PCT levels below or equal to 80% defines a positive change in PCT test result representing a higher risk for 28-day all-cause mortality of patients diagnosed with severe sepsis for septic shock.    Change in PCT >80%  A decrease of PCT levels of more than 80% defines a negative change in PCT result representing a lower risk for 28-day all-cause mortality of patients diagnosed with severe sepsis or septic shock.      URINALYSIS W/ MICROSCOPIC IF INDICATED (NO CULTURE) - Normal    Narrative:     Urine microscopic not indicated.   BLOOD CULTURE   BLOOD CULTURE   RAINBOW DRAW "    Narrative:     The following orders were created for panel order [place] Draw.  Procedure                               Abnormality         Status                     ---------                               -----------         ------                     Green Top (Gel)[[id number]]                                  Final result               Lavender Top[[id number]]                                     Final result               Red Top[[id number]]                                          Final result               Light Blue Top[[id number]]                                   Final result                 Please view results for these tests on the individual orders.   BLOOD GAS, VENOUS W/CO-OXIMETRY   CBC (NO DIFF)   COMPREHENSIVE METABOLIC PANEL   GREEN TOP   LAVENDER TOP   RED TOP   LIGHT BLUE TOP   CBC AND DIFFERENTIAL    Narrative:     The following orders were created for panel order CBC & Differential.  Procedure                               Abnormality         Status                     ---------                               -----------         ------                     CBC Auto Differential[[id number]]        Abnormal            Final result                 Please view results for these tests on the individual orders.       Meds given in ED:   Medications   sodium chloride 0.9 % flush 10 mL (has no administration in time range)   azithromycin (ZITHROMAX) 500 mg in sodium chloride 0.9 % 250 mL IVPB-VTB (500 mg Intravenous New Bag 12/25/24 0203)   cefTRIAXone (ROCEPHIN) 1,000 mg in sodium chloride 0.9 % 100 mL MBP (has no administration in time range)   azithromycin (ZITHROMAX) 500 mg in sodium chloride 0.9 % 250 mL IVPB-VTB (has no administration in time range)   ipratropium-albuterol (DUO-NEB) nebulizer solution 3 mL (has no administration in time range)   sodium chloride 0.9 % flush 10 mL (has no administration in time range)   sodium chloride 0.9 % flush 10 mL (has no administration in time range)   sodium  chloride 0.9 % infusion 40 mL (has no administration in time range)   Enoxaparin Sodium (LOVENOX) syringe 40 mg (has no administration in time range)   melatonin tablet 5 mg (has no administration in time range)   methylPREDNISolone sodium succinate (SOLU-Medrol) injection 125 mg (125 mg Intravenous Given 12/24/24 2345)   cefTRIAXone (ROCEPHIN) 1,000 mg in sodium chloride 0.9 % 100 mL MBP (0 mg Intravenous Stopped 12/25/24 0050)   cefTRIAXone (ROCEPHIN) 1,000 mg in sodium chloride 0.9 % 100 mL MBP (0 mg Intravenous Stopped 12/25/24 0203)           NIH Stroke Scale:       Isolation/Infection(s):  No active isolations   Human Metapneumovirus      COVID Testing  Collected .  Resulted .    Nursing report ED to floor:  Mental status: .  Ambulatory status: .  Precautions: .    ED nurse phone extentsion- ..

## 2024-12-25 NOTE — PLAN OF CARE
Goal Outcome Evaluation:  Plan of Care Reviewed With: patient        Progress: no change  Outcome Evaluation: Pt A&Ox4. Up stand-by ast w/ cane to BR. IV abx cont per orders. No c/o. 2L NC.  sat 94-96%. Family at BS. Call light in reach. Safety maintained.

## 2024-12-25 NOTE — PROGRESS NOTES
[facility] Medicine Services  INPATIENT PROGRESS NOTE    Patient Name: Carmine Garcia  Date of Admission: 12/24/2024  Today's Date: 12/25/24  Length of Stay: 0  Primary Care Physician: Efrain Dobson DO    Subjective   Chief Complaint: Tired, weak  HPI   No acute events overnight.  Patient is seen on 2 L of oxygen today in no acute distress.  Of note he does not use any oxygen at home.  He complains of feeling weak and tired.  Denies shortness of breath, chest pain.    Review of Systems   All pertinent negatives and positives are as above. All other systems have been reviewed and are negative unless otherwise stated.     Objective    Temp:  [98.3 °F (36.8 °C)-99 °F (37.2 °C)] 98.3 °F (36.8 °C)  Heart Rate:  [] 99  Resp:  [18-22] 18  BP: (110-199)/(64-94) 146/64  Physical Exam  GEN: Awake, alert, interactive, in NAD on 2L  HEENT: Atraumatic, EOMI, Anicteric  Lungs: CTAB, diminished on the left   Heart: RRR, +S1/s2, no rub  ABD: soft, nt/nd, +BS, no guarding/rebound  Extremities: atraumatic, no cyanosis, no edema  Skin: no rashes or lesions  Neuro: AAOx3, no focal deficits  Psych: normal mood & affect    Results Review:  I have reviewed the labs, radiology results, and diagnostic studies.    Laboratory Data:   Results from last 7 days   Lab Units 12/25/24  0630 12/24/24 2116   WBC 10*3/mm3 13.60* 13.66*   HEMOGLOBIN g/dL 10.5* 12.7*   HEMATOCRIT % 33.2* 39.0   PLATELETS 10*3/mm3 285 380        Results from last 7 days   Lab Units 12/25/24  0630 12/24/24  2247 12/24/24  2116   SODIUM mmol/L 129*  --  130*   SODIUM, ARTERIAL mmol/L  --  130*  --    POTASSIUM mmol/L 4.0  --  4.5   CHLORIDE mmol/L 95*  --  95*   CO2 mmol/L 24.0  --  23.0   BUN mg/dL 7*  --  7*   CREATININE mg/dL 0.63*  --  0.70*   CALCIUM mg/dL 7.2*  --  8.5*   BILIRUBIN mg/dL 0.3  --  0.4   ALK PHOS U/L 80  --  109   ALT (SGPT) U/L 19  --  29   AST (SGOT) U/L 15  --  22   GLUCOSE mg/dL 108*  --  109*        Culture Data:   [unfilled]    Radiology Data:   Imaging Results (Last 24 Hours)       Procedure Component Value Units Date/Time    XR Chest 1 View [[id number]] Resulted: 12/24/24 2217     Updated: 12/24/24 2228            I have reviewed the patient's current medications.     Assessment/Plan   Assessment  Active Hospital Problems    Diagnosis     **Pneumonia      Carmine Garcia is a 84 y.o. male with pmh of myasthenia gravis, lung cancer status postresection and radiation, COPD, presenting to the ED for generalized weakness.  He reports feeling unlike himself since November.  He has had 1 episode of pneumonia which was treated with doxycycline but he feels as if he has not improved.  He endorses cough, congestion runny nose and loss of appetite.    Treatment Plan:    # Acute respiratory failure with hypoxia - on 2L  # Human metapneumovirus infection  # LLL S.pneumoniae PNA  # History of COPD - not on any home inhalers   # Recurrent lung cancer  - left papillary adenocarcinoma 2011.  Right new tumor 8/22/24.  Completed radiation 9/17/24.  # Leukocytosis  ABG 7.4/36/53/26/90% on room air  WBC 13, procalcitonin 0.05  CXR: New consolidation left lower lobe suspicious for pneumonia  - Continue Rocephin and azithromycin - day 2  - Continue droplet precautions  - Continue oxygen supplementation to maintain SpO2 90%    # Chronic Macrocytic Anemia  At baseline  - Will check B12 and folate    # Chronic hyponatremia  Na 129  - Continue to monitor    # Hypocalcemia  Ca 7.2   -Will check ionized calcium    # Hyperglycemia  - Checking A1c    # Myasthenia gravis - continue home daily prednisone 15mg   # Hypertension - continue home amlodipine and losartan  # Depression/anxiety - continue bupropion    DVT prophylaxis - Lovenox   GI prophylaxis - continue home Protonix      Medical Decision Making  Number and Complexity of problems: 3 acute moderate complexity, 1 chronic moderate complexity, others chronic and  stable  Differential Diagnosis: As above    Conditions and Status        Patient's condition is stable.     MDM Data  External documents reviewed: Reviewed  Cardiac tracing (EKG, telemetry) interpretation: Reviewed  Radiology interpretation: Reviewed  Labs reviewed: As above  Any tests that were considered but not ordered: None     Decision rules/scores evaluated (example [id number], Wells, etc): None     Discussed with: Patient     Care Planning  Shared decision making: Discussed with patient  Code status and discussions: No CPR    Disposition  Social Determinants of Health that impact treatment or disposition: None  I expect the patient to be discharged to TBD in TBD days.         Electronically signed by Winston Ramirez MD, 12/25/24, 07:50 CST.

## 2024-12-26 LAB
ANION GAP SERPL CALCULATED.3IONS-SCNC: 11 MMOL/L (ref 5–15)
BASOPHILS # BLD AUTO: 0.01 10*3/MM3 (ref 0–0.2)
BASOPHILS NFR BLD AUTO: 0.1 % (ref 0–1.5)
BUN SERPL-MCNC: 7 MG/DL (ref 8–23)
BUN/CREAT SERPL: 9.3 (ref 7–25)
CALCIUM SPEC-SCNC: 7.8 MG/DL (ref 8.6–10.5)
CHLORIDE SERPL-SCNC: 95 MMOL/L (ref 98–107)
CO2 SERPL-SCNC: 24 MMOL/L (ref 22–29)
CREAT SERPL-MCNC: 0.75 MG/DL (ref 0.76–1.27)
DEPRECATED RDW RBC AUTO: 46.2 FL (ref 37–54)
EGFRCR SERPLBLD CKD-EPI 2021: 88.4 ML/MIN/1.73
EOSINOPHIL # BLD AUTO: 0.01 10*3/MM3 (ref 0–0.4)
EOSINOPHIL NFR BLD AUTO: 0.1 % (ref 0.3–6.2)
ERYTHROCYTE [DISTWIDTH] IN BLOOD BY AUTOMATED COUNT: 13.2 % (ref 12.3–15.4)
GLUCOSE SERPL-MCNC: 175 MG/DL (ref 65–99)
HCT VFR BLD AUTO: 34 % (ref 37.5–51)
HGB BLD-MCNC: 11.2 G/DL (ref 13–17.7)
IMM GRANULOCYTES # BLD AUTO: 0.06 10*3/MM3 (ref 0–0.05)
IMM GRANULOCYTES NFR BLD AUTO: 0.6 % (ref 0–0.5)
LYMPHOCYTES # BLD AUTO: 0.76 10*3/MM3 (ref 0.7–3.1)
LYMPHOCYTES NFR BLD AUTO: 8.1 % (ref 19.6–45.3)
MCH RBC QN AUTO: 31.5 PG (ref 26.6–33)
MCHC RBC AUTO-ENTMCNC: 32.9 G/DL (ref 31.5–35.7)
MCV RBC AUTO: 95.5 FL (ref 79–97)
MONOCYTES # BLD AUTO: 0.43 10*3/MM3 (ref 0.1–0.9)
MONOCYTES NFR BLD AUTO: 4.6 % (ref 5–12)
NEUTROPHILS NFR BLD AUTO: 8.1 10*3/MM3 (ref 1.7–7)
NEUTROPHILS NFR BLD AUTO: 86.5 % (ref 42.7–76)
NRBC BLD AUTO-RTO: 0 /100 WBC (ref 0–0.2)
PLATELET # BLD AUTO: 266 10*3/MM3 (ref 140–450)
PMV BLD AUTO: 9 FL (ref 6–12)
POTASSIUM SERPL-SCNC: 3.6 MMOL/L (ref 3.5–5.2)
RBC # BLD AUTO: 3.56 10*6/MM3 (ref 4.14–5.8)
SODIUM SERPL-SCNC: 130 MMOL/L (ref 136–145)
WBC NRBC COR # BLD AUTO: 9.37 10*3/MM3 (ref 3.4–10.8)

## 2024-12-26 PROCEDURE — 25810000003 SODIUM CHLORIDE 0.9 % SOLUTION 250 ML FLEX CONT: Performed by: STUDENT IN AN ORGANIZED HEALTH CARE EDUCATION/TRAINING PROGRAM

## 2024-12-26 PROCEDURE — 25010000002 ENOXAPARIN PER 10 MG

## 2024-12-26 PROCEDURE — 25010000002 CEFTRIAXONE PER 250 MG: Performed by: STUDENT IN AN ORGANIZED HEALTH CARE EDUCATION/TRAINING PROGRAM

## 2024-12-26 PROCEDURE — 63710000001 PREDNISONE PER 5 MG: Performed by: STUDENT IN AN ORGANIZED HEALTH CARE EDUCATION/TRAINING PROGRAM

## 2024-12-26 PROCEDURE — 85025 COMPLETE CBC W/AUTO DIFF WBC: CPT | Performed by: STUDENT IN AN ORGANIZED HEALTH CARE EDUCATION/TRAINING PROGRAM

## 2024-12-26 PROCEDURE — 25010000002 AZITHROMYCIN PER 500 MG: Performed by: STUDENT IN AN ORGANIZED HEALTH CARE EDUCATION/TRAINING PROGRAM

## 2024-12-26 PROCEDURE — 80048 BASIC METABOLIC PNL TOTAL CA: CPT | Performed by: STUDENT IN AN ORGANIZED HEALTH CARE EDUCATION/TRAINING PROGRAM

## 2024-12-26 RX ORDER — ACETAMINOPHEN 325 MG/1
650 TABLET ORAL EVERY 6 HOURS PRN
Status: DISCONTINUED | OUTPATIENT
Start: 2024-12-26 | End: 2024-12-27 | Stop reason: HOSPADM

## 2024-12-26 RX ADMIN — LOSARTAN POTASSIUM 25 MG: 25 TABLET, FILM COATED ORAL at 20:02

## 2024-12-26 RX ADMIN — AZITHROMYCIN DIHYDRATE 500 MG: 500 INJECTION, POWDER, LYOPHILIZED, FOR SOLUTION INTRAVENOUS at 01:00

## 2024-12-26 RX ADMIN — ACETAMINOPHEN 650 MG: 325 TABLET ORAL at 20:59

## 2024-12-26 RX ADMIN — PANTOPRAZOLE SODIUM 40 MG: 40 TABLET, DELAYED RELEASE ORAL at 06:03

## 2024-12-26 RX ADMIN — PYRIDOSTIGMINE BROMIDE 60 MG: 60 TABLET ORAL at 01:02

## 2024-12-26 RX ADMIN — LOSARTAN POTASSIUM 25 MG: 25 TABLET, FILM COATED ORAL at 08:11

## 2024-12-26 RX ADMIN — PYRIDOSTIGMINE BROMIDE 60 MG: 60 TABLET ORAL at 14:35

## 2024-12-26 RX ADMIN — AMLODIPINE BESYLATE 10 MG: 10 TABLET ORAL at 08:11

## 2024-12-26 RX ADMIN — Medication 10 ML: at 08:11

## 2024-12-26 RX ADMIN — TAMSULOSIN HYDROCHLORIDE 0.4 MG: 0.4 CAPSULE ORAL at 20:02

## 2024-12-26 RX ADMIN — Medication 1 TABLET: at 08:11

## 2024-12-26 RX ADMIN — BUPROPION HYDROCHLORIDE 150 MG: 150 TABLET, EXTENDED RELEASE ORAL at 06:03

## 2024-12-26 RX ADMIN — Medication 10 ML: at 20:02

## 2024-12-26 RX ADMIN — PREDNISONE 15 MG: 5 TABLET ORAL at 08:11

## 2024-12-26 RX ADMIN — ENOXAPARIN SODIUM 40 MG: 100 INJECTION SUBCUTANEOUS at 08:11

## 2024-12-26 RX ADMIN — PYRIDOSTIGMINE BROMIDE 60 MG: 60 TABLET ORAL at 08:10

## 2024-12-26 RX ADMIN — ATORVASTATIN CALCIUM 10 MG: 10 TABLET, FILM COATED ORAL at 08:11

## 2024-12-26 RX ADMIN — SODIUM CHLORIDE 1000 MG: 900 INJECTION INTRAVENOUS at 01:00

## 2024-12-26 RX ADMIN — PYRIDOSTIGMINE BROMIDE 60 MG: 60 TABLET ORAL at 20:02

## 2024-12-26 NOTE — PLAN OF CARE
Goal Outcome Evaluation:  Plan of Care Reviewed With: patient        Progress: improving  Outcome Evaluation: Pt A&Ox4. Up stand-by ast w/ cane to BR. Room air. No c/o. Call light in reach. Safety maintained.

## 2024-12-26 NOTE — CASE MANAGEMENT/SOCIAL WORK
Discharge Planning Assessment  [facility]     Patient Name: Carmine Garcia  MRN: [id number]  Today's Date: 12/26/2024    Admit Date: 12/24/2024        Discharge Needs Assessment       Row Name 12/26/24 1029       Living Environment    People in Home child(timoteo), adult    Name(s) of People in Home Monica    Current Living Arrangements home    Primary Care Provided by self    Provides Primary Care For no one    Family Caregiver if Needed child(timoteo), adult    Family Caregiver Names Monica    Able to Return to Prior Arrangements yes       Resource/Environmental Concerns    Resource/Environmental Concerns none       Transition Planning    Patient/Family Anticipates Transition to home with family    Transportation Anticipated family or friend will provide       Discharge Needs Assessment    Readmission Within the Last 30 Days no previous admission in last 30 days    Equipment Currently Used at Home walker, rolling;wheelchair;cane, straight    Concerns to be Addressed no discharge needs identified    Equipment Needed After Discharge none    Discharge Coordination/Progress spoke to patient who lives with daughter and is indpendent at home prior to illness; has RX coverage and PCP; will follow for DC needs                   Discharge Plan    No documentation.                 Continued Care and Services - Admitted Since 12/24/2024    No active coordination exists for this encounter.          Demographic Summary    No documentation.                  Functional Status    No documentation.                  Psychosocial    No documentation.                  Abuse/Neglect    No documentation.                  Legal    No documentation.                  Substance Abuse    No documentation.                  Patient Forms    No documentation.                     Nasrin Burger RN

## 2024-12-26 NOTE — PLAN OF CARE
Goal Outcome Evaluation:           Progress: improving  Outcome Evaluation: A&Ox4. Pt down to room air. Up SB assist w cane. No c/o pain. IV abx administered. No c/o pain.                  Problem: Adult Inpatient Plan of Care  Goal: Plan of Care Review  Outcome: Progressing  Flowsheets (Taken 12/26/2024 0547)  Progress: improving  Outcome Evaluation: A&Ox4. Pt down to room air. Up SB assist w cane. No c/o pain. IV abx administered. No c/o pain.  Goal: Patient-Specific Goal (Individualized)  Outcome: Progressing  Goal: Absence of Hospital-Acquired Illness or Injury  Outcome: Progressing  Intervention: Identify and Manage Fall Risk  Recent Flowsheet Documentation  Taken 12/26/2024 0500 by Loc Fernandez RN  Safety Promotion/Fall Prevention: safety round/check completed  Taken 12/26/2024 0400 by Loc Fernandez RN  Safety Promotion/Fall Prevention: safety round/check completed  Taken 12/26/2024 0300 by Loc Fernandez RN  Safety Promotion/Fall Prevention: safety round/check completed  Taken 12/26/2024 0200 by Loc Fernandez RN  Safety Promotion/Fall Prevention: safety round/check completed  Taken 12/26/2024 0100 by Loc Fernandez RN  Safety Promotion/Fall Prevention: safety round/check completed  Taken 12/26/2024 0000 by Loc Fernandez RN  Safety Promotion/Fall Prevention: safety round/check completed  Taken 12/25/2024 2300 by Loc Fernandez RN  Safety Promotion/Fall Prevention: safety round/check completed  Taken 12/25/2024 2200 by Loc Fernandez RN  Safety Promotion/Fall Prevention: safety round/check completed  Taken 12/25/2024 2100 by Lco Fernandez RN  Safety Promotion/Fall Prevention: safety round/check completed  Taken 12/25/2024 2000 by Loc Fernandez RN  Safety Promotion/Fall Prevention: safety round/check completed  Taken 12/25/2024 1900 by Loc Fernandez RN  Safety Promotion/Fall Prevention: safety round/check completed  Intervention: Prevent Skin Injury  Recent Flowsheet Documentation  Taken  12/26/2024 0500 by Loc Fernandez RN  Body Position:   turned   supine  Taken 12/26/2024 0300 by Loc Fernandez RN  Body Position:   turned   right   side-lying  Taken 12/26/2024 0100 by Loc Fernandez RN  Body Position: sitting up in bed  Taken 12/25/2024 2300 by Loc Fernandez RN  Body Position:   turned   left   side-lying  Taken 12/25/2024 2100 by Loc Fernandez RN  Body Position: supine  Taken 12/25/2024 1900 by Loc Fernandez RN  Body Position:   turned   right   position changed independently  Goal: Optimal Comfort and Wellbeing  Outcome: Progressing  Intervention: Provide Person-Centered Care  Recent Flowsheet Documentation  Taken 12/25/2024 2000 by Loc Fernandez RN  Trust Relationship/Rapport:   care explained   choices provided  Goal: Readiness for Transition of Care  Outcome: Progressing

## 2024-12-26 NOTE — PROGRESS NOTES
[facility] Medicine Services  INPATIENT PROGRESS NOTE    Patient Name: Carmine Garcia  Date of Admission: 12/24/2024  Today's Date: 12/26/24  Length of Stay: 0  Primary Care Physician: Efrain Dobson DO    Subjective   Chief Complaint: No complaints today     No acute events overnight.  Patient is seen sitting on a chair eating lunch.  He feels very good and reports improvement.  He was on room air.    Review of Systems   All pertinent negatives and positives are as above. All other systems have been reviewed and are negative unless otherwise stated.     Objective    Temp:  [97.8 °F (36.6 °C)-98.1 °F (36.7 °C)] 97.9 °F (36.6 °C)  Heart Rate:  [75-93] 80  Resp:  [18] 18  BP: (122-167)/(55-92) 122/65  Physical Exam  GEN: Awake, alert, interactive, in NAD on room air  HEENT: Atraumatic, EOMI, Anicteric  Lungs: CTAB, diminished on the left   Heart: RRR, +S1/s2, no rub  ABD: soft, nt/nd, +BS, no guarding/rebound  Extremities: atraumatic, no cyanosis, no edema  Skin: no rashes or lesions  Neuro: AAOx3, no focal deficits  Psych: normal mood & affect    Results Review:  I have reviewed the labs, radiology results, and diagnostic studies.    Laboratory Data:   Results from last 7 days   Lab Units 12/25/24  0630 12/24/24  2116   WBC 10*3/mm3 13.60* 13.66*   HEMOGLOBIN g/dL 10.5* 12.7*   HEMATOCRIT % 33.2* 39.0   PLATELETS 10*3/mm3 285 380        Results from last 7 days   Lab Units 12/25/24  0630 12/24/24  2247 12/24/24  2116   SODIUM mmol/L 129*  --  130*   SODIUM, ARTERIAL mmol/L  --  130*  --    POTASSIUM mmol/L 4.0  --  4.5   CHLORIDE mmol/L 95*  --  95*   CO2 mmol/L 24.0  --  23.0   BUN mg/dL 7*  --  7*   CREATININE mg/dL 0.63*  --  0.70*   CALCIUM mg/dL 7.2*  --  8.5*   BILIRUBIN mg/dL 0.3  --  0.4   ALK PHOS U/L 80  --  109   ALT (SGPT) U/L 19  --  29   AST (SGOT) U/L 15  --  22   GLUCOSE mg/dL 108*  --  109*       Culture Data:   @[facility]@    Radiology Data:   Imaging Results  (Last 24 Hours)       Procedure Component Value Units Date/Time    XR Chest 1 View [[id number]] Collected: 12/25/24 1006     Updated: 12/25/24 1010    Narrative:      EXAMINATION:  XR CHEST 1 VW-  12/24/2024 9:17 PM     HISTORY: Coughing. Prior history of lung cancer with resection.     COMPARISON: 12/21/2022.     TECHNIQUE: Single view AP image.     FINDINGS: There is new consolidation in the left lung base. There is  blunting of the left costophrenic angle. The right lung is essentially  clear. There is bronchial wall thickening. Heart size appears to be  within normal limits. The thoracic aorta is atheromatous. There are  degenerative changes of the spine and shoulders.          Impression:      1. New consolidation in the left lung base, worrisome for pneumonia.  2. Blunting of the left costophrenic angle. There may be a small left  pleural effusion.  3. Stable bronchial wall thickening.           This report was signed and finalized on 12/25/2024 10:07 AM by Dr. Bruce Brandon MD.               I have reviewed the patient's current medications.     Assessment/Plan   Assessment  Active Hospital Problems    Diagnosis     **Pneumonia      Carmine Garcia is a 84 y.o. male with pmh of myasthenia gravis, lung cancer status postresection and radiation, COPD, presenting to the ED for generalized weakness.  He reports feeling unlike himself since November.  He has had 1 episode of pneumonia which was treated with doxycycline but he feels as if he has not improved.  He endorses cough, congestion runny nose and loss of appetite.    Treatment Plan:    # Acute respiratory failure with hypoxia   # Human metapneumovirus infection  # LLL S.pneumoniae PNA  # History of COPD - not on any home inhalers   # Recurrent lung cancer  - left papillary adenocarcinoma 2011.  Right new tumor 8/22/24.  Completed radiation 9/17/24.  # Leukocytosis  ABG 7.4/36/53/26/90% on room air  WBC 13, procalcitonin 0.05  CXR: New consolidation left  lower lobe suspicious for pneumonia  - Continue Rocephin and azithromycin - day 3  - Continue droplet precautions  - Continue oxygen supplementation to maintain SpO2 90%    # Chronic Macrocytic Anemia  At baseline, normal B12 and folate    # Chronic hyponatremia  Na 129 > 130  - Continue to monitor    # Hypocalcemia  - 1 g skyler gluc    # Prediabetes - monitor  # Myasthenia gravis - continue home daily prednisone 15mg   # Hypertension - continue home amlodipine and losartan  # Depression/anxiety - continue bupropion    DVT prophylaxis - Lovenox   GI prophylaxis - continue home Protonix      Medical Decision Making  Number and Complexity of problems: 3 acute moderate complexity, 1 chronic moderate complexity, others chronic and stable  Differential Diagnosis: As above    Conditions and Status        Patient's condition is stable.     MDM Data  External documents reviewed: Reviewed  Cardiac tracing (EKG, telemetry) interpretation: Reviewed  Radiology interpretation: Reviewed  Labs reviewed: As above  Any tests that were considered but not ordered: None     Decision rules/scores evaluated (example [id number], Wells, etc): None     Discussed with: Patient     Care Planning  Shared decision making: Discussed with patient  Code status and discussions: No CPR    Disposition  Social Determinants of Health that impact treatment or disposition: None  I expect the patient to be discharged to TBD in TBD days.         Electronically signed by Winston Ramirez MD, 12/26/24, 08:16 CST.

## 2024-12-27 ENCOUNTER — READMISSION MANAGEMENT (OUTPATIENT)
Dept: CALL CENTER | Facility: HOSPITAL | Age: 84
End: 2024-12-27
Payer: MEDICARE

## 2024-12-27 VITALS
SYSTOLIC BLOOD PRESSURE: 122 MMHG | DIASTOLIC BLOOD PRESSURE: 71 MMHG | HEIGHT: 72 IN | HEART RATE: 93 BPM | OXYGEN SATURATION: 94 % | RESPIRATION RATE: 16 BRPM | WEIGHT: 187.7 LBS | TEMPERATURE: 98.1 F | BODY MASS INDEX: 25.42 KG/M2

## 2024-12-27 DIAGNOSIS — G70.00 MYASTHENIA GRAVIS (HCC): ICD-10-CM

## 2024-12-27 PROBLEM — J15.4 PNEUMONIA DUE TO STREPTOCOCCUS: Status: ACTIVE | Noted: 2024-12-27

## 2024-12-27 LAB
ANION GAP SERPL CALCULATED.3IONS-SCNC: 11 MMOL/L (ref 5–15)
BASOPHILS # BLD AUTO: 0.01 10*3/MM3 (ref 0–0.2)
BASOPHILS NFR BLD AUTO: 0.1 % (ref 0–1.5)
BUN SERPL-MCNC: 8 MG/DL (ref 8–23)
BUN/CREAT SERPL: 11.8 (ref 7–25)
CALCIUM SPEC-SCNC: 7.9 MG/DL (ref 8.6–10.5)
CHLORIDE SERPL-SCNC: 98 MMOL/L (ref 98–107)
CO2 SERPL-SCNC: 26 MMOL/L (ref 22–29)
CREAT SERPL-MCNC: 0.68 MG/DL (ref 0.76–1.27)
DEPRECATED RDW RBC AUTO: 48.2 FL (ref 37–54)
EGFRCR SERPLBLD CKD-EPI 2021: 91.1 ML/MIN/1.73
EOSINOPHIL # BLD AUTO: 0.02 10*3/MM3 (ref 0–0.4)
EOSINOPHIL NFR BLD AUTO: 0.2 % (ref 0.3–6.2)
ERYTHROCYTE [DISTWIDTH] IN BLOOD BY AUTOMATED COUNT: 13.3 % (ref 12.3–15.4)
GLUCOSE SERPL-MCNC: 99 MG/DL (ref 65–99)
HCT VFR BLD AUTO: 33 % (ref 37.5–51)
HGB BLD-MCNC: 10.5 G/DL (ref 13–17.7)
IMM GRANULOCYTES # BLD AUTO: 0.05 10*3/MM3 (ref 0–0.05)
IMM GRANULOCYTES NFR BLD AUTO: 0.6 % (ref 0–0.5)
LYMPHOCYTES # BLD AUTO: 0.95 10*3/MM3 (ref 0.7–3.1)
LYMPHOCYTES NFR BLD AUTO: 11.9 % (ref 19.6–45.3)
MCH RBC QN AUTO: 31.3 PG (ref 26.6–33)
MCHC RBC AUTO-ENTMCNC: 31.8 G/DL (ref 31.5–35.7)
MCV RBC AUTO: 98.5 FL (ref 79–97)
MONOCYTES # BLD AUTO: 0.79 10*3/MM3 (ref 0.1–0.9)
MONOCYTES NFR BLD AUTO: 9.9 % (ref 5–12)
NEUTROPHILS NFR BLD AUTO: 6.19 10*3/MM3 (ref 1.7–7)
NEUTROPHILS NFR BLD AUTO: 77.3 % (ref 42.7–76)
NRBC BLD AUTO-RTO: 0 /100 WBC (ref 0–0.2)
PLATELET # BLD AUTO: 254 10*3/MM3 (ref 140–450)
PMV BLD AUTO: 8.7 FL (ref 6–12)
POTASSIUM SERPL-SCNC: 3.6 MMOL/L (ref 3.5–5.2)
RBC # BLD AUTO: 3.35 10*6/MM3 (ref 4.14–5.8)
SODIUM SERPL-SCNC: 135 MMOL/L (ref 136–145)
WBC NRBC COR # BLD AUTO: 8.01 10*3/MM3 (ref 3.4–10.8)

## 2024-12-27 PROCEDURE — 25010000002 ENOXAPARIN PER 10 MG

## 2024-12-27 PROCEDURE — 80048 BASIC METABOLIC PNL TOTAL CA: CPT | Performed by: STUDENT IN AN ORGANIZED HEALTH CARE EDUCATION/TRAINING PROGRAM

## 2024-12-27 PROCEDURE — 25010000002 AZITHROMYCIN PER 500 MG: Performed by: STUDENT IN AN ORGANIZED HEALTH CARE EDUCATION/TRAINING PROGRAM

## 2024-12-27 PROCEDURE — 63710000001 PREDNISONE PER 5 MG: Performed by: STUDENT IN AN ORGANIZED HEALTH CARE EDUCATION/TRAINING PROGRAM

## 2024-12-27 PROCEDURE — 25010000002 CEFTRIAXONE PER 250 MG: Performed by: STUDENT IN AN ORGANIZED HEALTH CARE EDUCATION/TRAINING PROGRAM

## 2024-12-27 PROCEDURE — 85025 COMPLETE CBC W/AUTO DIFF WBC: CPT | Performed by: STUDENT IN AN ORGANIZED HEALTH CARE EDUCATION/TRAINING PROGRAM

## 2024-12-27 PROCEDURE — 25810000003 SODIUM CHLORIDE 0.9 % SOLUTION 250 ML FLEX CONT: Performed by: STUDENT IN AN ORGANIZED HEALTH CARE EDUCATION/TRAINING PROGRAM

## 2024-12-27 RX ORDER — AZITHROMYCIN 500 MG/1
500 TABLET, FILM COATED ORAL DAILY
Qty: 3 TABLET | Refills: 0 | Status: SHIPPED | OUTPATIENT
Start: 2024-12-27

## 2024-12-27 RX ORDER — CEFDINIR 300 MG/1
300 CAPSULE ORAL 2 TIMES DAILY
Qty: 6 CAPSULE | Refills: 0 | Status: SHIPPED | OUTPATIENT
Start: 2024-12-27

## 2024-12-27 RX ADMIN — FLUTICASONE PROPIONATE 1 SPRAY: 50 SPRAY, METERED NASAL at 08:11

## 2024-12-27 RX ADMIN — ENOXAPARIN SODIUM 40 MG: 100 INJECTION SUBCUTANEOUS at 08:10

## 2024-12-27 RX ADMIN — LOSARTAN POTASSIUM 25 MG: 25 TABLET, FILM COATED ORAL at 08:10

## 2024-12-27 RX ADMIN — AMLODIPINE BESYLATE 10 MG: 10 TABLET ORAL at 08:09

## 2024-12-27 RX ADMIN — PREDNISONE 15 MG: 5 TABLET ORAL at 08:10

## 2024-12-27 RX ADMIN — Medication 1 TABLET: at 08:09

## 2024-12-27 RX ADMIN — AZITHROMYCIN DIHYDRATE 500 MG: 500 INJECTION, POWDER, LYOPHILIZED, FOR SOLUTION INTRAVENOUS at 00:22

## 2024-12-27 RX ADMIN — PANTOPRAZOLE SODIUM 40 MG: 40 TABLET, DELAYED RELEASE ORAL at 04:20

## 2024-12-27 RX ADMIN — Medication 10 ML: at 08:11

## 2024-12-27 RX ADMIN — ATORVASTATIN CALCIUM 10 MG: 10 TABLET, FILM COATED ORAL at 08:09

## 2024-12-27 RX ADMIN — PYRIDOSTIGMINE BROMIDE 60 MG: 60 TABLET ORAL at 02:20

## 2024-12-27 RX ADMIN — BUPROPION HYDROCHLORIDE 150 MG: 150 TABLET, EXTENDED RELEASE ORAL at 08:09

## 2024-12-27 RX ADMIN — PYRIDOSTIGMINE BROMIDE 60 MG: 60 TABLET ORAL at 08:09

## 2024-12-27 RX ADMIN — SODIUM CHLORIDE 1000 MG: 900 INJECTION INTRAVENOUS at 02:20

## 2024-12-27 NOTE — DISCHARGE SUMMARY
[facility] Medicine Services  DISCHARGE SUMMARY       Date of Admission: 12/24/2024  Date of Discharge:  12/27/2024  Primary Care Physician: Efrain Dobson DO    Presenting Problem/History of Present Illness:  Carmine Garcia is a 84 y.o. male with pmh of myasthenia gravis, lung cancer status postresection and radiation, COPD, presenting to the ED for generalized weakness.  He reports feeling unlike himself since November.  He has had 1 episode of pneumonia which was treated with doxycycline but he feels as if he has not improved.  He endorses cough, congestion runny nose and loss of appetite.     Final Discharge Diagnoses:  Active Hospital Problems    Diagnosis     **Pneumonia     Pneumonia due to Streptococcus        Consults: none    Procedures Performed: none    Pertinent Test Results:       Imaging Results (All)       Procedure Component Value Units Date/Time    XR Chest 1 View [[id number]] Collected: 12/25/24 1006     Updated: 12/25/24 1010    Narrative:      EXAMINATION:  XR CHEST 1 VW-  12/24/2024 9:17 PM     HISTORY: Coughing. Prior history of lung cancer with resection.     COMPARISON: 12/21/2022.     TECHNIQUE: Single view AP image.     FINDINGS: There is new consolidation in the left lung base. There is  blunting of the left costophrenic angle. The right lung is essentially  clear. There is bronchial wall thickening. Heart size appears to be  within normal limits. The thoracic aorta is atheromatous. There are  degenerative changes of the spine and shoulders.          Impression:      1. New consolidation in the left lung base, worrisome for pneumonia.  2. Blunting of the left costophrenic angle. There may be a small left  pleural effusion.  3. Stable bronchial wall thickening.           This report was signed and finalized on 12/25/2024 10:07 AM by Dr. Bruce Brandon MD.             LAB RESULTS:      Lab 12/27/24  0301 12/26/24  0848 12/25/24  0630 12/24/24  0021  12/24/24 2116   WBC 8.01 9.37 13.60*  --  13.66*   HEMOGLOBIN 10.5* 11.2* 10.5*  --  12.7*   HEMATOCRIT 33.0* 34.0* 33.2*  --  39.0   PLATELETS 254 266 285  --  380   NEUTROS ABS 6.19 8.10*  --   --  12.04*   IMMATURE GRANS (ABS) 0.05 0.06*  --   --  0.06*   LYMPHS ABS 0.95 0.76  --   --  0.74   MONOS ABS 0.79 0.43  --   --  0.81   EOS ABS 0.02 0.01  --   --  0.00   MCV 98.5* 95.5 97.4*  --  97.3*   PROCALCITONIN  --   --   --   --  0.05   LACTATE  --   --   --  1.0  --          Lab 12/27/24  0301 12/26/24  0848 12/25/24  0630 12/24/24 2247 12/24/24 2116   SODIUM 135* 130* 129*  --  130*   SODIUM, ARTERIAL  --   --   --  130*  --    POTASSIUM 3.6 3.6 4.0  --  4.5   CHLORIDE 98 95* 95*  --  95*   CO2 26.0 24.0 24.0  --  23.0   ANION GAP 11.0 11.0 10.0  --  12.0   BUN 8 7* 7*  --  7*   CREATININE 0.68* 0.75* 0.63*  --  0.70*   EGFR 91.1 88.4 93.2  --  90.3   GLUCOSE 99 175* 108*  --  109*   CALCIUM 7.9* 7.8* 7.2*  --  8.5*   MAGNESIUM  --   --   --   --  1.8   HEMOGLOBIN A1C  --   --  6.20*  --   --          Lab 12/25/24  0630 12/24/24 2116   TOTAL PROTEIN 5.4* 6.9   ALBUMIN 2.9* 3.5   GLOBULIN 2.5 3.4   ALT (SGPT) 19 29   AST (SGOT) 15 22   BILIRUBIN 0.3 0.4   ALK PHOS 80 109                 Lab 12/25/24  0911   FOLATE 12.30   VITAMIN B 12 712         Lab 12/24/24 2247   PH, ARTERIAL 7.482*   PCO2, ARTERIAL 36.1   PO2 ART 53.1*   O2 SATURATION ART 90.5*   HCO3 ART 26.9*   BASE EXCESS ART 3.5*   CARBOXYHEMOGLOBIN 0.6     Brief Urine Lab Results  (Last result in the past 365 days)        Color   Clarity   Blood   Leuk Est   Nitrite   Protein   CREAT   Urine HCG        12/24/24 2341 Yellow   Clear   Negative   Negative   Negative   Negative                 Microbiology Results (last 10 days)       Procedure Component Value - Date/Time    Blood Culture - Blood, Arm, Right [[id number]]  (Normal) Collected: 12/24/24 2345    Lab Status: Preliminary result Specimen: Blood from Arm, Right Updated: 12/27/24 0015     Blood  Culture No growth at 2 days    Legionella Antigen, Urine - Urine, Urine, Clean Catch [[id number]]  (Normal) Collected: 12/24/24 2341    Lab Status: Final result Specimen: Urine, Clean Catch Updated: 12/25/24 1036     LEGIONELLA ANTIGEN, URINE Negative    S. Pneumo Ag Urine or CSF - Urine, Urine, Clean Catch [[id number]]  (Abnormal) Collected: 12/24/24 2341    Lab Status: Final result Specimen: Urine, Clean Catch Updated: 12/25/24 1040     Strep Pneumo Ag Positive    Narrative:      Streptococcus pneumoniae (pneumococcal pneumonia) vaccine may cause false-positive results, especially in patients who have received the vaccine within 5 days of having the test performed.        Blood Culture - Blood, Arm, Right [[id number]]  (Normal) Collected: 12/24/24 2340    Lab Status: Preliminary result Specimen: Blood from Arm, Right Updated: 12/27/24 0015     Blood Culture No growth at 2 days    Respiratory Panel PCR w/COVID-19(SARS-CoV-2) ALEENA/MILADIS/YULIA/[facility] In-House, NP Swab in UTM/VTM, 2 HR TAT - Swab, Nasopharynx [[id number]]  (Abnormal) Collected: 12/24/24 2110    Lab Status: Final result Specimen: Swab from Nasopharynx Updated: 12/24/24 2221     ADENOVIRUS, PCR Not Detected     Coronavirus 229E Not Detected     Coronavirus HKU1 Not Detected     Coronavirus NL63 Not Detected     Coronavirus OC43 Not Detected     COVID19 Not Detected     Human Metapneumovirus Detected     Human Rhinovirus/Enterovirus Not Detected     Influenza A PCR Not Detected     Influenza B PCR Not Detected     Parainfluenza Virus 1 Not Detected     Parainfluenza Virus 2 Not Detected     Parainfluenza Virus 3 Not Detected     Parainfluenza Virus 4 Not Detected     RSV, PCR Not Detected     Bordetella pertussis pcr Not Detected     Bordetella parapertussis PCR Not Detected     Chlamydophila pneumoniae PCR Not Detected     Mycoplasma pneumo by PCR Not Detected    Narrative:      In the setting of a positive respiratory panel with a viral infection PLUS a  "negative procalcitonin without other underlying concern for bacterial infection, consider observing off antibiotics or discontinuation of antibiotics and continue supportive care. If the respiratory panel is positive for atypical bacterial infection (Bordetella pertussis, Chlamydophila pneumoniae, or Mycoplasma pneumoniae), consider antibiotic de-escalation to target atypical bacterial infection.            Hospital Course:   Carmine Garcia is a 84 y.o. male with pmh of myasthenia gravis, lung cancer status postresection and radiation, COPD, presenting to the ED for generalized weakness.  He reports feeling unlike himself since November.  He has had 1 episode of pneumonia which was treated with doxycycline but he feels as if he has not improved.  He endorses cough, congestion runny nose and loss of appetite.     # Acute respiratory failure with hypoxia - resolved  # Human metapneumovirus infection  # LLL S.pneumoniae PNA  # History of COPD - not on any home inhalers   # Recurrent lung cancer  - left papillary adenocarcinoma 2011.  Right new tumor 8/22/24.  Completed radiation 9/17/24.  # Leukocytosis  ABG 7.4/36/53/26/90% on room air  WBC 13, procalcitonin 0.05  CXR: New consolidation left lower lobe suspicious for pneumonia  - Received 4 days of Rocephin and azithromycin  - Discharged with 3 additional days of azithromycin and cefdinir  - Will follow-up with his PCP  -Patient has been on room air     # Chronic Macrocytic Anemia  At baseline, normal B12 and folate  Possibly due to to chemo     # Chronic hyponatremia  Na 129 > 130     # Prediabetes - monitor.  Follow-up with PCP  # Myasthenia gravis - continue home daily prednisone 15mg   # Hypertension - continue home amlodipine and losartan  # Depression/anxiety - continue bupropion      Physical Exam on Discharge:  /71 (BP Location: Right arm, Patient Position: Sitting)   Pulse 93   Temp 98.1 °F (36.7 °C) (Oral)   Resp 16   Ht 182.9 cm (72\")   Wt 85.1 " kg (187 lb 11.2 oz)   SpO2 94%   BMI 25.46 kg/m²   Physical Exam  GEN: Awake, alert, interactive, in NAD on room air  HEENT: Atraumatic, EOMI, Anicteric  Lungs: CTAB, diminished on the left   Heart: RRR, +S1/s2, no rub  ABD: soft, nt/nd, +BS, no guarding/rebound  Extremities: atraumatic, no cyanosis, no edema  Skin: no rashes or lesions  Neuro: AAOx3, no focal deficits  Psych: normal mood & affect    Condition on Discharge: stable, at baseline     Discharge Disposition:  Home or Self Care    Discharge Medications:     Discharge Medications        New Medications        Instructions Start Date   azithromycin 500 MG tablet  Commonly known as: Zithromax   500 mg, Oral, Daily      cefdinir 300 MG capsule  Commonly known as: OMNICEF   300 mg, Oral, 2 Times Daily             Continue These Medications        Instructions Start Date   alendronate 70 MG tablet  Commonly known as: FOSAMAX   70 mg, Oral, Every 7 Days      alfuzosin 10 MG 24 hr tablet  Commonly known as: UROXATRAL   10 mg, Oral, Daily      amLODIPine 10 MG tablet  Commonly known as: NORVASC   10 mg, Oral, Daily      bisacodyl 5 MG EC tablet  Commonly known as: DULCOLAX   10 mg, Oral, Nightly      buPROPion  MG 24 hr tablet  Commonly known as: WELLBUTRIN XL   150 mg, Oral, Every Morning      CHEWABLE CALCIUM/D PO   1 tablet, Oral, Daily      fluticasone 50 MCG/ACT nasal spray  Commonly known as: FLONASE   Nasal, Daily      Folbee 2.5-25-1 MG tablet tablet  Generic drug: folic acid-vit B6-vit B12   1 tablet, Oral, Daily      losartan 25 MG tablet  Commonly known as: COZAAR   1 tablet, 2 Times Daily      lovastatin 40 MG tablet  Commonly known as: MEVACOR   40 mg, Oral      multivitamins-minerals capsule capsule   1 capsule, Oral, 2 Times Daily      pantoprazole 40 MG EC tablet  Commonly known as: PROTONIX   1 tablet, Every 24 Hours      predniSONE 10 MG tablet  Commonly known as: DELTASONE   1.5 tablets, Oral, Daily, 1.5 tab daily      pyridostigmine 60  MG tablet  Commonly known as: MESTINON   60 mg, Oral, Every 6 Hours             Stop These Medications      doxycycline 100 MG tablet  Commonly known as: VIBRAMYICN              Discharge Diet:   Dietary Orders (From admission, onward)       Start     Ordered    12/25/24 0223  Diet: Regular/House; Fluid Consistency: Thin (IDDSI 0)  Diet Effective Now        References:    Diet Order Definitions   Question Answer Comment   Diets: Regular/House    Fluid Consistency: Thin (IDDSI 0)        12/25/24 0223                      Activity at Discharge: as tolerated    Follow-up Appointments:   Future Appointments   Date Time Provider Department Center   3/20/2025  1:15 PM  [facility]  [facility]   3/27/2025  1:15 PM Cem Arteaga MD [facility]   3/27/2025  2:00 PM Bryn Proctor APRN [facility]       Test Results Pending at Discharge: none     Electronically signed by Winston Ramirez MD, 12/27/24, 08:56 CST.    Time: 35 minutes.

## 2024-12-27 NOTE — PLAN OF CARE
Goal Outcome Evaluation: Pt is alert and oriented, no s/s of distress, vitals are stable, IV is out cath intact no s/s of infection, room air, ambulation is steady with use of cane, pt has orders in to discharge home

## 2024-12-27 NOTE — PLAN OF CARE
Goal Outcome Evaluation:  Plan of Care Reviewed With: patient        Progress: improving  Outcome Evaluation: A/Ox4. Pt c/o having generalized pain and is restless throughout the night, pt requested for some Tylenol, MD notifed and placed order. IV abx infused as ordered. Up standby assist and cane to the BR. VSS. Room air. Contact precautions in place.

## 2024-12-27 NOTE — PROGRESS NOTES
"Enter Query Response Below      Query Response: Acute respiratory failure with hypoxia was supported with additional clinical indicators: ABG 7./ on room air             If applicable, please update the problem list.         Patient: Carmine Garcia \"Piyush\"        : 1940  Account: [id number]           Admit Date:         How to Respond to this query:       a. Click New Note     b. Answer query within the yellow box.                c. Update the Problem List, if applicable.      If you have any questions about this query contact me at: [email or web address]     :     84 y.o. male with history of myasthenia gravis and COPD admitted to observation () and to inpatient status () with \"Community-acquired pneumonia, quiring oxygen\" per the H&P.   ED note documents \"SpO2 90% on room air upon arrival\", \"Denies SOA\", \"He is not in acute distress\", \"Pulmonary effort is normal. no respiratory distress\" and \"Hypoxia.\"   The H&P includes \"Pulmonary effort is normal. No respiratory distress\" and \"Oxygen as needed.\"   Hospitalist's progress note () includes \"Patient is seen on 2 L of oxygen today in no acute distress.  Of note he does not use any oxygen at home\", \"in NAD on 2L\" and Acute respiratory failure with hypoxia - on 2L.\"  Hospitalist's progress note () includes \"in NAD on room air\" and \"Acute respiratory failure with hypoxia.\"    ABGs as follows:  ( 0456) pO2 60.0  ( 2247) pO2 53.1 on room air    Nursing flowsheet documentation includes:   (): 90% on room air; RR 22  (): 90-92% on 2L; RR not recorded      One episode of 89% on 2L ( 2200) and remained on 2L  (): 90-96% on 2L; RR 18  (): 93-98% on room air; RR 16-18  (): 95% on room air; RR 16    Treatment:  Supplemental oxygen up to 2L per nasal cannula and lab monitoring    After study, was acute respiratory failure with hypoxia clinically supported during this admission?    -Acute " respiratory failure with hypoxia was supported with additional clinical indicators:____________  -Acute respiratory failure with hypoxia was not supported  -Other- specify_____________  -Unable to determine    By submitting this query, we are merely seeking further clarification of documentation to accurately reflect all conditions that you are monitoring, evaluating, treating or that extend the hospitalization or utilize additional resources of care. Please utilize your independent clinical judgment when addressing the question(s) above.     This query and your response, once completed, will be entered into the legal medical record.    Sincerely,  Coby Cordero MSN RN   Clinical Documentation Integrity Program

## 2024-12-28 NOTE — OUTREACH NOTE
Prep Survey      Flowsheet Row Responses   [facility] patient discharged from? [place]   Is LACE score < 7 ? No   Eligibility Readm Mgmt   Discharge diagnosis *Pneumonia       Pneumonia due to Streptococcus   Does the patient have one of the following disease processes/diagnoses(primary or secondary)? Pneumonia   Does the patient have Home health ordered? No   Is there a DME ordered? No   Prep survey completed? Yes            MARIA ANTONIA JADE - Registered Nurse

## 2024-12-30 LAB
BACTERIA SPEC AEROBE CULT: NORMAL
BACTERIA SPEC AEROBE CULT: NORMAL

## 2024-12-30 RX ORDER — PREDNISONE 10 MG/1
15 TABLET ORAL DAILY
Qty: 60 TABLET | Refills: 2 | Status: SHIPPED | OUTPATIENT
Start: 2024-12-30

## 2024-12-30 NOTE — TELEPHONE ENCOUNTER
Martin Yeboah has requested a refill on his medication.      Last office visit : 11/18/2024   Next office visit : 2/19/2025   Last medication refill : 10/30/24 with 2 refills         Requested Prescriptions     Pending Prescriptions Disp Refills    predniSONE (DELTASONE) 10 MG tablet 60 tablet 2     Sig: Take 1.5 tablets by mouth daily

## 2025-01-03 ENCOUNTER — READMISSION MANAGEMENT (OUTPATIENT)
Dept: CALL CENTER | Facility: HOSPITAL | Age: 85
End: 2025-01-03
Payer: MEDICARE

## 2025-01-03 NOTE — OUTREACH NOTE
COPD/PN Week 1 Survey      Flowsheet Row Responses   [facility] [place] patient discharged from? [place]   Does the patient have one of the following disease processes/diagnoses(primary or secondary)? Pneumonia   Week 1 attempt successful? Yes   Call start time 1135   Call end time 1143   Discharge diagnosis Pneumonia due to Streptococcus   Meds reviewed with patient/caregiver? Yes   Is the patient having any side effects they believe may be caused by any medication additions or changes? No   Does the patient have all medications ordered at discharge? Yes   Is the patient taking all medications as directed (includes completed medication regime)? Yes   Does the patient have a primary care provider?  Yes   Does the patient have an appointment with their PCP or specialist within 7 days of discharge? Yes   Pulse Ox monitoring Intermittent   Pulse Ox device source Patient   O2 Sat comments 100% on RA   Comments Patient reports ongoing weakness and fatigue, still feeling poor but does note that he is slowly improving since DC. Pt c/o ongoing cough that hurts in his chest when he coughs. RN advised to contact MD today to discuss. Pt and nurse discussed using heating pad for comfort for patient's pain.  Pt reports that he is tolerating po intake and voiding WNL.   Did the patient receive a copy of their discharge instructions? Yes   Nursing interventions Reviewed instructions with patient   What is the patient's perception of their health status since discharge? Improving   Nursing Interventions Nurse provided patient education   Is the patient/caregiver able to teach back the hierarchy of who to call/visit for symptoms/problems? PCP, Specialist, Home health nurse, Urgent Care, ED, 911 Yes   Is the patient/caregiver able to teach back signs and symptoms of worsening condition: Fever/chills, Shortness of breath, Chest pain   Is the patient/caregiver able to teach back importance of completing antibiotic course of treatment?  Yes   Week 1 call completed? Yes   Call end time 4710            Mariaa DANIELSON - Registered Nurse

## 2025-01-03 NOTE — TELEPHONE ENCOUNTER
Requested Prescriptions     Pending Prescriptions Disp Refills    pyRIDostigmine (MESTINON) 60 MG tablet 120 tablet 5     Sig: Take 1 tablet by mouth every 6 hours       Last Office Visit: 11/18/2024  Next Office Visit: 2/19/2025  Last Medication Refill: 10/9/24 theodora balbuena

## 2025-01-04 RX ORDER — PYRIDOSTIGMINE BROMIDE 60 MG/1
60 TABLET ORAL EVERY 6 HOURS
Qty: 120 TABLET | Refills: 5 | Status: SHIPPED | OUTPATIENT
Start: 2025-01-04

## 2025-01-14 ENCOUNTER — READMISSION MANAGEMENT (OUTPATIENT)
Dept: CALL CENTER | Facility: HOSPITAL | Age: 85
End: 2025-01-14
Payer: MEDICARE

## 2025-01-14 NOTE — OUTREACH NOTE
COPD/PN Week 2 Survey      Flowsheet Row Responses   [facility] patient discharged from? [place]   Does the patient have one of the following disease processes/diagnoses(primary or secondary)? Pneumonia   Week 2 attempt successful? Yes   Call start time 1605   Unsuccessful attempts Attempt 1   Call end time 1608   Discharge diagnosis Pneumonia due to Streptococcus   Meds reviewed with patient/caregiver? Yes   Is the patient having any side effects they believe may be caused by any medication additions or changes? No   Does the patient have all medications ordered at discharge? Yes   Is the patient taking all medications as directed (includes completed medication regime)? Yes   Medication comments Pt had another round of atbs since discharge per PCP   Does the patient have a primary care provider?  Yes   Does the patient have an appointment with their PCP or specialist within 7 days of discharge? No   Comments regarding PCP Pt had to reschedule his PCP appt due to weather   Nursing Interventions Advised patient to make appointment   Has the patient kept scheduled appointments due by today? N/A   Has home health visited the patient within 72 hours of discharge? N/A   Pulse Ox monitoring Intermittent   Pulse Ox device source Patient   O2 Sat comments O2 in mid 90s on room air   O2 Sat: education provided Sat levels   Did the patient receive a copy of their discharge instructions? Yes   Nursing interventions Reviewed instructions with patient   What is the patient's perception of their health status since discharge? Improving  [Pt reports he is much better now--reports he always does have some degree of SOA due to h/o lung ca and lobectomy.  He reports lately he has been coughing very little and had some sinus drainage, reports he is about 80% improved.]   Nursing Interventions Nurse provided patient education   Is the patient/caregiver able to teach back the hierarchy of who to call/visit for symptoms/problems?  PCP, Specialist, Home health nurse, Urgent Care, ED, 911 Yes   Is the patient/caregiver able to teach back signs and symptoms of worsening condition: Fever/chills, Shortness of breath, Chest pain  [reviewed]   Week 2 call completed? Yes   Graduated Yes  [Improved, no immediate needs]   Call end time 1608            DONITA BARAHONA - Registered Nurse

## 2025-01-26 DIAGNOSIS — G70.00 MYASTHENIA GRAVIS: ICD-10-CM

## 2025-01-27 RX ORDER — PREDNISONE 10 MG/1
15 TABLET ORAL DAILY
Qty: 60 TABLET | Refills: 2 | OUTPATIENT
Start: 2025-01-27

## 2025-01-27 RX ORDER — PYRIDOSTIGMINE BROMIDE 60 MG/1
60 TABLET ORAL EVERY 6 HOURS
Qty: 120 TABLET | Refills: 5 | OUTPATIENT
Start: 2025-01-27

## 2025-01-27 NOTE — TELEPHONE ENCOUNTER
Requested Prescriptions     Pending Prescriptions Disp Refills    predniSONE (DELTASONE) 10 MG tablet 60 tablet 2     Sig: Take 1.5 tablets by mouth daily     Filled on 12/30/24 with 2 refills

## 2025-03-20 ENCOUNTER — APPOINTMENT (OUTPATIENT)
Dept: LAB | Facility: HOSPITAL | Age: 85
End: 2025-03-20
Payer: MEDICARE

## 2025-03-20 ENCOUNTER — HOSPITAL ENCOUNTER (OUTPATIENT)
Dept: CT IMAGING | Facility: HOSPITAL | Age: 85
Discharge: HOME | End: 2025-03-20
Payer: MEDICARE

## 2025-03-20 ENCOUNTER — LAB (OUTPATIENT)
Dept: LAB | Facility: HOSPITAL | Age: 85
End: 2025-03-20
Payer: MEDICARE

## 2025-03-20 DIAGNOSIS — Z85.118 HISTORY OF MALIGNANT NEOPLASM OF LUNG: ICD-10-CM

## 2025-03-20 DIAGNOSIS — C34.32 PRIMARY ADENOCARCINOMA OF LOWER LOBE OF LEFT LUNG: ICD-10-CM

## 2025-03-20 LAB
ALBUMIN SERPL-MCNC: 4.2 G/DL (ref 3.5–5)
ALBUMIN/GLOB SERPL: 1.4 G/DL (ref 1.1–2.5)
ALP SERPL-CCNC: 68 U/L (ref 24–120)
ALT SERPL W P-5'-P-CCNC: 40 U/L (ref 0–50)
ANION GAP SERPL CALCULATED.3IONS-SCNC: 2 MMOL/L (ref 4–13)
AST SERPL-CCNC: 29 U/L (ref 7–45)
AUTO MIXED CELLS #: 0.7 10*3/MM3 (ref 0.1–2.6)
AUTO MIXED CELLS %: 9.8 % (ref 0.1–24)
BILIRUB SERPL-MCNC: 0.5 MG/DL (ref 0.1–1)
BUN SERPL-MCNC: 8 MG/DL (ref 5–21)
BUN/CREAT SERPL: 8.9
CALCIUM SPEC-SCNC: 9.2 MG/DL (ref 8.6–10.5)
CHLORIDE SERPL-SCNC: 103 MMOL/L (ref 98–110)
CO2 SERPL-SCNC: 29 MMOL/L (ref 24–31)
CREAT SERPL-MCNC: 0.9 MG/DL (ref 0.5–1.4)
EGFRCR SERPLBLD CKD-EPI 2021: 83.7 ML/MIN/1.73
ERYTHROCYTE [DISTWIDTH] IN BLOOD BY AUTOMATED COUNT: 14.9 % (ref 12.3–15.4)
GLOBULIN UR ELPH-MCNC: 2.9 GM/DL
GLUCOSE SERPL-MCNC: 84 MG/DL (ref 65–99)
HCT VFR BLD AUTO: 41.2 % (ref 37.5–51)
HGB BLD-MCNC: 12.9 G/DL (ref 13–17.7)
IRON 24H UR-MRATE: 152 MCG/DL (ref 59–158)
IRON SATN MFR SERPL: 47 % (ref 20–50)
LYMPHOCYTES # BLD AUTO: 0.6 10*3/MM3 (ref 0.7–3.1)
LYMPHOCYTES NFR BLD AUTO: 8.1 % (ref 19.6–45.3)
MCH RBC QN AUTO: 31.7 PG (ref 26.6–33)
MCHC RBC AUTO-ENTMCNC: 31.3 G/DL (ref 31.5–35.7)
MCV RBC AUTO: 101.2 FL (ref 79–97)
NEUTROPHILS NFR BLD AUTO: 5.8 10*3/MM3 (ref 1.7–7)
NEUTROPHILS NFR BLD AUTO: 82.1 % (ref 42.7–76)
PLATELET # BLD AUTO: 284 10*3/MM3 (ref 140–450)
PMV BLD AUTO: 7.9 FL (ref 6–12)
POTASSIUM SERPL-SCNC: 4.2 MMOL/L (ref 3.5–5.3)
PROT SERPL-MCNC: 7.1 G/DL (ref 6.3–8.7)
RBC # BLD AUTO: 4.07 10*6/MM3 (ref 4.14–5.8)
SODIUM SERPL-SCNC: 134 MMOL/L (ref 135–145)
TIBC SERPL-MCNC: 322 MCG/DL (ref 298–536)
TRANSFERRIN SERPL-MCNC: 216 MG/DL (ref 200–360)
WBC NRBC COR # BLD AUTO: 7.1 10*3/MM3 (ref 3.4–10.8)

## 2025-03-20 PROCEDURE — 80053 COMPREHEN METABOLIC PANEL: CPT

## 2025-03-20 PROCEDURE — 83540 ASSAY OF IRON: CPT

## 2025-03-20 PROCEDURE — 85025 COMPLETE CBC W/AUTO DIFF WBC: CPT

## 2025-03-20 PROCEDURE — 82728 ASSAY OF FERRITIN: CPT

## 2025-03-20 PROCEDURE — 84466 ASSAY OF TRANSFERRIN: CPT

## 2025-03-20 PROCEDURE — 71250 CT THORAX DX C-: CPT

## 2025-03-20 PROCEDURE — 82607 VITAMIN B-12: CPT

## 2025-03-20 PROCEDURE — 36415 COLL VENOUS BLD VENIPUNCTURE: CPT

## 2025-03-20 PROCEDURE — 82378 CARCINOEMBRYONIC ANTIGEN: CPT

## 2025-03-20 PROCEDURE — 82746 ASSAY OF FOLIC ACID SERUM: CPT

## 2025-03-21 LAB
CEA SERPL-MCNC: 4.88 NG/ML
FERRITIN SERPL-MCNC: 110 NG/ML (ref 30–400)
FOLATE SERPL-MCNC: 14.2 NG/ML (ref 4.78–24.2)
VIT B12 BLD-MCNC: 499 PG/ML (ref 211–946)

## 2025-03-23 NOTE — PROGRESS NOTES
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 He is seen 160 months since thoracoscopic surgical resection of the lung neoplasm. He is also followed for anemia of iron deficiency and B12 deficiency. It has been 83.5 months since last receipt of Injectafer.   On 23- PET-CT- showed a recurrent left lung neoplasm an isolated finding (15 mm left lung nodule adjacent to the hilum shows FDG uptake with SUV max = 3.3).  He then received definitive SBRT: 23- 23- 6000 cGy/5 fx) to NISA nodule (16 months).  He since received SBRT to the right lun/10/24-24 (5000 cGy/5 fx).  He is here alone (previously with his daughter, Monica)    I have reviewed the HPI and verified with the patient the accuracy of it. No changes to interval history since the information was documented. Cem Arteaga MD 25      DIAGNOSTIC ABNORMALITIES:   CT chest, 10/20/2011, [facility]: 1.5 cm left lower lobe nodule positioned just lateral to the hilum - granuloma versus malignancy. No pathologic lymphadenopathy.  PET CT scan, 10/26/2011: Increased metabolic activity in the left lower lobe. Lung nodule located centrally near the left hilum with maximum SUV of 7.4 units, suspicious for primary carcinoma. This corresponded to the lesion seen on CT scan of 10/20/2011. No abnormal mediastinal, hilar, or inguinal activity noted.  Diagnostic bronchoscopy, 2011: Negative for malignancy. There were no endobronchial lesions.  Labs, 2011: Ferritin 78.7, B12 235, folate 6.7, CEA 2.2, serum iron 16, TIBC 196 (225 to " 420), iron saturation 8.2%.  Thyroid ultrasound, 11/30/2011. No thyroid nodule identified.  Labs, 12/01/2011: Serum cortisol 21.7. Urine sodium less than 5, serum osmolality 680 (601 to 850).  Left lower lobe lung biopsy, 11/28/2011, [facility]: Well-differentiated papillary adenocarcinoma (1.5 cm in greatest dimension).  Immunohistochemistry analysis, 11/30/2011: Tumor immunoreactive with TTF-1 and Napsin A while negative for thyroglobulin. Results support primary lung adenocarcinoma.  Labs, 01/03/2012: GFR 69.9. Ferritin 54, iron 97, TIBC 301, iron sat 32. B12 352, folate 16.4.   Chest x-ray 09/09/2015 at [facility]. Stable postoperative chest x-ray.  Labs, 03/27/2017: Hemoglobin 13.8, YMI462.6,  (313 to 618), TSH 0.7, T4 of 6.9 (each normal),   Comprehensive blood report, 03/27/2017: Mild anemia with history of macrocytosis. COMPREHENSIVE DIAGNOSIS. Review of peripheral blood smear: Very mild anemia with red blood cells showing fairly normal morphology at the time of this peripheral blood specimen. Normal white blood cell and platelet counts and morphology. No abnormal populations detected on flow cytometric studies. See comment. COMPREHENSIVE COMMENT. This patient's recent peripheral blood specimen demonstrating a mild macrocytosis with an MCV of 100.6 is noted. At the time of this current peripheral blood specimen, his MCV is within normal limits at 94.3. He has a minimal anemia. White blood cell and platelet counts are both within normal limits with normal morphology. Causes of these peripheral blood findings and macrocytosis could be liver disease, thyroid disease, vitamin/nutritional deficiencies and drugs/toxins. Correlation recommended.  -6/14/23- CT chest- New 1.5 cm central LEFT upper lobe pulmonary nodule. This is concerning for recurrent neoplasm. Management options include PET/CT versus sampling with EBUS.  -6/21/23- PET-CT- PET-CT findings compatible with recurrent left lung neoplasm an  isolated finding (15 mm left lung nodule adjacent to the hilum shows FDG uptake with SUV max = 3.3).  No distant disease is seen.      PREVIOUS INTERVENTIONS:   Left thoracoscopic core needle biopsy, followed by video-assisted thoracoscopic surgery (VATS) left lower lobectomy, and mediastinal lymph node dissection, 2011: Pathology from the left lower lung nodule lung biopsy showed no histologic evidence of malignancy. Well differentiated papillary adenocarcinoma (1.5 cm in greatest diminish. All resection margins are free of malignancy. No visceral pleural invasion identified. Three peribronchial lymph nodes negative for metastatic carcinoma. Emphysematous changes. Level 10 (1 lymph node), Level 11 (1 lymph node), Level 5 (2 lymph nodes) all negative for metastatic carcinoma. 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 Special stains are pending.  Venofer 200 mg IV daily, 2011 through 2011 (800 mg); then 2014 through 2014 (800 mg).  B12 at 1000 mcg IM beginning 2011 through 2011 (1000 mg).  Foltx p.o. daily beginning 2014 through 2015.  Injectafer 750 mg, 2017; 2021  -SBRT: 23- 23- 6000 cGy/5 fx) to NISA nodule  SBRT to the right lun/10/24-24 (5000 cGy/5 fx).    Problem List Items Addressed This Visit          Other    Primary adenocarcinoma of lower lobe of left lung - Primary             Oncology/Hematology History   Primary adenocarcinoma of lower lobe of left lung   10/20/2011 Imaging    CT Chest:  1.5 cm left lower lobe nodule positioned just lateral to the hilum, granuloma vs. Malignancy  No pathologic lymphadenopathy     10/26/2011 Imaging    PET/CT:  Increased metabolic activity in the left lower lobe, lung nodule located centrally near the left hilum witih max SUV 7.4, suspicious for primary carcinoma. This corresponds to the lesion seen on CT scan of 10/20/2011.   No abnormal mediastinal, hilar, or inguinal  activity     11/14/2011 Biopsy    Final Diagnosis   1.     Biopsy, left lower lobe nodule, lung:              A.     Fragments of bronchial mucosa with submucosa demonstrating   stromal elastosis and minimal chronic inflammation.        B.     No evidence of granulomatous inflammation.        C.     No histologic evidence of malignancy.      2.     Bronchial washings, smears (four) and cell block:         A.     Mild acute inflammation.        B.     Negative for malignant cells.      3.     Bronchial brushings, left lower lobe of lung, smears (3) and ThinPrep   preparation (1):         Negative for malignant cells.        11/28/2011 Surgery    Final Diagnosis        1.     Biopsy, left lower lobe of lung (frozen section control):              A.     Fragment of fibrotic pulmonary parenchyma demonstrating stromal   elastosis and chronic active inflammation, moderate.        B.     No histologic evidence of malignancy.      2.     Level 10 lymph node (frozen section control):         Lymph node (one), negative for metastatic carcinoma.      3.     Left lower lobe of lung (frozen section control):         A.     Well-differentiated papillary adenocarcinoma (1.5 cm in greatest   dimension).        B.     The dominant and aberrant bronchial margins of surgical resection   are negative for malignancy.         C.     The vascular and parenchymal margins of surgical resection are   negative for malignancy.         D.     No visceral pleural invasion identified.         E.     Peribronchial lymph nodes (three), negative for metastatic   carcinoma.         F.     Emphysematous changes.      4.     Level 11 lymph node:   Lymph node (one), negative for metastatic   carcinoma.      5.     Level 5 lymph nodes:    Lymph nodes (two), negative for metastatic   carcinoma.      Comment:     Primary papillary adenocarcinomas of the lung do occur; however, they demonstrate a similar histology to that seen in papillary thyroid  carcinoma.  The thyroid gland should be evaluated if not already   done to exclude the possibility of met        4/30/2014 Imaging    CXR:  SUMMARY:  1. Stable chronic change.  2. No acute disease.     9/3/2014 Imaging    CXR:  IMPRESSION-   1. Emphysematous and postoperative changes in the chest without evidence  of mass lesion or acute cardiopulmonary process.     1/5/2016 Imaging    CXR:  IMPRESSION   Postoperative changes of the left hemithorax with associated volume  loss. No acute process identified.     7/1/2016 Imaging    CXR:  IMPRESSION-   1. No active disease is seen.  2. COPD/emphysema.  3. Prior lung surgery on the left.      1/4/2017 Imaging    CXR:  IMPRESSION:  1. COPD/emphysema.  2. Postoperative changes on the left.  3. No acute appearing infiltrate or effusion.      3/20/2017 Imaging    CXR:  IMPRESSION:  1. Chronic changes in the lungs and spine without evidence of acute  cardiopulmonary process. Overall, the appearance of the lungs is similar  to the study from 01/04/2017.     11/20/2017 Imaging    CXR:  Impression:  No significant interval change when compared to chest x-ray dated  07/03/2017.     2/12/2018 Imaging    CXR:  IMPRESSION:  1. Hyperinflated lungs suggesting COPD.  2. Postoperative changes of the left hemithorax.  3. No acute cardiopulmonary process identified.     7/6/2018 Imaging    CXR:  Impression:  No acute cardiopulmonary disease.     12/21/2018 Imaging    CXR:  IMPRESSION:  COPD. Chronic volume loss in the lung bases with mild  central vascular crowding. No acute infiltrates.     8/23/2019 Imaging    CXR:  IMPRESSION:  1. No acute disease.     6/19/2020 Imaging    CXR:  IMPRESSION:  1. Mild blunting of the left costophrenic angle, which could be seen  with scarring or small effusion.     2/22/2021 Imaging    CXR:  IMPRESSION:  1. COPD no acute cardiopulmonary process.     10/25/2021 Imaging    CXR:  IMPRESSION:  1. Streaky bibasilar opacities, similar to prior.     4/25/2022  Imaging    CT Chest:  IMPRESSION:  1. Status post resection of a left lower lobe pulmonary nodule. Moderate  changes of centrilobular emphysema are present.  2. Small groundglass nodules one within the left upper lobe and one  within the periphery of the right lower lobe warranting follow-up per  Fleischner criteria. No additional lung lesions are present.  3. No enlarged mediastinal or axillary lymphadenopathy..  4. 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 The  thoracic aorta is ectatic. There is mild enlargement of the pulmonary  arteries suggesting pulmonary arterial hypertension. Heavy coronary  calcifications are present.         7/20/2022 Imaging    CT Chest:  IMPRESSION:  1.  Postoperative change of LEFT lower lobectomy without evidence of  recurrent or metastatic disease.   2.  No change in 7 mm LEFT upper lobe and RIGHT lower lobe groundglass  pulmonary nodules. Consider continued imaging surveillance to document  stability.  3.  Ectatic ascending aorta measuring 4 cm diameter.     12/21/2022 Imaging    CXR:  IMPRESSION:  1. Stable chronic change.  2. No acute disease.     1/27/2023 Imaging    CT Chest:  IMPRESSION:  1. A stable CT scan of the chest. Postsurgical changes/left lower  lobectomy. No finding to suggest recurrence.  2. A stable small groundglass opacity/nodule in the left upper lobe. No  features of malignancy at this time. A follow-up examination in one year  is recommended to ensure stability.  3. Chronic emphysematous lung changes.     6/14/2023 Imaging    CT Chest:  IMPRESSION:  New 1.5 cm central LEFT upper lobe pulmonary nodule. This is concerning  for recurrent neoplasm. Management options include PET/CT versus  sampling with EBUS.     6/21/2023 Imaging    PET/CT:  Summary:  1. PET-CT findings compatible with recurrent left lung neoplasm as an  isolated finding. No distant disease is seen.     6/22/2023 Procedure    Documentation per Dr. Arteaga:  Patient  informed per Dr Arteaga recommendations a referral should be made to Rad/Onc for evaluation and possible SBRT  Patient informed once the apt is domingo he will be called with time and date        11/8/2023 Imaging    CT Chest:  IMPRESSION:  Stable 1.5 cm left perihilar central upper lobe nodule, relatively  unchanged from 6/14/2023. No new or increasing size nodule identified.  No pathologic lymphadenopathy. Stable volume loss left hemithorax from a  prior left lower lobe lobectomy.     2/1/2024 Imaging    CT Chest:  IMPRESSION:  1. Stable chest CT appearance.  2. No new or progressive neoplastic disease.        5/6/2024 Imaging    CT Chest:  IMPRESSION:  Stable CT chest without new or progressive disease.        8/7/2024 Imaging    CT Chest:  IMPRESSION:  1. New RIGHT lower lobe peripheral spiculated masslike opacity, highly  concerning for disease recurrence if the patient has no symptoms of  infection.  2. Heavily calcified coronary arteries versus stent material.  3. New mild height loss at T2 and L2.     8/22/2024 Imaging    PET/CT:  IMPRESSION:  1. Intense abnormal uptake associated with the peripheral nodule in the  right lower lobe which abuts the major fissure along its anterior  margin. Given the high SUV measurement neoplasia should be excluded and  is the primary consideration. It is of note that the nodule has changed  somewhat from the previous examination demonstrating increase in size in  the transverse dimension but overall diminishment in size in the coronal  dimension. This is somewhat unusual for neoplasia suggesting that this  finding could potentially be inflammatory/infectious in nature. No  associated metabolically active hilar or mediastinal adenopathy.  Previously noted metabolically active left perihilar nodule no longer  demonstrates any abnormal metabolic activity posttreatment.  2. No additional sites of abnormal metabolic activity demonstrated.  3. There are emphysematous changes of the  lungs. No additional lung  lesions noted by CT imaging. No cervical or mediastinal adenopathy.  There is extensive diverticular disease of the descending and sigmoid  colon without diverticulitis. Diffuse enlargement of the prostate gland  is present with mass effect on the base of the bladder. Prominence of  the urinary bladder wall likely is related to muscular hypertrophy from  an element of chronic bladder outlet obstruction.            PAST MEDICAL HISTORY:  ALLERGIES:  Allergies   Allergen Reactions    Penicillins Unknown - High Severity     Unknown       CURRENT MEDICATIONS:  Outpatient Encounter Medications as of 3/27/2025   Medication Sig Dispense Refill    alendronate (FOSAMAX) 70 MG tablet Take 1 tablet by mouth Every 7 (Seven) Days.      alfuzosin (UROXATRAL) 10 MG 24 hr tablet Take 1 tablet by mouth Daily.      amLODIPine (NORVASC) 10 MG tablet Take 1 tablet by mouth Daily.      buPROPion XL (WELLBUTRIN XL) 150 MG 24 hr tablet Take 1 tablet by mouth Every Morning.      Calcium Carb-Ergocalciferol (CHEWABLE CALCIUM/D PO) Take 1 tablet by mouth Daily.      folic acid-vit B6-vit B12 (Folbee) 2.5-25-1 MG tablet tablet Take 1 tablet by mouth Daily. 30 tablet 0    losartan (COZAAR) 25 MG tablet 1 tablet 2 (Two) Times a Day.      lovastatin (MEVACOR) 40 MG tablet Take 1 tablet by mouth.      multivitamins-minerals (PRESERVISION AREDS 2) capsule capsule Take 1 capsule by mouth 2 (Two) Times a Day.      pantoprazole (PROTONIX) 40 MG EC tablet 1 tablet Daily.      predniSONE (DELTASONE) 10 MG tablet Take 1.5 tablets by mouth Daily. 1.5 tab daily      pyridostigmine (MESTINON) 60 MG tablet Take 1 tablet by mouth Every 6 (Six) Hours.      bisacodyl (DULCOLAX) 5 MG EC tablet Take 2 tablets by mouth Every Night. (Patient not taking: Reported on 3/27/2025)      fluticasone (FLONASE) 50 MCG/ACT nasal spray Administer  into the nostril(s) as directed by provider Daily. (Patient not taking: Reported on 3/27/2025)       [DISCONTINUED] azithromycin (Zithromax) 500 MG tablet Take 1 tablet by mouth Daily. (Patient not taking: Reported on 3/27/2025) 3 tablet 0    [DISCONTINUED] cefdinir (OMNICEF) 300 MG capsule Take 1 capsule by mouth 2 (Two) Times a Day. (Patient not taking: Reported on 3/27/2025) 6 capsule 0     No facility-administered encounter medications on file as of 3/27/2025.     ADULT ILLNESSES:   Lung cancer ( Stage Date: 11/28/2011, Stage IA (T1a, N0, M0)-Pathological  Date of Dx:2011 ; ICD-10:C34.32 ;Malignant neoplasm of lower lobe, left bronchus or lung )   Acute bronchitis ( ICD-10:J20.9 ;Acute bronchitis, unspecified   Cataracts ( ICD-10:H26.9 ;Unspecified cataract   Colonic polyps ( hyperplastic, 02/2010; ICD-10:K63.5 ;Polyp of colon )   Constipation ( ICD-10:K59.00 ;Constipation, unspecified   Former smoker ( ICD-10:Z87.891 ;Personal history of nicotine dependence   High carcinoembryonic antigen level ( ICD-10:R97.0 ;Elevated carcinoembryonic antigen [CEA]   Hyperlipidemia ( ICD-10:E78.5 ;Hyperlipidemia, unspecified   Hypertension ( ICD-10:I10 ;Essential (primary) hypertension   Hyperthyroidism ( ICD-10:E05.90 ;Thyrotoxicosis, unspecified without thyrotoxic crisis or storm   Iron deficiency anemia ( ICD-10:D50.9 ;Iron deficiency anemia, unspecified   Leukocytosis ( ICD-10:D72.829 ;Elevated white blood cell count, unspecified   Macrocytosis ( ICD-10:D75.89 ;Other specified diseases of blood and blood-forming organs   Malabsorption syndrome (disorder)   Peptic ulcers ( ICD-10:K27.7 ;Chronic peptic ulcer, site unspecified, without hemorrhage or perforation   Retention of urine ( postoperative following eye surgery, 10/2011; ICD-10:R33.9 ;Retention of urine, unspecified )   Strabismus (eye condition) ( ICD-10:H50.9 ;Unspecified strabismus   Vitamin B12 deficiency ( ICD-10:E53.8 ;Deficiency of other specified B group vitamins  Right leg edema. Venous Doppler of right lower extremity on 11/28/2017 ([facility]).  Impression: There is no evidence of deep venous thrombosis or superficial thrombophlebitis of the right lower extremity.      SURGERIES:   Excision of non-melanoma skin cancer, 01/2012. Dr. Medrano   Video-assisted thoracoscopic (VATS) left lower lobectomy, 11/28/2011. Dr. Troy   Partial gastrectomy and vagotomy   Total knee replacement, right, 05/18/2017. Dr. Jarrett   Cataract surgery, 2004   Incision and drainage of abscess, left groin, (I&D) in early 01/2014. Dr. Velarde   Strabismus surgery, left eye, 2011.   Colonoscopy on 4/10/18 ([facility]). Impressions: Preparation of the colon was inadequate. One 12 mm polyp in the cecum, removed with a hot snare and removed piecemeal using a hot snare. Resected and retrieved. Diverticulosis in the sigmoid colon and in the descending colon    ADULT ILLNESSES:  Patient Active Problem List   Diagnosis Code    Hx of colonic polyps Z86.0100    Obesity, unspecified obesity severity, unspecified obesity type E66.9    History of colon polyps Z86.0100    Primary adenocarcinoma of lower lobe of left lung C34.32    Iron deficiency anemia D50.9    B12 deficiency E53.8    Essential hypertension I10    Pulmonary emphysema J43.9    Slow transit constipation K59.01    Urinary retention R33.9    Former smoker Z87.891    History of radiation therapy Z92.3    History of malignant neoplasm of lung Z85.118    Pneumonia J18.9    Pneumonia due to Streptococcus J15.4     SURGERIES:  Past Surgical History:   Procedure Laterality Date    CATARACT EXTRACTION      COLONOSCOPY  12/12/2014    Diverticulosis repeat exam in 3 years    COLONOSCOPY N/A 04/10/2018    Tubular adenoma cecum poor prep repeat in 3 months    COLONOSCOPY N/A 08/02/2018    2 Tubular adenomas transverse and ascending colon fair prep repeat in 3 years    COLONOSCOPY N/A 03/24/2022    Procedure: COLONOSCOPY WITH ANESTHESIA;  Surgeon: Ferdinand Cross MD;  Location: [facility] ENDOSCOPY;  Service: Gastroenterology;  Laterality:  N/A;  pre hx colon polyps  post diverticulosis; inadequate prep  Efrain Dobson, DO    COLONOSCOPY W/ POLYPECTOMY  02/15/2010    2 Hyperplastic polyps at 25 cm and rectum repeat exam in 5 years    LUNG CANCER SURGERY      LUNG REMOVAL, PARTIAL      REPLACEMENT TOTAL KNEE Right 05/2017    TONSILLECTOMY       HEALTH MAINTENANCE ITEMS:  Health Maintenance Due   Topic Date Due    RSV Vaccine - Adults (1 - 1-dose 75+ series) Never done    ANNUAL WELLNESS VISIT  12/04/2024    ZOSTER VACCINE (2 of 2) 02/11/2025       <no information>  Last Completed Colonoscopy    This patient has no relevant Health Maintenance data.       Immunization History   Administered Date(s) Administered    COVID-19 (MODERNA) 1st,2nd,3rd Dose Monovalent 03/05/2021, 04/02/2021, 06/07/2021    COVID-19 (PFIZER) 12YRS+ (COMIRNATY) 12/11/2023, 11/05/2024    COVID-19 (PFIZER) Purple Cap Monovalent 12/14/2021    Flu Vaccine Split Quad 10/11/2018    INFLUENZA SPLIT TRI 10/13/2014    Influenza Injectable Mdck Pf Quad 10/14/2021, 10/13/2022    Influenza TIV (IM) 11/05/2019, 10/28/2020    Influenza, Unspecified 10/14/2021, 10/13/2022    Pneumococcal Conjugate 13-Valent (PCV13) 11/08/2018    Pneumococcal Polysaccharide (PPSV23) 09/12/2011, 10/21/2013    Tdap 10/07/2024    Zostavax 02/09/2013     Last Completed Mammogram    This patient has no relevant Health Maintenance data.           FAMILY HISTORY:  Family History   Problem Relation Age of Onset    Colon polyps Mother     Stroke Mother     Diabetes Mother     Hypertension Mother     Cancer Father         asbestos    Stroke Maternal Grandmother     No Known Problems Maternal Grandfather     No Known Problems Paternal Grandmother     No Known Problems Paternal Grandfather     Diabetes Child     Colon cancer Neg Hx      SOCIAL HISTORY:  Social History     Socioeconomic History    Marital status:    Tobacco Use    Smoking status: Former     Current packs/day: 0.00     Average packs/day: 2.0 packs/day for  30.0 years (60.0 ttl pk-yrs)     Types: Cigarettes     Start date: 1955     Quit date: 1984     Years since quittin.2     Passive exposure: Past    Smokeless tobacco: Never   Vaping Use    Vaping status: Never Used   Substance and Sexual Activity    Alcohol use: Never    Drug use: Never    Sexual activity: Not Currently     Partners: Female       REVIEW OF SYSTEMS:  Constitutional:   The patient's appetite is fairly good. His energy remains chronically low to fair. He says has been able to walk better since the knee surgery (right knee arthroplasty, 2017) though is again using a cane.  Says the lower back pains and breathlessness slow him down.  He manages his personal ADLs, some chores, running errands and driving short distances. He has lost 1 lb (in addition to 14 lb at his 3 prior visits) in the interval.  He lives with his daughter and her spouse. He denies fevers, chills, or drenching night sweats. His sleep habits had been appropriate.  Covid vaccines x 2, 2021 and booster last 2021  Ear/Nose/Throat/Mouth:   He reports sinus congestion and postnasal drainage but no ear pains or sore throat.  He has not had nosebleeds. No sore tongue. He has no headaches. He denies any hoarseness, change in voice quality.  Ocular:   He reports recent bouts of diplopia, no eye pain, significant change in visual acuity, or blurry vision.  Respiratory:   He has baseline exertional dyspnea but says he is less short of breath with his routine activities. Has worsening cough that he attributes to postnasal drainage.  He denies shortness of breath at rest.  Completed SBRT 23- 23- 6000 cGy/5 fx) to NISA nodule  Cardiovascular:   He reports no exertional chest pain, chest pressure, or chest heaviness. He reports no claudication. He reports no palpitations or symptomatic orthostasis.  Gastrointestinal:   He reports no dysphagia, nausea, vomiting, postprandial abdominal pain, bloating, cramping, change in  "bowel habits, with no dark stools (oral iron intolerant). He reports no rectal bleeding. He reports no constipation on \"the right diet.\"  Infrequent use of stool softeners (Dulcolax) without laxatives. He has no diarrhea. Repeat c-scope 03/24/22.  Diverticulosis.  Repeat prn. He says that he has elected not to do anymore. \"Not at my age anymore.\"  Genitourinary:   He denies prior problems with urinary burning, frequency, dribbling, or discoloration. He denies difficulty controlling his bladder. Has nocturia.  Musculoskeletal:   He has chronic arthralgias.  The right knee feels more stable since surgery.  Has not needed a cane to walk unless he goes long distances.  He has only occasional \"aches\" of the hips and his shoulders. He denies myalgias or nighttime leg cramping.  Extremities:   He normally has no fluid retention though has lingering mild right = left ankle/leg swelling.   Endocrine:   He reports no problems with excess thirst, excessive urination, vasomotor instability, or unexplained fatigue.  Heme/Lymphatic:   He reports easy bruising but no unexplained bleeding, petechial rashes, or swollen glands.  Neuro:   He reports no loss of consciousness, seizures, fainting spells, or dizziness. He reports no weakness of his face, arms, or legs. He has no difficulty with speech. He has no tremors. He reports occasional left = right foot tingling.   Psych:   He seems generally satisfied with life. He denies depression/anxiety. He reports no mood swings.       VITAL SIGNS: /86   Pulse 88   Temp 97.6 °F (36.4 °C) (Temporal)   Resp 18   Ht 182.9 cm (72\")   Wt 85.6 kg (188 lb 11.2 oz)   SpO2 96%   BMI 25.59 kg/m² Body surface area is 2.08 meters squared.  Pain Score    03/27/25 1320   PainSc: 0-No pain         PHYSICAL EXAMINATION:   General:   He is a pleasant, obese, and modestly-kept elderly male who appears comfortable while seated. He appears to be his stated age. His skin color is normal. He is " "ambulatory with a cane.  ECOG PS = 1-2 (\"still 50-50\")  Head/Neck:   The patient is anicteric and atraumatic. The trachea is midline. The neck is supple without evidence of jugular venous distention or cervical adenopathy.  Eyes:   The pupils are equal, round, and reactive to light. The extraocular movements are full. There is no scleral jaundice or erythema.  Chest:   Breath sounds are diminished, left > right. There are no wheezes, rhonchi, or rales. The right thoracotomy wounds are healed.  Cardiovascular:   The patient has a regular cardiac rate and rhythm without murmurs, rubs, or gallops. The peripheral pulses are equal and full.  Abdomen:   The belly is soft and globose. There is no rebound, guarding, organomegaly, mass-effect, or tenderness. Bowel sounds are present.  Extremities:   There is no evidence of cyanosis or clubbing. There is no ankle/ leg edema but no calf tenderness.   Rheumatologic:   There is some evidence of osteoarthritic deformities of the hands. The gait is slow, stooped and cane supported.  Cutaneous:   There is an irregularly shaped, variably pigmented, flaky, ovoid shaped lesion on the right cheek.  There are no overt rashes, disseminated lesions, purpura, or petechiae.  Lymphatics:   There is no evidence of adenopathy in the cervical, supraclavicular, or axillary areas.  Neurologic:   The patient is alert, oriented, cooperative, and pleasant. He is appropriately conversant. There is no overt impairment with no overt focal motor deficits.   Psych:   Mood and affect are appropriate for circumstance. Eye contact is appropriate.        LABS    Lab Results - Last 18 Months   Lab Units 03/20/25  1105 12/27/24  0301 12/26/24  0848 12/25/24  0630 12/24/24  2116 12/09/24  1303 09/05/24  1114 06/06/24  1036   HEMOGLOBIN g/dL 12.9* 10.5* 11.2* 10.5* 12.7* 12.2* 11.6* 12.8*   HEMATOCRIT % 41.2 33.0* 34.0* 33.2* 39.0 39.4 37.3* 40.2   MCV fL 101.2* 98.5* 95.5 97.4* 97.3* 101.3* 100.8* 97.1*   WBC " 10*3/mm3 7.10 8.01 9.37 13.60* 13.66* 13.72* 9.38 8.77   RDW % 14.9 13.3 13.2 13.2 13.2 13.3 13.4 15.6*   MPV fL 7.9 8.7 9.0 8.5 8.4 8.7 8.5 8.7   PLATELETS 10*3/mm3 284 254 266 285 380 401 239 217   IMM GRAN % %  --  0.6* 0.6*  --  0.4 1.2* 0.5 0.5   NEUTROS ABS 10*3/mm3 5.80 6.19 8.10*  --  12.04* 12.45* 7.86* 7.25*   LYMPHS ABS 10*3/mm3 0.60* 0.95 0.76  --  0.74 0.49* 0.73 0.78   MONOS ABS 10*3/mm3  --  0.79 0.43  --  0.81 0.59 0.68 0.65   EOS ABS 10*3/mm3  --  0.02 0.01  --  0.00 0.00 0.04 0.03   BASOS ABS 10*3/mm3  --  0.01 0.01  --  0.01 0.02 0.02 0.02   IMMATURE GRANS (ABS) 10*3/mm3  --  0.05 0.06*  --  0.06* 0.17* 0.05 0.04   NRBC /100 WBC  --  0.0 0.0  --  0.0 0.0 0.0 0.0       Lab Results - Last 18 Months   Lab Units 03/20/25  1105 12/27/24  0301 12/26/24  0848 12/25/24  0630 12/24/24  2247 12/24/24  2116 12/09/24  1303 09/05/24  1114 06/06/24  1036   GLUCOSE mg/dL 84 99 175* 108*  --  109* 114* 70 83   SODIUM mmol/L 134* 135* 130* 129*  --  130* 135* 135* 134*   SODIUM, ARTERIAL mmol/L  --   --   --   --  130*  --   --   --   --    POTASSIUM mmol/L 4.2 3.6 3.6 4.0  --  4.5 4.6 4.3 4.2   CO2 mmol/L 29.0 26.0 24.0 24.0  --  23.0 27.0 26.0 27.0   CHLORIDE mmol/L 103 98 95* 95*  --  95* 99 98 99   ANION GAP mmol/L 2.0* 11.0 11.0 10.0  --  12.0 9.0 11.0 8.0   CREATININE mg/dL 0.90 0.68* 0.75* 0.63*  --  0.70* 0.74* 0.80 0.76   BUN mg/dL 8 8 7* 7*  --  7* 12 8 9   BUN / CREAT RATIO  8.9 11.8 9.3 11.1  --  10.0 16.2 10.0 11.8   CALCIUM mg/dL 9.2 7.9* 7.8* 7.2*  --  8.5* 8.3* 8.6 9.0   ALK PHOS U/L 68  --   --  80  --  109 105 128* 200*   TOTAL PROTEIN g/dL 7.1  --   --  5.4*  --  6.9 6.8 6.4 6.5   ALT (SGPT) U/L 40  --   --  19  --  29 53* 18 24   AST (SGOT) U/L 29  --   --  15  --  22 28 16 20   BILIRUBIN mg/dL 0.5  --   --  0.3  --  0.4 0.3 0.3 0.4   ALBUMIN g/dL 4.2  --   --  2.9*  --  3.5 3.6 3.8 4.2   GLOBULIN gm/dL 2.9  --   --  2.5  --  3.4 3.2 2.6 2.3       Lab Results - Last 18 Months   Lab Units  03/20/25  1105 12/09/24  1303 09/05/24  1114 06/06/24  1036 03/07/24  1046 12/21/23  1041   CEA ng/mL 4.88 4.75 3.77 4.20 4.29 4.05       Lab Results - Last 18 Months   Lab Units 03/20/25  1105 12/25/24  0911 12/09/24  1303 09/05/24  1114 06/06/24  1036 03/07/24  1046 12/21/23  1041   IRON mcg/dL 152  --  58* 101 98 50* 75   TIBC mcg/dL 322  --  219* 283* 304 405 347   IRON SATURATION (TSAT) % 47  --  26 36 32 12* 22   FERRITIN ng/mL 110.00  --  290.80 216.40 201.60 25.52* 42.61   FOLATE ng/mL 14.20 12.30 10.60 10.70 12.20 16.90 14.80       PROBLEMS IDENTIFIED:   1.  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   Tumor burden: 1.5 cm left lower lung nodule.   Complications of tumor: None.   Tumor status: JAZMÍN following resection via left lower lobectomy.   Chest x-ray, 07/03/2017, [facility]. Impression: Stable appearance of the chest with postoperative changes in the left lung base and emphysematous changes.   Chest x-ray obtained 11/20/2017 at [facility] reveals no interval change when compared to chest x-ray dated 07/03/2017.   Chest x-ray on 02/12/2018 ([facility]). Impression: Hyperinflated lungs suggesting chronic obstructive pulmonary disease (COPD). Postoperative changes of the left hemithorax. No acute cardiopulmonary process identified.   Chest Xray on 07/06/2018 ([facility]). Impression: No acute cardiopulmonary disease.   Chest X-ray, 12/21/2018 at [facility]. COPD. Chronic volume loss in the lung bases with mild central vascular crowding. No acute infiltrates.   06/19/2020-chest x-ray.  Comparison: 8/23/2019-impression: Mild blunting of the left costophrenic angle which could be seen with scarring or small effusion.  02/22/2021- chest xray.  COPD no acute cardiopulmonary process  10/25/2021-chest x-ray.  Streaky bibasilar opacities.  Similar to prior.  04/25/22- CT chest- Status post resection of a  left lower lobe pulmonary nodule. Moderate changes of centrilobular emphysema are present. Small groundglass nodules one within the left upper lobe and one within the periphery of the right lower lobe warranting follow-up per Fleischner criteria. No additional lung lesions are present. No enlarged mediastinal or axillary lymphadenopathy.  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 The thoracic aorta is ectatic. There is mild enlargement of the pulmonary arteries suggesting pulmonary arterial hypertension. Heavy coronary calcifications are present.   07/20/22-CT chest- postoperative change of LLL without evidence of recurrent or metastatic disease.  No change in 7 mm NISA and RLL groundglass pulmonary nodules.  Consider continued imaging surveillance to document stability.  Ectatic ascending aorta measuring 4 cm in diameter.  1/27/23- CT chest- A stable CT scan of the chest. Postsurgical changes/left lower lobectomy. No finding to suggest recurrence. A stable small groundglass opacity/nodule in the left upper lobe. No features of malignancy at this time. A follow-up examination in one year is recommended to ensure stability. Chronic emphysematous lung changes  6/14/23- CT chest- New 1.5 cm central LEFT upper lobe pulmonary nodule. This is concerning for recurrent neoplasm. Management options include PET/CT versus sampling with EBUS.  6/21/23- PET-CT- PET-CT findings compatible with recurrent left lung neoplasm an isolated finding (15 mm left lung nodule adjacent to the hilum shows FDG uptake with SUV max = 3.3).  No distant disease is seen.  SBRT: 8/1/23- 8/14/23- 6000 cGy/5 fx) to NISA nodule  11/8/23- CT chest- Stable 1.5 cm left perihilar central upper lobe nodule, relatively unchanged from 6/14/2023. No new or increasing size nodule identified. No pathologic lymphadenopathy. Stable volume loss left hemithorax from a  prior left lower lobe lobectomy.  11/15/23- Follow-up Dr. Samuels- Diagnosed  with stage IA2 (cT1b cN0 cM0) neoplasm of left upper lung. He completed 5000 cGy in 5 fractions to the NISA lung tumor on 08/14/2023.  CT chest, 11/8/23 reviewed.  I do not see evidence for recurrent or metastatic disease at this time. We will continue routine follow-up/surveillance as discussed in 3 months with follow up CT scan before visit. Remission!  2/1/24- CT chest- Stable chest CT appearance. No new or progressive neoplastic disease.  2/5/24- Follow-up Dr. Samuels-, I do not see evidence for recurrent or metastatic disease at this time. We will continue routine follow-up/surveillance as discussed in 3 months with follow up CT scan before visit.   5/6/24- CT chest- Stable CT chest without new or progressive disease.   8/22/24- PET scan-1. Intense abnormal uptake associated with the peripheral nodule in the right lower lobe which abuts the major fissure along its anterior margin. Given the high SUV measurement neoplasia should be excluded and is the primary consideration. It is of note that the nodule has changed somewhat from the previous examination demonstrating increase in size in the transverse dimension but overall diminishment in size in the coronal dimension. This is somewhat unusual for neoplasia suggesting that this finding could potentially be inflammatory/infectious in nature. No associated metabolically active hilar or mediastinal adenopathy. Previously noted metabolically active left perihilar nodule no longer demonstrates any abnormal metabolic activity posttreatment. No additional sites of abnormal metabolic activity demonstrated. There are emphysematous changes of the lungs. No additional lung lesions noted by CT imaging. No cervical or mediastinal adenopathy. 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 Diffuse enlargement of the prostate gland is present with mass effect on the base of the bladder. Prominence of the urinary bladder wall  likely is related to muscular hypertrophy from an element of chronic bladder outlet obstruction.  8/29/24- Follow-up Dr. Samuels:  Diagnosed with stage IA2 (cT1b cN0 cM0) left upper lobe lung 1.5. He completed 5000 cGy in 5 fractions to the NISA lung tumor on 08/14/2023.  He now has a new tumor in the right lower lobe on PET scan, 8/22/24.  I recommended a course of stereotactic radiosurgery to right lower lobe tumor, I anticipate 4066-1142 cGy over 3-5 treatments - started 9/10/24-9/17/24.   12/17/24-CT chest- 1.  Marked decrease in size of 12 mm right lower lobe pulmonary nodule (previously 3.2 cm). No new or enlarging pulmonary nodules.2.  Fairly extensive consolidation and groundglass opacity throughout the left upper lobe. Favor infectious process/pneumonia but recommendfollow-up to ensure resolution and exclude a neoplastic process. Small left-sided pleural effusion. Postoperative change of left lower lobectomy.  3/20/25- CT chest-Previous left lower lobe resection. There is left perihilar parenchymal scarring and consolidation with associated volume loss and mild mediastinal shift. Previously noted more extensive consolidation within the left lung has resolved and I feel represented post radiation pneumonitis. A small loculated effusion is noted in the left lung base showing diminishment in size. There is mild thickening of the visceral and parietal pleura. No new or enlarging pulmonary nodule in the left lung. Within the right lung there is linear scarring with associated nodularity in the lateral aspect of the right lower lobe. The nodular component of this shows continued diminishment in size now measuring 8 mm in maximum dimension versus 1.2 cm on the previous exam. No new or enlarging right-sided pulmonary nodules. No developing mediastinal or axillary lymphadenopathy.       2.  Mildly abnormal CEA.    --Stable. 4.88, 3/20/25 (prior range: 2.2 - 5.0).     3.  Symptomatic degenerative joint disease (DJD),  "worst in the knees, left greater than right. Underwent right knee replacement, 05/18/2017.   --5/16/24- xray lumbar spine- Osteopenia. Extensive degenerative disc, endplate, and facet disease. Mild chronic appearing compression deformities of L1 and L4.    4.  Normocytic/macrocytic anemia with resolution of macrocytosis, repleted iron and B12 deficiencies (anemia of chronic disease).   --Stable, Hgb 12.9; .2, 3/20/25 (prior range: Hgb 11.4 - 14.3; 94.4 - 102.1)  a. No myelodysplastic syndrome (MDS) on comprehensive blood report, normal thyroid function tests (TFTs), normal LDH, normal B12/folate.   b. Colonoscopy on 4/10/18 ([facility]). Impressions: Preparation of the colon was inadequate. One 12 mm polyp in the cecum, removed with a hot snare and removed piecemeal using a hot snare. Resected and retrieved. Diverticulosis in the sigmoid colon and in the descending colon.   c. Repeat with 2 day prep scheduled for 08/02/2018. \"Polyps\" Repeat 3 years.  d. Colonoscopy, 08/02/2018 (above).  Fair colon preparation.  1 5 mm polyp in the ascending colon, removed with hot snare.  One 12 mm polyp in the transverse colon, removed with a hot snare.  e.  Injectafer 750 mg, 11/01/2022; 3/14/24; 3/21/24  5.  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 Negative thyroid ultrasound, 11/30/2011. Is followed by Dr. Medrano (ENT) and Dr. Roth (endocrine).   6.  Low B12 levels. Recurrent.   7.  Constipation, multifactorial.  Regulated by stool softeners.  \"Occasionally.\"  8.  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   9.  Chronic intermittent irregular heart rate, Dr. Dobson following.   10. A 50 pack-year tobacco smoker with radiographic evidence of emphysema. Has not smoked since 2004.         11. Anxiety.          12.  Myasthenia gravis.  Followed by Dr. Hines        13.  COPD        14.  Weight loss.  Has stabilized.  Reassures me it is intentional.  \"Eating less " "and smarter.\"        15.  Elevated alk phos,   - Resolved, 68, 3/20/25 (prior: 200 on 6/6/2024). Osteopenia?  - Lumbar xray, 5/16/24 (above).  Osteopenia. Extensive degenerative disc, endplate, and facet disease. Mild chronic appearing compression deformities of L1 and L4.         16.  Right cheek skin lesion    RECOMMENDATIONS:   1.   Apprise of labs from 3/20/25 with stable anemia otherwise normal CBC, CEA 4.8 (prior:  2.2 - 5), sodium 134, otherwise normal CMP, repleted iron (152), repleted (47%; 26%- 36%; 32%; 12%; 22%; 30%; 31%; 35%; 37%; 18%) Fe sat, ferritin (110-prior: 290; 216; 201; 25; 42; 47; 68; 71; 56; 17; 45; 87; 58), repleted (499; 254; 317; 306; 350; 279; 366; 290; 388; >1000) B12, folate 14.2.     2.   Review chest CT, 3/20/25 (above).  No new new or enlarging pulmonary nodule in the left lung.  Nodularity in lateral aspect of the right lower lobe shows continued diminishment in size.  No new or enlarging right-sided pulmonary nodules.  No developing mediastinal/axillary lymphadenopathy.  3.   Prior review visit with radiation oncology, Dr. Samuels on  8/29/24- Diagnosed with stage IA2 (cT1b cN0 cM0) left upper lobe lung 1.5. He completed 5000 cGy in 5 fractions to the NISA lung tumor on 08/14/2023.  He now has a new tumor in the right lower lobe on PET scan, 8/22/24.  I recommended a course of stereotactic radiosurgery to right lower lobe tumor, I anticipate 3506-5687 cGy over 3-5 treatments. Treatments beginning 9/10/24-9/16/24  4.   Previously discussed that even without adjuvant chemotherapy 70.8% of people are alive in 5 years and none of those are alive due to therapy. Approximately 28% of those die of cancer and 1.3% die from other causes. Emphasized that very little data are available about the effects of adjuvant chemotherapy in Stage I lung cancer and most guidelines do not recommend the use of adjuvant therapy in this population and a meta-analysis even suggested that there was a trend " toward a worse outcome in these patients. He preferred not to have chemo anyway.     5.  Continue ongoing management per primary care physician (Dr. Dobson) and other specialists (has follow-up appointment with Dr. Samuels later today).     6.  Refer to dermatology Re:  Right cheek skin lesion  7.  Schedule CT chest in 15 weeks  8.  Return to the office with pre-office CEA, serum iron, Fe sat, ferritin, B12/folate, CMP, and CBC with differential in 16 weeks.      I spent ~    minutes caring for Carmine on this date of service. This time includes time spent by me in the following activities: preparing for the visit, reviewing tests, performing a medically appropriate examination and/or evaluation, counseling and educating the patient/family/caregiver, ordering medications, tests, or procedures and documenting information in the medical record        cc: DO Kieran Pennington MD Nicholas Lopez, MD

## 2025-03-25 NOTE — PROGRESS NOTES
[facility]  Radiation Oncology Clinic   Byron Ojeda MD, FACR  Bryn EDWARDS  _______________________________________________  [facility]  Department of Radiation Oncology  [address] [place] [id number]  Office: [phone]  Fax: [phone]    DATE: 03/27/2025  PATIENT: Carmine Garcia  1940                         MEDICAL RECORD #: [id number]    1. History of primary non-small cell carcinoma of right lung    2. History of primary non-small cell carcinoma of left lung    3. History of radiation therapy    4. Former smoker                                               REASON FOR VISIT:    Chief Complaint   Patient presents with    Lung Cancer     Reason for Follow up Visit:  Carmine Garcia is a very pleasant 84 y.o. patient that completed radiation to the lung and returns to the clinic today for routine follow up exam.     History of Present Illness:    10/20/2011 - CT Chest:  1.5 cm left lower lobe nodule positioned just lateral to the hilum, granuloma vs. Malignancy  No pathologic lymphadenopathy    10/26/2011 - PET Scan:  Increased metabolic activity in the left lower lobe, lung nodule located centrally near the left hilum witih max SUV 7.4, suspicious for primary carcinoma. This corresponds to the lesion seen on CT scan of 10/20/2011.   No abnormal mediastinal, hilar, or inguinal activity    11/14/2011 - Bronchoscopy with biopsy:  Biopsy, left lower lobe nodule, lung:    Fragments of bronchial mucosa with submucosa demonstrating stromal elastosis and minimal chronic inflammation.   No evidence of granulomatous inflammation.   No histologic evidence of malignancy.    Bronchial washings, smears (four) and cell block:    Mild acute inflammation.   Negative for malignant cells.    Bronchial brushings, left lower lobe of lung, smears (3) and ThinPrep preparation (1):    Negative for malignant cells.     11/28/2011 -  Left thoracoscopic  core needle biopsy, followed by video-assisted thoracoscopic surgery (VATS) left lower lobectomy, and mediastinal lymph node dissection:  Biopsy, left lower lobe of lung (frozen section control):    Fragment of fibrotic pulmonary parenchyma demonstrating stromal elastosis and chronic active inflammation, moderate.   No histologic evidence of malignancy.    Level 10 lymph node (frozen section control):    Lymph node (one), negative for metastatic carcinoma.    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   The dominant and aberrant bronchial margins of surgical resection are negative for malignancy.    The vascular and parenchymal margins of surgical resection are negative for malignancy.    No visceral pleural invasion identified.    Peribronchial lymph nodes (three), negative for metastatic carcinoma.    Emphysematous changes.    Level 11 lymph node: Lymph node (one), negative for metastatic carcinoma.    Level 5 lymph nodes: Lymph nodes (two), negative for metastatic carcinoma.    Comment: Primary papillary adenocarcinomas of the lung do occur; however, they demonstrate a similar histology to that seen in papillary thyroid carcinoma.  The thyroid gland should be evaluated if not already done to exclude the possibility of met     04/30/2014 - Chest x-ray:  Stable chronic change.  No acute disease.    09/03/2014 - Chest x-ray:   Emphysematous and postoperative changes in the chest without evidence of mass lesion or acute cardiopulmonary process.    01/05/2016 - Chest x-ray:   Postoperative changes of the left hemithorax with associated volume loss. No acute process identified.    07/01/2016 - Chest x-ray:   No active disease is seen.  COPD/emphysema.  Prior lung surgery on the left.     01/04/2017 - Chest x-ray:  COPD/emphysema.  Postoperative changes on the left.  No acute appearing infiltrate or effusion.     03/20/2017 - Chest  x-ray:  Chronic changes in the lungs and spine without evidence of acute cardiopulmonary process. Overall, the appearance of the lungs is similar to the study from 01/04/2017.    11/20/2017 - Chest x-ray:  No significant interval change when compared to chest x-ray dated 07/03/2017.    02/12/2018 - Chest x-ray:  Hyperinflated lungs suggesting COPD.  Postoperative changes of the left hemithorax.  No acute cardiopulmonary process identified.    07/06/2018 - Chest x-ray:  No acute cardiopulmonary disease.    12/21/2018 - Chest x-ray:  COPD. Chronic volume loss in the lung bases with mild central vascular crowding. No acute infiltrates.    08/23/2019 - Chest x-ray:  No acute disease.    06/19/2020 - Chest x-ray:  Mild blunting of the left costophrenic angle, which could be seen with scarring or small effusion.    02/22/2021 - Chest x-ray:  COPD no acute cardiopulmonary process.    10/25/2021 - Chest x-ray:  Streaky bibasilar opacities, similar to prior.    04/25/2022 - CT Chest with contrast:  Status post resection of a left lower lobe pulmonary nodule. Moderate changes of centrilobular emphysema are present.  Small groundglass nodules one within the left upper lobe and one within the periphery of the right lower lobe warranting follow-up per Fleischner criteria. No additional lung lesions are present.  No enlarged mediastinal or axillary lymphadenopathy..  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 The thoracic aorta is ectatic. There is mild enlargement of the pulmonary arteries suggesting pulmonary arterial hypertension. Heavy coronary calcifications are present.     07/20/2022 - CT Chest without contrast:  Postoperative change of LEFT lower lobectomy without evidence of recurrent or metastatic disease.   No change in 7 mm LEFT upper lobe and RIGHT lower lobe groundglass pulmonary nodules. Consider continued imaging surveillance to document stability.  Ectatic ascending aorta measuring 4 cm  diameter.    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   Tumor burden: 1.5 cm left lower lung nodule.   Complications of tumor: None.   Tumor status: JAZMÍN following resection via left lower lobectomy.   Chest x-ray, 07/03/2017, [facility]. Impression: Stable appearance of the chest with postoperative changes in the left lung base and emphysematous changes.   Chest x-ray obtained 11/20/2017 at [facility] reveals no interval change when compared to chest x-ray dated 07/03/2017.   Chest x-ray on 02/12/2018 ([facility]). Impression: Hyperinflated lungs suggesting chronic obstructive pulmonary disease (COPD). Postoperative changes of the left hemithorax. No acute cardiopulmonary process identified.   Chest Xray on 07/06/2018 ([facility]). Impression: No acute cardiopulmonary disease.   Chest X-ray, 12/21/2018 at [facility]. COPD. Chronic volume loss in the lung bases with mild central vascular crowding. No acute infiltrates.   06/19/2020-chest x-ray.  Comparison: 8/23/2019-impression: Mild blunting of the left costophrenic angle which could be seen with scarring or small effusion.  02/22/2021- chest xray.  COPD no acute cardiopulmonary process  10/25/2021-chest x-ray.  Streaky bibasilar opacities.  Similar to prior.  04/25/2022- CT chest- Status post resection of a left lower lobe pulmonary nodule. Moderate changes of centrilobular emphysema are present. Small groundglass nodules one within the left upper lobe and one within the periphery of the right lower lobe warranting follow-up per Fleischner criteria. No additional lung lesions are present. No enlarged mediastinal or axillary lymphadenopathy.  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 The thoracic aorta is ectatic. There is mild enlargement of the pulmonary arteries  "suggesting pulmonary arterial hypertension. Heavy coronary calcifications are present.   07/20/2022-CT chest- postoperative change of LLL without evidence of recurrent or metastatic disease.  No change in 7 mm NISA and RLL groundglass pulmonary nodules.  Consider continued imaging surveillance to document stability.  Ectatic ascending aorta measuring 4 cm in diameter.  Mildly abnormal CEA.    --3.39, 10/26/2022 (prior range: 2.2 - 5.0). Continued observation warranted.   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 Underwent right knee replacement, 05/18/2017.   Normocytic/macrocytic anemia with resolution of macrocytosis, repleted iron and B12 deficiencies (anemia of chronic disease).   --Stable, Hgb 14; MCV 98.2, 10/26/2022 (prior range: Hgb 11.4 - 14.3; 94.4 - 102.1)  No myelodysplastic syndrome (MDS) on comprehensive blood report, normal thyroid function tests (TFTs), normal LDH, normal B12/folate.   Colonoscopy on 4/10/18 ([facility]). Impressions: Preparation of the colon was inadequate. One 12 mm polyp in the cecum, removed with a hot snare and removed piecemeal using a hot snare. Resected and retrieved. Diverticulosis in the sigmoid colon and in the descending colon.   Repeat with 2 day prep scheduled for 08/02/2018. \"Polyps\" Repeat 3 years.  Colonoscopy, 08/02/2018 (above).  Fair colon preparation.  1 5 mm polyp in the ascending colon, removed with hot snare.  One 12 mm polyp in the transverse colon, removed with a hot snare.  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 Negative thyroid ultrasound, 11/30/2011. Is followed by Dr. Medrano (ENT) and Dr. Roth (endocrine).   Low B12 levels. Recurrent.   Constipation, multifactorial.  Regulated by stool softeners.  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   Chronic intermittent irregular heart rate, Dr. Dobson " following.   A 50 pack-year tobacco smoker with radiographic evidence of emphysema. Has not smoked since 2004.   Anxiety.    Diplopia.  Is scheduled to see ophthalmology today  RECOMMENDATIONS:   Apprised of surveillance chest CT, 07/22/2022 (see above).  No evidence of recurrent metastatic disease.  No change in 7 mm NISA and RLL groundglass pulmonary nodules.  Apprise of labs from 10/26/2022 including the current heme (stable anemia otherwise normal CBC) and the other labs noting the stable CEA, essentially normal CMP, repleted iron, repleted (35%; from 37%; 18%) Fe sat, depressed ferritin (71; from 56; 17; 45; 87; 58), depressed (290; from 388; >1000) B12/folate.  Anne called in  Previously discussed that even without adjuvant chemotherapy 70.8% of people are alive in 5 years and none of those are alive due to therapy. Approximately 28% of those die of cancer and 1.3% die from other causes. Emphasized that very little data are available about the effects of adjuvant chemotherapy in Stage I lung cancer and most guidelines do not recommend the use of adjuvant therapy in this population and a meta-analysis even suggested that there was a trend toward a worse outcome in these patients. He preferred not to have chemo anyway.   Review follow-up by GRACE Bahena with pulmonary, 07/27/2022.  Review CT chest without contrast, 07/20/2022 (above).  Stable overall.  Plan: CT chest without contrast and follow-up in 6 months (01/27/2023).  Stay on [place]   Continue ongoing management per primary care physician and other specialists. Is seeing the eye docs today  Schedule chest CT with contrast in 23 weeks at [facility]  Return to the office with pre-office CEA, serum iron, Fe sat, ferritin, B12/folate, CMP, and CBC with differential in 24 weeks.    12/21/2022 - Chest x-ray:  Stable chronic change.  No acute disease.    01/27/2023 - CT Chest with contrast:  A stable CT scan of the chest. Postsurgical changes/left lower lobectomy.  No finding to suggest recurrence.  A stable small groundglass opacity/nodule in the left upper lobe. No features of malignancy at this time. A follow-up examination in one year is recommended to ensure stability.  Chronic emphysematous lung changes.    06/14/2023 - CT Chest with contrast:  New 1.5 cm central LEFT upper lobe pulmonary nodule. This is concerning for recurrent neoplasm. Management options include PET/CT versus sampling with EBUS.    06/21/2023 - PET Scan:  PET-CT findings compatible with recurrent left lung neoplasm as an isolated finding. No distant disease is seen.    06/22/2023 - Documentation per Dr. Arteaga:  Patient informed per Dr Artegaa recommendations a referral should be made to Rad/Onc for evaluation and possible SBRT  Patient informed once the apt is dom[place] he will be called with time and date    06/28/2023 - Consult with  (Covering for ):  Following this discussion and in consideration of the diagnostic data/evaluation of the patient, I recommended a course of definitive intent pulmonary SBRT to the PET avid left upper lobe nodule to an approximate dose of 8633-7252 cGy to be given over 4-5 every other day fractions, final course pending.  If SBRT cannot safely be delivered secondary to central location, would favor a hypofractionated course of treatment to an approximate dose of 2343-4946 cGy to be given over 15 daily fractions (Monday through Friday).  Will simulate treatment fields today, 3D CT with MIPS to begin the planning process, final course pending.    08/01/2023 - 08/14/2023 - Completed radiation course:  Received 5000 cGy in 5 fractions to the NISA lung tumor.    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   Tumor burden: 1.5 cm left lower lung nodule.   Complications of tumor: None.   Tumor status: JAZMÍN following resection  via left lower lobectomy.   Chest x-ray, 07/03/2017, [facility]. Impression: Stable appearance of the chest with postoperative changes in the left lung base and emphysematous changes.   Chest x-ray obtained 11/20/2017 at [facility] reveals no interval change when compared to chest x-ray dated 07/03/2017.   Chest x-ray on 02/12/2018 ([facility]). Impression: Hyperinflated lungs suggesting chronic obstructive pulmonary disease (COPD). Postoperative changes of the left hemithorax. No acute cardiopulmonary process identified.   Chest Xray on 07/06/2018 ([facility]). Impression: No acute cardiopulmonary disease.   Chest X-ray, 12/21/2018 at [facility]. COPD. Chronic volume loss in the lung bases with mild central vascular crowding. No acute infiltrates.   06/19/2020-chest x-ray.  Comparison: 8/23/2019-impression: Mild blunting of the left costophrenic angle which could be seen with scarring or small effusion.  02/22/2021- chest xray.  COPD no acute cardiopulmonary process  10/25/2021-chest x-ray.  Streaky bibasilar opacities.  Similar to prior.  04/25/22- CT chest- Status post resection of a left lower lobe pulmonary nodule. Moderate changes of centrilobular emphysema are present. Small groundglass nodules one within the left upper lobe and one within the periphery of the right lower lobe warranting follow-up per Fleischner criteria. No additional lung lesions are present. No enlarged mediastinal or axillary lymphadenopathy.  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 The thoracic aorta is ectatic. There is mild enlargement of the pulmonary arteries suggesting pulmonary arterial hypertension. Heavy coronary calcifications are present.   07/20/22-CT chest- postoperative change of LLL without evidence of recurrent or metastatic disease.  No change in 7 mm NISA and RLL groundglass pulmonary nodules.  Consider continued imaging surveillance to document  "stability.  Ectatic ascending aorta measuring 4 cm in diameter.  1/27/23- CT chest- A stable CT scan of the chest. Postsurgical changes/left lower lobectomy. No finding to suggest recurrence. A stable small groundglass opacity/nodule in the left upper lobe. No features of malignancy at this time. A follow-up examination in one year is recommended to ensure stability. Chronic emphysematous lung changes  6/14/23- CT chest- New 1.5 cm central LEFT upper lobe pulmonary nodule. This is concerning for recurrent neoplasm. Management options include PET/CT versus sampling with EBUS.  6/21/23- PET-CT- PET-CT findings compatible with recurrent left lung neoplasm an isolated finding (15 mm left lung nodule adjacent to the hilum shows FDG uptake with SUV max = 3.3).  No distant disease is seen.  SBRT: 8/1/23- 8/14/23- 6000 cGy/5 fx) to NISA nodule  Mildly abnormal CEA.    --4.15, 8/21/23 (prior range: 2.2 - 5.0). Continued observation warranted.   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 Underwent right knee replacement, 05/18/2017.   Normocytic/macrocytic anemia with resolution of macrocytosis, repleted iron and B12 deficiencies (anemia of chronic disease).   --Stable, Hgb 12.9; MCV 99.8, 8/21/23 (prior range: Hgb 11.4 - 14.3; 94.4 - 102.1)  No myelodysplastic syndrome (MDS) on comprehensive blood report, normal thyroid function tests (TFTs), normal LDH, normal B12/folate.   Colonoscopy on 4/10/18 ([facility]). Impressions: Preparation of the colon was inadequate. One 12 mm polyp in the cecum, removed with a hot snare and removed piecemeal using a hot snare. Resected and retrieved. Diverticulosis in the sigmoid colon and in the descending colon.   Repeat with 2 day prep scheduled for 08/02/2018. \"Polyps\" Repeat 3 years.  Colonoscopy, 08/02/2018 (above).  Fair colon preparation.  1 5 mm polyp in the ascending colon, removed with hot snare.  One 12 mm polyp in the " "transverse colon, removed with a hot snare.  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 Negative thyroid ultrasound, 11/30/2011. Is followed by Dr. Medrano (ENT) and Dr. Roth (endocrine).   Low B12 levels. Recurrent.   Constipation, multifactorial.  Regulated by stool softeners.  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   Chronic intermittent irregular heart rate, Dr. Dobson following.   A 50 pack-year tobacco smoker with radiographic evidence of emphysema. Has not smoked since 2004.   Anxiety.    Myasthenia gravis.  Followed by Dr. Hines  COPD  Weight loss.  Has stabilized.  Reassures me it is intentional.  \"Eating less and smarter.\"  RECOMMENDATIONS:   Apprise of labs from 8/21/23 including the current heme (stable anemia otherwise normal CBC) and the other labs noting the stable CEA (4.15-prior:  2.2 - 5), glucose otherwise essentially normal CMP, repleted iron, repleted (30%- prior: 31%; 35%; 37%; 18%) Fe sat, depressed ferritin (47; prior:  68; 71; 56; 17; 45; 87; 58), depressed (279; from 366; 290; 388; >1000) B12/folate.  (Anne called in)  CT chest, 6/14/23 and PET scan, 6/21/23 (above).  New 1.5 cm central LEFT upper lobe pulmonary nodule compatible with recurrent neoplasm.  Previously discussed that even without adjuvant chemotherapy 70.8% of people are alive in 5 years and none of those are alive due to therapy. Approximately 28% of those die of cancer and 1.3% die from other causes. Emphasized that very little data are available about the effects of adjuvant chemotherapy in Stage I lung cancer and most guidelines do not recommend the use of adjuvant therapy in this population and a meta-analysis even suggested that there was a trend toward a worse outcome in these patients. He preferred not to have chemo anyway.   Review follow-up with pulmonary, 7/11/23 Re:  Diagnostic bronchoscopy and PFTs.  Patient " "declined any invasive procedures.  He said that he had PFT in the past that \"almost killed him\" and is not wanting a pulmonary function test at this time either.  RTC 3 months (~ 10/11/23)  Review visit with radiation oncology (Dr. Herring), 6/28/23.   I recommended a course of definitive intent pulmonary SBRT to the PET avid left upper lobe nodule to an approximate dose of 4862-8215 cGy to be given over 4-5 every other day fractions, final course pending.  If SBRT cannot safely be delivered secondary to central location, would favor a hypofractionated course of treatment to an approximate dose of 9056-5976 cGy to be given over 15 daily fractions  (SBRT: 8/1/23- 8/14/23- 6000 cGy/5 fx)  Continue ongoing management per primary care physician and other specialists. Has appointment in 11/2023 with rad onc along with CT prior to visit  Return to the office with pre-office CEA, serum iron, Fe sat, ferritin, B12/folate, CMP, and CBC with differential in 16 weeks.    11/08/2023 - CT Chest with contrast:  Stable 1.5 cm left perihilar central upper lobe nodule, relatively unchanged from 6/14/2023. No new or increasing size nodule identified.  No pathologic lymphadenopathy. Stable volume loss left hemithorax from a prior left lower lobe lobectomy.    11/15/2023 - Appointment with :  Follow up in 3 months     12/28/2023 - Appointment with :  RECOMMENDATIONS:   Apprise of labs from 12/21/23 including the current heme (stable anemia otherwise normal CBC) and the other labs with CEA p (prior:  2.2 - 5), calcium 8.3 otherwise essentially normal CMP, repleted iron, repleted (22%- prior: 30%; 31%; 35%; 37%; 18%) Fe sat, depressed ferritin (42- prior:  47; 68; 71; 56; 17; 45; 87; 58) repleted (350; from 279; 366; 290; 388; >1000) B12/folate.   Apprised of CT chest, 11/8/23 (above).  Stable 1.5 cm left perihilar central upper lobe nodule, relatively unchanged from 6/14/2023. No new or increasing size nodule " identified. No pathologic lymphadenopathy  Previously discussed that even without adjuvant chemotherapy 70.8% of people are alive in 5 years and none of those are alive due to therapy. Approximately 28% of those die of cancer and 1.3% die from other causes. Emphasized that very little data are available about the effects of adjuvant chemotherapy in Stage I lung cancer and most guidelines do not recommend the use of adjuvant therapy in this population and a meta-analysis even suggested that there was a trend toward a worse outcome in these patients. He preferred not to have chemo anyway.   Review visit with radiation oncology (Dr. Samuels), 11/15/23- Diagnosed with stage IA2 (cT1b cN0 cM0) neoplasm of left upper lung. He completed 5000 cGy in 5 fractions to the NISA lung tumor on 08/14/2023.  CT chest, 11/8/23 reviewed.  I do not see evidence for recurrent or metastatic disease at this time. We will continue routine follow-up/surveillance as discussed in 3 months with follow up CT scan before visit. Remission!  Review follow-up with GRACE Bahena with pulmonary, 10/11/23-A/P: NISA nodule. Follow-up prn  Continue ongoing management per primary care physician and other specialists. Has appointment in 2/2024 with rad onc along with CT prior to visit  Return to the office with pre-office CEA, serum iron, Fe sat, ferritin, B12/folate, CMP, and CBC with differential in 12 weeks.    02/01/2024 - CT Chest with contrast:  Stable chest CT appearance.  No new or progressive neoplastic disease.    02/05/2024 - Appointment with :  Follow up in 3 months     03/14/2024 - Appointment with :  RECOMMENDATIONS:   Apprise of labs from 3/7/24 including the current heme (stable anemia otherwise normal CBC) and the other labs with CEA 4.29 (prior:  2.2 - 5), calcium 8.9 otherwise essentially normal CMP, low iron (50), low (12%- prior: 22%; 30%; 31%; 35%; 37%; 18%) Fe sat, depressed ferritin (25 -  prior:  42; 47; 68;  71; 56; 17; 45; 87; 58) repleted (306; 350; 279; 366; 290; 388; >1000) B12/folate.   Apprised of CT chest, 2/1/24 (above).   Stable chest CT appearance.  No new or progressive neoplastic disease.  Keep appointments of Injectafer 750 mg IV weekly x 2  Previously discussed that even without adjuvant chemotherapy 70.8% of people are alive in 5 years and none of those are alive due to therapy. Approximately 28% of those die of cancer and 1.3% die from other causes. Emphasized that very little data are available about the effects of adjuvant chemotherapy in Stage I lung cancer and most guidelines do not recommend the use of adjuvant therapy in this population and a meta-analysis even suggested that there was a trend toward a worse outcome in these patients. He preferred not to have chemo anyway.   Review visit with radiation oncology (Dr. 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 He completed 5000 cGy in 5 fractions to the NISA lung tumor on 08/14/2023.  CT chest, 2/1/24 reviewed.  I do not see evidence for recurrent or metastatic disease at this time. We will continue routine follow-up/surveillance as discussed in 3 months with follow up CT scan before visit.   Continue ongoing management per primary care physician and other specialists. Has appointment in 5/2024 with rad onc along with CT prior to visit  Return to the office with pre-office CEA, serum iron, Fe sat, ferritin, B12/folate, CMP, and CBC with differential in 12 weeks.    05/06/2024 - CT Chest with contrast:  Stable CT chest without new or progressive disease.    05/09/2024 - Appointment with :  Follow up in 3 months     06/13/2024 - Appointment with :  RECOMMENDATIONS:   Apprise of labs from 6/6/24 including the current heme (stable anemia otherwise normal CBC) and the other labs with CEA 4.2 (prior:  2.2 - 5), alk phos 200, sodium 134, otherwise normal CMP, repleted iron (98), repleted (32%-  prior: 12%; 22%; 30%; 31%; 35%; 37%; 18%) Fe sat, depressed ferritin (201 -  prior: 25; 42; 47; 68; 71; 56; 17; 45; 87; 58) repleted (306; 350; 279; 366; 290; 388; >1000) B12/folate.   Apprised of lumbar xray, 5/16/24 (above).  Osteopenia. Extensive degenerative disc, endplate, and facet disease. Mild chronic appearing compression deformities of L1 and L4.  Review visit with radiation oncology (GRACE Gtz with Dr. 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 He completed 5000 cGy in 5 fractions to the NISA lung tumor on 08/14/2023.  CT chest, 5/6/24 reviewed.  I do not see evidence for recurrent or metastatic disease at this time. We will continue routine follow-up/surveillance as discussed in 3 months (8/9/24) with follow up CT scan before visit.   Apprised of CT chest, 5/6/24 (above).   JAZMÍN  Previously discussed that even without adjuvant chemotherapy 70.8% of people are alive in 5 years and none of those are alive due to therapy. Approximately 28% of those die of cancer and 1.3% die from other causes. Emphasized that very little data are available about the effects of adjuvant chemotherapy in Stage I lung cancer and most guidelines do not recommend the use of adjuvant therapy in this population and a meta-analysis even suggested that there was a trend toward a worse outcome in these patients. He preferred not to have chemo anyway.   Continue ongoing management per primary care physician and other specialists. Has appointment in 8/9/24 with rad onc along with CT prior to visit  Return to the office with pre-office CEA, serum iron, Fe sat, ferritin, B12/folate, CMP, and CBC with differential in 12 weeks.    08/07/2024 - CT chest with contrast:  New RIGHT lower lobe peripheral spiculated masslike opacity, highly concerning for disease recurrence if the patient has no symptoms of infection.  Heavily calcified coronary arteries versus stent material.  New mild height  loss at T2 and L2.    08/16/2024 - Appointment with :  On review of CT imaging, he does have a new suspicious opacity in the right lower lobe of lung. He declines recent symptoms including fatigue, fever, or productive cough. I will order a PET scan for further evaluation. He will return in 2 weeks for further discussion of recommendations. I have instructed him to continue to see the other health care providers as per their scheduling.  Plan:  PET scan ordered, they will call you.  return in 2 weeks for further discussion    08/22/2024 - PET Scan:  Intense abnormal uptake associated with the peripheral nodule in the right lower lobe which abuts the major fissure along its anterior margin. Given the high SUV measurement neoplasia should be excluded and is the primary consideration. It is of note that the nodule has changed somewhat from the previous examination demonstrating increase in size in the transverse dimension but overall diminishment in size in the coronal dimension. This is somewhat unusual for neoplasia suggesting that this finding could potentially be inflammatory/infectious in nature. No associated metabolically active hilar or mediastinal adenopathy. Previously noted metabolically active left perihilar nodule no longer demonstrates any abnormal metabolic activity posttreatment.  No additional sites of abnormal metabolic activity demonstrated.  There are emphysematous changes of the lungs. No additional lung lesions noted by CT imaging. No cervical or mediastinal adenopathy. 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 Diffuse enlargement of the prostate gland is present with mass effect on the base of the bladder. Prominence of the urinary bladder wall likely is related to muscular hypertrophy from an element of chronic bladder outlet obstruction.    08/29/2024 - Appointment with :  Following this discussion and in consideration  of the diagnostic data/evaluation of the patient, I recommended a course of stereotactic radiosurgery to right lower lobe tumor, I anticipate 8687-2447 cGy over 3-5 treatments. Will simulate treatment fields today, 4D CT with MIPS to begin the planning process, final course pending. . Continue ongoing management per primary care physician and other specialists.     09/10/2024 - 09/17/2024 - Completed radiation course:  Received 5000 cGy in 5 fractions to the RLL lung tumor.     09/12/2024 - Appointment with :  RECOMMENDATIONS:   Apprise of labs from 9/5/24 including the current heme (stable anemia otherwise normal CBC) and the other labs with CEA 3.7 (prior:  2.2 - 5), alk phos 128 (prior: 200), sodium 135, otherwise normal CMP, repleted iron (101), repleted (36%- prior: 32%; 12%; 22%; 30%; 31%; 35%; 37%; 18%) Fe sat, depressed ferritin (216 -  prior: 201; 25; 42; 47; 68; 71; 56; 17; 45; 87; 58) repleted (317; 306; 350; 279; 366; 290; 388; >1000) B12/folate.   Review visit with radiation oncology, Dr. Samuels on  8/29/24- Diagnosed with stage IA2 (cT1b cN0 cM0) left upper lobe lung 1.5. He completed 5000 cGy in 5 fractions to the NISA lung tumor on 08/14/2023.  He now has a new tumor in the right lower lobe on PET scan, 8/22/24.  I recommended a course of stereotactic radiosurgery to right lower lobe tumor, I anticipate 1550-5770 cGy over 3-5 treatments. Treatments beginning 9/10/24-9/16/24  PET scan, 8/22/24 (above).  Neoplasia vs inflammatory lung nodule  Previously discussed that even without adjuvant chemotherapy 70.8% of people are alive in 5 years and none of those are alive due to therapy. Approximately 28% of those die of cancer and 1.3% die from other causes. Emphasized that very little data are available about the effects of adjuvant chemotherapy in Stage I lung cancer and most guidelines do not recommend the use of adjuvant therapy in this population and a meta-analysis even suggested that  there was a trend toward a worse outcome in these patients. He preferred not to have chemo anyway.   Continue ongoing management per primary care physician and other specialists.   Return to the office with pre-office CEA, serum iron, Fe sat, ferritin, B12/folate, CMP, and CBC with differential in 12 weeks-defer imaging to radiation oncology.    12/17/2024 - CT Chest without contrast:  Marked decrease in size of 12 mm right lower lobe pulmonary nodule (previously 3.2 cm). No new or enlarging pulmonary nodules.  Fairly extensive consolidation and groundglass opacity throughout the left upper lobe. Favor infectious process/pneumonia but recommend follow-up to ensure resolution and exclude a neoplastic process. Small left-sided pleural effusion. Postoperative change of left lower lobectomy.    12/19/2024 - Appointment with GRACE Little - Radiation oncology:  On exam, I do not see evidence for recurrent or metastatic disease at this time. He does have an area in the left upper lobe favored to be infection for which medical oncology has sent an antibiotic.   We will continue routine follow-up/surveillance as discussed in 3 months with follow up CT scan before visit and I have instructed him to continue to see the other health care providers as per their scheduling.    12/19/2024 - Appointment with :  RECOMMENDATIONS:   Apprise of labs from 12/9/24 with WBC 13.7, stable anemia otherwise normal CBC, CEA 4.75 (prior:  2.2 - 5), alk phos 105 (prior: 200), sodium 135, otherwise normal CMP, repleted iron (101), repleted (26%- 36%; 32%; 12%; 22%; 30%; 31%; 35%; 37%; 18%) Fe sat, depressed ferritin (290- prior 216; 201; 25; 42; 47; 68; 71; 56; 17; 45; 87; 58) depressed (254; 317; 306; 350; 279; 366; 290; 388; >1000) B12, folate 10.6.   Review chest CT, 12/17/24 (above). Decrease in size of 12 mm right lower lobe pulmonary nodule (previously 3.2 cm). No new or enlarging pulmonary nodules.  Fairly extensive  consolidation and groundglass opacity throughout the left upper lobe.  Needs follow-up  Review visit with radiation oncology, Dr. Samuels on  8/29/24- Diagnosed with stage IA2 (cT1b cN0 cM0) left upper lobe lung 1.5. He completed 5000 cGy in 5 fractions to the NISA lung tumor on 08/14/2023.  He now has a new tumor in the right lower lobe on PET scan, 8/22/24.  I recommended a course of stereotactic radiosurgery to right lower lobe tumor, I anticipate 5360-9522 cGy over 3-5 treatments. Treatments beginning 9/10/24-9/16/24  Previously discussed that even without adjuvant chemotherapy 70.8% of people are alive in 5 years and none of those are alive due to therapy. Approximately 28% of those die of cancer and 1.3% die from other causes. Emphasized that very little data are available about the effects of adjuvant chemotherapy in Stage I lung cancer and most guidelines do not recommend the use of adjuvant therapy in this population and a meta-analysis even suggested that there was a trend toward a worse outcome in these patients. He preferred not to have chemo anyway.   Continue ongoing management per primary care physician (Dr. Dobson) and other specialists (has follow-up appointment with Dr. Samuels later today, 12/19/2024).   Rx:  Doxycycline 100 mg po q 12h x 5 days  Schedule CT chest in 11 weeks  Return to the office with pre-office CEA, serum iron, Fe sat, ferritin, B12/folate, CMP, and CBC with differential in 12 weeks.    03/20/2025 - CT Chest without contrast:  Previous left lower lobe resection. There is left perihilar parenchymal scarring and consolidation with associated volume loss and mild mediastinal shift. Previously noted more extensive consolidation within the left lung has resolved and I feel represented post radiation pneumonitis. A small loculated effusion is noted in the left lung base showing diminishment in size. There is mild thickening of the visceral and parietal pleura. No new or enlarging  pulmonary nodule in the left lung.  Within the right lung there is linear scarring with associated nodularity in the lateral aspect of the right lower lobe. The nodular component of this shows continued diminishment in size now measuring 8 mm in maximum dimension versus 1.2 cm on the previous exam. No new or enlarging right-sided pulmonary nodules. No developing mediastinal or axillary lymphadenopathy.    03/27/2025 - Appointment with :  RECOMMENDATIONS:   Apprise of labs from 3/20/25 with stable anemia otherwise normal CBC, CEA 4.8 (prior:  2.2 - 5), sodium 134, otherwise normal CMP, repleted iron (152), repleted (47%; 26%- 36%; 32%; 12%; 22%; 30%; 31%; 35%; 37%; 18%) Fe sat, ferritin (110-prior: 290; 216; 201; 25; 42; 47; 68; 71; 56; 17; 45; 87; 58), repleted (499; 254; 317; 306; 350; 279; 366; 290; 388; >1000) B12, folate 14.2.   Review chest CT, 3/20/25 (above).  No new new or enlarging pulmonary nodule in the left lung.  Nodularity in lateral aspect of the right lower lobe shows continued diminishment in size.  No new or enlarging right-sided pulmonary nodules.  No developing mediastinal/axillary lymphadenopathy.  Prior review visit with radiation oncology, Dr. Samuels on  8/29/24- Diagnosed with stage IA2 (cT1b cN0 cM0) left upper lobe lung 1.5. He completed 5000 cGy in 5 fractions to the NISA lung tumor on 08/14/2023.  He now has a new tumor in the right lower lobe on PET scan, 8/22/24.  I recommended a course of stereotactic radiosurgery to right lower lobe tumor, I anticipate 5745-7908 cGy over 3-5 treatments. Treatments beginning 9/10/24-9/16/24  Previously discussed that even without adjuvant chemotherapy 70.8% of people are alive in 5 years and none of those are alive due to therapy. Approximately 28% of those die of cancer and 1.3% die from other causes. Emphasized that very little data are available about the effects of adjuvant chemotherapy in Stage I lung cancer and most guidelines do not  recommend the use of adjuvant therapy in this population and a meta-analysis even suggested that there was a trend toward a worse outcome in these patients. He preferred not to have chemo anyway.   Continue ongoing management per primary care physician (Dr. Dobson) and other specialists (has follow-up appointment with Dr. Samuels later today).   Refer to dermatology Re:  Right cheek skin lesion  Schedule CT chest in 15 weeks  Return to the office with pre-office CEA, serum iron, Fe sat, ferritin, B12/folate, CMP, and CBC with differential in 16 weeks.      History obtained from  PATIENT and CHART    PAST MEDICAL HISTORY  Past Medical History:   Diagnosis Date    Anemia     Arthritis     COPD (chronic obstructive pulmonary disease)     Disease of thyroid gland     Emphysema lung     Fracture of nasal bones     Hx of colonic polyps     Hyperlipidemia     Hypertension     Lung cancer     Lung nodule     Myasthenia gravis     Pneumonia     PUD (peptic ulcer disease)       PAST SURGICAL HISTORY  Past Surgical History:   Procedure Laterality Date    CATARACT EXTRACTION      COLONOSCOPY  12/12/2014    Diverticulosis repeat exam in 3 years    COLONOSCOPY N/A 04/10/2018    Tubular adenoma cecum poor prep repeat in 3 months    COLONOSCOPY N/A 08/02/2018    2 Tubular adenomas transverse and ascending colon fair prep repeat in 3 years    COLONOSCOPY N/A 03/24/2022    Procedure: COLONOSCOPY WITH ANESTHESIA;  Surgeon: Ferdinand Cross MD;  Location: [facility] ENDOSCOPY;  Service: Gastroenterology;  Laterality: N/A;  pre hx colon polyps  post diverticulosis; inadequate prep  Efrain Dobson, DO    COLONOSCOPY W/ POLYPECTOMY  02/15/2010    2 Hyperplastic polyps at 25 cm and rectum repeat exam in 5 years    LUNG CANCER SURGERY      LUNG REMOVAL, PARTIAL      REPLACEMENT TOTAL KNEE Right 05/2017    TONSILLECTOMY        FAMILY HISTORY  family history includes Cancer in his father; Colon polyps in his mother; Diabetes in his child and  mother; Hypertension in his mother; No Known Problems in his maternal grandfather, paternal grandfather, and paternal grandmother; Stroke in his maternal grandmother and mother.    SOCIAL HISTORY  Social History     Tobacco Use    Smoking status: Former     Current packs/day: 0.00     Average packs/day: 2.0 packs/day for 30.0 years (60.0 ttl pk-yrs)     Types: Cigarettes     Start date: 1955     Quit date: 1984     Years since quittin.2     Passive exposure: Past    Smokeless tobacco: Never   Vaping Use    Vaping status: Never Used   Substance Use Topics    Alcohol use: Never    Drug use: Never     ALLERGIES  Penicillins     MEDICATIONS    Current Outpatient Medications:     alendronate (FOSAMAX) 70 MG tablet, Take 1 tablet by mouth Every 7 (Seven) Days., Disp: , Rfl:     alfuzosin (UROXATRAL) 10 MG 24 hr tablet, Take 1 tablet by mouth Daily., Disp: , Rfl:     amLODIPine (NORVASC) 10 MG tablet, Take 1 tablet by mouth Daily., Disp: , Rfl:     bisacodyl (DULCOLAX) 5 MG EC tablet, Take 2 tablets by mouth Every Night., Disp: , Rfl:     buPROPion XL (WELLBUTRIN XL) 150 MG 24 hr tablet, Take 1 tablet by mouth Every Morning., Disp: , Rfl:     Calcium Carb-Ergocalciferol (CHEWABLE CALCIUM/D PO), Take 1 tablet by mouth Daily., Disp: , Rfl:     fluticasone (FLONASE) 50 MCG/ACT nasal spray, Administer  into the nostril(s) as directed by provider Daily., Disp: , Rfl:     folic acid-vit B6-vit B12 (Folbee) 2.5-25-1 MG tablet tablet, Take 1 tablet by mouth Daily., Disp: 30 tablet, Rfl: 0    losartan (COZAAR) 25 MG tablet, 1 tablet 2 (Two) Times a Day., Disp: , Rfl:     lovastatin (MEVACOR) 40 MG tablet, Take 1 tablet by mouth., Disp: , Rfl:     multivitamins-minerals (PRESERVISION AREDS 2) capsule capsule, Take 1 capsule by mouth 2 (Two) Times a Day., Disp: , Rfl:     pantoprazole (PROTONIX) 40 MG EC tablet, 1 tablet Daily., Disp: , Rfl:     predniSONE (DELTASONE) 10 MG tablet, Take 1.5 tablets by mouth Daily. 1.5  "tab daily, Disp: , Rfl:     pyridostigmine (MESTINON) 60 MG tablet, Take 1 tablet by mouth Every 6 (Six) Hours., Disp: , Rfl:     Current outpatient and discharge medications have been reconciled for the patient.  Reviewed by: GRACE Donahue    The following portions of the patient's history were reviewed and updated as appropriate: allergies, current medications, past family history, past medical history, past social history, past surgical history and problem list.    REVIEW OF SYSTEMS  Review of Systems   Constitutional: Negative.    HENT: Negative.     Eyes: Negative.    Respiratory: Negative.  Positive for shortness of breath (with exertion, stable for patient).    Cardiovascular: Negative.    Gastrointestinal: Negative.    Endocrine: Negative.    Genitourinary: Negative.    Musculoskeletal:  Positive for gait problem (ambulates with cane).   Skin: Negative.    Allergic/Immunologic: Negative.    Neurological: Negative.    Hematological: Negative.    Psychiatric/Behavioral: Negative.       PHYSICAL EXAM  VITAL SIGNS:   Vitals:    03/27/25 1404   BP: 123/68   Pulse: 76   SpO2: 94%  Comment: room air   Weight: 85.3 kg (188 lb)   Height: 182.9 cm (72\")   PainSc: 0-No pain     Physical Exam  Vitals reviewed.   Constitutional:       Appearance: Normal appearance.   HENT:      Head: Normocephalic.      Nose: Nose normal.   Eyes:      Pupils: Pupils are equal, round, and reactive to light.   Cardiovascular:      Rate and Rhythm: Normal rate and regular rhythm.      Pulses: Normal pulses.      Heart sounds: Normal heart sounds.   Pulmonary:      Effort: Pulmonary effort is normal. No respiratory distress.      Breath sounds: Normal breath sounds. No wheezing.   Abdominal:      General: Bowel sounds are normal.      Palpations: There is no mass.   Musculoskeletal:         General: Normal range of motion.      Cervical back: Normal range of motion and neck supple. No tenderness.   Lymphadenopathy:      Cervical: No " cervical adenopathy.   Skin:     General: Skin is warm and dry.      Capillary Refill: Capillary refill takes less than 2 seconds.   Neurological:      General: No focal deficit present.      Mental Status: He is alert and oriented to person, place, and time.      Motor: No weakness.   Psychiatric:         Mood and Affect: Mood normal.         Behavior: Behavior normal.          Performance Status: ECOG (1) Restricted in physically strenuous activity, ambulatory and able to do work of light nature    Clinical Quality Measures  - Pain Documented by Standardized Tool, FPS Carmine LANDON Garcia reports a pain score of 0. Given his pain assessment as noted, treatment options were discussed and the following options were decided upon as a follow-up plan to address the patient's pain:  No pain, no plan given .  Pain Medications              buPROPion XL (WELLBUTRIN XL) 150 MG 24 hr tablet Take 1 tablet by mouth Every Morning.    predniSONE (DELTASONE) 10 MG tablet Take 1.5 tablets by mouth Daily. 1.5 tab daily          - Body Mass Index Screening and Follow-Up Plan Body mass index is 25.5 kg/m².     - Tobacco Use: Screening and Cessation Intervention  Social History    Tobacco Use      Smoking status: Former        Packs/day: 0.00        Years: 2.0 packs/day for 30.0 years (60.0 ttl pk-yrs)        Types: Cigarettes        Start date: 1955        Quit date: 1984        Years since quittin.2        Passive exposure: Past      Smokeless tobacco: Never    - Advanced Care Planning Advance Care Planning   ACP discussion was held with the patient during this visit. Patient has an advance directive in EMR which is still valid.     - PHQ-2 Depression Screening  Little interest or pleasure in doing things? Not at all   Feeling down, depressed, or hopeless? Not at all   PHQ-2 Total Score 0     ASSESSMENT AND PLAN  1. History of primary non-small cell carcinoma of right lung    2. History of primary non-small cell carcinoma of  left lung    3. History of radiation therapy    4. Former smoker      Orders Placed This Encounter   Procedures    CT Chest With Contrast     Standing Status:   Future     Expected Date:   6/27/2025     Expiration Date:   6/27/2026     Does this exam require [facility] Bronch Protocol?:   No     Reason for Exam::   lung nodule     Release to patient:   Routine Release [[id number]]     RECOMMENDATIONS: Carmine Garcia is status post completion of radiation therapy to the lung and presents to our clinic today for surveillance exam and to review imaging.   Diagnosed with a new tumor in the right lower lobe on PET scan on 08/22/2024. He completed 5000 cGy in 5 fractions to the right lower lobe tumor on 09/17/2024.  Diagnosed with stage IA2 (cT1b cN0 cM0) left upper lobe lung 1.5. He completed 5000 cGy in 5 fractions to the NISA lung tumor on 08/14/2023.     CT-scan of the chest on 03/20/2025 revealed previous left lower lobe resection. There is left perihilar parenchymal scarring and consolidation with associated volume loss and mild mediastinal shift. Previously noted more extensive consolidation within the left lung has resolved and I feel represented post radiation pneumonitis. A small loculated effusion is noted in the left lung base showing diminishment in size. There is mild thickening of the visceral and parietal pleura. No new or enlarging pulmonary nodule in the left lung. Within the right lung there is linear scarring with associated nodularity in the lateral aspect of the right lower lobe. The nodular component of this shows continued diminishment in size now measuring 8 mm in maximum dimension versus 1.2 cm on the previous exam. No new or enlarging right-sided pulmonary nodules. No developing mediastinal or axillary lymphadenopathy.    On exam, I do not see evidence for recurrent or metastatic disease at this time. We will continue routine follow-up/surveillance as discussed in 3 months with follow up CT scan  before visit and I have instructed him to continue to see the other health care providers as per their scheduling.    Patient Instructions   1) return in 3 months with a CT chest before.    Return in about 3 months (around 6/27/2025).    Time Spent: I spent 44 minutes caring for Carmine on this date of service. This time includes time spent by me in the following activities: preparing for the visit, reviewing tests, obtaining and/or reviewing a separately obtained history, performing a medically appropriate examination and/or evaluation, counseling and educating the patient/family/caregiver, ordering medications, tests, or procedures, referring and communicating with other health care professionals, documenting information in the medical record, independently interpreting results and communicating that information with the patient/family/caregiver, and care coordination.   Bryn Proctor, APRN  03/27/2025

## 2025-03-26 ENCOUNTER — HOSPITAL ENCOUNTER (OUTPATIENT)
Dept: RADIATION ONCOLOGY | Facility: HOSPITAL | Age: 85
Setting detail: RADIATION/ONCOLOGY SERIES
End: 2025-03-26
Payer: MEDICARE

## 2025-03-27 ENCOUNTER — OFFICE VISIT (OUTPATIENT)
Dept: ONCOLOGY | Facility: CLINIC | Age: 85
End: 2025-03-27
Payer: MEDICARE

## 2025-03-27 ENCOUNTER — OFFICE VISIT (OUTPATIENT)
Age: 85
End: 2025-03-27
Payer: MEDICARE

## 2025-03-27 VITALS
DIASTOLIC BLOOD PRESSURE: 68 MMHG | OXYGEN SATURATION: 94 % | HEIGHT: 72 IN | BODY MASS INDEX: 25.47 KG/M2 | WEIGHT: 188 LBS | HEART RATE: 76 BPM | SYSTOLIC BLOOD PRESSURE: 123 MMHG

## 2025-03-27 VITALS
BODY MASS INDEX: 25.56 KG/M2 | HEIGHT: 72 IN | SYSTOLIC BLOOD PRESSURE: 148 MMHG | TEMPERATURE: 97.6 F | HEART RATE: 88 BPM | OXYGEN SATURATION: 96 % | RESPIRATION RATE: 18 BRPM | DIASTOLIC BLOOD PRESSURE: 86 MMHG | WEIGHT: 188.7 LBS

## 2025-03-27 DIAGNOSIS — Z85.118 HISTORY OF PRIMARY NON-SMALL CELL CARCINOMA OF RIGHT LUNG: Primary | ICD-10-CM

## 2025-03-27 DIAGNOSIS — Z85.118 HISTORY OF PRIMARY NON-SMALL CELL CARCINOMA OF LEFT LUNG: ICD-10-CM

## 2025-03-27 DIAGNOSIS — L98.9 SKIN LESION: ICD-10-CM

## 2025-03-27 DIAGNOSIS — C34.32 PRIMARY ADENOCARCINOMA OF LOWER LOBE OF LEFT LUNG: Primary | ICD-10-CM

## 2025-03-27 DIAGNOSIS — Z92.3 HISTORY OF RADIATION THERAPY: ICD-10-CM

## 2025-03-27 DIAGNOSIS — Z87.891 FORMER SMOKER: ICD-10-CM

## 2025-03-27 PROCEDURE — G0463 HOSPITAL OUTPT CLINIC VISIT: HCPCS | Performed by: RADIOLOGY

## 2025-05-23 ENCOUNTER — OFFICE VISIT (OUTPATIENT)
Dept: NEUROLOGY | Age: 85
End: 2025-05-23
Payer: MEDICARE

## 2025-05-23 VITALS
WEIGHT: 191 LBS | DIASTOLIC BLOOD PRESSURE: 86 MMHG | OXYGEN SATURATION: 97 % | RESPIRATION RATE: 16 BRPM | HEART RATE: 81 BPM | SYSTOLIC BLOOD PRESSURE: 146 MMHG | BODY MASS INDEX: 25.87 KG/M2 | HEIGHT: 72 IN

## 2025-05-23 DIAGNOSIS — Z87.09 HISTORY OF COPD: ICD-10-CM

## 2025-05-23 DIAGNOSIS — G70.00 MYASTHENIA GRAVIS (HCC): Primary | ICD-10-CM

## 2025-05-23 DIAGNOSIS — Z86.69 HISTORY OF DOUBLE VISION: ICD-10-CM

## 2025-05-23 PROCEDURE — 1036F TOBACCO NON-USER: CPT | Performed by: PSYCHIATRY & NEUROLOGY

## 2025-05-23 PROCEDURE — 1123F ACP DISCUSS/DSCN MKR DOCD: CPT | Performed by: PSYCHIATRY & NEUROLOGY

## 2025-05-23 PROCEDURE — G8427 DOCREV CUR MEDS BY ELIG CLIN: HCPCS | Performed by: PSYCHIATRY & NEUROLOGY

## 2025-05-23 PROCEDURE — 1159F MED LIST DOCD IN RCRD: CPT | Performed by: PSYCHIATRY & NEUROLOGY

## 2025-05-23 PROCEDURE — 99214 OFFICE O/P EST MOD 30 MIN: CPT | Performed by: PSYCHIATRY & NEUROLOGY

## 2025-05-23 PROCEDURE — G8417 CALC BMI ABV UP PARAM F/U: HCPCS | Performed by: PSYCHIATRY & NEUROLOGY

## 2025-05-23 RX ORDER — BUPROPION HYDROCHLORIDE 150 MG/1
150 TABLET ORAL EVERY MORNING
COMMUNITY
Start: 2025-03-06

## 2025-05-23 RX ORDER — ALENDRONATE SODIUM 70 MG/1
70 TABLET ORAL WEEKLY
COMMUNITY

## 2025-05-23 NOTE — PROGRESS NOTES
REVIEW OF SYSTEMS    Constitutional: []Fever []Sweat []Chills [] Recent Injury [x] Denies all unless marked  HEENT:[]Headache  [] Head Injury/Hearing Loss  [] Sore Throat  [] Ear Ache/Dizziness  [x] Denies all unless marked  Spine:  [] Neck pain  [] Back pain  [] Sciaticia  [x] Denies all unless marked  Cardiovascular:[]Heart Disease []Chest Pain [] Palpitations  [x] Denies all unless marked  Pulmonary: []Shortness of Breath []Cough   [x] Denies all unless marke  Gastrointestinal: []Nausea  []Vomiting  []Abdominal Pain  []Constipation  []Diarrhea  []Dark Bloody Stools  [x] Denies all unless marked  Psychiatric/Behavioral:[] Depression [] Anxiety [x] Denies all unless marked  Genitourinary:   [] Frequency  [] Urgency  [] Incontinence [] Pain with Urination  [x] Denies all unless marked  Extremities: []Pain  []Swelling  [x] Denies all unless marked  Musculoskeletal: [] Muscle Pain  [] Joint Pain  [] Arthritis [] Muscle Cramps [] Muscle Twitches  [x] Denies all unless marked  Sleep: [] Insomnia [] Snoring [] Restless Legs [] Sleep Apnea  [] Daytime Sleepiness  [x] Denies all unless marked  Skin:[] Rash [] Skin Discoloration [x] Denies all unless marked   Neurological: []Visual Disturbance/Memory Loss [] Loss of Balance [] Slurred Speech/Weakness [] Seizures  [] Vertigo/Dizziness [x] Denies all unless marked     
Indications: BLADDER      alendronate (FOSAMAX) 70 MG tablet Take 1 tablet by mouth Once a week at 5 PM      Calcium Citrate-Vitamin D3 315-6.25 MG-MCG TABS Take by mouth daily (Patient not taking: Reported on 5/23/2025)      Ergocalciferol (VITAMIN D2 PO) Take by mouth (Patient not taking: Reported on 5/23/2025)       No current facility-administered medications for this visit.       Outpatient Medications Marked as Taking for the 5/23/25 encounter (Office Visit) with Koby Richmond MD   Medication Sig Dispense Refill    buPROPion (WELLBUTRIN XL) 150 MG extended release tablet Take 1 tablet by mouth every morning      pyRIDostigmine (MESTINON) 60 MG tablet Take 1 tablet by mouth every 6 hours 120 tablet 5    predniSONE (DELTASONE) 10 MG tablet Take 1.5 tablets by mouth daily 60 tablet 2    pantoprazole (PROTONIX) 40 MG tablet TAKE 1 TABLET BY MOUTH ONCE DAILY IN THE MORNING BEFORE BREAKFAST 90 tablet 5    amLODIPine (NORVASC) 5 MG tablet Take 1 tablet by mouth daily      folbee plus (FOLBEE PLUS) TABS Take 1 tablet by mouth daily      calcium carbonate 600 MG TABS tablet Take 1 tablet by mouth daily      losartan (COZAAR) 25 MG tablet Take 0.5 tablets by mouth daily      diphenhydrAMINE-APAP, sleep, (TYLENOL PM EXTRA STRENGTH)  MG tablet Take 1 tablet by mouth at bedtime      acetaminophen (TYLENOL) 500 MG tablet Take by mouth every 6 hours as needed      fluticasone (FLONASE) 50 MCG/ACT nasal spray 2 sprays by Each Nostril route daily      lovastatin (MEVACOR) 40 MG tablet Take 1 tablet by mouth daily Indications: CHOLESTEROL      alfuzosin (UROXATRAL) 10 MG extended release tablet Take 1 tablet by mouth daily Indications: BLADDER         BP (!) 146/86   Pulse 81   Resp 16   Ht 1.829 m (6')   Wt 86.6 kg (191 lb)   SpO2 97%   BMI 25.90 kg/m²       Constitutional - well developed, well nourished.    Eyes - conjunctiva normal.  Pupils react to light  Ear, nose, throat -hearing intact to finger rub No

## 2025-06-03 ENCOUNTER — TELEPHONE (OUTPATIENT)
Age: 85
End: 2025-06-03
Payer: MEDICARE

## 2025-07-15 ENCOUNTER — HOSPITAL ENCOUNTER (OUTPATIENT)
Dept: RADIATION ONCOLOGY | Facility: HOSPITAL | Age: 85
Setting detail: RADIATION/ONCOLOGY SERIES
End: 2025-07-15
Payer: MEDICARE

## 2025-07-17 ENCOUNTER — HOSPITAL ENCOUNTER (OUTPATIENT)
Dept: CT IMAGING | Facility: HOSPITAL | Age: 85
Discharge: HOME | End: 2025-07-17
Admitting: INTERNAL MEDICINE
Payer: MEDICARE

## 2025-07-17 DIAGNOSIS — L98.9 SKIN LESION: ICD-10-CM

## 2025-07-17 DIAGNOSIS — C34.32 PRIMARY ADENOCARCINOMA OF LOWER LOBE OF LEFT LUNG: ICD-10-CM

## 2025-07-17 PROCEDURE — 71260 CT THORAX DX C+: CPT

## 2025-07-17 PROCEDURE — 25510000001 IOPAMIDOL 61 % SOLUTION: Performed by: INTERNAL MEDICINE

## 2025-07-17 RX ORDER — IOPAMIDOL 612 MG/ML
100 INJECTION, SOLUTION INTRAVASCULAR
Status: COMPLETED | OUTPATIENT
Start: 2025-07-17 | End: 2025-07-17

## 2025-07-17 RX ADMIN — IOPAMIDOL 100 ML: 612 INJECTION, SOLUTION INTRAVENOUS at 13:45

## 2025-07-18 ENCOUNTER — RESULTS FOLLOW-UP (OUTPATIENT)
Dept: ONCOLOGY | Facility: CLINIC | Age: 85
End: 2025-07-18
Payer: MEDICARE

## 2025-07-18 NOTE — TELEPHONE ENCOUNTER
----- Message from Cem Arteaga sent at 7/17/2025  4:34 PM CDT -----  Schedule repeat CT chest in 8 weeks  ----- Message -----  From: Sarita, Rad Results [place] In  Sent: 7/17/2025   4:20 PM CDT  To: Cem Arteaga MD

## 2025-07-21 NOTE — TELEPHONE ENCOUNTER
Spoke with Piyush and he would like to talk with Dr. Arteaga about this at his next appointment on 07/31/25.

## 2025-07-21 NOTE — PROGRESS NOTES
[facility]  Radiation Oncology Clinic   Byron Ojeda MD, FACR  Brynjoy Proctor GRACE  _______________________________________________  [facility]  Department of Radiation Oncology  [address] [place] [id number]  Office: [phone]  Fax: [phone]    DATE: 07/24/2025  PATIENT: Carmine Garcia  1940                         MEDICAL RECORD #: [id number]    1. 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 History of radiation therapy    3. Former smoker                                              REASON FOR VISIT:    Chief Complaint   Patient presents with    Lung Cancer     Reason for Follow up Visit: Carmine Garcia is a very pleasant 85 y.o. patient that completed radiation to the lung and returns to the clinic today for oncologic surveillance .     History of Present Illness:  Diagnosed with stage IA2 (cT1b cN0 cM0) left upper lobe lung. He completed 5000 cGy in 5 fractions to the NISA lung tumor on 08/14/2023.   Diagnosed with a new tumor in the right lower lobe on PET scan on 08/22/2024. He completed 5000 cGy in 5 fractions to the right lower lobe tumor on 09/17/2024.    10/20/2011 - CT Chest:  1.5 cm left lower lobe nodule positioned just lateral to the hilum, granuloma vs. Malignancy  No pathologic lymphadenopathy    10/26/2011 - PET Scan:  Increased metabolic activity in the left lower lobe, lung nodule located centrally near the left hilum witih max SUV 7.4, suspicious for primary carcinoma. This corresponds to the lesion seen on CT scan of 10/20/2011.   No abnormal mediastinal, hilar, or inguinal activity    11/14/2011 - Bronchoscopy with biopsy:  Biopsy, left lower lobe nodule, lung:    Fragments of bronchial mucosa with submucosa demonstrating stromal elastosis and minimal chronic inflammation.   No evidence of granulomatous inflammation.   No histologic evidence of malignancy.    Bronchial washings, smears (four) and cell  block:    Mild acute inflammation.   Negative for malignant cells.    Bronchial brushings, left lower lobe of lung, smears (3) and ThinPrep preparation (1):    Negative for malignant cells.     11/28/2011 -  Left thoracoscopic core needle biopsy, followed by video-assisted thoracoscopic surgery (VATS) left lower lobectomy, and mediastinal lymph node dissection:  Biopsy, left lower lobe of lung (frozen section control):    Fragment of fibrotic pulmonary parenchyma demonstrating stromal elastosis and chronic active inflammation, moderate.   No histologic evidence of malignancy.    Level 10 lymph node (frozen section control):    Lymph node (one), negative for metastatic carcinoma.    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   The dominant and aberrant bronchial margins of surgical resection are negative for malignancy.    The vascular and parenchymal margins of surgical resection are negative for malignancy.    No visceral pleural invasion identified.    Peribronchial lymph nodes (three), negative for metastatic carcinoma.    Emphysematous changes.    Level 11 lymph node: Lymph node (one), negative for metastatic carcinoma.    Level 5 lymph nodes: Lymph nodes (two), negative for metastatic carcinoma.    Comment: Primary papillary adenocarcinomas of the lung do occur; however, they demonstrate a similar histology to that seen in papillary thyroid carcinoma.  The thyroid gland should be evaluated if not already done to exclude the possibility of met     04/30/2014 - Chest x-ray:  Stable chronic change.  No acute disease.    09/03/2014 - Chest x-ray:   Emphysematous and postoperative changes in the chest without evidence of mass lesion or acute cardiopulmonary process.    01/05/2016 - Chest x-ray:   Postoperative changes of the left hemithorax with associated volume loss. No acute process identified.    07/01/2016 - Chest x-ray:   No  active disease is seen.  COPD/emphysema.  Prior lung surgery on the left.     01/04/2017 - Chest x-ray:  COPD/emphysema.  Postoperative changes on the left.  No acute appearing infiltrate or effusion.     03/20/2017 - Chest x-ray:  Chronic changes in the lungs and spine without evidence of acute cardiopulmonary process. Overall, the appearance of the lungs is similar to the study from 01/04/2017.    11/20/2017 - Chest x-ray:  No significant interval change when compared to chest x-ray dated 07/03/2017.    02/12/2018 - Chest x-ray:  Hyperinflated lungs suggesting COPD.  Postoperative changes of the left hemithorax.  No acute cardiopulmonary process identified.    07/06/2018 - Chest x-ray:  No acute cardiopulmonary disease.    12/21/2018 - Chest x-ray:  COPD. Chronic volume loss in the lung bases with mild central vascular crowding. No acute infiltrates.    08/23/2019 - Chest x-ray:  No acute disease.    06/19/2020 - Chest x-ray:  Mild blunting of the left costophrenic angle, which could be seen with scarring or small effusion.    02/22/2021 - Chest x-ray:  COPD no acute cardiopulmonary process.    10/25/2021 - Chest x-ray:  Streaky bibasilar opacities, similar to prior.    04/25/2022 - CT Chest with contrast:  Status post resection of a left lower lobe pulmonary nodule. Moderate changes of centrilobular emphysema are present.  Small groundglass nodules one within the left upper lobe and one within the periphery of the right lower lobe warranting follow-up per Fleischner criteria. No additional lung lesions are present.  No enlarged mediastinal or axillary lymphadenopathy..  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 The thoracic aorta is ectatic. There is mild enlargement of the pulmonary arteries suggesting pulmonary arterial hypertension. Heavy coronary calcifications are present.     07/20/2022 - CT Chest without contrast:  Postoperative change of LEFT lower lobectomy without evidence of  recurrent or metastatic disease.   No change in 7 mm LEFT upper lobe and RIGHT lower lobe groundglass pulmonary nodules. Consider continued imaging surveillance to document stability.  Ectatic ascending aorta measuring 4 cm diameter.    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   Tumor burden: 1.5 cm left lower lung nodule.   Complications of tumor: None.   Tumor status: JAZMÍN following resection via left lower lobectomy.   Chest x-ray, 07/03/2017, [facility]. Impression: Stable appearance of the chest with postoperative changes in the left lung base and emphysematous changes.   Chest x-ray obtained 11/20/2017 at [facility] reveals no interval change when compared to chest x-ray dated 07/03/2017.   Chest x-ray on 02/12/2018 ([facility]). Impression: Hyperinflated lungs suggesting chronic obstructive pulmonary disease (COPD). Postoperative changes of the left hemithorax. No acute cardiopulmonary process identified.   Chest Xray on 07/06/2018 ([facility]). Impression: No acute cardiopulmonary disease.   Chest X-ray, 12/21/2018 at [facility]. COPD. Chronic volume loss in the lung bases with mild central vascular crowding. No acute infiltrates.   06/19/2020-chest x-ray.  Comparison: 8/23/2019-impression: Mild blunting of the left costophrenic angle which could be seen with scarring or small effusion.  02/22/2021- chest xray.  COPD no acute cardiopulmonary process  10/25/2021-chest x-ray.  Streaky bibasilar opacities.  Similar to prior.  04/25/2022- CT chest- Status post resection of a left lower lobe pulmonary nodule. Moderate changes of centrilobular emphysema are present. Small groundglass nodules one within the left upper lobe and one within the periphery of the right lower lobe warranting follow-up per Fleischner criteria. No additional lung  "lesions are present. No enlarged mediastinal or axillary lymphadenopathy.  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 The thoracic aorta is ectatic. There is mild enlargement of the pulmonary arteries suggesting pulmonary arterial hypertension. Heavy coronary calcifications are present.   07/20/2022-CT chest- postoperative change of LLL without evidence of recurrent or metastatic disease.  No change in 7 mm NISA and RLL groundglass pulmonary nodules.  Consider continued imaging surveillance to document stability.  Ectatic ascending aorta measuring 4 cm in diameter.  Mildly abnormal CEA.    --3.39, 10/26/2022 (prior range: 2.2 - 5.0). Continued observation warranted.   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 Underwent right knee replacement, 05/18/2017.   Normocytic/macrocytic anemia with resolution of macrocytosis, repleted iron and B12 deficiencies (anemia of chronic disease).   --Stable, Hgb 14; MCV 98.2, 10/26/2022 (prior range: Hgb 11.4 - 14.3; 94.4 - 102.1)  No myelodysplastic syndrome (MDS) on comprehensive blood report, normal thyroid function tests (TFTs), normal LDH, normal B12/folate.   Colonoscopy on 4/10/18 ([facility]). Impressions: Preparation of the colon was inadequate. One 12 mm polyp in the cecum, removed with a hot snare and removed piecemeal using a hot snare. Resected and retrieved. Diverticulosis in the sigmoid colon and in the descending colon.   Repeat with 2 day prep scheduled for 08/02/2018. \"Polyps\" Repeat 3 years.  Colonoscopy, 08/02/2018 (above).  Fair colon preparation.  1 5 mm polyp in the ascending colon, removed with hot snare.  One 12 mm polyp in the transverse colon, removed with a hot snare.  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 Negative thyroid ultrasound, 11/30/2011. Is followed by Dr. Medrano (ENT) and Dr. Roth (endocrine). " "  Low B12 levels. Recurrent.   Constipation, multifactorial.  Regulated by stool softeners.  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   Chronic intermittent irregular heart rate, Dr. Dobson following.   A 50 pack-year tobacco smoker with radiographic evidence of emphysema. Has not smoked since 2004.   Anxiety.    Diplopia.  Is scheduled to see ophthalmology today  RECOMMENDATIONS:   Apprised of surveillance chest CT, 07/22/2022 (see above).  No evidence of recurrent metastatic disease.  No change in 7 mm NISA and RLL groundglass pulmonary nodules.  Apprise of labs from 10/26/2022 including the current heme (stable anemia otherwise normal CBC) and the other labs noting the stable CEA, essentially normal CMP, repleted iron, repleted (35%; from 37%; 18%) Fe sat, depressed ferritin (71; from 56; 17; 45; 87; 58), depressed (290; from 388; >1000) B12/folate.  Anne called in  Previously discussed that even without adjuvant chemotherapy 70.8% of people are alive in 5 years and none of those are alive due to therapy. Approximately 28% of those die of cancer and 1.3% die from other causes. Emphasized that very little data are available about the effects of adjuvant chemotherapy in Stage I lung cancer and most guidelines do not recommend the use of adjuvant therapy in this population and a meta-analysis even suggested that there was a trend toward a worse outcome in these patients. He preferred not to have chemo anyway.   Review follow-up by GRACE Bahena with pulmonary, 07/27/2022.  Review CT chest without contrast, 07/20/2022 (above).  Stable overall.  Plan: CT chest without contrast and follow-up in 6 months (01/27/2023).  Stay on [place]   Continue ongoing management per primary care physician and other specialists. Is seeing the eye docs today  Schedule chest CT with contrast in 23 weeks at [facility]  Return to the office with pre-office CEA, serum iron, Fe sat, ferritin, B12/folate, CMP, " and CBC with differential in 24 weeks.    12/21/2022 - Chest x-ray:  Stable chronic change.  No acute disease.    01/27/2023 - CT Chest with contrast:  A stable CT scan of the chest. Postsurgical changes/left lower lobectomy. No finding to suggest recurrence.  A stable small groundglass opacity/nodule in the left upper lobe. No features of malignancy at this time. A follow-up examination in one year is recommended to ensure stability.  Chronic emphysematous lung changes.    06/14/2023 - CT Chest with contrast:  New 1.5 cm central LEFT upper lobe pulmonary nodule. This is concerning for recurrent neoplasm. Management options include PET/CT versus sampling with EBUS.    06/21/2023 - PET Scan:  PET-CT findings compatible with recurrent left lung neoplasm as an isolated finding. No distant disease is seen.    06/22/2023 - Documentation per Dr. Arteaga:  Patient informed per Dr Arteaga recommendations a referral should be made to Rad/Onc for evaluation and possible SBRT  Patient informed once the apt is dom[place] he will be called with time and date    06/28/2023 - Consult with  (Covering for ):  Following this discussion and in consideration of the diagnostic data/evaluation of the patient, I recommended a course of definitive intent pulmonary SBRT to the PET avid left upper lobe nodule to an approximate dose of 5342-6989 cGy to be given over 4-5 every other day fractions, final course pending.  If SBRT cannot safely be delivered secondary to central location, would favor a hypofractionated course of treatment to an approximate dose of 0905-1668 cGy to be given over 15 daily fractions (Monday through Friday).  Will simulate treatment fields today, 3D CT with MIPS to begin the planning process, final course pending.    08/01/2023 - 08/14/2023 - Completed radiation course:  Received 5000 cGy in 5 fractions to the NISA lung tumor.    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   Tumor burden: 1.5 cm left lower lung nodule.   Complications of tumor: None.   Tumor status: JAZMÍN following resection via left lower lobectomy.   Chest x-ray, 07/03/2017, [facility]. Impression: Stable appearance of the chest with postoperative changes in the left lung base and emphysematous changes.   Chest x-ray obtained 11/20/2017 at [facility] reveals no interval change when compared to chest x-ray dated 07/03/2017.   Chest x-ray on 02/12/2018 ([facility]). Impression: Hyperinflated lungs suggesting chronic obstructive pulmonary disease (COPD). Postoperative changes of the left hemithorax. No acute cardiopulmonary process identified.   Chest Xray on 07/06/2018 ([facility]). Impression: No acute cardiopulmonary disease.   Chest X-ray, 12/21/2018 at [facility]. COPD. Chronic volume loss in the lung bases with mild central vascular crowding. No acute infiltrates.   06/19/2020-chest x-ray.  Comparison: 8/23/2019-impression: Mild blunting of the left costophrenic angle which could be seen with scarring or small effusion.  02/22/2021- chest xray.  COPD no acute cardiopulmonary process  10/25/2021-chest x-ray.  Streaky bibasilar opacities.  Similar to prior.  04/25/22- CT chest- Status post resection of a left lower lobe pulmonary nodule. Moderate changes of centrilobular emphysema are present. Small groundglass nodules one within the left upper lobe and one within the periphery of the right lower lobe warranting follow-up per Fleischner criteria. No additional lung lesions are present. No enlarged mediastinal or axillary lymphadenopathy.  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 The thoracic aorta is ectatic. There is mild enlargement of the pulmonary arteries suggesting pulmonary arterial hypertension. Heavy coronary calcifications are  present.   07/20/22-CT chest- postoperative change of LLL without evidence of recurrent or metastatic disease.  No change in 7 mm NISA and RLL groundglass pulmonary nodules.  Consider continued imaging surveillance to document stability.  Ectatic ascending aorta measuring 4 cm in diameter.  1/27/23- CT chest- A stable CT scan of the chest. Postsurgical changes/left lower lobectomy. No finding to suggest recurrence. A stable small groundglass opacity/nodule in the left upper lobe. No features of malignancy at this time. A follow-up examination in one year is recommended to ensure stability. Chronic emphysematous lung changes  6/14/23- CT chest- New 1.5 cm central LEFT upper lobe pulmonary nodule. This is concerning for recurrent neoplasm. Management options include PET/CT versus sampling with EBUS.  6/21/23- PET-CT- PET-CT findings compatible with recurrent left lung neoplasm an isolated finding (15 mm left lung nodule adjacent to the hilum shows FDG uptake with SUV max = 3.3).  No distant disease is seen.  SBRT: 8/1/23- 8/14/23- 6000 cGy/5 fx) to NISA nodule  Mildly abnormal CEA.    --4.15, 8/21/23 (prior range: 2.2 - 5.0). Continued observation warranted.   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 Underwent right knee replacement, 05/18/2017.   Normocytic/macrocytic anemia with resolution of macrocytosis, repleted iron and B12 deficiencies (anemia of chronic disease).   --Stable, Hgb 12.9; MCV 99.8, 8/21/23 (prior range: Hgb 11.4 - 14.3; 94.4 - 102.1)  No myelodysplastic syndrome (MDS) on comprehensive blood report, normal thyroid function tests (TFTs), normal LDH, normal B12/folate.   Colonoscopy on 4/10/18 ([facility]). Impressions: Preparation of the colon was inadequate. One 12 mm polyp in the cecum, removed with a hot snare and removed piecemeal using a hot snare. Resected and retrieved. Diverticulosis in the sigmoid colon and in the descending colon.  "  Repeat with 2 day prep scheduled for 08/02/2018. \"Polyps\" Repeat 3 years.  Colonoscopy, 08/02/2018 (above).  Fair colon preparation.  1 5 mm polyp in the ascending colon, removed with hot snare.  One 12 mm polyp in the transverse colon, removed with a hot snare.  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 Negative thyroid ultrasound, 11/30/2011. Is followed by Dr. Medrano (ENT) and Dr. Roth (endocrine).   Low B12 levels. Recurrent.   Constipation, multifactorial.  Regulated by stool softeners.  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   Chronic intermittent irregular heart rate, Dr. Dobson following.   A 50 pack-year tobacco smoker with radiographic evidence of emphysema. Has not smoked since 2004.   Anxiety.    Myasthenia gravis.  Followed by Dr. Hines  COPD  Weight loss.  Has stabilized.  Reassures me it is intentional.  \"Eating less and smarter.\"  RECOMMENDATIONS:   Apprise of labs from 8/21/23 including the current heme (stable anemia otherwise normal CBC) and the other labs noting the stable CEA (4.15-prior:  2.2 - 5), glucose otherwise essentially normal CMP, repleted iron, repleted (30%- prior: 31%; 35%; 37%; 18%) Fe sat, depressed ferritin (47; prior:  68; 71; 56; 17; 45; 87; 58), depressed (279; from 366; 290; 388; >1000) B12/folate.  (Anne called in)  CT chest, 6/14/23 and PET scan, 6/21/23 (above).  New 1.5 cm central LEFT upper lobe pulmonary nodule compatible with recurrent neoplasm.  Previously discussed that even without adjuvant chemotherapy 70.8% of people are alive in 5 years and none of those are alive due to therapy. Approximately 28% of those die of cancer and 1.3% die from other causes. Emphasized that very little data are available about the effects of adjuvant chemotherapy in Stage I lung cancer and most guidelines do not recommend the use of adjuvant therapy in this population and a " "meta-analysis even suggested that there was a trend toward a worse outcome in these patients. He preferred not to have chemo anyway.   Review follow-up with pulmonary, 7/11/23 Re:  Diagnostic bronchoscopy and PFTs.  Patient declined any invasive procedures.  He said that he had PFT in the past that \"almost killed him\" and is not wanting a pulmonary function test at this time either.  RTC 3 months (~ 10/11/23)  Review visit with radiation oncology (Dr. Herring), 6/28/23.   I recommended a course of definitive intent pulmonary SBRT to the PET avid left upper lobe nodule to an approximate dose of 6979-8841 cGy to be given over 4-5 every other day fractions, final course pending.  If SBRT cannot safely be delivered secondary to central location, would favor a hypofractionated course of treatment to an approximate dose of 9303-8317 cGy to be given over 15 daily fractions  (SBRT: 8/1/23- 8/14/23- 6000 cGy/5 fx)  Continue ongoing management per primary care physician and other specialists. Has appointment in 11/2023 with rad onc along with CT prior to visit  Return to the office with pre-office CEA, serum iron, Fe sat, ferritin, B12/folate, CMP, and CBC with differential in 16 weeks.    11/08/2023 - CT Chest with contrast:  Stable 1.5 cm left perihilar central upper lobe nodule, relatively unchanged from 6/14/2023. No new or increasing size nodule identified.  No pathologic lymphadenopathy. Stable volume loss left hemithorax from a prior left lower lobe lobectomy.    11/15/2023 - Appointment with :  Follow up in 3 months     12/28/2023 - Appointment with :  RECOMMENDATIONS:   Apprise of labs from 12/21/23 including the current heme (stable anemia otherwise normal CBC) and the other labs with CEA p (prior:  2.2 - 5), calcium 8.3 otherwise essentially normal CMP, repleted iron, repleted (22%- prior: 30%; 31%; 35%; 37%; 18%) Fe sat, depressed ferritin (42- prior:  47; 68; 71; 56; 17; 45; 87; 58) " repleted (350; from 279; 366; 290; 388; >1000) B12/folate.   Apprised of CT chest, 11/8/23 (above).  Stable 1.5 cm left perihilar central upper lobe nodule, relatively unchanged from 6/14/2023. No new or increasing size nodule identified. No pathologic lymphadenopathy  Previously discussed that even without adjuvant chemotherapy 70.8% of people are alive in 5 years and none of those are alive due to therapy. Approximately 28% of those die of cancer and 1.3% die from other causes. Emphasized that very little data are available about the effects of adjuvant chemotherapy in Stage I lung cancer and most guidelines do not recommend the use of adjuvant therapy in this population and a meta-analysis even suggested that there was a trend toward a worse outcome in these patients. He preferred not to have chemo anyway.   Review visit with radiation oncology (Dr. Samuels), 11/15/23- Diagnosed with stage IA2 (cT1b cN0 cM0) neoplasm of left upper lung. He completed 5000 cGy in 5 fractions to the NISA lung tumor on 08/14/2023.  CT chest, 11/8/23 reviewed.  I do not see evidence for recurrent or metastatic disease at this time. We will continue routine follow-up/surveillance as discussed in 3 months with follow up CT scan before visit. Remission!  Review follow-up with GRACE Bahena with pulmonary, 10/11/23-A/P: NISA nodule. Follow-up prn  Continue ongoing management per primary care physician and other specialists. Has appointment in 2/2024 with rad onc along with CT prior to visit  Return to the office with pre-office CEA, serum iron, Fe sat, ferritin, B12/folate, CMP, and CBC with differential in 12 weeks.    02/01/2024 - CT Chest with contrast:  Stable chest CT appearance.  No new or progressive neoplastic disease.    02/05/2024 - Appointment with :  Follow up in 3 months     03/14/2024 - Appointment with :  RECOMMENDATIONS:   Apprise of labs from 3/7/24 including the current heme (stable anemia  otherwise normal CBC) and the other labs with CEA 4.29 (prior:  2.2 - 5), calcium 8.9 otherwise essentially normal CMP, low iron (50), low (12%- prior: 22%; 30%; 31%; 35%; 37%; 18%) Fe sat, depressed ferritin (25 -  prior:  42; 47; 68; 71; 56; 17; 45; 87; 58) repleted (306; 350; 279; 366; 290; 388; >1000) B12/folate.   Apprised of CT chest, 2/1/24 (above).   Stable chest CT appearance.  No new or progressive neoplastic disease.  Keep appointments of Injectafer 750 mg IV weekly x 2  Previously discussed that even without adjuvant chemotherapy 70.8% of people are alive in 5 years and none of those are alive due to therapy. Approximately 28% of those die of cancer and 1.3% die from other causes. Emphasized that very little data are available about the effects of adjuvant chemotherapy in Stage I lung cancer and most guidelines do not recommend the use of adjuvant therapy in this population and a meta-analysis even suggested that there was a trend toward a worse outcome in these patients. He preferred not to have chemo anyway.   Review visit with radiation oncology (Dr. 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 He completed 5000 cGy in 5 fractions to the NISA lung tumor on 08/14/2023.  CT chest, 2/1/24 reviewed.  I do not see evidence for recurrent or metastatic disease at this time. We will continue routine follow-up/surveillance as discussed in 3 months with follow up CT scan before visit.   Continue ongoing management per primary care physician and other specialists. Has appointment in 5/2024 with rad onc along with CT prior to visit  Return to the office with pre-office CEA, serum iron, Fe sat, ferritin, B12/folate, CMP, and CBC with differential in 12 weeks.    05/06/2024 - CT Chest with contrast:  Stable CT chest without new or progressive disease.    05/09/2024 - Appointment with :  Follow up in 3 months     06/13/2024 - Appointment with  :  RECOMMENDATIONS:   Apprise of labs from 6/6/24 including the current heme (stable anemia otherwise normal CBC) and the other labs with CEA 4.2 (prior:  2.2 - 5), alk phos 200, sodium 134, otherwise normal CMP, repleted iron (98), repleted (32%- prior: 12%; 22%; 30%; 31%; 35%; 37%; 18%) Fe sat, depressed ferritin (201 -  prior: 25; 42; 47; 68; 71; 56; 17; 45; 87; 58) repleted (306; 350; 279; 366; 290; 388; >1000) B12/folate.   Apprised of lumbar xray, 5/16/24 (above).  Osteopenia. Extensive degenerative disc, endplate, and facet disease. Mild chronic appearing compression deformities of L1 and L4.  Review visit with radiation oncology (GRACE Gtz with Dr. 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 He completed 5000 cGy in 5 fractions to the NISA lung tumor on 08/14/2023.  CT chest, 5/6/24 reviewed.  I do not see evidence for recurrent or metastatic disease at this time. We will continue routine follow-up/surveillance as discussed in 3 months (8/9/24) with follow up CT scan before visit.   Apprised of CT chest, 5/6/24 (above).   JAZMÍN  Previously discussed that even without adjuvant chemotherapy 70.8% of people are alive in 5 years and none of those are alive due to therapy. Approximately 28% of those die of cancer and 1.3% die from other causes. Emphasized that very little data are available about the effects of adjuvant chemotherapy in Stage I lung cancer and most guidelines do not recommend the use of adjuvant therapy in this population and a meta-analysis even suggested that there was a trend toward a worse outcome in these patients. He preferred not to have chemo anyway.   Continue ongoing management per primary care physician and other specialists. Has appointment in 8/9/24 with rad onc along with CT prior to visit  Return to the office with pre-office CEA, serum iron, Fe sat, ferritin, B12/folate, CMP, and CBC with differential in 12  weeks.    08/07/2024 - CT chest with contrast:  New RIGHT lower lobe peripheral spiculated masslike opacity, highly concerning for disease recurrence if the patient has no symptoms of infection.  Heavily calcified coronary arteries versus stent material.  New mild height loss at T2 and L2.    08/16/2024 - Appointment with :  On review of CT imaging, he does have a new suspicious opacity in the right lower lobe of lung. He declines recent symptoms including fatigue, fever, or productive cough. I will order a PET scan for further evaluation. He will return in 2 weeks for further discussion of recommendations. I have instructed him to continue to see the other health care providers as per their scheduling.  Plan:  PET scan ordered, they will call you.  return in 2 weeks for further discussion    08/22/2024 - PET Scan:  Intense abnormal uptake associated with the peripheral nodule in the right lower lobe which abuts the major fissure along its anterior margin. Given the high SUV measurement neoplasia should be excluded and is the primary consideration. It is of note that the nodule has changed somewhat from the previous examination demonstrating increase in size in the transverse dimension but overall diminishment in size in the coronal dimension. This is somewhat unusual for neoplasia suggesting that this finding could potentially be inflammatory/infectious in nature. No associated metabolically active hilar or mediastinal adenopathy. Previously noted metabolically active left perihilar nodule no longer demonstrates any abnormal metabolic activity posttreatment.  No additional sites of abnormal metabolic activity demonstrated.  There are emphysematous changes of the lungs. No additional lung lesions noted by CT imaging. No cervical or mediastinal adenopathy. 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 Diffuse enlargement of the prostate gland is present  with mass effect on the base of the bladder. Prominence of the urinary bladder wall likely is related to muscular hypertrophy from an element of chronic bladder outlet obstruction.    08/29/2024 - Appointment with :  Following this discussion and in consideration of the diagnostic data/evaluation of the patient, I recommended a course of stereotactic radiosurgery to right lower lobe tumor, I anticipate 3483-1178 cGy over 3-5 treatments. Will simulate treatment fields today, 4D CT with MIPS to begin the planning process, final course pending. . Continue ongoing management per primary care physician and other specialists.     09/10/2024 - 09/17/2024 - Completed radiation course:  Received 5000 cGy in 5 fractions to the RLL lung tumor.     09/12/2024 - Appointment with :  RECOMMENDATIONS:   Apprise of labs from 9/5/24 including the current heme (stable anemia otherwise normal CBC) and the other labs with CEA 3.7 (prior:  2.2 - 5), alk phos 128 (prior: 200), sodium 135, otherwise normal CMP, repleted iron (101), repleted (36%- prior: 32%; 12%; 22%; 30%; 31%; 35%; 37%; 18%) Fe sat, depressed ferritin (216 -  prior: 201; 25; 42; 47; 68; 71; 56; 17; 45; 87; 58) repleted (317; 306; 350; 279; 366; 290; 388; >1000) B12/folate.   Review visit with radiation oncology, Dr. Samuels on  8/29/24- Diagnosed with stage IA2 (cT1b cN0 cM0) left upper lobe lung 1.5. He completed 5000 cGy in 5 fractions to the NISA lung tumor on 08/14/2023.  He now has a new tumor in the right lower lobe on PET scan, 8/22/24.  I recommended a course of stereotactic radiosurgery to right lower lobe tumor, I anticipate 8939-7523 cGy over 3-5 treatments. Treatments beginning 9/10/24-9/16/24  PET scan, 8/22/24 (above).  Neoplasia vs inflammatory lung nodule  Previously discussed that even without adjuvant chemotherapy 70.8% of people are alive in 5 years and none of those are alive due to therapy. Approximately 28% of those die of cancer  and 1.3% die from other causes. Emphasized that very little data are available about the effects of adjuvant chemotherapy in Stage I lung cancer and most guidelines do not recommend the use of adjuvant therapy in this population and a meta-analysis even suggested that there was a trend toward a worse outcome in these patients. He preferred not to have chemo anyway.   Continue ongoing management per primary care physician and other specialists.   Return to the office with pre-office CEA, serum iron, Fe sat, ferritin, B12/folate, CMP, and CBC with differential in 12 weeks-defer imaging to radiation oncology.    12/17/2024 - CT Chest without contrast:  Marked decrease in size of 12 mm right lower lobe pulmonary nodule (previously 3.2 cm). No new or enlarging pulmonary nodules.  Fairly extensive consolidation and groundglass opacity throughout the left upper lobe. Favor infectious process/pneumonia but recommend follow-up to ensure resolution and exclude a neoplastic process. Small left-sided pleural effusion. Postoperative change of left lower lobectomy.    12/19/2024 - Appointment with GRACE Little - Radiation oncology:  On exam, I do not see evidence for recurrent or metastatic disease at this time. He does have an area in the left upper lobe favored to be infection for which medical oncology has sent an antibiotic.   We will continue routine follow-up/surveillance as discussed in 3 months with follow up CT scan before visit and I have instructed him to continue to see the other health care providers as per their scheduling.    12/19/2024 - Appointment with :  RECOMMENDATIONS:   Apprise of labs from 12/9/24 with WBC 13.7, stable anemia otherwise normal CBC, CEA 4.75 (prior:  2.2 - 5), alk phos 105 (prior: 200), sodium 135, otherwise normal CMP, repleted iron (101), repleted (26%- 36%; 32%; 12%; 22%; 30%; 31%; 35%; 37%; 18%) Fe sat, depressed ferritin (290- prior 216; 201; 25; 42; 47; 68; 71; 56;  17; 45; 87; 58) depressed (254; 317; 306; 350; 279; 366; 290; 388; >1000) B12, folate 10.6.   Review chest CT, 12/17/24 (above). Decrease in size of 12 mm right lower lobe pulmonary nodule (previously 3.2 cm). No new or enlarging pulmonary nodules.  Fairly extensive consolidation and groundglass opacity throughout the left upper lobe.  Needs follow-up  Review visit with radiation oncology, Dr. Samuels on  8/29/24- Diagnosed with stage IA2 (cT1b cN0 cM0) left upper lobe lung 1.5. He completed 5000 cGy in 5 fractions to the NISA lung tumor on 08/14/2023.  He now has a new tumor in the right lower lobe on PET scan, 8/22/24.  I recommended a course of stereotactic radiosurgery to right lower lobe tumor, I anticipate 3457-2325 cGy over 3-5 treatments. Treatments beginning 9/10/24-9/16/24  Previously discussed that even without adjuvant chemotherapy 70.8% of people are alive in 5 years and none of those are alive due to therapy. Approximately 28% of those die of cancer and 1.3% die from other causes. Emphasized that very little data are available about the effects of adjuvant chemotherapy in Stage I lung cancer and most guidelines do not recommend the use of adjuvant therapy in this population and a meta-analysis even suggested that there was a trend toward a worse outcome in these patients. He preferred not to have chemo anyway.   Continue ongoing management per primary care physician (Dr. Dobson) and other specialists (has follow-up appointment with Dr. Samuels later today, 12/19/2024).   Rx:  Doxycycline 100 mg po q 12h x 5 days  Schedule CT chest in 11 weeks  Return to the office with pre-office CEA, serum iron, Fe sat, ferritin, B12/folate, CMP, and CBC with differential in 12 weeks.    03/20/2025 - CT Chest without contrast:  Previous left lower lobe resection. There is left perihilar parenchymal scarring and consolidation with associated volume loss and mild mediastinal shift. Previously noted more extensive  consolidation within the left lung has resolved and I feel represented post radiation pneumonitis. A small loculated effusion is noted in the left lung base showing diminishment in size. There is mild thickening of the visceral and parietal pleura. No new or enlarging pulmonary nodule in the left lung.  Within the right lung there is linear scarring with associated nodularity in the lateral aspect of the right lower lobe. The nodular component of this shows continued diminishment in size now measuring 8 mm in maximum dimension versus 1.2 cm on the previous exam. No new or enlarging right-sided pulmonary nodules. No developing mediastinal or axillary lymphadenopathy.    03/27/2025 - Appointment with GRACE Little - Radiation oncology:  Return in 3 months with a CT chest before.    03/27/2025 - Appointment with :  RECOMMENDATIONS:   Apprise of labs from 3/20/25 with stable anemia otherwise normal CBC, CEA 4.8 (prior:  2.2 - 5), sodium 134, otherwise normal CMP, repleted iron (152), repleted (47%; 26%- 36%; 32%; 12%; 22%; 30%; 31%; 35%; 37%; 18%) Fe sat, ferritin (110-prior: 290; 216; 201; 25; 42; 47; 68; 71; 56; 17; 45; 87; 58), repleted (499; 254; 317; 306; 350; 279; 366; 290; 388; >1000) B12, folate 14.2.   Review chest CT, 3/20/25 (above).  No new new or enlarging pulmonary nodule in the left lung.  Nodularity in lateral aspect of the right lower lobe shows continued diminishment in size.  No new or enlarging right-sided pulmonary nodules.  No developing mediastinal/axillary lymphadenopathy.  Prior review visit with radiation oncology, Dr. Samuels on  8/29/24- Diagnosed with stage IA2 (cT1b cN0 cM0) left upper lobe lung 1.5. He completed 5000 cGy in 5 fractions to the NISA lung tumor on 08/14/2023.  He now has a new tumor in the right lower lobe on PET scan, 8/22/24.  I recommended a course of stereotactic radiosurgery to right lower lobe tumor, I anticipate 4984-1645 cGy over 3-5 treatments.  Treatments beginning 9/10/24-9/16/24  Previously discussed that even without adjuvant chemotherapy 70.8% of people are alive in 5 years and none of those are alive due to therapy. Approximately 28% of those die of cancer and 1.3% die from other causes. Emphasized that very little data are available about the effects of adjuvant chemotherapy in Stage I lung cancer and most guidelines do not recommend the use of adjuvant therapy in this population and a meta-analysis even suggested that there was a trend toward a worse outcome in these patients. He preferred not to have chemo anyway.   Continue ongoing management per primary care physician (Dr. Dobson) and other specialists (has follow-up appointment with Dr. Samuels later today).   Refer to dermatology Re:  Right cheek skin lesion  Schedule CT chest in 15 weeks  Return to the office with pre-office CEA, serum iron, Fe sat, ferritin, B12/folate, CMP, and CBC with differential in 16 weeks.    05/01/2025 - Biopsies per Case:  Left nasal sidewall, shave removal and destruction:  Basal cell carcinoma with nodular pattern.  Right superior preauricular cheek, shave removal and destruction:  Squamous cell carcinoma.    07/17/2025 - CT Chest with contrast:  New RIGHT upper lobe consolidative opacity with air bronchograms and traction. This could represent posttreatment changes. Recommend attention on follow-up.  Other chronic pulmonary findings as above.  Cardiomegaly with heavily calcified coronary arteries versus stent material.  Enlarged ascending thoracic aorta measures 4.0 cm. Other incidental findings as above.    07/31/2025 - Scheduled appointment with .    History obtained from  PATIENT and CHART    PAST MEDICAL HISTORY  Past Medical History:   Diagnosis Date    Anemia     Arthritis     COPD (chronic obstructive pulmonary disease)     Disease of thyroid gland     Emphysema lung     Fracture of nasal bones     Hx of colonic polyps     Hyperlipidemia      Hypertension     Lung cancer     Lung nodule     Myasthenia gravis     Pneumonia     PUD (peptic ulcer disease)       PAST SURGICAL HISTORY  Past Surgical History:   Procedure Laterality Date    CATARACT EXTRACTION      COLONOSCOPY  2014    Diverticulosis repeat exam in 3 years    COLONOSCOPY N/A 04/10/2018    Tubular adenoma cecum poor prep repeat in 3 months    COLONOSCOPY N/A 2018    2 Tubular adenomas transverse and ascending colon fair prep repeat in 3 years    COLONOSCOPY N/A 2022    Procedure: COLONOSCOPY WITH ANESTHESIA;  Surgeon: Ferdinand Cross MD;  Location: [facility] ENDOSCOPY;  Service: Gastroenterology;  Laterality: N/A;  pre hx colon polyps  post diverticulosis; inadequate prep  Efrain Dobson, DO    COLONOSCOPY W/ POLYPECTOMY  02/15/2010    2 Hyperplastic polyps at 25 cm and rectum repeat exam in 5 years    LUNG CANCER SURGERY      LUNG REMOVAL, PARTIAL      REPLACEMENT TOTAL KNEE Right 2017    TONSILLECTOMY        FAMILY HISTORY  family history includes Cancer in his father; Colon polyps in his mother; Diabetes in his child and mother; Hypertension in his mother; No Known Problems in his maternal grandfather, paternal grandfather, and paternal grandmother; Stroke in his maternal grandmother and mother.    SOCIAL HISTORY  Social History     Tobacco Use    Smoking status: Former     Current packs/day: 0.00     Average packs/day: 2.0 packs/day for 30.0 years (60.0 ttl pk-yrs)     Types: Cigarettes     Start date: 1955     Quit date: 1984     Years since quittin.5     Passive exposure: Past    Smokeless tobacco: Never   Vaping Use    Vaping status: Never Used   Substance Use Topics    Alcohol use: Never    Drug use: Never     ALLERGIES  Penicillins     MEDICATIONS    Current Outpatient Medications:     alendronate (FOSAMAX) 70 MG tablet, Take 1 tablet by mouth Every 7 (Seven) Days., Disp: , Rfl:     alfuzosin (UROXATRAL) 10 MG 24 hr tablet, Take 1 tablet by mouth Daily.,  Disp: , Rfl:     amLODIPine (NORVASC) 10 MG tablet, Take 1 tablet by mouth Daily., Disp: , Rfl:     bisacodyl (DULCOLAX) 5 MG EC tablet, Take 2 tablets by mouth Every Night., Disp: , Rfl:     buPROPion XL (WELLBUTRIN XL) 150 MG 24 hr tablet, Take 1 tablet by mouth Every Morning., Disp: , Rfl:     Calcium Carb-Ergocalciferol (CHEWABLE CALCIUM/D PO), Take 1 tablet by mouth Daily., Disp: , Rfl:     fluticasone (FLONASE) 50 MCG/ACT nasal spray, Administer  into the nostril(s) as directed by provider Daily., Disp: , Rfl:     folic acid-vit B6-vit B12 (Folbee) 2.5-25-1 MG tablet tablet, Take 1 tablet by mouth Daily., Disp: 30 tablet, Rfl: 0    furosemide (LASIX) 40 MG tablet, Take 1 tablet by mouth Daily As Needed., Disp: , Rfl:     losartan (COZAAR) 25 MG tablet, 1 tablet 2 (Two) Times a Day., Disp: , Rfl:     lovastatin (MEVACOR) 40 MG tablet, Take 1 tablet by mouth., Disp: , Rfl:     multivitamins-minerals (PRESERVISION AREDS 2) capsule capsule, Take 1 capsule by mouth 2 (Two) Times a Day., Disp: , Rfl:     pantoprazole (PROTONIX) 40 MG EC tablet, 1 tablet Daily., Disp: , Rfl:     predniSONE (DELTASONE) 10 MG tablet, Take 1.5 tablets by mouth Daily. 1.5 tab daily, Disp: , Rfl:     pyridostigmine (MESTINON) 60 MG tablet, Take 1 tablet by mouth Every 6 (Six) Hours., Disp: , Rfl:     Current outpatient and discharge medications have been reconciled for the patient.  Reviewed by: GRACE Donahue    The following portions of the patient's history were reviewed and updated as appropriate: allergies, current medications, past family history, past medical history, past social history, past surgical history and problem list.    REVIEW OF SYSTEMS  Review of Systems   Constitutional: Negative.    HENT: Negative.     Eyes: Negative.    Respiratory: Negative.     Cardiovascular:  Positive for leg swelling.   Gastrointestinal: Negative.    Endocrine: Negative.    Genitourinary: Negative.    Musculoskeletal:  Positive for gait  "problem (ambulating with cane).   Skin: Negative.    Allergic/Immunologic: Negative.    Hematological: Negative.    Psychiatric/Behavioral: Negative.       PHYSICAL EXAM  VITAL SIGNS:   Vitals:    07/24/25 1323   BP: 144/86   Pulse: 91   SpO2: 95%  Comment: room air   Weight: 88.9 kg (196 lb)   Height: 182.9 cm (72\")   PainSc: 0-No pain     Physical Exam  Vitals reviewed.   Constitutional:       Appearance: Normal appearance.   HENT:      Head: Normocephalic.      Nose: Nose normal.   Eyes:      Pupils: Pupils are equal, round, and reactive to light.   Cardiovascular:      Rate and Rhythm: Normal rate and regular rhythm.      Pulses: Normal pulses.      Heart sounds: Normal heart sounds.   Pulmonary:      Effort: Pulmonary effort is normal. No respiratory distress.      Breath sounds: Normal breath sounds. No wheezing.   Abdominal:      General: Bowel sounds are normal.      Palpations: There is no mass.   Musculoskeletal:         General: Normal range of motion.      Cervical back: Normal range of motion and neck supple. No tenderness.   Lymphadenopathy:      Cervical: No cervical adenopathy.   Skin:     General: Skin is warm and dry.      Capillary Refill: Capillary refill takes less than 2 seconds.   Neurological:      General: No focal deficit present.      Mental Status: He is alert and oriented to person, place, and time.      Motor: No weakness.   Psychiatric:         Mood and Affect: Mood normal.         Behavior: Behavior normal.        Performance Status: ECOG (2) Ambulatory and capable of self care, unable to carry out work activity, up and about > 50% or waking hours    Clinical Quality Measures  - Pain Documented by Standardized Tool, FPS   Carmine Garcia reports a pain score of 0. Given his pain assessment as noted, treatment options were discussed and the following options were decided upon as a follow-up plan to address the patient's pain: No pain, no plan given.    - Body Mass Index Screening and " Follow-Up Plan  BMI is >= 25 and <30. (Overweight) The following options were offered after discussion;: none (medical contraindication)    - Tobacco Use: Screening and Cessation Intervention  Social History    Tobacco Use      Smoking status: Former        Packs/day: 0.00        Years: 2.0 packs/day for 30.0 years (60.0 ttl pk-yrs)        Types: Cigarettes        Start date: 1955        Quit date: 1984        Years since quittin.5        Passive exposure: Past      Smokeless tobacco: Never    - Advanced Care Planning Advance Care Planning   ACP discussion was held with the patient during this visit. Patient has an advance directive in EMR which is still valid.     - PHQ-2 Depression Screening  Little interest or pleasure in doing things? Not at all   Feeling down, depressed, or hopeless? Not at all   PHQ-2 Total Score 0     ASSESSMENT AND PLAN  1. 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 History of radiation therapy    3. Former smoker      No orders of the defined types were placed in this encounter.    RECOMMENDATIONS: Carmine Garcia is status post completion of radiation therapy to the lung and presents to our clinic today for oncological surveillance.     Diagnosed with stage IA2 (cT1b cN0 cM0) left upper lobe lung. He completed 5000 cGy in 5 fractions to the [facility]AS lung tumor on 2023.   Diagnosed with a new tumor in the right lower lobe on PET scan on 2024. He completed 5000 cGy in 5 fractions to the right lower lobe tumor on 2024.    CT-scan of the chest on 2025 revealed new RIGHT upper lobe consolidative opacity with air bronchograms and traction. This could represent posttreatment changes. Recommend attention on follow-up.  Other chronic pulmonary findings as above. Cardiomegaly with heavily calcified coronary arteries versus stent material. Enlarged ascending thoracic aorta measures 4.0 cm. Other incidental findings as above.    We discussed that the new  right upper lobe consolidation could be infection/inflammation or malignant. He follows closely with medical oncology who plans to repeat a CT chest in 8 weeks. We will continue routine follow-up/surveillance as discussed in 2 months with follow up CT scan before visit and I have instructed him to continue to see the other health care providers as per their scheduling.    Patient Instructions   1) Return in 2 months with a CT chest before    Return in about 2 months (around 9/24/2025).    Time Spent: I spent 44 minutes caring for Carmine on this date of service. This time includes time spent by me in the following activities: preparing for the visit, reviewing tests, obtaining and/or reviewing a separately obtained history, performing a medically appropriate examination and/or evaluation, counseling and educating the patient/family/caregiver, ordering medications, tests, or procedures, referring and communicating with other health care professionals, documenting information in the medical record, independently interpreting results and communicating that information with the patient/family/caregiver, and care coordination.   Bryn Proctor, GRACE  07/24/2025    ;

## 2025-07-24 ENCOUNTER — LAB (OUTPATIENT)
Dept: LAB | Facility: HOSPITAL | Age: 85
End: 2025-07-24
Payer: MEDICARE

## 2025-07-24 ENCOUNTER — OFFICE VISIT (OUTPATIENT)
Age: 85
End: 2025-07-24
Payer: MEDICARE

## 2025-07-24 VITALS
BODY MASS INDEX: 26.55 KG/M2 | DIASTOLIC BLOOD PRESSURE: 86 MMHG | HEART RATE: 91 BPM | OXYGEN SATURATION: 95 % | HEIGHT: 72 IN | WEIGHT: 196 LBS | SYSTOLIC BLOOD PRESSURE: 144 MMHG

## 2025-07-24 DIAGNOSIS — Z87.891 FORMER SMOKER: ICD-10-CM

## 2025-07-24 DIAGNOSIS — Z92.3 HISTORY OF RADIATION THERAPY: ICD-10-CM

## 2025-07-24 DIAGNOSIS — C34.32 PRIMARY ADENOCARCINOMA OF LOWER LOBE OF LEFT LUNG: ICD-10-CM

## 2025-07-24 DIAGNOSIS — L98.9 SKIN LESION: ICD-10-CM

## 2025-07-24 DIAGNOSIS — C34.32 PRIMARY ADENOCARCINOMA OF LOWER LOBE OF LEFT LUNG: Primary | ICD-10-CM

## 2025-07-24 LAB
ALBUMIN SERPL-MCNC: 4 G/DL (ref 3.5–5.2)
ALBUMIN/GLOB SERPL: 1.7 G/DL
ALP SERPL-CCNC: 73 U/L (ref 39–117)
ALT SERPL W P-5'-P-CCNC: 36 U/L (ref 1–41)
ANION GAP SERPL CALCULATED.3IONS-SCNC: 12 MMOL/L (ref 5–15)
AST SERPL-CCNC: 28 U/L (ref 1–40)
BASOPHILS # BLD AUTO: 0.03 10*3/MM3 (ref 0–0.2)
BASOPHILS NFR BLD AUTO: 0.3 % (ref 0–1.5)
BILIRUB SERPL-MCNC: 0.4 MG/DL (ref 0–1.2)
BUN SERPL-MCNC: 10.5 MG/DL (ref 8–23)
BUN/CREAT SERPL: 11.5 (ref 7–25)
CALCIUM SPEC-SCNC: 8.6 MG/DL (ref 8.6–10.5)
CHLORIDE SERPL-SCNC: 97 MMOL/L (ref 98–107)
CO2 SERPL-SCNC: 24 MMOL/L (ref 22–29)
CREAT SERPL-MCNC: 0.91 MG/DL (ref 0.76–1.27)
DEPRECATED RDW RBC AUTO: 48.9 FL (ref 37–54)
EGFRCR SERPLBLD CKD-EPI 2021: 82.1 ML/MIN/1.73
EOSINOPHIL # BLD AUTO: 0.01 10*3/MM3 (ref 0–0.4)
EOSINOPHIL NFR BLD AUTO: 0.1 % (ref 0.3–6.2)
ERYTHROCYTE [DISTWIDTH] IN BLOOD BY AUTOMATED COUNT: 13.4 % (ref 12.3–15.4)
FERRITIN SERPL-MCNC: 39.81 NG/ML (ref 30–400)
GLOBULIN UR ELPH-MCNC: 2.3 GM/DL
GLUCOSE SERPL-MCNC: 124 MG/DL (ref 65–99)
HCT VFR BLD AUTO: 40.3 % (ref 37.5–51)
HGB BLD-MCNC: 12.9 G/DL (ref 13–17.7)
IMM GRANULOCYTES # BLD AUTO: 0.06 10*3/MM3 (ref 0–0.05)
IMM GRANULOCYTES NFR BLD AUTO: 0.7 % (ref 0–0.5)
IRON 24H UR-MRATE: 91 MCG/DL (ref 59–158)
IRON SATN MFR SERPL: 24 % (ref 20–50)
LYMPHOCYTES # BLD AUTO: 0.97 10*3/MM3 (ref 0.7–3.1)
LYMPHOCYTES NFR BLD AUTO: 10.9 % (ref 19.6–45.3)
MCH RBC QN AUTO: 31.6 PG (ref 26.6–33)
MCHC RBC AUTO-ENTMCNC: 32 G/DL (ref 31.5–35.7)
MCV RBC AUTO: 98.8 FL (ref 79–97)
MONOCYTES # BLD AUTO: 0.4 10*3/MM3 (ref 0.1–0.9)
MONOCYTES NFR BLD AUTO: 4.5 % (ref 5–12)
NEUTROPHILS NFR BLD AUTO: 7.41 10*3/MM3 (ref 1.7–7)
NEUTROPHILS NFR BLD AUTO: 83.5 % (ref 42.7–76)
NRBC BLD AUTO-RTO: 0 /100 WBC (ref 0–0.2)
PLATELET # BLD AUTO: 233 10*3/MM3 (ref 140–450)
PMV BLD AUTO: 8.8 FL (ref 6–12)
POTASSIUM SERPL-SCNC: 4.7 MMOL/L (ref 3.5–5.2)
PROT SERPL-MCNC: 6.3 G/DL (ref 6–8.5)
RBC # BLD AUTO: 4.08 10*6/MM3 (ref 4.14–5.8)
SODIUM SERPL-SCNC: 133 MMOL/L (ref 136–145)
TIBC SERPL-MCNC: 373 MCG/DL (ref 298–536)
TRANSFERRIN SERPL-MCNC: 250 MG/DL (ref 200–360)
WBC NRBC COR # BLD AUTO: 8.88 10*3/MM3 (ref 3.4–10.8)

## 2025-07-24 PROCEDURE — 85025 COMPLETE CBC W/AUTO DIFF WBC: CPT

## 2025-07-24 PROCEDURE — 82728 ASSAY OF FERRITIN: CPT

## 2025-07-24 PROCEDURE — 82378 CARCINOEMBRYONIC ANTIGEN: CPT

## 2025-07-24 PROCEDURE — 80053 COMPREHEN METABOLIC PANEL: CPT

## 2025-07-24 PROCEDURE — 36415 COLL VENOUS BLD VENIPUNCTURE: CPT

## 2025-07-24 PROCEDURE — 84466 ASSAY OF TRANSFERRIN: CPT

## 2025-07-24 PROCEDURE — G0463 HOSPITAL OUTPT CLINIC VISIT: HCPCS | Performed by: RADIOLOGY

## 2025-07-24 PROCEDURE — 83540 ASSAY OF IRON: CPT

## 2025-07-24 PROCEDURE — 82746 ASSAY OF FOLIC ACID SERUM: CPT

## 2025-07-24 PROCEDURE — 82607 VITAMIN B-12: CPT

## 2025-07-24 RX ORDER — FUROSEMIDE 40 MG/1
40 TABLET ORAL DAILY PRN
COMMUNITY
Start: 2025-06-28

## 2025-07-25 LAB
CEA SERPL-MCNC: 4.72 NG/ML
FOLATE SERPL-MCNC: 13.5 NG/ML (ref 4.78–24.2)
VIT B12 BLD-MCNC: 348 PG/ML (ref 211–946)

## 2025-07-25 NOTE — PROGRESS NOTES
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 He is seen 164 months since thoracoscopic surgical resection of the lung neoplasm. He is also followed for anemia of iron deficiency and B12 deficiency. It has been 87.5 months since last receipt of Injectafer.   On 23- PET-CT- showed a recurrent left lung neoplasm an isolated finding (15 mm left lung nodule adjacent to the hilum shows FDG uptake with SUV max = 3.3).  He then received definitive SBRT: 23- 23- 6000 cGy/5 fx) to NISA nodule (20 months).  He since received SBRT to the right lun/10/24-24 (5000 cGy/5 fx).  He is here alone (previously with his daughter, Monica)    I have reviewed the HPI and verified with the patient the accuracy of it. No changes to interval history since the information was documented. Cem Arteaga MD 25      DIAGNOSTIC ABNORMALITIES:   CT chest, 10/20/2011, [facility]: 1.5 cm left lower lobe nodule positioned just lateral to the hilum - granuloma versus malignancy. No pathologic lymphadenopathy.  PET CT scan, 10/26/2011: Increased metabolic activity in the left lower lobe. Lung nodule located centrally near the left hilum with maximum SUV of 7.4 units, suspicious for primary carcinoma. This corresponded to the lesion seen on CT scan of 10/20/2011. No abnormal mediastinal, hilar, or inguinal activity noted.  Diagnostic bronchoscopy, 2011: Negative for malignancy. There were no endobronchial lesions.  Labs, 2011: Ferritin 78.7, B12 235, folate 6.7, CEA 2.2, serum iron 16, TIBC 196 (225 to " 420), iron saturation 8.2%.  Thyroid ultrasound, 11/30/2011. No thyroid nodule identified.  Labs, 12/01/2011: Serum cortisol 21.7. Urine sodium less than 5, serum osmolality 680 (601 to 850).  Left lower lobe lung biopsy, 11/28/2011, [facility], [place], operated by [facility]: Well-differentiated papillary adenocarcinoma (1.5 cm in greatest dimension).  Immunohistochemistry analysis, 11/30/2011: Tumor immunoreactive with TTF-1 and Napsin A while negative for thyroglobulin. Results support primary lung adenocarcinoma.  Labs, 01/03/2012: GFR 69.9. Ferritin 54, iron 97, TIBC 301, iron sat 32. B12 352, folate 16.4.   Chest x-ray 09/09/2015 at [facility]. Stable postoperative chest x-ray.  Labs, 03/27/2017: Hemoglobin 13.8, XYP844.6,  (313 to 618), TSH 0.7, T4 of 6.9 (each normal),   Comprehensive blood report, 03/27/2017: Mild anemia with history of macrocytosis. COMPREHENSIVE DIAGNOSIS. Review of peripheral blood smear: Very mild anemia with red blood cells showing fairly normal morphology at the time of this peripheral blood specimen. Normal white blood cell and platelet counts and morphology. No abnormal populations detected on flow cytometric studies. See comment. COMPREHENSIVE COMMENT. This patient's recent peripheral blood specimen demonstrating a mild macrocytosis with an MCV of 100.6 is noted. At the time of this current peripheral blood specimen, his MCV is within normal limits at 94.3. He has a minimal anemia. White blood cell and platelet counts are both within normal limits with normal morphology. Causes of these peripheral blood findings and macrocytosis could be liver disease, thyroid disease, vitamin/nutritional deficiencies and drugs/toxins. Correlation recommended.  -6/14/23- CT chest- New 1.5 cm central LEFT upper lobe pulmonary nodule. This is concerning for recurrent neoplasm. Management options include PET/CT versus sampling with EBUS.  -6/21/23- PET-CT- PET-CT findings compatible with recurrent left lung neoplasm an  isolated finding (15 mm left lung nodule adjacent to the hilum shows FDG uptake with SUV max = 3.3).  No distant disease is seen.      PREVIOUS INTERVENTIONS:   Left thoracoscopic core needle biopsy, followed by video-assisted thoracoscopic surgery (VATS) left lower lobectomy, and mediastinal lymph node dissection, 2011: Pathology from the left lower lung nodule lung biopsy showed no histologic evidence of malignancy. Well differentiated papillary adenocarcinoma (1.5 cm in greatest diminish. All resection margins are free of malignancy. No visceral pleural invasion identified. Three peribronchial lymph nodes negative for metastatic carcinoma. Emphysematous changes. Level 10 (1 lymph node), Level 11 (1 lymph node), Level 5 (2 lymph nodes) all negative for metastatic carcinoma. 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 Special stains are pending.  Venofer 200 mg IV daily, 2011 through 2011 (800 mg); then 2014 through 2014 (800 mg).  B12 at 1000 mcg IM beginning 2011 through 2011 (1000 mg).  Foltx p.o. daily beginning 2014 through 2015.  Injectafer 750 mg, 2017; 2021  -SBRT: 23- 23- 6000 cGy/5 fx) to NISA nodule  SBRT to the right lun/10/24-24 (5000 cGy/5 fx).    Problem List Items Addressed This Visit          Other    Primary adenocarcinoma of lower lobe of left lung - Primary               Oncology/Hematology History   Primary adenocarcinoma of lower lobe of left lung   10/20/2011 Imaging    CT Chest:  1.5 cm left lower lobe nodule positioned just lateral to the hilum, granuloma vs. Malignancy  No pathologic lymphadenopathy     10/26/2011 Imaging    PET/CT:  Increased metabolic activity in the left lower lobe, lung nodule located centrally near the left hilum witih max SUV 7.4, suspicious for primary carcinoma. This corresponds to the lesion seen on CT scan of 10/20/2011.   No abnormal mediastinal, hilar, or inguinal  activity     11/14/2011 Biopsy    Final Diagnosis   1.     Biopsy, left lower lobe nodule, lung:              A.     Fragments of bronchial mucosa with submucosa demonstrating   stromal elastosis and minimal chronic inflammation.        B.     No evidence of granulomatous inflammation.        C.     No histologic evidence of malignancy.      2.     Bronchial washings, smears (four) and cell block:         A.     Mild acute inflammation.        B.     Negative for malignant cells.      3.     Bronchial brushings, left lower lobe of lung, smears (3) and ThinPrep   preparation (1):         Negative for malignant cells.        11/28/2011 Surgery    Final Diagnosis        1.     Biopsy, left lower lobe of lung (frozen section control):              A.     Fragment of fibrotic pulmonary parenchyma demonstrating stromal   elastosis and chronic active inflammation, moderate.        B.     No histologic evidence of malignancy.      2.     Level 10 lymph node (frozen section control):         Lymph node (one), negative for metastatic carcinoma.      3.     Left lower lobe of lung (frozen section control):         A.     Well-differentiated papillary adenocarcinoma (1.5 cm in greatest   dimension).        B.     The dominant and aberrant bronchial margins of surgical resection   are negative for malignancy.         C.     The vascular and parenchymal margins of surgical resection are   negative for malignancy.         D.     No visceral pleural invasion identified.         E.     Peribronchial lymph nodes (three), negative for metastatic   carcinoma.         F.     Emphysematous changes.      4.     Level 11 lymph node:   Lymph node (one), negative for metastatic   carcinoma.      5.     Level 5 lymph nodes:    Lymph nodes (two), negative for metastatic   carcinoma.      Comment:     Primary papillary adenocarcinomas of the lung do occur; however, they demonstrate a similar histology to that seen in papillary thyroid  carcinoma.  The thyroid gland should be evaluated if not already   done to exclude the possibility of met        4/30/2014 Imaging    CXR:  SUMMARY:  1. Stable chronic change.  2. No acute disease.     9/3/2014 Imaging    CXR:  IMPRESSION-   1. Emphysematous and postoperative changes in the chest without evidence  of mass lesion or acute cardiopulmonary process.     1/5/2016 Imaging    CXR:  IMPRESSION   Postoperative changes of the left hemithorax with associated volume  loss. No acute process identified.     7/1/2016 Imaging    CXR:  IMPRESSION-   1. No active disease is seen.  2. COPD/emphysema.  3. Prior lung surgery on the left.      1/4/2017 Imaging    CXR:  IMPRESSION:  1. COPD/emphysema.  2. Postoperative changes on the left.  3. No acute appearing infiltrate or effusion.      3/20/2017 Imaging    CXR:  IMPRESSION:  1. Chronic changes in the lungs and spine without evidence of acute  cardiopulmonary process. Overall, the appearance of the lungs is similar  to the study from 01/04/2017.     11/20/2017 Imaging    CXR:  Impression:  No significant interval change when compared to chest x-ray dated  07/03/2017.     2/12/2018 Imaging    CXR:  IMPRESSION:  1. Hyperinflated lungs suggesting COPD.  2. Postoperative changes of the left hemithorax.  3. No acute cardiopulmonary process identified.     7/6/2018 Imaging    CXR:  Impression:  No acute cardiopulmonary disease.     12/21/2018 Imaging    CXR:  IMPRESSION:  COPD. Chronic volume loss in the lung bases with mild  central vascular crowding. No acute infiltrates.     8/23/2019 Imaging    CXR:  IMPRESSION:  1. No acute disease.     6/19/2020 Imaging    CXR:  IMPRESSION:  1. Mild blunting of the left costophrenic angle, which could be seen  with scarring or small effusion.     2/22/2021 Imaging    CXR:  IMPRESSION:  1. COPD no acute cardiopulmonary process.     10/25/2021 Imaging    CXR:  IMPRESSION:  1. Streaky bibasilar opacities, similar to prior.     4/25/2022  Imaging    CT Chest:  IMPRESSION:  1. Status post resection of a left lower lobe pulmonary nodule. Moderate  changes of centrilobular emphysema are present.  2. Small groundglass nodules one within the left upper lobe and one  within the periphery of the right lower lobe warranting follow-up per  Fleischner criteria. No additional lung lesions are present.  3. No enlarged mediastinal or axillary lymphadenopathy..  4. 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 The  thoracic aorta is ectatic. There is mild enlargement of the pulmonary  arteries suggesting pulmonary arterial hypertension. Heavy coronary  calcifications are present.         7/20/2022 Imaging    CT Chest:  IMPRESSION:  1.  Postoperative change of LEFT lower lobectomy without evidence of  recurrent or metastatic disease.   2.  No change in 7 mm LEFT upper lobe and RIGHT lower lobe groundglass  pulmonary nodules. Consider continued imaging surveillance to document  stability.  3.  Ectatic ascending aorta measuring 4 cm diameter.     12/21/2022 Imaging    CXR:  IMPRESSION:  1. Stable chronic change.  2. No acute disease.     1/27/2023 Imaging    CT Chest:  IMPRESSION:  1. A stable CT scan of the chest. Postsurgical changes/left lower  lobectomy. No finding to suggest recurrence.  2. A stable small groundglass opacity/nodule in the left upper lobe. No  features of malignancy at this time. A follow-up examination in one year  is recommended to ensure stability.  3. Chronic emphysematous lung changes.     6/14/2023 Imaging    CT Chest:  IMPRESSION:  New 1.5 cm central LEFT upper lobe pulmonary nodule. This is concerning  for recurrent neoplasm. Management options include PET/CT versus  sampling with EBUS.     6/21/2023 Imaging    PET/CT:  Summary:  1. PET-CT findings compatible with recurrent left lung neoplasm as an  isolated finding. No distant disease is seen.     6/22/2023 Procedure    Documentation per Dr. Arteaga:  Patient  informed per Dr Arteaga recommendations a referral should be made to Rad/Onc for evaluation and possible SBRT  Patient informed once the apt is domingo he will be called with time and date        11/8/2023 Imaging    CT Chest:  IMPRESSION:  Stable 1.5 cm left perihilar central upper lobe nodule, relatively  unchanged from 6/14/2023. No new or increasing size nodule identified.  No pathologic lymphadenopathy. Stable volume loss left hemithorax from a  prior left lower lobe lobectomy.     2/1/2024 Imaging    CT Chest:  IMPRESSION:  1. Stable chest CT appearance.  2. No new or progressive neoplastic disease.        5/6/2024 Imaging    CT Chest:  IMPRESSION:  Stable CT chest without new or progressive disease.        8/7/2024 Imaging    CT Chest:  IMPRESSION:  1. New RIGHT lower lobe peripheral spiculated masslike opacity, highly  concerning for disease recurrence if the patient has no symptoms of  infection.  2. Heavily calcified coronary arteries versus stent material.  3. New mild height loss at T2 and L2.     8/22/2024 Imaging    PET/CT:  IMPRESSION:  1. Intense abnormal uptake associated with the peripheral nodule in the  right lower lobe which abuts the major fissure along its anterior  margin. Given the high SUV measurement neoplasia should be excluded and  is the primary consideration. It is of note that the nodule has changed  somewhat from the previous examination demonstrating increase in size in  the transverse dimension but overall diminishment in size in the coronal  dimension. This is somewhat unusual for neoplasia suggesting that this  finding could potentially be inflammatory/infectious in nature. No  associated metabolically active hilar or mediastinal adenopathy.  Previously noted metabolically active left perihilar nodule no longer  demonstrates any abnormal metabolic activity posttreatment.  2. No additional sites of abnormal metabolic activity demonstrated.  3. There are emphysematous changes of the  lungs. No additional lung  lesions noted by CT imaging. No cervical or mediastinal adenopathy.  There is extensive diverticular disease of the descending and sigmoid  colon without diverticulitis. Diffuse enlargement of the prostate gland  is present with mass effect on the base of the bladder. Prominence of  the urinary bladder wall likely is related to muscular hypertrophy from  an element of chronic bladder outlet obstruction.            PAST MEDICAL HISTORY:  ALLERGIES:  Allergies   Allergen Reactions    Penicillins Unknown - High Severity     Unknown       CURRENT MEDICATIONS:  Outpatient Encounter Medications as of 7/31/2025   Medication Sig Dispense Refill    alendronate (FOSAMAX) 70 MG tablet Take 1 tablet by mouth Every 7 (Seven) Days.      alfuzosin (UROXATRAL) 10 MG 24 hr tablet Take 1 tablet by mouth Daily.      amLODIPine (NORVASC) 10 MG tablet Take 1 tablet by mouth Daily.      bisacodyl (DULCOLAX) 5 MG EC tablet Take 2 tablets by mouth Every Night.      buPROPion XL (WELLBUTRIN XL) 150 MG 24 hr tablet Take 1 tablet by mouth Every Morning.      Calcium Carb-Ergocalciferol (CHEWABLE CALCIUM/D PO) Take 1 tablet by mouth Daily.      fluticasone (FLONASE) 50 MCG/ACT nasal spray Administer  into the nostril(s) as directed by provider Daily.      folic acid-vit B6-vit B12 (Folbee) 2.5-25-1 MG tablet tablet Take 1 tablet by mouth Daily. 30 tablet 0    furosemide (LASIX) 40 MG tablet Take 1 tablet by mouth Daily As Needed.      losartan (COZAAR) 25 MG tablet 1 tablet 2 (Two) Times a Day.      lovastatin (MEVACOR) 40 MG tablet Take 1 tablet by mouth.      multivitamins-minerals (PRESERVISION AREDS 2) capsule capsule Take 1 capsule by mouth 2 (Two) Times a Day.      pantoprazole (PROTONIX) 40 MG EC tablet 1 tablet Daily.      predniSONE (DELTASONE) 10 MG tablet Take 1.5 tablets by mouth Daily. 1.5 tab daily      pyridostigmine (MESTINON) 60 MG tablet Take 1 tablet by mouth Every 6 (Six) Hours.       No  facility-administered encounter medications on file as of 7/31/2025.     ADULT ILLNESSES:   Lung cancer ( Stage Date: 11/28/2011, Stage IA (T1a, N0, M0)-Pathological  Date of Dx:2011 ; ICD-10:C34.32 ;Malignant neoplasm of lower lobe, left bronchus or lung )   Acute bronchitis ( ICD-10:J20.9 ;Acute bronchitis, unspecified   Cataracts ( ICD-10:H26.9 ;Unspecified cataract   Colonic polyps ( hyperplastic, 02/2010; ICD-10:K63.5 ;Polyp of colon )   Constipation ( ICD-10:K59.00 ;Constipation, unspecified   Former smoker ( ICD-10:Z87.891 ;Personal history of nicotine dependence   High carcinoembryonic antigen level ( ICD-10:R97.0 ;Elevated carcinoembryonic antigen [CEA]   Hyperlipidemia ( ICD-10:E78.5 ;Hyperlipidemia, unspecified   Hypertension ( ICD-10:I10 ;Essential (primary) hypertension   Hyperthyroidism ( ICD-10:E05.90 ;Thyrotoxicosis, unspecified without thyrotoxic crisis or storm   Iron deficiency anemia ( ICD-10:D50.9 ;Iron deficiency anemia, unspecified   Leukocytosis ( ICD-10:D72.829 ;Elevated white blood cell count, unspecified   Macrocytosis ( ICD-10:D75.89 ;Other specified diseases of blood and blood-forming organs   Malabsorption syndrome (disorder)   Peptic ulcers ( ICD-10:K27.7 ;Chronic peptic ulcer, site unspecified, without hemorrhage or perforation   Retention of urine ( postoperative following eye surgery, 10/2011; ICD-10:R33.9 ;Retention of urine, unspecified )   Strabismus (eye condition) ( ICD-10:H50.9 ;Unspecified strabismus   Vitamin B12 deficiency ( ICD-10:E53.8 ;Deficiency of other specified B group vitamins  Right leg edema. Venous Doppler of right lower extremity on 11/28/2017 ([facility]). Impression: There is no evidence of deep venous thrombosis or superficial thrombophlebitis of the right lower extremity.      SURGERIES:   Excision of non-melanoma skin cancer, 01/2012. Dr. Medrano   Video-assisted thoracoscopic (VATS) left lower lobectomy, 11/28/2011. Dr. Troy   Partial  gastrectomy and vagotomy   Total knee replacement, right, 05/18/2017. Dr. Jarrett   Cataract surgery, 2004   Incision and drainage of abscess, left groin, (I&D) in early 01/2014. Dr. Velarde   Strabismus surgery, left eye, 2011.   Colonoscopy on 4/10/18 ([facility]). Impressions: Preparation of the colon was inadequate. One 12 mm polyp in the cecum, removed with a hot snare and removed piecemeal using a hot snare. Resected and retrieved. Diverticulosis in the sigmoid colon and in the descending colon    ADULT ILLNESSES:  Patient Active Problem List   Diagnosis Code    Hx of colonic polyps Z86.0100    Obesity, unspecified obesity severity, unspecified obesity type E66.9    History of colon polyps Z86.0100    Primary adenocarcinoma of lower lobe of left lung C34.32    Iron deficiency anemia D50.9    B12 deficiency E53.8    Essential hypertension I10    Pulmonary emphysema J43.9    Slow transit constipation K59.01    Urinary retention R33.9    Former smoker Z87.891    History of radiation therapy Z92.3    History of malignant neoplasm of lung Z85.118    Pneumonia J18.9    Pneumonia due to Streptococcus J15.4     SURGERIES:  Past Surgical History:   Procedure Laterality Date    CATARACT EXTRACTION      COLONOSCOPY  12/12/2014    Diverticulosis repeat exam in 3 years    COLONOSCOPY N/A 04/10/2018    Tubular adenoma cecum poor prep repeat in 3 months    COLONOSCOPY N/A 08/02/2018    2 Tubular adenomas transverse and ascending colon fair prep repeat in 3 years    COLONOSCOPY N/A 03/24/2022    Procedure: COLONOSCOPY WITH ANESTHESIA;  Surgeon: Ferdinand Cross MD;  Location: [facility] ENDOSCOPY;  Service: Gastroenterology;  Laterality: N/A;  pre hx colon polyps  post diverticulosis; inadequate prep  Efrain Dobson, DO    COLONOSCOPY W/ POLYPECTOMY  02/15/2010    2 Hyperplastic polyps at 25 cm and rectum repeat exam in 5 years    LUNG CANCER SURGERY      LUNG REMOVAL, PARTIAL      REPLACEMENT TOTAL KNEE Right 05/2017     TONSILLECTOMY       HEALTH MAINTENANCE ITEMS:  Health Maintenance Due   Topic Date Due    RSV Vaccine - Adults (1 - 1-dose 75+ series) Never done    ANNUAL WELLNESS VISIT  2024    COVID-19 Vaccine (7 - Moderna risk - season) 2025       <no information>  Last Completed Colonoscopy    This patient has no relevant Health Maintenance data.       Immunization History   Administered Date(s) Administered    COVID-19 (MODERNA) 1st,2nd,3rd Dose Monovalent 2021, 2021, 2021    COVID-19 (PFIZER) 12YRS+ (COMIRNATY) 2023, 2024    COVID-19 (PFIZER) Purple Cap Monovalent 2021    Flu Vaccine Split Quad 10/11/2018    INFLUENZA SPLIT TRI 10/13/2014    Influenza Injectable Mdck Pf Quad 10/14/2021, 10/13/2022    Influenza TIV (IM) 2019, 10/28/2020    Influenza, Unspecified 10/14/2021, 10/13/2022    Pneumococcal Conjugate 13-Valent (PCV13) 2018    Pneumococcal Polysaccharide (PPSV23) 2011, 10/21/2013    Tdap 10/07/2024    Zostavax 2013     Last Completed Mammogram    This patient has no relevant Health Maintenance data.           FAMILY HISTORY:  Family History   Problem Relation Age of Onset    Colon polyps Mother     Stroke Mother     Diabetes Mother     Hypertension Mother     Cancer Father         asbestos    Stroke Maternal Grandmother     No Known Problems Maternal Grandfather     No Known Problems Paternal Grandmother     No Known Problems Paternal Grandfather     Diabetes Child     Colon cancer Neg Hx      SOCIAL HISTORY:  Social History     Socioeconomic History    Marital status:    Tobacco Use    Smoking status: Former     Current packs/day: 0.00     Average packs/day: 2.0 packs/day for 30.0 years (60.0 ttl pk-yrs)     Types: Cigarettes     Start date: 1955     Quit date: 1984     Years since quittin.6     Passive exposure: Past    Smokeless tobacco: Never   Vaping Use    Vaping status: Never Used   Substance and Sexual Activity  "   Alcohol use: Never    Drug use: Never    Sexual activity: Not Currently     Partners: Female       REVIEW OF SYSTEMS:  Constitutional:   The patient's appetite is fairly good. His energy remains chronically low to fair. \"Same.\" He says has been able to walk better since the knee surgery (right knee arthroplasty, 05/18/2017) though is again using a cane.  \"I don't use it all the time.\"  Says the lower back pains and breathlessness slow him down.  He manages his personal ADLs, some chores, running errands and driving short distances. He has regained 8 lb (had lost 15 lb at his 4 prior visits) in the interval.  He lives with his daughter and her spouse. He denies fevers, chills, or drenching night sweats. His sleep habits had been appropriate.    Ear/Nose/Throat/Mouth:   He reports sinus congestion and postnasal drainage but no ear pains or sore throat.  He has not had nosebleeds. No sore tongue. He has no headaches. He denies any hoarseness, change in voice quality.  Ocular:   He reports recent bouts of diplopia, no eye pain, significant change in visual acuity, or blurry vision.  Respiratory:   He has baseline exertional dyspnea but says he is less short of breath with his routine activities. Has worsening cough that he attributes to postnasal drainage.  He denies shortness of breath at rest.  Completed SBRT 8/1/23- 8/14/23- 6000 cGy/5 fx) to NISA nodule  Cardiovascular:   He reports no exertional chest pain, chest pressure, or chest heaviness. He reports no claudication. He reports no palpitations or symptomatic orthostasis.  Gastrointestinal:   He reports no dysphagia, nausea, vomiting, postprandial abdominal pain, bloating, cramping, change in bowel habits, with no dark stools (oral iron intolerant). He reports no rectal bleeding. He again reports no constipation on \"the right diet.\"  Infrequent use of stool softeners (Dulcolax) without laxatives. He has no diarrhea. Repeat c-scope 03/24/22.  Diverticulosis.  " "Repeat prn. He says that he has elected not to do anymore. \"Not at my age anymore.\"  Genitourinary:   He denies prior problems with urinary burning, frequency, dribbling, or discoloration. He denies difficulty controlling his bladder. Has nocturia.  Musculoskeletal:   He has chronic arthralgias.  The right knee feels more stable since surgery.  Has not needed a cane to walk unless he goes long distances.  He has only occasional \"aches\" of the hips and his shoulders. He denies myalgias or nighttime leg cramping.  Extremities:   He normally has no fluid retention though has lingering mild right = left ankle/leg swelling.   Endocrine:   He reports no problems with excess thirst, excessive urination, vasomotor instability, or unexplained fatigue.  Heme/Lymphatic:   He reports easy bruising but no unexplained bleeding, petechial rashes, or swollen glands.  Neuro:   He reports no loss of consciousness, seizures, fainting spells, or dizziness. He reports no weakness of his face, arms, or legs. He has no difficulty with speech. He has no tremors. He reports occasional left = right foot tingling.   Psych:   He seems generally satisfied with life. He denies depression/anxiety. He reports no mood swings.       VITAL SIGNS: /80   Pulse 86   Temp 96.5 °F (35.8 °C) (Temporal)   Resp 18   Ht 182.9 cm (72\")   Wt 89.1 kg (196 lb 6.4 oz)   SpO2 98%   BMI 26.64 kg/m² Body surface area is 2.11 meters squared.  Pain Score    07/31/25 1340   PainSc: 0-No pain           PHYSICAL EXAMINATION:   General:   He is a pleasant, obese, and modestly-kept elderly male who appears comfortable while seated. He appears to be his stated age. His skin color is normal. He is ambulatory with a cane.  ECOG PS = 1-2 (\"still 50-50\")  Head/Neck:   The patient is anicteric and atraumatic. The trachea is midline. The neck is supple without evidence of jugular venous distention or cervical adenopathy.  Eyes:   The pupils are equal, round, and " reactive to light. The extraocular movements are full. There is no scleral jaundice or erythema.  Chest:   Breath sounds are diminished, left > right. There are no wheezes, rhonchi, or rales. The right thoracotomy wounds are healed.  Cardiovascular:   The patient has a regular cardiac rate and rhythm without murmurs, rubs, or gallops. The peripheral pulses are equal and full.  Abdomen:   The belly is soft and globose. There is no rebound, guarding, organomegaly, mass-effect, or tenderness. Bowel sounds are present.  Extremities:   There is no evidence of cyanosis or clubbing. There is no ankle/ leg edema but no calf tenderness.   Rheumatologic:   There is some evidence of osteoarthritic deformities of the hands. The gait is slow, stooped and cane supported.  Cutaneous:   The irregularly shaped, variably pigmented, flaky, ovoid shaped lesion on the right cheek and left nasal ala have been resected and healed.  There are no overt rashes, disseminated lesions, purpura, or petechiae.  Lymphatics:   There is no evidence of adenopathy in the cervical, supraclavicular, or axillary areas.  Neurologic:   The patient is alert, oriented, cooperative, and pleasant. He is appropriately conversant. There is no overt impairment with no overt focal motor deficits.   Psych:   Mood and affect are appropriate for circumstance. Eye contact is appropriate.        LABS    Lab Results - Last 18 Months   Lab Units 07/24/25  1304 03/20/25  1105 12/27/24  0301 12/26/24  0848 12/25/24  0630 12/24/24  2116 12/09/24  1303 09/05/24  1114   HEMOGLOBIN g/dL 12.9* 12.9* 10.5* 11.2* 10.5* 12.7* 12.2* 11.6*   HEMATOCRIT % 40.3 41.2 33.0* 34.0* 33.2* 39.0 39.4 37.3*   MCV fL 98.8* 101.2* 98.5* 95.5 97.4* 97.3* 101.3* 100.8*   WBC 10*3/mm3 8.88 7.10 8.01 9.37 13.60* 13.66* 13.72* 9.38   RDW % 13.4 14.9 13.3 13.2 13.2 13.2 13.3 13.4   MPV fL 8.8 7.9 8.7 9.0 8.5 8.4 8.7 8.5   PLATELETS 10*3/mm3 233 284 254 266 285 380 401 239   IMM GRAN % % 0.7*  --   0.6* 0.6*  --  0.4 1.2* 0.5   NEUTROS ABS 10*3/mm3 7.41* 5.80 6.19 8.10*  --  12.04* 12.45* 7.86*   LYMPHS ABS 10*3/mm3 0.97 0.60* 0.95 0.76  --  0.74 0.49* 0.73   MONOS ABS 10*3/mm3 0.40  --  0.79 0.43  --  0.81 0.59 0.68   EOS ABS 10*3/mm3 0.01  --  0.02 0.01  --  0.00 0.00 0.04   BASOS ABS 10*3/mm3 0.03  --  0.01 0.01  --  0.01 0.02 0.02   IMMATURE GRANS (ABS) 10*3/mm3 0.06*  --  0.05 0.06*  --  0.06* 0.17* 0.05   NRBC /100 WBC 0.0  --  0.0 0.0  --  0.0 0.0 0.0       Lab Results - Last 18 Months   Lab Units 07/24/25  1304 03/20/25  1105 12/27/24  0301 12/26/24  0848 12/25/24  0630 12/24/24  2247 12/24/24  2116 12/09/24  1303 09/05/24  1114   GLUCOSE mg/dL 124* 84 99 175* 108*  --  109* 114* 70   SODIUM mmol/L 133* 134* 135* 130* 129*  --  130* 135* 135*   SODIUM, ARTERIAL mmol/L  --   --   --   --   --  130*  --   --   --    POTASSIUM mmol/L 4.7 4.2 3.6 3.6 4.0  --  4.5 4.6 4.3   CO2 mmol/L 24.0 29.0 26.0 24.0 24.0  --  23.0 27.0 26.0   CHLORIDE mmol/L 97* 103 98 95* 95*  --  95* 99 98   ANION GAP mmol/L 12.0 2.0* 11.0 11.0 10.0  --  12.0 9.0 11.0   CREATININE mg/dL 0.91 0.90 0.68* 0.75* 0.63*  --  0.70* 0.74* 0.80   BUN mg/dL 10.5 8 8 7* 7*  --  7* 12 8   BUN / CREAT RATIO  11.5 8.9 11.8 9.3 11.1  --  10.0 16.2 10.0   CALCIUM mg/dL 8.6 9.2 7.9* 7.8* 7.2*  --  8.5* 8.3* 8.6   ALK PHOS U/L 73 68  --   --  80  --  109 105 128*   TOTAL PROTEIN g/dL 6.3 7.1  --   --  5.4*  --  6.9 6.8 6.4   ALT (SGPT) U/L 36 40  --   --  19  --  29 53* 18   AST (SGOT) U/L 28 29  --   --  15  --  22 28 16   BILIRUBIN mg/dL 0.4 0.5  --   --  0.3  --  0.4 0.3 0.3   ALBUMIN g/dL 4.0 4.2  --   --  2.9*  --  3.5 3.6 3.8   GLOBULIN gm/dL 2.3 2.9  --   --  2.5  --  3.4 3.2 2.6       Lab Results - Last 18 Months   Lab Units 07/24/25  1304 03/20/25  1105 12/09/24  1303 09/05/24  1114 06/06/24  1036 03/07/24  1046   CEA ng/mL 4.72 4.88 4.75 3.77 4.20 4.29       Lab Results - Last 18 Months   Lab Units 07/24/25  1304 03/20/25  1105  12/25/24  0911 12/09/24  1303 09/05/24  1114 06/06/24  1036 03/07/24  1046   IRON mcg/dL 91 152  --  58* 101 98 50*   TIBC mcg/dL 373 322  --  219* 283* 304 405   IRON SATURATION (TSAT) % 24 47  --  26 36 32 12*   FERRITIN ng/mL 39.81 110.00  --  290.80 216.40 201.60 25.52*   FOLATE ng/mL 13.50 14.20 12.30 10.60 10.70 12.20 16.90       PROBLEMS IDENTIFIED:   1.  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   Tumor burden: 1.5 cm left lower lung nodule.   Complications of tumor: None.   Tumor status: JAZMÍN following resection via left lower lobectomy.   Chest x-ray, 07/03/2017, [facility]. Impression: Stable appearance of the chest with postoperative changes in the left lung base and emphysematous changes.   Chest x-ray obtained 11/20/2017 at [facility] reveals no interval change when compared to chest x-ray dated 07/03/2017.   Chest x-ray on 02/12/2018 ([facility]). Impression: Hyperinflated lungs suggesting chronic obstructive pulmonary disease (COPD). Postoperative changes of the left hemithorax. No acute cardiopulmonary process identified.   Chest Xray on 07/06/2018 ([facility]). Impression: No acute cardiopulmonary disease.   Chest X-ray, 12/21/2018 at [facility]. COPD. Chronic volume loss in the lung bases with mild central vascular crowding. No acute infiltrates.   06/19/2020-chest x-ray.  Comparison: 8/23/2019-impression: Mild blunting of the left costophrenic angle which could be seen with scarring or small effusion.  02/22/2021- chest xray.  COPD no acute cardiopulmonary process  10/25/2021-chest x-ray.  Streaky bibasilar opacities.  Similar to prior.  04/25/22- CT chest- Status post resection of a left lower lobe pulmonary nodule. Moderate changes of centrilobular emphysema are present. Small groundglass nodules one within the left upper lobe and one within the periphery of the right lower lobe  warranting follow-up per Fleischner criteria. No additional lung lesions are present. No enlarged mediastinal or axillary lymphadenopathy.  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 The thoracic aorta is ectatic. There is mild enlargement of the pulmonary arteries suggesting pulmonary arterial hypertension. Heavy coronary calcifications are present.   07/20/22-CT chest- postoperative change of LLL without evidence of recurrent or metastatic disease.  No change in 7 mm NISA and RLL groundglass pulmonary nodules.  Consider continued imaging surveillance to document stability.  Ectatic ascending aorta measuring 4 cm in diameter.  1/27/23- CT chest- A stable CT scan of the chest. Postsurgical changes/left lower lobectomy. No finding to suggest recurrence. A stable small groundglass opacity/nodule in the left upper lobe. No features of malignancy at this time. A follow-up examination in one year is recommended to ensure stability. Chronic emphysematous lung changes  6/14/23- CT chest- New 1.5 cm central LEFT upper lobe pulmonary nodule. This is concerning for recurrent neoplasm. Management options include PET/CT versus sampling with EBUS.  6/21/23- PET-CT- PET-CT findings compatible with recurrent left lung neoplasm an isolated finding (15 mm left lung nodule adjacent to the hilum shows FDG uptake with SUV max = 3.3).  No distant disease is seen.  SBRT: 8/1/23- 8/14/23- 6000 cGy/5 fx) to NISA nodule  11/8/23- CT chest- Stable 1.5 cm left perihilar central upper lobe nodule, relatively unchanged from 6/14/2023. No new or increasing size nodule identified. No pathologic lymphadenopathy. Stable volume loss left hemithorax from a  prior left lower lobe lobectomy.  11/15/23- Follow-up Dr. Samuels- Diagnosed with stage IA2 (cT1b cN0 cM0) neoplasm of left upper lung. He completed 5000 cGy in 5 fractions to the NISA lung tumor on 08/14/2023.  CT chest, 11/8/23 reviewed.  I do not see evidence for recurrent or  metastatic disease at this time. We will continue routine follow-up/surveillance as discussed in 3 months with follow up CT scan before visit. Remission!  2/1/24- CT chest- Stable chest CT appearance. No new or progressive neoplastic disease.  2/5/24- Follow-up Dr. Samuels-, I do not see evidence for recurrent or metastatic disease at this time. We will continue routine follow-up/surveillance as discussed in 3 months with follow up CT scan before visit.   5/6/24- CT chest- Stable CT chest without new or progressive disease.   8/22/24- PET scan-1. Intense abnormal uptake associated with the peripheral nodule in the right lower lobe which abuts the major fissure along its anterior margin. Given the high SUV measurement neoplasia should be excluded and is the primary consideration. It is of note that the nodule has changed somewhat from the previous examination demonstrating increase in size in the transverse dimension but overall diminishment in size in the coronal dimension. This is somewhat unusual for neoplasia suggesting that this finding could potentially be inflammatory/infectious in nature. No associated metabolically active hilar or mediastinal adenopathy. Previously noted metabolically active left perihilar nodule no longer demonstrates any abnormal metabolic activity posttreatment. No additional sites of abnormal metabolic activity demonstrated. There are emphysematous changes of the lungs. No additional lung lesions noted by CT imaging. No cervical or mediastinal adenopathy. 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 Diffuse enlargement of the prostate gland is present with mass effect on the base of the bladder. Prominence of the urinary bladder wall likely is related to muscular hypertrophy from an element of chronic bladder outlet obstruction.  8/29/24- Follow-up Dr. Samuels:  Diagnosed with stage IA2 (cT1b cN0 cM0) left upper lobe lung 1.5. He  completed 5000 cGy in 5 fractions to the NISA lung tumor on 08/14/2023.  He now has a new tumor in the right lower lobe on PET scan, 8/22/24.  I recommended a course of stereotactic radiosurgery to right lower lobe tumor, I anticipate 3336-0027 cGy over 3-5 treatments - started 9/10/24-9/17/24.   12/17/24-CT chest- 1.  Marked decrease in size of 12 mm right lower lobe pulmonary nodule (previously 3.2 cm). No new or enlarging pulmonary nodules.2.  Fairly extensive consolidation and groundglass opacity throughout the left upper lobe. Favor infectious process/pneumonia but recommendfollow-up to ensure resolution and exclude a neoplastic process. Small left-sided pleural effusion. Postoperative change of left lower lobectomy.  3/20/25- CT chest-Previous left lower lobe resection. There is left perihilar parenchymal scarring and consolidation with associated volume loss and mild mediastinal shift. Previously noted more extensive consolidation within the left lung has resolved and I feel represented post radiation pneumonitis. A small loculated effusion is noted in the left lung base showing diminishment in size. There is mild thickening of the visceral and parietal pleura. No new or enlarging pulmonary nodule in the left lung. Within the right lung there is linear scarring with associated nodularity in the lateral aspect of the right lower lobe. The nodular component of this shows continued diminishment in size now measuring 8 mm in maximum dimension versus 1.2 cm on the previous exam. No new or enlarging right-sided pulmonary nodules. No developing mediastinal or axillary lymphadenopathy.  7/17/25- CT chest-New RIGHT upper lobe consolidative opacity with air bronchograms and traction. This could represent posttreatment changes. Recommend attention on follow-up.  Other chronic pulmonary findings as above. Cardiomegaly with heavily calcified coronary arteries versus stent material. Enlarged ascending thoracic aorta  "measures 4.0 cm. Other incidental findings as above.           2.  Mildly abnormal CEA.    --Stable. 4.72, 7/24/25 (prior range: 2.2 - 5.0).     3.  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 Underwent right knee replacement, 05/18/2017.   --5/16/24- xray lumbar spine- Osteopenia. Extensive degenerative disc, endplate, and facet disease. Mild chronic appearing compression deformities of L1 and L4.    4.  Normocytic/macrocytic anemia with resolution of macrocytosis, repleted iron and B12 deficiencies (anemia of chronic disease).   --Stable, Hgb 12.9; MCV 98.8, 7/24/25 (prior range: Hgb 11.4 - 14.3; 94.4 - 102.1)  a. No myelodysplastic syndrome (MDS) on comprehensive blood report, normal thyroid function tests (TFTs), normal LDH, normal B12/folate.   b. Colonoscopy on 4/10/18 ([facility]). Impressions: Preparation of the colon was inadequate. One 12 mm polyp in the cecum, removed with a hot snare and removed piecemeal using a hot snare. Resected and retrieved. Diverticulosis in the sigmoid colon and in the descending colon.   c. Repeat with 2 day prep scheduled for 08/02/2018. \"Polyps\" Repeat 3 years.  d. Colonoscopy, 08/02/2018 (above).  Fair colon preparation.  1 5 mm polyp in the ascending colon, removed with hot snare.  One 12 mm polyp in the transverse colon, removed with a hot snare.  e.  Injectafer 750 mg, 11/01/2022; 3/14/24; 3/21/24  5.  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 Negative thyroid ultrasound, 11/30/2011. Is followed by Dr. Medrano (ENT) and Dr. Roth (endocrine).   6.  Low B12 levels. Recurrent.   7.  Constipation, multifactorial.  Regulated by stool softeners.  \"Occasionally.\"  8.  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   9.  Chronic intermittent irregular heart rate, Dr. Dobson following.   10. A 50 pack-year tobacco smoker with radiographic evidence of emphysema. Has not smoked " "since 2004.         11. Anxiety.          12.  Myasthenia gravis.  Followed by Dr. Hines        13.  COPD        14.  Weight loss.  Has stabilized.  Reassures me it is intentional.  \"Eating less and smarter.\"        15.  Elevated alk phos,   - Resolved, 73, 7/24/25 (prior: 200 on 6/6/2024). Osteopenia?  - Lumbar xray, 5/16/24 (above).  Osteopenia. Extensive degenerative disc, endplate, and facet disease. Mild chronic appearing compression deformities of L1 and L4.         16.  Right cheek and left nasal skin lesions.  Resected by Dr. Zuniga of dermatology, 5/13/25.  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   Apprise of labs from 7/24/25 with stable anemia otherwise normal CBC, CEA 4.7 (prior:  2.2 - 5), gluc 124, sodium 133, otherwise normal CMP, repleted iron (91), repleted (24%; 47%; 26%- 36%; 32%; 12%; 22%; 30%; 31%; 35%; 37%; 18%) Fe sat, ferritin (39-prior: 110; 290; 216; 201; 25; 42; 47; 68; 71; 56; 17; 45; 87; 58), repleted (348; 499; 254; 317; 306; 350; 279; 366; 290; 388; >1000) B12, folate 13.5.     2.   Schedule Venofer 200 mg IV daily x 2    3.   Review chest CT, 7/17/25 (above).  New RIGHT upper lobe consolidative opacity with air bronchograms and traction. This could represent posttreatment changes. Recommend attention on follow-up.  4.   Review visit with radiation oncology, LUIS Gtz on  7/24/24-   Diagnosed with stage IA2 (cT1b cN0 cM0) left upper lobe lung. He completed 5000 cGy in 5 fractions to the NISA lung tumor on 08/14/2023.   Diagnosed with a new tumor in the right lower lobe on PET scan on 08/22/2024. He completed 5000 cGy in 5 fractions to the right lower lobe tumor on 09/17/2024.  CT chest, 7/17/25 (above) reviewed.We discussed that the new right upper lobe consolidation could be infection/inflammation or malignant. He follows closely with medical oncology who plans to repeat a CT chest in 8 weeks. We will continue routine follow-up/surveillance as " discussed in 2 months with follow up CT scan before visit   Return in about 2 months (around 9/24/2025)     5.  Continue ongoing management per primary care physician (Dr. Dobson) and other specialists (has follow-up appointment with Dr. Samuels later today).     6.  Right cheek and left nasal skin lesions-Resected by dermatology, 5/13/25.  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  Schedule CT chest in 7 weeks  8.  Return to the office with pre-office CEA, serum iron, Fe sat, ferritin, B12/folate, CMP, and CBC with differential in 8 weeks.      I spent ~ 40 minutes caring for Carmine on this date of service. This time includes time spent by me in the following activities: preparing for the visit, reviewing tests, performing a medically appropriate examination and/or evaluation, counseling and educating the patient/family/caregiver, ordering medications, tests, or procedures and documenting information in the medical record        cc: DO Kieran Pennington MD Nicholas Lopez, MD

## 2025-07-31 ENCOUNTER — OFFICE VISIT (OUTPATIENT)
Dept: ONCOLOGY | Facility: CLINIC | Age: 85
End: 2025-07-31
Payer: MEDICARE

## 2025-07-31 VITALS
DIASTOLIC BLOOD PRESSURE: 80 MMHG | OXYGEN SATURATION: 98 % | RESPIRATION RATE: 18 BRPM | WEIGHT: 196.4 LBS | HEIGHT: 72 IN | BODY MASS INDEX: 26.6 KG/M2 | TEMPERATURE: 96.5 F | HEART RATE: 86 BPM | SYSTOLIC BLOOD PRESSURE: 148 MMHG

## 2025-07-31 DIAGNOSIS — C34.32 PRIMARY ADENOCARCINOMA OF LOWER LOBE OF LEFT LUNG: Primary | ICD-10-CM

## 2025-07-31 PROBLEM — K90.9 INTESTINAL MALABSORPTION, UNSPECIFIED: Status: ACTIVE | Noted: 2025-07-31

## 2025-07-31 RX ORDER — HYDROCORTISONE SODIUM SUCCINATE 100 MG/2ML
100 INJECTION INTRAMUSCULAR; INTRAVENOUS AS NEEDED
OUTPATIENT
Start: 2025-08-06

## 2025-07-31 RX ORDER — SODIUM CHLORIDE 9 MG/ML
20 INJECTION, SOLUTION INTRAVENOUS ONCE
OUTPATIENT
Start: 2025-08-06

## 2025-07-31 RX ORDER — FAMOTIDINE 10 MG/ML
20 INJECTION, SOLUTION INTRAVENOUS AS NEEDED
OUTPATIENT
Start: 2025-08-06

## 2025-07-31 RX ORDER — DIPHENHYDRAMINE HYDROCHLORIDE 50 MG/ML
50 INJECTION, SOLUTION INTRAMUSCULAR; INTRAVENOUS AS NEEDED
OUTPATIENT
Start: 2025-08-06

## 2025-08-04 ENCOUNTER — TELEPHONE (OUTPATIENT)
Dept: ONCOLOGY | Facility: CLINIC | Age: 85
End: 2025-08-04
Payer: MEDICARE

## 2025-08-06 ENCOUNTER — INFUSION (OUTPATIENT)
Dept: ONCOLOGY | Facility: HOSPITAL | Age: 85
End: 2025-08-06
Payer: MEDICARE

## 2025-08-06 VITALS
RESPIRATION RATE: 18 BRPM | OXYGEN SATURATION: 94 % | TEMPERATURE: 97.7 F | SYSTOLIC BLOOD PRESSURE: 149 MMHG | HEIGHT: 72 IN | BODY MASS INDEX: 26.87 KG/M2 | DIASTOLIC BLOOD PRESSURE: 82 MMHG | HEART RATE: 75 BPM | WEIGHT: 198.4 LBS

## 2025-08-06 DIAGNOSIS — D50.9 IRON DEFICIENCY ANEMIA, UNSPECIFIED IRON DEFICIENCY ANEMIA TYPE: ICD-10-CM

## 2025-08-06 DIAGNOSIS — K90.9 INTESTINAL MALABSORPTION, UNSPECIFIED TYPE: Primary | ICD-10-CM

## 2025-08-06 PROCEDURE — 25010000002 IRON SUCROSE PER 1 MG: Performed by: INTERNAL MEDICINE

## 2025-08-06 PROCEDURE — 96374 THER/PROPH/DIAG INJ IV PUSH: CPT

## 2025-08-06 PROCEDURE — 25810000003 SODIUM CHLORIDE 0.9 % SOLUTION: Performed by: INTERNAL MEDICINE

## 2025-08-06 PROCEDURE — 96365 THER/PROPH/DIAG IV INF INIT: CPT

## 2025-08-06 RX ORDER — HYDROCORTISONE SODIUM SUCCINATE 100 MG/2ML
100 INJECTION INTRAMUSCULAR; INTRAVENOUS AS NEEDED
Status: DISCONTINUED | OUTPATIENT
Start: 2025-08-06 | End: 2025-08-06 | Stop reason: HOSPADM

## 2025-08-06 RX ORDER — SODIUM CHLORIDE 9 MG/ML
20 INJECTION, SOLUTION INTRAVENOUS ONCE
Status: CANCELLED | OUTPATIENT
Start: 2025-08-07

## 2025-08-06 RX ORDER — FAMOTIDINE 10 MG/ML
20 INJECTION, SOLUTION INTRAVENOUS AS NEEDED
Status: CANCELLED | OUTPATIENT
Start: 2025-08-07

## 2025-08-06 RX ORDER — DIPHENHYDRAMINE HYDROCHLORIDE 50 MG/ML
50 INJECTION, SOLUTION INTRAMUSCULAR; INTRAVENOUS AS NEEDED
Status: DISCONTINUED | OUTPATIENT
Start: 2025-08-06 | End: 2025-08-06 | Stop reason: HOSPADM

## 2025-08-06 RX ORDER — HYDROCORTISONE SODIUM SUCCINATE 100 MG/2ML
100 INJECTION INTRAMUSCULAR; INTRAVENOUS AS NEEDED
Status: CANCELLED | OUTPATIENT
Start: 2025-08-07

## 2025-08-06 RX ORDER — SODIUM CHLORIDE 9 MG/ML
20 INJECTION, SOLUTION INTRAVENOUS ONCE
Status: COMPLETED | OUTPATIENT
Start: 2025-08-06 | End: 2025-08-06

## 2025-08-06 RX ORDER — DIPHENHYDRAMINE HYDROCHLORIDE 50 MG/ML
50 INJECTION, SOLUTION INTRAMUSCULAR; INTRAVENOUS AS NEEDED
Status: CANCELLED | OUTPATIENT
Start: 2025-08-07

## 2025-08-06 RX ORDER — FAMOTIDINE 10 MG/ML
20 INJECTION, SOLUTION INTRAVENOUS AS NEEDED
Status: DISCONTINUED | OUTPATIENT
Start: 2025-08-06 | End: 2025-08-06 | Stop reason: HOSPADM

## 2025-08-06 RX ADMIN — SODIUM CHLORIDE 200 MG: 9 INJECTION, SOLUTION INTRAVENOUS at 15:11

## 2025-08-06 RX ADMIN — SODIUM CHLORIDE 20 ML/HR: 9 INJECTION, SOLUTION INTRAVENOUS at 15:00

## 2025-08-11 ENCOUNTER — INFUSION (OUTPATIENT)
Dept: ONCOLOGY | Facility: HOSPITAL | Age: 85
End: 2025-08-11
Payer: MEDICARE

## 2025-08-11 VITALS
HEART RATE: 64 BPM | OXYGEN SATURATION: 94 % | TEMPERATURE: 97.6 F | SYSTOLIC BLOOD PRESSURE: 123 MMHG | RESPIRATION RATE: 18 BRPM | DIASTOLIC BLOOD PRESSURE: 58 MMHG

## 2025-08-11 DIAGNOSIS — D50.9 IRON DEFICIENCY ANEMIA, UNSPECIFIED IRON DEFICIENCY ANEMIA TYPE: ICD-10-CM

## 2025-08-11 DIAGNOSIS — K90.9 INTESTINAL MALABSORPTION, UNSPECIFIED TYPE: Primary | ICD-10-CM

## 2025-08-11 PROCEDURE — 25010000002 IRON SUCROSE PER 1 MG: Performed by: INTERNAL MEDICINE

## 2025-08-11 PROCEDURE — 25810000003 SODIUM CHLORIDE 0.9 % SOLUTION: Performed by: INTERNAL MEDICINE

## 2025-08-11 PROCEDURE — 96374 THER/PROPH/DIAG INJ IV PUSH: CPT

## 2025-08-11 RX ORDER — SODIUM CHLORIDE 9 MG/ML
20 INJECTION, SOLUTION INTRAVENOUS ONCE
Status: COMPLETED | OUTPATIENT
Start: 2025-08-11 | End: 2025-08-11

## 2025-08-11 RX ORDER — DIPHENHYDRAMINE HYDROCHLORIDE 50 MG/ML
50 INJECTION, SOLUTION INTRAMUSCULAR; INTRAVENOUS AS NEEDED
Status: DISCONTINUED | OUTPATIENT
Start: 2025-08-11 | End: 2025-08-11 | Stop reason: HOSPADM

## 2025-08-11 RX ORDER — HYDROCORTISONE SODIUM SUCCINATE 100 MG/2ML
100 INJECTION INTRAMUSCULAR; INTRAVENOUS AS NEEDED
Status: DISCONTINUED | OUTPATIENT
Start: 2025-08-11 | End: 2025-08-11 | Stop reason: HOSPADM

## 2025-08-11 RX ORDER — HYDROCORTISONE SODIUM SUCCINATE 100 MG/2ML
100 INJECTION INTRAMUSCULAR; INTRAVENOUS AS NEEDED
OUTPATIENT
Start: 2025-08-11

## 2025-08-11 RX ORDER — DIPHENHYDRAMINE HYDROCHLORIDE 50 MG/ML
50 INJECTION, SOLUTION INTRAMUSCULAR; INTRAVENOUS AS NEEDED
OUTPATIENT
Start: 2025-08-11

## 2025-08-11 RX ORDER — SODIUM CHLORIDE 9 MG/ML
20 INJECTION, SOLUTION INTRAVENOUS ONCE
Status: CANCELLED | OUTPATIENT
Start: 2025-08-11

## 2025-08-11 RX ORDER — FAMOTIDINE 10 MG/ML
20 INJECTION, SOLUTION INTRAVENOUS AS NEEDED
Status: DISCONTINUED | OUTPATIENT
Start: 2025-08-11 | End: 2025-08-11 | Stop reason: HOSPADM

## 2025-08-11 RX ORDER — FAMOTIDINE 10 MG/ML
20 INJECTION, SOLUTION INTRAVENOUS AS NEEDED
OUTPATIENT
Start: 2025-08-11

## 2025-08-11 RX ADMIN — SODIUM CHLORIDE 200 MG: 9 INJECTION, SOLUTION INTRAVENOUS at 14:44

## 2025-08-11 RX ADMIN — SODIUM CHLORIDE 20 ML/HR: 9 INJECTION, SOLUTION INTRAVENOUS at 14:40

## 2025-08-25 ENCOUNTER — OFFICE VISIT (OUTPATIENT)
Dept: NEUROLOGY | Age: 85
End: 2025-08-25
Payer: MEDICARE

## 2025-08-25 VITALS
BODY MASS INDEX: 25.87 KG/M2 | DIASTOLIC BLOOD PRESSURE: 74 MMHG | HEART RATE: 88 BPM | WEIGHT: 191 LBS | OXYGEN SATURATION: 96 % | RESPIRATION RATE: 16 BRPM | SYSTOLIC BLOOD PRESSURE: 128 MMHG | HEIGHT: 72 IN

## 2025-08-25 DIAGNOSIS — G70.00 MYASTHENIA GRAVIS (HCC): Primary | ICD-10-CM

## 2025-08-25 DIAGNOSIS — Z87.09 HISTORY OF COPD: ICD-10-CM

## 2025-08-25 DIAGNOSIS — H53.2 DIPLOPIA: ICD-10-CM

## 2025-08-25 PROCEDURE — 99214 OFFICE O/P EST MOD 30 MIN: CPT | Performed by: PSYCHIATRY & NEUROLOGY

## 2025-08-25 PROCEDURE — G8428 CUR MEDS NOT DOCUMENT: HCPCS | Performed by: PSYCHIATRY & NEUROLOGY

## 2025-08-25 PROCEDURE — G8417 CALC BMI ABV UP PARAM F/U: HCPCS | Performed by: PSYCHIATRY & NEUROLOGY

## 2025-08-25 PROCEDURE — 1123F ACP DISCUSS/DSCN MKR DOCD: CPT | Performed by: PSYCHIATRY & NEUROLOGY

## 2025-08-25 PROCEDURE — 1036F TOBACCO NON-USER: CPT | Performed by: PSYCHIATRY & NEUROLOGY

## 2025-08-25 RX ORDER — PYRIDOSTIGMINE BROMIDE 60 MG/1
TABLET ORAL
Qty: 240 TABLET | Refills: 5 | Status: SHIPPED | OUTPATIENT
Start: 2025-08-25

## (undated) DEVICE — TOWEL,OR,DSP,ST,BLUE,DLX,4/PK,20PK/CS: Brand: MEDLINE

## (undated) DEVICE — THE CHANNEL CLEANING BRUSH IS A NYLON FLEXI BRUSH ATTACHED TO A FLEXIBLE PLASTIC SHEATH DESIGNED TO SAFELY REMOVE DEBRIS FROM FLEXIBLE ENDOSCOPES.

## (undated) DEVICE — SOLUTION IRRIG 1000ML 09% SOD CHL USP PIC PLAS CONTAINER

## (undated) DEVICE — GAUZE,SPONGE,FLUFF,6"X6.75",STRL,10/TRAY: Brand: MEDLINE

## (undated) DEVICE — MASK,OXYGEN,MED CONC,ADLT,7' TUB, UC: Brand: PENDING

## (undated) DEVICE — SNAR POLYP SENSATION MICRO OVL 13 240X40

## (undated) DEVICE — Device: Brand: DEFENDO AIR/WATER/SUCTION AND BIOPSY VALVE

## (undated) DEVICE — SOLUTION IV IRRIG WATER 1000ML POUR BRL 2F7114

## (undated) DEVICE — TBG SMPL FLTR LINE NASL 02/C02 A/ BX/100

## (undated) DEVICE — FRCP BIOP ENDO CAPTURAPRO SPK SERR 2.8MM 230CM

## (undated) DEVICE — CUFF,BP,DISP,1 TUBE,ADULT,HP: Brand: MEDLINE

## (undated) DEVICE — SUTURE MCRYL SZ 3-0 L18IN ABSRB UD L19MM PS-2 3/8 CIR PRIM Y497G

## (undated) DEVICE — THREE QUARTER SHEET: Brand: CONVERTORS

## (undated) DEVICE — ZIMMER® STERILE DISPOSABLE TOURNIQUET CUFF WITH PLC, DUAL PORT, SINGLE BLADDER, 34 IN. (86 CM)

## (undated) DEVICE — SOLUTION IV IRRIG POUR BRL 0.9% SODIUM CHL 2F7124

## (undated) DEVICE — SUTURE VCRL SZ 2-0 L36IN ABSRB UD L36MM CT-1 1/2 CIR J945H

## (undated) DEVICE — OSCILLATOR BLADE, 25.4 X 90 X 1.27 MM (.050"): Brand: CONMED

## (undated) DEVICE — MSK O2 MD CONCENTR A/ LF 7FT 1P/U

## (undated) DEVICE — BAND FLX MSTR STD STR 6IN

## (undated) DEVICE — SENSR O2 OXIMAX FNGR A/ 18IN NONSTR

## (undated) DEVICE — DISCONTINUED NO SUB IDED TG GLOVE SURG SENSICARE ALOE LT LF PF ST GRN SZ 8

## (undated) DEVICE — SCREW BNE L25MM DIA2.5MM KNEE FULL THRD HEX FEM PERSONA

## (undated) DEVICE — ABDOMINAL PAD: Brand: DERMACEA

## (undated) DEVICE — CHLORAPREP 26ML ORANGE

## (undated) DEVICE — LARGE BONE HALL BLADE, RECIPROCATOR, 12.5 X 76 X 1.27 MM: Brand: HALL

## (undated) DEVICE — ENDOGATOR AUXILIARY WATER JET CONNECTOR: Brand: ENDOGATOR

## (undated) DEVICE — SUTURE ABSORBABLE MONOFILAMENT 1-0 OS8 14 IN STRATAFIX SPRL SXPD2B202

## (undated) DEVICE — THE SINGLE USE ETRAP – POLYP TRAP IS USED FOR SUCTION RETRIEVAL OF ENDOSCOPICALLY REMOVED POLYPS.: Brand: ETRAP

## (undated) DEVICE — Device: Brand: POWER-FLO®

## (undated) DEVICE — TRAY EPI 25GA L3.5IN 0.75% BIPIVCAIN 8.25% D CONTAIN BPA

## (undated) DEVICE — YANKAUER,BULB TIP WITH VENT: Brand: ARGYLE

## (undated) DEVICE — GLOVE SURG SZ 8 L12IN FNGR THK94MIL TRNSLUC YEL LTX HYDRGEL

## (undated) DEVICE — SURGICAL PROCEDURE PACK KNEE TOT DBD CDS LOURDES HOSP LF

## (undated) DEVICE — SKIN AFFIX SURG ADHESIVE 72/CS 0.55ML: Brand: MEDLINE